# Patient Record
Sex: MALE | Race: WHITE | NOT HISPANIC OR LATINO | URBAN - METROPOLITAN AREA
[De-identification: names, ages, dates, MRNs, and addresses within clinical notes are randomized per-mention and may not be internally consistent; named-entity substitution may affect disease eponyms.]

---

## 2023-08-15 ENCOUNTER — TELEPHONE (OUTPATIENT)
Dept: UROLOGY | Facility: CLINIC | Age: 63
End: 2023-08-15
Payer: COMMERCIAL

## 2023-08-15 ENCOUNTER — HOSPITAL ENCOUNTER (EMERGENCY)
Facility: HOSPITAL | Age: 63
Discharge: HOME | End: 2023-08-15
Attending: EMERGENCY MEDICINE | Admitting: EMERGENCY MEDICINE
Payer: COMMERCIAL

## 2023-08-15 ENCOUNTER — APPOINTMENT (OUTPATIENT)
Dept: CT IMAGING | Facility: HOSPITAL | Age: 63
End: 2023-08-15
Payer: COMMERCIAL

## 2023-08-15 VITALS
HEART RATE: 101 BPM | SYSTOLIC BLOOD PRESSURE: 157 MMHG | OXYGEN SATURATION: 92 % | RESPIRATION RATE: 18 BRPM | HEIGHT: 70 IN | WEIGHT: 179.01 LBS | DIASTOLIC BLOOD PRESSURE: 88 MMHG | BODY MASS INDEX: 25.63 KG/M2 | TEMPERATURE: 98.2 F

## 2023-08-15 DIAGNOSIS — I71.43 INFRARENAL ABDOMINAL AORTIC ANEURYSM (AAA) WITHOUT RUPTURE: ICD-10-CM

## 2023-08-15 DIAGNOSIS — N20.1 URETEROLITHIASIS: Primary | ICD-10-CM

## 2023-08-15 DIAGNOSIS — N28.89 LEFT RENAL MASS: ICD-10-CM

## 2023-08-15 LAB
ALBUMIN SERPL-MCNC: 4.5 G/DL (ref 3.5–5.2)
ALBUMIN/GLOB SERPL: 1.6 G/DL
ALP SERPL-CCNC: 77 U/L (ref 39–117)
ALT SERPL W P-5'-P-CCNC: 23 U/L (ref 1–41)
ANION GAP SERPL CALCULATED.3IONS-SCNC: 11.1 MMOL/L (ref 5–15)
AST SERPL-CCNC: 22 U/L (ref 1–40)
BASOPHILS # BLD AUTO: 0.07 10*3/MM3 (ref 0–0.2)
BASOPHILS NFR BLD AUTO: 0.7 % (ref 0–1.5)
BILIRUB SERPL-MCNC: 0.3 MG/DL (ref 0–1.2)
BILIRUB UR QL STRIP: NEGATIVE
BUN SERPL-MCNC: 17 MG/DL (ref 8–23)
BUN/CREAT SERPL: 17 (ref 7–25)
CALCIUM SPEC-SCNC: 8.9 MG/DL (ref 8.6–10.5)
CHLORIDE SERPL-SCNC: 102 MMOL/L (ref 98–107)
CLARITY UR: CLEAR
CO2 SERPL-SCNC: 24.9 MMOL/L (ref 22–29)
COLOR UR: YELLOW
CREAT SERPL-MCNC: 1 MG/DL (ref 0.76–1.27)
D-LACTATE SERPL-SCNC: 1.5 MMOL/L (ref 0.5–2)
DEPRECATED RDW RBC AUTO: 45 FL (ref 37–54)
EGFRCR SERPLBLD CKD-EPI 2021: 84 ML/MIN/1.73
EOSINOPHIL # BLD AUTO: 0.44 10*3/MM3 (ref 0–0.4)
EOSINOPHIL NFR BLD AUTO: 4.3 % (ref 0.3–6.2)
ERYTHROCYTE [DISTWIDTH] IN BLOOD BY AUTOMATED COUNT: 15.4 % (ref 12.3–15.4)
GLOBULIN UR ELPH-MCNC: 2.8 GM/DL
GLUCOSE SERPL-MCNC: 160 MG/DL (ref 65–99)
GLUCOSE UR STRIP-MCNC: ABNORMAL MG/DL
HCT VFR BLD AUTO: 45.4 % (ref 37.5–51)
HGB BLD-MCNC: 15.9 G/DL (ref 13–17.7)
HGB UR QL STRIP.AUTO: NEGATIVE
HOLD SPECIMEN: NORMAL
HOLD SPECIMEN: NORMAL
IMM GRANULOCYTES # BLD AUTO: 0.03 10*3/MM3 (ref 0–0.05)
IMM GRANULOCYTES NFR BLD AUTO: 0.3 % (ref 0–0.5)
KETONES UR QL STRIP: NEGATIVE
LEUKOCYTE ESTERASE UR QL STRIP.AUTO: NEGATIVE
LIPASE SERPL-CCNC: 21 U/L (ref 13–60)
LYMPHOCYTES # BLD AUTO: 1.45 10*3/MM3 (ref 0.7–3.1)
LYMPHOCYTES NFR BLD AUTO: 14.3 % (ref 19.6–45.3)
MCH RBC QN AUTO: 29.7 PG (ref 26.6–33)
MCHC RBC AUTO-ENTMCNC: 35 G/DL (ref 31.5–35.7)
MCV RBC AUTO: 84.9 FL (ref 79–97)
MONOCYTES # BLD AUTO: 0.56 10*3/MM3 (ref 0.1–0.9)
MONOCYTES NFR BLD AUTO: 5.5 % (ref 5–12)
NEUTROPHILS NFR BLD AUTO: 7.58 10*3/MM3 (ref 1.7–7)
NEUTROPHILS NFR BLD AUTO: 74.9 % (ref 42.7–76)
NITRITE UR QL STRIP: NEGATIVE
NRBC BLD AUTO-RTO: 0 /100 WBC (ref 0–0.2)
PH UR STRIP.AUTO: 7.5 [PH] (ref 5–8)
PLATELET # BLD AUTO: 279 10*3/MM3 (ref 140–450)
PMV BLD AUTO: 9 FL (ref 6–12)
POTASSIUM SERPL-SCNC: 3.6 MMOL/L (ref 3.5–5.2)
PROT SERPL-MCNC: 7.3 G/DL (ref 6–8.5)
PROT UR QL STRIP: ABNORMAL
RBC # BLD AUTO: 5.35 10*6/MM3 (ref 4.14–5.8)
SODIUM SERPL-SCNC: 138 MMOL/L (ref 136–145)
SP GR UR STRIP: 1.01 (ref 1–1.03)
UROBILINOGEN UR QL STRIP: ABNORMAL
WBC NRBC COR # BLD: 10.13 10*3/MM3 (ref 3.4–10.8)
WHOLE BLOOD HOLD COAG: NORMAL
WHOLE BLOOD HOLD SPECIMEN: NORMAL

## 2023-08-15 PROCEDURE — 81003 URINALYSIS AUTO W/O SCOPE: CPT

## 2023-08-15 PROCEDURE — 96374 THER/PROPH/DIAG INJ IV PUSH: CPT

## 2023-08-15 PROCEDURE — 99285 EMERGENCY DEPT VISIT HI MDM: CPT

## 2023-08-15 PROCEDURE — 25010000002 KETOROLAC TROMETHAMINE PER 15 MG: Performed by: NURSE PRACTITIONER

## 2023-08-15 PROCEDURE — 96375 TX/PRO/DX INJ NEW DRUG ADDON: CPT

## 2023-08-15 PROCEDURE — 25510000001 IOPAMIDOL PER 1 ML: Performed by: EMERGENCY MEDICINE

## 2023-08-15 PROCEDURE — 25010000002 ONDANSETRON PER 1 MG: Performed by: NURSE PRACTITIONER

## 2023-08-15 PROCEDURE — 25010000002 HYDROMORPHONE 1 MG/ML SOLUTION: Performed by: EMERGENCY MEDICINE

## 2023-08-15 PROCEDURE — 83690 ASSAY OF LIPASE: CPT | Performed by: EMERGENCY MEDICINE

## 2023-08-15 PROCEDURE — 74177 CT ABD & PELVIS W/CONTRAST: CPT

## 2023-08-15 PROCEDURE — 80053 COMPREHEN METABOLIC PANEL: CPT | Performed by: EMERGENCY MEDICINE

## 2023-08-15 PROCEDURE — 85025 COMPLETE CBC W/AUTO DIFF WBC: CPT

## 2023-08-15 PROCEDURE — 83605 ASSAY OF LACTIC ACID: CPT

## 2023-08-15 RX ORDER — TAMSULOSIN HYDROCHLORIDE 0.4 MG/1
0.4 CAPSULE ORAL ONCE
Status: COMPLETED | OUTPATIENT
Start: 2023-08-15 | End: 2023-08-15

## 2023-08-15 RX ORDER — ONDANSETRON 2 MG/ML
4 INJECTION INTRAMUSCULAR; INTRAVENOUS ONCE
Status: COMPLETED | OUTPATIENT
Start: 2023-08-15 | End: 2023-08-15

## 2023-08-15 RX ORDER — METHYLPREDNISOLONE 4 MG/1
TABLET ORAL
Qty: 21 TABLET | Refills: 0 | Status: SHIPPED | OUTPATIENT
Start: 2023-08-15

## 2023-08-15 RX ORDER — HYDROCODONE BITARTRATE AND ACETAMINOPHEN 7.5; 325 MG/1; MG/1
1 TABLET ORAL EVERY 6 HOURS PRN
Qty: 20 TABLET | Refills: 0 | Status: SHIPPED | OUTPATIENT
Start: 2023-08-15 | End: 2023-08-19 | Stop reason: HOSPADM

## 2023-08-15 RX ORDER — KETOROLAC TROMETHAMINE 10 MG/1
10 TABLET, FILM COATED ORAL EVERY 6 HOURS PRN
Qty: 20 TABLET | Refills: 0 | Status: SHIPPED | OUTPATIENT
Start: 2023-08-15

## 2023-08-15 RX ORDER — SODIUM CHLORIDE 0.9 % (FLUSH) 0.9 %
10 SYRINGE (ML) INJECTION AS NEEDED
Status: DISCONTINUED | OUTPATIENT
Start: 2023-08-15 | End: 2023-08-15 | Stop reason: HOSPADM

## 2023-08-15 RX ORDER — MYCOPHENOLATE MOFETIL 500 MG/1
500 TABLET ORAL DAILY
COMMUNITY
Start: 2023-08-14

## 2023-08-15 RX ORDER — TAMSULOSIN HYDROCHLORIDE 0.4 MG/1
1 CAPSULE ORAL DAILY
Qty: 30 CAPSULE | Refills: 0 | Status: ON HOLD | OUTPATIENT
Start: 2023-08-15 | End: 2023-08-18 | Stop reason: SDUPTHER

## 2023-08-15 RX ORDER — KETOROLAC TROMETHAMINE 30 MG/ML
30 INJECTION, SOLUTION INTRAMUSCULAR; INTRAVENOUS ONCE
Status: COMPLETED | OUTPATIENT
Start: 2023-08-15 | End: 2023-08-15

## 2023-08-15 RX ADMIN — HYDROMORPHONE HYDROCHLORIDE 0.5 MG: 1 INJECTION, SOLUTION INTRAMUSCULAR; INTRAVENOUS; SUBCUTANEOUS at 08:59

## 2023-08-15 RX ADMIN — KETOROLAC TROMETHAMINE 30 MG: 30 INJECTION, SOLUTION INTRAMUSCULAR; INTRAVENOUS at 06:20

## 2023-08-15 RX ADMIN — IOPAMIDOL 100 ML: 755 INJECTION, SOLUTION INTRAVENOUS at 07:42

## 2023-08-15 RX ADMIN — ONDANSETRON 4 MG: 2 INJECTION INTRAMUSCULAR; INTRAVENOUS at 06:20

## 2023-08-15 RX ADMIN — TAMSULOSIN HYDROCHLORIDE 0.4 MG: 0.4 CAPSULE ORAL at 07:21

## 2023-08-15 NOTE — DISCHARGE INSTRUCTIONS
Increase your daily fluid intake.  Strain your urine for passage of stone.  Follow-up with urology.  He will see you a phone call with a follow-up appointment to see Dr. Spicer or one of her associates.  Follow-up with Dr. Velazco to discuss treatment options regarding your abdominal aortic aneurysm.  Return to the ER for severe abdominal pain, severe back pain, if you pass out, or any other concerns issues that may arise.

## 2023-08-15 NOTE — PROGRESS NOTES
UROLOGY OFFICE H&P NOTE    Subjective   HPI  Danny Savage is a 62 y.o. male.  Presents for evaluation of ureteral stone and renal mass after abnormal CT scan after presentation to the ER for acute left-sided flank pain.  Accompanied by his wife.    The patient was seen in the emergency room the other day for a kidney stone. He states that this is the third bad kidney stone he has had. The patient denies any previous surgeries for kidney stones. He states that the pain was worse than it has ever hurt before because he was vomiting with it.     The patient states that he had a CT scan done and was told that the mass on the left kidney was probably cancerous. He also had an aortic aneurysm. The patient states that the aneurysm was 4.7 x 4.6 cm. He was told to follow up later with a vascular surgeon.     The patient is doing well from a vomiting standpoint. He denies any fevers. The patient feels like he has passed the stone. He has zero pain at all in the tube where the stone will be lodged. The patient has not seen it coming out. He denies any burning. The patient was dehydrated so badly that he woke up at 5:30 AM and needed to go to the hospital. He vomited at home quite a bit. He drank a lot of water. The patient has not been seeing any blood in his urine.     Denies family history of genitourinary cancer.. The patient quit smoking about 10 to 12 years ago.     Denies history of renal insufficiency.  Denies unintentional weight loss or hematuria.    _______  Creatinine   8/15/2023: 1.0    UA 8/15/2023: Negative for hematuria, nitrite negative    CT abdomen pelvis with contrast 8/15/2023: (Urology findings)  4.5 mm obstructing stone in the left UPJ causing mild obstructing   uropathy.  A couple of other 2 mm nonobstructing stones are present in the left kidney as well.    2. 4.2 x 3.7 cm heterogeneous soft tissue partially exophytic mass in the lower pole of the left   kidney suspicious for renal cell  carcinoma/malignancy until proven otherwise.  Urology consultation   is recommended.      Results for orders placed or performed during the hospital encounter of 08/15/23   Comprehensive Metabolic Panel    Specimen: Blood   Result Value Ref Range    Glucose 160 (H) 65 - 99 mg/dL    BUN 17 8 - 23 mg/dL    Creatinine 1.00 0.76 - 1.27 mg/dL    Sodium 138 136 - 145 mmol/L    Potassium 3.6 3.5 - 5.2 mmol/L    Chloride 102 98 - 107 mmol/L    CO2 24.9 22.0 - 29.0 mmol/L    Calcium 8.9 8.6 - 10.5 mg/dL    Total Protein 7.3 6.0 - 8.5 g/dL    Albumin 4.5 3.5 - 5.2 g/dL    ALT (SGPT) 23 1 - 41 U/L    AST (SGOT) 22 1 - 40 U/L    Alkaline Phosphatase 77 39 - 117 U/L    Total Bilirubin 0.3 0.0 - 1.2 mg/dL    Globulin 2.8 gm/dL    A/G Ratio 1.6 g/dL    BUN/Creatinine Ratio 17.0 7.0 - 25.0    Anion Gap 11.1 5.0 - 15.0 mmol/L    eGFR 84.0 >60.0 mL/min/1.73   Lipase    Specimen: Blood   Result Value Ref Range    Lipase 21 13 - 60 U/L   Urinalysis With Microscopic If Indicated (No Culture) - Urine, Clean Catch    Specimen: Urine, Clean Catch   Result Value Ref Range    Color, UA Yellow Yellow, Straw    Appearance, UA Clear Clear    pH, UA 7.5 5.0 - 8.0    Specific Gravity, UA 1.009 1.005 - 1.030    Glucose,  mg/dL (1+) (A) Negative    Ketones, UA Negative Negative    Bilirubin, UA Negative Negative    Blood, UA Negative Negative    Protein, UA Trace (A) Negative    Leuk Esterase, UA Negative Negative    Nitrite, UA Negative Negative    Urobilinogen, UA 0.2 E.U./dL 0.2 - 1.0 E.U./dL   Lactic Acid, Plasma    Specimen: Blood   Result Value Ref Range    Lactate 1.5 0.5 - 2.0 mmol/L   CBC Auto Differential    Specimen: Blood   Result Value Ref Range    WBC 10.13 3.40 - 10.80 10*3/mm3    RBC 5.35 4.14 - 5.80 10*6/mm3    Hemoglobin 15.9 13.0 - 17.7 g/dL    Hematocrit 45.4 37.5 - 51.0 %    MCV 84.9 79.0 - 97.0 fL    MCH 29.7 26.6 - 33.0 pg    MCHC 35.0 31.5 - 35.7 g/dL    RDW 15.4 12.3 - 15.4 %    RDW-SD 45.0 37.0 - 54.0 fl    MPV 9.0  6.0 - 12.0 fL    Platelets 279 140 - 450 10*3/mm3    Neutrophil % 74.9 42.7 - 76.0 %    Lymphocyte % 14.3 (L) 19.6 - 45.3 %    Monocyte % 5.5 5.0 - 12.0 %    Eosinophil % 4.3 0.3 - 6.2 %    Basophil % 0.7 0.0 - 1.5 %    Immature Grans % 0.3 0.0 - 0.5 %    Neutrophils, Absolute 7.58 (H) 1.70 - 7.00 10*3/mm3    Lymphocytes, Absolute 1.45 0.70 - 3.10 10*3/mm3    Monocytes, Absolute 0.56 0.10 - 0.90 10*3/mm3    Eosinophils, Absolute 0.44 (H) 0.00 - 0.40 10*3/mm3    Basophils, Absolute 0.07 0.00 - 0.20 10*3/mm3    Immature Grans, Absolute 0.03 0.00 - 0.05 10*3/mm3    nRBC 0.0 0.0 - 0.2 /100 WBC   Green Top (Gel)   Result Value Ref Range    Extra Tube Hold for add-ons.    Lavender Top   Result Value Ref Range    Extra Tube hold for add-on    Gold Top - SST   Result Value Ref Range    Extra Tube Hold for add-ons.    Light Blue Top   Result Value Ref Range    Extra Tube Hold for add-ons.          Medical History:  Past Medical History:   Diagnosis Date    Allergy         Social History:  Social History     Socioeconomic History    Marital status:    Tobacco Use    Smoking status: Former     Types: Cigarettes    Smokeless tobacco: Never   Vaping Use    Vaping Use: Never used   Substance and Sexual Activity    Alcohol use: Never    Drug use: Never    Sexual activity: Yes     Partners: Female        Family History:  History reviewed. No pertinent family history.     Surgical History:  History reviewed. No pertinent surgical history.     Allergies:  Allergies   Allergen Reactions    Latex, Natural Rubber Hives        Current Medications:  No current facility-administered medications for this visit.     No current outpatient medications on file.     Facility-Administered Medications Ordered in Other Visits   Medication Dose Route Frequency Provider Last Rate Last Admin    sennosides-docusate (PERICOLACE) 8.6-50 MG per tablet 2 tablet  2 tablet Oral BID , Dimpi, DO        And    polyethylene glycol (MIRALAX) packet 17  g  17 g Oral Daily PRN , Dimpi, DO        And    bisacodyl (DULCOLAX) EC tablet 5 mg  5 mg Oral Daily PRN , Dimpi, DO        And    bisacodyl (DULCOLAX) suppository 10 mg  10 mg Rectal Daily PRN , Dimpi, DO        Calcium Replacement - Follow Nurse / BPA Driven Protocol   Does not apply PRN , Dimpi, DO        cefTRIAXone (ROCEPHIN) IVPB 1,000 mg  1,000 mg Intravenous Q24H , Dimpi, DO        cetirizine (zyrTEC) tablet 10 mg  10 mg Oral Daily , Dimpi, DO        Enoxaparin Sodium (LOVENOX) syringe 40 mg  40 mg Subcutaneous Daily , Dimpi, DO        famotidine (PEPCID) tablet 40 mg  40 mg Oral Daily , Dimpi, DO   40 mg at 08/17/23 1956    hydrALAZINE (APRESOLINE) injection 10 mg  10 mg Intravenous Q6H PRN , Dimpi, DO        HYDROcodone-acetaminophen (NORCO) 5-325 MG per tablet 1 tablet  1 tablet Oral Q4H PRN , Dimpi, DO   1 tablet at 08/17/23 1956    HYDROmorphone (DILAUDID) injection 0.5 mg  0.5 mg Intravenous Q2H PRN , Dimpi, DO   0.5 mg at 08/18/23 0349    And    naloxone (NARCAN) injection 0.4 mg  0.4 mg Intravenous Q5 Min PRN , Dimpi, DO        ketorolac (TORADOL) injection 15 mg  15 mg Intravenous Q6H PRN , Dimpi, DO   15 mg at 08/18/23 0528    Magnesium Standard Dose Replacement - Follow Nurse / BPA Driven Protocol   Does not apply PRN , Dimpi, DO        melatonin tablet 5 mg  5 mg Oral Nightly PRN , Dimpi, DO        mycophenolate (CELLCEPT) capsule 500 mg  500 mg Oral Daily , Dimpi, DO        ondansetron (ZOFRAN) tablet 4 mg  4 mg Oral Q6H PRN , Dimpi, DO        Or    ondansetron (ZOFRAN) injection 4 mg  4 mg Intravenous Q6H PRN , Dimpi, DO        Phosphorus Replacement - Follow Nurse / BPA Driven Protocol   Does not apply PRN , Dimpi, DO        Potassium Replacement - Follow Nurse / BPA Driven Protocol   Does not apply PRN Jack Patel, DO        sodium chloride 0.9 % flush 10 mL  10 mL Intravenous PRN Alberto Carrero,  DO        sodium chloride 0.9 % flush 10 mL  10 mL Intravenous Q12H , Dimpi, DO        sodium chloride 0.9 % flush 10 mL  10 mL Intravenous PRN , Dimpi, DO        sodium chloride 0.9 % infusion 40 mL  40 mL Intravenous PRN , Dimpi, DO        sodium chloride 0.9 % infusion  100 mL/hr Intravenous Continuous , Dimpi,  mL/hr at 08/18/23 0405 100 mL/hr at 08/18/23 0405    tamsulosin (FLOMAX) 24 hr capsule 0.8 mg  0.8 mg Oral Daily , Dimpi, DO           Review of systems  A review of systems was performed, and positive findings are noted in the HPI.    Objective     Physical exam  No acute distress, well-nourished  Lungs: Clear, unlabored  CV: Regular rate, no chest retractions  Awake alert and oriented  Mood normal; affect normal    Results  PROCEDURE:  CT ABDOMEN PELVIS W CONTRAST     COMPARISON: [facility], CR, IV PYELOGRAM & TIMI, 2/23/2004, 10:54.     INDICATIONS:  BILATERAL FLANK PAIN WITH NO KNOWN HEMATURIA     TECHNIQUE:    After obtaining the patient's consent, CT images were created with non-ionic intravenous   contrast material.       PROTOCOL:     Standard imaging protocol performed                 RADIATION:      DLP: 764.9mGy*cm               Automated exposure control was utilized to minimize radiation dose.   CONTRAST:      100cc Isovue 370 I.V.     FINDINGS:          The lung bases are free of active disease.  There is cardiomegaly.  The liver appears mildly   diffusely fatty infiltrated.  No suspicious liver.  Spleen pancreas adrenal glands are   unremarkable.  There is mild left hydroureteronephrosis due to a 4.5 mm left UPJ stone.  There are   a couple of 2 mm stones in the calices of the upper and midportion of the left kidney.    Additionally, arising from the lower pole of the left kidney is a partially exophytic slightly   heterogeneous mass measuring 4.2 x 3.7 x 5.3 cm in AP transverse and craniocaudal dimensions.  It   has Hounsfield units of 64 on the  portal venous phase images and 65 on the delayed images.  It is   suspicious for malignancy until proven otherwise.  There is a sub cm cortical cyst in the anterior   contralateral right kidney which otherwise is unremarkable.  The bladder is well distended without   focal abnormality.  The prostate gland is mildly enlarged with coarse central calcifications.       There is a small hiatal hernia.  The GI tract is normal in course and caliber.  No focal bowel wall   thickening.  There is mild descending and sigmoid colonic diverticulosis.  No fluid collections are   seen.  No pathologically enlarged lymph nodes.  There is Caroline nephric and Caroline ureteral fat   stranding around the left kidney and proximal left ureter.     There is a fusiform infrarenal abdominal aortic aneurysm measuring 4.7 x 4.6 cm in AP and   transverse dimensions.  There is a moderate amount of atherosclerotic disease in the abdominal   aorta.  No acute bony abnormalities or aggressive appearing focal osseous lesions are seen.     IMPRESSION:               1. There is a 4.5 mm obstructing stone in the left UPJ causing mild obstructing   uropathy.  A couple of other 2 mm nonobstructing stones are present in the left kidney as well.    2. 4.2 x 3.7 cm heterogeneous soft tissue partially exophytic mass in the lower pole of the left   kidney suspicious for renal cell carcinoma/malignancy until proven otherwise.  Urology consultation   is recommended.  3. Fusiform 4.7 x 4.6 cm infrarenal abdominal aortic aneurysm with a moderate amount of   atherosclerotic disease.  There are no prior imaging studies available for comparison to evaluate   for change.  If this has not been previously evaluated elsewhere, vascular surgery consultation on   a nonemergent basis is recommended.  4. Other incidental chronic ancillary findings are described above.        COBY DEGROOT DO         Electronically Signed and Approved By: COBY DEGROOT DO on 8/15/2023 at 8:18              Problem List:  Patient Active Problem List   Diagnosis    Left renal mass    Kidney stone    Left ureteral stone       Assessment & Plan     Diagnoses and all orders for this visit:    1. Ureteral stone (Primary)  -     XR Abdomen KUB; Future    2. Renal mass      We discussed the results of his CT scan in detail.       The patient and patient's wife were counseled regarding diagnostic results, prognosis and risks and benefits of treatment options.             The patient has a  4.2 x 3.7 left lower pole left renal mass concerning for renal cell carcinoma. This appears to be amendable to Nephron Sparing Surgery. I discussed the risks of this including bleeding, infection, pain, injury to ureter and renal pelvis, urine leak, recurrence, need for nephrectomy. I discussed the role of biopsy in my opinion and the degree of false negative biopsies and the 85% likelihood of a small renal mass being malignant.   Patient aware that this mass may be malignant but diagnosis is uncertain without tissue pathology.  Discussed that the risk of it being malignant are increased as size increase.     Recommend surgical excision of this left renal mass.  Discussed the risks of partial nephrectomy this including bleeding, infection, damage to surrounding structures, pain, injury to ureter and renal pelvis, urine leak, recurrence, need for nephrectomy, need for open surgery, benign pathology, and permanent loss of renal function.  Also aware that this is a procedure performed under general anesthesia which carries inherent risks including and up to death.  Patient participated in the discussion and notes understanding of these risks.      Willing to schedule left robotic assisted laparoscopic partial nephrectomy once acute issues resolve.      Left ureteral stone and nephrolithiasis    Potentially passed the 4.5 mm stone in left kidney.  -     The patient needs follow up imaging due to the size, and degree of hydronephrosis  to verify the stone has passed particularly prior to necessary intervention of his left renal mass  -    Obtain x-ray in about 2 weeks  -     If the stone has not passed within 4 to 6 weeks, we will make a plan to take it out.  -     If he gets a recurrence of pain, a fever over 101, or persistent vomiting, he should call the office, and we will schedule to have the stone removed.   -     Continue to stay well hydrated.   - If the stone is not seen on x-ray, then we need to wait until we can get another CT scan to make sure that that stone is not there and address it.    We will notify him of KUB results in about 2 weeks   Schedule OR left robotic partial nephrectomy   all questions and concerns have been addressed at this time.        Transcribed from ambient dictation for Michelle Cai MD by Gracie Mcgee.  08/16/23   10:28 EDT    Patient or patient representative verbalized consent to the visit recording.  I have personally performed the services described in this document as transcribed by the above individual, and it is both accurate and complete.

## 2023-08-15 NOTE — ED PROVIDER NOTES
Time: 6:58 AM EDT  Date of encounter:  8/15/2023  Independent Historian/Clinical History and Information was obtained by:   Patient  Chief Complaint: Flank pain    History is limited by: N/A    History of Present Illness:  Patient is a 62 y.o. year old male who presents to the emergency department for evaluation of flank pain. Pt states that he developed left sided flank pain abruptly around 0330 this morning. He states that the pain has been constant since onset. His pain has been relieved with Toradol in the ED. He denies any aggravating factors. He states that his pain started in the posterior aspect of his flank but it has since radiated into his LLQ. He rates his pain as a 10/10 at its worst. He also confirms several episodes of vomiting and urinary frequency. He denies nausea, diarrhea, or hematuria. He has a history of kidney stones but he states that this pain feels different.    hospitals    Patient Care Team  Primary Care Provider: Provider, No Known    Past Medical History:     Allergies   Allergen Reactions    Latex, Natural Rubber Hives     History reviewed. No pertinent past medical history.  History reviewed. No pertinent surgical history.  History reviewed. No pertinent family history.    Home Medications:  Prior to Admission medications    Not on File        Social History:          Review of Systems:  Review of Systems   Constitutional:  Negative for chills and fever.   HENT:  Negative for congestion, ear pain and sore throat.    Eyes:  Negative for pain.   Respiratory:  Negative for cough and shortness of breath.    Cardiovascular:  Negative for chest pain.   Gastrointestinal:  Positive for abdominal pain and vomiting. Negative for diarrhea and nausea.   Genitourinary:  Positive for flank pain and frequency. Negative for dysuria and hematuria.   Musculoskeletal:  Negative for back pain.   Skin:  Negative for pallor.   Neurological:  Negative for headaches.   All other systems reviewed and are negative.  "    Physical Exam:  /88 (BP Location: Left arm, Patient Position: Lying)   Pulse 101   Temp 98.2 øF (36.8 øC) (Oral)   Resp 18   Ht 177.8 cm (70\")   Wt 81.2 kg (179 lb 0.2 oz)   SpO2 92%   BMI 25.69 kg/mý      Vital signs were reviewed under triage note.  General appearance - Patient appears well-developed and well-nourished.  Patient is in no acute distress.  Head - Normocephalic, atraumatic.  Pupils - Equal, round, reactive to light.  Extraocular muscles are intact.  Conjunctiva is clear.  Nasal - Normal inspection.  No evidence of trauma or epistaxis.  Tympanic membranes - Gray, intact without erythema or retractions.  Oral mucosa - Pink and moist without lesions or erythema.  Uvula is midline.  Chest wall - Atraumatic.  Chest wall is nontender.  There are no vesicular rashes noted.  Neck - Supple.  Trachea was midline.  There is no palpable lymphadenopathy or thyromegaly.  There are no meningeal signs  Lungs - Clear to auscultation and percussion bilaterally.  Heart - Regular rate and rhythm without any murmurs, clicks, or gallops.  Abdomen - Soft.  Bowel sounds are present.  There is mild left lower quadrant palpable tenderness.  There is no rebound, guarding, or rigidity.  There are no palpable masses.  There are no pulsatile masses.  Back - Spine is straight and midline.  There is no CVA tenderness.  Extremities - Intact x4 with full range of motion.  There is no palpable edema.  Pulses are intact x4 and equal.  Neurologic - Patient is awake, alert, and oriented x3.  Cranial nerves II through XII are grossly intact.  Motor and sensory functions grossly intact.  Cerebellar function was normal.  Integument - There are no rashes.  There are no petechia or purpura lesions noted.  There are no vesicular lesions noted.            Procedures:  Procedures      Medical Decision Making:      Comorbidities that affect care:    History of Nephrolithiasis    External Notes reviewed:    None      The following " orders were placed and all results were independently analyzed by me:  Orders Placed This Encounter   Procedures    CT Abdomen Pelvis With Contrast    [place] Draw    Comprehensive Metabolic Panel    Lipase    Urinalysis With Microscopic If Indicated (No Culture) - Urine, Clean Catch    Lactic Acid, Plasma    CBC Auto Differential    Undress & Gown    CBC & Differential    Green Top (Gel)    Lavender Top    Gold Top - SST    Light Blue Top       Medications Given in the Emergency Department:  Medications   ketorolac (TORADOL) injection 30 mg (30 mg Intravenous Given 8/15/23 0620)   ondansetron (ZOFRAN) injection 4 mg (4 mg Intravenous Given 8/15/23 0620)   tamsulosin (FLOMAX) 24 hr capsule 0.4 mg (0.4 mg Oral Given 8/15/23 0721)   iopamidol (ISOVUE-370) 76 % injection 100 mL (100 mL Intravenous Given 8/15/23 0742)   HYDROmorphone (DILAUDID) injection 0.5 mg (0.5 mg Intravenous Given 8/15/23 0859)        ED Course:    Danny Savage is a 62 year old male who presented to the ED for evaluation of flank pain. I personally evaluated this patient during their visit to the ED. History was obtained. Their physical exam was unremarkable. I ordered appropriate labs and imaging related to this patient's chief complaint. Their work up in the ED was remarkable for multiple findings on his CT imaging. Significant findings include a 4.5 mm obstructing stone in the left UPJ, a 4.2 x 3.7 cm soft tissue mass in the lower pole of the left kidney, and a 4.7 x 4.6 cm infrarenal AAA. Because of these findings I consulted Dr. Cai who agreed to see this patient in the office. I also recommended outpatient follow up with vascular surgery for clinical monitoring of his AAA. I discussed the work up results with the patient. I instructed them to return to the ED with any new or worsening symptoms. The patient was agreeable to the plan of care. I determined this patient to be stable and appropriate for discharge. I addressed all of this  patient's questions and the patient has no further questions.      Labs:    Lab Results (last 24 hours)       Procedure Component Value Units Date/Time    Urinalysis With Microscopic If Indicated (No Culture) - Urine, Clean Catch [[id number]]  (Abnormal) Collected: 08/15/23 0606    Specimen: Urine, Clean Catch Updated: 08/15/23 0617     Color, UA Yellow     Appearance, UA Clear     pH, UA 7.5     Specific Gravity, UA 1.009     Glucose,  mg/dL (1+)     Ketones, UA Negative     Bilirubin, UA Negative     Blood, UA Negative     Protein, UA Trace     Leuk Esterase, UA Negative     Nitrite, UA Negative     Urobilinogen, UA 0.2 E.U./dL    Narrative:      Urine microscopic not indicated.    CBC & Differential [[id number]]  (Abnormal) Collected: 08/15/23 0613    Specimen: Blood Updated: 08/15/23 0624    Narrative:      The following orders were created for panel order CBC & Differential.  Procedure                               Abnormality         Status                     ---------                               -----------         ------                     CBC Auto Differential[[id number]]        Abnormal            Final result                 Please view results for these tests on the individual orders.    Lactic Acid, Plasma [[id number]]  (Normal) Collected: 08/15/23 0613    Specimen: Blood Updated: 08/15/23 0638     Lactate 1.5 mmol/L     CBC Auto Differential [[id number]]  (Abnormal) Collected: 08/15/23 0613    Specimen: Blood Updated: 08/15/23 0624     WBC 10.13 10*3/mm3      RBC 5.35 10*6/mm3      Hemoglobin 15.9 g/dL      Hematocrit 45.4 %      MCV 84.9 fL      MCH 29.7 pg      MCHC 35.0 g/dL      RDW 15.4 %      RDW-SD 45.0 fl      MPV 9.0 fL      Platelets 279 10*3/mm3      Neutrophil % 74.9 %      Lymphocyte % 14.3 %      Monocyte % 5.5 %      Eosinophil % 4.3 %      Basophil % 0.7 %      Immature Grans % 0.3 %      Neutrophils, Absolute 7.58 10*3/mm3      Lymphocytes, Absolute 1.45 10*3/mm3      Monocytes,  Absolute 0.56 10*3/mm3      Eosinophils, Absolute 0.44 10*3/mm3      Basophils, Absolute 0.07 10*3/mm3      Immature Grans, Absolute 0.03 10*3/mm3      nRBC 0.0 /100 WBC     Comprehensive Metabolic Panel [[id number]]  (Abnormal) Collected: 08/15/23 0640    Specimen: Blood Updated: 08/15/23 0704     Glucose 160 mg/dL      BUN 17 mg/dL      Creatinine 1.00 mg/dL      Sodium 138 mmol/L      Potassium 3.6 mmol/L      Comment: Slight hemolysis detected by analyzer. Results may be affected.        Chloride 102 mmol/L      CO2 24.9 mmol/L      Calcium 8.9 mg/dL      Total Protein 7.3 g/dL      Albumin 4.5 g/dL      ALT (SGPT) 23 U/L      AST (SGOT) 22 U/L      Alkaline Phosphatase 77 U/L      Total Bilirubin 0.3 mg/dL      Globulin 2.8 gm/dL      A/G Ratio 1.6 g/dL      BUN/Creatinine Ratio 17.0     Anion Gap 11.1 mmol/L      eGFR 84.0 mL/min/1.73     Narrative:      GFR Normal >60  Chronic Kidney Disease <60  Kidney Failure <15      Lipase [[id number]]  (Normal) Collected: 08/15/23 0640    Specimen: Blood Updated: 08/15/23 0704     Lipase 21 U/L              Imaging:    CT Abdomen Pelvis With Contrast    Result Date: 8/15/2023  PROCEDURE: CT ABDOMEN PELVIS W CONTRAST  COMPARISON: [facility], CR, IV PYELOGRAM & TIMI, 2/23/2004, 10:54.  INDICATIONS: BILATERAL FLANK PAIN WITH NO KNOWN HEMATURIA  TECHNIQUE: After obtaining the patient's consent, CT images were created with non-ionic intravenous contrast material.   PROTOCOL:   Standard imaging protocol performed    RADIATION:   DLP: 764.9mGy*cm   Automated exposure control was utilized to minimize radiation dose. CONTRAST: 100cc Isovue 370 I.V.  FINDINGS:  The lung bases are free of active disease.  There is cardiomegaly.  The liver appears mildly diffusely fatty infiltrated.  No suspicious liver.  Spleen pancreas adrenal glands are unremarkable.  There is mild left hydroureteronephrosis due to a 4.5 mm left UPJ stone.  There are a couple of 2 mm stones in the  calices of the upper and midportion of the left kidney.  Additionally, arising from the lower pole of the left kidney is a partially exophytic slightly heterogeneous mass measuring 4.2 x 3.7 x 5.3 cm in AP transverse and craniocaudal dimensions.  It has Hounsfield units of 64 on the portal venous phase images and 65 on the delayed images.  It is suspicious for malignancy until proven otherwise.  There is a sub cm cortical cyst in the anterior contralateral right kidney which otherwise is unremarkable.  The bladder is well distended without focal abnormality.  The prostate gland is mildly enlarged with coarse central calcifications.   There is a small hiatal hernia.  The GI tract is normal in course and caliber.  No focal bowel wall thickening.  There is mild descending and sigmoid colonic diverticulosis.  No fluid collections are seen.  No pathologically enlarged lymph nodes.  There is Caroline nephric and Caroline ureteral fat stranding around the left kidney and proximal left ureter.  There is a fusiform infrarenal abdominal aortic aneurysm measuring 4.7 x 4.6 cm in AP and transverse dimensions.  There is a moderate amount of atherosclerotic disease in the abdominal aorta.  No acute bony abnormalities or aggressive appearing focal osseous lesions are seen.       1. There is a 4.5 mm obstructing stone in the left UPJ causing mild obstructing uropathy.  A couple of other 2 mm nonobstructing stones are present in the left kidney as well.  2. 4.2 x 3.7 cm heterogeneous soft tissue partially exophytic mass in the lower pole of the left kidney suspicious for renal cell carcinoma/malignancy until proven otherwise.  Urology consultation is recommended. 3. Fusiform 4.7 x 4.6 cm infrarenal abdominal aortic aneurysm with a moderate amount of atherosclerotic disease.  There are no prior imaging studies available for comparison to evaluate for change.  If this has not been previously evaluated elsewhere, vascular surgery consultation  on a nonemergent basis is recommended. 4. Other incidental chronic ancillary findings are described above.    COBY DEGROOT DO       Electronically Signed and Approved By: COBY DEGROOT DO on 8/15/2023 at 8:18                Differential Diagnosis and Discussion:    Flank Pain: Differential diagnosis includes but is not limited to kidney stones, pyelonephritis, musculoskeletal disorders, renal infarction, urinary tract infection, hydronephrosis, radiculopathy, aortic aneurysm, renal cell carcinoma.    All labs were reviewed and interpreted by me.  CT scan radiology impression was interpreted by me.    MDM     Amount and/or Complexity of Data Reviewed  Clinical lab tests: reviewed  Tests in the radiology section of CP[facility]: reviewed             Patient Care Considerations:    MRI: I considered ordering an MRI however this can be performed as an outpatient if deemed necessary by Dr. Cai.      Consultants/Shared Management Plan:    Consultant: I have discussed the case with Dr. Cai who agrees to consult on the patient.    Social Determinants of Health:    Patient is independent, reliable, and has access to care.       Disposition and Care Coordination:    Discharged: I considered escalation of care by admitting this patient for observation, however the patient has improved and is suitable and  stable for discharge.    I have explained the patient's condition, diagnoses and treatment plan based on the information available to me at this time. I have answered questions and addressed any concerns. The patient has a good  understanding of the patient's diagnosis, condition, and treatment plan as can be expected at this point. The vital signs have been stable. The patient's condition is stable and appropriate for discharge from the emergency department.      The patient will pursue further outpatient evaluation with the primary care physician or other designated or consulting physician as outlined in the discharge  instructions. They are agreeable to this plan of care and follow-up instructions have been explained in detail. The patient has received these instructions in written format and have expressed an understanding of the discharge instructions. The patient is aware that any significant change in condition or worsening of symptoms should prompt an immediate return to this or the closest emergency department or call to 911.    Final diagnoses:   Ureterolithiasis   Left renal mass   Infrarenal abdominal aortic aneurysm (AAA) without rupture        ED Disposition       ED Disposition   Discharge    Condition   Stable    Comment   --               This medical record created using voice recognition software.             Teofilo Crawford DO  08/15/23 2036

## 2023-08-15 NOTE — TELEPHONE ENCOUNTER
"----- Message from Angelina Naresh sent at 8/15/2023  9:10 AM EDT -----  Regarding: needs appt  Per Dr Cai, please call this pt to sched a new pt appt \"renal stone/kidney mass.\" She is on call today and spoke to Dr Crawford (ER) about this pt. She wants to see him this week or next. He is currently still in the ER. I advised that you all will call him today. Thank you!    "

## 2023-08-16 ENCOUNTER — OFFICE VISIT (OUTPATIENT)
Dept: UROLOGY | Facility: CLINIC | Age: 63
End: 2023-08-16
Payer: COMMERCIAL

## 2023-08-16 ENCOUNTER — PREP FOR SURGERY (OUTPATIENT)
Dept: OTHER | Facility: HOSPITAL | Age: 63
End: 2023-08-16
Payer: COMMERCIAL

## 2023-08-16 DIAGNOSIS — N28.89 RENAL MASS: ICD-10-CM

## 2023-08-16 DIAGNOSIS — N28.89 LEFT RENAL MASS: Primary | ICD-10-CM

## 2023-08-16 DIAGNOSIS — N20.1 URETERAL STONE: Primary | ICD-10-CM

## 2023-08-17 ENCOUNTER — HOSPITAL ENCOUNTER (INPATIENT)
Facility: HOSPITAL | Age: 63
LOS: 2 days | Discharge: HOME | DRG: 661 | End: 2023-08-19
Attending: EMERGENCY MEDICINE | Admitting: HOSPITALIST
Payer: COMMERCIAL

## 2023-08-17 ENCOUNTER — APPOINTMENT (OUTPATIENT)
Dept: GENERAL RADIOLOGY | Facility: HOSPITAL | Age: 63
DRG: 661 | End: 2023-08-17
Payer: COMMERCIAL

## 2023-08-17 DIAGNOSIS — N28.89 LEFT RENAL MASS: ICD-10-CM

## 2023-08-17 DIAGNOSIS — N13.30 HYDRONEPHROSIS, UNSPECIFIED HYDRONEPHROSIS TYPE: ICD-10-CM

## 2023-08-17 DIAGNOSIS — N20.1 LEFT URETERAL STONE: ICD-10-CM

## 2023-08-17 DIAGNOSIS — R26.2 DIFFICULTY WALKING: ICD-10-CM

## 2023-08-17 DIAGNOSIS — N20.0 KIDNEY STONE: Primary | ICD-10-CM

## 2023-08-17 LAB
ALBUMIN SERPL-MCNC: 4.4 G/DL (ref 3.5–5.2)
ALBUMIN/GLOB SERPL: 1.8 G/DL
ALP SERPL-CCNC: 75 U/L (ref 39–117)
ALT SERPL W P-5'-P-CCNC: 21 U/L (ref 1–41)
ANION GAP SERPL CALCULATED.3IONS-SCNC: 11.1 MMOL/L (ref 5–15)
AST SERPL-CCNC: 20 U/L (ref 1–40)
BACTERIA UR QL AUTO: ABNORMAL /HPF
BASOPHILS # BLD AUTO: 0.05 10*3/MM3 (ref 0–0.2)
BASOPHILS NFR BLD AUTO: 0.8 % (ref 0–1.5)
BILIRUB SERPL-MCNC: 0.3 MG/DL (ref 0–1.2)
BILIRUB UR QL STRIP: NEGATIVE
BUN SERPL-MCNC: 20 MG/DL (ref 8–23)
BUN/CREAT SERPL: 17.7 (ref 7–25)
CALCIUM SPEC-SCNC: 8.9 MG/DL (ref 8.6–10.5)
CHLORIDE SERPL-SCNC: 102 MMOL/L (ref 98–107)
CLARITY UR: CLEAR
CO2 SERPL-SCNC: 24.9 MMOL/L (ref 22–29)
COLOR UR: YELLOW
CREAT SERPL-MCNC: 1.13 MG/DL (ref 0.76–1.27)
D-LACTATE SERPL-SCNC: 1.1 MMOL/L (ref 0.5–2)
DEPRECATED RDW RBC AUTO: 42.4 FL (ref 37–54)
EGFRCR SERPLBLD CKD-EPI 2021: 72.6 ML/MIN/1.73
EOSINOPHIL # BLD AUTO: 0.57 10*3/MM3 (ref 0–0.4)
EOSINOPHIL NFR BLD AUTO: 9.6 % (ref 0.3–6.2)
ERYTHROCYTE [DISTWIDTH] IN BLOOD BY AUTOMATED COUNT: 12.8 % (ref 12.3–15.4)
GLOBULIN UR ELPH-MCNC: 2.5 GM/DL
GLUCOSE SERPL-MCNC: 121 MG/DL (ref 65–99)
GLUCOSE UR STRIP-MCNC: ABNORMAL MG/DL
HCT VFR BLD AUTO: 46.1 % (ref 37.5–51)
HGB BLD-MCNC: 14.4 G/DL (ref 13–17.7)
HGB UR QL STRIP.AUTO: ABNORMAL
HOLD SPECIMEN: NORMAL
HOLD SPECIMEN: NORMAL
HYALINE CASTS UR QL AUTO: ABNORMAL /LPF
IMM GRANULOCYTES # BLD AUTO: 0.01 10*3/MM3 (ref 0–0.05)
IMM GRANULOCYTES NFR BLD AUTO: 0.2 % (ref 0–0.5)
KETONES UR QL STRIP: NEGATIVE
LEUKOCYTE ESTERASE UR QL STRIP.AUTO: NEGATIVE
LIPASE SERPL-CCNC: 22 U/L (ref 13–60)
LYMPHOCYTES # BLD AUTO: 1.41 10*3/MM3 (ref 0.7–3.1)
LYMPHOCYTES NFR BLD AUTO: 23.8 % (ref 19.6–45.3)
MCH RBC QN AUTO: 28.2 PG (ref 26.6–33)
MCHC RBC AUTO-ENTMCNC: 31.2 G/DL (ref 31.5–35.7)
MCV RBC AUTO: 90.4 FL (ref 79–97)
MONOCYTES # BLD AUTO: 0.59 10*3/MM3 (ref 0.1–0.9)
MONOCYTES NFR BLD AUTO: 9.9 % (ref 5–12)
NEUTROPHILS NFR BLD AUTO: 3.3 10*3/MM3 (ref 1.7–7)
NEUTROPHILS NFR BLD AUTO: 55.7 % (ref 42.7–76)
NITRITE UR QL STRIP: NEGATIVE
NRBC BLD AUTO-RTO: 0 /100 WBC (ref 0–0.2)
PH UR STRIP.AUTO: 7 [PH] (ref 5–8)
PLATELET # BLD AUTO: 278 10*3/MM3 (ref 140–450)
PMV BLD AUTO: 9.1 FL (ref 6–12)
POTASSIUM SERPL-SCNC: 4 MMOL/L (ref 3.5–5.2)
PROT SERPL-MCNC: 6.9 G/DL (ref 6–8.5)
PROT UR QL STRIP: NEGATIVE
RBC # BLD AUTO: 5.1 10*6/MM3 (ref 4.14–5.8)
RBC # UR STRIP: ABNORMAL /HPF
REF LAB TEST METHOD: ABNORMAL
SODIUM SERPL-SCNC: 138 MMOL/L (ref 136–145)
SP GR UR STRIP: 1.01 (ref 1–1.03)
SQUAMOUS #/AREA URNS HPF: ABNORMAL /HPF
UROBILINOGEN UR QL STRIP: ABNORMAL
WBC # UR STRIP: ABNORMAL /HPF
WBC NRBC COR # BLD: 5.93 10*3/MM3 (ref 3.4–10.8)
WHOLE BLOOD HOLD COAG: NORMAL
WHOLE BLOOD HOLD SPECIMEN: NORMAL

## 2023-08-17 PROCEDURE — 85025 COMPLETE CBC W/AUTO DIFF WBC: CPT

## 2023-08-17 PROCEDURE — 74018 RADEX ABDOMEN 1 VIEW: CPT

## 2023-08-17 PROCEDURE — 36415 COLL VENOUS BLD VENIPUNCTURE: CPT

## 2023-08-17 PROCEDURE — 83690 ASSAY OF LIPASE: CPT

## 2023-08-17 PROCEDURE — 25010000002 HYDROMORPHONE 1 MG/ML SOLUTION: Performed by: HOSPITALIST

## 2023-08-17 PROCEDURE — 99285 EMERGENCY DEPT VISIT HI MDM: CPT

## 2023-08-17 PROCEDURE — 25010000002 HYDROMORPHONE 1 MG/ML SOLUTION: Performed by: EMERGENCY MEDICINE

## 2023-08-17 PROCEDURE — 80053 COMPREHEN METABOLIC PANEL: CPT

## 2023-08-17 PROCEDURE — 25010000002 ONDANSETRON PER 1 MG: Performed by: EMERGENCY MEDICINE

## 2023-08-17 PROCEDURE — 83605 ASSAY OF LACTIC ACID: CPT

## 2023-08-17 PROCEDURE — 81001 URINALYSIS AUTO W/SCOPE: CPT | Performed by: EMERGENCY MEDICINE

## 2023-08-17 RX ORDER — LORATADINE 10 MG/1
10 TABLET ORAL DAILY
COMMUNITY

## 2023-08-17 RX ORDER — ENOXAPARIN SODIUM 100 MG/ML
40 INJECTION SUBCUTANEOUS DAILY
Status: DISCONTINUED | OUTPATIENT
Start: 2023-08-17 | End: 2023-08-19 | Stop reason: HOSPADM

## 2023-08-17 RX ORDER — HYDROCODONE BITARTRATE AND ACETAMINOPHEN 5; 325 MG/1; MG/1
1 TABLET ORAL EVERY 4 HOURS PRN
Status: DISCONTINUED | OUTPATIENT
Start: 2023-08-17 | End: 2023-08-19 | Stop reason: HOSPADM

## 2023-08-17 RX ORDER — SODIUM CHLORIDE 9 MG/ML
100 INJECTION, SOLUTION INTRAVENOUS CONTINUOUS
Status: DISCONTINUED | OUTPATIENT
Start: 2023-08-17 | End: 2023-08-19 | Stop reason: HOSPADM

## 2023-08-17 RX ORDER — ONDANSETRON 2 MG/ML
4 INJECTION INTRAMUSCULAR; INTRAVENOUS ONCE
Status: COMPLETED | OUTPATIENT
Start: 2023-08-17 | End: 2023-08-17

## 2023-08-17 RX ORDER — FAMOTIDINE 20 MG/1
40 TABLET, FILM COATED ORAL DAILY
Status: DISCONTINUED | OUTPATIENT
Start: 2023-08-17 | End: 2023-08-19 | Stop reason: HOSPADM

## 2023-08-17 RX ORDER — SODIUM CHLORIDE 9 MG/ML
40 INJECTION, SOLUTION INTRAVENOUS AS NEEDED
Status: DISCONTINUED | OUTPATIENT
Start: 2023-08-17 | End: 2023-08-19 | Stop reason: HOSPADM

## 2023-08-17 RX ORDER — TAMSULOSIN HYDROCHLORIDE 0.4 MG/1
0.4 CAPSULE ORAL ONCE
Status: COMPLETED | OUTPATIENT
Start: 2023-08-17 | End: 2023-08-17

## 2023-08-17 RX ORDER — TAMSULOSIN HYDROCHLORIDE 0.4 MG/1
0.8 CAPSULE ORAL DAILY
Status: DISCONTINUED | OUTPATIENT
Start: 2023-08-18 | End: 2023-08-18

## 2023-08-17 RX ORDER — CHOLECALCIFEROL (VITAMIN D3) 125 MCG
5 CAPSULE ORAL NIGHTLY PRN
Status: DISCONTINUED | OUTPATIENT
Start: 2023-08-17 | End: 2023-08-19 | Stop reason: HOSPADM

## 2023-08-17 RX ORDER — NALOXONE HCL 0.4 MG/ML
0.4 VIAL (ML) INJECTION
Status: DISCONTINUED | OUTPATIENT
Start: 2023-08-17 | End: 2023-08-19 | Stop reason: HOSPADM

## 2023-08-17 RX ORDER — CEFTRIAXONE SODIUM 1 G/50ML
1000 INJECTION, SOLUTION INTRAVENOUS ONCE
Status: COMPLETED | OUTPATIENT
Start: 2023-08-18 | End: 2023-08-18

## 2023-08-17 RX ORDER — POLYETHYLENE GLYCOL 3350 17 G/17G
17 POWDER, FOR SOLUTION ORAL DAILY PRN
Status: DISCONTINUED | OUTPATIENT
Start: 2023-08-17 | End: 2023-08-19 | Stop reason: HOSPADM

## 2023-08-17 RX ORDER — BISACODYL 10 MG
10 SUPPOSITORY, RECTAL RECTAL DAILY PRN
Status: DISCONTINUED | OUTPATIENT
Start: 2023-08-17 | End: 2023-08-19 | Stop reason: HOSPADM

## 2023-08-17 RX ORDER — KETOROLAC TROMETHAMINE 30 MG/ML
15 INJECTION, SOLUTION INTRAMUSCULAR; INTRAVENOUS EVERY 6 HOURS PRN
Status: DISPENSED | OUTPATIENT
Start: 2023-08-18 | End: 2023-08-18

## 2023-08-17 RX ORDER — SODIUM CHLORIDE 0.9 % (FLUSH) 0.9 %
10 SYRINGE (ML) INJECTION AS NEEDED
Status: DISCONTINUED | OUTPATIENT
Start: 2023-08-17 | End: 2023-08-19 | Stop reason: HOSPADM

## 2023-08-17 RX ORDER — CETIRIZINE HYDROCHLORIDE 10 MG/1
10 TABLET ORAL DAILY
Status: DISCONTINUED | OUTPATIENT
Start: 2023-08-18 | End: 2023-08-19 | Stop reason: HOSPADM

## 2023-08-17 RX ORDER — AMOXICILLIN 250 MG
2 CAPSULE ORAL 2 TIMES DAILY
Status: DISCONTINUED | OUTPATIENT
Start: 2023-08-17 | End: 2023-08-19 | Stop reason: HOSPADM

## 2023-08-17 RX ORDER — PSYLLIUM HUSK 0.4 G
1 CAPSULE ORAL 2 TIMES DAILY
COMMUNITY

## 2023-08-17 RX ORDER — BISACODYL 5 MG/1
5 TABLET, DELAYED RELEASE ORAL DAILY PRN
Status: DISCONTINUED | OUTPATIENT
Start: 2023-08-17 | End: 2023-08-19 | Stop reason: HOSPADM

## 2023-08-17 RX ORDER — ONDANSETRON 4 MG/1
4 TABLET, FILM COATED ORAL EVERY 6 HOURS PRN
Status: DISCONTINUED | OUTPATIENT
Start: 2023-08-17 | End: 2023-08-19 | Stop reason: HOSPADM

## 2023-08-17 RX ORDER — ONDANSETRON 2 MG/ML
4 INJECTION INTRAMUSCULAR; INTRAVENOUS EVERY 6 HOURS PRN
Status: DISCONTINUED | OUTPATIENT
Start: 2023-08-17 | End: 2023-08-19 | Stop reason: HOSPADM

## 2023-08-17 RX ORDER — MYCOPHENOLATE MOFETIL 250 MG/1
500 CAPSULE ORAL DAILY
Status: DISCONTINUED | OUTPATIENT
Start: 2023-08-18 | End: 2023-08-19 | Stop reason: HOSPADM

## 2023-08-17 RX ORDER — HYDRALAZINE HYDROCHLORIDE 20 MG/ML
10 INJECTION INTRAMUSCULAR; INTRAVENOUS EVERY 6 HOURS PRN
Status: DISCONTINUED | OUTPATIENT
Start: 2023-08-17 | End: 2023-08-19 | Stop reason: HOSPADM

## 2023-08-17 RX ORDER — SODIUM CHLORIDE 0.9 % (FLUSH) 0.9 %
10 SYRINGE (ML) INJECTION EVERY 12 HOURS SCHEDULED
Status: DISCONTINUED | OUTPATIENT
Start: 2023-08-17 | End: 2023-08-19 | Stop reason: HOSPADM

## 2023-08-17 RX ORDER — CEFTRIAXONE SODIUM 1 G/50ML
1000 INJECTION, SOLUTION INTRAVENOUS EVERY 24 HOURS
Status: DISCONTINUED | OUTPATIENT
Start: 2023-08-18 | End: 2023-08-19 | Stop reason: HOSPADM

## 2023-08-17 RX ADMIN — ONDANSETRON 4 MG: 2 INJECTION INTRAMUSCULAR; INTRAVENOUS at 16:36

## 2023-08-17 RX ADMIN — HYDROMORPHONE HYDROCHLORIDE 0.5 MG: 1 INJECTION, SOLUTION INTRAMUSCULAR; INTRAVENOUS; SUBCUTANEOUS at 18:55

## 2023-08-17 RX ADMIN — HYDROCODONE BITARTRATE AND ACETAMINOPHEN 1 TABLET: 5; 325 TABLET ORAL at 19:56

## 2023-08-17 RX ADMIN — HYDROMORPHONE HYDROCHLORIDE 1 MG: 1 INJECTION, SOLUTION INTRAMUSCULAR; INTRAVENOUS; SUBCUTANEOUS at 16:36

## 2023-08-17 RX ADMIN — FAMOTIDINE 40 MG: 20 TABLET ORAL at 19:56

## 2023-08-17 RX ADMIN — LIDOCAINE HYDROCHLORIDE 125 MG: 10 INJECTION, SOLUTION INFILTRATION; PERINEURAL at 21:04

## 2023-08-17 RX ADMIN — TAMSULOSIN HYDROCHLORIDE 0.4 MG: 0.4 CAPSULE ORAL at 21:05

## 2023-08-17 NOTE — ED PROVIDER NOTES
Time: 3:45 PM EDT  Date of encounter:  8/17/2023  Independent Historian/Clinical History and Information was obtained by:   Patient and Family    History is limited by: N/A    Chief Complaint   Patient presents with    Flank Pain         History of Present Illness:  Patient is a 62 y.o. year old male who presents to the emergency department for evaluation of flank pain.  Patient was diagnosed with ureteral stone 2 days ago.  Has been drinking fluids and taking Flomax as well as Toradol.  Today pain returned with significant severity.  Patient reports that the discussed surgical removal with Dr. Cai if pain did not improve.  Patient denies fever/chills, difficulty urinating.  BAKARI Gaffney    HPI    Patient Care Team  Primary Care Provider: Provider, No Known    Past Medical History:     Allergies   Allergen Reactions    Latex, Natural Rubber Hives     Past Medical History:   Diagnosis Date    Allergy      History reviewed. No pertinent surgical history.  History reviewed. No pertinent family history.    Home Medications:  Prior to Admission medications    Medication Sig Start Date End Date Taking? Authorizing Provider   HYDROcodone-acetaminophen (NORCO) 7.5-325 MG per tablet Take 1 tablet by mouth Every 6 (Six) Hours As Needed for Moderate Pain. 8/15/23   Teofilo Crawford DO   ketorolac (TORADOL) 10 MG tablet Take 1 tablet by mouth Every 6 (Six) Hours As Needed for Moderate Pain. 8/15/23   Teofilo Crawford DO   methylPREDNISolone (MEDROL) 4 MG dose pack Take as directed on package instructions. 8/15/23   Teofilo Crawford DO   mycophenolate (CELLCEPT) 500 MG tablet  8/14/23   Provider, MD Soniya   tamsulosin (FLOMAX) 0.4 MG capsule 24 hr capsule Take 1 capsule by mouth Daily. 8/15/23   Teofilo Crawford DO        Social History:   Social History     Tobacco Use    Smoking status: Former     Types: Cigarettes    Smokeless tobacco: Never   Vaping Use    Vaping Use: Never used         Review of Systems:  Review of Systems  "  Constitutional:  Negative for chills and fever.   HENT:  Negative for congestion, rhinorrhea and sore throat.    Eyes:  Negative for pain and visual disturbance.   Respiratory:  Negative for apnea, cough, chest tightness and shortness of breath.    Cardiovascular:  Negative for chest pain and palpitations.   Gastrointestinal:  Positive for nausea. Negative for abdominal pain, diarrhea and vomiting.   Genitourinary:  Positive for flank pain (Left). Negative for difficulty urinating and dysuria.   Musculoskeletal:  Negative for joint swelling and myalgias.   Skin:  Negative for color change.   Neurological:  Negative for seizures and headaches.   Psychiatric/Behavioral: Negative.     All other systems reviewed and are negative.     Physical Exam:  BP (!) 174/101 (BP Location: Left arm, Patient Position: Sitting)   Pulse 90   Temp 98.7 øF (37.1 øC) (Oral)   Resp 18   Ht 177.8 cm (70\")   Wt 80.3 kg (177 lb 0.5 oz)   SpO2 98%   BMI 25.40 kg/mý         Physical Exam  Vitals and nursing note reviewed.   Constitutional:       General: He is in acute distress (Pain).      Appearance: Normal appearance. He is not toxic-appearing.   HENT:      Head: Normocephalic and atraumatic.      Jaw: There is normal jaw occlusion.   Eyes:      General: Lids are normal.      Extraocular Movements: Extraocular movements intact.      Conjunctiva/sclera: Conjunctivae normal.      Pupils: Pupils are equal, round, and reactive to light.   Cardiovascular:      Rate and Rhythm: Normal rate and regular rhythm.      Pulses: Normal pulses.      Heart sounds: Normal heart sounds.   Pulmonary:      Effort: Pulmonary effort is normal. No respiratory distress.      Breath sounds: Normal breath sounds. No wheezing or rhonchi.   Abdominal:      General: Abdomen is flat. There is no distension.      Palpations: Abdomen is soft.      Tenderness: There is no abdominal tenderness. There is left CVA tenderness. There is no guarding or rebound. "   Musculoskeletal:         General: Normal range of motion.      Cervical back: Normal range of motion and neck supple.      Right lower leg: No edema.      Left lower leg: No edema.   Skin:     General: Skin is warm and dry.      Coloration: Skin is not cyanotic.   Neurological:      Mental Status: He is alert and oriented to person, place, and time. Mental status is at baseline.   Psychiatric:         Attention and Perception: Attention and perception normal.         Mood and Affect: Mood normal.                    Procedures:  Procedures      Medical Decision Making:      Comorbidities that affect care:    None    External Notes reviewed:    Previous Clinic Note: 8/16/2023 for ureteral stone      The following orders were placed and all results were independently analyzed by me:  Orders Placed This Encounter   Procedures    XR Abdomen KUB    [place] Draw    Comprehensive Metabolic Panel    Lipase    Urinalysis With Microscopic If Indicated (No Culture) - Urine, Clean Catch    Lactic Acid, Plasma    CBC Auto Differential    Urinalysis, Microscopic Only - Urine, Clean Catch    NPO Diet NPO Type: Strict NPO    Undress & Gown    Inpatient Urology Consult    Inpatient Hospitalist Consult    Insert Peripheral IV    Inpatient Admission    CBC & Differential    Green Top (Gel)    Lavender Top    Gold Top - SST    Light Blue Top       Medications Given in the Emergency Department:  Medications   sodium chloride 0.9 % flush 10 mL (has no administration in time range)   lidocaine (XYLOCAINE) 1 % 125 mg in sodium chloride 0.9 % 250 mL IVPB (has no administration in time range)   sodium chloride 0.9 % infusion (has no administration in time range)   ondansetron (ZOFRAN) injection 4 mg (4 mg Intravenous Given 8/17/23 1636)   HYDROmorphone (DILAUDID) injection 1 mg (1 mg Intravenous Given 8/17/23 1636)        ED Course:    The patient was initially evaluated in the triage area where orders were placed. The patient was later  dispositioned by Alberto Rocha PA-C.      The patient was advised to stay for completion of workup which includes but is not limited to communication of labs and radiological results, reassessment and plan. The patient was advised that leaving prior to disposition by a provider could result in critical findings that are not communicated to the patient.          Labs:    Lab Results (last 24 hours)       Procedure Component Value Units Date/Time    CBC & Differential [[id number]]  (Abnormal) Collected: 08/17/23 1544    Specimen: Blood from Arm, Left Updated: 08/17/23 1601    Narrative:      The following orders were created for panel order CBC & Differential.  Procedure                               Abnormality         Status                     ---------                               -----------         ------                     CBC Auto Differential[[id number]]        Abnormal            Final result                 Please view results for these tests on the individual orders.    Comprehensive Metabolic Panel [[id number]]  (Abnormal) Collected: 08/17/23 1544    Specimen: Blood from Arm, Left Updated: 08/17/23 1620     Glucose 121 mg/dL      BUN 20 mg/dL      Creatinine 1.13 mg/dL      Sodium 138 mmol/L      Potassium 4.0 mmol/L      Chloride 102 mmol/L      CO2 24.9 mmol/L      Calcium 8.9 mg/dL      Total Protein 6.9 g/dL      Albumin 4.4 g/dL      ALT (SGPT) 21 U/L      AST (SGOT) 20 U/L      Alkaline Phosphatase 75 U/L      Total Bilirubin 0.3 mg/dL      Globulin 2.5 gm/dL      A/G Ratio 1.8 g/dL      BUN/Creatinine Ratio 17.7     Anion Gap 11.1 mmol/L      eGFR 72.6 mL/min/1.73     Narrative:      GFR Normal >60  Chronic Kidney Disease <60  Kidney Failure <15      Lipase [[id number]]  (Normal) Collected: 08/17/23 1544    Specimen: Blood from Arm, Left Updated: 08/17/23 1620     Lipase 22 U/L     Lactic Acid, Plasma [[id number]]  (Normal) Collected: 08/17/23 1544    Specimen: Blood from Arm, Left Updated: 08/17/23  1616     Lactate 1.1 mmol/L     CBC Auto Differential [[id number]]  (Abnormal) Collected: 08/17/23 1544    Specimen: Blood from Arm, Left Updated: 08/17/23 1601     WBC 5.93 10*3/mm3      RBC 5.10 10*6/mm3      Hemoglobin 14.4 g/dL      Hematocrit 46.1 %      MCV 90.4 fL      MCH 28.2 pg      MCHC 31.2 g/dL      RDW 12.8 %      RDW-SD 42.4 fl      MPV 9.1 fL      Platelets 278 10*3/mm3      Neutrophil % 55.7 %      Lymphocyte % 23.8 %      Monocyte % 9.9 %      Eosinophil % 9.6 %      Basophil % 0.8 %      Immature Grans % 0.2 %      Neutrophils, Absolute 3.30 10*3/mm3      Lymphocytes, Absolute 1.41 10*3/mm3      Monocytes, Absolute 0.59 10*3/mm3      Eosinophils, Absolute 0.57 10*3/mm3      Basophils, Absolute 0.05 10*3/mm3      Immature Grans, Absolute 0.01 10*3/mm3      nRBC 0.0 /100 WBC     Urinalysis With Microscopic If Indicated (No Culture) - Urine, Clean Catch [[id number]]  (Abnormal) Collected: 08/17/23 1623    Specimen: Urine, Clean Catch Updated: 08/17/23 1637     Color, UA Yellow     Appearance, UA Clear     pH, UA 7.0     Specific Gravity, UA 1.012     Glucose,  mg/dL (Trace)     Ketones, UA Negative     Bilirubin, UA Negative     Blood, UA Trace     Protein, UA Negative     Leuk Esterase, UA Negative     Nitrite, UA Negative     Urobilinogen, UA 0.2 E.U./dL    Urinalysis, Microscopic Only - Urine, Clean Catch [[id number]]  (Abnormal) Collected: 08/17/23 1623    Specimen: Urine, Clean Catch Updated: 08/17/23 1637     RBC, UA 0-2 /HPF      WBC, UA 0-2 /HPF      Bacteria, UA None Seen /HPF      Squamous Epithelial Cells, UA 0-2 /HPF      Hyaline Casts, UA None Seen /LPF      Methodology Automated Microscopy             Imaging:    XR Abdomen KUB    Result Date: 8/17/2023  PROCEDURE: XR ABDOMEN KUB  COMPARISON: [facility], CT, CT ABDOMEN PELVIS W CONTRAST, 8/15/2023, 7:28.  INDICATIONS: left side flank pain  FINDINGS:  There are faint left renal calculi.  There is a round calcific density  in the left aspect of the pelvis consistent with a phlebolith.  There is a slightly irregular calcification in the left lower pelvis measuring 4 mm in size suspicious for a stone at the left ureterovesical junction.  The bowel gas pattern is normal.  The bony structures are normal for age.  The lung bases are clear.        1. Faint left renal calculi. 2. 4 mm calcific density in the left lower pelvis suspicious for left ureterovesical junction stone.     MALICK GARNETT MD       Electronically Signed and Approved By: MALICK GARNETT MD on 8/17/2023 at 17:41                Differential Diagnosis and Discussion:      Dysuria: Differential diagnosis includes but is not limited to urethritis, cystitis, pyelonephritis, ureteral calculi, neoplasm, chemical irritant, urethral stricture, and trauma  Flank Pain: Differential diagnosis includes but is not limited to kidney stones, pyelonephritis, musculoskeletal disorders, renal infarction, urinary tract infection, hydronephrosis, radiculopathy, aortic aneurysm, renal cell carcinoma.    All labs were reviewed and interpreted by me.  All X-rays impressions were independently interpreted by me.    MDM     Amount and/or Complexity of Data Reviewed  Decide to obtain previous medical records or to obtain history from someone other than the patient: yes    Patient was seen yesterday by Dr. Spicer and evaluated for left kidney stone as well as left kidney neoplasm.  Dr. Spicer told the patient that they would try to let the patient pass the stone within the next 2 weeks and if the stone had not passed or became too painful for the patient to return to the ER.  Patient states he has been taking Toradol and Flomax at home and is still having intractable pain so he came to the ER today.  I gave the patient Dilaudid but did not help the patient's pain.  I spoke with Dr. Spicer on the phone and she said to have the patient admitted to  for pain control and she will operate tomorrow  before noon.  I relayed this to the patient he was very grateful and understood the plan of care.        Patient Care Considerations:    CT ABDOMEN AND PELVIS: I considered ordering a CT scan of the abdomen and pelvis however the patient had a CT 2 days ago and Dr. Spicer is aware of the situation.      Consultants/Shared Management Plan:    Consultant: I have discussed the case with Dr. Spicer who states to admit the patient due to intractable pain and she will operate tomorrow.  I spoke with Dr. Melchor on the phone and she agreed to accept the patient for admission.    Social Determinants of Health:    Patient has presented with family members who are responsible, reliable and will ensure follow up care.      Disposition and Care Coordination:    Admit:   Through independent evaluation of the patient's history, physical, and imperical data, the patient meets criteria for observation/admission to the hospital.        Final diagnoses:   Kidney stone        ED Disposition       ED Disposition   Decision to Admit    Condition   --    Comment   Level of Care: Med/Surg [1]   Diagnosis: Kidney stone [[id number]]   Admitting Physician: CRIS MELCHOR [[id number]]   Attending Physician: CRIS MELCHOR [[id number]]   Certification: I Certify That Inpatient Hospital Services Are Medically Necessary For Greater Than 2 Midnights                 This medical record created using voice recognition software.             Alberto Rocha PA-C  08/17/23 8350

## 2023-08-17 NOTE — H&P
[facility] HISTORY AND PHYSICAL  Date: 2023   Patient Name: Danny Savage  : 1960  MRN: [id number]  Primary Care Physician:  Provider, No Known  Date of admission: 2023    Subjective abdominal pain/flank pain  Subjective     Chief Complaint: Abdominal and flank pain    HPI: Patient is a 62-year-old male who was diagnosed with a ureteral stone 2 days ago in the ER.  He has been drinking lots of fluids and taking Flomax.  The pain is severe.  He was told to come back to the ER and have the stone removed by Dr. Spicer if the pain did not improve.  Patient denies any nausea vomiting fevers or chills.    On arrival to the ED patient's temperature of 98.7, pulse of 90, respiratory rate of 18, blood pressure 174/101, and he saturating 98% on room air.  KUB shows a forced millimeter calcific density in the left lower pelvis suspicious for left ureterovesicular junction stone.    CT from 8/15/2023 shows:1. There is a 4.5 mm obstructing stone in the left UPJ causing mild obstructing uropathy.  A couple of other 2 mm nonobstructing stones are present in the left kidney as well.    2. 4.2 x 3.7 cm heterogeneous soft tissue partially exophytic mass in the lower pole of the left kidney suspicious for renal cell carcinoma/malignancy until proven otherwise.  Urology consultation is recommended.   3. Fusiform 4.7 x 4.6 cm infrarenal abdominal aortic aneurysm with a moderate amount of atherosclerotic disease.  There are no prior imaging studies available for comparison to evaluate for change.  If this has not been previously evaluated elsewhere, vascular surgery consultation on a nonemergent basis is recommended.   4. Other incidental chronic ancillary findings are described above.         On UA, patient has a trace amount of blood.  Glucose is 121.  There is no leukocytosis.  Personal History     Past Medical History:  Past Medical History:   Diagnosis Date    Allergy          Past Surgical  History:  History reviewed. No pertinent surgical history.     Family History:   History reviewed. No pertinent family history.     Social History:   Social History     Socioeconomic History    Marital status:    Tobacco Use    Smoking status: Former     Types: Cigarettes    Smokeless tobacco: Never   Vaping Use    Vaping Use: Never used         Home Medications:  HYDROcodone-acetaminophen, Psyllium, ketorolac, loratadine, methylPREDNISolone, mycophenolate, and tamsulosin    Allergies:  Allergies   Allergen Reactions    Latex, Natural Rubber Hives       Review of Systems   All systems were reviewed and negative except for:flank pain     Objective   Objective     Vitals:   Temp:  [98.7 øF (37.1 øC)] 98.7 øF (37.1 øC)  Heart Rate:  [88-90] 88  Resp:  [18] 18  BP: (168-174)/() 168/84    Physical Exam   Physical Exam  Constitutional:       Appearance: Normal appearance.   HENT:      Head: Normocephalic.      Nose: Nose normal.      Mouth/Throat:      Mouth: Mucous membranes are moist.   Eyes:      Extraocular Movements: Extraocular movements intact.      Pupils: Pupils are equal, round, and reactive to light.   Cardiovascular:      Rate and Rhythm: Normal rate and regular rhythm.      Pulses: Normal pulses.   Pulmonary:      Effort: Pulmonary effort is normal.      Breath sounds: Normal breath sounds.   Abdominal:      General: Abdomen is flat. Bowel sounds are normal.      Palpations: Abdomen is soft.   Musculoskeletal:         General: Normal range of motion.   Skin:     General: Skin is warm and dry.   Neurological:      General: No focal deficit present.      Mental Status: He is alert.        Result Review      Result Review:  I have personally reviewed the results from the time of this admission to 8/17/2023 19:42 EDT and agree with these findings:  [x]  Laboratory  []  Microbiology  []  Radiology  []  EKG/Telemetry   [x]  Cardiology/Vascular   []  Pathology  [x]  Old records  []  Other:    Lab  Results (most recent)       Procedure Component Value Units Date/Time    Urinalysis With Microscopic If Indicated (No Culture) - Urine, Clean Catch [[id number]]  (Abnormal) Collected: 08/17/23 1623    Specimen: Urine, Clean Catch Updated: 08/17/23 1637     Color, UA Yellow     Appearance, UA Clear     pH, UA 7.0     Specific Gravity, UA 1.012     Glucose,  mg/dL (Trace)     Ketones, UA Negative     Bilirubin, UA Negative     Blood, UA Trace     Protein, UA Negative     Leuk Esterase, UA Negative     Nitrite, UA Negative     Urobilinogen, UA 0.2 E.U./dL    Urinalysis, Microscopic Only - Urine, Clean Catch [[id number]]  (Abnormal) Collected: 08/17/23 1623    Specimen: Urine, Clean Catch Updated: 08/17/23 1637     RBC, UA 0-2 /HPF      WBC, UA 0-2 /HPF      Bacteria, UA None Seen /HPF      Squamous Epithelial Cells, UA 0-2 /HPF      Hyaline Casts, UA None Seen /LPF      Methodology Automated Microscopy    Comprehensive Metabolic Panel [[id number]]  (Abnormal) Collected: 08/17/23 1544    Specimen: Blood from Arm, Left Updated: 08/17/23 1620     Glucose 121 mg/dL      BUN 20 mg/dL      Creatinine 1.13 mg/dL      Sodium 138 mmol/L      Potassium 4.0 mmol/L      Chloride 102 mmol/L      CO2 24.9 mmol/L      Calcium 8.9 mg/dL      Total Protein 6.9 g/dL      Albumin 4.4 g/dL      ALT (SGPT) 21 U/L      AST (SGOT) 20 U/L      Alkaline Phosphatase 75 U/L      Total Bilirubin 0.3 mg/dL      Globulin 2.5 gm/dL      A/G Ratio 1.8 g/dL      BUN/Creatinine Ratio 17.7     Anion Gap 11.1 mmol/L      eGFR 72.6 mL/min/1.73     Narrative:      GFR Normal >60  Chronic Kidney Disease <60  Kidney Failure <15      Lipase [[id number]]  (Normal) Collected: 08/17/23 1544    Specimen: Blood from Arm, Left Updated: 08/17/23 1620     Lipase 22 U/L     Lactic Acid, Plasma [[id number]]  (Normal) Collected: 08/17/23 1544    Specimen: Blood from Arm, Left Updated: 08/17/23 1616     Lactate 1.1 mmol/L     CBC & Differential [[id number]]  (Abnormal)  Collected: 08/17/23 1544    Specimen: Blood from Arm, Left Updated: 08/17/23 1601    Narrative:      The following orders were created for panel order CBC & Differential.  Procedure                               Abnormality         Status                     ---------                               -----------         ------                     CBC Auto Differential[[id number]]        Abnormal            Final result                 Please view results for these tests on the individual orders.    CBC Auto Differential [[id number]]  (Abnormal) Collected: 08/17/23 1544    Specimen: Blood from Arm, Left Updated: 08/17/23 1601     WBC 5.93 10*3/mm3      RBC 5.10 10*6/mm3      Hemoglobin 14.4 g/dL      Hematocrit 46.1 %      MCV 90.4 fL      MCH 28.2 pg      MCHC 31.2 g/dL      RDW 12.8 %      RDW-SD 42.4 fl      MPV 9.1 fL      Platelets 278 10*3/mm3      Neutrophil % 55.7 %      Lymphocyte % 23.8 %      Monocyte % 9.9 %      Eosinophil % 9.6 %      Basophil % 0.8 %      Immature Grans % 0.2 %      Neutrophils, Absolute 3.30 10*3/mm3      Lymphocytes, Absolute 1.41 10*3/mm3      Monocytes, Absolute 0.59 10*3/mm3      Eosinophils, Absolute 0.57 10*3/mm3      Basophils, Absolute 0.05 10*3/mm3      Immature Grans, Absolute 0.01 10*3/mm3      nRBC 0.0 /100 WBC     [place] Draw [[id number]] Collected: 08/17/23 1544    Specimen: Blood from Arm, Left Updated: 08/17/23 5047    Narrative:      The following orders were created for panel order [place] Draw.  Procedure                               Abnormality         Status                     ---------                               -----------         ------                     Green Top (Gel)[[id number]]                                  Final result               Lavender Top[[id number]]                                     Final result               Gold Top - SST[[id number]]                                   Final result               Light Blue Top[[id number]]                                    Final result                 Please view results for these tests on the individual orders.    Lavender Top [[id number]] Collected: 08/17/23 1544    Specimen: Blood from Arm, Left Updated: 08/17/23 1559     Extra Tube hold for add-on     Comment: Auto resulted       Green Top (Gel) [[id number]] Collected: 08/17/23 1544    Specimen: Blood from Arm, Left Updated: 08/17/23 1558     Extra Tube Hold for add-ons.     Comment: Auto resulted.       Gold Top - SST [[id number]] Collected: 08/17/23 1544    Specimen: Blood from Arm, Left Updated: 08/17/23 1558     Extra Tube Hold for add-ons.     Comment: Auto resulted.       Light Blue Top [[id number]] Collected: 08/17/23 1544    Specimen: Blood from Arm, Left Updated: 08/17/23 1558     Extra Tube Hold for add-ons.     Comment: Auto resulted               XR Abdomen KUB    Result Date: 8/17/2023    1. Faint left renal calculi. 2. 4 mm calcific density in the left lower pelvis suspicious for left ureterovesical junction stone.     MALICK GARNETT MD       Electronically Signed and Approved By: MALICK GARNETT MD on 8/17/2023 at 17:41              Assessment & Plan   Assessment / Plan   Assessment/Plan:   #1  4 mm left kidney stone  -Dr. Cai will take the patient to the OR tomorrow.  -IV fluids, Rocephin, pain control antiemetics    #2  4.2 x 3.7 cm heterogeneous soft tissue partially exophytic mass in the lower pole of the left kidney suspicious for renal cell carcinoma/malignancy until proven otherwise.  Urology consultation is recommended.     #3  Patient reports that he is on CellCept we will continue.  Unsure of the reason.    #4  Fusiform 4.7 x 4.6 cm infrarenal abdominal aortic aneurysm with a moderate amount of atherosclerotic disease.  -We will need outpatient vascular referral    #5 allergic rhinitis continue home regimen    #6 hypertension as needed hydralazine ordered.  Patient not on any hypertension medicines could be secondary to pain.       DVT  prophylaxis:  Medical DVT prophylaxis orders are present.    CODE STATUS:    Level Of Support Discussed With: Patient  Code Status (Patient has no pulse and is not breathing): CPR (Attempt to Resuscitate)  Medical Interventions (Patient has pulse or is breathing): Full Support      Admission Status:  I believe this patient meets inpatient status.    Electronically signed by Jack Patel DO, 08/17/23, 7:42 PM EDT.

## 2023-08-18 ENCOUNTER — APPOINTMENT (OUTPATIENT)
Dept: GENERAL RADIOLOGY | Facility: HOSPITAL | Age: 63
DRG: 661 | End: 2023-08-18
Payer: COMMERCIAL

## 2023-08-18 ENCOUNTER — ANESTHESIA EVENT (OUTPATIENT)
Dept: PERIOP | Facility: HOSPITAL | Age: 63
DRG: 661 | End: 2023-08-18
Payer: COMMERCIAL

## 2023-08-18 ENCOUNTER — ANESTHESIA (OUTPATIENT)
Dept: PERIOP | Facility: HOSPITAL | Age: 63
DRG: 661 | End: 2023-08-18
Payer: COMMERCIAL

## 2023-08-18 LAB
ANION GAP SERPL CALCULATED.3IONS-SCNC: 11 MMOL/L (ref 5–15)
BASOPHILS # BLD AUTO: 0.04 10*3/MM3 (ref 0–0.2)
BASOPHILS NFR BLD AUTO: 0.6 % (ref 0–1.5)
BUN SERPL-MCNC: 20 MG/DL (ref 8–23)
BUN/CREAT SERPL: 14.6 (ref 7–25)
CALCIUM SPEC-SCNC: 8.5 MG/DL (ref 8.6–10.5)
CHLORIDE SERPL-SCNC: 104 MMOL/L (ref 98–107)
CO2 SERPL-SCNC: 23 MMOL/L (ref 22–29)
CREAT SERPL-MCNC: 1.37 MG/DL (ref 0.76–1.27)
DEPRECATED RDW RBC AUTO: 42.5 FL (ref 37–54)
EGFRCR SERPLBLD CKD-EPI 2021: 57.6 ML/MIN/1.73
EOSINOPHIL # BLD AUTO: 0.28 10*3/MM3 (ref 0–0.4)
EOSINOPHIL NFR BLD AUTO: 4.2 % (ref 0.3–6.2)
ERYTHROCYTE [DISTWIDTH] IN BLOOD BY AUTOMATED COUNT: 12.8 % (ref 12.3–15.4)
GLUCOSE SERPL-MCNC: 99 MG/DL (ref 65–99)
HCT VFR BLD AUTO: 41.4 % (ref 37.5–51)
HGB BLD-MCNC: 13.1 G/DL (ref 13–17.7)
IMM GRANULOCYTES # BLD AUTO: 0.02 10*3/MM3 (ref 0–0.05)
IMM GRANULOCYTES NFR BLD AUTO: 0.3 % (ref 0–0.5)
LAB AP CASE REPORT: NORMAL
LAB AP CLINICAL INFORMATION: NORMAL
LYMPHOCYTES # BLD AUTO: 1.02 10*3/MM3 (ref 0.7–3.1)
LYMPHOCYTES NFR BLD AUTO: 15.2 % (ref 19.6–45.3)
MCH RBC QN AUTO: 28.8 PG (ref 26.6–33)
MCHC RBC AUTO-ENTMCNC: 31.6 G/DL (ref 31.5–35.7)
MCV RBC AUTO: 91 FL (ref 79–97)
MONOCYTES # BLD AUTO: 0.84 10*3/MM3 (ref 0.1–0.9)
MONOCYTES NFR BLD AUTO: 12.5 % (ref 5–12)
NEUTROPHILS NFR BLD AUTO: 4.52 10*3/MM3 (ref 1.7–7)
NEUTROPHILS NFR BLD AUTO: 67.2 % (ref 42.7–76)
NRBC BLD AUTO-RTO: 0 /100 WBC (ref 0–0.2)
PATH REPORT.FINAL DX SPEC: NORMAL
PATH REPORT.GROSS SPEC: NORMAL
PLATELET # BLD AUTO: 231 10*3/MM3 (ref 140–450)
PMV BLD AUTO: 9.6 FL (ref 6–12)
POTASSIUM SERPL-SCNC: 3.8 MMOL/L (ref 3.5–5.2)
RBC # BLD AUTO: 4.55 10*6/MM3 (ref 4.14–5.8)
SODIUM SERPL-SCNC: 138 MMOL/L (ref 136–145)
WBC NRBC COR # BLD: 6.72 10*3/MM3 (ref 3.4–10.8)

## 2023-08-18 PROCEDURE — C2617 STENT, NON-COR, TEM W/O DEL: HCPCS | Performed by: UROLOGY

## 2023-08-18 PROCEDURE — 99222 1ST HOSP IP/OBS MODERATE 55: CPT | Performed by: UROLOGY

## 2023-08-18 PROCEDURE — BT1F1ZZ FLUOROSCOPY OF LEFT KIDNEY, URETER AND BLADDER USING LOW OSMOLAR CONTRAST: ICD-10-PCS | Performed by: UROLOGY

## 2023-08-18 PROCEDURE — C1769 GUIDE WIRE: HCPCS | Performed by: UROLOGY

## 2023-08-18 PROCEDURE — 25010000002 KETOROLAC TROMETHAMINE PER 15 MG: Performed by: HOSPITALIST

## 2023-08-18 PROCEDURE — 25010000002 DEXAMETHASONE PER 1 MG

## 2023-08-18 PROCEDURE — 74420 UROGRAPHY RTRGR +-KUB: CPT | Performed by: UROLOGY

## 2023-08-18 PROCEDURE — 0T778DZ DILATION OF LEFT URETER WITH INTRALUMINAL DEVICE, VIA NATURAL OR ARTIFICIAL OPENING ENDOSCOPIC: ICD-10-PCS | Performed by: UROLOGY

## 2023-08-18 PROCEDURE — 25010000002 CEFTRIAXONE PER 250 MG: Performed by: UROLOGY

## 2023-08-18 PROCEDURE — 93005 ELECTROCARDIOGRAM TRACING: CPT | Performed by: ANESTHESIOLOGY

## 2023-08-18 PROCEDURE — 0TC78ZZ EXTIRPATION OF MATTER FROM LEFT URETER, VIA NATURAL OR ARTIFICIAL OPENING ENDOSCOPIC: ICD-10-PCS | Performed by: UROLOGY

## 2023-08-18 PROCEDURE — 80048 BASIC METABOLIC PNL TOTAL CA: CPT | Performed by: HOSPITALIST

## 2023-08-18 PROCEDURE — 52352 CYSTOURETERO W/STONE REMOVE: CPT | Performed by: UROLOGY

## 2023-08-18 PROCEDURE — 25010000002 ONDANSETRON PER 1 MG

## 2023-08-18 PROCEDURE — 63710000001 MYCOPHENOLATE MOFETIL PER 250 MG: Performed by: UROLOGY

## 2023-08-18 PROCEDURE — 25010000002 HYDROMORPHONE 1 MG/ML SOLUTION: Performed by: HOSPITALIST

## 2023-08-18 PROCEDURE — 97165 OT EVAL LOW COMPLEX 30 MIN: CPT

## 2023-08-18 PROCEDURE — C1758 CATHETER, URETERAL: HCPCS | Performed by: UROLOGY

## 2023-08-18 PROCEDURE — 25010000002 PROPOFOL 10 MG/ML EMULSION

## 2023-08-18 PROCEDURE — 85025 COMPLETE CBC W/AUTO DIFF WBC: CPT | Performed by: HOSPITALIST

## 2023-08-18 PROCEDURE — 25510000001 IOPAMIDOL PER 1 ML: Performed by: UROLOGY

## 2023-08-18 PROCEDURE — 25010000002 FENTANYL CITRATE (PF) 50 MCG/ML SOLUTION

## 2023-08-18 PROCEDURE — 82365 CALCULUS SPECTROSCOPY: CPT | Performed by: UROLOGY

## 2023-08-18 PROCEDURE — 88300 SURGICAL PATH GROSS: CPT | Performed by: UROLOGY

## 2023-08-18 PROCEDURE — 25010000002 SUGAMMADEX 200 MG/2ML SOLUTION

## 2023-08-18 PROCEDURE — 76000 FLUOROSCOPY <1 HR PHYS/QHP: CPT

## 2023-08-18 PROCEDURE — 52332 CYSTOSCOPY AND TREATMENT: CPT | Performed by: UROLOGY

## 2023-08-18 DEVICE — URETERAL STENT
Type: IMPLANTABLE DEVICE | Site: URETER | Status: FUNCTIONAL
Brand: ASCERTA™

## 2023-08-18 RX ORDER — DEXAMETHASONE SODIUM PHOSPHATE 4 MG/ML
INJECTION, SOLUTION INTRA-ARTICULAR; INTRALESIONAL; INTRAMUSCULAR; INTRAVENOUS; SOFT TISSUE AS NEEDED
Status: DISCONTINUED | OUTPATIENT
Start: 2023-08-18 | End: 2023-08-18 | Stop reason: SURG

## 2023-08-18 RX ORDER — PHENYLEPHRINE HCL IN 0.9% NACL 1 MG/10 ML
SYRINGE (ML) INTRAVENOUS AS NEEDED
Status: DISCONTINUED | OUTPATIENT
Start: 2023-08-18 | End: 2023-08-18 | Stop reason: SURG

## 2023-08-18 RX ORDER — EPHEDRINE SULFATE 50 MG/ML
INJECTION, SOLUTION INTRAVENOUS AS NEEDED
Status: DISCONTINUED | OUTPATIENT
Start: 2023-08-18 | End: 2023-08-18 | Stop reason: SURG

## 2023-08-18 RX ORDER — MAGNESIUM HYDROXIDE 1200 MG/15ML
LIQUID ORAL AS NEEDED
Status: DISCONTINUED | OUTPATIENT
Start: 2023-08-18 | End: 2023-08-18 | Stop reason: HOSPADM

## 2023-08-18 RX ORDER — PROMETHAZINE HYDROCHLORIDE 25 MG/1
25 SUPPOSITORY RECTAL ONCE AS NEEDED
Status: DISCONTINUED | OUTPATIENT
Start: 2023-08-18 | End: 2023-08-18

## 2023-08-18 RX ORDER — PHENAZOPYRIDINE HYDROCHLORIDE 200 MG/1
200 TABLET, FILM COATED ORAL 3 TIMES DAILY PRN
Qty: 20 TABLET | Refills: 0 | Status: SHIPPED | OUTPATIENT
Start: 2023-08-18

## 2023-08-18 RX ORDER — OXYCODONE AND ACETAMINOPHEN 10; 325 MG/1; MG/1
1 TABLET ORAL EVERY 4 HOURS PRN
Status: DISCONTINUED | OUTPATIENT
Start: 2023-08-18 | End: 2023-08-19 | Stop reason: HOSPADM

## 2023-08-18 RX ORDER — PROPOFOL 10 MG/ML
VIAL (ML) INTRAVENOUS AS NEEDED
Status: DISCONTINUED | OUTPATIENT
Start: 2023-08-18 | End: 2023-08-18 | Stop reason: SURG

## 2023-08-18 RX ORDER — TAMSULOSIN HYDROCHLORIDE 0.4 MG/1
1 CAPSULE ORAL DAILY
Qty: 14 CAPSULE | Refills: 0 | Status: SHIPPED | OUTPATIENT
Start: 2023-08-18

## 2023-08-18 RX ORDER — OXYCODONE HCL 5 MG/5 ML
5 SOLUTION, ORAL ORAL EVERY 4 HOURS PRN
Status: DISCONTINUED | OUTPATIENT
Start: 2023-08-18 | End: 2023-08-18

## 2023-08-18 RX ORDER — OXYCODONE HYDROCHLORIDE AND ACETAMINOPHEN 5; 325 MG/1; MG/1
1-2 TABLET ORAL EVERY 6 HOURS PRN
Qty: 14 TABLET | Refills: 0 | Status: SHIPPED | OUTPATIENT
Start: 2023-08-18

## 2023-08-18 RX ORDER — PHENAZOPYRIDINE HYDROCHLORIDE 100 MG/1
100 TABLET, FILM COATED ORAL 3 TIMES DAILY PRN
Status: DISCONTINUED | OUTPATIENT
Start: 2023-08-18 | End: 2023-08-19 | Stop reason: HOSPADM

## 2023-08-18 RX ORDER — ONDANSETRON 2 MG/ML
INJECTION INTRAMUSCULAR; INTRAVENOUS AS NEEDED
Status: DISCONTINUED | OUTPATIENT
Start: 2023-08-18 | End: 2023-08-18 | Stop reason: SURG

## 2023-08-18 RX ORDER — ONDANSETRON 2 MG/ML
4 INJECTION INTRAMUSCULAR; INTRAVENOUS ONCE AS NEEDED
Status: DISCONTINUED | OUTPATIENT
Start: 2023-08-18 | End: 2023-08-18

## 2023-08-18 RX ORDER — ACETAMINOPHEN 500 MG
1000 TABLET ORAL ONCE
Status: COMPLETED | OUTPATIENT
Start: 2023-08-18 | End: 2023-08-18

## 2023-08-18 RX ORDER — OXYCODONE HYDROCHLORIDE AND ACETAMINOPHEN 5; 325 MG/1; MG/1
1 TABLET ORAL EVERY 4 HOURS PRN
Status: DISCONTINUED | OUTPATIENT
Start: 2023-08-18 | End: 2023-08-19 | Stop reason: HOSPADM

## 2023-08-18 RX ORDER — TAMSULOSIN HYDROCHLORIDE 0.4 MG/1
0.4 CAPSULE ORAL DAILY
Status: DISCONTINUED | OUTPATIENT
Start: 2023-08-18 | End: 2023-08-19 | Stop reason: HOSPADM

## 2023-08-18 RX ORDER — LIDOCAINE HYDROCHLORIDE 20 MG/ML
INJECTION, SOLUTION EPIDURAL; INFILTRATION; INTRACAUDAL; PERINEURAL AS NEEDED
Status: DISCONTINUED | OUTPATIENT
Start: 2023-08-18 | End: 2023-08-18 | Stop reason: SURG

## 2023-08-18 RX ORDER — SODIUM CHLORIDE, SODIUM LACTATE, POTASSIUM CHLORIDE, CALCIUM CHLORIDE 600; 310; 30; 20 MG/100ML; MG/100ML; MG/100ML; MG/100ML
9 INJECTION, SOLUTION INTRAVENOUS CONTINUOUS PRN
Status: DISCONTINUED | OUTPATIENT
Start: 2023-08-18 | End: 2023-08-19 | Stop reason: HOSPADM

## 2023-08-18 RX ORDER — ROCURONIUM BROMIDE 10 MG/ML
INJECTION, SOLUTION INTRAVENOUS AS NEEDED
Status: DISCONTINUED | OUTPATIENT
Start: 2023-08-18 | End: 2023-08-18 | Stop reason: SURG

## 2023-08-18 RX ORDER — FENTANYL CITRATE 50 UG/ML
INJECTION, SOLUTION INTRAMUSCULAR; INTRAVENOUS AS NEEDED
Status: DISCONTINUED | OUTPATIENT
Start: 2023-08-18 | End: 2023-08-18 | Stop reason: SURG

## 2023-08-18 RX ORDER — PROMETHAZINE HYDROCHLORIDE 12.5 MG/1
25 TABLET ORAL ONCE AS NEEDED
Status: DISCONTINUED | OUTPATIENT
Start: 2023-08-18 | End: 2023-08-18

## 2023-08-18 RX ORDER — MEPERIDINE HYDROCHLORIDE 25 MG/ML
12.5 INJECTION INTRAMUSCULAR; INTRAVENOUS; SUBCUTANEOUS
Status: DISCONTINUED | OUTPATIENT
Start: 2023-08-18 | End: 2023-08-18

## 2023-08-18 RX ORDER — OXYBUTYNIN CHLORIDE 5 MG/1
5 TABLET ORAL 3 TIMES DAILY PRN
Qty: 12 TABLET | Refills: 0 | Status: SHIPPED | OUTPATIENT
Start: 2023-08-18

## 2023-08-18 RX ADMIN — FENTANYL CITRATE 25 MCG: 50 INJECTION, SOLUTION INTRAMUSCULAR; INTRAVENOUS at 09:56

## 2023-08-18 RX ADMIN — FAMOTIDINE 40 MG: 20 TABLET ORAL at 11:35

## 2023-08-18 RX ADMIN — HYDROCODONE BITARTRATE AND ACETAMINOPHEN 1 TABLET: 5; 325 TABLET ORAL at 19:29

## 2023-08-18 RX ADMIN — DEXAMETHASONE SODIUM PHOSPHATE 4 MG: 4 INJECTION, SOLUTION INTRAMUSCULAR; INTRAVENOUS at 09:41

## 2023-08-18 RX ADMIN — CETIRIZINE HYDROCHLORIDE 10 MG: 10 TABLET, FILM COATED ORAL at 11:36

## 2023-08-18 RX ADMIN — Medication 100 MCG: at 09:54

## 2023-08-18 RX ADMIN — EPHEDRINE SULFATE 5 MG: 50 INJECTION INTRAVENOUS at 10:05

## 2023-08-18 RX ADMIN — MYCOPHENOLATE MOFETIL 500 MG: 250 CAPSULE ORAL at 11:35

## 2023-08-18 RX ADMIN — ROCURONIUM BROMIDE 50 MG: 50 INJECTION INTRAVENOUS at 09:35

## 2023-08-18 RX ADMIN — SUGAMMADEX 200 MG: 100 INJECTION, SOLUTION INTRAVENOUS at 10:15

## 2023-08-18 RX ADMIN — PROPOFOL 160 MG: 10 INJECTION, EMULSION INTRAVENOUS at 09:35

## 2023-08-18 RX ADMIN — Medication 10 ML: at 11:38

## 2023-08-18 RX ADMIN — SODIUM CHLORIDE 100 ML/HR: 9 INJECTION, SOLUTION INTRAVENOUS at 04:05

## 2023-08-18 RX ADMIN — EPHEDRINE SULFATE 5 MG: 50 INJECTION INTRAVENOUS at 10:00

## 2023-08-18 RX ADMIN — EPHEDRINE SULFATE 5 MG: 50 INJECTION INTRAVENOUS at 10:08

## 2023-08-18 RX ADMIN — PHENAZOPYRIDINE 100 MG: 100 TABLET ORAL at 21:38

## 2023-08-18 RX ADMIN — HYDROMORPHONE HYDROCHLORIDE 0.5 MG: 1 INJECTION, SOLUTION INTRAMUSCULAR; INTRAVENOUS; SUBCUTANEOUS at 03:49

## 2023-08-18 RX ADMIN — Medication 100 MCG: at 10:08

## 2023-08-18 RX ADMIN — FENTANYL CITRATE 50 MCG: 50 INJECTION, SOLUTION INTRAMUSCULAR; INTRAVENOUS at 09:35

## 2023-08-18 RX ADMIN — CEFTRIAXONE SODIUM 1000 MG: 1 INJECTION, SOLUTION INTRAVENOUS at 06:47

## 2023-08-18 RX ADMIN — TAMSULOSIN HYDROCHLORIDE 0.4 MG: 0.4 CAPSULE ORAL at 11:36

## 2023-08-18 RX ADMIN — KETOROLAC TROMETHAMINE 15 MG: 30 INJECTION, SOLUTION INTRAMUSCULAR; INTRAVENOUS at 05:28

## 2023-08-18 RX ADMIN — LIDOCAINE HYDROCHLORIDE 100 MG: 20 INJECTION, SOLUTION EPIDURAL; INFILTRATION; INTRACAUDAL; PERINEURAL at 09:35

## 2023-08-18 RX ADMIN — ACETAMINOPHEN 1000 MG: 500 TABLET ORAL at 08:03

## 2023-08-18 RX ADMIN — PHENAZOPYRIDINE 100 MG: 100 TABLET ORAL at 11:45

## 2023-08-18 RX ADMIN — ONDANSETRON 4 MG: 2 INJECTION INTRAMUSCULAR; INTRAVENOUS at 10:09

## 2023-08-18 RX ADMIN — SODIUM CHLORIDE, POTASSIUM CHLORIDE, SODIUM LACTATE AND CALCIUM CHLORIDE 9 ML/HR: 600; 310; 30; 20 INJECTION, SOLUTION INTRAVENOUS at 08:04

## 2023-08-18 RX ADMIN — SODIUM CHLORIDE 100 ML/HR: 9 INJECTION, SOLUTION INTRAVENOUS at 11:45

## 2023-08-18 NOTE — THERAPY EVALUATION
Patient Name: Danny Savage  : 1960    MRN: [id number]                              Today's Date: 2023       Admit Date: 2023    Visit Dx:     ICD-10-CM ICD-9-CM   1. Kidney stone  N20.0 592.0   2. Left ureteral stone  N20.1 592.1     Patient Active Problem List   Diagnosis    Left renal mass    Kidney stone    Left ureteral stone     Past Medical History:   Diagnosis Date    Allergy      History reviewed. No pertinent surgical history.   General Information       Row Name 2313          OT Time and Intention    Document Type evaluation  -PG     Mode of Treatment individual therapy;occupational therapy  -PG       Row Name 23          General Information    Patient Profile Reviewed yes  Patient lives with his wife and independent with all self-care/functional transfers  -PG     Prior Level of Function independent:;transfer;ADL's  -PG     Existing Precautions/Restrictions no known precautions/restrictions  -PG     Barriers to Rehab none identified  -PG       Row Name 23          Occupational Profile    Reason for Services/Referral (Occupational Profile) Patient is a 62-year-old male admitted for left ureteral stone.  No previous OT services identified.  Patient is being evaluated by Occupational Therapy due to recent decline in ADL function  -PG       Row Name 2313          Living Environment    People in Home spouse  -PG       Row Name 2313          Cognition    Orientation Status (Cognition) oriented x 4  -PG               User Key  (r) = Recorded By, (t) = Taken By, (c) = Cosigned By      Initials Name Provider Type    PG Dameon Kiran, OT Occupational Therapist                     Mobility/ADL's       Row Name 2315          Transfers    Transfers sit-stand transfer;stand-sit transfer  -PG       Row Name 2315          Sit-Stand Transfer    Sit-Stand [place] (Transfers) independent  -PG       Row Name 23 0915           Stand-Sit Transfer    Stand-Sit [place] (Transfers) independent  -PG       Row Name 08/18/23 0915          Activities of Daily Living    BADL Assessment/Intervention bathing;upper body dressing;lower body dressing;grooming;toileting  -PG       Row Name 08/18/23 0915          Bathing Assessment/Intervention    [place] Level (Bathing) bathing skills;independent  -PG       Row Name 08/18/23 0915          Upper Body Dressing Assessment/Training    [place] Level (Upper Body Dressing) upper body dressing skills;independent  -PG       Row Name 08/18/23 0915          Lower Body Dressing Assessment/Training    [place] Level (Lower Body Dressing) lower body dressing skills;independent  -PG       Row Name 08/18/23 0915          Grooming Assessment/Training    [place] Level (Grooming) grooming skills;independent  -PG       Row Name 08/18/23 0915          Toileting Assessment/Training    [place] Level (Toileting) toileting skills;independent  -PG               User Key  (r) = Recorded By, (t) = Taken By, (c) = Cosigned By      Initials Name Provider Type    PG Dameon Kiran OT Occupational Therapist                   Obj/Interventions       Row Name 08/18/23 0916          Sensory Assessment (Somatosensory)    Sensory Assessment (Somatosensory) sensation intact  -PG       Row Name 08/18/23 0916          Vision Assessment/Intervention    Visual Impairment/Limitations WFL  -PG       Row Name 08/18/23 0916          Range of Motion Comprehensive    General Range of Motion no range of motion deficits identified  -PG       Row Name 08/18/23 0916          Strength Comprehensive (MMT)    General Manual Muscle Testing (MMT) Assessment no strength deficits identified  -PG       Row Name 08/18/23 0916          Motor Skills    Motor Skills coordination;functional endurance  -PG     Coordination WFL  -PG     Functional Endurance Good  -PG               User Key  (r) = Recorded By, (t) = Taken By, (c) = Cosigned  By      Initials Name Provider Type    PG Dameon Kiran, KOSTA Occupational Therapist                   Goals/Plan    No documentation.                  Clinical Impression       Row Name 08/18/23 0917          Pain Assessment    Pretreatment Pain Rating 0/10 - no pain  -PG     Posttreatment Pain Rating 0/10 - no pain  -PG       Row Name 08/18/23 0917          Plan of Care Review    Plan of Care Reviewed With patient  -PG     Progress improving  -PG     Outcome Evaluation Patient currently independent with all self-care and functional transfers and agreeable that no additional Occupational Therapy services are necessary at this time.  -PG       Row Name 08/18/23 0917          Therapy Assessment/Plan (OT)    Criteria for Skilled Therapeutic Interventions Met (OT) no;no problems identified which require skilled intervention  -PG     Therapy Frequency (OT) evaluation only  -PG       Row Name 08/18/23 0917          Therapy Plan Review/Discharge Plan (OT)    Anticipated Discharge Disposition (OT) home  -PG               User Key  (r) = Recorded By, (t) = Taken By, (c) = Cosigned By      Initials Name Provider Type    PG Dameon Kiran OT Occupational Therapist                   Outcome Measures       Row Name 08/18/23 0917          How much help from another is currently needed...    Putting on and taking off regular lower body clothing? 4  -PG     Bathing (including washing, rinsing, and drying) 4  -PG     Toileting (which includes using toilet bed pan or urinal) 4  -PG     Putting on and taking off regular upper body clothing 4  -PG     Taking care of personal grooming (such as brushing teeth) 4  -PG     Eating meals 4  -PG     AM-PAC 6 Clicks Score (OT) 24  -PG       Row Name 08/17/23 2240 08/17/23 2238       How much help from another person do you currently need...    Turning from your back to your side while in flat bed without using bedrails? 4  -CS 4  -CS    Moving from lying on back to sitting on the side of a flat  bed without bedrails? 4  -CS 4  -CS    Moving to and from a bed to a chair (including a wheelchair)? 4  -CS 4  -CS    Standing up from a chair using your arms (e.g., wheelchair, bedside chair)? 4  -CS 4  -CS    Climbing 3-5 steps with a railing? 4  -CS 4  -CS    To walk in hospital room? 4  -CS 4  -CS    AM-PAC 6 Clicks Score (PT) 24  -CS 24  -CS      Row Name 08/18/23 0917          Functional Assessment    Outcome Measure Options AM-PAC 6 Clicks Daily Activity (OT);Optimal Instrument  -PG       Row Name 08/18/23 0917          Optimal Instrument    Optimal Instrument Optimal - 3  -PG     Bending/Stooping 1  -PG     Standing 1  -PG     Reaching 1  -PG     From the list, choose the 3 activities you would most like to be able to do without any difficulty Bending/stooping;Standing;Reaching  -PG     Total Score Optimal - 3 3  -PG               User Key  (r) = Recorded By, (t) = Taken By, (c) = Cosigned By      Initials Name Provider Type    CS Brittany Daniel, RN Registered Nurse    Dameon Hassan, OT Occupational Therapist                      OT Recommendation and Plan  Therapy Frequency (OT): evaluation only  Plan of Care Review  Plan of Care Reviewed With: patient  Progress: improving  Outcome Evaluation: Patient currently independent with all self-care and functional transfers and agreeable that no additional Occupational Therapy services are necessary at this time.     Time Calculation:   Evaluation Complexity (OT)  Review Occupational Profile/Medical/Therapy History Complexity: brief/low complexity  Assessment, Occupational Performance/Identification of Deficit Complexity: 1-3 performance deficits  Clinical Decision Making Complexity (OT): problem focused assessment/low complexity  Overall Complexity of Evaluation (OT): low complexity     Time Calculation- OT       Row Name 08/18/23 0918             Time Calculation- OT    OT Received On 08/18/23  -PG         Untimed Charges    OT Eval/Re-eval Minutes 30   -PG         Total Minutes    Untimed Charges Total Minutes 30  -PG       Total Minutes 30  -PG                User Key  (r) = Recorded By, (t) = Taken By, (c) = Cosigned By      Initials Name Provider Type    PG Dameon Kiran OT Occupational Therapist                  Therapy Charges for Today       Code Description Service Date Service Provider Modifiers Qty    [id number]  OT EVAL LOW COMPLEXITY 2 8/18/2023 Dameon Kiran OT GO 1                 Dameon Kiran OT  8/18/2023

## 2023-08-18 NOTE — PAYOR COMM NOTE
"JanetteDanny W \"Karl\" (62 y.o. Male)       Date of Birth   1960    Social Security Number       Address   [id number] [facility] [id number]    Home Phone       MRN   [id number]       Congregation   None    Marital Status                               Admission Date   8/17/23    Admission Type   Emergency    Admitting Provider   Jack Patel DO    Attending Provider   Tad Juarez DO    Department, Room/Bed   [address], 4006/1       Discharge Date       Discharge Disposition       Discharge Destination                                 Attending Provider: Tad Juarez DO    Allergies: Latex, Natural Rubber    Isolation: None   Infection: None   Code Status: CPR    Ht: 177.8 cm (70\")   Wt: 78.8 kg (173 lb 11.6 oz)    Admission Cmt: None   Principal Problem: Kidney stone [N20.0]                   Active Insurance as of 8/17/2023       Primary Coverage       Payor Plan Insurance Group Employer/Plan Group    [employer] BLUE [employer]OSS [employer] BLUE [employer]OSS BLUE [employer] PPO [id number]       Payor Plan Address Payor Plan Phone Number Payor Plan Fax Number Effective Dates    [address] [phone]  10/1/2022 - None Entered    [place] [address]         Subscriber Name Subscriber Birth Date Member ID       RONNI SAVAGE 6/18/1970 [id number]                     Emergency Contacts        (Rel.) Home Phone Work Phone Mobile Phone    Radha Savage (Spouse) -- -- [phone]             Alberto Rocha PA-C   Physician Assistant  Emergency Medicine     ED Provider Notes     Attested     Date of Service: 08/17/23 1545  Creation Time: 08/17/23 1545     Attested           Attestation signed by Alberto Carrero DO at 08/18/23 0036           SUPERVISE: For this patient encounter, I reviewed the APC's documentation, treatment plan, and medical decision making.  Alberto Carrero DO 8/18/2023 00:36 EDT                                   Expand All Collapse " All    Time: 3:45 PM EDT  Date of encounter:  8/17/2023  Independent Historian/Clinical History and Information was obtained by:   Patient and Family     History is limited by: N/A         Chief Complaint   Patient presents with    Flank Pain            History of Present Illness:  Patient is a 62 y.o. year old male who presents to the emergency department for evaluation of flank pain.  Patient was diagnosed with ureteral stone 2 days ago.  Has been drinking fluids and taking Flomax as well as Toradol.  Today pain returned with significant severity.  Patient reports that the discussed surgical removal with Dr. Cai if pain did not improve.  Patient denies fever/chills, difficulty urinating.  BAKARI Gaffney     HPI     Patient Care Team  Primary Care Provider: Provider, No Known     Past Medical History:           Allergies   Allergen Reactions    Latex, Natural Rubber Hives      Medical History        Past Medical History:   Diagnosis Date    Allergy           Surgical History   History reviewed. No pertinent surgical history.     History reviewed. No pertinent family history.     Home Medications:          Prior to Admission medications    Medication Sig Start Date End Date Taking? Authorizing Provider   HYDROcodone-acetaminophen (NORCO) 7.5-325 MG per tablet Take 1 tablet by mouth Every 6 (Six) Hours As Needed for Moderate Pain. 8/15/23     Teofilo Crawford DO   ketorolac (TORADOL) 10 MG tablet Take 1 tablet by mouth Every 6 (Six) Hours As Needed for Moderate Pain. 8/15/23     Teofilo Crawford DO   methylPREDNISolone (MEDROL) 4 MG dose pack Take as directed on package instructions. 8/15/23     Teofilo Crawford DO   mycophenolate (CELLCEPT) 500 MG tablet   8/14/23     Provider, MD Soniya   tamsulosin (FLOMAX) 0.4 MG capsule 24 hr capsule Take 1 capsule by mouth Daily. 8/15/23     Teofilo Crawford DO         Social History:   Social History            Tobacco Use    Smoking status: Former       Types: Cigarettes    Smokeless  "tobacco: Never   Vaping Use    Vaping Use: Never used            Review of Systems:  Review of Systems   Constitutional:  Negative for chills and fever.   HENT:  Negative for congestion, rhinorrhea and sore throat.    Eyes:  Negative for pain and visual disturbance.   Respiratory:  Negative for apnea, cough, chest tightness and shortness of breath.    Cardiovascular:  Negative for chest pain and palpitations.   Gastrointestinal:  Positive for nausea. Negative for abdominal pain, diarrhea and vomiting.   Genitourinary:  Positive for flank pain (Left). Negative for difficulty urinating and dysuria.   Musculoskeletal:  Negative for joint swelling and myalgias.   Skin:  Negative for color change.   Neurological:  Negative for seizures and headaches.   Psychiatric/Behavioral: Negative.     All other systems reviewed and are negative.      Physical Exam:  BP (!) 174/101 (BP Location: Left arm, Patient Position: Sitting)   Pulse 90   Temp 98.7 øF (37.1 øC) (Oral)   Resp 18   Ht 177.8 cm (70\")   Wt 80.3 kg (177 lb 0.5 oz)   SpO2 98%   BMI 25.40 kg/mý            Physical Exam  Vitals and nursing note reviewed.   Constitutional:       General: He is in acute distress (Pain).      Appearance: Normal appearance. He is not toxic-appearing.   HENT:      Head: Normocephalic and atraumatic.      Jaw: There is normal jaw occlusion.   Eyes:      General: Lids are normal.      Extraocular Movements: Extraocular movements intact.      Conjunctiva/sclera: Conjunctivae normal.      Pupils: Pupils are equal, round, and reactive to light.   Cardiovascular:      Rate and Rhythm: Normal rate and regular rhythm.      Pulses: Normal pulses.      Heart sounds: Normal heart sounds.   Pulmonary:      Effort: Pulmonary effort is normal. No respiratory distress.      Breath sounds: Normal breath sounds. No wheezing or rhonchi.   Abdominal:      General: Abdomen is flat. There is no distension.      Palpations: Abdomen is soft.      " Tenderness: There is no abdominal tenderness. There is left CVA tenderness. There is no guarding or rebound.   Musculoskeletal:         General: Normal range of motion.      Cervical back: Normal range of motion and neck supple.      Right lower leg: No edema.      Left lower leg: No edema.   Skin:     General: Skin is warm and dry.      Coloration: Skin is not cyanotic.   Neurological:      Mental Status: He is alert and oriented to person, place, and time. Mental status is at baseline.   Psychiatric:         Attention and Perception: Attention and perception normal.         Mood and Affect: Mood normal.                         Procedures:  Procedures        Medical Decision Making:        Comorbidities that affect care:     None     External Notes reviewed:     Previous Clinic Note: 8/16/2023 for ureteral stone        The following orders were placed and all results were independently analyzed by me:      Orders Placed This Encounter   Procedures    XR Abdomen KUB    [place] Draw    Comprehensive Metabolic Panel    Lipase    Urinalysis With Microscopic If Indicated (No Culture) - Urine, Clean Catch    Lactic Acid, Plasma    CBC Auto Differential    Urinalysis, Microscopic Only - Urine, Clean Catch    NPO Diet NPO Type: Strict NPO    Undress & Gown    Inpatient Urology Consult    Inpatient Hospitalist Consult    Insert Peripheral IV    Inpatient Admission    CBC & Differential    Green Top (Gel)    Lavender Top    Gold Top - SST    Light Blue Top         Medications Given in the Emergency Department:  Medications   sodium chloride 0.9 % flush 10 mL (has no administration in time range)   lidocaine (XYLOCAINE) 1 % 125 mg in sodium chloride 0.9 % 250 mL IVPB (has no administration in time range)   sodium chloride 0.9 % infusion (has no administration in time range)   ondansetron (ZOFRAN) injection 4 mg (4 mg Intravenous Given 8/17/23 1636)   HYDROmorphone (DILAUDID) injection 1 mg (1 mg Intravenous Given 8/17/23  1636)         ED Course:     The patient was initially evaluated in the triage area where orders were placed. The patient was later dispositioned by Alberto Rocha PA-C.        The patient was advised to stay for completion of workup which includes but is not limited to communication of labs and radiological results, reassessment and plan. The patient was advised that leaving prior to disposition by a provider could result in critical findings that are not communicated to the patient.         Labs:     Lab Results (last 24 hours)         Procedure Component Value Units Date/Time     CBC & Differential [[id number]]  (Abnormal) Collected: 08/17/23 1544     Specimen: Blood from Arm, Left Updated: 08/17/23 1601     Narrative:       The following orders were created for panel order CBC & Differential.  Procedure                               Abnormality         Status                     ---------                               -----------         ------                     CBC Auto Differential[[id number]]        Abnormal            Final result                  Please view results for these tests on the individual orders.     Comprehensive Metabolic Panel [[id number]]  (Abnormal) Collected: 08/17/23 1544     Specimen: Blood from Arm, Left Updated: 08/17/23 1620       Glucose 121 mg/dL         BUN 20 mg/dL         Creatinine 1.13 mg/dL         Sodium 138 mmol/L         Potassium 4.0 mmol/L         Chloride 102 mmol/L         CO2 24.9 mmol/L         Calcium 8.9 mg/dL         Total Protein 6.9 g/dL         Albumin 4.4 g/dL         ALT (SGPT) 21 U/L         AST (SGOT) 20 U/L         Alkaline Phosphatase 75 U/L         Total Bilirubin 0.3 mg/dL         Globulin 2.5 gm/dL         A/G Ratio 1.8 g/dL         BUN/Creatinine Ratio 17.7       Anion Gap 11.1 mmol/L         eGFR 72.6 mL/min/1.73       Narrative:       GFR Normal >60  Chronic Kidney Disease <60  Kidney Failure <15        Lipase [[id number]]  (Normal) Collected: 08/17/23  1544     Specimen: Blood from Arm, Left Updated: 08/17/23 1620       Lipase 22 U/L       Lactic Acid, Plasma [[id number]]  (Normal) Collected: 08/17/23 1544     Specimen: Blood from Arm, Left Updated: 08/17/23 1616       Lactate 1.1 mmol/L       CBC Auto Differential [[id number]]  (Abnormal) Collected: 08/17/23 1544     Specimen: Blood from Arm, Left Updated: 08/17/23 1601       WBC 5.93 10*3/mm3         RBC 5.10 10*6/mm3         Hemoglobin 14.4 g/dL         Hematocrit 46.1 %         MCV 90.4 fL         MCH 28.2 pg         MCHC 31.2 g/dL         RDW 12.8 %         RDW-SD 42.4 fl         MPV 9.1 fL         Platelets 278 10*3/mm3         Neutrophil % 55.7 %         Lymphocyte % 23.8 %         Monocyte % 9.9 %         Eosinophil % 9.6 %         Basophil % 0.8 %         Immature Grans % 0.2 %         Neutrophils, Absolute 3.30 10*3/mm3         Lymphocytes, Absolute 1.41 10*3/mm3         Monocytes, Absolute 0.59 10*3/mm3         Eosinophils, Absolute 0.57 10*3/mm3         Basophils, Absolute 0.05 10*3/mm3         Immature Grans, Absolute 0.01 10*3/mm3         nRBC 0.0 /100 WBC       Urinalysis With Microscopic If Indicated (No Culture) - Urine, Clean Catch [[id number]]  (Abnormal) Collected: 08/17/23 1623     Specimen: Urine, Clean Catch Updated: 08/17/23 1637       Color, UA Yellow       Appearance, UA Clear       pH, UA 7.0       Specific Gravity, UA 1.012       Glucose,  mg/dL (Trace)       Ketones, UA Negative       Bilirubin, UA Negative       Blood, UA Trace       Protein, UA Negative       Leuk Esterase, UA Negative       Nitrite, UA Negative       Urobilinogen, UA 0.2 E.U./dL     Urinalysis, Microscopic Only - Urine, Clean Catch [[id number]]  (Abnormal) Collected: 08/17/23 1623     Specimen: Urine, Clean Catch Updated: 08/17/23 1637       RBC, UA 0-2 /HPF         WBC, UA 0-2 /HPF         Bacteria, UA None Seen /HPF         Squamous Epithelial Cells, UA 0-2 /HPF         Hyaline Casts, UA None Seen /LPF          Methodology Automated Microscopy                Imaging:     XR Abdomen KUB     Result Date: 8/17/2023  PROCEDURE:            XR ABDOMEN KUB  COMPARISON:           [facility], CT, CT ABDOMEN PELVIS W CONTRAST, 8/15/2023, 7:28.  INDICATIONS:         left side flank pain  FINDINGS:    There are faint left renal calculi.  There is a round calcific density in the left aspect of the pelvis consistent with a phlebolith.  There is a slightly irregular calcification in the left lower pelvis measuring 4 mm in size suspicious for a stone at the left ureterovesical junction.  The bowel gas pattern is normal.  The bony structures are normal for age.  The lung bases are clear.                    1. Faint left renal calculi. 2. 4 mm calcific density in the left lower pelvis suspicious for left ureterovesical junction stone.     MALICK GARNETT MD       Electronically Signed and Approved By: MALICK GARNETT MD on 8/17/2023 at 17:41                  Differential Diagnosis and Discussion:        Dysuria: Differential diagnosis includes but is not limited to urethritis, cystitis, pyelonephritis, ureteral calculi, neoplasm, chemical irritant, urethral stricture, and trauma  Flank Pain: Differential diagnosis includes but is not limited to kidney stones, pyelonephritis, musculoskeletal disorders, renal infarction, urinary tract infection, hydronephrosis, radiculopathy, aortic aneurysm, renal cell carcinoma.     All labs were reviewed and interpreted by me.  All X-rays impressions were independently interpreted by me.     MDM     Amount and/or Complexity of Data Reviewed  Decide to obtain previous medical records or to obtain history from someone other than the patient: yes     Patient was seen yesterday by Dr. Spicer and evaluated for left kidney stone as well as left kidney neoplasm.  Dr. Spicer told the patient that they would try to let the patient pass the stone within the next 2 weeks and if the stone had not passed or  became too painful for the patient to return to the ER.  Patient states he has been taking Toradol and Flomax at home and is still having intractable pain so he came to the ER today.  I gave the patient Dilaudid but did not help the patient's pain.  I spoke with Dr. Spicer on the phone and she said to have the patient admitted to  for pain control and she will operate tomorrow before noon.  I relayed this to the patient he was very grateful and understood the plan of care.           Patient Care Considerations:     CT ABDOMEN AND PELVIS: I considered ordering a CT scan of the abdomen and pelvis however the patient had a CT 2 days ago and Dr. Spicer is aware of the situation.        Consultants/Shared Management Plan:     Consultant: I have discussed the case with Dr. Spicer who states to admit the patient due to intractable pain and she will operate tomorrow.  I spoke with  on the phone and she agreed to accept the patient for admission.     Social Determinants of Health:     Patient has presented with family members who are responsible, reliable and will ensure follow up care.        Disposition and Care Coordination:     Admit:   Through independent evaluation of the patient's history, physical, and imperical data, the patient meets criteria for observation/admission to the hospital.           Final diagnoses:   Kidney stone         ED Disposition         ED Disposition   Decision to Admit    Condition   --    Comment   Level of Care: Med/Surg [1]   Diagnosis: Kidney stone [518380]   Admitting Physician: CRIS PATEL [[id number]]   Attending Physician: CRIS PATEL [[id number]]   Certification: I Certify That Inpatient Hospital Services Are Medically Necessary For Greater Than 2 Midnights                      This medical record created using voice recognition software.                 Alberto Rocha PA-C  08/17/23 1824               Cosigned by: Alberto Carrero DO at 08/18/23 0036     Revision  History          Jack Patel DO   Physician  Medicine     H&P     Signed     Date of Service: 23  Creation Time: 23     Signed       [facility] Coll[facility]  Date: 2023   Patient Name: Danny Savage  : 1960  MRN: [id number]  Primary Care Physician:  Provider, No Known  Date of admission: 2023        Subjective []Expand by Default   abdominal pain/flank pain  Subjective      Chief Complaint: Abdominal and flank pain     HPI: Patient is a 62-year-old male who was diagnosed with a ureteral stone 2 days ago in the ER.  He has been drinking lots of fluids and taking Flomax.  The pain is severe.  He was told to come back to the ER and have the stone removed by Dr. Spicer if the pain did not improve.  Patient denies any nausea vomiting fevers or chills.     On arrival to the ED patient's temperature of 98.7, pulse of 90, respiratory rate of 18, blood pressure 174/101, and he saturating 98% on room air.  KUB shows a forced millimeter calcific density in the left lower pelvis suspicious for left ureterovesicular junction stone.     CT from 8/15/2023 shows:1. There is a 4.5 mm obstructing stone in the left UPJ causing mild obstructing uropathy.  A couple of other 2 mm nonobstructing stones are present in the left kidney as well.    2. 4.2 x 3.7 cm heterogeneous soft tissue partially exophytic mass in the lower pole of the left kidney suspicious for renal cell carcinoma/malignancy until proven otherwise.  Urology consultation is recommended.   3. Fusiform 4.7 x 4.6 cm infrarenal abdominal aortic aneurysm with a moderate amount of atherosclerotic disease.  There are no prior imaging studies available for comparison to evaluate for change.  If this has not been previously evaluated elsewhere, vascular surgery consultation on a nonemergent basis is recommended.   4. Other incidental chronic ancillary findings are described above.            On UA, patient has a trace amount of blood.  Glucose is 121.  There is no leukocytosis.  Personal History      Past Medical History:  Medical History        Past Medical History:   Diagnosis Date    Allergy                 Past Surgical History:  Surgical History   History reviewed. No pertinent surgical history.         Family History:   History reviewed. No pertinent family history.      Social History:   Social History   Social History            Socioeconomic History    Marital status:    Tobacco Use    Smoking status: Former       Types: Cigarettes    Smokeless tobacco: Never   Vaping Use    Vaping Use: Never used               Home Medications:  HYDROcodone-acetaminophen, Psyllium, ketorolac, loratadine, methylPREDNISolone, mycophenolate, and tamsulosin     Allergies:       Allergies   Allergen Reactions    Latex, Natural Rubber Hives         Review of Systems              All systems were reviewed and negative except for:flank pain            Objective      Objective      Vitals:   Temp:  [98.7 øF (37.1 øC)] 98.7 øF (37.1 øC)  Heart Rate:  [88-90] 88  Resp:  [18] 18  BP: (168-174)/() 168/84     Physical Exam             Physical Exam  Constitutional:       Appearance: Normal appearance.   HENT:      Head: Normocephalic.      Nose: Nose normal.      Mouth/Throat:      Mouth: Mucous membranes are moist.   Eyes:      Extraocular Movements: Extraocular movements intact.      Pupils: Pupils are equal, round, and reactive to light.   Cardiovascular:      Rate and Rhythm: Normal rate and regular rhythm.      Pulses: Normal pulses.   Pulmonary:      Effort: Pulmonary effort is normal.      Breath sounds: Normal breath sounds.   Abdominal:      General: Abdomen is flat. Bowel sounds are normal.      Palpations: Abdomen is soft.   Musculoskeletal:         General: Normal range of motion.   Skin:     General: Skin is warm and dry.   Neurological:      General: No focal deficit present.      Mental  Status: He is alert.               Result Review          Result Review:  I have personally reviewed the results from the time of this admission to 8/17/2023 19:42 EDT and agree with these findings:  [x]  Laboratory  []  Microbiology  []  Radiology  []  EKG/Telemetry   [x]  Cardiology/Vascular   []  Pathology  [x]  Old records  []  Other:     Lab Results (most recent)         Procedure Component Value Units Date/Time     Urinalysis With Microscopic If Indicated (No Culture) - Urine, Clean Catch [[id number]]  (Abnormal) Collected: 08/17/23 1623     Specimen: Urine, Clean Catch Updated: 08/17/23 1637       Color, UA Yellow       Appearance, UA Clear       pH, UA 7.0       Specific Gravity, UA 1.012       Glucose,  mg/dL (Trace)       Ketones, UA Negative       Bilirubin, UA Negative       Blood, UA Trace       Protein, UA Negative       Leuk Esterase, UA Negative       Nitrite, UA Negative       Urobilinogen, UA 0.2 E.U./dL     Urinalysis, Microscopic Only - Urine, Clean Catch [[id number]]  (Abnormal) Collected: 08/17/23 1623     Specimen: Urine, Clean Catch Updated: 08/17/23 1637       RBC, UA 0-2 /HPF         WBC, UA 0-2 /HPF         Bacteria, UA None Seen /HPF         Squamous Epithelial Cells, UA 0-2 /HPF         Hyaline Casts, UA None Seen /LPF         Methodology Automated Microscopy     Comprehensive Metabolic Panel [[id number]]  (Abnormal) Collected: 08/17/23 1544     Specimen: Blood from Arm, Left Updated: 08/17/23 1620       Glucose 121 mg/dL         BUN 20 mg/dL         Creatinine 1.13 mg/dL         Sodium 138 mmol/L         Potassium 4.0 mmol/L         Chloride 102 mmol/L         CO2 24.9 mmol/L         Calcium 8.9 mg/dL         Total Protein 6.9 g/dL         Albumin 4.4 g/dL         ALT (SGPT) 21 U/L         AST (SGOT) 20 U/L         Alkaline Phosphatase 75 U/L         Total Bilirubin 0.3 mg/dL         Globulin 2.5 gm/dL         A/G Ratio 1.8 g/dL         BUN/Creatinine Ratio 17.7       Anion Gap 11.1  mmol/L         eGFR 72.6 mL/min/1.73       Narrative:       GFR Normal >60  Chronic Kidney Disease <60  Kidney Failure <15        Lipase [[id number]]  (Normal) Collected: 08/17/23 1544     Specimen: Blood from Arm, Left Updated: 08/17/23 1620       Lipase 22 U/L       Lactic Acid, Plasma [[id number]]  (Normal) Collected: 08/17/23 1544     Specimen: Blood from Arm, Left Updated: 08/17/23 1616       Lactate 1.1 mmol/L       CBC & Differential [[id number]]  (Abnormal) Collected: 08/17/23 1544     Specimen: Blood from Arm, Left Updated: 08/17/23 1601     Narrative:       The following orders were created for panel order CBC & Differential.  Procedure                               Abnormality         Status                     ---------                               -----------         ------                     CBC Auto Differential[[id number]]        Abnormal            Final result                  Please view results for these tests on the individual orders.     CBC Auto Differential [[id number]]  (Abnormal) Collected: 08/17/23 1544     Specimen: Blood from Arm, Left Updated: 08/17/23 1601       WBC 5.93 10*3/mm3         RBC 5.10 10*6/mm3         Hemoglobin 14.4 g/dL         Hematocrit 46.1 %         MCV 90.4 fL         MCH 28.2 pg         MCHC 31.2 g/dL         RDW 12.8 %         RDW-SD 42.4 fl         MPV 9.1 fL         Platelets 278 10*3/mm3         Neutrophil % 55.7 %         Lymphocyte % 23.8 %         Monocyte % 9.9 %         Eosinophil % 9.6 %         Basophil % 0.8 %         Immature Grans % 0.2 %         Neutrophils, Absolute 3.30 10*3/mm3         Lymphocytes, Absolute 1.41 10*3/mm3         Monocytes, Absolute 0.59 10*3/mm3         Eosinophils, Absolute 0.57 10*3/mm3         Basophils, Absolute 0.05 10*3/mm3         Immature Grans, Absolute 0.01 10*3/mm3         nRBC 0.0 /100 WBC       [place] Draw [[id number]] Collected: 08/17/23 1544     Specimen: Blood from Arm, Left Updated: 08/17/23 1559     Narrative:        The following orders were created for panel order [place] Draw.  Procedure                               Abnormality         Status                     ---------                               -----------         ------                     Green Top (Gel)[[id number]]                                  Final result               Lavender Top[[id number]]                                     Final result               Gold Top - SST[[id number]]                                   Final result               Light Blue Top[[id number]]                                   Final result                  Please view results for these tests on the individual orders.     Lavender Top [[id number]] Collected: 08/17/23 1544     Specimen: Blood from Arm, Left Updated: 08/17/23 1559       Extra Tube hold for add-on       Comment: Auto resulted        Green Top (Gel) [[id number]] Collected: 08/17/23 1544     Specimen: Blood from Arm, Left Updated: 08/17/23 1558       Extra Tube Hold for add-ons.       Comment: Auto resulted.        Gold Top - SST [[id number]] Collected: 08/17/23 1544     Specimen: Blood from Arm, Left Updated: 08/17/23 1558       Extra Tube Hold for add-ons.       Comment: Auto resulted.        Light Blue Top [[id number]] Collected: 08/17/23 1544     Specimen: Blood from Arm, Left Updated: 08/17/23 1558       Extra Tube Hold for add-ons.       Comment: Auto resulted                   XR Abdomen KUB     Result Date: 8/17/2023               1. Faint left renal calculi. 2. 4 mm calcific density in the left lower pelvis suspicious for left ureterovesical junction stone.     MALICK GARNETT MD       Electronically Signed and Approved By: MALICK GARNETT MD on 8/17/2023 at 17:41                     Assessment & Plan     Assessment / Plan   Assessment/Plan:   #1  4 mm left kidney stone  -Dr. Cai will take the patient to the OR tomorrow.  -IV fluids, Rocephin, pain control antiemetics     #2  4.2 x 3.7 cm heterogeneous soft tissue partially  exophytic mass in the lower pole of the left kidney suspicious for renal cell carcinoma/malignancy until proven otherwise.  Urology consultation is recommended.      #3  Patient reports that he is on CellCept we will continue.  Unsure of the reason.     #4  Fusiform 4.7 x 4.6 cm infrarenal abdominal aortic aneurysm with a moderate amount of atherosclerotic disease.  -We will need outpatient vascular referral     #5 allergic rhinitis continue home regimen     #6 hypertension as needed hydralazine ordered.  Patient not on any hypertension medicines could be secondary to pain.                   DVT prophylaxis:  Medical DVT prophylaxis orders are present.     CODE STATUS:    Level Of Support Discussed With: Patient  Code Status (Patient has no pulse and is not breathing): CPR (Attempt to Resuscitate)  Medical Interventions (Patient has pulse or is breathing): Full Support        Admission Status:  I believe this patient meets inpatient status.     Electronically signed by Jack Patel DO, 23, 7:42 PM EDT.                                      Michelle Cai MD   Physician  Urology     Consults     Signed     Date of Service: 23  Creation Time: 23  Consult Orders   Inpatient Urology Consult [[id number]] ordered by Alberto Rocha PA-C at 23 1716          Signed       [facility]   UROLOGY Consult Note     Patient Name: Danny Savage  : 1960  MRN: [id number]  Primary Care Physician:  Provider, No Known  Referring Physician: No ref. provider found  Date of admission: 2023        Subjective []Expand by Default     Subjective      Reason for Consult/ Chief Complaint: Left flank pain     HPI:  Danny Savage is a 62 y.o. male presented to ER overnight with acute, severe left-sided flank pain.  Patient known to urology, seen this week in office secondary to left ureteral stone as well as left renal mass.  Patient presented to ER when  pain became too severe to control at home.  Denies fever, nausea, vomiting.  Symptoms refractory in ER, admitted to hospitalist service for overnight pain control, plan for definitive stone intervention.     Patient was afebrile vital signs stable in ER; Platten this morning 1.37 from 1.13 upon presentation to ER.  No leukocytosis.  UA negative for infection.     ER KUB reveals faint left renal calculi; 4 mm density in left lower pelvis suspicious for left UPJ stone     Review of Systems              All systems were reviewed and negative except for: ER H&P in the above     Personal History      Medical History        Past Medical History:   Diagnosis Date    Allergy              Surgical History   History reviewed. No pertinent surgical history.        Family History: family history is not on file. Otherwise pertinent FHx was reviewed and not pertinent to current issue.     Social History:  reports that he has quit smoking. His smoking use included cigarettes. He has never used smokeless tobacco. He reports that he does not drink alcohol and does not use drugs.     Home Medications:  HYDROcodone-acetaminophen, Psyllium, ketorolac, loratadine, methylPREDNISolone, mycophenolate, and tamsulosin     Allergies:       Allergies   Allergen Reactions    Latex, Natural Rubber Hives               Objective       Objective      Vitals:   Temp:  [97.9 øF (36.6 øC)-98.7 øF (37.1 øC)] 97.9 øF (36.6 øC)  Heart Rate:  [82-90] 82  Resp:  [18] 18  BP: (127-174)/() 131/80     Physical Exam:              No acute distress, well-nourished  Lungs: Clear, unlabored  CV: Regular rate, no chest retractions  Awake alert and oriented  Mood normal; affect normal           Result Review       Result Review:  I have personally reviewed the results from the time of this admission to 8/18/2023 07:32 EDT and agree with these findings:  [x]  Laboratory  [x]  Microbiology  [x]  Radiology  []  EKG/Telemetry   []  Cardiology/Vascular   []   Pathology  []  Old records  []  Other:              Assessment & Plan     Assessment / Plan      Brief Patient Summary:  Danny Savage is a 62 y.o. male with left renal mass; left ureteral stone and nephrolithiasis, failed trial of passage     Active Hospital Problems:       Active Hospital Problems     Diagnosis      **Kidney stone      Left ureteral stone           Plan:   Labs and imaging reviewed  Plan to proceed with cystoscopy, left retrograde pyelogram, ureteroscopy, laser lithotripsy, stent placement.    Risks, benefits and alternatives were discussed with patient including bleeding, infection, damage to surrounding structures, stone recurrence, stricture.    Patient also notes understanding that if unable to reach the level of the stone or if significant purulent discharge noted upon initiation of procedure that stent will be placed in definitive stone management will be deferred until the collecting system is optimized.  OR notified  Left side marked  N.p.o.  Preop Rocephin  All question addressed at this time.     Electronically signed by Michelle Cai MD, 08/18/23, 7:32 AM EDT.                           Michelle Cai MD   Physician  Urology     Op Note     Signed     Date of Service: 08/18/23 0952  Creation Time: 08/18/23 1020  Case Time: Procedures: Surgeons:   08/18/23 0952 CYSTOSCOPY URETEROSCOPY RETROGRADE PYELOGRAM STENT INSERTION, left    Michelle Cai MD               Signed         Preoperative diagnosis  Left ureteral stone, nephrolithiasis     Postoperative diagnosis  Left ureteral stone, nephrolithiasis     Procedure performed  Cystoscopy left retrograde pyelogram, ureteroscopy, basket stone extraction in 2 distinct locations, ureter and kidney; ureteral stent insertion      Attending surgeon  Michelle Cai MD      Anesthesia  General     EBL  0 mL     Complications  None     Specimen  Left ureteral and renal stones     Findings  Cystoscopy without abnormalities;  bilateral orthotopic ureters; distal ureteral stone seen on  x-ray; retrieved with basket  Small intrarenal stone retrieved with basket; no other intrarenal stone burden appreciated  Left retrograde pyelogram with mild hydronephrosis  6 x 26 stent with string     Indications  62 y.o. male agreed to undergo the above named procedure after discussion of the alternatives, risks and benefits.   Informed consent was obtained.       Procedure  After informed consent was obtained.  The patient was taken back to the operating suite where general anesthesia was administered.  Once patient was adequately anesthetized he was placed in the dorsolithotomy position.  His genitals were prepped and draped in the normal sterile fashion.  A rigid cystoscope was inserted gently into the urethral meatus and passed atraumatically into the bladder.  Pan cystoscopy was performed and a 360 degree manner.  No abnormalities were noted.  There were no diverticula, trabeculations, lesions, or tumors.  Patient had bilateral orthotopic ureters.  Focus for the remainder of the procedure was placed on the left side.   x-ray revealed the location of the distal left ureteral stone.  A sensor wire was used to intubate the left ureteral orifice which passed up into the renal pelvis.  Next, semirigid ureteroscope was used to navigate the distal ureter.  The distal ureteral stone was encountered as expected in the distal ureter.  It was amenable to basket stone extraction.  Basket was inserted through the ureteroscope and used to retrieve the stone entirely.  It was then passed off for chemical analysis.  Further ureteroscopy up to the level of the renal pelvis did not reveal any ureteral abnormalities further fragments or calculi.   Retrograde pyelogram was then obtained through the semirigid ureteroscope revealing mild left hydronephrosis.  Ureteroscope was then removed.  Access sheath was placed over the wire under fluoroscopic guidance.   Next the flexible ureteroscope was inserted over the wire to the level of the renal pelvis and nephroscopy was performed.  Nephroscopy was performed a systematic manner; there was one free-floating renal calculus which was retrieved with basket and sent for chemical analysis as well.  No other stones encountered.  After ensuring inspection of all calyces, finally, the access sheath and ureteroscope was retrieved inspecting the length of the ureter 1 last time which was free of stones.  Finally, a 6 x 26 stent was placed over the wire.  Upon retrieval of the wire there was proximal coil within the UPJ and visible distal coil within the bladder.  Patient's bladder was then emptied and the procedure deemed terminated.  The string was secured with a Tegaderm.  He was awoken from general anesthesia and transferred to the PACU in stable condition.        Signed:  Michelle Cai MD  08/18/23  10:20 EDT                  IV ROCEPHIN QD-IVF @ 100  DILAUDID IV X8  ZOFRAN IV X3 -NORCO PO X3-TORADOL IV x4 -DEM IV X1-OXYCODONE PO X1

## 2023-08-18 NOTE — PLAN OF CARE
"Goal Outcome Evaluation:  Plan of Care Reviewed With: patient      Pt alert and oriented. Had stent placement today for kidney stone. States pain has improved since surgery, but now \"burning\" sensation in penis. Pt concerned he may have reaction to the tegaderm that is holding the \"string\" in place on his penis. Dr. Hunter was notified and discussed situation with her. Pt was given option of just keeping an eye on it and if it begins being irritated, removing the tegaderm at that time and having urology come and cut the string closer to the head of the penis so the stent is less likely to come out. Or for Dr. Crocker to come on in and remove the tegaderm and cut the string closer today. Pt decided to wait and keep eye on it. Call light in reach.                "

## 2023-08-18 NOTE — OP NOTE
Preoperative diagnosis  Left ureteral stone, nephrolithiasis    Postoperative diagnosis  Left ureteral stone, nephrolithiasis    Procedure performed  Cystoscopy left retrograde pyelogram, ureteroscopy, basket stone extraction in 2 distinct locations, ureter and kidney; ureteral stent insertion     Attending surgeon  Michelle Cai MD     Anesthesia  General    EBL  0 mL    Complications  None    Specimen  Left ureteral and renal stones    Findings  Cystoscopy without abnormalities; bilateral orthotopic ureters; distal ureteral stone seen on  x-ray; retrieved with basket  Small intrarenal stone retrieved with basket; no other intrarenal stone burden appreciated  Left retrograde pyelogram with mild hydronephrosis  6 x 26 stent with string    Indications  62 y.o. male agreed to undergo the above named procedure after discussion of the alternatives, risks and benefits.   Informed consent was obtained.      Procedure  After informed consent was obtained.  The patient was taken back to the operating suite where general anesthesia was administered.  Once patient was adequately anesthetized he was placed in the dorsolithotomy position.  His genitals were prepped and draped in the normal sterile fashion.  A rigid cystoscope was inserted gently into the urethral meatus and passed atraumatically into the bladder.  Pan cystoscopy was performed and a 360 degree manner.  No abnormalities were noted.  There were no diverticula, trabeculations, lesions, or tumors.  Patient had bilateral orthotopic ureters.  Focus for the remainder of the procedure was placed on the left side.   x-ray revealed the location of the distal left ureteral stone.  A sensor wire was used to intubate the left ureteral orifice which passed up into the renal pelvis.  Next, semirigid ureteroscope was used to navigate the distal ureter.  The distal ureteral stone was encountered as expected in the distal ureter.  It was amenable to basket stone  extraction.  Basket was inserted through the ureteroscope and used to retrieve the stone entirely.  It was then passed off for chemical analysis.  Further ureteroscopy up to the level of the renal pelvis did not reveal any ureteral abnormalities further fragments or calculi.   Retrograde pyelogram was then obtained through the semirigid ureteroscope revealing mild left hydronephrosis.  Ureteroscope was then removed.  Access sheath was placed over the wire under fluoroscopic guidance.  Next the flexible ureteroscope was inserted over the wire to the level of the renal pelvis and nephroscopy was performed.  Nephroscopy was performed a systematic manner; there was one free-floating renal calculus which was retrieved with basket and sent for chemical analysis as well.  No other stones encountered.  After ensuring inspection of all calyces, finally, the access sheath and ureteroscope was retrieved inspecting the length of the ureter 1 last time which was free of stones.  Finally, a 6 x 26 stent was placed over the wire.  Upon retrieval of the wire there was proximal coil within the UPJ and visible distal coil within the bladder.  Patient's bladder was then emptied and the procedure deemed terminated.  The string was secured with a Tegaderm.  He was awoken from general anesthesia and transferred to the PACU in stable condition.      Signed:  Michelle Cai MD  08/18/23  10:20 EDT

## 2023-08-18 NOTE — CONSULTS
[facility]   UROLOGY Consult Note    Patient Name: Danny Savage  : 1960  MRN: [id number]  Primary Care Physician:  Provider, No Known  Referring Physician: No ref. provider found  Date of admission: 2023    Subjective   Subjective     Reason for Consult/ Chief Complaint: Left flank pain    HPI:  Danny Savage is a 62 y.o. male presented to ER overnight with acute, severe left-sided flank pain.  Patient known to urology, seen this week in office secondary to left ureteral stone as well as left renal mass.  Patient presented to ER when pain became too severe to control at home.  Denies fever, nausea, vomiting.  Symptoms refractory in ER, admitted to hospitalist service for overnight pain control, plan for definitive stone intervention.    Patient was afebrile vital signs stable in ER; Platten this morning 1.37 from 1.13 upon presentation to ER.  No leukocytosis.  UA negative for infection.    ER KUB reveals faint left renal calculi; 4 mm density in left lower pelvis suspicious for left UPJ stone    Review of Systems   All systems were reviewed and negative except for: ER H&P in the above    Personal History     Past Medical History:   Diagnosis Date    Allergy        History reviewed. No pertinent surgical history.    Family History: family history is not on file. Otherwise pertinent FHx was reviewed and not pertinent to current issue.    Social History:  reports that he has quit smoking. His smoking use included cigarettes. He has never used smokeless tobacco. He reports that he does not drink alcohol and does not use drugs.    Home Medications:  HYDROcodone-acetaminophen, Psyllium, ketorolac, loratadine, methylPREDNISolone, mycophenolate, and tamsulosin    Allergies:  Allergies   Allergen Reactions    Latex, Natural Rubber Hives       Objective    Objective     Vitals:   Temp:  [97.9 øF (36.6 øC)-98.7 øF (37.1 øC)] 97.9 øF (36.6 øC)  Heart Rate:  [82-90] 82  Resp:  [18] 18  BP:  (127-174)/() 131/80    Physical Exam:   No acute distress, well-nourished  Lungs: Clear, unlabored  CV: Regular rate, no chest retractions  Awake alert and oriented  Mood normal; affect normal    Result Review    Result Review:  I have personally reviewed the results from the time of this admission to 8/18/2023 07:32 EDT and agree with these findings:  [x]  Laboratory  [x]  Microbiology  [x]  Radiology  []  EKG/Telemetry   []  Cardiology/Vascular   []  Pathology  []  Old records  []  Other:      Assessment & Plan   Assessment / Plan     Brief Patient Summary:  Danny Savage is a 62 y.o. male with left renal mass; left ureteral stone and nephrolithiasis, failed trial of passage    Active Hospital Problems:  Active Hospital Problems    Diagnosis     **Kidney stone     Left ureteral stone        Plan:   Labs and imaging reviewed  Plan to proceed with cystoscopy, left retrograde pyelogram, ureteroscopy, laser lithotripsy, stent placement.    Risks, benefits and alternatives were discussed with patient including bleeding, infection, damage to surrounding structures, stone recurrence, stricture.    Patient also notes understanding that if unable to reach the level of the stone or if significant purulent discharge noted upon initiation of procedure that stent will be placed in definitive stone management will be deferred until the collecting system is optimized.  OR notified  Left side marked  N.p.o.  Preop Rocephin  All question addressed at this time.    Electronically signed by Michelle Cai MD, 08/18/23, 7:32 AM EDT.

## 2023-08-18 NOTE — PLAN OF CARE
Goal Outcome Evaluation:  Plan of Care Reviewed With: patient        Progress: improving  Outcome Evaluation: Patient currently independent with all self-care and functional transfers and agreeable that no additional Occupational Therapy services are necessary at this time.      Anticipated Discharge Disposition (OT): home

## 2023-08-18 NOTE — PLAN OF CARE
Goal Outcome Evaluation:                      Patient alert and oriented to person,place, time, situation. Wife at bedside on admission, attentive and helpful to patient, no meds to beds necessary at this time, pain left flank staying moderate but controlled compared to severe before admission. 3/10 pain acceptable per patient. Educated on NPO status and chlorhexadine bath tomorrow, 8/18/2023, for possible procedure tomorrow. Call bell in reach, no needs voiced, breathing deeply.

## 2023-08-18 NOTE — ANESTHESIA PREPROCEDURE EVALUATION
Anesthesia Evaluation     Patient summary reviewed and Nursing notes reviewed   no history of anesthetic complications:   NPO Solid Status: > 8 hours  NPO Liquid Status: > 2 hours           Airway   Mallampati: II  TM distance: >3 FB  Neck ROM: full  No difficulty expected  Dental      Pulmonary - negative pulmonary ROS and normal exam    breath sounds clear to auscultation  Cardiovascular - negative cardio ROS and normal exam  Exercise tolerance: good (4-7 METS)    ECG reviewed  Rhythm: regular  Rate: normal        Neuro/Psych- negative ROS  GI/Hepatic/Renal/Endo    (+) renal disease stones    Musculoskeletal (-) negative ROS    Abdominal    Substance History - negative use     OB/GYN negative ob/gyn ROS         Other - negative ROS       ROS/Med Hx Other: PAT Nursing Notes unavailable.             Latest Reference Range & Units 08/17/23 15:44   Sodium 136 - 145 mmol/L 138   Potassium 3.5 - 5.2 mmol/L 4.0   Chloride 98 - 107 mmol/L 102   CO2 22.0 - 29.0 mmol/L 24.9   Anion Gap 5.0 - 15.0 mmol/L 11.1   BUN 8 - 23 mg/dL 20   Creatinine 0.76 - 1.27 mg/dL 1.13   BUN/Creatinine Ratio 7.0 - 25.0  17.7   eGFR >60.0 mL/min/1.73 72.6   Glucose 65 - 99 mg/dL 121 (H)   Calcium 8.6 - 10.5 mg/dL 8.9   Alkaline Phosphatase 39 - 117 U/L 75   Total Protein 6.0 - 8.5 g/dL 6.9   Albumin 3.5 - 5.2 g/dL 4.4   Globulin gm/dL 2.5   A/G Ratio g/dL 1.8   AST (SGOT) 1 - 40 U/L 20   ALT (SGPT) 1 - 41 U/L 21   Total Bilirubin 0.0 - 1.2 mg/dL 0.3   (H): Data is abnormally high         Anesthesia Plan    ASA 2     general     (Patient understands anesthesia not responsible for dental damage.    Will obtain 12 lead EKG preop since none documented in last year)  intravenous induction     Anesthetic plan, risks, benefits, and alternatives have been provided, discussed and informed consent has been obtained with: patient.    Use of blood products discussed with patient .    Plan discussed with CRNA.      CODE STATUS:    Level Of Support Discussed  With: Patient  Code Status (Patient has no pulse and is not breathing): CPR (Attempt to Resuscitate)  Medical Interventions (Patient has pulse or is breathing): Full Support

## 2023-08-18 NOTE — CASE MANAGEMENT/SOCIAL WORK
Discharge Planning Assessment   Evelina     Patient Name: Danny Savage  MRN: [id number]  Today's Date: 8/18/2023    Admit Date: 8/17/2023    Plan: patient admitted due to ureteral stone and nephrolithiasis. Patient is indeendent at home with spouse. Pharm will be [facility]. Patient would like to schedule his PCP new patient with Dr. Kodi Pichardo. Wife will call today to set up. Patient denies additional needs, spouse very involved. Patient did express along with wife his extreme sensitivity to latex and adhesive. Patient has concerns with tape around surgical area. Primary RN made aware at bedside.   Discharge Needs Assessment       Row Name 08/18/23 1412       Living Environment    People in Home spouse    Current Living Arrangements home    Potentially Unsafe Housing Conditions none    Primary Care Provided by self    Family Caregiver if Needed spouse    Quality of Family Relationships helpful;involved;supportive    Able to Return to Prior Arrangements yes       Resource/Environmental Concerns    Resource/Environmental Concerns none    Transportation Concerns none       Transition Planning    Patient/Family Anticipates Transition to home with family    Patient/Family Anticipated Services at Transition none    Transportation Anticipated family or friend will provide       Discharge Needs Assessment    Readmission Within the Last 30 Days no previous admission in last 30 days    Equipment Currently Used at Home none    Concerns to be Addressed discharge planning    Anticipated Changes Related to Illness none    Equipment Needed After Discharge none                   Discharge Plan       Row Name 08/18/23 1414       Plan    Plan patient admitted due to ureteral stone and nephrolithiasis. Patient is indeendent at home with spouse. Pharm will be [facility]. Patient would like to schedule his PCP new patient with Dr. Kodi Pichardo. Wife will call today to set up. Patient denies additional needs, spouse very involved. Patient  did express along with wife his extreme sensitivity to latex and adhesive. Patient has concerns with tape around surgical area. Primary RN made aware at bedside.    Final Discharge Disposition Code 01 - home or self-care                  Continued Care and Services - Admitted Since 8/17/2023    Coordination has not been started for this encounter.          Demographic Summary       Row Name 08/18/23 1411       General Information    Admission Type inpatient    Arrived From home;emergency department    Referral Source admission list    Reason for Consult discharge planning    Preferred Language English       Contact Information    Permission Granted to Share Info With family/designee    Contact Information Obtained for                    Functional Status       Row Name 08/18/23 1412       Functional Status    Usual Activity Tolerance good    Current Activity Tolerance good       Assessment of Health Literacy    How often do you have someone help you read hospital materials? Occasionally    How often do you have problems learning about your medical condition because of difficulty understanding written information? Never    How often do you have a problem understanding what is told to you about your medical condition? Never    How confident are you filling out medical forms by yourself? Quite a bit    Health Literacy Good       Functional Status, IADL    Medications independent    Meal Preparation independent    Housekeeping independent    Laundry independent    Shopping independent       Mental Status    General Appearance WDL WDL       Mental Status Summary    Recent Changes in Mental Status/Cognitive Functioning no changes       Employment/    Employment Status retired                   Psychosocial    No documentation.                  Abuse/Neglect    No documentation.                  Legal    No documentation.                  Substance Abuse    No documentation.                  Patient  Forms    No documentation.                     Gaby Villavicencio RN

## 2023-08-18 NOTE — ANESTHESIA POSTPROCEDURE EVALUATION
Patient: Danny Savage    Procedure Summary       Date: 08/18/23 Room / Location: [facility] OR 07 / [facility] MAIN OR    Anesthesia Start: 0930 Anesthesia Stop: 1025    Procedure: CYSTOSCOPY URETEROSCOPY RETROGRADE PYELOGRAM STENT INSERTION, left (Left) Diagnosis:       Kidney stone      Left ureteral stone      (Kidney stone [N20.0])      (Left ureteral stone [N20.1])    Surgeons: Michelle Cai MD Provider: Clayton Anders MD    Anesthesia Type: general ASA Status: 2            Anesthesia Type: general    Vitals  Vitals Value Taken Time   /77 08/18/23 1035   Temp 36.4 øC (97.6 øF) 08/18/23 1023   Pulse 87 08/18/23 1038   Resp 14 08/18/23 1035   SpO2 90 % 08/18/23 1038   Vitals shown include unvalidated device data.        Post Anesthesia Care and Evaluation    Patient location during evaluation: bedside  Patient participation: complete - patient participated  Level of consciousness: awake  Pain management: adequate    Airway patency: patent  PONV Status: none  Cardiovascular status: acceptable and stable  Respiratory status: acceptable  Hydration status: acceptable    Comments: An Anesthesiologist personally participated in the most demanding procedures (including induction and emergence if applicable) in the anesthesia plan, monitored the course of anesthesia administration at frequent intervals and remained physically present and available for immediate diagnosis and treatment of emergencies.

## 2023-08-19 ENCOUNTER — READMISSION MANAGEMENT (OUTPATIENT)
Dept: CALL CENTER | Facility: HOSPITAL | Age: 63
End: 2023-08-19
Payer: COMMERCIAL

## 2023-08-19 VITALS
HEIGHT: 70 IN | OXYGEN SATURATION: 95 % | TEMPERATURE: 98.1 F | HEART RATE: 84 BPM | SYSTOLIC BLOOD PRESSURE: 133 MMHG | WEIGHT: 173.72 LBS | DIASTOLIC BLOOD PRESSURE: 69 MMHG | BODY MASS INDEX: 24.87 KG/M2 | RESPIRATION RATE: 16 BRPM

## 2023-08-19 LAB
ANION GAP SERPL CALCULATED.3IONS-SCNC: 8.3 MMOL/L (ref 5–15)
BASOPHILS # BLD AUTO: 0.03 10*3/MM3 (ref 0–0.2)
BASOPHILS NFR BLD AUTO: 0.4 % (ref 0–1.5)
BUN SERPL-MCNC: 17 MG/DL (ref 8–23)
BUN/CREAT SERPL: 16.5 (ref 7–25)
CALCIUM SPEC-SCNC: 8.5 MG/DL (ref 8.6–10.5)
CHLORIDE SERPL-SCNC: 106 MMOL/L (ref 98–107)
CO2 SERPL-SCNC: 24.7 MMOL/L (ref 22–29)
CREAT SERPL-MCNC: 1.03 MG/DL (ref 0.76–1.27)
DEPRECATED RDW RBC AUTO: 42.4 FL (ref 37–54)
EGFRCR SERPLBLD CKD-EPI 2021: 81.1 ML/MIN/1.73
EOSINOPHIL # BLD AUTO: 0.06 10*3/MM3 (ref 0–0.4)
EOSINOPHIL NFR BLD AUTO: 0.7 % (ref 0.3–6.2)
ERYTHROCYTE [DISTWIDTH] IN BLOOD BY AUTOMATED COUNT: 13 % (ref 12.3–15.4)
GLUCOSE SERPL-MCNC: 132 MG/DL (ref 65–99)
HCT VFR BLD AUTO: 37.8 % (ref 37.5–51)
HGB BLD-MCNC: 12.1 G/DL (ref 13–17.7)
IMM GRANULOCYTES # BLD AUTO: 0.03 10*3/MM3 (ref 0–0.05)
IMM GRANULOCYTES NFR BLD AUTO: 0.4 % (ref 0–0.5)
LYMPHOCYTES # BLD AUTO: 1.07 10*3/MM3 (ref 0.7–3.1)
LYMPHOCYTES NFR BLD AUTO: 13 % (ref 19.6–45.3)
MCH RBC QN AUTO: 29.1 PG (ref 26.6–33)
MCHC RBC AUTO-ENTMCNC: 32 G/DL (ref 31.5–35.7)
MCV RBC AUTO: 90.9 FL (ref 79–97)
MONOCYTES # BLD AUTO: 0.94 10*3/MM3 (ref 0.1–0.9)
MONOCYTES NFR BLD AUTO: 11.4 % (ref 5–12)
NEUTROPHILS NFR BLD AUTO: 6.08 10*3/MM3 (ref 1.7–7)
NEUTROPHILS NFR BLD AUTO: 74.1 % (ref 42.7–76)
NRBC BLD AUTO-RTO: 0 /100 WBC (ref 0–0.2)
PLATELET # BLD AUTO: 227 10*3/MM3 (ref 140–450)
PMV BLD AUTO: 9.6 FL (ref 6–12)
POTASSIUM SERPL-SCNC: 4 MMOL/L (ref 3.5–5.2)
RBC # BLD AUTO: 4.16 10*6/MM3 (ref 4.14–5.8)
SODIUM SERPL-SCNC: 139 MMOL/L (ref 136–145)
WBC NRBC COR # BLD: 8.21 10*3/MM3 (ref 3.4–10.8)

## 2023-08-19 PROCEDURE — 85025 COMPLETE CBC W/AUTO DIFF WBC: CPT | Performed by: UROLOGY

## 2023-08-19 PROCEDURE — 25010000002 ENOXAPARIN PER 10 MG: Performed by: UROLOGY

## 2023-08-19 PROCEDURE — 97161 PT EVAL LOW COMPLEX 20 MIN: CPT

## 2023-08-19 PROCEDURE — 36415 COLL VENOUS BLD VENIPUNCTURE: CPT | Performed by: UROLOGY

## 2023-08-19 PROCEDURE — 63710000001 MYCOPHENOLATE MOFETIL PER 250 MG: Performed by: UROLOGY

## 2023-08-19 PROCEDURE — 80048 BASIC METABOLIC PNL TOTAL CA: CPT | Performed by: UROLOGY

## 2023-08-19 RX ADMIN — MYCOPHENOLATE MOFETIL 500 MG: 250 CAPSULE ORAL at 09:22

## 2023-08-19 RX ADMIN — FAMOTIDINE 40 MG: 20 TABLET ORAL at 09:22

## 2023-08-19 RX ADMIN — SENNOSIDES AND DOCUSATE SODIUM 2 TABLET: 50; 8.6 TABLET ORAL at 09:22

## 2023-08-19 RX ADMIN — PHENAZOPYRIDINE 100 MG: 100 TABLET ORAL at 07:28

## 2023-08-19 RX ADMIN — CETIRIZINE HYDROCHLORIDE 10 MG: 10 TABLET, FILM COATED ORAL at 09:22

## 2023-08-19 RX ADMIN — TAMSULOSIN HYDROCHLORIDE 0.4 MG: 0.4 CAPSULE ORAL at 09:22

## 2023-08-19 RX ADMIN — HYDROCODONE BITARTRATE AND ACETAMINOPHEN 1 TABLET: 5; 325 TABLET ORAL at 09:24

## 2023-08-19 NOTE — PLAN OF CARE
Goal Outcome Evaluation:  Plan of Care Reviewed With: patient, spouse      Pt alert and oriented. Had some discomfort this am in left kidney area, pain medication given. No signs of skin breakdown from tegaderm placement on penis. Pt to be discharged home. Spouse at bedside. Call light in reach.

## 2023-08-19 NOTE — THERAPY EVALUATION
Acute Care - Physical Therapy Initial Evaluation   Evelina     Patient Name: Danny Savage  : 1960  MRN: [id number]  Today's Date: 2023      Visit Dx:     ICD-10-CM ICD-9-CM   1. Kidney stone  N20.0 592.0   2. Left ureteral stone  N20.1 592.1   3. Left renal mass  N28.89 593.9   4. Hydronephrosis, unspecified hydronephrosis type  N13.30 591   5. Difficulty walking  R26.2 719.7     Patient Active Problem List   Diagnosis    Left renal mass    Kidney stone    Left ureteral stone     Past Medical History:   Diagnosis Date    Allergy      Past Surgical History:   Procedure Laterality Date    URETEROSCOPY LASER LITHOTRIPSY WITH STENT INSERTION Left 2023    Procedure: CYSTOSCOPY URETEROSCOPY RETROGRADE PYELOGRAM STENT INSERTION, left;  Surgeon: Michelle Cai MD;  Location: [facility];  Service: Urology;  Laterality: Left;     PT Assessment (last 12 hours)       PT Evaluation and Treatment       Row Name 23 1104          Physical Therapy Time and Intention    Subjective Information no complaints  -     Document Type evaluation  -     Mode of Treatment individual therapy;physical therapy  -     Patient Effort excellent  -     Symptoms Noted During/After Treatment none  -       Row Name 23 1104          General Information    Patient Profile Reviewed yes  -     Barriers to Rehab none identified  -       Row Name 23 1104          Living Environment    Current Living Arrangements home  -     People in Home spouse  -     Primary Care Provided by self  -       Row Name 23 1104          Home Use of Assistive/Adaptive Equipment    Equipment Currently Used at Home none  -       Row Name 23 1104          Range of Motion (ROM)    Range of Motion bilateral lower extremities;ROM is WNL  -       Row Name 23 1104          Strength (Manual Muscle Testing)    Strength (Manual Muscle Testing) bilateral lower extremities;strength is [facility]        Row Name 08/19/23 1104          Bed Mobility    Bed Mobility bed mobility (all) activities  -     All Activities, [place] (Bed Mobility) independent  -       Row Name 08/19/23 1104          Transfers    Transfers sit-stand transfer;stand-sit transfer  -       Row Name 08/19/23 1104          Sit-Stand Transfer    Sit-Stand [place] (Transfers) independent  -       Row Name 08/19/23 1104          Stand-Sit Transfer    Stand-Sit [place] (Transfers) independent  -       Row Name 08/19/23 1104          Gait/Stairs (Locomotion)    Gait/Stairs Locomotion gait/ambulation independence  -     [place] Level (Gait) independent  -     Distance in Feet (Gait) 300  -     Pattern (Gait) step-through  -       Row Name 08/19/23 1104          Balance    Balance Assessment standing dynamic balance  -     Dynamic Standing Balance independent  -       Row Name             Wound 08/18/23 0952 urethral meatus Other (comment)    Wound - Properties Group Placement Date: 08/18/23  -KJ Placement Time: 0952  -KJ Present on Hospital Admission: N  -KJ Location: urethral meatus  -KJ Primary Wound Type: Other  -KJ, procedure point of entry     Retired Wound - Properties Group Placement Date: 08/18/23  -KJ Placement Time: 0952  -KJ Present on Hospital Admission: N  -KJ Location: urethral meatus  -KJ Primary Wound Type: Other  -KJ, procedure point of entry     Retired Wound - Properties Group Date first assessed: 08/18/23  -KJ Time first assessed: 0952  -KJ Present on Hospital Admission: N  -KJ Location: urethral meatus  -KJ Primary Wound Type: Other  -KJ, procedure point of entry       Row Name 08/19/23 1104          Plan of Care Review    Plan of Care Reviewed With patient  -     Outcome Evaluation Pt does not present with any deficits requiring skilled PT. Pt is safe to discharge home with spouse.  -       Row Name 08/19/23 1104          Positioning and Restraints    Pre-Treatment Position in bed  -      Post Treatment Position bed  -     In Bed call light within reach;with family/caregiver  -       Row Name 08/19/23 1104          Therapy Assessment/Plan (PT)    Therapy Frequency (PT) evaluation only  -               User Key  (r) = Recorded By, (t) = Taken By, (c) = Cosigned By      Initials Name Provider Type    Velia Nation, RN Registered Nurse    Emiliano Robert PT Physical Therapist                    Physical Therapy Education       Title: PT OT SLP Therapies (Done)       Topic: Physical Therapy (Done)       Point: Mobility training (Done)       Learning Progress Summary             Patient Acceptance, E, VU by  at 8/19/2023 1109                         Point: Home exercise program (Done)       Learning Progress Summary             Patient Acceptance, E, VU by  at 8/19/2023 1109                         Point: Body mechanics (Done)       Learning Progress Summary             Patient Acceptance, E, VU by  at 8/19/2023 1109                         Point: Precautions (Done)       Learning Progress Summary             Patient Acceptance, E, VU by  at 8/19/2023 1109                                         User Key       Initials Effective Dates Name Provider Type [facility] 05/08/23 -  Emiliano Love, LEONARDA Physical Therapist PT                  PT Recommendation and Plan  Therapy Frequency (PT): evaluation only  Plan of Care Reviewed With: patient  Outcome Evaluation: Pt does not present with any deficits requiring skilled PT. Pt is safe to discharge home with spouse.   Outcome Measures       Row Name 08/19/23 1100             How much help from another person do you currently need...    Turning from your back to your side while in flat bed without using bedrails? 4  -JH      Moving from lying on back to sitting on the side of a flat bed without bedrails? 4  -JH      Moving to and from a bed to a chair (including a wheelchair)? 4  -JH      Standing up from a chair using your arms (e.g.,  wheelchair, bedside chair)? 4  -JH      Climbing 3-5 steps with a railing? 4  -JH      To walk in hospital room? 4  -JH      AM-PAC 6 Clicks Score (PT) 24  -         Functional Assessment    Outcome Measure Options AM-PAC 6 Clicks Basic Mobility (PT)  -                User Key  (r) = Recorded By, (t) = Taken By, (c) = Cosigned By      Initials Name Provider Type    Emiliano Robert, LEONARDA Physical Therapist                     Time Calculation:    PT Charges       Row Name 08/19/23 1104             Time Calculation    PT Received On 08/19/23  -         Untimed Charges    PT Eval/Re-eval Minutes 35  -JH         Total Minutes    Untimed Charges Total Minutes 35  -JH       Total Minutes 35  -JH                User Key  (r) = Recorded By, (t) = Taken By, (c) = Cosigned By      Initials Name Provider Type    Emiliano Robert PT Physical Therapist                  Therapy Charges for Today       Code Description Service Date Service Provider Modifiers Qty    13029170049 HC PT EVAL LOW COMPLEXITY 3 8/19/2023 Emiliano Love, PT GP 1            PT G-Codes  Outcome Measure Options: AM-PAC 6 Clicks Basic Mobility (PT)  AM-PAC 6 Clicks Score (PT): 24  AM-PAC 6 Clicks Score (OT): 24    Emiliano Love PT  8/19/2023

## 2023-08-19 NOTE — DISCHARGE SUMMARY
[facility]         HOSPITALIST  DISCHARGE SUMMARY    Patient Name: Danny Savage  : 1960  MRN: [id number]  4006/1   Date of Admission: 2023  Date of Discharge:  2023  Primary Care Physician: Provider, No Known    Consults       Date and Time Order Name Status Description    2023  5:28 PM Inpatient Hospitalist Consult      2023  5:16 PM Inpatient Urology Consult Completed             Active and Resolved Hospital Problems:  4 mm obstructive left renal stone  4.2 x 3.7 cm heterogeneous soft tissue partially exophytic mass in the lower pole of the left kidney suspicious for renal cell carcinoma/malignancy until proven otherwise   Fusiform 4.7 x 4.6 cm infrarenal abdominal aortic aneurysm with a moderate amount of atherosclerotic disease.   Hypertension   Allergic rhinitis    Hospital Course     Hospital Course:  Danny Savage is a 62 y.o. male  who was diagnosed with a ureteral stone 2 days ago in the ER.  He has been drinking lots of fluids and taking Flomax.  The pain is severe.  He was told to come back to the ER and have the stone removed by Dr. Spicer if the pain did not improve.  Patient denies any nausea vomiting fevers or chills.     On arrival to the ED patient's temperature of 98.7, pulse of 90, respiratory rate of 18, blood pressure 174/101, and he saturating 98% on room air.  KUB shows a forced millimeter calcific density in the left lower pelvis suspicious for left ureterovesicular junction stone. Patient was taken to the OR for urologic intervention.  Ureteral stent was placed and stones were removed.    On the day of discharge he was feeling much better and eager to go home. He was discharged in good condition to follow up with Urology which has already been arranged.     DISCHARGE Follow Up Recommendations for labs and diagnostics: Will need vascular referral outpatient for Fusiform 4.7 x 4.6 cm infrarenal abdominal aortic aneurysm with a moderate  amount of atherosclerotic disease.       Day of Discharge     Vital Signs:  Temp:  [97.6 øF (36.4 øC)-98.5 øF (36.9 øC)] 98.1 øF (36.7 øC)  Heart Rate:  [84-99] 84  Resp:  [16-18] 16  BP: (119-151)/(69-85) 133/69  Physical Exam:               Constitutional: Awake, alert, no acute distress              Respiratory: Clear to auscultation bilaterally, nonlabored respirations               Cardiovascular: RRR, no murmurs, rubs, or gallops, palpable pedal pulses bilaterally              Gastrointestinal: Positive bowel sounds, soft, nontender, nondistended              Psychiatric: Appropriate affect, cooperative              Neurologic: Oriented x 3, speech clear      Discharge Details        Discharge Medications        New Medications        Instructions Start Date   oxybutynin 5 MG tablet  Commonly known as: DITROPAN   5 mg, Oral, 3 Times Daily PRN, May cause constipation      oxyCODONE-acetaminophen 5-325 MG per tablet  Commonly known as: Percocet   1-2 tablets, Oral, Every 6 Hours PRN      phenazopyridine 200 MG tablet  Commonly known as: Pyridium   200 mg, Oral, 3 Times Daily PRN             Continue These Medications        Instructions Start Date   ketorolac 10 MG tablet  Commonly known as: TORADOL   10 mg, Oral, Every 6 Hours PRN      loratadine 10 MG tablet  Commonly known as: CLARITIN   10 mg, Oral, Daily      Metamucil 0.36 g capsule  Generic drug: Psyllium   1 capsule, Oral, 2 Times Daily      methylPREDNISolone 4 MG dose pack  Commonly known as: MEDROL   Take as directed on package instructions.      mycophenolate 500 MG tablet  Commonly known as: CELLCEPT   Take 1 tablet by mouth Daily.      tamsulosin 0.4 MG capsule 24 hr capsule  Commonly known as: FLOMAX   0.4 mg, Oral, Daily             Stop These Medications      HYDROcodone-acetaminophen 7.5-325 MG per tablet  Commonly known as: NORCO              Allergies   Allergen Reactions    Latex, Natural Rubber Hives       Discharge Disposition:  Home or  Self Care    Diet:  Hospital:  Diet Order   Procedures    Diet: Regular/House Diet; Texture: Regular Texture (IDDSI 7); Fluid Consistency: Thin (IDDSI 0)       Discharge Activity:   Activity Instructions       Discharge Activity      Do not drive or return to work while requiring narcotic pain medication.      Do not take additional Tylenol while taking prescription pain medication.      May take ibuprofen as needed for additional discomfort.   Once no longer taking narcotic pain medication, may take Tylenol and ibuprofen.    It is important to decrease the amount of pain medication as you are discomfort improves as this is not a medication that can be called in over the phone.    Blood in the urine as well as back and crampy bladder pain is expected after this procedure and sometimes until stent is removed, take medications as needed and prescribed, and be sure to drink plenty of fluids.     Wean narcotic pain medication as tolerated and discomfort improves.     Maintain string taped to skin, okay to reinforce with tape as needed.  May shower.  No soaking in water or tub baths until stent is removed.  Be mindful not to pull or tug at string prior to removal.     On Friday morning, 8/25/2023, may untape string and pull with steady pressure until stent is removed.  Frequently, patients experience some blood in urine and crampy pain after stent is removed; typically gets better in 24 to 48 hours.  Be sure to drink plenty of fluids and take medication if needed.  If pain does not improve with medication or fever 101, call urology or go to ER.    Okay to resume activity as tolerated after stent removal.     Follow-up with Dr. Cai in 4-6 week, unless earlier as needed.  You will have a study performed in radiology, a renal ultrasound, prior to follow-up appointment.  You will be called to arrange and schedule renal ultrasound. Call urology office with any questions or concerns.            CODE STATUS:  Code Status  and Medical Interventions:   Ordered at: 08/17/23 1941     Level Of Support Discussed With:    Patient     Code Status (Patient has no pulse and is not breathing):    CPR (Attempt to Resuscitate)     Medical Interventions (Patient has pulse or is breathing):    Full Support         Future Appointments   Date Time Provider Department Center   10/30/2023 12:30 PM PAT 2 [facility]       Additional Instructions for the Follow-ups that You Need to Schedule       Discharge Follow-up with Specified Provider: Dr. Cai; CT scan prior; 6 Weeks   As directed      To: Dr. Cai; CT scan prior   Follow Up: 6 Weeks   Follow Up Details: [phone]        CT Abdomen With & Without Contrast   Sep 18, 2023      Will Oral Contrast be needed for this procedure?: No                Pertinent  and/or Most Recent Results     PROCEDURES:   Cystoscopy left retrograde pyelogram, ureteroscopy, basket stone extraction in 2 distinct locations, ureter and kidney; ureteral stent insertion      LAB RESULTS:      Lab 08/19/23  0556 08/18/23  0516 08/17/23  1544 08/15/23  0613   WBC 8.21 6.72 5.93 10.13   HEMOGLOBIN 12.1* 13.1 14.4 15.9   HEMATOCRIT 37.8 41.4 46.1 45.4   PLATELETS 227 231 278 279   NEUTROS ABS 6.08 4.52 3.30 7.58*   IMMATURE GRANS (ABS) 0.03 0.02 0.01 0.03   LYMPHS ABS 1.07 1.02 1.41 1.45   MONOS ABS 0.94* 0.84 0.59 0.56   EOS ABS 0.06 0.28 0.57* 0.44*   MCV 90.9 91.0 90.4 84.9   LACTATE  --   --  1.1 1.5         Lab 08/19/23  0556 08/18/23  0516 08/17/23  1544 08/15/23  0640   SODIUM 139 138 138 138   POTASSIUM 4.0 3.8 4.0 3.6   CHLORIDE 106 104 102 102   CO2 24.7 23.0 24.9 24.9   ANION GAP 8.3 11.0 11.1 11.1   BUN 17 20 20 17   CREATININE 1.03 1.37* 1.13 1.00   EGFR 81.1 57.6* 72.6 84.0   GLUCOSE 132* 99 121* 160*   CALCIUM 8.5* 8.5* 8.9 8.9         Lab 08/17/23  1544 08/15/23  0640   TOTAL PROTEIN 6.9 7.3   ALBUMIN 4.4 4.5   GLOBULIN 2.5 2.8   ALT (SGPT) 21 23   AST (SGOT) 20 22   BILIRUBIN 0.3 0.3   ALK PHOS 75 77    LIPASE 22 21                     Brief Urine Lab Results  (Last result in the past 365 days)        Color   Clarity   Blood   Leuk Est   Nitrite   Protein   CREAT   Urine HCG        08/17/23 1623 Yellow   Clear   Trace   Negative   Negative   Negative                 Microbiology Results (last 10 days)       ** No results found for the last 240 hours. **            CT Abdomen Pelvis With Contrast    Result Date: 8/15/2023   1. There is a 4.5 mm obstructing stone in the left UPJ causing mild obstructing uropathy.  A couple of other 2 mm nonobstructing stones are present in the left kidney as well.  2. 4.2 x 3.7 cm heterogeneous soft tissue partially exophytic mass in the lower pole of the left kidney suspicious for renal cell carcinoma/malignancy until proven otherwise.  Urology consultation is recommended. 3. Fusiform 4.7 x 4.6 cm infrarenal abdominal aortic aneurysm with a moderate amount of atherosclerotic disease.  There are no prior imaging studies available for comparison to evaluate for change.  If this has not been previously evaluated elsewhere, vascular surgery consultation on a nonemergent basis is recommended. 4. Other incidental chronic ancillary findings are described above.    COBY DEGROOT DO       Electronically Signed and Approved By: COBY DEGROOT DO on 8/15/2023 at 8:18             XR Abdomen KUB    Result Date: 8/17/2023    1. Faint left renal calculi. 2. 4 mm calcific density in the left lower pelvis suspicious for left ureterovesical junction stone.     MALICK GARNETT MD       Electronically Signed and Approved By: MALICK GARNETT MD on 8/17/2023 at 17:41                           Labs Pending at Discharge:  Pending Labs       Order Current Status    STONE ANALYSIS - Calculus, Ureter, Left In process              Time spent on Discharge including face to face service:  25 minutes    Electronically signed by Tad Juarez DO, 08/19/23, 11:01 AM EDT.

## 2023-08-19 NOTE — PROGRESS NOTES
[facility]   Hospitalist Progress Note  Date: 2023  Patient Name: Danny Savage  : 1960  MRN: [id number]  Date of admission: 2023      Subjective   Subjective     Summary: 62-year-old male who was diagnosed with a ureteral stone 2 days ago in the ER.  He has been drinking lots of fluids and taking Flomax.  The pain is severe.  He was told to come back to the ER and have the stone removed by Dr. Spicer if the pain did not improve.  Patient denies any nausea vomiting fevers or chills.     On arrival to the ED patient's temperature of 98.7, pulse of 90, respiratory rate of 18, blood pressure 174/101, and he saturating 98% on room air.  KUB shows a forced millimeter calcific density in the left lower pelvis suspicious for left ureterovesicular junction stone.       Interval Followup: Patient was taken to the OR for urologic intervention.  Ureteral stent was placed and stones were removed.      Objective   Objective     Vitals:   Temp:  [97.6 øF (36.4 øC)-98.5 øF (36.9 øC)] 98.2 øF (36.8 øC)  Heart Rate:  [82-99] 92  Resp:  [14-19] 16  BP: (122-151)/(69-83) 137/73  FiO2 (%):  [44 %] 44 %  Physical Exam    Constitutional: Awake, alert, no acute distress   Respiratory: Clear to auscultation bilaterally, nonlabored respirations    Cardiovascular: RRR, no murmurs, rubs, or gallops, palpable pedal pulses bilaterally   Gastrointestinal: Positive bowel sounds, soft, nontender, nondistended   Psychiatric: Appropriate affect, cooperative   Neurologic: Oriented x 3, speech clear          Assessment & Plan   Assessment / Plan     Assessment/Plan:  4 mm obstructive left renal stone  4.2 x 3.7 cm heterogeneous soft tissue partially exophytic mass in the lower pole of the left kidney suspicious for renal cell carcinoma/malignancy until proven otherwise   Fusiform 4.7 x 4.6 cm infrarenal abdominal aortic aneurysm with a moderate amount of atherosclerotic disease.   Hypertension - holding home meds for  now  Allergic rhinitis       Remain inpatient  Appreciate urology consultation and intervention   Monitor pain and vital signs  PRN pain meds and nausea meds  Continue Cellcept  Will need vascular referral outpatient for Fusiform 4.7 x 4.6 cm infrarenal abdominal aortic aneurysm with a moderate amount of atherosclerotic disease.   Labs in am    Discussed plan with RN.    DVT prophylaxis:  Medical DVT prophylaxis orders are present.    CODE STATUS:   Level Of Support Discussed With: Patient  Code Status (Patient has no pulse and is not breathing): CPR (Attempt to Resuscitate)  Medical Interventions (Patient has pulse or is breathing): Full Support

## 2023-08-19 NOTE — OUTREACH NOTE
Prep Survey      Flowsheet Row Responses   [facility] facility patient discharged from? [facility]   Is LACE score < 7 ? Yes   Eligibility Not Eligible   What are the reasons patient is not eligible? Other  [Readmission Risk Score low]   Does the patient have one of the following disease processes/diagnoses(primary or secondary)? Other   Prep survey completed? Yes            Monserrat CALDWELL - Registered Nurse

## 2023-08-19 NOTE — PLAN OF CARE
Goal Outcome Evaluation:  Plan of Care Reviewed With: patient           Outcome Evaluation: Pt does not present with any deficits requiring skilled PT. Pt is safe to discharge home with spouse.

## 2023-08-20 LAB — QT INTERVAL: 388 MS

## 2023-08-24 ENCOUNTER — LAB (OUTPATIENT)
Dept: LAB | Facility: HOSPITAL | Age: 63
End: 2023-08-24
Payer: COMMERCIAL

## 2023-08-24 ENCOUNTER — TRANSCRIBE ORDERS (OUTPATIENT)
Dept: LAB | Facility: HOSPITAL | Age: 63
End: 2023-08-24
Payer: COMMERCIAL

## 2023-08-24 DIAGNOSIS — Z13.220 SCREENING FOR LIPOID DISORDERS: ICD-10-CM

## 2023-08-24 DIAGNOSIS — Z00.00 ROUTINE GENERAL MEDICAL EXAMINATION AT A HEALTH CARE FACILITY: Primary | ICD-10-CM

## 2023-08-24 DIAGNOSIS — Z13.6 SCREENING FOR ISCHEMIC HEART DISEASE: ICD-10-CM

## 2023-08-24 DIAGNOSIS — Z12.5 SPECIAL SCREENING FOR MALIGNANT NEOPLASM OF PROSTATE: ICD-10-CM

## 2023-08-24 DIAGNOSIS — Z00.00 ROUTINE GENERAL MEDICAL EXAMINATION AT A HEALTH CARE FACILITY: ICD-10-CM

## 2023-08-24 LAB
ALBUMIN SERPL-MCNC: 4 G/DL (ref 3.5–5.2)
ALBUMIN/GLOB SERPL: 1.4 G/DL
ALP SERPL-CCNC: 76 U/L (ref 39–117)
ALT SERPL W P-5'-P-CCNC: 19 U/L (ref 1–41)
ANION GAP SERPL CALCULATED.3IONS-SCNC: 11.9 MMOL/L (ref 5–15)
AST SERPL-CCNC: 20 U/L (ref 1–40)
BASOPHILS # BLD AUTO: 0.04 10*3/MM3 (ref 0–0.2)
BASOPHILS NFR BLD AUTO: 0.7 % (ref 0–1.5)
BILIRUB SERPL-MCNC: 0.5 MG/DL (ref 0–1.2)
BUN SERPL-MCNC: 22 MG/DL (ref 8–23)
BUN/CREAT SERPL: 18.2 (ref 7–25)
CALCIUM SPEC-SCNC: 9.2 MG/DL (ref 8.6–10.5)
CHLORIDE SERPL-SCNC: 105 MMOL/L (ref 98–107)
CHOLEST SERPL-MCNC: 236 MG/DL (ref 0–200)
CO2 SERPL-SCNC: 23.1 MMOL/L (ref 22–29)
CREAT SERPL-MCNC: 1.21 MG/DL (ref 0.76–1.27)
DEPRECATED RDW RBC AUTO: 41.2 FL (ref 37–54)
EGFRCR SERPLBLD CKD-EPI 2021: 66.9 ML/MIN/1.73
EOSINOPHIL # BLD AUTO: 0.59 10*3/MM3 (ref 0–0.4)
EOSINOPHIL NFR BLD AUTO: 10.4 % (ref 0.3–6.2)
ERYTHROCYTE [DISTWIDTH] IN BLOOD BY AUTOMATED COUNT: 13 % (ref 12.3–15.4)
GLOBULIN UR ELPH-MCNC: 2.8 GM/DL
GLUCOSE SERPL-MCNC: 93 MG/DL (ref 65–99)
HCT VFR BLD AUTO: 44.6 % (ref 37.5–51)
HDLC SERPL-MCNC: 47 MG/DL (ref 40–60)
HGB BLD-MCNC: 14.8 G/DL (ref 13–17.7)
IMM GRANULOCYTES # BLD AUTO: 0.01 10*3/MM3 (ref 0–0.05)
IMM GRANULOCYTES NFR BLD AUTO: 0.2 % (ref 0–0.5)
LDLC SERPL CALC-MCNC: 168 MG/DL (ref 0–100)
LDLC/HDLC SERPL: 3.53 {RATIO}
LYMPHOCYTES # BLD AUTO: 1.08 10*3/MM3 (ref 0.7–3.1)
LYMPHOCYTES NFR BLD AUTO: 18.9 % (ref 19.6–45.3)
MCH RBC QN AUTO: 29.2 PG (ref 26.6–33)
MCHC RBC AUTO-ENTMCNC: 33.2 G/DL (ref 31.5–35.7)
MCV RBC AUTO: 88 FL (ref 79–97)
MONOCYTES # BLD AUTO: 0.6 10*3/MM3 (ref 0.1–0.9)
MONOCYTES NFR BLD AUTO: 10.5 % (ref 5–12)
NEUTROPHILS NFR BLD AUTO: 3.38 10*3/MM3 (ref 1.7–7)
NEUTROPHILS NFR BLD AUTO: 59.3 % (ref 42.7–76)
NRBC BLD AUTO-RTO: 0 /100 WBC (ref 0–0.2)
PLATELET # BLD AUTO: 294 10*3/MM3 (ref 140–450)
PMV BLD AUTO: 9.6 FL (ref 6–12)
POTASSIUM SERPL-SCNC: 4.6 MMOL/L (ref 3.5–5.2)
PROT SERPL-MCNC: 6.8 G/DL (ref 6–8.5)
PSA SERPL-MCNC: 2.78 NG/ML (ref 0–4)
RBC # BLD AUTO: 5.07 10*6/MM3 (ref 4.14–5.8)
SODIUM SERPL-SCNC: 140 MMOL/L (ref 136–145)
TRIGL SERPL-MCNC: 116 MG/DL (ref 0–150)
TSH SERPL DL<=0.05 MIU/L-ACNC: 1.8 UIU/ML (ref 0.27–4.2)
VLDLC SERPL-MCNC: 21 MG/DL (ref 5–40)
WBC NRBC COR # BLD: 5.7 10*3/MM3 (ref 3.4–10.8)

## 2023-08-24 PROCEDURE — G0103 PSA SCREENING: HCPCS

## 2023-08-24 PROCEDURE — 80050 GENERAL HEALTH PANEL: CPT

## 2023-08-24 PROCEDURE — 80061 LIPID PANEL: CPT

## 2023-08-24 PROCEDURE — 36415 COLL VENOUS BLD VENIPUNCTURE: CPT

## 2023-08-28 LAB
CALCIUM OXALATE DIHYDRATE MFR STONE IR: 10 %
COLOR STONE: NORMAL
COM MFR STONE: 90 %
COMPN STONE: NORMAL
LABORATORY COMMENT REPORT: NORMAL
Lab: NORMAL
Lab: NORMAL
PHOTO: NORMAL
SIZE STONE: NORMAL MM
SPEC SOURCE SUBJ: NORMAL
WT STONE: 74 MG

## 2023-09-19 ENCOUNTER — HOSPITAL ENCOUNTER (OUTPATIENT)
Dept: CT IMAGING | Facility: HOSPITAL | Age: 63
Discharge: HOME | End: 2023-09-19
Admitting: UROLOGY
Payer: COMMERCIAL

## 2023-09-19 DIAGNOSIS — N13.30 HYDRONEPHROSIS, UNSPECIFIED HYDRONEPHROSIS TYPE: ICD-10-CM

## 2023-09-19 DIAGNOSIS — N28.89 LEFT RENAL MASS: ICD-10-CM

## 2023-09-19 PROCEDURE — 25510000001 IOPAMIDOL PER 1 ML: Performed by: UROLOGY

## 2023-09-19 PROCEDURE — 74170 CT ABD WO CNTRST FLWD CNTRST: CPT

## 2023-09-19 RX ADMIN — IOPAMIDOL 100 ML: 755 INJECTION, SOLUTION INTRAVENOUS at 18:13

## 2023-10-30 ENCOUNTER — HOSPITAL ENCOUNTER (OUTPATIENT)
Dept: GENERAL RADIOLOGY | Facility: HOSPITAL | Age: 63
Discharge: HOME | End: 2023-10-30
Payer: COMMERCIAL

## 2023-10-30 ENCOUNTER — PRE-ADMISSION TESTING (OUTPATIENT)
Dept: PREADMISSION TESTING | Facility: HOSPITAL | Age: 63
End: 2023-10-30
Payer: COMMERCIAL

## 2023-10-30 VITALS
HEIGHT: 70 IN | WEIGHT: 176.15 LBS | BODY MASS INDEX: 25.22 KG/M2 | DIASTOLIC BLOOD PRESSURE: 64 MMHG | OXYGEN SATURATION: 98 % | RESPIRATION RATE: 18 BRPM | SYSTOLIC BLOOD PRESSURE: 122 MMHG | TEMPERATURE: 98.5 F | HEART RATE: 86 BPM

## 2023-10-30 DIAGNOSIS — N28.89 LEFT RENAL MASS: ICD-10-CM

## 2023-10-30 DIAGNOSIS — J18.9 PNEUMONIA OF BOTH LUNGS DUE TO INFECTIOUS ORGANISM, UNSPECIFIED PART OF LUNG: ICD-10-CM

## 2023-10-30 LAB
ABO GROUP BLD: NORMAL
ANION GAP SERPL CALCULATED.3IONS-SCNC: 8.2 MMOL/L (ref 5–15)
BUN SERPL-MCNC: 16 MG/DL (ref 8–23)
BUN/CREAT SERPL: 17.6 (ref 7–25)
CALCIUM SPEC-SCNC: 9 MG/DL (ref 8.6–10.5)
CHLORIDE SERPL-SCNC: 102 MMOL/L (ref 98–107)
CO2 SERPL-SCNC: 28.8 MMOL/L (ref 22–29)
CREAT SERPL-MCNC: 0.91 MG/DL (ref 0.76–1.27)
DEPRECATED RDW RBC AUTO: 42.5 FL (ref 37–54)
EGFRCR SERPLBLD CKD-EPI 2021: 93.5 ML/MIN/1.73
ERYTHROCYTE [DISTWIDTH] IN BLOOD BY AUTOMATED COUNT: 12.9 % (ref 12.3–15.4)
GLUCOSE SERPL-MCNC: 158 MG/DL (ref 65–99)
HCT VFR BLD AUTO: 45.7 % (ref 37.5–51)
HGB BLD-MCNC: 14.4 G/DL (ref 13–17.7)
MCH RBC QN AUTO: 28.6 PG (ref 26.6–33)
MCHC RBC AUTO-ENTMCNC: 31.5 G/DL (ref 31.5–35.7)
MCV RBC AUTO: 90.9 FL (ref 79–97)
PLATELET # BLD AUTO: 289 10*3/MM3 (ref 140–450)
PMV BLD AUTO: 9.2 FL (ref 6–12)
POTASSIUM SERPL-SCNC: 3.9 MMOL/L (ref 3.5–5.2)
RBC # BLD AUTO: 5.03 10*6/MM3 (ref 4.14–5.8)
RH BLD: NEGATIVE
SODIUM SERPL-SCNC: 139 MMOL/L (ref 136–145)
WBC NRBC COR # BLD: 5.45 10*3/MM3 (ref 3.4–10.8)

## 2023-10-30 PROCEDURE — 71046 X-RAY EXAM CHEST 2 VIEWS: CPT

## 2023-10-30 PROCEDURE — 85027 COMPLETE CBC AUTOMATED: CPT

## 2023-10-30 PROCEDURE — 36415 COLL VENOUS BLD VENIPUNCTURE: CPT

## 2023-10-30 PROCEDURE — 80048 BASIC METABOLIC PNL TOTAL CA: CPT

## 2023-10-30 NOTE — DISCHARGE INSTRUCTIONS
IMPORTANT INSTRUCTIONS - PRE-ADMISSION TESTING  DO NOT EAT OR CHEW anything after midnight the night before your procedure.    You may have CLEAR liquids up to 2 hours prior to ARRIVAL time.   Take the following medications the morning of your procedure with JUST A SIP OF WATER:  Claritin, Mycophenolate     DO NOT BRING your medications to the hospital with you, UNLESS something has changed since your PRE-Admission Testing appointment.  Hold all vitamins, supplements, and NSAIDS (Non- steroidal anti-inflammatory meds) for one week prior to surgery (you MAY take Tylenol or Acetaminophen).  Make sure you have a ride home and have someone who will stay with you the day of your procedure after you go home.  If you have any questions, please call your Pre-Admission Testing Nurse, Hailey at [phone]_.   You will receive a call the Friday before surgery with an arrival time.,    Clear Liquid Diet        Find out when you need to start a clear liquid diet.   Think of “clear liquids” as anything you could read a newspaper through. This includes things like water, broth, sports drinks, or tea WITHOUT any kind of milk or cream.           Once you are told to start a clear liquid diet, only drink these things until 2 hours before arrival to the hospital or when the hospital says to stop. Total volume limitation: 8 oz.       Clear liquids you CAN drink:   Water   Clear broth: beef, chicken, vegetable, or bone broth with nothing in it   Gatorade   Lemonade or Max-aid   Soda   Tea, coffee (NO cream or honey)   Jell-O (without fruit)   Popsicles (without fruit or cream)   Italian ices   Juice without pulp: apple, white, grape   You may use salt, pepper, and sugar    Do NOT drink:   Milk or cream   Soy milk, almond milk, coconut milk, or other non-dairy drinks and   creamers   Milkshakes or smoothies   Tomato juice   Orange juice   Grapefruit juice   Cream soups or any other than broth         Clear Liquid Diet:  Do NOT eat  any solid food.  Do NOT eat or suck on mints or candy.  Do NOT chew gum.  Do NOT drink thick liquids like milk or juice with pulp in it.  Do NOT add milk, cream, or anything like soy milk or almond milk to coffee or tea.     PREOPERATIVE (BEFORE SURGERY)              BATHING INSTRUCTIONS  Instructions:    You will need to shower 2 separate times utilizing the soap provided; at the times indicated   below:     11-12-23 AM  11-12-23 PM     Wash your hair and face with normal shampoo and soap, rinse it well before using the surgical soap.      In the shower, wet the skin completely with water from your neck to your feet. Apply the cleanser to your   body ONLY FROM THE NECK TO YOUR FEET.     Do NOT USE THE CLEANSER ON YOUR FACE, HEAD, OR GENITAL (PRIVATE) AREAS.   Keep it out of your eyes, ears, and mouth because of the risk of injury to those areas.      Scrub with a clean washcloth for each bath utilizing the soap provided from the top of your body to the   bottom starting at the neck area.      Pay close attention to your armpits, groin area, and the site of surgery.      Wash your body gently for 5 minutes. Stand outside the stream or turn off the water while scrubbing your   body. Do NOT wash with your regular soap after the surgical cleanser is used.      RINSE THE CLEANSER OFF COMPLETELY with plenty of water. Rinse the area again thoroughly.      Dry off with a clean towel. The surgical soap can cause dryness; however do NOT APPLY LOTION,   CREAM, POWDER, and/or DEODORANT AFTER SHOWERING.     Be sure to where clean clothes after showering.      Ensure CLEAN BED LINENS AFTER FIRST wash with the surgical soap.      NO PETS ALLOWED IN THE BED with you after utilizing the surgical soap.

## 2023-11-13 ENCOUNTER — ANESTHESIA EVENT CONVERTED (OUTPATIENT)
Dept: ANESTHESIOLOGY | Facility: HOSPITAL | Age: 63
End: 2023-11-13
Payer: COMMERCIAL

## 2023-11-13 ENCOUNTER — ANESTHESIA EVENT (OUTPATIENT)
Dept: PERIOP | Facility: HOSPITAL | Age: 63
End: 2023-11-13
Payer: COMMERCIAL

## 2023-11-13 ENCOUNTER — HOSPITAL ENCOUNTER (OUTPATIENT)
Facility: HOSPITAL | Age: 63
Discharge: HOME | End: 2023-11-15
Attending: UROLOGY | Admitting: UROLOGY
Payer: COMMERCIAL

## 2023-11-13 ENCOUNTER — ANESTHESIA (OUTPATIENT)
Dept: PERIOP | Facility: HOSPITAL | Age: 63
End: 2023-11-13
Payer: COMMERCIAL

## 2023-11-13 DIAGNOSIS — N28.89 LEFT RENAL MASS: ICD-10-CM

## 2023-11-13 PROBLEM — C65.2 CANCER OF LEFT RENAL PELVIS: Status: ACTIVE | Noted: 2023-11-13

## 2023-11-13 LAB
ABO GROUP BLD: NORMAL
BACTERIA UR QL AUTO: NORMAL /HPF
BILIRUB UR QL STRIP: NEGATIVE
BLD GP AB SCN SERPL QL: NEGATIVE
CLARITY UR: CLEAR
COLOR UR: YELLOW
GLUCOSE UR STRIP-MCNC: NEGATIVE MG/DL
HGB UR QL STRIP.AUTO: NEGATIVE
HYALINE CASTS UR QL AUTO: NORMAL /LPF
KETONES UR QL STRIP: NEGATIVE
LEUKOCYTE ESTERASE UR QL STRIP.AUTO: NEGATIVE
NITRITE UR QL STRIP: NEGATIVE
PH UR STRIP.AUTO: 5.5 [PH] (ref 5–8)
PROT UR QL STRIP: NEGATIVE
RBC # UR STRIP: NORMAL /HPF
REF LAB TEST METHOD: NORMAL
RH BLD: NEGATIVE
SP GR UR STRIP: 1.02 (ref 1–1.03)
SQUAMOUS #/AREA URNS HPF: NORMAL /HPF
T&S EXPIRATION DATE: NORMAL
UROBILINOGEN UR QL STRIP: NORMAL
WBC # UR STRIP: NORMAL /HPF

## 2023-11-13 PROCEDURE — S0260 H&P FOR SURGERY: HCPCS | Performed by: UROLOGY

## 2023-11-13 PROCEDURE — 63710000001 ONDANSETRON PER 8 MG: Performed by: UROLOGY

## 2023-11-13 PROCEDURE — 94799 UNLISTED PULMONARY SVC/PX: CPT

## 2023-11-13 PROCEDURE — 25010000002 MIDAZOLAM PER 1MG: Performed by: ANESTHESIOLOGY

## 2023-11-13 PROCEDURE — 25010000002 SUGAMMADEX 200 MG/2ML SOLUTION

## 2023-11-13 PROCEDURE — 25810000003 LACTATED RINGERS PER 1000 ML: Performed by: ANESTHESIOLOGY

## 2023-11-13 PROCEDURE — 25810000003 LACTATED RINGERS PER 1000 ML: Performed by: UROLOGY

## 2023-11-13 PROCEDURE — 25810000003 SODIUM CHLORIDE 0.9 % SOLUTION

## 2023-11-13 PROCEDURE — 88341 IMHCHEM/IMCYTCHM EA ADD ANTB: CPT | Performed by: UROLOGY

## 2023-11-13 PROCEDURE — 25010000002 DEXAMETHASONE PER 1 MG: Performed by: ANESTHESIOLOGY

## 2023-11-13 PROCEDURE — 88342 IMHCHEM/IMCYTCHM 1ST ANTB: CPT | Performed by: UROLOGY

## 2023-11-13 PROCEDURE — 25010000002 CEFAZOLIN 1-4 GM/50ML-% SOLUTION: Performed by: UROLOGY

## 2023-11-13 PROCEDURE — 50543 LAPARO PARTIAL NEPHRECTOMY: CPT | Performed by: UROLOGY

## 2023-11-13 PROCEDURE — 25010000002 MEPERIDINE PER 100 MG

## 2023-11-13 PROCEDURE — 25010000002 BUPIVACAINE (PF) 0.5 % SOLUTION: Performed by: UROLOGY

## 2023-11-13 PROCEDURE — 86850 RBC ANTIBODY SCREEN: CPT | Performed by: UROLOGY

## 2023-11-13 PROCEDURE — 86901 BLOOD TYPING SEROLOGIC RH(D): CPT | Performed by: UROLOGY

## 2023-11-13 PROCEDURE — 25010000002 FENTANYL CITRATE (PF) 50 MCG/ML SOLUTION

## 2023-11-13 PROCEDURE — 88307 TISSUE EXAM BY PATHOLOGIST: CPT | Performed by: UROLOGY

## 2023-11-13 PROCEDURE — 81001 URINALYSIS AUTO W/SCOPE: CPT | Performed by: UROLOGY

## 2023-11-13 PROCEDURE — 25010000002 PROPOFOL 10 MG/ML EMULSION

## 2023-11-13 PROCEDURE — 25010000002 HYDROMORPHONE 1 MG/ML SOLUTION

## 2023-11-13 PROCEDURE — 25010000002 KETOROLAC TROMETHAMINE PER 15 MG: Performed by: UROLOGY

## 2023-11-13 PROCEDURE — 25010000002 ONDANSETRON PER 1 MG

## 2023-11-13 PROCEDURE — 86900 BLOOD TYPING SEROLOGIC ABO: CPT | Performed by: UROLOGY

## 2023-11-13 PROCEDURE — G0378 HOSPITAL OBSERVATION PER HR: HCPCS

## 2023-11-13 DEVICE — CLIP LIG HEMOLOK PA LG 6CT PRP: Type: IMPLANTABLE DEVICE | Site: ABDOMEN | Status: FUNCTIONAL

## 2023-11-13 DEVICE — ABSORBABLE WOUND CLOSURE DEVICE
Type: IMPLANTABLE DEVICE | Site: ABDOMEN | Status: FUNCTIONAL
Brand: V-LOC 90

## 2023-11-13 DEVICE — ABSORBABLE WOUND CLOSURE DEVICE
Type: IMPLANTABLE DEVICE | Site: ABDOMEN | Status: FUNCTIONAL
Brand: V-LOC 180

## 2023-11-13 DEVICE — FLOSEAL WITH RECOTHROM - 10ML.
Type: IMPLANTABLE DEVICE | Site: ABDOMEN | Status: FUNCTIONAL
Brand: FLOSEAL HEMOSTATIC MATRIX

## 2023-11-13 DEVICE — ABSORBABLE HEMOSTAT (OXIDIZED REGENERATED CELLULOSE, U.S.P.)
Type: IMPLANTABLE DEVICE | Site: ABDOMEN | Status: FUNCTIONAL
Brand: SURGICEL

## 2023-11-13 RX ORDER — NALOXONE HCL 0.4 MG/ML
0.4 VIAL (ML) INJECTION
Status: DISCONTINUED | OUTPATIENT
Start: 2023-11-13 | End: 2023-11-15 | Stop reason: HOSPADM

## 2023-11-13 RX ORDER — PROPOFOL 10 MG/ML
VIAL (ML) INTRAVENOUS AS NEEDED
Status: DISCONTINUED | OUTPATIENT
Start: 2023-11-13 | End: 2023-11-13 | Stop reason: SURG

## 2023-11-13 RX ORDER — DEXMEDETOMIDINE HYDROCHLORIDE 100 UG/ML
INJECTION, SOLUTION INTRAVENOUS AS NEEDED
Status: DISCONTINUED | OUTPATIENT
Start: 2023-11-13 | End: 2023-11-13 | Stop reason: SURG

## 2023-11-13 RX ORDER — DEXAMETHASONE SODIUM PHOSPHATE 4 MG/ML
INJECTION, SOLUTION INTRA-ARTICULAR; INTRALESIONAL; INTRAMUSCULAR; INTRAVENOUS; SOFT TISSUE
Status: COMPLETED | OUTPATIENT
Start: 2023-11-13 | End: 2023-11-13

## 2023-11-13 RX ORDER — DOCUSATE SODIUM 100 MG/1
100 CAPSULE, LIQUID FILLED ORAL 2 TIMES DAILY PRN
Status: DISCONTINUED | OUTPATIENT
Start: 2023-11-13 | End: 2023-11-15 | Stop reason: HOSPADM

## 2023-11-13 RX ORDER — OXYCODONE HYDROCHLORIDE 5 MG/1
5 TABLET ORAL
Status: DISCONTINUED | OUTPATIENT
Start: 2023-11-13 | End: 2023-11-13 | Stop reason: HOSPADM

## 2023-11-13 RX ORDER — CEFAZOLIN SODIUM 1 G/50ML
1000 INJECTION, SOLUTION INTRAVENOUS ONCE
Status: COMPLETED | OUTPATIENT
Start: 2023-11-13 | End: 2023-11-13

## 2023-11-13 RX ORDER — BUPIVACAINE HYDROCHLORIDE AND EPINEPHRINE 2.5; 5 MG/ML; UG/ML
INJECTION, SOLUTION EPIDURAL; INFILTRATION; INTRACAUDAL; PERINEURAL
Status: COMPLETED | OUTPATIENT
Start: 2023-11-13 | End: 2023-11-13

## 2023-11-13 RX ORDER — ONDANSETRON 2 MG/ML
4 INJECTION INTRAMUSCULAR; INTRAVENOUS ONCE AS NEEDED
Status: DISCONTINUED | OUTPATIENT
Start: 2023-11-13 | End: 2023-11-13 | Stop reason: HOSPADM

## 2023-11-13 RX ORDER — FENTANYL CITRATE 50 UG/ML
INJECTION, SOLUTION INTRAMUSCULAR; INTRAVENOUS AS NEEDED
Status: DISCONTINUED | OUTPATIENT
Start: 2023-11-13 | End: 2023-11-13 | Stop reason: SURG

## 2023-11-13 RX ORDER — PROMETHAZINE HYDROCHLORIDE 12.5 MG/1
25 TABLET ORAL ONCE AS NEEDED
Status: DISCONTINUED | OUTPATIENT
Start: 2023-11-13 | End: 2023-11-13 | Stop reason: HOSPADM

## 2023-11-13 RX ORDER — FAMOTIDINE 20 MG/1
20 TABLET, FILM COATED ORAL
Status: DISCONTINUED | OUTPATIENT
Start: 2023-11-13 | End: 2023-11-15 | Stop reason: HOSPADM

## 2023-11-13 RX ORDER — ONDANSETRON 2 MG/ML
4 INJECTION INTRAMUSCULAR; INTRAVENOUS EVERY 6 HOURS PRN
Status: DISCONTINUED | OUTPATIENT
Start: 2023-11-13 | End: 2023-11-15 | Stop reason: HOSPADM

## 2023-11-13 RX ORDER — CETIRIZINE HYDROCHLORIDE 10 MG/1
10 TABLET ORAL DAILY
Status: DISCONTINUED | OUTPATIENT
Start: 2023-11-13 | End: 2023-11-15 | Stop reason: HOSPADM

## 2023-11-13 RX ORDER — PROMETHAZINE HYDROCHLORIDE 25 MG/1
25 SUPPOSITORY RECTAL ONCE AS NEEDED
Status: DISCONTINUED | OUTPATIENT
Start: 2023-11-13 | End: 2023-11-13 | Stop reason: HOSPADM

## 2023-11-13 RX ORDER — ONDANSETRON 2 MG/ML
INJECTION INTRAMUSCULAR; INTRAVENOUS AS NEEDED
Status: DISCONTINUED | OUTPATIENT
Start: 2023-11-13 | End: 2023-11-13 | Stop reason: SURG

## 2023-11-13 RX ORDER — ACETAMINOPHEN 650 MG/1
650 SUPPOSITORY RECTAL EVERY 4 HOURS PRN
Status: DISCONTINUED | OUTPATIENT
Start: 2023-11-13 | End: 2023-11-15 | Stop reason: HOSPADM

## 2023-11-13 RX ORDER — ACETAMINOPHEN 500 MG
1000 TABLET ORAL ONCE
Status: COMPLETED | OUTPATIENT
Start: 2023-11-13 | End: 2023-11-13

## 2023-11-13 RX ORDER — SODIUM CHLORIDE 9 MG/ML
40 INJECTION, SOLUTION INTRAVENOUS AS NEEDED
Status: DISCONTINUED | OUTPATIENT
Start: 2023-11-13 | End: 2023-11-15 | Stop reason: HOSPADM

## 2023-11-13 RX ORDER — SODIUM CHLORIDE 0.9 % (FLUSH) 0.9 %
10 SYRINGE (ML) INJECTION AS NEEDED
Status: DISCONTINUED | OUTPATIENT
Start: 2023-11-13 | End: 2023-11-15 | Stop reason: HOSPADM

## 2023-11-13 RX ORDER — OXYCODONE AND ACETAMINOPHEN 10; 325 MG/1; MG/1
1 TABLET ORAL EVERY 4 HOURS PRN
Status: DISCONTINUED | OUTPATIENT
Start: 2023-11-13 | End: 2023-11-15 | Stop reason: HOSPADM

## 2023-11-13 RX ORDER — EPHEDRINE SULFATE 50 MG/ML
INJECTION, SOLUTION INTRAVENOUS AS NEEDED
Status: DISCONTINUED | OUTPATIENT
Start: 2023-11-13 | End: 2023-11-13 | Stop reason: SURG

## 2023-11-13 RX ORDER — KETOROLAC TROMETHAMINE 15 MG/ML
15 INJECTION, SOLUTION INTRAMUSCULAR; INTRAVENOUS EVERY 6 HOURS PRN
Status: DISCONTINUED | OUTPATIENT
Start: 2023-11-13 | End: 2023-11-15 | Stop reason: HOSPADM

## 2023-11-13 RX ORDER — BUPIVACAINE HYDROCHLORIDE 5 MG/ML
INJECTION, SOLUTION EPIDURAL; INTRACAUDAL AS NEEDED
Status: DISCONTINUED | OUTPATIENT
Start: 2023-11-13 | End: 2023-11-13 | Stop reason: HOSPADM

## 2023-11-13 RX ORDER — ACETAMINOPHEN 325 MG/1
650 TABLET ORAL EVERY 4 HOURS PRN
Status: DISCONTINUED | OUTPATIENT
Start: 2023-11-13 | End: 2023-11-15 | Stop reason: HOSPADM

## 2023-11-13 RX ORDER — PHENYLEPHRINE HCL IN 0.9% NACL 1 MG/10 ML
SYRINGE (ML) INTRAVENOUS AS NEEDED
Status: DISCONTINUED | OUTPATIENT
Start: 2023-11-13 | End: 2023-11-13 | Stop reason: SURG

## 2023-11-13 RX ORDER — MEPERIDINE HYDROCHLORIDE 25 MG/ML
12.5 INJECTION INTRAMUSCULAR; INTRAVENOUS; SUBCUTANEOUS
Status: DISCONTINUED | OUTPATIENT
Start: 2023-11-13 | End: 2023-11-13 | Stop reason: HOSPADM

## 2023-11-13 RX ORDER — OXYCODONE HYDROCHLORIDE AND ACETAMINOPHEN 5; 325 MG/1; MG/1
1 TABLET ORAL EVERY 4 HOURS PRN
Status: DISCONTINUED | OUTPATIENT
Start: 2023-11-13 | End: 2023-11-15 | Stop reason: HOSPADM

## 2023-11-13 RX ORDER — LIDOCAINE HYDROCHLORIDE 20 MG/ML
INJECTION, SOLUTION EPIDURAL; INFILTRATION; INTRACAUDAL; PERINEURAL AS NEEDED
Status: DISCONTINUED | OUTPATIENT
Start: 2023-11-13 | End: 2023-11-13 | Stop reason: SURG

## 2023-11-13 RX ORDER — SODIUM CHLORIDE, SODIUM LACTATE, POTASSIUM CHLORIDE, CALCIUM CHLORIDE 600; 310; 30; 20 MG/100ML; MG/100ML; MG/100ML; MG/100ML
9 INJECTION, SOLUTION INTRAVENOUS CONTINUOUS PRN
Status: DISCONTINUED | OUTPATIENT
Start: 2023-11-13 | End: 2023-11-15 | Stop reason: HOSPADM

## 2023-11-13 RX ORDER — ROCURONIUM BROMIDE 10 MG/ML
INJECTION, SOLUTION INTRAVENOUS AS NEEDED
Status: DISCONTINUED | OUTPATIENT
Start: 2023-11-13 | End: 2023-11-13 | Stop reason: SURG

## 2023-11-13 RX ORDER — SODIUM CHLORIDE, SODIUM LACTATE, POTASSIUM CHLORIDE, CALCIUM CHLORIDE 600; 310; 30; 20 MG/100ML; MG/100ML; MG/100ML; MG/100ML
100 INJECTION, SOLUTION INTRAVENOUS CONTINUOUS
Status: DISCONTINUED | OUTPATIENT
Start: 2023-11-13 | End: 2023-11-14

## 2023-11-13 RX ORDER — LIDOCAINE 4 G/G
1 PATCH TOPICAL DAILY PRN
Status: DISCONTINUED | OUTPATIENT
Start: 2023-11-13 | End: 2023-11-15 | Stop reason: HOSPADM

## 2023-11-13 RX ORDER — FAMOTIDINE 20 MG/1
20 TABLET, FILM COATED ORAL
Status: DISCONTINUED | OUTPATIENT
Start: 2023-11-14 | End: 2023-11-13

## 2023-11-13 RX ORDER — ONDANSETRON 4 MG/1
4 TABLET, FILM COATED ORAL EVERY 6 HOURS PRN
Status: DISCONTINUED | OUTPATIENT
Start: 2023-11-13 | End: 2023-11-15 | Stop reason: HOSPADM

## 2023-11-13 RX ORDER — DEXAMETHASONE SODIUM PHOSPHATE 4 MG/ML
INJECTION, SOLUTION INTRA-ARTICULAR; INTRALESIONAL; INTRAMUSCULAR; INTRAVENOUS; SOFT TISSUE AS NEEDED
Status: DISCONTINUED | OUTPATIENT
Start: 2023-11-13 | End: 2023-11-13 | Stop reason: SURG

## 2023-11-13 RX ORDER — SODIUM CHLORIDE 9 MG/ML
INJECTION, SOLUTION INTRAVENOUS CONTINUOUS PRN
Status: DISCONTINUED | OUTPATIENT
Start: 2023-11-13 | End: 2023-11-13 | Stop reason: SURG

## 2023-11-13 RX ORDER — MIDAZOLAM HYDROCHLORIDE 2 MG/2ML
2 INJECTION, SOLUTION INTRAMUSCULAR; INTRAVENOUS ONCE
Status: COMPLETED | OUTPATIENT
Start: 2023-11-13 | End: 2023-11-13

## 2023-11-13 RX ORDER — SODIUM CHLORIDE 0.9 % (FLUSH) 0.9 %
10 SYRINGE (ML) INJECTION EVERY 12 HOURS SCHEDULED
Status: DISCONTINUED | OUTPATIENT
Start: 2023-11-13 | End: 2023-11-15 | Stop reason: HOSPADM

## 2023-11-13 RX ORDER — MYCOPHENOLATE MOFETIL 500 MG/1
500 TABLET ORAL DAILY
Status: DISCONTINUED | OUTPATIENT
Start: 2023-11-14 | End: 2023-11-15 | Stop reason: HOSPADM

## 2023-11-13 RX ORDER — MORPHINE SULFATE 2 MG/ML
2 INJECTION, SOLUTION INTRAMUSCULAR; INTRAVENOUS
Status: DISCONTINUED | OUTPATIENT
Start: 2023-11-13 | End: 2023-11-15 | Stop reason: HOSPADM

## 2023-11-13 RX ORDER — MAGNESIUM HYDROXIDE 1200 MG/15ML
LIQUID ORAL AS NEEDED
Status: DISCONTINUED | OUTPATIENT
Start: 2023-11-13 | End: 2023-11-13 | Stop reason: HOSPADM

## 2023-11-13 RX ADMIN — KETOROLAC TROMETHAMINE 15 MG: 15 INJECTION, SOLUTION INTRAMUSCULAR; INTRAVENOUS at 18:55

## 2023-11-13 RX ADMIN — FENTANYL CITRATE 100 MCG: 50 INJECTION, SOLUTION INTRAMUSCULAR; INTRAVENOUS at 08:38

## 2023-11-13 RX ADMIN — BUPIVACAINE HYDROCHLORIDE AND EPINEPHRINE BITARTRATE 60 ML: 2.5; .0091 INJECTION, SOLUTION EPIDURAL; INFILTRATION; INTRACAUDAL; PERINEURAL at 08:00

## 2023-11-13 RX ADMIN — HYDROMORPHONE HYDROCHLORIDE 0.5 MG: 1 INJECTION, SOLUTION INTRAMUSCULAR; INTRAVENOUS; SUBCUTANEOUS at 12:09

## 2023-11-13 RX ADMIN — Medication 200 MCG: at 09:18

## 2023-11-13 RX ADMIN — Medication 150 MCG: at 10:28

## 2023-11-13 RX ADMIN — DEXMEDETOMIDINE 20 MCG: 100 INJECTION, SOLUTION INTRAVENOUS at 09:32

## 2023-11-13 RX ADMIN — Medication 150 MCG: at 08:59

## 2023-11-13 RX ADMIN — EPHEDRINE SULFATE 10 MG: 50 INJECTION INTRAVENOUS at 09:54

## 2023-11-13 RX ADMIN — Medication 150 MCG: at 09:38

## 2023-11-13 RX ADMIN — ONDANSETRON 4 MG: 2 INJECTION INTRAMUSCULAR; INTRAVENOUS at 08:45

## 2023-11-13 RX ADMIN — MEPERIDINE HYDROCHLORIDE 12.5 MG: 25 INJECTION INTRAMUSCULAR; INTRAVENOUS; SUBCUTANEOUS at 12:10

## 2023-11-13 RX ADMIN — Medication 200 MCG: at 09:04

## 2023-11-13 RX ADMIN — DEXAMETHASONE SODIUM PHOSPHATE 4 MG: 4 INJECTION, SOLUTION INTRAMUSCULAR; INTRAVENOUS at 08:00

## 2023-11-13 RX ADMIN — ROCURONIUM BROMIDE 50 MG: 50 INJECTION INTRAVENOUS at 08:38

## 2023-11-13 RX ADMIN — SODIUM CHLORIDE, POTASSIUM CHLORIDE, SODIUM LACTATE AND CALCIUM CHLORIDE: 600; 310; 30; 20 INJECTION, SOLUTION INTRAVENOUS at 10:22

## 2023-11-13 RX ADMIN — SUGAMMADEX 200 MG: 100 INJECTION, SOLUTION INTRAVENOUS at 11:38

## 2023-11-13 RX ADMIN — ROCURONIUM BROMIDE 20 MG: 50 INJECTION INTRAVENOUS at 10:21

## 2023-11-13 RX ADMIN — HYDROMORPHONE HYDROCHLORIDE 0.5 MG: 1 INJECTION, SOLUTION INTRAMUSCULAR; INTRAVENOUS; SUBCUTANEOUS at 12:31

## 2023-11-13 RX ADMIN — SODIUM CHLORIDE, POTASSIUM CHLORIDE, SODIUM LACTATE AND CALCIUM CHLORIDE 9 ML/HR: 600; 310; 30; 20 INJECTION, SOLUTION INTRAVENOUS at 07:24

## 2023-11-13 RX ADMIN — SODIUM CHLORIDE, POTASSIUM CHLORIDE, SODIUM LACTATE AND CALCIUM CHLORIDE 100 ML/HR: 600; 310; 30; 20 INJECTION, SOLUTION INTRAVENOUS at 13:10

## 2023-11-13 RX ADMIN — PROPOFOL 150 MG: 10 INJECTION, EMULSION INTRAVENOUS at 08:38

## 2023-11-13 RX ADMIN — Medication 150 MCG: at 08:55

## 2023-11-13 RX ADMIN — MIDAZOLAM HYDROCHLORIDE 2 MG: 1 INJECTION, SOLUTION INTRAMUSCULAR; INTRAVENOUS at 07:54

## 2023-11-13 RX ADMIN — SODIUM CHLORIDE: 9 INJECTION, SOLUTION INTRAVENOUS at 08:38

## 2023-11-13 RX ADMIN — ROCURONIUM BROMIDE 20 MG: 50 INJECTION INTRAVENOUS at 09:43

## 2023-11-13 RX ADMIN — Medication 10 ML: at 13:11

## 2023-11-13 RX ADMIN — Medication 200 MCG: at 09:13

## 2023-11-13 RX ADMIN — Medication 100 MCG: at 09:11

## 2023-11-13 RX ADMIN — FAMOTIDINE 20 MG: 20 TABLET, FILM COATED ORAL at 21:41

## 2023-11-13 RX ADMIN — ROCURONIUM BROMIDE 20 MG: 50 INJECTION INTRAVENOUS at 11:05

## 2023-11-13 RX ADMIN — ROCURONIUM BROMIDE 30 MG: 50 INJECTION INTRAVENOUS at 09:07

## 2023-11-13 RX ADMIN — Medication 200 MCG: at 09:09

## 2023-11-13 RX ADMIN — DEXMEDETOMIDINE 10 MCG: 100 INJECTION, SOLUTION INTRAVENOUS at 10:55

## 2023-11-13 RX ADMIN — MIDAZOLAM HYDROCHLORIDE 2 MG: 1 INJECTION, SOLUTION INTRAMUSCULAR; INTRAVENOUS at 07:27

## 2023-11-13 RX ADMIN — ACETAMINOPHEN 1000 MG: 500 TABLET ORAL at 07:27

## 2023-11-13 RX ADMIN — DEXAMETHASONE SODIUM PHOSPHATE 4 MG: 4 INJECTION, SOLUTION INTRA-ARTICULAR; INTRALESIONAL; INTRAMUSCULAR; INTRAVENOUS; SOFT TISSUE at 08:45

## 2023-11-13 RX ADMIN — CEFAZOLIN SODIUM 2000 MG: 1 INJECTION, SOLUTION INTRAVENOUS at 08:43

## 2023-11-13 RX ADMIN — LIDOCAINE HYDROCHLORIDE 50 MG: 20 INJECTION, SOLUTION EPIDURAL; INFILTRATION; INTRACAUDAL; PERINEURAL at 08:38

## 2023-11-13 RX ADMIN — Medication 10 ML: at 21:19

## 2023-11-13 RX ADMIN — ONDANSETRON HYDROCHLORIDE 4 MG: 4 TABLET, FILM COATED ORAL at 15:24

## 2023-11-13 RX ADMIN — Medication 200 MCG: at 09:51

## 2023-11-13 RX ADMIN — Medication 150 MCG: at 11:10

## 2023-11-13 NOTE — OP NOTE
Preoperative diagnosis  Left renal mass    Postoperative diagnosis  Left renal mass    Procedure performed  Robotic assisted laparoscopic left partial nephrectomy    Attending surgeon  Michelle Cai MD     Assistant  Hussein Brown RN    Anesthesia  General    EBL  30 mL    Complications  None    Specimen  Left renal mass    Findings  Left renal mass, gross margin negative; kidney defect thoroughly fulgurated; no collecting system defect noted; ischemic clamp time 19 minutes    Indications  63 y.o. male agreed to undergo the above named procedure after discussion of the alternatives, risks and benefits.   Informed consent was obtained.      Procedure  The patient was properly identified, brought to the Operating Room. Following induction of general anesthesia, the patient was placed in the modified left flank position. At the umbilicus, a small vertical incision was made with an 11 blade scalpel, through which we gained pneumoperitoneum with help of a Veress needle. A drop test was used to confirm its proper placement. Visual trocar with 0 degree laparoscopic lens was used to obtain access into the abdomen. There was no evidence of bowel or vascular injury. There were no adhesions noted and careful inspection was performed prior to insertion of the ports. In a triangular fashion towards the left upper quadrant, three robot trocars were placed. In the mid-line just above the camera port, a 12 millimeter dilating assistant port was placed. We docked the robot using a 30 degree  lens. The colon was mobilized along the white line of Toldt. Once this was reflected, identification of the ureter and gonadal vein were achieved on the lower margin of the kidney. A space was created between posterior body wall and the posterior aspect of Gerota's fascia. Dissection continued toward the hilum. Once the renal hilum was identified, a window was created above and below. The kidney was then completely mobilized within Gerotas  Fascia. The mass was seen exophytic off the lower pole of the kidney. Gerota's fascia was kept attached to this mass. Once the mass was completely exposed the hilum was clamped with a bulldog x 2. The mass was then sharply resected with minimal venous oozing. The defect was then closed over a Nu-Knit bolster with 0 V-Loc suture capsular renorrhaphy.  There was no collecting defect noted.  Once this was complete the mass was placed in a 10 mm Endocatch bag, total warm ischemia time was 19 minutes. Inspection noted hemostasis to be excellent. Repeated inspection of the hilum, there was adequate hemostasis after observing for several minutes; the field was covered with Ochoa hemostatic agent.  The robot was then undocked and the camera, 12  mm trocar excision was extended in order to extract the specimen.  The specimen was noted to be completely intact inside the EndoCatch bag.  This was passed off for pathology.  The fascia was closed in a running fashion with 0 Vicryl suture.  The skin was closed in subcuticular 4-0 Monocryl, dermabond was placed as dressing.  The procedure was deemed terminated.  The patient tolerated the procedure well without complication and was  transferred  to the recovery room in stable condition.   All needle and sponge counts were correct x 2.      The first assist, Hussein Brown RN, was present and actively participated throughout the case.         Signed:  Michelle Cai MD  11/13/23  11:38 EST

## 2023-11-13 NOTE — ANESTHESIA POSTPROCEDURE EVALUATION
Patient: Danny Savage    Procedure Summary       Date: 11/13/23 Room / Location: [facility] OR 08 / [facility] MAIN OR    Anesthesia Start: 0833 Anesthesia Stop: 1152    Procedure: NEPHRECTOMY PARTIAL LAPAROSCOPIC WITH DAVINCI ROBOT, left (Left: Abdomen) Diagnosis:       Left renal mass      (Left renal mass [N28.89])    Surgeons: Michelle Cai MD Provider: Yinka Ellison MD    Anesthesia Type: general with block ASA Status: 2            Anesthesia Type: general with block    Vitals  Vitals Value Taken Time   /76 11/13/23 1319   Temp 36.3 °C (97.3 °F) 11/13/23 1319   Pulse 100 11/13/23 1319   Resp 18 11/13/23 1319   SpO2 93 % 11/13/23 1319           Post Anesthesia Care and Evaluation    Patient location during evaluation: bedside  Patient participation: complete - patient participated  Level of consciousness: awake and alert  Pain management: adequate    Airway patency: patent  Anesthetic complications: No anesthetic complications  PONV Status: none  Cardiovascular status: acceptable  Respiratory status: acceptable  Hydration status: acceptable    Comments: An Anesthesiologist personally participated in the most demanding procedures (including induction and emergence if applicable) in the anesthesia plan, monitored the course of anesthesia administration at frequent intervals and remained physically present and available for immediate diagnosis and treatment of emergencies.

## 2023-11-13 NOTE — ANESTHESIA PROCEDURE NOTES
Peripheral Block      Patient reassessed immediately prior to procedure    Patient location during procedure: pre-op  Start time: 11/13/2023 8:00 AM  Stop time: 11/13/2023 8:10 AM  Reason for block: at surgeon's request and post-op pain management  Performed by  Anesthesiologist: Alberto Hendrickson MD  Preanesthetic Checklist  Completed: patient identified, IV checked, site marked, risks and benefits discussed, surgical consent, monitors and equipment checked, pre-op evaluation and timeout performed  Prep:  Pt Position: sitting  Sterile barriers:partial drape, alcohol skin prep, cap, washed/disinfected hands, gloves and mask  Prep: ChloraPrep  Patient monitoring: blood pressure monitoring, EKG and continuous pulse oximetry  Procedure    Sedation: yes    Guidance:ultrasound guided and landmark technique    ULTRASOUND INTERPRETATION.  Using ultrasound guidance a 22 G gauge needle was placed in close proximity to the nerve, at which point, under ultrasound guidance anesthetic was injected in the area of the nerve and spread of the anesthesia was seen on ultrasound in close proximity thereto.  There were no abnormalities seen on ultrasound; a digital image was taken; and the patient tolerated the procedure with no complications. Images:still images obtained, printed/placed on chart    Laterality:Bilateral  Block Type:erector spinae block  Injection Technique:single-shot  Needle Type:echogenic  Needle Gauge:22 G (2in)  Resistance on Injection: none    Medications Used: bupivacaine-EPINEPHrine PF (MARCAINE w/EPI) 0.25% -[id number] injection - Injection   60 mL - 11/13/2023 8:00:00 AM  dexamethasone (DECADRON) injection 4 mg/mL - Injection   4 mg - 11/13/2023 8:00:00 AM      Medications  Comment:40 cc on LEFT, 20 cc on RIGHT    Post Assessment  Injection Assessment: negative aspiration for heme, no paresthesia on injection and incremental injection  Patient Tolerance:comfortable throughout block  Complications:no  Additional  Notes  The block or continuous infusion is requested by the referring physician for management of postoperative pain, or pain related to a procedure. Ultrasound guidance (deemed medically necessary). Painless injection, pt was awake and conversant during the procedure without complications. Needle and surrounding structures visualized throughout procedure. No adverse reactions or complications seen during this period. Post-procedure image showed no signs of complication, and anatomy was consistent with an uncomplicated nerve blockade.

## 2023-11-13 NOTE — H&P
[facility]   Urology Preop H&P Note    Patient Name: Danny Savage  : 1960  MRN: [id number]  Primary Care Physician:  Kodi Pichardo MD  Referring Physician: Michelle Cai MD  Date of admission: 2023    Subjective   Subjective     Reason for Consult/ Chief Complaint: Left renal mass [N28.89]    HPI:  Danny Savage is a 63 y.o. male history ofLeft renal mass [N28.89] who presents for further management OR.  Presents for planned Procedure(s):  NEPHRECTOMY PARTIAL LAPAROSCOPIC WITH DAVINCI ROBOT, left;  .  Procedure to be preformed on the left.  Risk, benefits, and alternatives discussed with patient prior to today. Discussed the risks of partial nephrectomy this including bleeding, infection, damage to surrounding structures, pain, injury to ureter and renal pelvis, urine leak, recurrence, need for nephrectomy, need for open surgery, benign pathology, and permanent loss of renal function.  Also aware that this is a procedure performed under general anesthesia which carries inherent risks including and up to death.  Patient participated in the discussion and notes understanding of these risks.    All questions were addressed after providing time for discussion.  Patient denies significant changes since last visit.  No new complaints today.    ___  Creatinine   8/15/2023: 1.0     UA 8/15/2023: Negative for hematuria, nitrite negative     CT abdomen pelvis with contrast 8/15/2023: (Urology findings)  4.5 mm obstructing stone in the left UPJ causing mild obstructing   uropathy.  A couple of other 2 mm nonobstructing stones are present in the left kidney as well.    2. 4.2 x 3.7 cm heterogeneous soft tissue partially exophytic mass in the lower pole of the left   kidney suspicious for renal cell carcinoma/malignancy until proven otherwise.  Urology consultation   is recommended.    Review of Systems   All systems were reviewed and negative except for the above  Personal History      Past Medical History:   Diagnosis Date    Allergy     Aortic aneurysm     4.7 just found has not f/u    H/O Kidney stone     Hiatal hernia     Kidney mass     left       Past Surgical History:   Procedure Laterality Date    URETEROSCOPY LASER LITHOTRIPSY WITH STENT INSERTION Left 8/18/2023    Procedure: CYSTOSCOPY URETEROSCOPY RETROGRADE PYELOGRAM STENT INSERTION, left;  Surgeon: Michelle Cai MD;  [facility];  Service: Urology;  Laterality: Left;       Family History: family history is not on file. Otherwise pertinent FHx was reviewed and not pertinent to current issue.    Social History:  reports that he has quit smoking. His smoking use included cigarettes. He has never used smokeless tobacco. He reports that he does not drink alcohol and does not use drugs.    Home Medications:  Psyllium, loratadine, and mycophenolate    Allergies:  Allergies   Allergen Reactions    Latex, Natural Rubber Hives       Objective    Objective     Vitals:   Temp:  [98.5 °F (36.9 °C)] 98.5 °F (36.9 °C)  Heart Rate:  [88] 88  Resp:  [18] 18  BP: (160)/(91) 160/91    Physical Exam:   Constitutional: Awake, alert   Respiratory: Clear, nonlabored respirations    Cardiovascular: Regular rate, no chest retractions   gastrointestinal: Appears soft, nontender     Results:    Assessment & Plan   Assessment / Plan     Brief Patient Summary:  Danny Savage is a 63 y.o. male who     Active Hospital Problems:  Active Hospital Problems    Diagnosis     **Left renal mass        Plan:   Proceed to the OR for planned procedure, Procedure(s):  NEPHRECTOMY PARTIAL LAPAROSCOPIC WITH DAVINCI ROBOT, left,  ,   Risk, benefits, and alternatives discussed with patient at length he is agreeable to proceed  Laterality identified, left  All questions addressed      Electronically signed by Michelle Cai MD, 11/13/23, 6:49 AM EST.

## 2023-11-13 NOTE — ANESTHESIA PREPROCEDURE EVALUATION
Anesthesia Evaluation     Patient summary reviewed and Nursing notes reviewed   no history of anesthetic complications:   NPO Solid Status: > 8 hours  NPO Liquid Status: > 2 hours           Airway   Mallampati: II  TM distance: >3 FB  Neck ROM: full  No difficulty expected  Dental      Pulmonary - negative pulmonary ROS and normal exam    breath sounds clear to auscultation  Cardiovascular - negative cardio ROS and normal exam  Exercise tolerance: good (4-7 METS)    Rhythm: regular  Rate: normal        Neuro/Psych- negative ROS  GI/Hepatic/Renal/Endo    (+) hiatal hernia, renal disease-    Musculoskeletal (-) negative ROS    Abdominal    Substance History - negative use     OB/GYN negative ob/gyn ROS         Other - negative ROS       ROS/Med Hx Other: >4METS, HX RENAL MASS, 4.7 CM INFRARENAL AAA (STABLE PER CT SCAN 9/19/23).CHRONIC MYCOPHENOLATE USE FOR HIVES/ALLERGY). KT               Anesthesia Plan    ASA 2     general with block     (Patient understands anesthesia not responsible for dental damage.  Pt consented and ok with preop esb block)  intravenous induction     Anesthetic plan, risks, benefits, and alternatives have been provided, discussed and informed consent has been obtained with: patient.  Pre-procedure education provided  Use of blood products discussed with patient .    Plan discussed with CRNA.    CODE STATUS:

## 2023-11-14 LAB
ANION GAP SERPL CALCULATED.3IONS-SCNC: 10.6 MMOL/L (ref 5–15)
BASOPHILS # BLD AUTO: 0.01 10*3/MM3 (ref 0–0.2)
BASOPHILS NFR BLD AUTO: 0.1 % (ref 0–1.5)
BUN SERPL-MCNC: 19 MG/DL (ref 8–23)
BUN/CREAT SERPL: 16.7 (ref 7–25)
CALCIUM SPEC-SCNC: 8.2 MG/DL (ref 8.6–10.5)
CHLORIDE SERPL-SCNC: 100 MMOL/L (ref 98–107)
CO2 SERPL-SCNC: 23.4 MMOL/L (ref 22–29)
CREAT SERPL-MCNC: 1.14 MG/DL (ref 0.76–1.27)
DEPRECATED RDW RBC AUTO: 40.4 FL (ref 37–54)
EGFRCR SERPLBLD CKD-EPI 2021: 71.4 ML/MIN/1.73
EOSINOPHIL # BLD AUTO: 0 10*3/MM3 (ref 0–0.4)
EOSINOPHIL NFR BLD AUTO: 0 % (ref 0.3–6.2)
ERYTHROCYTE [DISTWIDTH] IN BLOOD BY AUTOMATED COUNT: 12.6 % (ref 12.3–15.4)
GLUCOSE SERPL-MCNC: 169 MG/DL (ref 65–99)
HCT VFR BLD AUTO: 37.2 % (ref 37.5–51)
HGB BLD-MCNC: 12 G/DL (ref 13–17.7)
IMM GRANULOCYTES # BLD AUTO: 0.04 10*3/MM3 (ref 0–0.05)
IMM GRANULOCYTES NFR BLD AUTO: 0.4 % (ref 0–0.5)
LYMPHOCYTES # BLD AUTO: 0.72 10*3/MM3 (ref 0.7–3.1)
LYMPHOCYTES NFR BLD AUTO: 7.2 % (ref 19.6–45.3)
MCH RBC QN AUTO: 28.6 PG (ref 26.6–33)
MCHC RBC AUTO-ENTMCNC: 32.3 G/DL (ref 31.5–35.7)
MCV RBC AUTO: 88.6 FL (ref 79–97)
MONOCYTES # BLD AUTO: 0.75 10*3/MM3 (ref 0.1–0.9)
MONOCYTES NFR BLD AUTO: 7.5 % (ref 5–12)
NEUTROPHILS NFR BLD AUTO: 8.46 10*3/MM3 (ref 1.7–7)
NEUTROPHILS NFR BLD AUTO: 84.8 % (ref 42.7–76)
NRBC BLD AUTO-RTO: 0 /100 WBC (ref 0–0.2)
PLATELET # BLD AUTO: 226 10*3/MM3 (ref 140–450)
PMV BLD AUTO: 9.4 FL (ref 6–12)
POTASSIUM SERPL-SCNC: 4.1 MMOL/L (ref 3.5–5.2)
RBC # BLD AUTO: 4.2 10*6/MM3 (ref 4.14–5.8)
SODIUM SERPL-SCNC: 134 MMOL/L (ref 136–145)
WBC NRBC COR # BLD: 9.98 10*3/MM3 (ref 3.4–10.8)

## 2023-11-14 PROCEDURE — 25810000003 LACTATED RINGERS PER 1000 ML: Performed by: UROLOGY

## 2023-11-14 PROCEDURE — 99024 POSTOP FOLLOW-UP VISIT: CPT | Performed by: UROLOGY

## 2023-11-14 PROCEDURE — 94799 UNLISTED PULMONARY SVC/PX: CPT

## 2023-11-14 PROCEDURE — 25010000002 KETOROLAC TROMETHAMINE PER 15 MG: Performed by: UROLOGY

## 2023-11-14 PROCEDURE — 85025 COMPLETE CBC W/AUTO DIFF WBC: CPT | Performed by: UROLOGY

## 2023-11-14 PROCEDURE — 80048 BASIC METABOLIC PNL TOTAL CA: CPT | Performed by: UROLOGY

## 2023-11-14 RX ADMIN — DOCUSATE SODIUM 100 MG: 100 CAPSULE, LIQUID FILLED ORAL at 21:53

## 2023-11-14 RX ADMIN — OXYCODONE AND ACETAMINOPHEN 1 TABLET: 5; 325 TABLET ORAL at 13:17

## 2023-11-14 RX ADMIN — OXYCODONE AND ACETAMINOPHEN 1 TABLET: 5; 325 TABLET ORAL at 07:03

## 2023-11-14 RX ADMIN — OXYCODONE AND ACETAMINOPHEN 1 TABLET: 10; 325 TABLET ORAL at 17:13

## 2023-11-14 RX ADMIN — KETOROLAC TROMETHAMINE 15 MG: 15 INJECTION, SOLUTION INTRAMUSCULAR; INTRAVENOUS at 02:10

## 2023-11-14 RX ADMIN — DOCUSATE SODIUM 100 MG: 100 CAPSULE, LIQUID FILLED ORAL at 13:23

## 2023-11-14 RX ADMIN — KETOROLAC TROMETHAMINE 15 MG: 15 INJECTION, SOLUTION INTRAMUSCULAR; INTRAVENOUS at 15:31

## 2023-11-14 RX ADMIN — SODIUM CHLORIDE, POTASSIUM CHLORIDE, SODIUM LACTATE AND CALCIUM CHLORIDE 100 ML/HR: 600; 310; 30; 20 INJECTION, SOLUTION INTRAVENOUS at 00:18

## 2023-11-14 RX ADMIN — OXYCODONE AND ACETAMINOPHEN 1 TABLET: 10; 325 TABLET ORAL at 21:54

## 2023-11-14 NOTE — PLAN OF CARE
Goal Outcome Evaluation:      Pt vitals stable throughout shift. Pt ambulated in room and to bathroom multiple times during shift. Pt pain well controlled until later in shift and breakthrough medication was given. Pt tolerated diet well. Pt urinated multiple times, urine WDL.    Kandice Soto RN

## 2023-11-14 NOTE — PROGRESS NOTES
[facility]   Urology Progress Note    Patient Name: Danny Savage  : 1960  MRN: [id number]  Primary Care Physician:  Kodi Pichardo MD  Date of admission: 2023    Subjective   Subjective     Primary complaint is acid reflux; no able to tolerate p.o. but this is limited due to his reflux, states getting better  Walked overnight; has not required narcotic pain medication      Objective   Objective     Vitals:   Temp:  [97.2 °F (36.2 °C)-98.2 °F (36.8 °C)] 98.2 °F (36.8 °C)  Heart Rate:  [] 73  Resp:  [12-18] 16  BP: (106-135)/(62-86) 119/74  Flow (L/min):  [2-4] 2  Physical Exam     Alert and oriented x3  No acute distress  Unlabored respirations  Soft, nondistended, appropriately tender; incisions clean, dry, intact, no erythema  Rico in place, clear       Result Review    Result Review:  I have personally reviewed the results from the time of this admission to 2023 07:47 EST and agree with these findings:  [x]  Laboratory  []  Microbiology  []  Radiology  []  EKG/Telemetry   []  Cardiology/Vascular   []  Pathology  []  Old records  []  Other:      Assessment & Plan   Assessment / Plan     Brief Patient Summary:  Danny Savage is a 63 y.o. male status post robotic left partial, 2023 nephrectomy    Active Hospital Problems:  Active Hospital Problems    Diagnosis     **Left renal mass      Plan:   Afebrile vital signs stable, labs reviewed  Clinically doing well  Void trial  P.o. symptom control  Ambulate as much as will tolerate  P.o. as tolerated  Saline lock IV    Home when goals met either later today or tomorrow      Electronically signed by Michelle Cai MD, 23, 6:23 AM EST.

## 2023-11-15 ENCOUNTER — TELEPHONE (OUTPATIENT)
Dept: UROLOGY | Facility: CLINIC | Age: 63
End: 2023-11-15
Payer: COMMERCIAL

## 2023-11-15 VITALS
BODY MASS INDEX: 24.68 KG/M2 | WEIGHT: 172.4 LBS | RESPIRATION RATE: 16 BRPM | HEART RATE: 85 BPM | HEIGHT: 70 IN | DIASTOLIC BLOOD PRESSURE: 82 MMHG | SYSTOLIC BLOOD PRESSURE: 145 MMHG | TEMPERATURE: 97.7 F | OXYGEN SATURATION: 95 %

## 2023-11-15 LAB
ANION GAP SERPL CALCULATED.3IONS-SCNC: 7 MMOL/L (ref 5–15)
BUN SERPL-MCNC: 18 MG/DL (ref 8–23)
BUN/CREAT SERPL: 15.7 (ref 7–25)
CALCIUM SPEC-SCNC: 8 MG/DL (ref 8.6–10.5)
CHLORIDE SERPL-SCNC: 104 MMOL/L (ref 98–107)
CO2 SERPL-SCNC: 25 MMOL/L (ref 22–29)
CREAT SERPL-MCNC: 1.15 MG/DL (ref 0.76–1.27)
CYTO UR: NORMAL
DEPRECATED RDW RBC AUTO: 42.9 FL (ref 37–54)
EGFRCR SERPLBLD CKD-EPI 2021: 70.6 ML/MIN/1.73
ERYTHROCYTE [DISTWIDTH] IN BLOOD BY AUTOMATED COUNT: 12.9 % (ref 12.3–15.4)
GLUCOSE SERPL-MCNC: 144 MG/DL (ref 65–99)
HCT VFR BLD AUTO: 36.8 % (ref 37.5–51)
HGB BLD-MCNC: 11.7 G/DL (ref 13–17.7)
LAB AP CASE REPORT: NORMAL
LAB AP CLINICAL INFORMATION: NORMAL
LAB AP SPECIAL STAINS: NORMAL
LAB AP SYNOPTIC CHECKLIST: NORMAL
MCH RBC QN AUTO: 29 PG (ref 26.6–33)
MCHC RBC AUTO-ENTMCNC: 31.8 G/DL (ref 31.5–35.7)
MCV RBC AUTO: 91.3 FL (ref 79–97)
PATH REPORT.FINAL DX SPEC: NORMAL
PATH REPORT.GROSS SPEC: NORMAL
PLATELET # BLD AUTO: 177 10*3/MM3 (ref 140–450)
PMV BLD AUTO: 9.7 FL (ref 6–12)
POTASSIUM SERPL-SCNC: 3.8 MMOL/L (ref 3.5–5.2)
RBC # BLD AUTO: 4.03 10*6/MM3 (ref 4.14–5.8)
SODIUM SERPL-SCNC: 136 MMOL/L (ref 136–145)
WBC NRBC COR # BLD: 6.75 10*3/MM3 (ref 3.4–10.8)

## 2023-11-15 PROCEDURE — 85027 COMPLETE CBC AUTOMATED: CPT | Performed by: UROLOGY

## 2023-11-15 PROCEDURE — 99024 POSTOP FOLLOW-UP VISIT: CPT | Performed by: UROLOGY

## 2023-11-15 PROCEDURE — 63710000001 ONDANSETRON PER 8 MG: Performed by: UROLOGY

## 2023-11-15 PROCEDURE — 80048 BASIC METABOLIC PNL TOTAL CA: CPT | Performed by: UROLOGY

## 2023-11-15 RX ORDER — GABAPENTIN 100 MG/1
100 CAPSULE ORAL ONCE
Status: COMPLETED | OUTPATIENT
Start: 2023-11-15 | End: 2023-11-15

## 2023-11-15 RX ORDER — BACLOFEN 10 MG/1
10 TABLET ORAL 3 TIMES DAILY PRN
Qty: 24 TABLET | Refills: 0 | Status: SHIPPED | OUTPATIENT
Start: 2023-11-15

## 2023-11-15 RX ORDER — BACLOFEN 10 MG/1
10 TABLET ORAL 3 TIMES DAILY PRN
Status: DISCONTINUED | OUTPATIENT
Start: 2023-11-15 | End: 2023-11-15 | Stop reason: HOSPADM

## 2023-11-15 RX ORDER — OXYCODONE HYDROCHLORIDE AND ACETAMINOPHEN 5; 325 MG/1; MG/1
1-2 TABLET ORAL EVERY 6 HOURS PRN
Qty: 18 TABLET | Refills: 0 | Status: SHIPPED | OUTPATIENT
Start: 2023-11-15 | End: 2023-11-20

## 2023-11-15 RX ORDER — GABAPENTIN 100 MG/1
100 CAPSULE ORAL 3 TIMES DAILY PRN
Status: DISCONTINUED | OUTPATIENT
Start: 2023-11-15 | End: 2023-11-15 | Stop reason: HOSPADM

## 2023-11-15 RX ORDER — ONDANSETRON 4 MG/1
4 TABLET, FILM COATED ORAL EVERY 6 HOURS PRN
Qty: 12 TABLET | Refills: 0 | Status: SHIPPED | OUTPATIENT
Start: 2023-11-15

## 2023-11-15 RX ORDER — DOCUSATE SODIUM 100 MG/1
100 CAPSULE, LIQUID FILLED ORAL 2 TIMES DAILY
Qty: 30 CAPSULE | Refills: 1 | Status: SHIPPED | OUTPATIENT
Start: 2023-11-15

## 2023-11-15 RX ADMIN — OXYCODONE AND ACETAMINOPHEN 1 TABLET: 10; 325 TABLET ORAL at 07:35

## 2023-11-15 RX ADMIN — ONDANSETRON HYDROCHLORIDE 4 MG: 4 TABLET, FILM COATED ORAL at 02:50

## 2023-11-15 RX ADMIN — BACLOFEN 10 MG: 10 TABLET ORAL at 09:42

## 2023-11-15 RX ADMIN — GABAPENTIN 100 MG: 100 CAPSULE ORAL at 09:42

## 2023-11-15 RX ADMIN — OXYCODONE AND ACETAMINOPHEN 1 TABLET: 10; 325 TABLET ORAL at 02:50

## 2023-11-15 NOTE — DISCHARGE SUMMARY
[facility]   DISCHARGE SUMMARY      Name:  Danny Savage   Age:  63 y.o.  Sex:  male  :  1960  MRN:  [id number]   Visit Number:  [id number]  Primary Care Physician:  Kodi Pichardo MD  Date of Discharge:  11/15/2023  Admission Date:  2023      Discharge Diagnosis:       Active Hospital Problems    Diagnosis  POA    **Left renal mass [N28.89]  Unknown      Resolved Hospital Problems   No resolved problems to display.         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  He tolerated procedure well.  Please see operative report for further details.  Patient was admitted postoperatively for pain control and monitoring.  Patient remained stable during admission.  Postoperatively he was able to ambulate as well as tolerate a diet.  At time of discharge patient was ambulating without assist, tolerating regular diet, and pain was controlled with p.o. medications.  At this time all goals of inpatient care met patient is deemed ready for discharge home     Procedures Performed:    Procedure(s):  NEPHRECTOMY PARTIAL LAPAROSCOPIC WITH DAVINCI ROBOT, left       Consults:     Consults       No orders found from 10/15/2023 to 2023.            Pertinent Test Results:    Basic Metabolic Panel [[id number]]  (Abnormal) Collected: 11/15/23 0401    Specimen: Blood Updated: 11/15/23 05     Glucose 144 mg/dL      BUN 18 mg/dL      Creatinine 1.15 mg/dL      Sodium 136 mmol/L      Potassium 3.8 mmol/L      Comment: Slight hemolysis detected by analyzer. Result may be falsely elevated.        Chloride 104 mmol/L      CO2 25.0 mmol/L      Calcium 8.0 mg/dL      BUN/Creatinine Ratio 15.7     Anion Gap 7.0 mmol/L      eGFR 70.6 mL/min/1.73     Narrative:      GFR Normal >60  Chronic Kidney Disease <60  Kidney Failure <15      CBC (No Diff) [[id number]]  (Abnormal)  Collected: 11/15/23 0401    Specimen: Blood Updated: 11/15/23 0500     WBC 6.75 10*3/mm3      RBC 4.03 10*6/mm3      Hemoglobin 11.7 g/dL      Hematocrit 36.8 %      MCV 91.3 fL      MCH 29.0 pg      MCHC 31.8 g/dL      RDW 12.9 %      RDW-SD 42.9 fl      MPV 9.7 fL      Platelets 177 10*3/mm3     Basic Metabolic Panel [[id number]]  (Abnormal) Collected: 11/14/23 0348    Specimen: Blood from Arm, Right Updated: 11/14/23 0445     Glucose 169 mg/dL      BUN 19 mg/dL      Creatinine 1.14 mg/dL      Sodium 134 mmol/L      Potassium 4.1 mmol/L      Chloride 100 mmol/L      CO2 23.4 mmol/L      Calcium 8.2 mg/dL      BUN/Creatinine Ratio 16.7     Anion Gap 10.6 mmol/L      eGFR 71.4 mL/min/1.73     Narrative:      GFR Normal >60  Chronic Kidney Disease <60  Kidney Failure <15      CBC & Differential [[id number]]  (Abnormal) Collected: 11/14/23 0348    Specimen: Blood from Arm, Right Updated: 11/14/23 0426    Narrative:      The following orders were created for panel order CBC & Differential.  Procedure                               Abnormality         Status                     ---------                               -----------         ------                     CBC Auto Differential[[id number]]        Abnormal            Final result                 Please view results for these tests on the individual orders.    CBC Auto Differential [[id number]]  (Abnormal) Collected: 11/14/23 0348    Specimen: Blood from Arm, Right Updated: 11/14/23 0426     WBC 9.98 10*3/mm3      RBC 4.20 10*6/mm3      Hemoglobin 12.0 g/dL      Hematocrit 37.2 %      MCV 88.6 fL      MCH 28.6 pg      MCHC 32.3 g/dL      RDW 12.6 %      RDW-SD 40.4 fl      MPV 9.4 fL      Platelets 226 10*3/mm3      Neutrophil % 84.8 %      Lymphocyte % 7.2 %      Monocyte % 7.5 %      Eosinophil % 0.0 %      Basophil % 0.1 %      Immature Grans % 0.4 %      Neutrophils, Absolute 8.46 10*3/mm3      Lymphocytes, Absolute 0.72 10*3/mm3      Monocytes, Absolute 0.75  "10*3/mm3      Eosinophils, Absolute 0.00 10*3/mm3      Basophils, Absolute 0.01 10*3/mm3      Immature Grans, Absolute 0.04 10*3/mm3      nRBC 0.0 /100 WBC     Urinalysis With Microscopic - Urine, Clean Catch [[id number]] Collected: 11/13/23 0632    Specimen: Urine, Clean Catch Updated: 11/13/23 0712    Narrative:      The following orders were created for panel order Urinalysis With Microscopic - Urine, Clean Catch.  Procedure                               Abnormality         Status                     ---------                               -----------         ------                     Urinalysis without micro...[[id number]]  Normal              Final result               Urinalysis, Microscopic ...[[id number]]                      Final result                 Please view results for these tests on the individual orders.    Urinalysis, Microscopic Only - Urine, Clean Catch [[id number]] Collected: 11/13/23 0632    Specimen: Urine, Clean Catch Updated: 11/13/23 0712     RBC, UA 0-2 /HPF      WBC, UA 0-2 /HPF      Bacteria, UA None Seen /HPF      Squamous Epithelial Cells, UA 0-2 /HPF      Hyaline Casts, UA 0-2 /LPF      Methodology Automated Microscopy    Urinalysis without microscopic (no culture) - Urine, Clean Catch [[id number]]  (Normal) Collected: 11/13/23 0632    Specimen: Urine, Clean Catch Updated: 11/13/23 0702     Color, UA Yellow     Appearance, UA Clear     pH, UA 5.5     Specific Gravity, UA 1.016     Glucose, UA Negative     Ketones, UA Negative     Bilirubin, UA Negative     Blood, UA Negative     Protein, UA Negative     Leuk Esterase, UA Negative     Nitrite, UA Negative     Urobilinogen, UA 0.2 E.U./dL            Imaging Results (All)       None            Condition on Discharge:      good    Vital Signs:    /82 (BP Location: Left arm, Patient Position: Lying)   Pulse 85   Temp 97.7 °F (36.5 °C) (Oral)   Resp 16   Ht 177.8 cm (70\")   Wt 78.2 kg (172 lb 6.4 oz)   SpO2 95%   BMI 24.74 kg/m² "     Discharge Disposition:    Home or Self Care    Discharge Medication:       Discharge Medications        New Medications        Instructions Start Date   baclofen 10 MG tablet  Commonly known as: LIORESAL   10 mg, Oral, 3 Times Daily PRN      docusate sodium 100 MG capsule  Commonly known as: Colace   100 mg, Oral, 2 Times Daily, May take as needed once having regular bowel movements      ondansetron 4 MG tablet  Commonly known as: ZOFRAN   4 mg, Oral, Every 6 Hours PRN      oxyCODONE-acetaminophen 5-325 MG per tablet  Commonly known as: PERCOCET   1-2 tablets, Oral, Every 6 Hours PRN             Continue These Medications        Instructions Start Date   loratadine 10 MG tablet  Commonly known as: CLARITIN   10 mg, Oral, Daily      Metamucil 0.36 g capsule  Generic drug: Psyllium   1 capsule, Oral, 2 Times Daily      mycophenolate 500 MG tablet  Commonly known as: CELLCEPT   Take 1 tablet by mouth Daily.               Discharge Diet:         Activity at Discharge:     Activity Instructions       Discharge Activity      Activity: You will likely feel tired for 1-2 weeks or longer after surgery.  Simple tasks may tire you.  We encourage you to nap during the day.  Your activity and energy will improve each day.     Try to walk as much as tolerated. It is ok to go up and down stairs as tolerated. Walk at least twice a day for 20 minutes at a time.  Start the day you leave the hospital and continue for 5 weeks after surgery.     Do not drive while taking prescription pain medication (narcotic).       Do not lift anything over 15 pounds until follow up.  15 pounds is about a gallon of milk.     Caring for your wound: Your wound may itch or feel a bit irritated.  It is normal to feel a firm ridge under the wound as it heals.  The scar will get smaller and slowly fade in 6-18 months. All of the stiches are dissovable and the surgical glue will come off over time.     You may shower.  Do not soak in a tub, pool or  Shruthi for 4 weeks.     Recommend wearing loose pants with an elastic waistband to feel more comfortable.     Eating and drinking:  You may have less of an appetite right after surgery.  Your appetite should steadily improve.  Drinking is more important than eating.  Be sure to drink plenty of fluids and stay hydrated.     Medications:   Follow the instructions provided to you at time of discharge regarding your home medications  Your pain should improve each day after surgery.  Take the prescription pain medication for severe pain as needed.  As your pain improves, it is important that you decrease the amount of prescription pain medicine as much as possible. Prescription pain medication is unable to be refilled over the phone.     Do not take over 4000 mg (4 g) acetaminophen/Tylenol in 24 hours.    It is okay to take ibuprofen while taking the prescription pain medication as needed for additional discomfort.      Many patients experience constipation after surgery, which can cause significant discomfort. It may take up to a week to have a bowel movement. You should take a stool softener (Colace or Docusate) twice a day and should add Miralax once daily, if needed, until you are having bowel movements regularly.     Follow-up: You will be scheduled for follow-up in about 3 weeks; call with any questions or issues     Notify our office if any of the following occurs:  Increased pain, redness, or swelling at the incision  Fever greater than 101.5°F  Persistent nausea, vomiting or inability to have bowel movements  Shortness of breath, calf pain, or swelling in your extremities            Follow-up Appointments:    No future appointments.  Additional Instructions for the Follow-ups that You Need to Schedule       Discharge Follow-up with Specified Provider: Dr. Cai; 3 Weeks   As directed      To: Dr. Cai   Follow Up: 3 Weeks   Follow Up Details: [phone]        Basic Metabolic Panel    Nov 22, 2023  (Approximate)      Release to patient: Routine Release        CBC (No Diff)    Nov 22, 2023 (Approximate)      Release to patient: Routine Release                Test Results Pending at Discharge:           Michelle Cai MD  11/15/23  15:48 EST

## 2023-11-15 NOTE — SIGNIFICANT NOTE
11/15/23 1300   Coping/Psychosocial   Observed Emotional State calm;cooperative;other (see comments)  (Pt says he had cancer and now he had a surgery and the cancer is gone.)   Verbalized Emotional State relief;hopefulness   Trust Relationship/Rapport empathic listening provided   Involvement in Care interacting with patient   Additional Documentation Spiritual Care (Group)   Spiritual Care   Use of Spiritual Resources non-Gnosticist use of spiritual care   Spiritual Care Source  initiative   Spiritual Care Follow-Up follow-up, none required as presently assessed   Response to Spiritual Care receptive of support;engaged in conversation   Spiritual Care Interventions supportive conversation provided   Spiritual Care Visit Type initial   Receptivity to Spiritual Care visit welcomed

## 2023-11-15 NOTE — PLAN OF CARE
Goal Outcome Evaluation:         Education complete. Patient is discharging.   Jenny Gamboa RN

## 2023-11-15 NOTE — PROGRESS NOTES
[facility]   Urology Progress Note    Patient Name: Danny Savage  : 1960  MRN: [id number]  Primary Care Physician:  Kodi Pichardo MD  Date of admission: 2023    Subjective   Subjective     Pain overnight requiring Percocet; passing gas, tolerating p.o., ambulating by: Concerned about the pain    Objective   Objective     Vitals:   Temp:  [97.4 °F (36.3 °C)-98.3 °F (36.8 °C)] 98.2 °F (36.8 °C)  Heart Rate:  [83-92] 85  Resp:  [16] 16  BP: (111-137)/(62-82) 137/82  Physical Exam     Alert and oriented x3  No acute distress  Unlabored respirations  Soft, nondistended, appropriately tender; incisions clean, dry, intact, no erythema        Result Review    Result Review:  I have personally reviewed the results from the time of this admission to 11/15/2023 07:32 EST and agree with these findings:  [x]  Laboratory  []  Microbiology  []  Radiology  []  EKG/Telemetry   []  Cardiology/Vascular   []  Pathology  []  Old records  []  Other:      Assessment & Plan   Assessment / Plan     Brief Patient Summary:  Danny Savage is a 63 y.o. male status post robotic left partial, 2023 nephrectomy    Active Hospital Problems:  Active Hospital Problems    Diagnosis     **Left renal mass      Plan:   Afebrile vital signs stable, labs reviewed  Provided reassurance; clinically doing well    P.o. symptom control; try as needed gabapentin and baclofen    Ambulate as much as will tolerate    P.o. as tolerated      Home when goals met either later today or tomorrow    Electronically signed by Michelle Cai MD, 11/15/23, 7:33 AM EST.

## 2023-11-22 ENCOUNTER — OFFICE VISIT (OUTPATIENT)
Dept: UROLOGY | Facility: CLINIC | Age: 63
End: 2023-11-22
Payer: COMMERCIAL

## 2023-11-22 ENCOUNTER — TELEPHONE (OUTPATIENT)
Dept: UROLOGY | Facility: CLINIC | Age: 63
End: 2023-11-22
Payer: COMMERCIAL

## 2023-11-22 VITALS — HEIGHT: 70 IN | WEIGHT: 172.4 LBS | BODY MASS INDEX: 24.68 KG/M2

## 2023-11-22 DIAGNOSIS — N28.89 RENAL MASS: Primary | ICD-10-CM

## 2023-11-22 DIAGNOSIS — T81.89XA INCISIONAL IRRITATION, INITIAL ENCOUNTER: ICD-10-CM

## 2023-11-22 PROCEDURE — 99024 POSTOP FOLLOW-UP VISIT: CPT | Performed by: NURSE PRACTITIONER

## 2023-11-22 RX ORDER — CEPHALEXIN 500 MG/1
500 CAPSULE ORAL 2 TIMES DAILY
Qty: 14 CAPSULE | Refills: 0 | Status: SHIPPED | OUTPATIENT
Start: 2023-11-22 | End: 2023-11-29

## 2023-11-22 NOTE — PROGRESS NOTES
Chief Complaint: Post-op Problem    Subjective         History of Present Illness  Danny Savage is a 63 y.o. male presents to [facility] UROLOGY to be seen for follow-up.    Patient had a partial left nephrectomy on 11/13/2023 by Dr. Michelle Cai.  He called the office today with concerns of drainage and redness at his incision site.  He is here for evaluation.    Patient reports that today he had showered and the Band-Aid on one of his incisions came off and yellow drainage came off with it and it appeared to him that the incision may be open.  He was concerned with the long holiday weekend coming up that this may worsen over the course of the next several days.    Objective     Past Medical History:   Diagnosis Date    Allergy     Aortic aneurysm     4.7 just found has not f/u    H/O Kidney stone     Hiatal hernia     Kidney mass     left       Past Surgical History:   Procedure Laterality Date    NEPHRECTOMY PARTIAL Left 11/13/2023    Procedure: NEPHRECTOMY PARTIAL LAPAROSCOPIC WITH DAVINCI ROBOT, left;  Surgeon: Michelle Cai MD;  [facility]  Service: Robotics - DaVinci;  Laterality: Left;    URETEROSCOPY LASER LITHOTRIPSY WITH STENT INSERTION Left 8/18/2023    Procedure: CYSTOSCOPY URETEROSCOPY RETROGRADE PYELOGRAM STENT INSERTION, left;  Surgeon: Michelle Cai MD;  [facility]  Service: Urology;  Laterality: Left;         Current Outpatient Medications:     baclofen (LIORESAL) 10 MG tablet, Take 1 tablet by mouth 3 (Three) Times a Day As Needed for Muscle Spasms (muscle pain)., Disp: 24 tablet, Rfl: 0    docusate sodium (Colace) 100 MG capsule, Take 1 capsule by mouth 2 (Two) Times a Day. May take as needed once having regular bowel movements, Disp: 30 capsule, Rfl: 1    loratadine (CLARITIN) 10 MG tablet, Take 1 tablet by mouth Daily., Disp: , Rfl:     mycophenolate (CELLCEPT) 500 MG tablet, Take 1 tablet by mouth Daily., Disp: , Rfl:      "ondansetron (ZOFRAN) 4 MG tablet, Take 1 tablet by mouth Every 6 (Six) Hours As Needed for Nausea or Vomiting., Disp: 12 tablet, Rfl: 0    Psyllium (Metamucil) 0.36 g capsule, Take 1 capsule by mouth 2 (Two) Times a Day., Disp: , Rfl:     cephalexin (KEFLEX) 500 MG capsule, Take 1 capsule by mouth 2 (Two) Times a Day for 7 days., Disp: 14 capsule, Rfl: 0    Allergies   Allergen Reactions    Latex, Natural Rubber Hives        No family history on file.    Social History     Socioeconomic History    Marital status:    Tobacco Use    Smoking status: Former     Types: Cigarettes     Passive exposure: Past    Smokeless tobacco: Never    Tobacco comments:     Quit over 10 years ago   Vaping Use    Vaping Use: Never used   Substance and Sexual Activity    Alcohol use: Never    Drug use: Never    Sexual activity: Yes     Partners: Female       Vital Signs:   Ht 177.8 cm (70\")   Wt 78.2 kg (172 lb 6.4 oz)   BMI 24.74 kg/m²      Physical Exam  Vitals reviewed.   Constitutional:       Appearance: Normal appearance.   Abdominal:          Comments: 4mm area of mild erythema with slight open area to bottom of surgical incision. No drainage or warmth noted.   Neurological:      General: No focal deficit present.      Mental Status: He is alert and oriented to person, place, and time.   Psychiatric:         Mood and Affect: Mood normal.         Behavior: Behavior normal.          Result Review :   The following data was reviewed by: BAKARI Tong on 11/22/2023:     PSA          8/24/2023    08:13   PSA   PSA 2.780          Procedures        Assessment and Plan    Diagnoses and all orders for this visit:    1. Renal mass (Primary)  -     cephalexin (KEFLEX) 500 MG capsule; Take 1 capsule by mouth 2 (Two) Times a Day for 7 days.  Dispense: 14 capsule; Refill: 0    2. Incisional irritation, initial encounter    Discussed with patient that I do not believe the incision to be infected, however with the upcoming " holiday weekend he is very concerned that it may become infected so I will put him on a short course of antibiotics.  We discussed incisional care.  I did advise him if this appearance worsens or if he should develop fever that he should definitely follow-up.    Advised to follow-up with Dr. Michelle Cai as advised postop.      Follow Up   No follow-ups on file.  Patient was given instructions and counseling regarding his condition or for health maintenance advice. Please see specific information pulled into the AVS if appropriate.         This document has been electronically signed by BAKARI Tong  November 22, 2023 13:44 EST

## 2023-11-22 NOTE — TELEPHONE ENCOUNTER
"PT HAD SX ON 11/13 (L PARTIAL NEPHRECTOMY). STATES THAT ONE OF HIS INCISIONS IS RED AROUND IT, YELLOW DRAINAGE, STILL HAS BANDAGE ON IT BUT HE FEELS LIKE IF HE TRIES TO TAKE IT OFF \"THE WHOLE THING WILL PULL AWAY.\" PT IS ASKING TO BE SEEN TODAY. CONCERNED ABOUT IT BEING/BECOMING INFECTED.    PER SHARLA, ADD TO HER SCHED TODAY. PT NOTIFIED, EXPRESSED UNDERSTANDING AND IS AGREEABLE.  "

## 2023-12-06 ENCOUNTER — OFFICE VISIT (OUTPATIENT)
Dept: UROLOGY | Facility: CLINIC | Age: 63
End: 2023-12-06
Payer: COMMERCIAL

## 2023-12-06 ENCOUNTER — HOSPITAL ENCOUNTER (OUTPATIENT)
Dept: CT IMAGING | Facility: HOSPITAL | Age: 63
Discharge: HOME | End: 2023-12-06
Admitting: UROLOGY
Payer: COMMERCIAL

## 2023-12-06 ENCOUNTER — TELEPHONE (OUTPATIENT)
Dept: UROLOGY | Facility: CLINIC | Age: 63
End: 2023-12-06

## 2023-12-06 VITALS
WEIGHT: 176.2 LBS | HEIGHT: 70 IN | HEART RATE: 82 BPM | BODY MASS INDEX: 25.22 KG/M2 | DIASTOLIC BLOOD PRESSURE: 87 MMHG | SYSTOLIC BLOOD PRESSURE: 153 MMHG

## 2023-12-06 DIAGNOSIS — C64.2 RENAL CELL CARCINOMA OF LEFT KIDNEY: ICD-10-CM

## 2023-12-06 DIAGNOSIS — C64.2 RENAL CELL CARCINOMA OF LEFT KIDNEY: Primary | ICD-10-CM

## 2023-12-06 DIAGNOSIS — R10.9 LEFT FLANK PAIN: ICD-10-CM

## 2023-12-06 PROCEDURE — 25510000001 IOPAMIDOL PER 1 ML: Performed by: UROLOGY

## 2023-12-06 PROCEDURE — 74178 CT ABD&PLV WO CNTR FLWD CNTR: CPT

## 2023-12-06 PROCEDURE — 99024 POSTOP FOLLOW-UP VISIT: CPT | Performed by: UROLOGY

## 2023-12-06 RX ORDER — AZELASTINE HYDROCHLORIDE, FLUTICASONE PROPIONATE 137; 50 UG/1; UG/1
SPRAY, METERED NASAL
COMMUNITY
Start: 2023-10-31

## 2023-12-06 RX ADMIN — IOPAMIDOL 100 ML: 755 INJECTION, SOLUTION INTRAVENOUS at 14:53

## 2023-12-06 NOTE — TELEPHONE ENCOUNTER
SPOKE TO PT AND ADVISED THAT PRE-CERT WAS APPROVED ON HIS SCAN AND GEN CAN WORK HIM IN NOW. APPT IS 245PM TODAY. PT EXPRESSED UNDERSTANDING AND IS AGREEABLE.

## 2023-12-06 NOTE — PROGRESS NOTES
"    UROLOGY OFFICE follow up NOTE    Subjective   HPI  Danny Savage is a 63 y.o. male.  Presents for follow-up after robotic left partial nephrectomy, 11/13/2023.  Overall doing well.    The patient has been experiencing back pain on the left side. He had a little flank pain before the surgery. The patient reports that the pain seems to be still there or a little bit worse. Today his pain is worse. He slept a little bit last night. The patient reports that the pain is on both sides, which is all the way across the bottom. He asks if the flank pain will slowly dissipate.   He states that Tylenol does not relieve his pain. He does not want narcotic medication.     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  A couple of other 2 mm nonobstructing stones are present in the left kidney as well.    2. 4.2 x 3.7 cm heterogeneous soft tissue partially exophytic mass in the lower pole of the left   kidney suspicious for renal cell carcinoma/malignancy until proven otherwise.  Urology consultation   is recommended.    Review of systems  A review of systems was performed, and positive findings are noted in the HPI.    Objective     Vital Signs:   /87 (BP Location: Left arm, Patient Position: Sitting, Cuff Size: Large Adult)   Pulse 82   Ht 177.8 cm (70\")   Wt 79.9 kg (176 lb 3.2 oz)   BMI 25.28 kg/m²       Physical exam  No acute distress, well-nourished  Awake alert and oriented  Mood normal; affect normal    Problem List:  Patient Active Problem List   Diagnosis    Left renal mass    Kidney stone    Left ureteral stone       Assessment & Plan   Diagnoses and all " orders for this visit:    1. Renal cell carcinoma of left kidney (Primary)  -     Cancel: CT Abdomen Pelvis Stone Protocol; Future  -     CT Abdomen Pelvis With & Without Contrast; Future    2. Left flank pain  -     Cancel: CT Abdomen Pelvis Stone Protocol; Future  -     CT Abdomen Pelvis With & Without Contrast; Future      pT3a papillary renal cell carcinoma (extends into renal sinus, 5 cm, margin negative    Status post partial nephrectomy, doing well    We discussed the results of his pathology.We will obtain a CT scan, laboratory studies, and a chest x-ray prior to his 4 month follow-up.    Advised to avoid heavy lifting or a lot of activity for 1 more week.     Back pain.  Unknown etiology, certainly possible that he is continuing to heal.  However, given history of partial nephrectomy, obtain CT scan to assess for urinoma, other surgical complication which may be attributing to his back pain and may or may not require intervention.    P.o. symptom control      We will notify him of the result of CT scan once available and any further recommendations    Follow-up in about 4 months, routine surveillance imaging and labs prior  All questions addressed    Transcribed from ambient dictation for Michelle Cai MD by Gracie Mcgee.  12/06/23   13:37 EST    Patient or patient representative verbalized consent to the visit recording.  I have personally performed the services described in this document as transcribed by the above individual, and it is both accurate and complete.

## 2023-12-22 ENCOUNTER — APPOINTMENT (OUTPATIENT)
Dept: GENERAL RADIOLOGY | Facility: HOSPITAL | Age: 63
End: 2023-12-22
Payer: COMMERCIAL

## 2023-12-22 ENCOUNTER — HOSPITAL ENCOUNTER (EMERGENCY)
Facility: HOSPITAL | Age: 63
Discharge: HOME | End: 2023-12-22
Attending: EMERGENCY MEDICINE
Payer: COMMERCIAL

## 2023-12-22 ENCOUNTER — APPOINTMENT (OUTPATIENT)
Dept: CT IMAGING | Facility: HOSPITAL | Age: 63
End: 2023-12-22
Payer: COMMERCIAL

## 2023-12-22 VITALS
WEIGHT: 173.72 LBS | HEIGHT: 70 IN | TEMPERATURE: 98.1 F | SYSTOLIC BLOOD PRESSURE: 106 MMHG | DIASTOLIC BLOOD PRESSURE: 69 MMHG | OXYGEN SATURATION: 92 % | BODY MASS INDEX: 24.87 KG/M2 | RESPIRATION RATE: 20 BRPM | HEART RATE: 93 BPM

## 2023-12-22 DIAGNOSIS — B34.9 ACUTE VIRAL SYNDROME: ICD-10-CM

## 2023-12-22 DIAGNOSIS — R50.9 FEVER IN ADULT: Primary | ICD-10-CM

## 2023-12-22 LAB
ALBUMIN SERPL-MCNC: 4.3 G/DL (ref 3.5–5.2)
ALBUMIN/GLOB SERPL: 1.2 G/DL
ALP SERPL-CCNC: 94 U/L (ref 39–117)
ALT SERPL W P-5'-P-CCNC: 22 U/L (ref 1–41)
ANION GAP SERPL CALCULATED.3IONS-SCNC: 14.9 MMOL/L (ref 5–15)
AST SERPL-CCNC: 21 U/L (ref 1–40)
BASOPHILS # BLD AUTO: 0.02 10*3/MM3 (ref 0–0.2)
BASOPHILS NFR BLD AUTO: 0.1 % (ref 0–1.5)
BILIRUB SERPL-MCNC: 0.5 MG/DL (ref 0–1.2)
BILIRUB UR QL STRIP: NEGATIVE
BUN SERPL-MCNC: 16 MG/DL (ref 8–23)
BUN/CREAT SERPL: 13.9 (ref 7–25)
CALCIUM SPEC-SCNC: 9.3 MG/DL (ref 8.6–10.5)
CHLORIDE SERPL-SCNC: 97 MMOL/L (ref 98–107)
CLARITY UR: CLEAR
CO2 SERPL-SCNC: 23.1 MMOL/L (ref 22–29)
COLOR UR: YELLOW
CREAT SERPL-MCNC: 1.15 MG/DL (ref 0.76–1.27)
D-LACTATE SERPL-SCNC: 1.5 MMOL/L (ref 0.5–2)
DEPRECATED RDW RBC AUTO: 41.6 FL (ref 37–54)
EGFRCR SERPLBLD CKD-EPI 2021: 70.6 ML/MIN/1.73
EOSINOPHIL # BLD AUTO: 0.01 10*3/MM3 (ref 0–0.4)
EOSINOPHIL NFR BLD AUTO: 0.1 % (ref 0.3–6.2)
ERYTHROCYTE [DISTWIDTH] IN BLOOD BY AUTOMATED COUNT: 13 % (ref 12.3–15.4)
FLUAV SUBTYP SPEC NAA+PROBE: NOT DETECTED
FLUBV RNA ISLT QL NAA+PROBE: NOT DETECTED
GLOBULIN UR ELPH-MCNC: 3.5 GM/DL
GLUCOSE SERPL-MCNC: 160 MG/DL (ref 65–99)
GLUCOSE UR STRIP-MCNC: ABNORMAL MG/DL
HCT VFR BLD AUTO: 43.8 % (ref 37.5–51)
HGB BLD-MCNC: 14.1 G/DL (ref 13–17.7)
HGB UR QL STRIP.AUTO: NEGATIVE
HOLD SPECIMEN: NORMAL
HOLD SPECIMEN: NORMAL
IMM GRANULOCYTES # BLD AUTO: 0.05 10*3/MM3 (ref 0–0.05)
IMM GRANULOCYTES NFR BLD AUTO: 0.4 % (ref 0–0.5)
KETONES UR QL STRIP: ABNORMAL
LEUKOCYTE ESTERASE UR QL STRIP.AUTO: NEGATIVE
LYMPHOCYTES # BLD AUTO: 0.01 10*3/MM3 (ref 0.7–3.1)
LYMPHOCYTES NFR BLD AUTO: 0.1 % (ref 19.6–45.3)
MAGNESIUM SERPL-MCNC: 1.5 MG/DL (ref 1.6–2.4)
MCH RBC QN AUTO: 28.3 PG (ref 26.6–33)
MCHC RBC AUTO-ENTMCNC: 32.2 G/DL (ref 31.5–35.7)
MCV RBC AUTO: 87.8 FL (ref 79–97)
MONOCYTES # BLD AUTO: 0.96 10*3/MM3 (ref 0.1–0.9)
MONOCYTES NFR BLD AUTO: 7 % (ref 5–12)
NEUTROPHILS NFR BLD AUTO: 12.59 10*3/MM3 (ref 1.7–7)
NEUTROPHILS NFR BLD AUTO: 92.3 % (ref 42.7–76)
NITRITE UR QL STRIP: NEGATIVE
NRBC BLD AUTO-RTO: 0 /100 WBC (ref 0–0.2)
NT-PROBNP SERPL-MCNC: 165.7 PG/ML (ref 0–900)
PH UR STRIP.AUTO: 6 [PH] (ref 5–8)
PLATELET # BLD AUTO: 222 10*3/MM3 (ref 140–450)
PMV BLD AUTO: 9 FL (ref 6–12)
POTASSIUM SERPL-SCNC: 3.6 MMOL/L (ref 3.5–5.2)
PROCALCITONIN SERPL-MCNC: 0.48 NG/ML (ref 0–0.25)
PROT SERPL-MCNC: 7.8 G/DL (ref 6–8.5)
PROT UR QL STRIP: ABNORMAL
QT INTERVAL: 319 MS
QTC INTERVAL: 459 MS
RBC # BLD AUTO: 4.99 10*6/MM3 (ref 4.14–5.8)
RSV RNA NPH QL NAA+NON-PROBE: NOT DETECTED
SARS-COV-2 RNA RESP QL NAA+PROBE: NOT DETECTED
SODIUM SERPL-SCNC: 135 MMOL/L (ref 136–145)
SP GR UR STRIP: 1.02 (ref 1–1.03)
TROPONIN T SERPL HS-MCNC: 9 NG/L
UROBILINOGEN UR QL STRIP: ABNORMAL
WBC NRBC COR # BLD AUTO: 13.64 10*3/MM3 (ref 3.4–10.8)
WHOLE BLOOD HOLD COAG: NORMAL
WHOLE BLOOD HOLD SPECIMEN: NORMAL

## 2023-12-22 PROCEDURE — 87186 SC STD MICRODIL/AGAR DIL: CPT

## 2023-12-22 PROCEDURE — 96365 THER/PROPH/DIAG IV INF INIT: CPT

## 2023-12-22 PROCEDURE — 93005 ELECTROCARDIOGRAM TRACING: CPT | Performed by: EMERGENCY MEDICINE

## 2023-12-22 PROCEDURE — 74177 CT ABD & PELVIS W/CONTRAST: CPT

## 2023-12-22 PROCEDURE — 96368 THER/DIAG CONCURRENT INF: CPT

## 2023-12-22 PROCEDURE — 81003 URINALYSIS AUTO W/O SCOPE: CPT | Performed by: EMERGENCY MEDICINE

## 2023-12-22 PROCEDURE — 25010000002 MORPHINE PER 10 MG: Performed by: EMERGENCY MEDICINE

## 2023-12-22 PROCEDURE — 25010000002 PIPERACILLIN SOD-TAZOBACTAM PER 1 G

## 2023-12-22 PROCEDURE — 25010000002 ONDANSETRON PER 1 MG

## 2023-12-22 PROCEDURE — 25010000002 VANCOMYCIN 5 G RECONSTITUTED SOLUTION: Performed by: EMERGENCY MEDICINE

## 2023-12-22 PROCEDURE — 87040 BLOOD CULTURE FOR BACTERIA: CPT

## 2023-12-22 PROCEDURE — 85025 COMPLETE CBC W/AUTO DIFF WBC: CPT | Performed by: EMERGENCY MEDICINE

## 2023-12-22 PROCEDURE — 96375 TX/PRO/DX INJ NEW DRUG ADDON: CPT

## 2023-12-22 PROCEDURE — 25810000003 SODIUM CHLORIDE 0.9 % SOLUTION

## 2023-12-22 PROCEDURE — 83735 ASSAY OF MAGNESIUM: CPT | Performed by: EMERGENCY MEDICINE

## 2023-12-22 PROCEDURE — 25510000001 IOPAMIDOL PER 1 ML: Performed by: EMERGENCY MEDICINE

## 2023-12-22 PROCEDURE — 84484 ASSAY OF TROPONIN QUANT: CPT | Performed by: EMERGENCY MEDICINE

## 2023-12-22 PROCEDURE — 93005 ELECTROCARDIOGRAM TRACING: CPT

## 2023-12-22 PROCEDURE — 36415 COLL VENOUS BLD VENIPUNCTURE: CPT

## 2023-12-22 PROCEDURE — 83605 ASSAY OF LACTIC ACID: CPT

## 2023-12-22 PROCEDURE — 71045 X-RAY EXAM CHEST 1 VIEW: CPT

## 2023-12-22 PROCEDURE — 83880 ASSAY OF NATRIURETIC PEPTIDE: CPT

## 2023-12-22 PROCEDURE — 84145 PROCALCITONIN (PCT): CPT

## 2023-12-22 PROCEDURE — 80053 COMPREHEN METABOLIC PANEL: CPT | Performed by: EMERGENCY MEDICINE

## 2023-12-22 PROCEDURE — 25010000002 ACETAMINOPHEN 10 MG/ML SOLUTION

## 2023-12-22 PROCEDURE — 99285 EMERGENCY DEPT VISIT HI MDM: CPT

## 2023-12-22 PROCEDURE — 25810000003 SODIUM CHLORIDE 0.9 % SOLUTION: Performed by: EMERGENCY MEDICINE

## 2023-12-22 PROCEDURE — 87147 CULTURE TYPE IMMUNOLOGIC: CPT

## 2023-12-22 PROCEDURE — 87637 SARSCOV2&INF A&B&RSV AMP PRB: CPT | Performed by: EMERGENCY MEDICINE

## 2023-12-22 RX ORDER — SODIUM CHLORIDE 0.9 % (FLUSH) 0.9 %
10 SYRINGE (ML) INJECTION AS NEEDED
Status: DISCONTINUED | OUTPATIENT
Start: 2023-12-22 | End: 2023-12-22 | Stop reason: HOSPADM

## 2023-12-22 RX ORDER — ONDANSETRON 2 MG/ML
4 INJECTION INTRAMUSCULAR; INTRAVENOUS ONCE
Status: COMPLETED | OUTPATIENT
Start: 2023-12-22 | End: 2023-12-22

## 2023-12-22 RX ORDER — VANCOMYCIN/0.9 % SOD CHLORIDE 1.5G/250ML
20 PLASTIC BAG, INJECTION (ML) INTRAVENOUS ONCE
Status: COMPLETED | OUTPATIENT
Start: 2023-12-22 | End: 2023-12-22

## 2023-12-22 RX ORDER — VANCOMYCIN/0.9 % SOD CHLORIDE 1.5G/250ML
20 PLASTIC BAG, INJECTION (ML) INTRAVENOUS ONCE
Status: DISCONTINUED | OUTPATIENT
Start: 2023-12-22 | End: 2023-12-22

## 2023-12-22 RX ORDER — ACETAMINOPHEN 10 MG/ML
1000 INJECTION, SOLUTION INTRAVENOUS ONCE
Status: COMPLETED | OUTPATIENT
Start: 2023-12-22 | End: 2023-12-22

## 2023-12-22 RX ADMIN — MORPHINE SULFATE 4 MG: 4 INJECTION, SOLUTION INTRAMUSCULAR; INTRAVENOUS at 10:38

## 2023-12-22 RX ADMIN — SODIUM CHLORIDE 500 ML: 9 INJECTION, SOLUTION INTRAVENOUS at 08:12

## 2023-12-22 RX ADMIN — ACETAMINOPHEN 1000 MG: 10 INJECTION INTRAVENOUS at 09:00

## 2023-12-22 RX ADMIN — PIPERACILLIN SODIUM AND TAZOBACTAM SODIUM 3.38 G: 3; .375 INJECTION, POWDER, LYOPHILIZED, FOR SOLUTION INTRAVENOUS at 08:31

## 2023-12-22 RX ADMIN — IOPAMIDOL 100 ML: 755 INJECTION, SOLUTION INTRAVENOUS at 08:58

## 2023-12-22 RX ADMIN — VANCOMYCIN HYDROCHLORIDE 1500 MG: 5 INJECTION, POWDER, LYOPHILIZED, FOR SOLUTION INTRAVENOUS at 08:36

## 2023-12-22 RX ADMIN — SODIUM CHLORIDE 500 ML: 9 INJECTION, SOLUTION INTRAVENOUS at 10:44

## 2023-12-22 RX ADMIN — ONDANSETRON 4 MG: 2 INJECTION INTRAMUSCULAR; INTRAVENOUS at 08:13

## 2023-12-22 NOTE — DISCHARGE INSTRUCTIONS
Your urine shows that you are dehydrated, no signs of UTI.  Chest x-ray was negative for pneumonia, COVID and flu are negative.  CT scan of abdomen pelvis shows a decrease in fluid collection from prior nephrectomy.    We have replaced your IV fluids during your ED visit. Please take tylenol for fever/pain and follow up with your PCP

## 2023-12-22 NOTE — ED PROVIDER NOTES
Time: 8:15 AM EST  Date of encounter:  12/22/2023  Independent Historian/Clinical History and Information was obtained by:   Patient and Family    History is limited by: N/A    Chief Complaint   Patient presents with    Vomiting    Nausea    Dizziness    Fever         History of Present Illness:  Patient is a 63 y.o. year old male who presents to the emergency department for evaluation of nausea, vomiting, dizziness, low back pain and fever that started this morning around 2 AM.  Fever at home PTA was 102.7.  No antipyretics given.  Wife at bedside states he had a low-grade fever 99.92 days ago, she gave him Tylenol states that it broke his fever.  Patient has a history of kidney cancer with nephrectomy.  Denies radiation or chemo.  Denies abdominal pain, dysuria, hematuria, cough.     Patient Care Team  Primary Care Provider: Kodi Pichardo MD    Past Medical History:     Allergies   Allergen Reactions    Latex, Natural Rubber Hives     Past Medical History:   Diagnosis Date    Allergy     Aortic aneurysm     4.7 just found has not f/u    H/O Kidney stone     Hiatal hernia     Kidney mass     left    Renal cancer      Past Surgical History:   Procedure Laterality Date    KIDNEY STONE SURGERY      NEPHRECTOMY PARTIAL Left 11/13/2023    Procedure: NEPHRECTOMY PARTIAL LAPAROSCOPIC WITH DAVINCI ROBOT, left;  Surgeon: Michelle Cai MD;  Location: [facility] OR;  Service: Robotics - DaVinci;  Laterality: Left;    URETEROSCOPY LASER LITHOTRIPSY WITH STENT INSERTION Left 08/18/2023    Procedure: CYSTOSCOPY URETEROSCOPY RETROGRADE PYELOGRAM STENT INSERTION, left;  Surgeon: Michelle Cai MD;  Location: [facility] OR;  Service: Urology;  Laterality: Left;     Family History   Problem Relation Age of Onset    Cancer Mother         Skin Cancer    Cancer Father         Throat Cancer    Heart disease Father         Congestive Heart Failure    Hypertension Father        Home Medications:  Prior to Admission  medications    Medication Sig Start Date End Date Taking? Authorizing Provider   Azelastine-Fluticasone 137-50 MCG/ACT suspension  10/31/23   Soniya Calderon MD   baclofen (LIORESAL) 10 MG tablet Take 1 tablet by mouth 3 (Three) Times a Day As Needed for Muscle Spasms (muscle pain). 11/15/23   Michelle Cai MD   docusate sodium (Colace) 100 MG capsule Take 1 capsule by mouth 2 (Two) Times a Day. May take as needed once having regular bowel movements 11/15/23   Michelle Cai MD   loratadine (CLARITIN) 10 MG tablet Take 1 tablet by mouth Daily.    Soniya Calderon MD   mycophenolate (CELLCEPT) 500 MG tablet Take 1 tablet by mouth Daily. 8/14/23   Soniya Calderon MD   ondansetron (ZOFRAN) 4 MG tablet Take 1 tablet by mouth Every 6 (Six) Hours As Needed for Nausea or Vomiting. 11/15/23   Michelle Cai MD   Psyllium (Metamucil) 0.36 g capsule Take 1 capsule by mouth 2 (Two) Times a Day.    Soniya Calderon MD        Social History:   Social History     Tobacco Use    Smoking status: Former     Packs/day: 1.00     Years: 20.00     Additional pack years: 0.00     Total pack years: 20.00     Types: Cigarettes     Passive exposure: Past    Smokeless tobacco: Never    Tobacco comments:     Quit appx 10-15 years ago   Vaping Use    Vaping Use: Never used   Substance Use Topics    Alcohol use: Never    Drug use: Never         Review of Systems:  Review of Systems   Constitutional:  Positive for fever.   HENT: Negative.     Eyes: Negative.    Respiratory: Negative.  Negative for cough.    Cardiovascular: Negative.    Gastrointestinal:  Positive for nausea and vomiting. Negative for abdominal pain.   Endocrine: Negative.    Genitourinary: Negative.  Negative for dysuria and hematuria.   Musculoskeletal: Negative.    Skin: Negative.    Allergic/Immunologic: Negative.    Neurological:  Positive for dizziness.   Hematological: Negative.    Psychiatric/Behavioral: Negative.          Physical  "Exam:  /69   Pulse 93   Temp 98.1 °F (36.7 °C) (Oral)   Resp 20   Ht 177.8 cm (70\")   Wt 78.8 kg (173 lb 11.6 oz)   SpO2 92%   BMI 24.93 kg/m²         Physical Exam  Vitals and nursing note reviewed.   Constitutional:       Appearance: Normal appearance. He is normal weight.   HENT:      Head: Normocephalic and atraumatic.      Nose: Nose normal.      Mouth/Throat:      Mouth: Mucous membranes are moist.   Eyes:      Extraocular Movements: Extraocular movements intact.      Conjunctiva/sclera: Conjunctivae normal.      Pupils: Pupils are equal, round, and reactive to light.   Cardiovascular:      Rate and Rhythm: Regular rhythm. Tachycardia present.      Heart sounds: Normal heart sounds.   Pulmonary:      Effort: Pulmonary effort is normal.      Breath sounds: Normal breath sounds.   Abdominal:      Tenderness: There is no abdominal tenderness. There is no right CVA tenderness.   Musculoskeletal:         General: Normal range of motion.      Cervical back: Normal range of motion and neck supple.   Skin:     General: Skin is warm and dry.   Neurological:      General: No focal deficit present.      Mental Status: He is alert and oriented to person, place, and time.   Psychiatric:         Mood and Affect: Mood normal.         Behavior: Behavior normal.                 Procedures:  Procedures      Medical Decision Making:      Comorbidities that affect care:    Renal cancer    External Notes reviewed:    Previous Clinic Note: Urology office visit December 6, 2023 for renal cell carcinoma and Previous Radiological Studies: CT abdomen pelvis from December 6, 2023 with and without contrast showing a 4.5 fluid collection adjacent to the left pole of left kidney.       The following orders were placed and all results were independently analyzed by me:  Orders Placed This Encounter   Procedures    COVID PRE-OP / PRE-PROCEDURE SCREENING ORDER (NO ISOLATION) - Swab, Nasopharynx    COVID-19, FLU A/B, RSV PCR 1 HR " TAT - Swab, Nasopharynx    Blood Culture - Blood,    Blood Culture - Blood,    XR Chest 1 View    CT Abdomen Pelvis With Contrast    [place] Draw    Comprehensive Metabolic Panel    Single High Sensitivity Troponin T    Magnesium    Urinalysis With Microscopic If Indicated (No Culture) - Urine, Clean Catch    CBC Auto Differential    Lactic Acid, Plasma    Procalcitonin    BNP    Undress & Gown    Continuous Pulse Oximetry    Vital Signs    Vital Signs    ECG 12 Lead ED Triage Standing Order; Weak / Dizzy / AMS    CBC & Differential    Green Top (Gel)    Lavender Top    Gold Top - SST    Light Blue Top       Medications Given in the Emergency Department:  Medications   sodium chloride 0.9 % bolus 500 mL (0 mL Intravenous Stopped 12/22/23 1016)   ondansetron (ZOFRAN) injection 4 mg (4 mg Intravenous Given 12/22/23 0813)   acetaminophen (OFIRMEV) injection 1,000 mg (1,000 mg Intravenous Given 12/22/23 0900)   piperacillin-tazobactam (ZOSYN) 3.375 g/100 mL 0.9% NS IVPB (mbp) (0 g Intravenous Stopped 12/22/23 0958)   vancomycin IVPB 1500 mg in 0.9% NaCl (Premix) 500 mL (0 mg Intravenous Stopped 12/22/23 1050)   iopamidol (ISOVUE-370) 76 % injection 100 mL (100 mL Intravenous Given 12/22/23 0858)   morphine injection 4 mg (4 mg Intravenous Given 12/22/23 1038)   sodium chloride 0.9 % bolus 500 mL (0 mL Intravenous Stopped 12/22/23 1130)        ED Course:    The patient was initially evaluated in the triage area where orders were placed. The patient was later dispositioned by Yue Jeong PA-C.      The patient was advised to stay for completion of workup which includes but is not limited to communication of labs and radiological results, reassessment and plan. The patient was advised that leaving prior to disposition by a provider could result in critical findings that are not communicated to the patient.     ED Course as of 12/22/23 1645   Fri Dec 22, 2023   0732 SIRS met at 0706, will evaluate the patient prior  to abx, fluids started  [AJ]   1043 Consulted with Dr. Richards, urologist.  She would like me to consult with Dr. Cai who did the nephrectomy procedure.      Consulted with Dr. Cai, she is looked over patient's CT from today.  Fluid collection has significantly decreased, she does not believe his symptoms are coming from infection status post op.  Urine is negative for UTI.  COVID, flu and chest x-ray are negative.  No source of infection noted, most likely viral.    Discussed with nurse to stop IV abx and will order another bolus of fluids and DC home.  [AJ]   1105 Patient's vital signs are stable, he is afebrile and non tachy [AJ]      ED Course User Index  [AJ] Yue Jeong, JESÚS       Labs:    Lab Results (last 24 hours)       Procedure Component Value Units Date/Time    COVID PRE-OP / PRE-PROCEDURE SCREENING ORDER (NO ISOLATION) - Swab, Nasopharynx [[id number]]  (Normal) Collected: 12/22/23 0710    Specimen: Swab from Nasopharynx Updated: 12/22/23 0802    Narrative:      The following orders were created for panel order COVID PRE-OP / PRE-PROCEDURE SCREENING ORDER (NO ISOLATION) - Swab, Nasopharynx.  Procedure                               Abnormality         Status                     ---------                               -----------         ------                     COVID-19, FLU A/B, RSV P...[[id number]]  Normal              Final result                 Please view results for these tests on the individual orders.    COVID-19, FLU A/B, RSV PCR 1 HR TAT - Swab, Nasopharynx [[id number]]  (Normal) Collected: 12/22/23 0710    Specimen: Swab from Nasopharynx Updated: 12/22/23 0802     COVID19 Not Detected     Influenza A PCR Not Detected     Influenza B PCR Not Detected     RSV, PCR Not Detected    Narrative:      Fact sheet for providers: https://www.fda.gov/media/481496/download    Fact sheet for patients: https://www.fda.gov/media/087004/download    Test performed by PCR.    CBC & Differential  [[id number]]  (Abnormal) Collected: 12/22/23 0815    Specimen: Blood Updated: 12/22/23 0823    Narrative:      The following orders were created for panel order CBC & Differential.  Procedure                               Abnormality         Status                     ---------                               -----------         ------                     CBC Auto Differential[[id number]]        Abnormal            Final result                 Please view results for these tests on the individual orders.    Comprehensive Metabolic Panel [[id number]]  (Abnormal) Collected: 12/22/23 0815    Specimen: Blood Updated: 12/22/23 0845     Glucose 160 mg/dL      BUN 16 mg/dL      Creatinine 1.15 mg/dL      Sodium 135 mmol/L      Potassium 3.6 mmol/L      Chloride 97 mmol/L      CO2 23.1 mmol/L      Calcium 9.3 mg/dL      Total Protein 7.8 g/dL      Albumin 4.3 g/dL      ALT (SGPT) 22 U/L      AST (SGOT) 21 U/L      Alkaline Phosphatase 94 U/L      Total Bilirubin 0.5 mg/dL      Globulin 3.5 gm/dL      A/G Ratio 1.2 g/dL      BUN/Creatinine Ratio 13.9     Anion Gap 14.9 mmol/L      eGFR 70.6 mL/min/1.73     Narrative:      GFR Normal >60  Chronic Kidney Disease <60  Kidney Failure <15      Single High Sensitivity Troponin T [[id number]]  (Normal) Collected: 12/22/23 0815    Specimen: Blood Updated: 12/22/23 0845     HS Troponin T 9 ng/L     Narrative:      High Sensitive Troponin T Reference Range:  <14.0 ng/L- Negative Female for AMI  <22.0 ng/L- Negative Male for AMI  >=14 - Abnormal Female indicating possible myocardial injury.  >=22 - Abnormal Male indicating possible myocardial injury.   Clinicians would have to utilize clinical acumen, EKG, Troponin, and serial changes to determine if it is an Acute Myocardial Infarction or myocardial injury due to an underlying chronic condition.         Magnesium [[id number]]  (Abnormal) Collected: 12/22/23 0815    Specimen: Blood Updated: 12/22/23 0845     Magnesium 1.5 mg/dL     CBC Auto  "Differential [[id number]]  (Abnormal) Collected: 12/22/23 0815    Specimen: Blood Updated: 12/22/23 0823     WBC 13.64 10*3/mm3      RBC 4.99 10*6/mm3      Hemoglobin 14.1 g/dL      Hematocrit 43.8 %      MCV 87.8 fL      MCH 28.3 pg      MCHC 32.2 g/dL      RDW 13.0 %      RDW-SD 41.6 fl      MPV 9.0 fL      Platelets 222 10*3/mm3      Neutrophil % 92.3 %      Lymphocyte % 0.1 %      Monocyte % 7.0 %      Eosinophil % 0.1 %      Basophil % 0.1 %      Immature Grans % 0.4 %      Neutrophils, Absolute 12.59 10*3/mm3      Lymphocytes, Absolute 0.01 10*3/mm3      Monocytes, Absolute 0.96 10*3/mm3      Eosinophils, Absolute 0.01 10*3/mm3      Basophils, Absolute 0.02 10*3/mm3      Immature Grans, Absolute 0.05 10*3/mm3      nRBC 0.0 /100 WBC     Blood Culture - Blood, Arm, Right [[id number]] Collected: 12/22/23 0815    Specimen: Blood from Arm, Right Updated: 12/22/23 0819    Blood Culture - Blood, Arm, Left [[id number]] Collected: 12/22/23 0815    Specimen: Blood from Arm, Left Updated: 12/22/23 0820    Lactic Acid, Plasma [[id number]]  (Normal) Collected: 12/22/23 0815    Specimen: Blood Updated: 12/22/23 0842     Lactate 1.5 mmol/L     Procalcitonin [[id number]]  (Abnormal) Collected: 12/22/23 0815    Specimen: Blood Updated: 12/22/23 0852     Procalcitonin 0.48 ng/mL     Narrative:      As a Marker for Sepsis (Non-Neonates):    1. <0.5 ng/mL represents a low risk of severe sepsis and/or septic shock.  2. >2 ng/mL represents a high risk of severe sepsis and/or septic shock.    As a Marker for Lower Respiratory Tract Infections that require antibiotic therapy:    PCT on Admission    Antibiotic Therapy       6-12 Hrs later    >0.5                Strongly Recommended  >0.25 - <0.5        Recommended  0.1 - 0.25          Discouraged              Remeasure/reassess PCT  <0.1                Strongly Discouraged     Remeasure/reassess PCT    As 28 day mortality risk marker: \"Change in Procalcitonin Result\" (>80% or <=80%) if Day " 0 (or Day 1) and Day 4 values are available. Refer to http://www.[facility]-pct-calculator.com    Change in PCT <=80%  A decrease of PCT levels below or equal to 80% defines a positive change in PCT test result representing a higher risk for 28-day all-cause mortality of patients diagnosed with severe sepsis for septic shock.    Change in PCT >80%  A decrease of PCT levels of more than 80% defines a negative change in PCT result representing a lower risk for 28-day all-cause mortality of patients diagnosed with severe sepsis or septic shock.    This test is Prognostic not Diagnostic, if elevated correlate with clinical findings before administering antibiotic treatment.        Urinalysis With Microscopic If Indicated (No Culture) - Urine, Clean Catch [[id number]]  (Abnormal) Collected: 12/22/23 0841    Specimen: Urine, Clean Catch Updated: 12/22/23 0850     Color, UA Yellow     Appearance, UA Clear     pH, UA 6.0     Specific Gravity, UA 1.017     Glucose,  mg/dL (2+)     Ketones, UA 80 mg/dL (3+)     Bilirubin, UA Negative     Blood, UA Negative     Protein, UA Trace     Leuk Esterase, UA Negative     Nitrite, UA Negative     Urobilinogen, UA 0.2 E.U./dL    Narrative:      Urine microscopic not indicated.    BNP [[id number]]  (Normal) Collected: 12/22/23 0841    Specimen: Blood Updated: 12/22/23 0906     proBNP 165.7 pg/mL     Narrative:      This assay is used as an aid in the diagnosis of individuals suspected of having heart failure. It can be used as an aid in the diagnosis of acute decompensated heart failure (ADHF) in patients presenting with signs and symptoms of ADHF to the emergency department (ED). In addition, NT-proBNP of <300 pg/mL indicates ADHF is not likely.    Age Range Result Interpretation  NT-proBNP Concentration (pg/mL:      <50             Positive            >450                   [place]                 300-450                    Negative             <300    50-75           Positive             >900                  [place]                300-900                  Negative            <300      >75             Positive            >1800                  Gray                300-1800                  Negative            <300             Imaging:    CT Abdomen Pelvis With Contrast    Result Date: 12/22/2023  PROCEDURE: CT ABDOMEN PELVIS W CONTRAST  COMPARISON: [facility], CT, CT ABDOMEN PELVIS W WO CONTRAST, 12/06/2023, 14:43.  INDICATIONS: LEFT FLANK PAIN/NAUSEA/FEVER  TECHNIQUE: After obtaining the patient's consent, CT images were created with non-ionic intravenous contrast material.   PROTOCOL:   Standard imaging protocol performed    RADIATION:   DLP: 466.7 mGy*cm   Automated exposure control was utilized to minimize radiation dose. CONTRAST: 90 cc Isovue 370 I.V. LABS:   eGFR: >60 ml/min/1.73m2  FINDINGS:  Heart size normal.  Subpleural atelectasis versus scar in the lung bases.  Normal size and contour of the liver.  Probable mild steatosis.  Portal vasculature, splenic vein and superior mesenteric vein patent.  Gallbladder and biliary tree unremarkable.  Fatty infiltration and atrophy of the pancreas without active inflammation.  Spleen appears unremarkable.  Prominent vasculature at splenic hilum similar to prior exam and nonspecific in isolation.   No adrenal nodule.  Changes of partial left nephrectomy again noted.  Low-density fluid containing small but decreasing gas bubbles appears decreased in size from prior exam measuring 1.4 x 2.9 centimeter in AP and transverse dimension previously 2 x 3.2 centimeter.  This fluid does not appear to have progressively organized in the antrum.  No visualized true rim enhancement.  The adjacent renal parenchyma appears normally enhancing.  Tiny low-density lesions in right kidney likely benign cysts.  No hydronephrosis.  Urinary bladder unremarkable.  Mild prostatomegaly.   Small sliding hiatal hernia.  Mild colonic diverticulosis.  Submucosal  fat deposition throughout the colon suggesting prior inflammation.  No obstruction or active inflammation.  No evidence of acute appendicitis.  Aortic atherosclerotic disease with fusiform infrarenal abdominal aortic aneurysm measuring up to 5.1 x 4.7 centimeter similar to prior measuring 5 x 4.8 centimeter.  Low-density eccentric mural thrombus/hematoma without significant change.  Smoothly dilated common iliac arteries similar to prior.  No overt ascites, new free air or drainable collection.  No suspicious adenopathy.  Prior midline laparotomy.  Small fat containing umbilical hernia.  Degenerative related osseous changes within lumbar spine without acute displaced fracture or aggressive lesion.        1. Mildly decreased fluid collection at the partial left nephrectomy bed measuring 1.4 x 2.9 centimeter (previously 2 x 3.2 centimeter), containing decreasing but persistent small gas bubbles.  Findings favor postoperative seroma/hematoma +/-infection.  There does not appear to be a mature rim enhancing wall to suggest true abscess or drainable collection.  Decrease size argues against urinoma. 2. Grossly stable infrarenal abdominal aortic aneurysm with eccentric mural thrombus/hematoma measuring up to 5.1 centimeter maximally. 3. Hepatic steatosis. 4. Other chronic/ancillary finding similar to recent comparison.      DOMINIK NORTON MD       Electronically Signed and Approved By: DOMINIK NORTON MD on 12/22/2023 at 9:36             XR Chest 1 View    Result Date: 12/22/2023  PROCEDURE: XR CHEST 1 VW  COMPARISON: None  INDICATIONS: DIZZINESS AND WEAKNESS  FINDINGS:  Heart size and pulmonary vasculature within normal limits.  No infiltrate or edema.  Atelectasis in the left lung base.  Scarring in the left lower lung.  Calcified granuloma in the left mid lung.       No active cardiopulmonary disease       BERNIE SEXTON MD       Electronically Signed and Approved By: BERNIE SEXTON MD on 12/22/2023 at 7:25                 Differential Diagnosis and Discussion:      Dizziness: Based on the patient's history, signs, and symptoms, the diffential diagnosis includes but is not limited to meningitis, stroke, sepsis, subarachnoid hemorrhage, intracranial bleeding, encephalitis, vertigo, electrolyte imbalance, and metabolic disorders.  Fever: Based on the complaint of fever, differential diagnosis includes but is not limited to meningitis, pneumonia, pyelonephritis, acute uti,  systemic immune response syndrome, sepsis, viral syndrome, fungal infection, tick born illness and other bacterial infections.  Vomiting: Differential diagnosis includes but is not limited to migraine, labyrinthine disorders, psychogenic, metabolic and endocrine causes, peptic ulcer, gastric outlet obstruction, gastritis, gastroenteritis, appendicitis, intestinal obstruction, paralytic ileus, food poisoning, cholecystitis, acute hepatitis, acute pancreatitis, acute febrile illness, and myocardial infarction.    All labs were reviewed and interpreted by me.  All X-rays impressions were independently interpreted by me.  EKG was interpreted by me.  CT scan radiology impression was interpreted by me.    MDM     Amount and/or Complexity of Data Reviewed  Clinical lab tests: reviewed  Tests in the radiology section of CPT®: reviewed  Tests in the medicine section of CPT®: reviewed  Decide to obtain previous medical records or to obtain history from someone other than the patient: yes         Critical Care Note: Total Critical Care time of 35 minutes. Total critical care time documented does not include time spent on separately billed procedures for services of nurses or physician assistants. I personally saw and examined the patient. I have reviewed all diagnostic interpretations and treatment plans as written. I was present for the key portions of any procedures performed and the inclusive time noted in any critical care statement. Critical care time includes patient  management by me, time spent at the patients bedside,  time to review lab and imaging results, discussing patient care, documentation in the medical record, and time spent with family or caregiver.        Patient Care Considerations:    SEPSIS IS NOT PRESENT IN THE EMERGENCY DEPARTMENT: Patient meets SIRS criteria in the emergency department however the patient does not have a known source of bacterial infection to confirm the diagnosis of sepsis.   ANTIBIOTICS: I considered prescribing antibiotics as an outpatient however no bacterial focus of infection was found.      Consultants/Shared Management Plan:    Consultant: I have discussed the case with Dr Cai, urologist  who states she has reviewed patient CT from today, fluid collection has significantly decreased from prior imaging.  She does not believe the source of infection is status postop nephrectomy.    Social Determinants of Health:    Patient has presented with family members who are responsible, reliable and will ensure follow up care.      Disposition and Care Coordination:    Discharged: I considered escalation of care by admitting this patient to the hospital, however the patient has improved and is suitable and stable for discharge.    I have explained the patient´s condition, diagnoses and treatment plan based on the information available to me at this time. I have answered questions and addressed any concerns. The patient has a good  understanding of the patient´s diagnosis, condition, and treatment plan as can be expected at this point. The vital signs have been stable. The patient´s condition is stable and appropriate for discharge from the emergency department.      The patient will pursue further outpatient evaluation with the primary care physician or other designated or consulting physician as outlined in the discharge instructions. They are agreeable to this plan of care and follow-up instructions have been explained in detail. The patient  has received these instructions in written format and have expressed an understanding of the discharge instructions. The patient is aware that any significant change in condition or worsening of symptoms should prompt an immediate return to this or the closest emergency department or call to 911.  I have explained discharge medications and the need for follow up with the patient/caretakers. This was also printed in the discharge instructions. Patient was discharged with the following medications and follow up:      Medication List      No changes were made to your prescriptions during this visit.      Kodi Pichardo MD  [address] [facility] [place] [address]  [phone]    In 3 days         Final diagnoses:   Fever in adult   Acute viral syndrome        ED Disposition       ED Disposition   Discharge    Condition   Stable    Comment   --               This medical record created using voice recognition software.             Yue Jeong PA-C  12/22/23 2956

## 2023-12-23 ENCOUNTER — APPOINTMENT (OUTPATIENT)
Dept: CT IMAGING | Facility: HOSPITAL | Age: 63
DRG: 268 | End: 2023-12-23
Payer: COMMERCIAL

## 2023-12-23 ENCOUNTER — APPOINTMENT (OUTPATIENT)
Dept: GENERAL RADIOLOGY | Facility: HOSPITAL | Age: 63
DRG: 268 | End: 2023-12-23
Payer: COMMERCIAL

## 2023-12-23 ENCOUNTER — HOSPITAL ENCOUNTER (INPATIENT)
Facility: HOSPITAL | Age: 63
LOS: 20 days | Discharge: NURSING, REHAB OR LONG-TERM CARE | DRG: 268 | End: 2024-01-12
Attending: EMERGENCY MEDICINE | Admitting: STUDENT IN AN ORGANIZED HEALTH CARE EDUCATION/TRAINING PROGRAM
Payer: COMMERCIAL

## 2023-12-23 DIAGNOSIS — Z86.79 S/P AAA (ABDOMINAL AORTIC ANEURYSM) REPAIR: ICD-10-CM

## 2023-12-23 DIAGNOSIS — R50.9 FEVER, UNSPECIFIED FEVER CAUSE: ICD-10-CM

## 2023-12-23 DIAGNOSIS — A41.9 SEPSIS, DUE TO UNSPECIFIED ORGANISM, UNSPECIFIED WHETHER ACUTE ORGAN DYSFUNCTION PRESENT: Primary | ICD-10-CM

## 2023-12-23 DIAGNOSIS — R26.2 DIFFICULTY WALKING: ICD-10-CM

## 2023-12-23 DIAGNOSIS — I71.40 ABDOMINAL AORTIC ANEURYSM (AAA) WITHOUT RUPTURE, UNSPECIFIED PART: ICD-10-CM

## 2023-12-23 DIAGNOSIS — Z98.890 S/P AAA (ABDOMINAL AORTIC ANEURYSM) REPAIR: ICD-10-CM

## 2023-12-23 DIAGNOSIS — I71.33 RUPTURED INFRARENAL ABDOMINAL AORTIC ANEURYSM (AAA): ICD-10-CM

## 2023-12-23 DIAGNOSIS — I82.4Z9 DVT, LOWER EXTREMITY, DISTAL, ACUTE, UNSPECIFIED LATERALITY: ICD-10-CM

## 2023-12-23 DIAGNOSIS — R13.12 DYSPHAGIA, OROPHARYNGEAL: ICD-10-CM

## 2023-12-23 DIAGNOSIS — Z78.9 DECREASED ACTIVITIES OF DAILY LIVING (ADL): ICD-10-CM

## 2023-12-23 DIAGNOSIS — N17.9 AKI (ACUTE KIDNEY INJURY): ICD-10-CM

## 2023-12-23 DIAGNOSIS — R18.8 INTRA-ABDOMINAL FLUID COLLECTION: ICD-10-CM

## 2023-12-23 PROBLEM — R10.9 LEFT FLANK PAIN: Status: ACTIVE | Noted: 2023-12-23

## 2023-12-23 LAB
ALBUMIN SERPL-MCNC: 3.6 G/DL (ref 3.5–5.2)
ALBUMIN/GLOB SERPL: 1.1 G/DL
ALP SERPL-CCNC: 112 U/L (ref 39–117)
ALT SERPL W P-5'-P-CCNC: 54 U/L (ref 1–41)
ANION GAP SERPL CALCULATED.3IONS-SCNC: 16.8 MMOL/L (ref 5–15)
AST SERPL-CCNC: 43 U/L (ref 1–40)
BACTERIA UR QL AUTO: ABNORMAL /HPF
BASOPHILS # BLD AUTO: 0.02 10*3/MM3 (ref 0–0.2)
BASOPHILS NFR BLD AUTO: 0.2 % (ref 0–1.5)
BILIRUB SERPL-MCNC: 0.8 MG/DL (ref 0–1.2)
BILIRUB UR QL STRIP: NEGATIVE
BUN SERPL-MCNC: 13 MG/DL (ref 8–23)
BUN/CREAT SERPL: 15.5 (ref 7–25)
CALCIUM SPEC-SCNC: 8.4 MG/DL (ref 8.6–10.5)
CHLORIDE SERPL-SCNC: 96 MMOL/L (ref 98–107)
CLARITY UR: CLEAR
CO2 SERPL-SCNC: 20.2 MMOL/L (ref 22–29)
COLOR UR: YELLOW
CREAT SERPL-MCNC: 0.84 MG/DL (ref 0.76–1.27)
D-LACTATE SERPL-SCNC: 1.2 MMOL/L (ref 0.5–2)
DEPRECATED RDW RBC AUTO: 41.9 FL (ref 37–54)
EGFRCR SERPLBLD CKD-EPI 2021: 96.8 ML/MIN/1.73
EOSINOPHIL # BLD AUTO: 0 10*3/MM3 (ref 0–0.4)
EOSINOPHIL NFR BLD AUTO: 0 % (ref 0.3–6.2)
ERYTHROCYTE [DISTWIDTH] IN BLOOD BY AUTOMATED COUNT: 13.1 % (ref 12.3–15.4)
FLUAV SUBTYP SPEC NAA+PROBE: NOT DETECTED
FLUBV RNA ISLT QL NAA+PROBE: NOT DETECTED
GLOBULIN UR ELPH-MCNC: 3.2 GM/DL
GLUCOSE SERPL-MCNC: 109 MG/DL (ref 65–99)
GLUCOSE UR STRIP-MCNC: ABNORMAL MG/DL
HCT VFR BLD AUTO: 40.6 % (ref 37.5–51)
HGB BLD-MCNC: 13 G/DL (ref 13–17.7)
HGB UR QL STRIP.AUTO: ABNORMAL
HOLD SPECIMEN: NORMAL
HOLD SPECIMEN: NORMAL
HYALINE CASTS UR QL AUTO: ABNORMAL /LPF
IMM GRANULOCYTES # BLD AUTO: 0.03 10*3/MM3 (ref 0–0.05)
IMM GRANULOCYTES NFR BLD AUTO: 0.3 % (ref 0–0.5)
KETONES UR QL STRIP: ABNORMAL
LEUKOCYTE ESTERASE UR QL STRIP.AUTO: NEGATIVE
LYMPHOCYTES # BLD AUTO: 0.43 10*3/MM3 (ref 0.7–3.1)
LYMPHOCYTES NFR BLD AUTO: 3.8 % (ref 19.6–45.3)
MCH RBC QN AUTO: 28.1 PG (ref 26.6–33)
MCHC RBC AUTO-ENTMCNC: 32 G/DL (ref 31.5–35.7)
MCV RBC AUTO: 87.7 FL (ref 79–97)
MONOCYTES # BLD AUTO: 0.69 10*3/MM3 (ref 0.1–0.9)
MONOCYTES NFR BLD AUTO: 6.1 % (ref 5–12)
NEUTROPHILS NFR BLD AUTO: 10.15 10*3/MM3 (ref 1.7–7)
NEUTROPHILS NFR BLD AUTO: 89.6 % (ref 42.7–76)
NITRITE UR QL STRIP: NEGATIVE
NRBC BLD AUTO-RTO: 0 /100 WBC (ref 0–0.2)
PH UR STRIP.AUTO: 5.5 [PH] (ref 5–8)
PLATELET # BLD AUTO: 188 10*3/MM3 (ref 140–450)
PMV BLD AUTO: 9.7 FL (ref 6–12)
POTASSIUM SERPL-SCNC: 3.4 MMOL/L (ref 3.5–5.2)
PROT SERPL-MCNC: 6.8 G/DL (ref 6–8.5)
PROT UR QL STRIP: ABNORMAL
RBC # BLD AUTO: 4.63 10*6/MM3 (ref 4.14–5.8)
RBC # UR STRIP: ABNORMAL /HPF
REF LAB TEST METHOD: ABNORMAL
RSV RNA NPH QL NAA+NON-PROBE: NOT DETECTED
SARS-COV-2 RNA RESP QL NAA+PROBE: NOT DETECTED
SODIUM SERPL-SCNC: 133 MMOL/L (ref 136–145)
SP GR UR STRIP: 1.02 (ref 1–1.03)
SQUAMOUS #/AREA URNS HPF: ABNORMAL /HPF
UROBILINOGEN UR QL STRIP: ABNORMAL
VANCOMYCIN SERPL-MCNC: <4 MCG/ML (ref 5–40)
WBC # UR STRIP: ABNORMAL /HPF
WBC NRBC COR # BLD AUTO: 11.32 10*3/MM3 (ref 3.4–10.8)
WHOLE BLOOD HOLD COAG: NORMAL
WHOLE BLOOD HOLD SPECIMEN: NORMAL

## 2023-12-23 PROCEDURE — 36415 COLL VENOUS BLD VENIPUNCTURE: CPT

## 2023-12-23 PROCEDURE — 83605 ASSAY OF LACTIC ACID: CPT

## 2023-12-23 PROCEDURE — 71045 X-RAY EXAM CHEST 1 VIEW: CPT

## 2023-12-23 PROCEDURE — 25010000002 CEFEPIME PER 500 MG: Performed by: EMERGENCY MEDICINE

## 2023-12-23 PROCEDURE — 85025 COMPLETE CBC W/AUTO DIFF WBC: CPT

## 2023-12-23 PROCEDURE — 99223 1ST HOSP IP/OBS HIGH 75: CPT | Performed by: STUDENT IN AN ORGANIZED HEALTH CARE EDUCATION/TRAINING PROGRAM

## 2023-12-23 PROCEDURE — 99291 CRITICAL CARE FIRST HOUR: CPT

## 2023-12-23 PROCEDURE — 87637 SARSCOV2&INF A&B&RSV AMP PRB: CPT | Performed by: EMERGENCY MEDICINE

## 2023-12-23 PROCEDURE — 74177 CT ABD & PELVIS W/CONTRAST: CPT

## 2023-12-23 PROCEDURE — 80053 COMPREHEN METABOLIC PANEL: CPT | Performed by: EMERGENCY MEDICINE

## 2023-12-23 PROCEDURE — 25010000002 HYDROMORPHONE 1 MG/ML SOLUTION: Performed by: EMERGENCY MEDICINE

## 2023-12-23 PROCEDURE — 80202 ASSAY OF VANCOMYCIN: CPT | Performed by: EMERGENCY MEDICINE

## 2023-12-23 PROCEDURE — 25010000002 VANCOMYCIN 5 G RECONSTITUTED SOLUTION: Performed by: EMERGENCY MEDICINE

## 2023-12-23 PROCEDURE — 25510000001 IOPAMIDOL PER 1 ML: Performed by: EMERGENCY MEDICINE

## 2023-12-23 PROCEDURE — 81001 URINALYSIS AUTO W/SCOPE: CPT

## 2023-12-23 PROCEDURE — 87040 BLOOD CULTURE FOR BACTERIA: CPT | Performed by: EMERGENCY MEDICINE

## 2023-12-23 PROCEDURE — 25010000002 ONDANSETRON PER 1 MG

## 2023-12-23 PROCEDURE — 25810000003 SODIUM CHLORIDE 0.9 % SOLUTION: Performed by: EMERGENCY MEDICINE

## 2023-12-23 RX ORDER — ONDANSETRON 2 MG/ML
4 INJECTION INTRAMUSCULAR; INTRAVENOUS ONCE
Status: COMPLETED | OUTPATIENT
Start: 2023-12-23 | End: 2023-12-23

## 2023-12-23 RX ORDER — ACETAMINOPHEN 325 MG/1
975 TABLET ORAL ONCE
Status: COMPLETED | OUTPATIENT
Start: 2023-12-23 | End: 2023-12-23

## 2023-12-23 RX ORDER — ALUMINA, MAGNESIA, AND SIMETHICONE 2400; 2400; 240 MG/30ML; MG/30ML; MG/30ML
15 SUSPENSION ORAL ONCE
Status: COMPLETED | OUTPATIENT
Start: 2023-12-23 | End: 2023-12-23

## 2023-12-23 RX ORDER — VANCOMYCIN/0.9 % SOD CHLORIDE 1.5G/250ML
20 PLASTIC BAG, INJECTION (ML) INTRAVENOUS ONCE
Status: COMPLETED | OUTPATIENT
Start: 2023-12-23 | End: 2023-12-24

## 2023-12-23 RX ADMIN — HYDROMORPHONE HYDROCHLORIDE 1 MG: 1 INJECTION, SOLUTION INTRAMUSCULAR; INTRAVENOUS; SUBCUTANEOUS at 23:27

## 2023-12-23 RX ADMIN — VANCOMYCIN HYDROCHLORIDE 1500 MG: 5 INJECTION, POWDER, LYOPHILIZED, FOR SOLUTION INTRAVENOUS at 23:32

## 2023-12-23 RX ADMIN — ACETAMINOPHEN 975 MG: 325 TABLET ORAL at 20:03

## 2023-12-23 RX ADMIN — SODIUM CHLORIDE 1000 ML: 9 INJECTION, SOLUTION INTRAVENOUS at 22:05

## 2023-12-23 RX ADMIN — ONDANSETRON 4 MG: 2 INJECTION INTRAMUSCULAR; INTRAVENOUS at 20:03

## 2023-12-23 RX ADMIN — IOPAMIDOL 100 ML: 755 INJECTION, SOLUTION INTRAVENOUS at 21:53

## 2023-12-23 RX ADMIN — HYDROMORPHONE HYDROCHLORIDE 1 MG: 1 INJECTION, SOLUTION INTRAMUSCULAR; INTRAVENOUS; SUBCUTANEOUS at 21:41

## 2023-12-23 RX ADMIN — CEFEPIME 2000 MG: 2 INJECTION, POWDER, FOR SOLUTION INTRAVENOUS at 22:05

## 2023-12-23 RX ADMIN — ALUMINUM HYDROXIDE, MAGNESIUM HYDROXIDE, AND DIMETHICONE 15 ML: 400; 400; 40 SUSPENSION ORAL at 21:41

## 2023-12-23 NOTE — Clinical Note
Hemostasis started on the right femoral vein. Manual pressure applied to vessel. Manual pressure was held by Dr. Collins. Manual pressure was held for 7 min. Hemostasis achieved successfully.

## 2023-12-23 NOTE — Clinical Note
Level of Care: Telemetry [5]   Diagnosis: Left flank pain [965455]   Certification: I Certify That Inpatient Hospital Services Are Medically Necessary For Greater Than 2 Midnights

## 2023-12-24 ENCOUNTER — APPOINTMENT (OUTPATIENT)
Dept: GENERAL RADIOLOGY | Facility: HOSPITAL | Age: 63
DRG: 268 | End: 2023-12-24
Payer: COMMERCIAL

## 2023-12-24 ENCOUNTER — APPOINTMENT (OUTPATIENT)
Dept: CARDIOLOGY | Facility: HOSPITAL | Age: 63
DRG: 268 | End: 2023-12-24
Payer: COMMERCIAL

## 2023-12-24 ENCOUNTER — ANESTHESIA EVENT (OUTPATIENT)
Dept: PERIOP | Facility: HOSPITAL | Age: 63
End: 2023-12-24
Payer: COMMERCIAL

## 2023-12-24 ENCOUNTER — APPOINTMENT (OUTPATIENT)
Dept: CT IMAGING | Facility: HOSPITAL | Age: 63
DRG: 268 | End: 2023-12-24
Payer: COMMERCIAL

## 2023-12-24 ENCOUNTER — ANESTHESIA (OUTPATIENT)
Dept: PERIOP | Facility: HOSPITAL | Age: 63
End: 2023-12-24
Payer: COMMERCIAL

## 2023-12-24 PROBLEM — I71.33 RUPTURED INFRARENAL ABDOMINAL AORTIC ANEURYSM (AAA): Status: ACTIVE | Noted: 2023-12-23

## 2023-12-24 LAB
ABO GROUP BLD: NORMAL
ALBUMIN SERPL-MCNC: 3.3 G/DL (ref 3.5–5.2)
ALBUMIN/GLOB SERPL: 1.1 G/DL
ALP SERPL-CCNC: 105 U/L (ref 39–117)
ALT SERPL W P-5'-P-CCNC: 47 U/L (ref 1–41)
ANION GAP SERPL CALCULATED.3IONS-SCNC: 11.7 MMOL/L (ref 5–15)
ANION GAP SERPL CALCULATED.3IONS-SCNC: 16.1 MMOL/L (ref 5–15)
APTT PPP: 24.6 SECONDS (ref 24.2–34.2)
APTT PPP: >200 SECONDS (ref 24.2–34.2)
ARTERIAL PATENCY WRIST A: ABNORMAL
ARTERIAL PATENCY WRIST A: ABNORMAL
ARTERIAL PATENCY WRIST A: POSITIVE
AST SERPL-CCNC: 31 U/L (ref 1–40)
BASE EXCESS BLDA CALC-SCNC: -15.5 MMOL/L (ref -2–2)
BASE EXCESS BLDA CALC-SCNC: -15.6 MMOL/L (ref -2–2)
BASE EXCESS BLDA CALC-SCNC: -17.1 MMOL/L (ref -2–2)
BASE EXCESS BLDA CALC-SCNC: -6.5 MMOL/L (ref -2–2)
BASE EXCESS BLDA CALC-SCNC: -7 MMOL/L (ref -2–2)
BASE EXCESS BLDA CALC-SCNC: -8.6 MMOL/L (ref -2–2)
BASOPHILS # BLD AUTO: 0.03 10*3/MM3 (ref 0–0.2)
BASOPHILS # BLD AUTO: 0.05 10*3/MM3 (ref 0–0.2)
BASOPHILS # BLD AUTO: NORMAL 10*3/UL
BASOPHILS NFR BLD AUTO: 0.2 % (ref 0–1.5)
BASOPHILS NFR BLD AUTO: 0.2 % (ref 0–1.5)
BASOPHILS NFR BLD AUTO: NORMAL %
BDY SITE: ABNORMAL
BILIRUB SERPL-MCNC: 0.6 MG/DL (ref 0–1.2)
BUN SERPL-MCNC: 11 MG/DL (ref 8–23)
BUN SERPL-MCNC: 9 MG/DL (ref 8–23)
BUN/CREAT SERPL: 11.8 (ref 7–25)
BUN/CREAT SERPL: 12 (ref 7–25)
BURR CELLS BLD QL SMEAR: NORMAL
CA-I BLDA-SCNC: 0.68 MMOL/L (ref 1.13–1.32)
CA-I BLDA-SCNC: 0.69 MMOL/L (ref 1.13–1.32)
CA-I BLDA-SCNC: 0.95 MMOL/L (ref 1.13–1.32)
CA-I BLDA-SCNC: 1.12 MMOL/L (ref 1.13–1.32)
CA-I BLDA-SCNC: 1.16 MMOL/L (ref 1.13–1.32)
CA-I BLDA-SCNC: 1.17 MMOL/L (ref 1.13–1.32)
CALCIUM SPEC-SCNC: 7.9 MG/DL (ref 8.6–10.5)
CALCIUM SPEC-SCNC: 9.3 MG/DL (ref 8.6–10.5)
CHLORIDE BLDA-SCNC: 100 MMOL/L (ref 98–106)
CHLORIDE BLDA-SCNC: 100 MMOL/L (ref 98–106)
CHLORIDE BLDA-SCNC: 101 MMOL/L (ref 98–106)
CHLORIDE BLDA-SCNC: 117 MMOL/L (ref 98–106)
CHLORIDE BLDA-SCNC: 99 MMOL/L (ref 98–106)
CHLORIDE BLDA-SCNC: 99 MMOL/L (ref 98–106)
CHLORIDE SERPL-SCNC: 96 MMOL/L (ref 98–107)
CHLORIDE SERPL-SCNC: 98 MMOL/L (ref 98–107)
CO2 SERPL-SCNC: 20.9 MMOL/L (ref 22–29)
CO2 SERPL-SCNC: 23.3 MMOL/L (ref 22–29)
COHGB MFR BLD: 0 % (ref 0–1.5)
COHGB MFR BLD: 0.3 % (ref 0–1.5)
CREAT SERPL-MCNC: 0.76 MG/DL (ref 0.76–1.27)
CREAT SERPL-MCNC: 0.92 MG/DL (ref 0.76–1.27)
D DIMER PPP FEU-MCNC: 19.79 MCGFEU/ML (ref 0–0.65)
D-LACTATE SERPL-SCNC: 11.2 MMOL/L (ref 0.5–2)
D-LACTATE SERPL-SCNC: 7.7 MMOL/L (ref 0.5–2)
DEPRECATED RDW RBC AUTO: 42 FL (ref 37–54)
DEPRECATED RDW RBC AUTO: 44 FL (ref 37–54)
DEPRECATED RDW RBC AUTO: NORMAL FL
EGFRCR SERPLBLD CKD-EPI 2021: 92.3 ML/MIN/1.73
EGFRCR SERPLBLD CKD-EPI 2021: 99.7 ML/MIN/1.73
EOSINOPHIL # BLD AUTO: 0.01 10*3/MM3 (ref 0–0.4)
EOSINOPHIL # BLD AUTO: 0.02 10*3/MM3 (ref 0–0.4)
EOSINOPHIL # BLD AUTO: NORMAL 10*3/UL
EOSINOPHIL NFR BLD AUTO: 0.1 % (ref 0.3–6.2)
EOSINOPHIL NFR BLD AUTO: 0.1 % (ref 0.3–6.2)
EOSINOPHIL NFR BLD AUTO: NORMAL %
ERYTHROCYTE [DISTWIDTH] IN BLOOD BY AUTOMATED COUNT: 13 % (ref 12.3–15.4)
ERYTHROCYTE [DISTWIDTH] IN BLOOD BY AUTOMATED COUNT: 13.1 % (ref 12.3–15.4)
ERYTHROCYTE [DISTWIDTH] IN BLOOD BY AUTOMATED COUNT: NORMAL %
FHHB: 1.6 % (ref 0–5)
FHHB: 1.8 % (ref 0–5)
FHHB: 2 % (ref 0–5)
FHHB: 3.1 % (ref 0–5)
FHHB: 3.2 % (ref 0–5)
FHHB: 7.3 % (ref 0–5)
FIBRINOGEN PPP-MCNC: 203 MG/DL (ref 215–521)
GAS FLOW AIRWAY: 3 LPM
GAS FLOW AIRWAY: ABNORMAL L/MIN
GAS FLOW AIRWAY: ABNORMAL L/MIN
GEN 5 2HR TROPONIN T REFLEX: 467 NG/L
GLOBULIN UR ELPH-MCNC: 3 GM/DL
GLUCOSE BLDA-MCNC: 131 MG/DL (ref 70–99)
GLUCOSE BLDA-MCNC: 142 MG/DL (ref 70–99)
GLUCOSE BLDA-MCNC: 145 MG/DL (ref 70–99)
GLUCOSE BLDA-MCNC: 179 MG/DL (ref 70–99)
GLUCOSE BLDA-MCNC: 187 MG/DL (ref 70–99)
GLUCOSE BLDA-MCNC: 217 MG/DL (ref 70–99)
GLUCOSE BLDC GLUCOMTR-MCNC: 107 MG/DL (ref 70–99)
GLUCOSE BLDC GLUCOMTR-MCNC: 128 MG/DL (ref 70–99)
GLUCOSE BLDC GLUCOMTR-MCNC: 136 MG/DL (ref 70–99)
GLUCOSE SERPL-MCNC: 128 MG/DL (ref 65–99)
GLUCOSE SERPL-MCNC: 188 MG/DL (ref 65–99)
HCO3 BLDA-SCNC: 11 MMOL/L (ref 22–26)
HCO3 BLDA-SCNC: 11.6 MMOL/L (ref 22–26)
HCO3 BLDA-SCNC: 12.7 MMOL/L (ref 22–26)
HCO3 BLDA-SCNC: 15.8 MMOL/L (ref 22–26)
HCO3 BLDA-SCNC: 17.7 MMOL/L (ref 22–26)
HCO3 BLDA-SCNC: 21.2 MMOL/L (ref 22–26)
HCT VFR BLD AUTO: 29 % (ref 37.5–51)
HCT VFR BLD AUTO: 37.5 % (ref 37.5–51)
HCT VFR BLD AUTO: NORMAL %
HGB BLD-MCNC: 12 G/DL (ref 13–17.7)
HGB BLD-MCNC: 9.1 G/DL (ref 13–17.7)
HGB BLD-MCNC: NORMAL G/DL
HGB BLDA-MCNC: 11.6 G/DL (ref 13.8–16.4)
HGB BLDA-MCNC: 4.6 G/DL (ref 13.8–16.4)
HGB BLDA-MCNC: 5.3 G/DL (ref 13.8–16.4)
HGB BLDA-MCNC: 5.5 G/DL (ref 13.8–16.4)
HGB BLDA-MCNC: 7 G/DL (ref 13.8–16.4)
HGB BLDA-MCNC: 9.4 G/DL (ref 13.8–16.4)
IMM GRANULOCYTES # BLD AUTO: 0.08 10*3/MM3 (ref 0–0.05)
IMM GRANULOCYTES # BLD AUTO: 0.12 10*3/MM3 (ref 0–0.05)
IMM GRANULOCYTES NFR BLD AUTO: 0.6 % (ref 0–0.5)
IMM GRANULOCYTES NFR BLD AUTO: 0.6 % (ref 0–0.5)
INHALED O2 CONCENTRATION: 100 %
INHALED O2 CONCENTRATION: 32 %
INHALED O2 CONCENTRATION: 40 %
INHALED O2 CONCENTRATION: 42 %
INHALED O2 CONCENTRATION: 42 %
INHALED O2 CONCENTRATION: ABNORMAL %
INR PPP: 1.42 (ref 0.86–1.15)
INR PPP: 4.53 (ref 0.86–1.15)
LACTATE BLDA-SCNC: 10.66 MMOL/L (ref 0.5–2)
LACTATE BLDA-SCNC: 11.18 MMOL/L (ref 0.5–2)
LACTATE BLDA-SCNC: 12.89 MMOL/L (ref 0.5–2)
LACTATE BLDA-SCNC: 5.17 MMOL/L (ref 0.5–2)
LACTATE BLDA-SCNC: 5.38 MMOL/L (ref 0.5–2)
LACTATE BLDA-SCNC: 8.18 MMOL/L (ref 0.5–2)
LYMPHOCYTES # BLD AUTO: 0.64 10*3/MM3 (ref 0.7–3.1)
LYMPHOCYTES # BLD AUTO: 2.48 10*3/MM3 (ref 0.7–3.1)
LYMPHOCYTES # BLD AUTO: NORMAL 10*3/UL
LYMPHOCYTES NFR BLD AUTO: 12.1 % (ref 19.6–45.3)
LYMPHOCYTES NFR BLD AUTO: 4.9 % (ref 19.6–45.3)
LYMPHOCYTES NFR BLD AUTO: NORMAL %
MAGNESIUM SERPL-MCNC: 1.7 MG/DL (ref 1.6–2.4)
MAGNESIUM SERPL-MCNC: 2 MG/DL (ref 1.6–2.4)
MCH RBC QN AUTO: 28.3 PG (ref 26.6–33)
MCH RBC QN AUTO: 28.6 PG (ref 26.6–33)
MCH RBC QN AUTO: NORMAL PG
MCHC RBC AUTO-ENTMCNC: 31.4 G/DL (ref 31.5–35.7)
MCHC RBC AUTO-ENTMCNC: 32 G/DL (ref 31.5–35.7)
MCHC RBC AUTO-ENTMCNC: NORMAL G/DL
MCV RBC AUTO: 88.4 FL (ref 79–97)
MCV RBC AUTO: 91.2 FL (ref 79–97)
MCV RBC AUTO: NORMAL FL
METHGB BLD QL: 0.2 % (ref 0–1.5)
METHGB BLD QL: 0.3 % (ref 0–1.5)
METHGB BLD QL: 0.4 % (ref 0–1.5)
METHGB BLD QL: 0.6 % (ref 0–1.5)
METHGB BLD QL: 0.7 % (ref 0–1.5)
METHGB BLD QL: 1.1 % (ref 0–1.5)
MODALITY: ABNORMAL
MONOCYTES # BLD AUTO: 1.02 10*3/MM3 (ref 0.1–0.9)
MONOCYTES # BLD AUTO: 2.55 10*3/MM3 (ref 0.1–0.9)
MONOCYTES # BLD AUTO: NORMAL 10*3/UL
MONOCYTES NFR BLD AUTO: 12.5 % (ref 5–12)
MONOCYTES NFR BLD AUTO: 7.8 % (ref 5–12)
MONOCYTES NFR BLD AUTO: NORMAL %
NEUTROPHILS NFR BLD AUTO: 11.25 10*3/MM3 (ref 1.7–7)
NEUTROPHILS NFR BLD AUTO: 15.22 10*3/MM3 (ref 1.7–7)
NEUTROPHILS NFR BLD AUTO: 74.5 % (ref 42.7–76)
NEUTROPHILS NFR BLD AUTO: 86.4 % (ref 42.7–76)
NEUTROPHILS NFR BLD AUTO: NORMAL %
NEUTROPHILS NFR BLD AUTO: NORMAL %
NOTE: ABNORMAL
NRBC BLD AUTO-RTO: 0 /100 WBC (ref 0–0.2)
NRBC BLD AUTO-RTO: 0 /100 WBC (ref 0–0.2)
NT-PROBNP SERPL-MCNC: 1898 PG/ML (ref 0–900)
OXYHGB MFR BLDV: 91.6 % (ref 94–99)
OXYHGB MFR BLDV: 95.8 % (ref 94–99)
OXYHGB MFR BLDV: 96.4 % (ref 94–99)
OXYHGB MFR BLDV: 97.3 % (ref 94–99)
OXYHGB MFR BLDV: 97.5 % (ref 94–99)
OXYHGB MFR BLDV: 97.6 % (ref 94–99)
PCO2 BLDA: 23.1 MM HG (ref 35–45)
PCO2 BLDA: 33.1 MM HG (ref 35–45)
PCO2 BLDA: 36.1 MM HG (ref 35–45)
PCO2 BLDA: 39.6 MM HG (ref 35–45)
PCO2 BLDA: 42.5 MM HG (ref 35–45)
PCO2 BLDA: 60.4 MM HG (ref 35–45)
PH BLDA: 7.09 PH UNITS (ref 7.35–7.45)
PH BLDA: 7.1 PH UNITS (ref 7.35–7.45)
PH BLDA: 7.16 PH UNITS (ref 7.35–7.45)
PH BLDA: 7.16 PH UNITS (ref 7.35–7.45)
PH BLDA: 7.27 PH UNITS (ref 7.35–7.45)
PH BLDA: 7.45 PH UNITS (ref 7.35–7.45)
PHOSPHATE SERPL-MCNC: 1.8 MG/DL (ref 2.5–4.5)
PHOSPHATE SERPL-MCNC: 7.1 MG/DL (ref 2.5–4.5)
PLAT MORPH BLD: NORMAL
PLATELET # BLD AUTO: 171 10*3/MM3 (ref 140–450)
PLATELET # BLD AUTO: 263 10*3/MM3 (ref 140–450)
PLATELET # BLD AUTO: NORMAL 10*3/UL
PMV BLD AUTO: 9.1 FL (ref 6–12)
PMV BLD AUTO: 9.5 FL (ref 6–12)
PMV BLD AUTO: NORMAL FL
PO2 BLD: 279 MM[HG] (ref 0–500)
PO2 BLD: 287 MM[HG] (ref 0–500)
PO2 BLD: 338 MM[HG] (ref 0–500)
PO2 BLD: 476 MM[HG] (ref 0–500)
PO2 BLD: 664 MM[HG] (ref 0–500)
PO2 BLD: ABNORMAL MM[HG]
PO2 BLDA: 120.5 MM HG (ref 80–100)
PO2 BLDA: 135.3 MM HG (ref 80–100)
PO2 BLDA: 181.7 MM HG (ref 80–100)
PO2 BLDA: 278.9 MM HG (ref 80–100)
PO2 BLDA: 475.9 MM HG (ref 80–100)
PO2 BLDA: 89.2 MM HG (ref 80–100)
POIKILOCYTOSIS BLD QL SMEAR: NORMAL
POTASSIUM BLDA-SCNC: 2.27 MMOL/L (ref 3.5–5)
POTASSIUM BLDA-SCNC: 3.43 MMOL/L (ref 3.5–5)
POTASSIUM BLDA-SCNC: 3.77 MMOL/L (ref 3.5–5)
POTASSIUM BLDA-SCNC: 4.29 MMOL/L (ref 3.5–5)
POTASSIUM BLDA-SCNC: 4.61 MMOL/L (ref 3.5–5)
POTASSIUM BLDA-SCNC: 4.81 MMOL/L (ref 3.5–5)
POTASSIUM SERPL-SCNC: 4.2 MMOL/L (ref 3.5–5.2)
POTASSIUM SERPL-SCNC: 4.5 MMOL/L (ref 3.5–5.2)
PROCALCITONIN SERPL-MCNC: 1.32 NG/ML (ref 0–0.25)
PROT SERPL-MCNC: 6.3 G/DL (ref 6–8.5)
PROTHROMBIN TIME: 17.6 SECONDS (ref 11.8–14.9)
PROTHROMBIN TIME: 43.6 SECONDS (ref 11.8–14.9)
RBC # BLD AUTO: 3.18 10*6/MM3 (ref 4.14–5.8)
RBC # BLD AUTO: 4.24 10*6/MM3 (ref 4.14–5.8)
RBC # BLD AUTO: NORMAL 10*6/UL
RBC MORPH BLD: NORMAL
RH BLD: NEGATIVE
SAO2 % BLDCOA: 92.6 % (ref 95–99)
SAO2 % BLDCOA: 96.8 % (ref 95–99)
SAO2 % BLDCOA: 96.9 % (ref 95–99)
SAO2 % BLDCOA: 98 % (ref 95–99)
SAO2 % BLDCOA: 98.2 % (ref 95–99)
SAO2 % BLDCOA: 98.4 % (ref 95–99)
SMALL PLATELETS BLD QL SMEAR: NORMAL
SODIUM BLDA-SCNC: 128.2 MMOL/L (ref 136–146)
SODIUM BLDA-SCNC: 128.8 MMOL/L (ref 136–146)
SODIUM BLDA-SCNC: 129.9 MMOL/L (ref 136–146)
SODIUM BLDA-SCNC: 140.4 MMOL/L (ref 136–146)
SODIUM BLDA-SCNC: 140.5 MMOL/L (ref 136–146)
SODIUM BLDA-SCNC: 143.9 MMOL/L (ref 136–146)
SODIUM SERPL-SCNC: 133 MMOL/L (ref 136–145)
SODIUM SERPL-SCNC: 133 MMOL/L (ref 136–145)
TROPONIN T DELTA: 353 NG/L
TROPONIN T SERPL HS-MCNC: 114 NG/L
WBC MORPH BLD: NORMAL
WBC NRBC COR # BLD AUTO: 13.03 10*3/MM3 (ref 3.4–10.8)
WBC NRBC COR # BLD AUTO: 20.44 10*3/MM3 (ref 3.4–10.8)
WBC NRBC COR # BLD AUTO: NORMAL 10*3/UL

## 2023-12-24 PROCEDURE — 94760 N-INVAS EAR/PLS OXIMETRY 1: CPT

## 2023-12-24 PROCEDURE — 99292 CRITICAL CARE ADDL 30 MIN: CPT | Performed by: INTERNAL MEDICINE

## 2023-12-24 PROCEDURE — 25010000002 HYDROMORPHONE 1 MG/ML SOLUTION: Performed by: STUDENT IN AN ORGANIZED HEALTH CARE EDUCATION/TRAINING PROGRAM

## 2023-12-24 PROCEDURE — 99291 CRITICAL CARE FIRST HOUR: CPT | Performed by: INTERNAL MEDICINE

## 2023-12-24 PROCEDURE — 93005 ELECTROCARDIOGRAM TRACING: CPT | Performed by: INTERNAL MEDICINE

## 2023-12-24 PROCEDURE — 86900 BLOOD TYPING SEROLOGIC ABO: CPT

## 2023-12-24 PROCEDURE — 86900 BLOOD TYPING SEROLOGIC ABO: CPT | Performed by: SURGERY

## 2023-12-24 PROCEDURE — 85384 FIBRINOGEN ACTIVITY: CPT | Performed by: SURGERY

## 2023-12-24 PROCEDURE — 86901 BLOOD TYPING SEROLOGIC RH(D): CPT | Performed by: SURGERY

## 2023-12-24 PROCEDURE — 86901 BLOOD TYPING SEROLOGIC RH(D): CPT

## 2023-12-24 PROCEDURE — 35082 REPAIR ARTERY RUPTURE AORTA: CPT | Performed by: SURGERY

## 2023-12-24 PROCEDURE — 0BH17EZ INSERTION OF ENDOTRACHEAL AIRWAY INTO TRACHEA, VIA NATURAL OR ARTIFICIAL OPENING: ICD-10-PCS | Performed by: NURSE PRACTITIONER

## 2023-12-24 PROCEDURE — 86927 PLASMA FRESH FROZEN: CPT

## 2023-12-24 PROCEDURE — 25010000002 HYDROCORTISONE SOD SUC (PF) 100 MG RECONSTITUTED SOLUTION: Performed by: NURSE PRACTITIONER

## 2023-12-24 PROCEDURE — 25010000002 MORPHINE PER 10 MG: Performed by: STUDENT IN AN ORGANIZED HEALTH CARE EDUCATION/TRAINING PROGRAM

## 2023-12-24 PROCEDURE — 83735 ASSAY OF MAGNESIUM: CPT | Performed by: STUDENT IN AN ORGANIZED HEALTH CARE EDUCATION/TRAINING PROGRAM

## 2023-12-24 PROCEDURE — 86923 COMPATIBILITY TEST ELECTRIC: CPT

## 2023-12-24 PROCEDURE — 87205 SMEAR GRAM STAIN: CPT | Performed by: NURSE PRACTITIONER

## 2023-12-24 PROCEDURE — 93306 TTE W/DOPPLER COMPLETE: CPT

## 2023-12-24 PROCEDURE — 25010000002 ALBUMIN HUMAN 5% PER 50 ML: Performed by: ANESTHESIOLOGY

## 2023-12-24 PROCEDURE — 99223 1ST HOSP IP/OBS HIGH 75: CPT | Performed by: SURGERY

## 2023-12-24 PROCEDURE — 85730 THROMBOPLASTIN TIME PARTIAL: CPT | Performed by: SURGERY

## 2023-12-24 PROCEDURE — 86850 RBC ANTIBODY SCREEN: CPT | Performed by: SURGERY

## 2023-12-24 PROCEDURE — P9035 PLATELET PHERES LEUKOREDUCED: HCPCS

## 2023-12-24 PROCEDURE — 83880 ASSAY OF NATRIURETIC PEPTIDE: CPT | Performed by: NURSE PRACTITIONER

## 2023-12-24 PROCEDURE — 83050 HGB METHEMOGLOBIN QUAN: CPT | Performed by: INTERNAL MEDICINE

## 2023-12-24 PROCEDURE — P9016 RBC LEUKOCYTES REDUCED: HCPCS

## 2023-12-24 PROCEDURE — P9041 ALBUMIN (HUMAN),5%, 50ML: HCPCS | Performed by: ANESTHESIOLOGY

## 2023-12-24 PROCEDURE — 83605 ASSAY OF LACTIC ACID: CPT | Performed by: STUDENT IN AN ORGANIZED HEALTH CARE EDUCATION/TRAINING PROGRAM

## 2023-12-24 PROCEDURE — 94799 UNLISTED PULMONARY SVC/PX: CPT

## 2023-12-24 PROCEDURE — 25010000002 CEFAZOLIN PER 500 MG: Performed by: ANESTHESIOLOGY

## 2023-12-24 PROCEDURE — 25010000002 NALOXONE PER 1 MG: Performed by: STUDENT IN AN ORGANIZED HEALTH CARE EDUCATION/TRAINING PROGRAM

## 2023-12-24 PROCEDURE — 82948 REAGENT STRIP/BLOOD GLUCOSE: CPT

## 2023-12-24 PROCEDURE — 85610 PROTHROMBIN TIME: CPT | Performed by: NURSE PRACTITIONER

## 2023-12-24 PROCEDURE — 25010000002 CEFEPIME PER 500 MG: Performed by: STUDENT IN AN ORGANIZED HEALTH CARE EDUCATION/TRAINING PROGRAM

## 2023-12-24 PROCEDURE — 94002 VENT MGMT INPAT INIT DAY: CPT

## 2023-12-24 PROCEDURE — 36430 TRANSFUSION BLD/BLD COMPNT: CPT

## 2023-12-24 PROCEDURE — C1752 CATH,HEMODIALYSIS,SHORT-TERM: HCPCS

## 2023-12-24 PROCEDURE — 25010000002 CLINDAMYCIN 600 MG/50ML SOLUTION: Performed by: NURSE PRACTITIONER

## 2023-12-24 PROCEDURE — 85007 BL SMEAR W/DIFF WBC COUNT: CPT | Performed by: SURGERY

## 2023-12-24 PROCEDURE — 84100 ASSAY OF PHOSPHORUS: CPT | Performed by: STUDENT IN AN ORGANIZED HEALTH CARE EDUCATION/TRAINING PROGRAM

## 2023-12-24 PROCEDURE — 85025 COMPLETE CBC W/AUTO DIFF WBC: CPT | Performed by: SURGERY

## 2023-12-24 PROCEDURE — 80053 COMPREHEN METABOLIC PANEL: CPT | Performed by: STUDENT IN AN ORGANIZED HEALTH CARE EDUCATION/TRAINING PROGRAM

## 2023-12-24 PROCEDURE — C1751 CATH, INF, PER/CENT/MIDLINE: HCPCS

## 2023-12-24 PROCEDURE — 82805 BLOOD GASES W/O2 SATURATION: CPT | Performed by: SURGERY

## 2023-12-24 PROCEDURE — 85379 FIBRIN DEGRADATION QUANT: CPT | Performed by: NURSE PRACTITIONER

## 2023-12-24 PROCEDURE — 84484 ASSAY OF TROPONIN QUANT: CPT | Performed by: NURSE PRACTITIONER

## 2023-12-24 PROCEDURE — 82375 ASSAY CARBOXYHB QUANT: CPT | Performed by: SURGERY

## 2023-12-24 PROCEDURE — 74018 RADEX ABDOMEN 1 VIEW: CPT

## 2023-12-24 PROCEDURE — P9012 CRYOPRECIPITATE EACH UNIT: HCPCS

## 2023-12-24 PROCEDURE — 04HY32Z INSERTION OF MONITORING DEVICE INTO LOWER ARTERY, PERCUTANEOUS APPROACH: ICD-10-PCS | Performed by: NURSE PRACTITIONER

## 2023-12-24 PROCEDURE — 25810000003 SODIUM CHLORIDE 0.9 % SOLUTION: Performed by: INTERNAL MEDICINE

## 2023-12-24 PROCEDURE — 84145 PROCALCITONIN (PCT): CPT | Performed by: NURSE PRACTITIONER

## 2023-12-24 PROCEDURE — 02HV33Z INSERTION OF INFUSION DEVICE INTO SUPERIOR VENA CAVA, PERCUTANEOUS APPROACH: ICD-10-PCS | Performed by: NURSE PRACTITIONER

## 2023-12-24 PROCEDURE — 25010000002 MIDAZOLAM PER 1MG: Performed by: ANESTHESIOLOGY

## 2023-12-24 PROCEDURE — P9100 PATHOGEN TEST FOR PLATELETS: HCPCS

## 2023-12-24 PROCEDURE — 99233 SBSQ HOSP IP/OBS HIGH 50: CPT | Performed by: STUDENT IN AN ORGANIZED HEALTH CARE EDUCATION/TRAINING PROGRAM

## 2023-12-24 PROCEDURE — 82805 BLOOD GASES W/O2 SATURATION: CPT | Performed by: INTERNAL MEDICINE

## 2023-12-24 PROCEDURE — 5A1945Z RESPIRATORY VENTILATION, 24-96 CONSECUTIVE HOURS: ICD-10-PCS | Performed by: NURSE PRACTITIONER

## 2023-12-24 PROCEDURE — 74174 CTA ABD&PLVS W/CONTRAST: CPT

## 2023-12-24 PROCEDURE — 36600 WITHDRAWAL OF ARTERIAL BLOOD: CPT | Performed by: INTERNAL MEDICINE

## 2023-12-24 PROCEDURE — 04R00JZ REPLACEMENT OF ABDOMINAL AORTA WITH SYNTHETIC SUBSTITUTE, OPEN APPROACH: ICD-10-PCS | Performed by: SURGERY

## 2023-12-24 PROCEDURE — 25810000003 SODIUM CHLORIDE 0.9 % SOLUTION: Performed by: STUDENT IN AN ORGANIZED HEALTH CARE EDUCATION/TRAINING PROGRAM

## 2023-12-24 PROCEDURE — 0 DEXTROSE 5 % SOLUTION: Performed by: INTERNAL MEDICINE

## 2023-12-24 PROCEDURE — 25810000003 SODIUM CHLORIDE 0.9 % SOLUTION 500 ML FLEX CONT: Performed by: STUDENT IN AN ORGANIZED HEALTH CARE EDUCATION/TRAINING PROGRAM

## 2023-12-24 PROCEDURE — 85384 FIBRINOGEN ACTIVITY: CPT | Performed by: NURSE PRACTITIONER

## 2023-12-24 PROCEDURE — 25010000002 DIPHENHYDRAMINE PER 50 MG: Performed by: ANESTHESIOLOGY

## 2023-12-24 PROCEDURE — 82375 ASSAY CARBOXYHB QUANT: CPT | Performed by: INTERNAL MEDICINE

## 2023-12-24 PROCEDURE — 85610 PROTHROMBIN TIME: CPT | Performed by: SURGERY

## 2023-12-24 PROCEDURE — 94761 N-INVAS EAR/PLS OXIMETRY MLT: CPT

## 2023-12-24 PROCEDURE — 25010000002 PROPOFOL 10 MG/ML EMULSION: Performed by: NURSE PRACTITIONER

## 2023-12-24 PROCEDURE — 25010000002 MAGNESIUM SULFATE PER 500 MG OF MAGNESIUM: Performed by: ANESTHESIOLOGY

## 2023-12-24 PROCEDURE — 36556 INSERT NON-TUNNEL CV CATH: CPT

## 2023-12-24 PROCEDURE — 25010000002 ONDANSETRON PER 1 MG: Performed by: STUDENT IN AN ORGANIZED HEALTH CARE EDUCATION/TRAINING PROGRAM

## 2023-12-24 PROCEDURE — 85025 COMPLETE CBC W/AUTO DIFF WBC: CPT | Performed by: STUDENT IN AN ORGANIZED HEALTH CARE EDUCATION/TRAINING PROGRAM

## 2023-12-24 PROCEDURE — 25510000001 IOPAMIDOL PER 1 ML: Performed by: STUDENT IN AN ORGANIZED HEALTH CARE EDUCATION/TRAINING PROGRAM

## 2023-12-24 PROCEDURE — 25010000002 KETOROLAC TROMETHAMINE PER 15 MG: Performed by: STUDENT IN AN ORGANIZED HEALTH CARE EDUCATION/TRAINING PROGRAM

## 2023-12-24 PROCEDURE — 87070 CULTURE OTHR SPECIMN AEROBIC: CPT | Performed by: NURSE PRACTITIONER

## 2023-12-24 PROCEDURE — 36620 INSERTION CATHETER ARTERY: CPT | Performed by: NURSE PRACTITIONER

## 2023-12-24 PROCEDURE — 83050 HGB METHEMOGLOBIN QUAN: CPT | Performed by: SURGERY

## 2023-12-24 PROCEDURE — 71045 X-RAY EXAM CHEST 1 VIEW: CPT

## 2023-12-24 PROCEDURE — 31500 INSERT EMERGENCY AIRWAY: CPT

## 2023-12-24 PROCEDURE — C1768 GRAFT, VASCULAR: HCPCS | Performed by: SURGERY

## 2023-12-24 PROCEDURE — 25010000002 FENTANYL CITRATE (PF) 50 MCG/ML SOLUTION: Performed by: ANESTHESIOLOGY

## 2023-12-24 PROCEDURE — 36556 INSERT NON-TUNNEL CV CATH: CPT | Performed by: NURSE PRACTITIONER

## 2023-12-24 PROCEDURE — 87040 BLOOD CULTURE FOR BACTERIA: CPT | Performed by: STUDENT IN AN ORGANIZED HEALTH CARE EDUCATION/TRAINING PROGRAM

## 2023-12-24 PROCEDURE — 25010000002 VASOPRESSIN 20 UNIT/ML SOLUTION: Performed by: ANESTHESIOLOGY

## 2023-12-24 PROCEDURE — 35082 REPAIR ARTERY RUPTURE AORTA: CPT

## 2023-12-24 PROCEDURE — 25010000002 HEPARIN (PORCINE) PER 1000 UNITS: Performed by: SURGERY

## 2023-12-24 PROCEDURE — P9037 PLATE PHERES LEUKOREDU IRRAD: HCPCS

## 2023-12-24 PROCEDURE — 25010000002 CALCIUM GLUCONATE 2-0.675 GM/100ML-% SOLUTION: Performed by: INTERNAL MEDICINE

## 2023-12-24 PROCEDURE — 85730 THROMBOPLASTIN TIME PARTIAL: CPT | Performed by: NURSE PRACTITIONER

## 2023-12-24 PROCEDURE — 25010000002 EPINEPHRINE 1 MG/10ML SOLUTION PREFILLED SYRINGE: Performed by: STUDENT IN AN ORGANIZED HEALTH CARE EDUCATION/TRAINING PROGRAM

## 2023-12-24 PROCEDURE — 25810000003 LACTATED RINGERS SOLUTION: Performed by: NURSE PRACTITIONER

## 2023-12-24 PROCEDURE — 86920 COMPATIBILITY TEST SPIN: CPT

## 2023-12-24 PROCEDURE — 25010000002 HEPARIN (PORCINE) PER 1000 UNITS: Performed by: ANESTHESIOLOGY

## 2023-12-24 PROCEDURE — 25010000002 VASOPRESSIN 0.2 UNIT/ML SOLUTION: Performed by: NURSE PRACTITIONER

## 2023-12-24 PROCEDURE — 0 DEXTROSE 5 % SOLUTION: Performed by: STUDENT IN AN ORGANIZED HEALTH CARE EDUCATION/TRAINING PROGRAM

## 2023-12-24 PROCEDURE — P9059 PLASMA, FRZ BETWEEN 8-24HOUR: HCPCS

## 2023-12-24 PROCEDURE — 76937 US GUIDE VASCULAR ACCESS: CPT | Performed by: NURSE PRACTITIONER

## 2023-12-24 PROCEDURE — 25010000002 MIDAZOLAM PER 1MG: Performed by: INTERNAL MEDICINE

## 2023-12-24 PROCEDURE — 31500 INSERT EMERGENCY AIRWAY: CPT | Performed by: NURSE PRACTITIONER

## 2023-12-24 PROCEDURE — 25010000002 CALCIUM GLUCONATE 2-0.675 GM/100ML-% SOLUTION: Performed by: STUDENT IN AN ORGANIZED HEALTH CARE EDUCATION/TRAINING PROGRAM

## 2023-12-24 PROCEDURE — 25010000002 VANCOMYCIN 5 G RECONSTITUTED SOLUTION: Performed by: STUDENT IN AN ORGANIZED HEALTH CARE EDUCATION/TRAINING PROGRAM

## 2023-12-24 PROCEDURE — 25810000003 LACTATED RINGERS PER 1000 ML: Performed by: STUDENT IN AN ORGANIZED HEALTH CARE EDUCATION/TRAINING PROGRAM

## 2023-12-24 PROCEDURE — 93306 TTE W/DOPPLER COMPLETE: CPT | Performed by: INTERNAL MEDICINE

## 2023-12-24 DEVICE — CLIP LIGAT VASC HORIZON TI LG ORNG 6CT: Type: IMPLANTABLE DEVICE | Site: ABDOMEN | Status: FUNCTIONAL

## 2023-12-24 DEVICE — PLEDGET INCISIONLINE REINF TFE SFT PTFE 1.5X4.8X9.5MM WHT: Type: IMPLANTABLE DEVICE | Site: ABDOMEN | Status: FUNCTIONAL

## 2023-12-24 DEVICE — CLIP LIGAT VASC HORIZON TI MD BLU 6CT: Type: IMPLANTABLE DEVICE | Site: ABDOMEN | Status: FUNCTIONAL

## 2023-12-24 DEVICE — FLOSEAL WITH RECOTHROM - 10ML.
Type: IMPLANTABLE DEVICE | Site: ABDOMEN | Status: FUNCTIONAL
Brand: FLOSEAL HEMOSTATIC MATRIX

## 2023-12-24 DEVICE — ABSORBABLE HEMOSTAT (OXIDIZED REGENERATED CELLULOSE, U.S.P.)
Type: IMPLANTABLE DEVICE | Site: ABDOMEN | Status: FUNCTIONAL
Brand: SURGICEL FIBRILLAR

## 2023-12-24 DEVICE — IMPLANTABLE DEVICE: Type: IMPLANTABLE DEVICE | Site: ABDOMEN | Status: FUNCTIONAL

## 2023-12-24 DEVICE — CLIP LIGAT VASC HORIZON TI SM YEL 6CT: Type: IMPLANTABLE DEVICE | Site: ABDOMEN | Status: FUNCTIONAL

## 2023-12-24 RX ORDER — DEXTROSE MONOHYDRATE 25 G/50ML
25 INJECTION, SOLUTION INTRAVENOUS
Status: DISCONTINUED | OUTPATIENT
Start: 2023-12-24 | End: 2024-01-12 | Stop reason: HOSPADM

## 2023-12-24 RX ORDER — ALBUMIN (HUMAN) 12.5 G/50ML
25 SOLUTION INTRAVENOUS
Qty: 200 ML | Refills: 0 | Status: DISCONTINUED | OUTPATIENT
Start: 2023-12-24 | End: 2023-12-24

## 2023-12-24 RX ORDER — ETOMIDATE 2 MG/ML
INJECTION INTRAVENOUS
Status: COMPLETED | OUTPATIENT
Start: 2023-12-24 | End: 2023-12-24

## 2023-12-24 RX ORDER — AMOXICILLIN 250 MG
2 CAPSULE ORAL 2 TIMES DAILY
Status: DISCONTINUED | OUTPATIENT
Start: 2023-12-24 | End: 2023-12-24

## 2023-12-24 RX ORDER — LIDOCAINE HYDROCHLORIDE 20 MG/ML
INJECTION, SOLUTION INFILTRATION; PERINEURAL
Status: COMPLETED
Start: 2023-12-24 | End: 2023-12-25

## 2023-12-24 RX ORDER — AMOXICILLIN 250 MG
2 CAPSULE ORAL 2 TIMES DAILY
Status: DISCONTINUED | OUTPATIENT
Start: 2023-12-24 | End: 2023-12-29

## 2023-12-24 RX ORDER — ACETAMINOPHEN 325 MG/1
650 TABLET ORAL EVERY 4 HOURS PRN
Status: DISCONTINUED | OUTPATIENT
Start: 2023-12-24 | End: 2023-12-24

## 2023-12-24 RX ORDER — CALCIUM GLUCONATE 20 MG/ML
2000 INJECTION, SOLUTION INTRAVENOUS ONCE
Status: COMPLETED | OUTPATIENT
Start: 2023-12-24 | End: 2023-12-25

## 2023-12-24 RX ORDER — PROMETHAZINE HYDROCHLORIDE 25 MG/1
25 TABLET ORAL ONCE AS NEEDED
Status: CANCELLED | OUTPATIENT
Start: 2023-12-24

## 2023-12-24 RX ORDER — KETOROLAC TROMETHAMINE 30 MG/ML
15 INJECTION, SOLUTION INTRAMUSCULAR; INTRAVENOUS ONCE
Status: COMPLETED | OUTPATIENT
Start: 2023-12-24 | End: 2023-12-24

## 2023-12-24 RX ORDER — SODIUM CHLORIDE 9 MG/ML
40 INJECTION, SOLUTION INTRAVENOUS AS NEEDED
Status: DISCONTINUED | OUTPATIENT
Start: 2023-12-24 | End: 2024-01-12 | Stop reason: HOSPADM

## 2023-12-24 RX ORDER — FENTANYL CITRATE-0.9 % NACL/PF 10 MCG/ML
50-200 PLASTIC BAG, INJECTION (ML) INTRAVENOUS
Status: DISCONTINUED | OUTPATIENT
Start: 2023-12-24 | End: 2023-12-25

## 2023-12-24 RX ORDER — SODIUM CHLORIDE, SODIUM LACTATE, POTASSIUM CHLORIDE, CALCIUM CHLORIDE 600; 310; 30; 20 MG/100ML; MG/100ML; MG/100ML; MG/100ML
100 INJECTION, SOLUTION INTRAVENOUS CONTINUOUS
Status: ACTIVE | OUTPATIENT
Start: 2023-12-24 | End: 2023-12-24

## 2023-12-24 RX ORDER — METOPROLOL TARTRATE 1 MG/ML
INJECTION, SOLUTION INTRAVENOUS AS NEEDED
Status: DISCONTINUED | OUTPATIENT
Start: 2023-12-24 | End: 2023-12-25 | Stop reason: SURG

## 2023-12-24 RX ORDER — CEFAZOLIN SODIUM 1 G/3ML
INJECTION, POWDER, FOR SOLUTION INTRAMUSCULAR; INTRAVENOUS AS NEEDED
Status: DISCONTINUED | OUTPATIENT
Start: 2023-12-24 | End: 2023-12-25 | Stop reason: SURG

## 2023-12-24 RX ORDER — POTASSIUM CHLORIDE 750 MG/1
40 CAPSULE, EXTENDED RELEASE ORAL ONCE
Status: COMPLETED | OUTPATIENT
Start: 2023-12-24 | End: 2023-12-24

## 2023-12-24 RX ORDER — CALCIUM CHLORIDE 100 MG/ML
INJECTION INTRAVENOUS; INTRAVENTRICULAR
Status: COMPLETED | OUTPATIENT
Start: 2023-12-24 | End: 2023-12-24

## 2023-12-24 RX ORDER — FAMOTIDINE 10 MG/ML
20 INJECTION, SOLUTION INTRAVENOUS EVERY 12 HOURS SCHEDULED
Status: DISCONTINUED | OUTPATIENT
Start: 2023-12-24 | End: 2023-12-30

## 2023-12-24 RX ORDER — ALBUMIN, HUMAN INJ 5% 5 %
SOLUTION INTRAVENOUS CONTINUOUS PRN
Status: DISCONTINUED | OUTPATIENT
Start: 2023-12-24 | End: 2023-12-25 | Stop reason: SURG

## 2023-12-24 RX ORDER — CETIRIZINE HYDROCHLORIDE 10 MG/1
10 TABLET ORAL DAILY
COMMUNITY
End: 2024-01-12 | Stop reason: HOSPADM

## 2023-12-24 RX ORDER — ALBUMIN (HUMAN) 12.5 G/50ML
25 SOLUTION INTRAVENOUS
Status: COMPLETED | OUTPATIENT
Start: 2023-12-24 | End: 2023-12-25

## 2023-12-24 RX ORDER — BISACODYL 10 MG
10 SUPPOSITORY, RECTAL RECTAL DAILY PRN
Status: DISCONTINUED | OUTPATIENT
Start: 2023-12-24 | End: 2023-12-29

## 2023-12-24 RX ORDER — DIPHENHYDRAMINE HYDROCHLORIDE 50 MG/ML
INJECTION INTRAMUSCULAR; INTRAVENOUS AS NEEDED
Status: DISCONTINUED | OUTPATIENT
Start: 2023-12-24 | End: 2023-12-25 | Stop reason: SURG

## 2023-12-24 RX ORDER — NICOTINE POLACRILEX 4 MG
15 LOZENGE BUCCAL
Status: DISCONTINUED | OUTPATIENT
Start: 2023-12-24 | End: 2024-01-12 | Stop reason: HOSPADM

## 2023-12-24 RX ORDER — NOREPINEPHRINE BITARTRATE 0.03 MG/ML
INJECTION, SOLUTION INTRAVENOUS
Status: COMPLETED | OUTPATIENT
Start: 2023-12-24 | End: 2023-12-24

## 2023-12-24 RX ORDER — NOREPINEPHRINE BITARTRATE 0.03 MG/ML
.02-.3 INJECTION, SOLUTION INTRAVENOUS
Status: DISCONTINUED | OUTPATIENT
Start: 2023-12-24 | End: 2023-12-29

## 2023-12-24 RX ORDER — CALCIUM GLUCONATE 20 MG/ML
2000 INJECTION, SOLUTION INTRAVENOUS ONCE
Status: COMPLETED | OUTPATIENT
Start: 2023-12-24 | End: 2023-12-24

## 2023-12-24 RX ORDER — MORPHINE SULFATE 2 MG/ML
2 INJECTION, SOLUTION INTRAMUSCULAR; INTRAVENOUS EVERY 4 HOURS PRN
Status: DISCONTINUED | OUTPATIENT
Start: 2023-12-24 | End: 2023-12-24

## 2023-12-24 RX ORDER — ONDANSETRON 2 MG/ML
4 INJECTION INTRAMUSCULAR; INTRAVENOUS ONCE AS NEEDED
Status: CANCELLED | OUTPATIENT
Start: 2023-12-24

## 2023-12-24 RX ORDER — CLINDAMYCIN PHOSPHATE 600 MG/50ML
600 INJECTION, SOLUTION INTRAVENOUS EVERY 8 HOURS
Status: DISCONTINUED | OUTPATIENT
Start: 2023-12-24 | End: 2023-12-25

## 2023-12-24 RX ORDER — ROCURONIUM BROMIDE 10 MG/ML
INJECTION, SOLUTION INTRAVENOUS
Status: COMPLETED | OUTPATIENT
Start: 2023-12-24 | End: 2023-12-24

## 2023-12-24 RX ORDER — ONDANSETRON 2 MG/ML
4 INJECTION INTRAMUSCULAR; INTRAVENOUS EVERY 6 HOURS PRN
Status: DISCONTINUED | OUTPATIENT
Start: 2023-12-24 | End: 2024-01-12 | Stop reason: HOSPADM

## 2023-12-24 RX ORDER — PROMETHAZINE HYDROCHLORIDE 25 MG/1
25 SUPPOSITORY RECTAL ONCE AS NEEDED
Status: CANCELLED | OUTPATIENT
Start: 2023-12-24

## 2023-12-24 RX ORDER — FENTANYL CITRATE 50 UG/ML
INJECTION, SOLUTION INTRAMUSCULAR; INTRAVENOUS AS NEEDED
Status: DISCONTINUED | OUTPATIENT
Start: 2023-12-24 | End: 2023-12-25 | Stop reason: SURG

## 2023-12-24 RX ORDER — VASOPRESSIN 20 U/ML
INJECTION PARENTERAL AS NEEDED
Status: DISCONTINUED | OUTPATIENT
Start: 2023-12-24 | End: 2023-12-25 | Stop reason: SURG

## 2023-12-24 RX ORDER — CALCIUM CHLORIDE 100 MG/ML
INJECTION INTRAVENOUS; INTRAVENTRICULAR
Status: DISPENSED
Start: 2023-12-24 | End: 2023-12-25

## 2023-12-24 RX ORDER — NITROGLYCERIN 0.4 MG/1
0.4 TABLET SUBLINGUAL
Status: DISCONTINUED | OUTPATIENT
Start: 2023-12-24 | End: 2024-01-05 | Stop reason: SDUPTHER

## 2023-12-24 RX ORDER — MORPHINE SULFATE 2 MG/ML
2 INJECTION, SOLUTION INTRAMUSCULAR; INTRAVENOUS EVERY 4 HOURS PRN
Status: DISPENSED | OUTPATIENT
Start: 2023-12-24 | End: 2023-12-31

## 2023-12-24 RX ORDER — MEPERIDINE HYDROCHLORIDE 25 MG/ML
12.5 INJECTION INTRAMUSCULAR; INTRAVENOUS; SUBCUTANEOUS
Status: CANCELLED | OUTPATIENT
Start: 2023-12-24 | End: 2023-12-25

## 2023-12-24 RX ORDER — POLYETHYLENE GLYCOL 3350 17 G/17G
17 POWDER, FOR SOLUTION ORAL DAILY PRN
Status: DISCONTINUED | OUTPATIENT
Start: 2023-12-24 | End: 2023-12-24

## 2023-12-24 RX ORDER — OXYCODONE HYDROCHLORIDE 5 MG/1
5 TABLET ORAL
Status: CANCELLED | OUTPATIENT
Start: 2023-12-24

## 2023-12-24 RX ORDER — MIDAZOLAM HYDROCHLORIDE 2 MG/2ML
INJECTION, SOLUTION INTRAMUSCULAR; INTRAVENOUS AS NEEDED
Status: DISCONTINUED | OUTPATIENT
Start: 2023-12-24 | End: 2023-12-25 | Stop reason: SURG

## 2023-12-24 RX ORDER — SODIUM CHLORIDE 0.9 % (FLUSH) 0.9 %
10 SYRINGE (ML) INJECTION AS NEEDED
Status: DISCONTINUED | OUTPATIENT
Start: 2023-12-24 | End: 2024-01-12 | Stop reason: HOSPADM

## 2023-12-24 RX ORDER — POLYETHYLENE GLYCOL 3350 17 G/17G
17 POWDER, FOR SOLUTION ORAL DAILY PRN
Status: DISCONTINUED | OUTPATIENT
Start: 2023-12-24 | End: 2023-12-29

## 2023-12-24 RX ORDER — IBUPROFEN 600 MG/1
1 TABLET ORAL
Status: DISCONTINUED | OUTPATIENT
Start: 2023-12-24 | End: 2024-01-12 | Stop reason: HOSPADM

## 2023-12-24 RX ORDER — CALCIUM CARBONATE 500 MG/1
2 TABLET, CHEWABLE ORAL ONCE
Status: COMPLETED | OUTPATIENT
Start: 2023-12-24 | End: 2023-12-24

## 2023-12-24 RX ORDER — CALCIUM CARBONATE 500 MG/1
2 TABLET, CHEWABLE ORAL 3 TIMES DAILY PRN
Status: DISCONTINUED | OUTPATIENT
Start: 2023-12-24 | End: 2023-12-24

## 2023-12-24 RX ORDER — MORPHINE SULFATE 2 MG/ML
2 INJECTION, SOLUTION INTRAMUSCULAR; INTRAVENOUS ONCE
Status: DISCONTINUED | OUTPATIENT
Start: 2023-12-24 | End: 2023-12-24

## 2023-12-24 RX ORDER — ACETAMINOPHEN 325 MG/1
650 TABLET ORAL EVERY 4 HOURS PRN
Status: DISCONTINUED | OUTPATIENT
Start: 2023-12-24 | End: 2023-12-29

## 2023-12-24 RX ORDER — SODIUM CHLORIDE 0.9 % (FLUSH) 0.9 %
10 SYRINGE (ML) INJECTION EVERY 12 HOURS SCHEDULED
Status: DISCONTINUED | OUTPATIENT
Start: 2023-12-24 | End: 2024-01-12 | Stop reason: HOSPADM

## 2023-12-24 RX ORDER — MAGNESIUM SULFATE HEPTAHYDRATE 500 MG/ML
INJECTION, SOLUTION INTRAMUSCULAR; INTRAVENOUS AS NEEDED
Status: DISCONTINUED | OUTPATIENT
Start: 2023-12-24 | End: 2023-12-25 | Stop reason: SURG

## 2023-12-24 RX ORDER — BISACODYL 10 MG
10 SUPPOSITORY, RECTAL RECTAL DAILY PRN
Status: DISCONTINUED | OUTPATIENT
Start: 2023-12-24 | End: 2023-12-24

## 2023-12-24 RX ORDER — NALOXONE HCL 0.4 MG/ML
0.4 VIAL (ML) INJECTION
Status: DISCONTINUED | OUTPATIENT
Start: 2023-12-24 | End: 2023-12-24

## 2023-12-24 RX ORDER — CALCIUM CHLORIDE 100 MG/ML
INJECTION INTRAVENOUS; INTRAVENTRICULAR AS NEEDED
Status: DISCONTINUED | OUTPATIENT
Start: 2023-12-24 | End: 2023-12-25 | Stop reason: SURG

## 2023-12-24 RX ORDER — MIDAZOLAM HYDROCHLORIDE 2 MG/2ML
INJECTION, SOLUTION INTRAMUSCULAR; INTRAVENOUS
Status: COMPLETED | OUTPATIENT
Start: 2023-12-24 | End: 2023-12-24

## 2023-12-24 RX ORDER — SODIUM CHLORIDE, SODIUM LACTATE, POTASSIUM CHLORIDE, CALCIUM CHLORIDE 600; 310; 30; 20 MG/100ML; MG/100ML; MG/100ML; MG/100ML
150 INJECTION, SOLUTION INTRAVENOUS CONTINUOUS
Status: DISCONTINUED | OUTPATIENT
Start: 2023-12-24 | End: 2023-12-25

## 2023-12-24 RX ORDER — HEPARIN SODIUM 1000 [USP'U]/ML
INJECTION, SOLUTION INTRAVENOUS; SUBCUTANEOUS AS NEEDED
Status: DISCONTINUED | OUTPATIENT
Start: 2023-12-24 | End: 2023-12-25 | Stop reason: SURG

## 2023-12-24 RX ORDER — BISACODYL 5 MG/1
5 TABLET, DELAYED RELEASE ORAL DAILY PRN
Status: DISCONTINUED | OUTPATIENT
Start: 2023-12-24 | End: 2023-12-24

## 2023-12-24 RX ORDER — NALOXONE HCL 0.4 MG/ML
0.4 VIAL (ML) INJECTION
Status: DISCONTINUED | OUTPATIENT
Start: 2023-12-24 | End: 2024-01-12 | Stop reason: HOSPADM

## 2023-12-24 RX ORDER — CALCIUM CARBONATE 500 MG/1
2 TABLET, CHEWABLE ORAL 3 TIMES DAILY PRN
Status: DISCONTINUED | OUTPATIENT
Start: 2023-12-24 | End: 2023-12-29

## 2023-12-24 RX ORDER — ROCURONIUM BROMIDE 10 MG/ML
INJECTION, SOLUTION INTRAVENOUS AS NEEDED
Status: DISCONTINUED | OUTPATIENT
Start: 2023-12-24 | End: 2023-12-25 | Stop reason: SURG

## 2023-12-24 RX ADMIN — SODIUM CHLORIDE, POTASSIUM CHLORIDE, SODIUM LACTATE AND CALCIUM CHLORIDE 2000 ML: 600; 310; 30; 20 INJECTION, SOLUTION INTRAVENOUS at 15:30

## 2023-12-24 RX ADMIN — MIDAZOLAM HYDROCHLORIDE 2 MG: 1 INJECTION, SOLUTION INTRAMUSCULAR; INTRAVENOUS at 14:31

## 2023-12-24 RX ADMIN — CALCIUM CARBONATE 2 TABLET: 500 TABLET, CHEWABLE ORAL at 10:48

## 2023-12-24 RX ADMIN — SODIUM PHOSPHATE, MONOBASIC, MONOHYDRATE AND SODIUM PHOSPHATE, DIBASIC, ANHYDROUS 15 MMOL: 142; 276 INJECTION, SOLUTION INTRAVENOUS at 10:20

## 2023-12-24 RX ADMIN — ROCURONIUM BROMIDE 50 MG: 50 INJECTION INTRAVENOUS at 20:47

## 2023-12-24 RX ADMIN — SODIUM CHLORIDE 40 ML: 9 INJECTION, SOLUTION INTRAVENOUS at 20:43

## 2023-12-24 RX ADMIN — CALCIUM CHLORIDE INJECTION 1 G: 100 INJECTION, SOLUTION INTRAVENOUS at 14:18

## 2023-12-24 RX ADMIN — CEFEPIME 2000 MG: 2 INJECTION, POWDER, FOR SOLUTION INTRAVENOUS at 13:37

## 2023-12-24 RX ADMIN — HEPARIN SODIUM 3000 UNITS: 1000 INJECTION INTRAVENOUS; SUBCUTANEOUS at 20:53

## 2023-12-24 RX ADMIN — IOPAMIDOL 92 ML: 755 INJECTION, SOLUTION INTRAVENOUS at 19:20

## 2023-12-24 RX ADMIN — FENTANYL CITRATE 50 MCG: 50 INJECTION, SOLUTION INTRAMUSCULAR; INTRAVENOUS at 22:41

## 2023-12-24 RX ADMIN — SODIUM CHLORIDE 40 ML: 9 INJECTION, SOLUTION INTRAVENOUS at 20:31

## 2023-12-24 RX ADMIN — NOREPINEPHRINE BITARTRATE 0.05 MCG/KG/MIN: 0.03 INJECTION, SOLUTION INTRAVENOUS at 14:06

## 2023-12-24 RX ADMIN — PROPOFOL 15 MCG/KG/MIN: 10 INJECTION, EMULSION INTRAVENOUS at 16:04

## 2023-12-24 RX ADMIN — ALBUMIN (HUMAN): 12.5 INJECTION, SOLUTION INTRAVENOUS at 20:37

## 2023-12-24 RX ADMIN — SODIUM CHLORIDE 40 ML: 9 INJECTION, SOLUTION INTRAVENOUS at 20:50

## 2023-12-24 RX ADMIN — MORPHINE SULFATE 2 MG: 2 INJECTION, SOLUTION INTRAMUSCULAR; INTRAVENOUS at 01:10

## 2023-12-24 RX ADMIN — CALCIUM CARBONATE (ANTACID) CHEW TAB 500 MG 2 TABLET: 500 CHEW TAB at 05:13

## 2023-12-24 RX ADMIN — SODIUM BICARBONATE 50 MEQ: 84 INJECTION INTRAVENOUS at 14:17

## 2023-12-24 RX ADMIN — POTASSIUM CHLORIDE 40 MEQ: 10 CAPSULE, COATED, EXTENDED RELEASE ORAL at 01:09

## 2023-12-24 RX ADMIN — HEPARIN SODIUM 2000 UNITS: 1000 INJECTION INTRAVENOUS; SUBCUTANEOUS at 21:51

## 2023-12-24 RX ADMIN — VASOPRESSIN 1 UNITS: 20 INJECTION INTRAVENOUS at 22:05

## 2023-12-24 RX ADMIN — CALCIUM GLUCONATE 2000 MG: 20 INJECTION, SOLUTION INTRAVENOUS at 20:00

## 2023-12-24 RX ADMIN — HYDROMORPHONE HYDROCHLORIDE 1 MG: 1 INJECTION, SOLUTION INTRAMUSCULAR; INTRAVENOUS; SUBCUTANEOUS at 12:27

## 2023-12-24 RX ADMIN — CALCIUM CHLORIDE INJECTION 1 G: 100 INJECTION, SOLUTION INTRAVENOUS at 21:00

## 2023-12-24 RX ADMIN — MIDAZOLAM HYDROCHLORIDE 2 MG: 1 INJECTION, SOLUTION INTRAMUSCULAR; INTRAVENOUS at 20:30

## 2023-12-24 RX ADMIN — CALCIUM CHLORIDE INJECTION 1 G: 100 INJECTION, SOLUTION INTRAVENOUS at 22:35

## 2023-12-24 RX ADMIN — ETOMIDATE 20 MG: 40 INJECTION, SOLUTION INTRAVENOUS at 14:10

## 2023-12-24 RX ADMIN — METOPROLOL TARTRATE 2 MG: 1 INJECTION, SOLUTION INTRAVENOUS at 22:34

## 2023-12-24 RX ADMIN — FAMOTIDINE 20 MG: 10 INJECTION INTRAVENOUS at 03:37

## 2023-12-24 RX ADMIN — CALCIUM GLUCONATE 2000 MG: 20 INJECTION, SOLUTION INTRAVENOUS at 13:38

## 2023-12-24 RX ADMIN — MORPHINE SULFATE 2 MG: 2 INJECTION, SOLUTION INTRAMUSCULAR; INTRAVENOUS at 05:13

## 2023-12-24 RX ADMIN — MAGNESIUM SULFATE HEPTAHYDRATE 1 G: 500 INJECTION, SOLUTION INTRAMUSCULAR; INTRAVENOUS at 22:53

## 2023-12-24 RX ADMIN — KETOROLAC TROMETHAMINE 15 MG: 30 INJECTION, SOLUTION INTRAMUSCULAR; INTRAVENOUS at 11:30

## 2023-12-24 RX ADMIN — ONDANSETRON 4 MG: 2 INJECTION INTRAMUSCULAR; INTRAVENOUS at 09:10

## 2023-12-24 RX ADMIN — ROCURONIUM BROMIDE 50 MG: 50 INJECTION INTRAVENOUS at 23:28

## 2023-12-24 RX ADMIN — SODIUM CHLORIDE 40 ML: 9 INJECTION, SOLUTION INTRAVENOUS at 13:37

## 2023-12-24 RX ADMIN — HYDROMORPHONE HYDROCHLORIDE 1 MG: 1 INJECTION, SOLUTION INTRAMUSCULAR; INTRAVENOUS; SUBCUTANEOUS at 06:26

## 2023-12-24 RX ADMIN — SODIUM CHLORIDE, POTASSIUM CHLORIDE, SODIUM LACTATE AND CALCIUM CHLORIDE 100 ML/HR: 600; 310; 30; 20 INJECTION, SOLUTION INTRAVENOUS at 01:44

## 2023-12-24 RX ADMIN — SODIUM CHLORIDE, POTASSIUM CHLORIDE, SODIUM LACTATE AND CALCIUM CHLORIDE 100 ML/HR: 600; 310; 30; 20 INJECTION, SOLUTION INTRAVENOUS at 12:32

## 2023-12-24 RX ADMIN — KETOROLAC TROMETHAMINE 15 MG: 30 INJECTION, SOLUTION INTRAMUSCULAR; INTRAVENOUS at 07:10

## 2023-12-24 RX ADMIN — ALBUMIN (HUMAN): 12.5 INJECTION, SOLUTION INTRAVENOUS at 21:09

## 2023-12-24 RX ADMIN — FAMOTIDINE 20 MG: 10 INJECTION INTRAVENOUS at 23:40

## 2023-12-24 RX ADMIN — HYDROMORPHONE HYDROCHLORIDE 1 MG: 1 INJECTION, SOLUTION INTRAMUSCULAR; INTRAVENOUS; SUBCUTANEOUS at 03:37

## 2023-12-24 RX ADMIN — SODIUM CHLORIDE 200 ML/HR: 9 INJECTION, SOLUTION INTRAVENOUS at 20:09

## 2023-12-24 RX ADMIN — DOCUSATE SODIUM 50MG AND SENNOSIDES 8.6MG 2 TABLET: 8.6; 5 TABLET, FILM COATED ORAL at 08:18

## 2023-12-24 RX ADMIN — VANCOMYCIN HYDROCHLORIDE 1250 MG: 5 INJECTION, POWDER, LYOPHILIZED, FOR SOLUTION INTRAVENOUS at 13:37

## 2023-12-24 RX ADMIN — CEFAZOLIN 2 G: 1 INJECTION, POWDER, FOR SOLUTION INTRAMUSCULAR; INTRAVENOUS at 21:12

## 2023-12-24 RX ADMIN — CLINDAMYCIN IN 5 PERCENT DEXTROSE 600 MG: 12 INJECTION, SOLUTION INTRAVENOUS at 17:00

## 2023-12-24 RX ADMIN — Medication 50 MCG/HR: at 15:50

## 2023-12-24 RX ADMIN — EPINEPHRINE 1 MG: 0.1 INJECTION INTRAVENOUS at 14:05

## 2023-12-24 RX ADMIN — SODIUM CHLORIDE 40 ML: 9 INJECTION, SOLUTION INTRAVENOUS at 21:02

## 2023-12-24 RX ADMIN — Medication 10 ML: at 01:12

## 2023-12-24 RX ADMIN — ROCURONIUM BROMIDE 50 MG: 50 INJECTION INTRAVENOUS at 22:20

## 2023-12-24 RX ADMIN — DIPHENHYDRAMINE HYDROCHLORIDE 25 MG: 50 INJECTION, SOLUTION INTRAMUSCULAR; INTRAVENOUS at 23:40

## 2023-12-24 RX ADMIN — SODIUM CHLORIDE 40 ML: 9 INJECTION, SOLUTION INTRAVENOUS at 13:38

## 2023-12-24 RX ADMIN — HYDROMORPHONE HYDROCHLORIDE 2 MG: 1 INJECTION, SOLUTION INTRAMUSCULAR; INTRAVENOUS; SUBCUTANEOUS at 09:04

## 2023-12-24 RX ADMIN — VASOPRESSIN 0.04 UNITS/MIN: 0.2 INJECTION INTRAVENOUS at 16:23

## 2023-12-24 RX ADMIN — FENTANYL CITRATE 50 MCG: 50 INJECTION, SOLUTION INTRAMUSCULAR; INTRAVENOUS at 22:44

## 2023-12-24 RX ADMIN — SODIUM BICARBONATE 50 MEQ: 84 INJECTION INTRAVENOUS at 19:53

## 2023-12-24 RX ADMIN — NALOXONE HYDROCHLORIDE 0.4 MG: 0.4 INJECTION, SOLUTION INTRAMUSCULAR; INTRAVENOUS; SUBCUTANEOUS at 13:57

## 2023-12-24 RX ADMIN — HYDROCORTISONE SODIUM SUCCINATE 100 MG: 100 INJECTION, POWDER, FOR SOLUTION INTRAMUSCULAR; INTRAVENOUS at 18:07

## 2023-12-24 RX ADMIN — CEFEPIME 2000 MG: 2 INJECTION, POWDER, FOR SOLUTION INTRAVENOUS at 05:13

## 2023-12-24 RX ADMIN — ROCURONIUM BROMIDE 50 MG: 10 INJECTION INTRAVENOUS at 14:11

## 2023-12-24 RX ADMIN — NALOXONE HYDROCHLORIDE 0.4 MG: 0.4 INJECTION, SOLUTION INTRAMUSCULAR; INTRAVENOUS; SUBCUTANEOUS at 14:05

## 2023-12-24 NOTE — PROGRESS NOTES
"[facility] Clinical Pharmacy Services: Vancomycin Pharmacokinetic Initial Consult Note    Danny Savage is a 63 y.o. male who is on day 1 of pharmacy to dose vancomycin for Sepsis and Urinary Tract Infection.    Consult Information  Consulting Provider: Keyana  Planned Duration of Therapy: 7 days   Was Patient Receiving Prior to Admission/Consult?: No  Loading Dose Ordered or Given: 1500 mg on 23 at 2330  PK/PD Target: -600 mg/L.hr   Relevant ID History:     Aerobic Bottle Gram positive cocci in pairs and chains Critical on 23     Other Antimicrobials: Cefepime    Imaging Reviewed?: Yes    Microbiology Data  MRSA PCR performed: No; Result: Not ordered due to excluded indication or presence of suspected abscess  Culture/Source:       Vitals/Labs  Ht: 177.8 cm (70\"); Wt: 79.9 kg (176 lb 2.4 oz)  Temp (24hrs), Av.5 °F (38.1 °C), Min:98.5 °F (36.9 °C), Max:102.6 °F (39.2 °C)   Estimated Creatinine Clearance: 99.1 mL/min (by C-G formula based on SCr of 0.84 mg/dL).       Results from last 7 days   Lab Units 23  0815   VANCOMYCIN RM mcg/mL <4.00*  --   --    CREATININE mg/dL 0.84  --  1.15   WBC 10*3/mm3  --  11.32* 13.64*     Assessment/Plan:    Vancomycin Dose:  1250 mg IV every 12 hours; which provides the following predicted parameters:  Exposure target: AUC24 (range)400-600 mg/L.hr   AUC24,ss: 538 mg/L.hr  PAUC*: 90 %  Ctrough,ss: 15.7 mg/L  Pconc*: 24 %  Tox.: 11 %  Vanc Random ordered for 23 at 0900  Patient has order for Complete Metabolic Panel    Pharmacy will follow patient's kidney function and will adjust doses and obtain levels as necessary. Thank you for involving pharmacy in this patient's care. Please contact pharmacy with any questions or concerns.                           Stanton Dailey [facility]  Clinical Pharmacist   "

## 2023-12-24 NOTE — CONSULTS
Pulmonary / Critical Care Consult Note      Patient Name: Danny Savage  : 1960  MRN: [id number]  Primary Care Physician:  Kodi Pichardo MD  Referring Physician: Dick Chaves MD  Date of admission: 2023    Subjective   Subjective     Reason for Consult/ Chief Complaint: Septic shock    HPI:  Danny Savage is a 63 y.o. male with history of renal cell carcinoma, who had partial nephrectomy on  by urology presented to the hospital 2 days ago for fever and chills.  At that time patient was discharged home, patient had ongoing fever back pain and chills presented to the ED yesterday, CT scan of the abdomen showed fluid collection around left nephrectomy consistent with seroma/hematoma but infection cannot be ruled out.  Laboratory findings showed leukocytosis, urinalysis was negative.  He was initiated on antibiotics, and admitted to [facility].  Urology has been seen patient no indication for intervention at this time.  Blood cultures have resulted 2/2 Streptococcus pyogenes group A.  Today patient had witnessed episode of dizziness followed by unresponsiveness RRT was called followed by [facility] patient did not lose pulse however was agonal he breathing with systolic blood pressures in the 50s he was given IV fluids and 0.5 mg of epinephrine.  He was emergently intubated.  ABG showed pH 7.4 5/23/89/15, with a lactate of 5.3.  Our service was consulted for critical care management he was transferred to the ICU.  Patient was intubated emergently at bedside and was transferred to ICU.  Central venous access was obtained and arterial line was placed.  Placed on invasive mechanical ventilator with vent settings of AC VC 24/450/PEEP of 5/FiO2 at 100%.  Patient was started on Levophed, vasopressin drip and currently is on escalating amount of pressors.  He is getting fluid resuscitated.  He is on sedative with propofol drip and fentanyl drip.    Review of Systems  Unable to obtain due  to patient status    Personal History     Past Medical History:   Diagnosis Date   • Allergy    • Aortic aneurysm     4.7 just found has not f/u   • H/O Kidney stone    • Hiatal hernia    • Kidney mass     left   • Renal cancer        Past Surgical History:   Procedure Laterality Date   • KIDNEY STONE SURGERY     • NEPHRECTOMY PARTIAL Left 11/13/2023    Procedure: NEPHRECTOMY PARTIAL LAPAROSCOPIC WITH DAVINCI ROBOT, left;  Surgeon: Michelle Cai MD;  Location: [facility] OR;  Service: Robotics - DaVinci;  Laterality: Left;   • URETEROSCOPY LASER LITHOTRIPSY WITH STENT INSERTION Left 08/18/2023    Procedure: CYSTOSCOPY URETEROSCOPY RETROGRADE PYELOGRAM STENT INSERTION, left;  Surgeon: Michelle Cai MD;  Location: [facility] OR;  Service: Urology;  Laterality: Left;       Family History: family history includes Cancer in his father and mother; Heart disease in his father; Hypertension in his father. Otherwise pertinent FHx was reviewed and not pertinent to current issue.    Social History:  reports that he has quit smoking. His smoking use included cigarettes. He has a 20.00 pack-year smoking history. He has been exposed to tobacco smoke. He has never used smokeless tobacco. He reports that he does not drink alcohol and does not use drugs.    Home Medications:  Psyllium, cetirizine, mycophenolate, and ondansetron    Allergies:  Allergies   Allergen Reactions   • Latex, Natural Rubber Hives       Objective    Objective     Vitals:   Temp:  [98.4 °F (36.9 °C)-102.6 °F (39.2 °C)] 98.6 °F (37 °C)  Heart Rate:  [] 158  Resp:  [14-18] 18  BP: ()/(32-97) 42/32  Flow (L/min):  [1.5] 1.5  FiO2 (%):  [100 %] 100 %    Physical Exam:  Vital Signs Reviewed   General: critically ill, on mechanical ventilator  HEENT:  PERRL, EOMI. ET tube in place  Neck:  Supple, no JVD, no thyromegaly  Chest:  good aeration, coarse mechanical ventilator sounds to auscultation bilaterally, tympanic to percussion bilaterally,  no work of breathing noted  CV: Tachycardic, no MGR, pulses 2+, equal.  Abd: Soft, round, NT, ND, + BS, no HSM  EXT:  no clubbing, no cyanosis, no edema  Neuro:  A&Ox0, CN grossly intact, no focal deficits.  Skin: No rashes or lesions noted      Result Review    Result Review:  I have personally reviewed the results from the time of this admission to 12/24/2023 15:25 EST and agree with these findings:  [x]  Laboratory  [x]  Microbiology  [x]  Radiology  [x]  EKG/Telemetry   [x]  Cardiology/Vascular   []  Pathology  [x]  Old records  []  Other:  Most notable findings include:   Blood cultures 2/2 Streptococcus pyogenes    Assessment & Plan   Assessment / Plan     Active Hospital Problems:  Active Hospital Problems    Diagnosis    • **Left flank pain      Impression:  Septic shock  Streptococcus pyogenes group A bacteremia  Acute hypoxic respiratory failure requiring mechanical ventilation  Aspiration  ?  Possible pneumonia  Chronically immunosuppressed on CellCept at home prescribed by dermatology  History of renal cell carcinoma s/p partial nephrectomy on 11/13/2023  Leukocytosis  Hypophosphatemia  Hypomagnesia   Hyponatremia    Plan:  -Continue mechanical ventilator AC/VC 22/450/5 +, wean FiO2 as tolerated  -ok to use propofol and fentanyl for RASS 0 to -1  -Will give 3 L total LR for volume resuscitation in the setting of septic shock  -Start Levophed to maintain MAP at 65 or above  -Add vasopressin  -Stress dose steroids hydrocortisone 100 now, 50 every 8  -Noted to have group A Streptococcus pyogenes bacteremia, continue vancomycin and cefepime  -Will also add clindamycin for toxic burden  -Trend lactic acid until clear  -Recheck labs now, CBC/BMP/mag/Phos/lactic  -Check echocardiogram  -Arterial line in place, check noninvasive hemodynamic monitoring  -Repeat blood cultures in process  -CT scan of the abdomen and pelvis reviewed 12/23/2023 stable fluid collection along left nephrectomy, urology following  -Did  have a left lower lobe infiltrate  -Would send respiratory culture  -Trend renal function, monitor replace electrolytes as needed  -Pepcid for peptic ulcer prophylaxis  Prognosis-poor    DVT prophylaxis:  Mechanical DVT prophylaxis orders are present.     Code Status and Medical Interventions:   Ordered at: 12/24/23 0024     Level Of Support Discussed With:    Patient     Code Status (Patient has no pulse and is not breathing):    CPR (Attempt to Resuscitate)     Medical Interventions (Patient has pulse or is breathing):    Full Support      I, BAKARI Alvarez, spent 30 minutes critical care time in accordance to split shared billing.    Electronically signed by BAKARI Arenas, 12/24/23, 3:25 PM EST.      The patient is critically ill in the ICU with septic shock with strep pyogenes bacteremia, possible perinephric abscess versus pneumonia versus other cirrhosis, chronic immunosuppressed state, lactic acidosis. Multidisciplinary bedside critical care rounds were performed with nursing staff, respiratory therapy, pharmacy, nutritional services, social work. I have personally reviewed the chart, labs and any pertinent imaging available.  We have spent 108 minutes of critical care time, excluding procedures, in the care of this patient. I, Dr Rossi, spent 78 mins of critical care time according to split shared critical care billing guidelines.     Electronically signed by Brayan Rossi MD, 12/24/23, 5:54 PM EST.

## 2023-12-24 NOTE — PROCEDURES
Arterial Line Placement Procedure Note     Indication: Shock   Hypotension    Consent obtained: shock, emergent    Time Out: performed, yes    Anthony's Test: not applicable    Procedure: The skin over the right femoral artery was prepped with ChloraPrep and draped. Local anesthesia was applied with 1% lidocaine. An arterial catheter insertion was attempted under ultrasound guidance with arterial Arrow kit. The procedure was successful in first attempt.  The catheter was securely fastened to the skin with suture.      The transducer set was attached to the catheter and waveforms on the monitor were observed and found to be normal. The site was then dressed in a sterile fashion.    The patient tolerated the procedure well.    Complications: None    Electronically signed by BAKARI Arenas, 12/24/23, 3:25 PM EST.

## 2023-12-24 NOTE — SIGNIFICANT NOTE
"Family member at bedside opened door and stated \"something is not right.\" PCA entered room and called for help. RN entered room found patient cool, clammy, sweating, no pupil response, non-responsive to sternal rub, Kaitana roa breathing pattern. Called out to have an RRT called. Called for vitals cart and blood glucose machine to be brought to bedside. Obtained vitals, blood sugar, pushed Narcan. RN also obtained manual BP. RN stayed at bedside to assist with RRT. Code was called. RN remained at bedside and gave needed information. RN escorted family to CCU.   "

## 2023-12-24 NOTE — PROCEDURES
Procedure Name: Intubation     Indication: Respiratory failure    Consent obtained: emergent    Timeout: Performed with nursing staff prior to beginning procedure.     Medications used:  Pre-medications: Etomidate 20 mg IV  Paralytics: Rocuronium 50 mg IV    Procedure: Patient was placed in the supine position. Patient was preoxygenated with 100% O2 via BMV. RSI was initiated.  GlideScope was then used to visualize the vocal cords as an 8.0 ET tube was placed in between the cords without difficulty.  Position was verified by auscultation, condensate noted in ET tube and with Etco2 monitoring. Device was secured at 24 cm at lip. Patient was connected to the vent.     X-ray was ordered to correct tube placement.    The patient tolerated the procedure well.    Complications: None    Electronically signed by BAKARI Arenas 12/24/23 15:24 EST

## 2023-12-24 NOTE — PROCEDURES
Procedure Note    Central Line Placement Procedure Note    Indication: Shock    Consent obtained: emergent.    Time Out: performed at bedside    Procedure: The patient was positioned appropriately and the skin over the right internal jugular vein was prepped with ChloraPrep and draped in sterile fashion.  Local anesthesia over the insertion site was applied with 1% lidocaine.  A large bore needle was then advanced with ultrasound guidance until there was return of dark blood. Guidewire was inserted and site confirmed with ultrasound again. Site was dilated with dilator, and triple lumen catheter was then inserted into the vessel over the guidewire using the Seldinger technique.  All three ports showed good, free flowing blood return and easily flushed with saline.  The catheter was then securely fastened to the skin with sutures and covered with a sterile dressing.  A post-procedure x-ray is pending at this time.    The patient tolerated the procedure well.    Complications: None.Procedure Note

## 2023-12-24 NOTE — H&P
[facility] HISTORY AND PHYSICAL  Date: 2023   Patient Name: Danny Savage  : 1960  MRN: [id number]  Primary Care Physician:  Kodi Pichardo MD  Date of admission: 2023    Subjective   Subjective     Chief Complaint: Left flank pain, fevers    HPI:    Danny Savage is a 63 y.o. male with a past medical history of renal cell carcinoma of the left kidney status post partial nephrectomy in 2023, stable infrarenal abdominal aortic aneurysm presented to the ED for evaluation of worsening left lower back pain and fever since 3 days.  Patient previously had left flank pain in the early December and has been evaluated by outpatient urologist, CT scan abdomen pelvis at that time showed 4.5 cm fluid collection adjacent to the lower pole of the left kidney with a few foci of gas likely post operative seroma and has been treated symptomatically.  Patient was seen here in the ED yesterday left flank pain, imaging showed mildly decreased fluid collection at the partial left nephrectomy bed containing decreasing but persistent small gas bubbles.  Findings consistent with postoperative seroma/hematoma plus or minus infection.  Patient has been discharged home after discussing with Dr. Cai.  Pain got worse today and and has come to the ED for further evaluation.  Associated with nausea, vomiting, unable to tolerate oral liquids, fevers with a Tmax of 102.  Severe pain, 10/10 in the left flank region radiating to the anterior abdomen.  Denies any dysuria, hematuria, diarrhea.    In the ED, vital signs temperature 102.6, pulse 108, respiratory 14, blood pressure 156/97 on room air saturating around 94%.  Labs showed sodium 133, potassium 3.4, bicarb 20.2, anion gap 16.8, AST/ALT 43/54, normal lactic acid, Pro-Hunter yesterday was elevated 0.48, WBC 11.32, rest of the CMP and CBC with no significant findings, urinalysis showed none bacteria.  1/2 blood cultures yesterday  were positive for gram-positive cocci in pairs and chains possibly contamination.  COVID flu RSV negative.  CT abdomen pelvis with contrast showed stable fluid collection along the left nephrectomy bed, likely postoperative seroma/hematoma although infection cannot be excluded on imaging alone.  Received IV fluids, cefepime and vancomycin in the ED.  Case has been discussed with urologist on-call by ED physician, agreed to consult.  Patient has been admitted for further evaluation and management of sepsis, possible abscess of the left nephrectomy bed, possible seroma/hematoma of the left nephrectomy bed, hypokalemia, mild transaminitis      Personal History     Past Medical History:   Diagnosis Date   • Allergy    • Aortic aneurysm     4.7 just found has not f/u   • H/O Kidney stone    • Hiatal hernia    • Kidney mass     left   • Renal cancer          Past Surgical History:   Procedure Laterality Date   • KIDNEY STONE SURGERY     • NEPHRECTOMY PARTIAL Left 11/13/2023    Procedure: NEPHRECTOMY PARTIAL LAPAROSCOPIC WITH DAVINCI ROBOT, left;  Surgeon: Michelle Cai MD;  Location: [facility];  Service: Robotics - DaVinci;  Laterality: Left;   • URETEROSCOPY LASER LITHOTRIPSY WITH STENT INSERTION Left 08/18/2023    Procedure: CYSTOSCOPY URETEROSCOPY RETROGRADE PYELOGRAM STENT INSERTION, left;  Surgeon: Michelle Cai MD;  Location: [facility] OR;  Service: Urology;  Laterality: Left;         Family History   Problem Relation Age of Onset   • Cancer Mother         Skin Cancer   • Cancer Father         Throat Cancer   • Heart disease Father         Congestive Heart Failure   • Hypertension Father          Social History     Socioeconomic History   • Marital status:    Tobacco Use   • Smoking status: Former     Packs/day: 1.00     Years: 20.00     Additional pack years: 0.00     Total pack years: 20.00     Types: Cigarettes     Passive exposure: Past   • Smokeless tobacco: Never   • Tobacco comments:      Quit appx 10-15 years ago   Vaping Use   • Vaping Use: Never used   Substance and Sexual Activity   • Alcohol use: Never   • Drug use: Never   • Sexual activity: Defer     Partners: Female         Home Medications:  Azelastine-Fluticasone, Psyllium, baclofen, docusate sodium, loratadine, mycophenolate, and ondansetron    Allergies:  Allergies   Allergen Reactions   • Latex, Natural Rubber Hives       Review of Systems   All other systems reviewed and negative except as mentioned above in the HPI    Objective   Objective     Vitals:   Temp:  [100.3 °F (37.9 °C)-102.6 °F (39.2 °C)] 100.3 °F (37.9 °C)  Heart Rate:  [108-112] 112  Resp:  [14-16] 16  BP: (139-156)/(97) 139/97    Physical Exam    Constitutional: Awake, alert, no acute distress   Eyes: Pupils equal, sclerae anicteric, no conjunctival injection   HENT: NCAT, mucous membranes moist   Respiratory: Clear to auscultation bilaterally, nonlabored respirations    Cardiovascular: RRR, no murmurs, rubs, or gallops, palpable pedal pulses bilaterally   Gastrointestinal: Tenderness in the left flank and left paraspinal region, positive bowel sounds, soft, nontender, nondistended   Musculoskeletal: No bilateral ankle edema, no clubbing or cyanosis to extremities   Neurologic: Oriented x 3, speech clear   Skin: No rashes     Result Review    Result Review:  I have personally reviewed the results from the time of this admission to 12/24/2023 00:25 EST and agree with these findings:  [x]  Laboratory  []  Microbiology  [x]  Radiology  []  EKG/Telemetry   []  Cardiology/Vascular   []  Pathology  []  Old records  []  Other:        Imaging Results (Last 24 Hours)       Procedure Component Value Units Date/Time    CT Abdomen Pelvis With Contrast [[id number]] Collected: 12/23/23 2207     Updated: 12/23/23 2210    Narrative:      PROCEDURE: CT ABDOMEN PELVIS W CONTRAST     COMPARISON: [facility], CR, XR CHEST 1 VW, 12/23/2023, 21:43.  [facility],  CT, CT ABDOMEN PELVIS W CONTRAST, 12/22/2023, 8:56.     INDICATIONS: BACK PAIN. WORSENING SINCE YESTERDAY.     TECHNIQUE: After obtaining the patient's consent, CT images were created with non-ionic intravenous   contrast material.       PROTOCOL:   Standard imaging protocol performed      RADIATION:   DLP: 513.6mGy*cm    Automated exposure control was utilized to minimize radiation dose.   CONTRAST: 80cc Isovue 370 I.V.  LABS:   eGFR: >60ml/min/1.73m2     FINDINGS:   Within the lung bases is mild bibasilar atelectasis.     The liver, gallbladder, adrenal glands, right kidney, spleen, and pancreas are unremarkable.  A 2.9   x 1.4 cm fluid collection along the left nephrectomy bed appears unchanged, with a couple gas   locules.     The stomach appears normal.  The small bowel appears normal in caliber and configuration.  There is   sigmoid diverticulosis.  The appendix is not well visualized.  There is no ascites or loculated   collection.  No abnormally enlarged lymph nodes are identified.  A 5.1 cm infrarenal abdominal   aortic aneurysm appears unchanged.     The rectum, prostate, and urinary bladder are unremarkable.     No aggressive osseous lesions are identified.       Impression:         1. Stable fluid collection along left nephrectomy bed, likely a postoperative seroma/hematoma,   although infection cannot be excluded on imaging alone.  2. No new acute process identified within abdomen/pelvis.  3. Stable abdominal aortic aneurysm.            GELY MONTE MD         Electronically Signed and Approved By: GELY MONTE MD on 12/23/2023 at 22:07                     XR Chest 1 View [[id number]] Collected: 12/23/23 2149     Updated: 12/23/23 2152    Narrative:      PROCEDURE: XR CHEST 1 VW     COMPARISON: [facility], , XR CHEST 1 VW, 12/22/2023, 6:58.     INDICATIONS: fever     FINDINGS:   LUNGS: There is a calcified granuloma in the along the left mid lung.  No significant pulmonary    parenchymal abnormalities.    VASCULATURE: Normal.  Unremarkable pulmonary vasculature.    CARDIAC: Normal.  No cardiac silhouette abnormality or cardiomegaly.    MEDIASTINUM: Normal.  No visible mass or adenopathy.    PLEURA: Normal.  No effusion or pleural thickening.    BONES: Normal.  No fracture or visible bony lesion.    OTHER: Negative.         Impression:       No acute cardiopulmonary process identified.                  GELY MONTE MD         Electronically Signed and Approved By: GELY MONTE MD on 12/23/2023 at 21:49                              vancomycin, 20 mg/kg, Intravenous, Once         Assessment & Plan   Assessment / Plan     Assessment/Plan:   Sepsis, present on admission  Concern for abscess/seroma/hematoma of the left nephrectomy bed  Hypokalemia  Mild transaminitis  Blood cultures from 12/22, 1/ 2 positive for gram-positive cocci pairs and chains  History of left renal cell carcinoma s/p partial nephrectomy  Stable infrarenal abdominal aortic aneurysm 5.1 cm  Immunosuppressed state, on Chronic Mycophenolate, unsure of the reason    Plan  Admit to inpatient, remote telemetry  Continue cefepime and vancomycin  Follow-up on repeat blood cultures  40 mEq PO potassium, recheck and replete with a.m. labs  Pain control with IV opioids  Case discussed by ED physician with urologist on-call, DR. Ozuna, agreed to consult  N.p.o. except sips with meds  POCT glucose every 6 hours  Hypoglycemia protocol  Resume other appropriate home medications once reconciled      DVT prophylaxis:  No DVT prophylaxis order currently exists.    CODE STATUS:    Level Of Support Discussed With: Patient  Code Status (Patient has no pulse and is not breathing): CPR (Attempt to Resuscitate)  Medical Interventions (Patient has pulse or is breathing): Full Support      Admission Status:  I believe this patient meets inpatient status.    Part of this note may be an electronic transcription/translation of spoken  language to printed text using the Dragon Dictation System    Oswald Ridley MD

## 2023-12-24 NOTE — PROGRESS NOTES
[facility]   Hospitalist Progress Note  Date: 2023  Patient Name: Danny Savage  : 1960  MRN: [id number]  Date of admission: 2023  Room/Bed: [facility]      Subjective   Subjective     Chief Complaint: Left flank pain, fever    Summary:Danny Savage is a 63 y.o. male with past medical history of recently diagnosed renal cell carcinoma s/p partial left kidney nephrectomy in 2023 (follows Dr.O Espino as outpatient) and infrarenal aortic aneurysm (scan on  showing AAA of 4.7 cm) who presented with complaints of left lower back pain and fever since past 3 days.  Patient had CT abdomen scan done early December and was evaluated by urologist as outpatient.  Patient presented to ER 1 day prior with left flank pain where CT abdomen was obtained showing mildly decreased fluid collection at partial left nephrectomy bed containing decreasing but persistent small gas bubbles; findings consistent with postoperative seroma/hematoma; questionable infection.  Case was discussed with Dr. Spicer and patient was discharged home.  But patient's pain got worse on the day of presentation and was brought to ED.  Patient's left flank pain was severe, about 10 x 10 in intensity on presentation.  Patient was febrile with temp 102.6.  Lactic acid was normal.  Urinalysis was not significant for UTI.  CT abdomen pelvis with contrast showed stable fluid collection along the left nephrectomy bed, likely postoperative seroma/hematoma although infection cannot be ruled out. Abdominal aortic aneurysm of 5.1 cm.  Patient was started on IV antibiotics and was admitted as a case of possible abscess in the left nephrectomy bed.  Urology was consulted.    Interval Followup: Patient was complaining of severe pain and had received multiple doses of IV pain medications overnight.  Patient was also very lethargic this morning but alert and awake.  Patient's wife was at bedside.  Patient stated his pain is down to  3/10 in intensity during my evaluation.  He denied any fever, chills, rigors, chest pain or difficulty breathing.  ETCO2 was added given patient had received multiple opioids for pain control and seemed slightly lethargic.  Later afternoon, RRT was called given patient was unresponsive suddenly.  Vital signs showed blood pressure 42/32.  I was informed by the RN, went to bedside immediately.  Patient was pale and clammy with agonal breathing.  No pulse was felt which seem like PEA arrest on my evaluation; after which CODE BLUE was called.  Patient was given second dose of Narcan and was getting IV fluid bolus.  Before starting CPR, patient had feeble pulse after which CPR was held.  Patient was then intubated at bedside.  He had a lot of secretions which was aspirated during intubation.  He was then transferred to critical care unit.      Review of Systems    All systems reviewed and negative except for what is outlined above.      Objective   Objective     Vitals:   Temp:  [98.4 °F (36.9 °C)-102.6 °F (39.2 °C)] 98.4 °F (36.9 °C)  Heart Rate:  [] 101  Resp:  [14-18] 18  BP: (139-170)/(85-97) 170/92  Flow (L/min):  [1.5] 1.5    Physical Exam   General: Intubated.  Cardiovascular: RRR, no murmurs   Pulmonary: Bilateral decreased air entry, crackles appreciated.  No wheezing.    Gastrointestinal: S/ND/tenderness on left flank, +BS  Neuro: Currently intubated.  Was moving all extremities prior to that.        Result Review    Result Review:  I have personally reviewed these results:  [x]  Laboratory      Lab 12/24/23 0620 12/1960 12/23/23 1957 12/22/23  0815   WBC 13.03*  --  11.32* 13.64*   HEMOGLOBIN 12.0*  --  13.0 14.1   HEMATOCRIT 37.5  --  40.6 43.8   PLATELETS 171  --  188 222   NEUTROS ABS 11.25*  --  10.15* 12.59*   IMMATURE GRANS (ABS) 0.08*  --  0.03 0.05   LYMPHS ABS 0.64*  --  0.43* 0.01*   MONOS ABS 1.02*  --  0.69 0.96*   EOS ABS 0.01  --  0.00 0.01   MCV 88.4  --  87.7 87.8   PROCALCITONIN   --   --   --  0.48*   LACTATE  --  1.2  --  1.5         Lab 12/24/23  0620 12/23/23 2050 12/22/23 0815   SODIUM 133* 133* 135*   POTASSIUM 4.2 3.4* 3.6   CHLORIDE 98 96* 97*   CO2 23.3 20.2* 23.1   ANION GAP 11.7 16.8* 14.9   BUN 9 13 16   CREATININE 0.76 0.84 1.15   EGFR 99.7 96.8 70.6   GLUCOSE 128* 109* 160*   CALCIUM 7.9* 8.4* 9.3   MAGNESIUM 1.7  --  1.5*   PHOSPHORUS 1.8*  --   --          Lab 12/24/23 0620 12/23/23 2050 12/22/23 0815   TOTAL PROTEIN 6.3 6.8 7.8   ALBUMIN 3.3* 3.6 4.3   GLOBULIN 3.0 3.2 3.5   ALT (SGPT) 47* 54* 22   AST (SGOT) 31 43* 21   BILIRUBIN 0.6 0.8 0.5   ALK PHOS 105 112 94         Lab 12/22/23  0841 12/22/23 0815   PROBNP 165.7  --    HSTROP T  --  9                 Brief Urine Lab Results  (Last result in the past 365 days)        Color   Clarity   Blood   Leuk Est   Nitrite   Protein   CREAT   Urine HCG        12/23/23 2054 Yellow   Clear   Small (1+)   Negative   Negative   100 mg/dL (2+)                 [x]  Microbiology   Microbiology Results (last 10 days)       Procedure Component Value - Date/Time    COVID-19, FLU A/B, RSV PCR 1 HR TAT - Swab, Nasopharynx [[id number]]  (Normal) Collected: 12/23/23 2204    Lab Status: Final result Specimen: Swab from Nasopharynx Updated: 12/23/23 2303     COVID19 Not Detected     Influenza A PCR Not Detected     Influenza B PCR Not Detected     RSV, PCR Not Detected    Narrative:      Fact sheet for providers: https://www.fda.gov/media/179384/download    Fact sheet for patients: https://www.fda.gov/media/886355/download    Test performed by PCR.    Blood Culture - Blood, Arm, Right [[id number]]  (Abnormal) Collected: 12/22/23 0815    Lab Status: Preliminary result Specimen: Blood from Arm, Right Updated: 12/24/23 0637     Blood Culture Streptococcus pyogenes (Group A)     Isolated from Aerobic and Anaerobic Bottles     Gram Stain Aerobic Bottle Gram positive cocci in pairs and chains      Anaerobic Bottle Gram positive cocci in pairs and  chains    Blood Culture - Blood, Arm, Left [[id number]]  (Abnormal) Collected: 12/22/23 0815    Lab Status: Preliminary result Specimen: Blood from Arm, Left Updated: 12/24/23 0638     Blood Culture Streptococcus pyogenes (Group A)     Isolated from Aerobic and Anaerobic Bottles     Gram Stain Aerobic Bottle Gram positive cocci in pairs and chains      Anaerobic Bottle Gram positive cocci in pairs and chains    COVID PRE-OP / PRE-PROCEDURE SCREENING ORDER (NO ISOLATION) - Swab, Nasopharynx [[id number]]  (Normal) Collected: 12/22/23 0710    Lab Status: Final result Specimen: Swab from Nasopharynx Updated: 12/22/23 0802    Narrative:      The following orders were created for panel order COVID PRE-OP / PRE-PROCEDURE SCREENING ORDER (NO ISOLATION) - Swab, Nasopharynx.  Procedure                               Abnormality         Status                     ---------                               -----------         ------                     COVID-19, FLU A/B, RSV P...[[id number]]  Normal              Final result                 Please view results for these tests on the individual orders.    COVID-19, FLU A/B, RSV PCR 1 HR TAT - Swab, Nasopharynx [[id number]]  (Normal) Collected: 12/22/23 0710    Lab Status: Final result Specimen: Swab from Nasopharynx Updated: 12/22/23 0802     COVID19 Not Detected     Influenza A PCR Not Detected     Influenza B PCR Not Detected     RSV, PCR Not Detected    Narrative:      Fact sheet for providers: https://www.fda.gov/media/453073/download    Fact sheet for patients: https://www.fda.gov/media/290286/download    Test performed by PCR.          [x]  Radiology  CT Abdomen Pelvis With Contrast    Result Date: 12/23/2023    1. Stable fluid collection along left nephrectomy bed, likely a postoperative seroma/hematoma, although infection cannot be excluded on imaging alone. 2. No new acute process identified within abdomen/pelvis. 3. Stable abdominal aortic aneurysm.     GELY MONTE MD        Electronically Signed and Approved By: GELY MONTE MD on 12/23/2023 at 22:07             XR Chest 1 View    Result Date: 12/23/2023   No acute cardiopulmonary process identified.       GELY MONTE MD       Electronically Signed and Approved By: GELY MONTE MD on 12/23/2023 at 21:49             CT Abdomen Pelvis With Contrast    Result Date: 12/22/2023    1. Mildly decreased fluid collection at the partial left nephrectomy bed measuring 1.4 x 2.9 centimeter (previously 2 x 3.2 centimeter), containing decreasing but persistent small gas bubbles.  Findings favor postoperative seroma/hematoma +/-infection.  There does not appear to be a mature rim enhancing wall to suggest true abscess or drainable collection.  Decrease size argues against urinoma. 2. Grossly stable infrarenal abdominal aortic aneurysm with eccentric mural thrombus/hematoma measuring up to 5.1 centimeter maximally. 3. Hepatic steatosis. 4. Other chronic/ancillary finding similar to recent comparison.      DOMINIK NORTON MD       Electronically Signed and Approved By: DOMINIK NORTON MD on 12/22/2023 at 9:36             XR Chest 1 View    Result Date: 12/22/2023   No active cardiopulmonary disease       BERNIE SEXTON MD       Electronically Signed and Approved By: BERNIE SEXTON MD on 12/22/2023 at 7:25            []  EKG/Telemetry   []  Cardiology/Vascular   []  Pathology  []  Old records  []  Other:    Assessment & Plan   Assessment / Plan     Assessment:  Intubated, on mechanical ventilation  Acute hypoxic respiratory failure with agonal breathing  Shock, likely septic shock but hemorrhagic shock cannot be ruled out  Septic shock could be in the setting of perinephric abscess along with aspiration pneumonia  ?  Hemorrhagic shock given history of AAA which is expanding compared to prior scans  Streptococcus pyogenes bacteremia  Concern for abscess/seroma/hematoma left nephrectomy bed  Hypophosphatemia,  repleted  Leukocytosis      Plan:  Patient is currently being managed in Critical care unit, appreciate intensivist input.  Patient was found to be hypotensive with SBP in 40s. IV fluid bolus was started. Later Levophed was started. Later Vasopressin was added.  S/p 1 dose of 0.5 mg Epi.  S/p 1 dose of Hydrocortisone 100 mg IV stat. Started on Hydrocortisone 50 mg IV every 8 hours.  Blood culture growing Group A Strep pyogenes. On IV Vancomycin and IV cefepime. Clindamycin added. Continue.  Lactic acid uptrending.  Given patient has expanding AAA  with eccentric mural thrombus/hematoma measuring up to 5.1 centimeter maximally on CT abdomen of 12/22; there is also concern for aortic rupture; will obtain CTA abdomen stat to r/o rupture.  Stat lab including CBC, BMP, Ca, Mag, phos, lactic acid.  Urology following the patient; appreciate further input.  Rest management as per intensivist.       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 Vascular Surgery was consulted stat and massive transfusion protocol was initiated. Vascular surgery planning on urgent surgery.    DVT prophylaxis:  Mechanical DVT prophylaxis orders are present.    CODE STATUS:   Level Of Support Discussed With: Patient  Code Status (Patient has no pulse and is not breathing): CPR (Attempt to Resuscitate)  Medical Interventions (Patient has pulse or is breathing): Full Support      Electronically signed by Dick Chaves MD, 12/24/23, 8:23 AM EST.

## 2023-12-24 NOTE — PROGRESS NOTES
[facility]     Progress Note    Patient Name: Danny Savage  : 1960  MRN: [id number]  Primary Care Physician:  Kodi Pichardo MD  Date of admission: 2023    Subjective   Subjective     Chief Complaint: Patient is septic    Back Pain      Patient Reports patient unable to communicate he is intubated    Review of Systems   Musculoskeletal:  Positive for back pain.       Objective   Objective     Vitals:   Temp:  [98.4 °F (36.9 °C)-102.6 °F (39.2 °C)] 98.6 °F (37 °C)  Heart Rate:  [] 156  Resp:  [14-26] 26  BP: ()/(32-97) 67/53  Flow (L/min):  [1.5] 1.5  FiO2 (%):  [60 %-100 %] 60 %    Physical Exam   Patient is intubated is not awake or alert.  Result Review    Result Review:  I have personally reviewed the results from the time of this admission to 2023 17:30 EST and agree with these findings:  []  Laboratory list / accordion  []  Microbiology  []  Radiology  []  EKG/Telemetry   []  Cardiology/Vascular   []  Pathology  []  Old records  []  Other:  Most notable findings include: Potential pocket of abscess left side      Assessment & Plan   Assessment / Plan     Brief Patient Summary:  Danny Savage is a 63 y.o. male who patient underwent left partial nephrectomy in November of this year.  He was having significant pain around  and had a CT which showed a 4.5 cm fluid collection with some gas bubbles inside it.  Patient then comes into the emergency room yesterday with severe left flank pain and abdominal pain and a repeat CT scan shows the collection of fluid on the left side now down to 2.9 cm but still has some air bubbles in there.  Patient was doing well earlier today when he was seen but gradually became septic and went into shock and was intubated and sent to ICU.  Currently he is on pressors.    Active Hospital Problems:  Active Hospital Problems    Diagnosis    • **Left flank pain      Plan:   I discussed the case with Dr. Recio the interventional  radiologist and discussed the need to place a drain and that 3 cm fluid collection next to his partial nephrectomy site.  In all the imaging studies and tests the only possible source of infection and sepsis is that small fluid collection next to his kidney.  Dr. Recio discussed the case with Dr. Ordaz who looked at his CT scan and chest x-ray and believes that there are infiltrates in the lower lungs and that may be a source of sepsis.  Dr. Recio however states that the collection may be small and difficult to put a drain into it.  I will discuss the case with the intensivist.    DVT prophylaxis:  Mechanical DVT prophylaxis orders are present.    CODE STATUS:    Level Of Support Discussed With: Patient  Code Status (Patient has no pulse and is not breathing): CPR (Attempt to Resuscitate)  Medical Interventions (Patient has pulse or is breathing): Full Support    Disposition:  I expect patient to be discharged to floor.    Iliana Ozuna MD

## 2023-12-24 NOTE — PLAN OF CARE
"Goal Outcome Evaluation:  Plan of Care Reviewed With: patient        Progress: no change  Outcome Evaluation: patient is alert and oriented x4 and on 2LNC. direct admit this shift. screened positive for sepsis, MD aware. vitals signs monitored and blood glucose monitored. IV antibiotics and fluids given per orders. PRN pain medication provided throughout shift, pt states \"its not touching this pain I am having.\" MD notified and aware. patient also medicated for indigestion x2 during shift. patients LUQ and LLQ is tender, pain is radiating to flank and RLQ/RUQ. patient expresses frustration stating \"I just dont understand why this pain wont go away.\" no other concerns at this time.         "

## 2023-12-24 NOTE — CONSULTS
[facility]   Consult Note    Patient Name: Danny Savage  : 1960  MRN: [id number]  Primary Care Physician:  Kodi Pichardo MD  Referring Physician: No ref. provider found  Date of admission: 2023    Consults  Subjective   Subjective     Reason for Consult/ Chief Complaint: Left flank pain    History of Present Illness  Danny Savage is a 63 y.o. male patient in November of this year underwent a left partial nephrectomy.  Patient was doing well until he developed severe left flank pain.  CT scan on  showed a 4.5 cm collection of fluid on the left side at the site of the partial nephrectomy.  Patient states that the pain has continued and got worse 2 days ago with severe nausea and vomiting and fevers up to 102.6.  Another CT scan was performed yesterday and it shows that the fluid collection has decreased to 2.9 cm and it is not consistent with an abscess.  The kidney itself looks good and there is no hydronephrosis or obstruction.  There is no collection of urinoma.    Review of Systems   Review of systems 10 points was performed and it is negative  Personal History     Past Medical History:   Diagnosis Date    Allergy     Aortic aneurysm     4.7 just found has not f/u    H/O Kidney stone     Hiatal hernia     Kidney mass     left    Renal cancer        Past Surgical History:   Procedure Laterality Date    KIDNEY STONE SURGERY      NEPHRECTOMY PARTIAL Left 2023    Procedure: NEPHRECTOMY PARTIAL LAPAROSCOPIC WITH DAVINCI ROBOT, left;  Surgeon: Michelle Cai MD;  Location: [facility];  Service: Robotics - DaVinci;  Laterality: Left;    URETEROSCOPY LASER LITHOTRIPSY WITH STENT INSERTION Left 2023    Procedure: CYSTOSCOPY URETEROSCOPY RETROGRADE PYELOGRAM STENT INSERTION, left;  Surgeon: Michelle Cai MD;  Location: [facility];  Service: Urology;  Laterality: Left;       Family History: family history includes Cancer in his father and mother;  Heart disease in his father; Hypertension in his father. Otherwise pertinent FHx was reviewed and not pertinent to current issue.    Social History:  reports that he has quit smoking. His smoking use included cigarettes. He has a 20.00 pack-year smoking history. He has been exposed to tobacco smoke. He has never used smokeless tobacco. He reports that he does not drink alcohol and does not use drugs.    Home Medications:   Psyllium, baclofen, docusate sodium, loratadine, mycophenolate, and ondansetron    Allergies:  Allergies   Allergen Reactions    Latex, Natural Rubber Hives       Objective    Objective     Vitals:  Temp:  [98.4 °F (36.9 °C)-102.6 °F (39.2 °C)] 98.4 °F (36.9 °C)  Heart Rate:  [] 101  Resp:  [14-18] 18  BP: (139-170)/(85-97) 170/92  Flow (L/min):  [1.5] 1.5    Physical Exam  He is awake alert oriented x 3.  Afebrile at this moment vital signs stable.  Temperature was 102.6 at admission.  HEENT: Negative no lesions seen neck is supple.  Cardiovascular is regular rate and rhythm  Lungs inspiration and expiration are normal without any shortness of breath or pain.  Abdomen soft nontender nondistended no rebound guarding or rigidity is noted.  Incisions have healed well.  There is significant left CVA tenderness.  No tenderness on the right side.  Extremities no clubbing cyanosis or edema.  Result Review    Result Review:  I have personally reviewed the results from the time of this admission to 12/24/2023 09:37 EST and agree with these findings:  []  Laboratory list / accordion  []  Microbiology  []  Radiology  []  EKG/Telemetry   []  Cardiology/Vascular   []  Pathology  []  Old records  []  Other:  Most notable findings include: Fluid collection on the left side 2.9 cm.      Assessment & Plan   Assessment / Plan     Brief Patient Summary:  Danny Savage is a 63 y.o. male who patient has undergone left partial nephrectomy in November.  He has developed severe left flank pain that radiates  he says to the middle of the back and around the left side of the abdomen.  CT scan reveals a fluid collection that is getting smaller.    Active Hospital Problems:  Active Hospital Problems    Diagnosis     **Left flank pain      Plan:   Patient continues to have severe pain.  The CT scan shows a fluid collection it was 4.5 cm on December 6 and had some air bubbles within it the fluid collection has gotten smaller by 1 cm now and there are still some small air bubbles within it.  This could represent an abscess but since his has gotten smaller it is not consistent with an abscess or an infection.  However I do recommend given his temperature of 102.6 to continue with aggressive IV antibiotics therapy and then monitor him to see how he does.  I will follow him with you please call me for any questions.    Iliana Ozuna MD

## 2023-12-24 NOTE — NURSING NOTE
RRT called to patient room 4005. Upon arrival patient found to be bradypneic with a blood pressure in the 50's systolic. Patient unresponsive to voice or painful stimuli. Blood sugar 132. Pt was in NSR on the monitor with rate in the 70-80s. 500ml bolus started per protocol. Dr. Chaves notified. EKG ordered per protocol. Liv Gomez NP notified of critically ill status of patient. After no response in blood pressure and alertness from bolus, and decline in respiration rate code blue called. Follow code documentation in for continuing care notes.

## 2023-12-25 LAB
ALBUMIN SERPL-MCNC: 1.8 G/DL (ref 3.5–5.2)
ALBUMIN SERPL-MCNC: 2.2 G/DL (ref 3.5–5.2)
ALBUMIN SERPL-MCNC: 2.4 G/DL (ref 3.5–5.2)
ALBUMIN SERPL-MCNC: 2.4 G/DL (ref 3.5–5.2)
ALBUMIN/GLOB SERPL: 1.7 G/DL
ALBUMIN/GLOB SERPL: 1.8 G/DL
ALBUMIN/GLOB SERPL: 2 G/DL
ALBUMIN/GLOB SERPL: 2.7 G/DL
ALP SERPL-CCNC: 27 U/L (ref 39–117)
ALP SERPL-CCNC: 35 U/L (ref 39–117)
ALP SERPL-CCNC: 39 U/L (ref 39–117)
ALP SERPL-CCNC: 48 U/L (ref 39–117)
ALT SERPL W P-5'-P-CCNC: 244 U/L (ref 1–41)
ALT SERPL W P-5'-P-CCNC: 302 U/L (ref 1–41)
ALT SERPL W P-5'-P-CCNC: 550 U/L (ref 1–41)
ALT SERPL W P-5'-P-CCNC: 846 U/L (ref 1–41)
AMYLASE SERPL-CCNC: 194 U/L (ref 28–100)
ANION GAP SERPL CALCULATED.3IONS-SCNC: 13.6 MMOL/L (ref 5–15)
ANION GAP SERPL CALCULATED.3IONS-SCNC: 14.1 MMOL/L (ref 5–15)
ANION GAP SERPL CALCULATED.3IONS-SCNC: 16.3 MMOL/L (ref 5–15)
ANION GAP SERPL CALCULATED.3IONS-SCNC: 25.6 MMOL/L (ref 5–15)
ANISOCYTOSIS BLD QL: ABNORMAL
APTT PPP: 111 SECONDS (ref 24.2–34.2)
APTT PPP: 45 SECONDS (ref 24.2–34.2)
ARTERIAL PATENCY WRIST A: ABNORMAL
AST SERPL-CCNC: 444 U/L (ref 1–40)
AST SERPL-CCNC: 493 U/L (ref 1–40)
AST SERPL-CCNC: 591 U/L (ref 1–40)
AST SERPL-CCNC: 863 U/L (ref 1–40)
BACTERIA SPEC AEROBE CULT: ABNORMAL
BACTERIA SPEC AEROBE CULT: ABNORMAL
BASE EXCESS BLDA CALC-SCNC: -10.4 MMOL/L (ref -2–2)
BASE EXCESS BLDA CALC-SCNC: -17.7 MMOL/L (ref -2–2)
BASE EXCESS BLDA CALC-SCNC: -2.6 MMOL/L (ref -2–2)
BASE EXCESS BLDA CALC-SCNC: 0.1 MMOL/L (ref -2–2)
BDY SITE: ABNORMAL
BH CV ECHO MEAS - LVOT AREA: 3.1 CM2
BH CV ECHO MEAS - LVOT DIAM: 2 CM
BILIRUB SERPL-MCNC: 0.4 MG/DL (ref 0–1.2)
BILIRUB SERPL-MCNC: 1.2 MG/DL (ref 0–1.2)
BILIRUB SERPL-MCNC: 1.4 MG/DL (ref 0–1.2)
BILIRUB SERPL-MCNC: 1.7 MG/DL (ref 0–1.2)
BUN SERPL-MCNC: 12 MG/DL (ref 8–23)
BUN SERPL-MCNC: 14 MG/DL (ref 8–23)
BUN SERPL-MCNC: 17 MG/DL (ref 8–23)
BUN SERPL-MCNC: 19 MG/DL (ref 8–23)
BUN/CREAT SERPL: 7.7 (ref 7–25)
BUN/CREAT SERPL: 9 (ref 7–25)
BUN/CREAT SERPL: 9 (ref 7–25)
BUN/CREAT SERPL: 9.1 (ref 7–25)
BURR CELLS BLD QL SMEAR: ABNORMAL
BURR CELLS BLD QL SMEAR: ABNORMAL
CA-I BLDA-SCNC: 1.01 MMOL/L (ref 1.13–1.32)
CA-I BLDA-SCNC: 1.09 MMOL/L (ref 1.13–1.32)
CA-I BLDA-SCNC: 1.16 MMOL/L (ref 1.13–1.32)
CA-I BLDA-SCNC: 1.23 MMOL/L (ref 1.13–1.32)
CALCIUM SPEC-SCNC: 7.9 MG/DL (ref 8.6–10.5)
CALCIUM SPEC-SCNC: 7.9 MG/DL (ref 8.6–10.5)
CALCIUM SPEC-SCNC: 8.1 MG/DL (ref 8.6–10.5)
CALCIUM SPEC-SCNC: 9.4 MG/DL (ref 8.6–10.5)
CHLORIDE BLDA-SCNC: 100 MMOL/L (ref 98–106)
CHLORIDE BLDA-SCNC: 103 MMOL/L (ref 98–106)
CHLORIDE BLDA-SCNC: 99 MMOL/L (ref 98–106)
CHLORIDE BLDA-SCNC: 99 MMOL/L (ref 98–106)
CHLORIDE SERPL-SCNC: 100 MMOL/L (ref 98–107)
CHLORIDE SERPL-SCNC: 102 MMOL/L (ref 98–107)
CHLORIDE SERPL-SCNC: 98 MMOL/L (ref 98–107)
CHLORIDE SERPL-SCNC: 99 MMOL/L (ref 98–107)
CO2 SERPL-SCNC: 15.4 MMOL/L (ref 22–29)
CO2 SERPL-SCNC: 22.7 MMOL/L (ref 22–29)
CO2 SERPL-SCNC: 25.9 MMOL/L (ref 22–29)
CO2 SERPL-SCNC: 26.4 MMOL/L (ref 22–29)
COHGB MFR BLD: 0.3 % (ref 0–1.5)
COHGB MFR BLD: 0.4 % (ref 0–1.5)
CREAT SERPL-MCNC: 1.33 MG/DL (ref 0.76–1.27)
CREAT SERPL-MCNC: 1.54 MG/DL (ref 0.76–1.27)
CREAT SERPL-MCNC: 1.89 MG/DL (ref 0.76–1.27)
CREAT SERPL-MCNC: 2.47 MG/DL (ref 0.76–1.27)
D-LACTATE SERPL-SCNC: 11.4 MMOL/L (ref 0.5–2)
D-LACTATE SERPL-SCNC: 18.6 MMOL/L (ref 0.5–2)
D-LACTATE SERPL-SCNC: 4.9 MMOL/L (ref 0.5–2)
D-LACTATE SERPL-SCNC: 6.6 MMOL/L (ref 0.5–2)
D-LACTATE SERPL-SCNC: 7.5 MMOL/L (ref 0.5–2)
D-LACTATE SERPL-SCNC: 9.5 MMOL/L (ref 0.5–2)
DEPRECATED RDW RBC AUTO: 43.9 FL (ref 37–54)
DEPRECATED RDW RBC AUTO: 46.3 FL (ref 37–54)
DEPRECATED RDW RBC AUTO: 47.7 FL (ref 37–54)
DEPRECATED RDW RBC AUTO: 51.1 FL (ref 37–54)
EGFRCR SERPLBLD CKD-EPI 2021: 28.2 ML/MIN/1.73
EGFRCR SERPLBLD CKD-EPI 2021: 38.9 ML/MIN/1.73
EGFRCR SERPLBLD CKD-EPI 2021: 49.7 ML/MIN/1.73
EGFRCR SERPLBLD CKD-EPI 2021: 59.3 ML/MIN/1.73
ERYTHROCYTE [DISTWIDTH] IN BLOOD BY AUTOMATED COUNT: 14.4 % (ref 12.3–15.4)
ERYTHROCYTE [DISTWIDTH] IN BLOOD BY AUTOMATED COUNT: 15 % (ref 12.3–15.4)
ERYTHROCYTE [DISTWIDTH] IN BLOOD BY AUTOMATED COUNT: 15.5 % (ref 12.3–15.4)
ERYTHROCYTE [DISTWIDTH] IN BLOOD BY AUTOMATED COUNT: 15.5 % (ref 12.3–15.4)
FHHB: 11.5 % (ref 0–5)
FHHB: 5.3 % (ref 0–5)
FHHB: 7.6 % (ref 0–5)
FHHB: 9.9 % (ref 0–5)
FIBRINOGEN PPP-MCNC: 135 MG/DL (ref 215–521)
FIBRINOGEN PPP-MCNC: 158 MG/DL (ref 215–521)
FIBRINOGEN PPP-MCNC: 163 MG/DL (ref 215–521)
FIBRINOGEN PPP-MCNC: 169 MG/DL (ref 215–521)
FIBRINOGEN PPP-MCNC: <60 MG/DL (ref 215–521)
GAS FLOW AIRWAY: ABNORMAL L/MIN
GLOBULIN UR ELPH-MCNC: 0.9 GM/DL
GLOBULIN UR ELPH-MCNC: 1 GM/DL
GLOBULIN UR ELPH-MCNC: 1.1 GM/DL
GLOBULIN UR ELPH-MCNC: 1.4 GM/DL
GLUCOSE BLDA-MCNC: 254 MG/DL (ref 70–99)
GLUCOSE BLDA-MCNC: 265 MG/DL (ref 70–99)
GLUCOSE BLDA-MCNC: 265 MG/DL (ref 70–99)
GLUCOSE BLDA-MCNC: 280 MG/DL (ref 70–99)
GLUCOSE BLDC GLUCOMTR-MCNC: 186 MG/DL (ref 70–99)
GLUCOSE BLDC GLUCOMTR-MCNC: 239 MG/DL (ref 70–99)
GLUCOSE SERPL-MCNC: 186 MG/DL (ref 65–99)
GLUCOSE SERPL-MCNC: 276 MG/DL (ref 65–99)
GLUCOSE SERPL-MCNC: 307 MG/DL (ref 65–99)
GLUCOSE SERPL-MCNC: 328 MG/DL (ref 65–99)
GRAM STN SPEC: ABNORMAL
HCO3 BLDA-SCNC: 12.8 MMOL/L (ref 22–26)
HCO3 BLDA-SCNC: 16.5 MMOL/L (ref 22–26)
HCO3 BLDA-SCNC: 22.5 MMOL/L (ref 22–26)
HCO3 BLDA-SCNC: 24.9 MMOL/L (ref 22–26)
HCT VFR BLD AUTO: 16.7 % (ref 37.5–51)
HCT VFR BLD AUTO: 18.7 % (ref 37.5–51)
HCT VFR BLD AUTO: 33.9 % (ref 37.5–51)
HCT VFR BLD AUTO: 42.6 % (ref 37.5–51)
HGB BLD-MCNC: 11.6 G/DL (ref 13–17.7)
HGB BLD-MCNC: 13.8 G/DL (ref 13–17.7)
HGB BLD-MCNC: 5.7 G/DL (ref 13–17.7)
HGB BLD-MCNC: 6.6 G/DL (ref 13–17.7)
HGB BLDA-MCNC: 11.8 G/DL (ref 13.8–16.4)
HGB BLDA-MCNC: 12.2 G/DL (ref 13.8–16.4)
HGB BLDA-MCNC: 13.9 G/DL (ref 13.8–16.4)
HGB BLDA-MCNC: 6.7 G/DL (ref 13.8–16.4)
INHALED O2 CONCENTRATION: 40 %
INHALED O2 CONCENTRATION: 40 %
INHALED O2 CONCENTRATION: 50 %
INHALED O2 CONCENTRATION: 50 %
INR PPP: 1.39 (ref 0.86–1.15)
INR PPP: 1.66 (ref 0.86–1.15)
INR PPP: 1.76 (ref 0.86–1.15)
INR PPP: 2.15 (ref 0.86–1.15)
ISOLATED FROM: ABNORMAL
ISOLATED FROM: ABNORMAL
LACTATE BLDA-SCNC: 10.14 MMOL/L (ref 0.5–2)
LACTATE BLDA-SCNC: 5.16 MMOL/L (ref 0.5–2)
LACTATE BLDA-SCNC: 5.99 MMOL/L (ref 0.5–2)
LACTATE BLDA-SCNC: 8.24 MMOL/L (ref 0.5–2)
LARGE PLATELETS: ABNORMAL
LIPASE SERPL-CCNC: 20 U/L (ref 13–60)
LYMPHOCYTES # BLD MANUAL: 0.5 10*3/MM3 (ref 0.7–3.1)
LYMPHOCYTES NFR BLD MANUAL: 7 % (ref 5–12)
MACROCYTES BLD QL SMEAR: ABNORMAL
MAGNESIUM SERPL-MCNC: 1.9 MG/DL (ref 1.6–2.4)
MAGNESIUM SERPL-MCNC: 2.2 MG/DL (ref 1.6–2.4)
MAGNESIUM SERPL-MCNC: 2.2 MG/DL (ref 1.6–2.4)
MAGNESIUM SERPL-MCNC: 2.5 MG/DL (ref 1.6–2.4)
MCH RBC QN AUTO: 28.8 PG (ref 26.6–33)
MCH RBC QN AUTO: 28.9 PG (ref 26.6–33)
MCH RBC QN AUTO: 29 PG (ref 26.6–33)
MCH RBC QN AUTO: 29.5 PG (ref 26.6–33)
MCHC RBC AUTO-ENTMCNC: 32.4 G/DL (ref 31.5–35.7)
MCHC RBC AUTO-ENTMCNC: 34.1 G/DL (ref 31.5–35.7)
MCHC RBC AUTO-ENTMCNC: 34.2 G/DL (ref 31.5–35.7)
MCHC RBC AUTO-ENTMCNC: 35.3 G/DL (ref 31.5–35.7)
MCV RBC AUTO: 83.5 FL (ref 79–97)
MCV RBC AUTO: 84.3 FL (ref 79–97)
MCV RBC AUTO: 84.3 FL (ref 79–97)
MCV RBC AUTO: 89.5 FL (ref 79–97)
METAMYELOCYTES NFR BLD MANUAL: 1 % (ref 0–0)
METHGB BLD QL: 0.2 % (ref 0–1.5)
METHGB BLD QL: 0.2 % (ref 0–1.5)
METHGB BLD QL: 0.3 % (ref 0–1.5)
METHGB BLD QL: 0.3 % (ref 0–1.5)
MICROCYTES BLD QL: ABNORMAL
MODALITY: ABNORMAL
MONOCYTES # BLD: 0.58 10*3/MM3 (ref 0.1–0.9)
NEUTROPHILS # BLD AUTO: 7.14 10*3/MM3 (ref 1.7–7)
NEUTROPHILS NFR BLD MANUAL: 82 % (ref 42.7–76)
NEUTS BAND NFR BLD MANUAL: 4 % (ref 0–5)
NEUTS VAC BLD QL SMEAR: ABNORMAL
NOTE: ABNORMAL
OVALOCYTES BLD QL SMEAR: ABNORMAL
OXYHGB MFR BLDV: 87.9 % (ref 94–99)
OXYHGB MFR BLDV: 89.6 % (ref 94–99)
OXYHGB MFR BLDV: 91.7 % (ref 94–99)
OXYHGB MFR BLDV: 94.2 % (ref 94–99)
PCO2 BLDA: 40 MM HG (ref 35–45)
PCO2 BLDA: 40.1 MM HG (ref 35–45)
PCO2 BLDA: 40.9 MM HG (ref 35–45)
PCO2 BLDA: 49.5 MM HG (ref 35–45)
PH BLDA: 7.03 PH UNITS (ref 7.35–7.45)
PH BLDA: 7.23 PH UNITS (ref 7.35–7.45)
PH BLDA: 7.37 PH UNITS (ref 7.35–7.45)
PH BLDA: 7.4 PH UNITS (ref 7.35–7.45)
PHOSPHATE SERPL-MCNC: 3.2 MG/DL (ref 2.5–4.5)
PHOSPHATE SERPL-MCNC: 3.5 MG/DL (ref 2.5–4.5)
PHOSPHATE SERPL-MCNC: 3.8 MG/DL (ref 2.5–4.5)
PHOSPHATE SERPL-MCNC: 9 MG/DL (ref 2.5–4.5)
PLATELET # BLD AUTO: 105 10*3/MM3 (ref 140–450)
PLATELET # BLD AUTO: 41 10*3/MM3 (ref 140–450)
PLATELET # BLD AUTO: 62 10*3/MM3 (ref 140–450)
PLATELET # BLD AUTO: 69 10*3/MM3 (ref 140–450)
PMV BLD AUTO: 10.2 FL (ref 6–12)
PMV BLD AUTO: 10.9 FL (ref 6–12)
PMV BLD AUTO: 11.6 FL (ref 6–12)
PMV BLD AUTO: 9.6 FL (ref 6–12)
PO2 BLD: 123 MM[HG] (ref 0–500)
PO2 BLD: 158 MM[HG] (ref 0–500)
PO2 BLD: 170 MM[HG] (ref 0–500)
PO2 BLD: 171 MM[HG] (ref 0–500)
PO2 BLDA: 61.3 MM HG (ref 80–100)
PO2 BLDA: 68.1 MM HG (ref 80–100)
PO2 BLDA: 68.2 MM HG (ref 80–100)
PO2 BLDA: 79.2 MM HG (ref 80–100)
POLYCHROMASIA BLD QL SMEAR: ABNORMAL
POTASSIUM BLDA-SCNC: 3.35 MMOL/L (ref 3.5–5)
POTASSIUM BLDA-SCNC: 3.39 MMOL/L (ref 3.5–5)
POTASSIUM BLDA-SCNC: 3.67 MMOL/L (ref 3.5–5)
POTASSIUM BLDA-SCNC: 4.03 MMOL/L (ref 3.5–5)
POTASSIUM SERPL-SCNC: 3.6 MMOL/L (ref 3.5–5.2)
POTASSIUM SERPL-SCNC: 3.7 MMOL/L (ref 3.5–5.2)
POTASSIUM SERPL-SCNC: 4.1 MMOL/L (ref 3.5–5.2)
POTASSIUM SERPL-SCNC: 4.5 MMOL/L (ref 3.5–5.2)
PROCALCITONIN SERPL-MCNC: 2.07 NG/ML (ref 0–0.25)
PROCALCITONIN SERPL-MCNC: 8.45 NG/ML (ref 0–0.25)
PROT SERPL-MCNC: 2.8 G/DL (ref 6–8.5)
PROT SERPL-MCNC: 3.3 G/DL (ref 6–8.5)
PROT SERPL-MCNC: 3.3 G/DL (ref 6–8.5)
PROT SERPL-MCNC: 3.8 G/DL (ref 6–8.5)
PROTHROMBIN TIME: 17.4 SECONDS (ref 11.8–14.9)
PROTHROMBIN TIME: 19.9 SECONDS (ref 11.8–14.9)
PROTHROMBIN TIME: 20.8 SECONDS (ref 11.8–14.9)
PROTHROMBIN TIME: 24.3 SECONDS (ref 11.8–14.9)
RBC # BLD AUTO: 1.98 10*6/MM3 (ref 4.14–5.8)
RBC # BLD AUTO: 2.24 10*6/MM3 (ref 4.14–5.8)
RBC # BLD AUTO: 4.02 10*6/MM3 (ref 4.14–5.8)
RBC # BLD AUTO: 4.76 10*6/MM3 (ref 4.14–5.8)
SAO2 % BLDCOA: 88.4 % (ref 95–99)
SAO2 % BLDCOA: 90.1 % (ref 95–99)
SAO2 % BLDCOA: 92.3 % (ref 95–99)
SAO2 % BLDCOA: 94.7 % (ref 95–99)
SMALL PLATELETS BLD QL SMEAR: ABNORMAL
SODIUM BLDA-SCNC: 133.4 MMOL/L (ref 136–146)
SODIUM BLDA-SCNC: 134.8 MMOL/L (ref 136–146)
SODIUM BLDA-SCNC: 135.1 MMOL/L (ref 136–146)
SODIUM BLDA-SCNC: 137 MMOL/L (ref 136–146)
SODIUM SERPL-SCNC: 137 MMOL/L (ref 136–145)
SODIUM SERPL-SCNC: 140 MMOL/L (ref 136–145)
SODIUM SERPL-SCNC: 140 MMOL/L (ref 136–145)
SODIUM SERPL-SCNC: 142 MMOL/L (ref 136–145)
VANCOMYCIN SERPL-MCNC: 24 MCG/ML (ref 5–40)
VARIANT LYMPHS NFR BLD MANUAL: 3 % (ref 0–5)
VARIANT LYMPHS NFR BLD MANUAL: 3 % (ref 19.6–45.3)
WBC NRBC COR # BLD AUTO: 10.74 10*3/MM3 (ref 3.4–10.8)
WBC NRBC COR # BLD AUTO: 10.77 10*3/MM3 (ref 3.4–10.8)
WBC NRBC COR # BLD AUTO: 7.18 10*3/MM3 (ref 3.4–10.8)
WBC NRBC COR # BLD AUTO: 8.3 10*3/MM3 (ref 3.4–10.8)

## 2023-12-25 PROCEDURE — P9100 PATHOGEN TEST FOR PLATELETS: HCPCS

## 2023-12-25 PROCEDURE — 82150 ASSAY OF AMYLASE: CPT | Performed by: SURGERY

## 2023-12-25 PROCEDURE — 25010000002 VASOPRESSIN 0.2 UNIT/ML SOLUTION: Performed by: NURSE PRACTITIONER

## 2023-12-25 PROCEDURE — P9037 PLATE PHERES LEUKOREDU IRRAD: HCPCS

## 2023-12-25 PROCEDURE — P9047 ALBUMIN (HUMAN), 25%, 50ML: HCPCS | Performed by: NURSE PRACTITIONER

## 2023-12-25 PROCEDURE — 25010000002 FENTANYL CITRATE (PF) 50 MCG/ML SOLUTION

## 2023-12-25 PROCEDURE — P9016 RBC LEUKOCYTES REDUCED: HCPCS

## 2023-12-25 PROCEDURE — 80202 ASSAY OF VANCOMYCIN: CPT | Performed by: STUDENT IN AN ORGANIZED HEALTH CARE EDUCATION/TRAINING PROGRAM

## 2023-12-25 PROCEDURE — 25810000003 SODIUM CHLORIDE 0.9 % SOLUTION: Performed by: NURSE PRACTITIONER

## 2023-12-25 PROCEDURE — 85384 FIBRINOGEN ACTIVITY: CPT | Performed by: NURSE PRACTITIONER

## 2023-12-25 PROCEDURE — 83605 ASSAY OF LACTIC ACID: CPT | Performed by: STUDENT IN AN ORGANIZED HEALTH CARE EDUCATION/TRAINING PROGRAM

## 2023-12-25 PROCEDURE — 82805 BLOOD GASES W/O2 SATURATION: CPT | Performed by: SURGERY

## 2023-12-25 PROCEDURE — 25010000002 CALCIUM GLUCONATE 2-0.675 GM/100ML-% SOLUTION: Performed by: NURSE PRACTITIONER

## 2023-12-25 PROCEDURE — 25010000002 CEFEPIME PER 500 MG: Performed by: STUDENT IN AN ORGANIZED HEALTH CARE EDUCATION/TRAINING PROGRAM

## 2023-12-25 PROCEDURE — 82375 ASSAY CARBOXYHB QUANT: CPT | Performed by: SURGERY

## 2023-12-25 PROCEDURE — 99291 CRITICAL CARE FIRST HOUR: CPT | Performed by: INTERNAL MEDICINE

## 2023-12-25 PROCEDURE — 85027 COMPLETE CBC AUTOMATED: CPT | Performed by: INTERNAL MEDICINE

## 2023-12-25 PROCEDURE — 0 DEXTROSE 5 % SOLUTION: Performed by: INTERNAL MEDICINE

## 2023-12-25 PROCEDURE — 25010000002 PROPOFOL 10 MG/ML EMULSION: Performed by: NURSE PRACTITIONER

## 2023-12-25 PROCEDURE — 86900 BLOOD TYPING SEROLOGIC ABO: CPT

## 2023-12-25 PROCEDURE — 85025 COMPLETE CBC W/AUTO DIFF WBC: CPT | Performed by: NURSE PRACTITIONER

## 2023-12-25 PROCEDURE — 99232 SBSQ HOSP IP/OBS MODERATE 35: CPT | Performed by: STUDENT IN AN ORGANIZED HEALTH CARE EDUCATION/TRAINING PROGRAM

## 2023-12-25 PROCEDURE — 83735 ASSAY OF MAGNESIUM: CPT | Performed by: NURSE PRACTITIONER

## 2023-12-25 PROCEDURE — 80053 COMPREHEN METABOLIC PANEL: CPT | Performed by: NURSE PRACTITIONER

## 2023-12-25 PROCEDURE — 85730 THROMBOPLASTIN TIME PARTIAL: CPT | Performed by: NURSE PRACTITIONER

## 2023-12-25 PROCEDURE — 25010000002 MAGNESIUM SULFATE 2 GM/50ML SOLUTION: Performed by: INTERNAL MEDICINE

## 2023-12-25 PROCEDURE — P9035 PLATELET PHERES LEUKOREDUCED: HCPCS

## 2023-12-25 PROCEDURE — 85730 THROMBOPLASTIN TIME PARTIAL: CPT | Performed by: INTERNAL MEDICINE

## 2023-12-25 PROCEDURE — 25010000002 AMIODARONE IN DEXTROSE 5% 360-4.14 MG/200ML-% SOLUTION: Performed by: NURSE PRACTITIONER

## 2023-12-25 PROCEDURE — P9059 PLASMA, FRZ BETWEEN 8-24HOUR: HCPCS

## 2023-12-25 PROCEDURE — 25010000002 CLINDAMYCIN 600 MG/50ML SOLUTION: Performed by: NURSE PRACTITIONER

## 2023-12-25 PROCEDURE — 83050 HGB METHEMOGLOBIN QUAN: CPT | Performed by: NURSE PRACTITIONER

## 2023-12-25 PROCEDURE — 83735 ASSAY OF MAGNESIUM: CPT | Performed by: INTERNAL MEDICINE

## 2023-12-25 PROCEDURE — 94003 VENT MGMT INPAT SUBQ DAY: CPT

## 2023-12-25 PROCEDURE — 80053 COMPREHEN METABOLIC PANEL: CPT | Performed by: SURGERY

## 2023-12-25 PROCEDURE — 25010000002 POTASSIUM CHLORIDE PER 2 MEQ: Performed by: NURSE PRACTITIONER

## 2023-12-25 PROCEDURE — 83690 ASSAY OF LIPASE: CPT | Performed by: SURGERY

## 2023-12-25 PROCEDURE — 84100 ASSAY OF PHOSPHORUS: CPT | Performed by: SURGERY

## 2023-12-25 PROCEDURE — 25810000003 SODIUM CHLORIDE 0.9 % SOLUTION: Performed by: STUDENT IN AN ORGANIZED HEALTH CARE EDUCATION/TRAINING PROGRAM

## 2023-12-25 PROCEDURE — 85384 FIBRINOGEN ACTIVITY: CPT | Performed by: INTERNAL MEDICINE

## 2023-12-25 PROCEDURE — 36430 TRANSFUSION BLD/BLD COMPNT: CPT

## 2023-12-25 PROCEDURE — 25010000002 AMIODARONE IN DEXTROSE 5% 150-4.21 MG/100ML-% SOLUTION

## 2023-12-25 PROCEDURE — 85610 PROTHROMBIN TIME: CPT | Performed by: NURSE PRACTITIONER

## 2023-12-25 PROCEDURE — 85027 COMPLETE CBC AUTOMATED: CPT | Performed by: NURSE PRACTITIONER

## 2023-12-25 PROCEDURE — 86927 PLASMA FRESH FROZEN: CPT

## 2023-12-25 PROCEDURE — 94799 UNLISTED PULMONARY SVC/PX: CPT

## 2023-12-25 PROCEDURE — 83735 ASSAY OF MAGNESIUM: CPT | Performed by: SURGERY

## 2023-12-25 PROCEDURE — 83605 ASSAY OF LACTIC ACID: CPT | Performed by: NURSE PRACTITIONER

## 2023-12-25 PROCEDURE — 83050 HGB METHEMOGLOBIN QUAN: CPT | Performed by: SURGERY

## 2023-12-25 PROCEDURE — 0 DEXTROSE 5 % SOLUTION: Performed by: NURSE PRACTITIONER

## 2023-12-25 PROCEDURE — 84100 ASSAY OF PHOSPHORUS: CPT | Performed by: INTERNAL MEDICINE

## 2023-12-25 PROCEDURE — 84100 ASSAY OF PHOSPHORUS: CPT | Performed by: NURSE PRACTITIONER

## 2023-12-25 PROCEDURE — 84145 PROCALCITONIN (PCT): CPT | Performed by: SURGERY

## 2023-12-25 PROCEDURE — 25010000002 ALBUMIN HUMAN 25% PER 50 ML: Performed by: NURSE PRACTITIONER

## 2023-12-25 PROCEDURE — 80053 COMPREHEN METABOLIC PANEL: CPT | Performed by: INTERNAL MEDICINE

## 2023-12-25 PROCEDURE — 99024 POSTOP FOLLOW-UP VISIT: CPT | Performed by: SURGERY

## 2023-12-25 PROCEDURE — 25010000002 MAGNESIUM SULFATE IN D5W 1G/100ML (PREMIX) 1-5 GM/100ML-% SOLUTION: Performed by: NURSE PRACTITIONER

## 2023-12-25 PROCEDURE — 25010000002 VANCOMYCIN 5 G RECONSTITUTED SOLUTION: Performed by: STUDENT IN AN ORGANIZED HEALTH CARE EDUCATION/TRAINING PROGRAM

## 2023-12-25 PROCEDURE — 25010000002 FENTANYL CITRATE (PF) 50 MCG/ML SOLUTION: Performed by: NURSE PRACTITIONER

## 2023-12-25 PROCEDURE — 84145 PROCALCITONIN (PCT): CPT | Performed by: INTERNAL MEDICINE

## 2023-12-25 PROCEDURE — 82375 ASSAY CARBOXYHB QUANT: CPT | Performed by: NURSE PRACTITIONER

## 2023-12-25 PROCEDURE — 25010000002 CALCIUM GLUCONATE 2-0.675 GM/100ML-% SOLUTION: Performed by: INTERNAL MEDICINE

## 2023-12-25 PROCEDURE — 85610 PROTHROMBIN TIME: CPT | Performed by: INTERNAL MEDICINE

## 2023-12-25 PROCEDURE — 85007 BL SMEAR W/DIFF WBC COUNT: CPT | Performed by: NURSE PRACTITIONER

## 2023-12-25 PROCEDURE — 82948 REAGENT STRIP/BLOOD GLUCOSE: CPT

## 2023-12-25 PROCEDURE — P9017 PLASMA 1 DONOR FRZ W/IN 8 HR: HCPCS

## 2023-12-25 PROCEDURE — 25810000003 SODIUM CHLORIDE 0.9 % SOLUTION: Performed by: SURGERY

## 2023-12-25 PROCEDURE — 82805 BLOOD GASES W/O2 SATURATION: CPT | Performed by: NURSE PRACTITIONER

## 2023-12-25 PROCEDURE — P9012 CRYOPRECIPITATE EACH UNIT: HCPCS

## 2023-12-25 RX ORDER — FENTANYL CITRATE 50 UG/ML
INJECTION, SOLUTION INTRAMUSCULAR; INTRAVENOUS
Status: COMPLETED
Start: 2023-12-25 | End: 2023-12-25

## 2023-12-25 RX ORDER — ALBUMIN (HUMAN) 12.5 G/50ML
25 SOLUTION INTRAVENOUS
Qty: 200 ML | Refills: 0 | Status: COMPLETED | OUTPATIENT
Start: 2023-12-25 | End: 2023-12-25

## 2023-12-25 RX ORDER — SODIUM CHLORIDE, SODIUM LACTATE, POTASSIUM CHLORIDE, CALCIUM CHLORIDE 600; 310; 30; 20 MG/100ML; MG/100ML; MG/100ML; MG/100ML
9 INJECTION, SOLUTION INTRAVENOUS CONTINUOUS PRN
Status: CANCELLED | OUTPATIENT
Start: 2023-12-25

## 2023-12-25 RX ORDER — FENTANYL CITRATE-0.9 % NACL/PF 10 MCG/ML
50-200 PLASTIC BAG, INJECTION (ML) INTRAVENOUS
Status: DISCONTINUED | OUTPATIENT
Start: 2023-12-25 | End: 2023-12-27

## 2023-12-25 RX ORDER — LABETALOL HYDROCHLORIDE 5 MG/ML
10 INJECTION, SOLUTION INTRAVENOUS ONCE
Status: DISCONTINUED | OUTPATIENT
Start: 2023-12-25 | End: 2023-12-25

## 2023-12-25 RX ORDER — MAGNESIUM SULFATE HEPTAHYDRATE 40 MG/ML
2 INJECTION, SOLUTION INTRAVENOUS ONCE
Status: COMPLETED | OUTPATIENT
Start: 2023-12-25 | End: 2023-12-25

## 2023-12-25 RX ORDER — FENTANYL CITRATE-0.9 % NACL/PF 10 MCG/ML
PLASTIC BAG, INJECTION (ML) INTRAVENOUS
Status: COMPLETED
Start: 2023-12-25 | End: 2023-12-25

## 2023-12-25 RX ORDER — FENTANYL CITRATE 50 UG/ML
25 INJECTION, SOLUTION INTRAMUSCULAR; INTRAVENOUS
Status: DISPENSED | OUTPATIENT
Start: 2023-12-25 | End: 2024-01-01

## 2023-12-25 RX ORDER — MAGNESIUM SULFATE 1 G/100ML
1 INJECTION INTRAVENOUS ONCE
Status: COMPLETED | OUTPATIENT
Start: 2023-12-25 | End: 2023-12-25

## 2023-12-25 RX ORDER — CALCIUM GLUCONATE 20 MG/ML
2000 INJECTION, SOLUTION INTRAVENOUS ONCE
Status: COMPLETED | OUTPATIENT
Start: 2023-12-25 | End: 2023-12-25

## 2023-12-25 RX ORDER — SODIUM CHLORIDE 9 MG/ML
100 INJECTION, SOLUTION INTRAVENOUS CONTINUOUS
Status: DISCONTINUED | OUTPATIENT
Start: 2023-12-25 | End: 2023-12-26

## 2023-12-25 RX ORDER — POTASSIUM CHLORIDE 29.8 MG/ML
20 INJECTION INTRAVENOUS ONCE
Status: COMPLETED | OUTPATIENT
Start: 2023-12-25 | End: 2023-12-25

## 2023-12-25 RX ADMIN — SODIUM BICARBONATE 50 MEQ: 84 INJECTION INTRAVENOUS at 00:59

## 2023-12-25 RX ADMIN — Medication 50 MCG/HR: at 11:15

## 2023-12-25 RX ADMIN — MAGNESIUM SULFATE HEPTAHYDRATE 2 G: 40 INJECTION, SOLUTION INTRAVENOUS at 08:13

## 2023-12-25 RX ADMIN — VANCOMYCIN HYDROCHLORIDE 1250 MG: 5 INJECTION, POWDER, LYOPHILIZED, FOR SOLUTION INTRAVENOUS at 02:25

## 2023-12-25 RX ADMIN — CEFEPIME 2000 MG: 2 INJECTION, POWDER, FOR SOLUTION INTRAVENOUS at 15:23

## 2023-12-25 RX ADMIN — SODIUM CHLORIDE 100 ML/HR: 9 INJECTION, SOLUTION INTRAVENOUS at 21:33

## 2023-12-25 RX ADMIN — NOREPINEPHRINE BITARTRATE 0.02 MCG/KG/MIN: 1 INJECTION, SOLUTION, CONCENTRATE INTRAVENOUS at 05:39

## 2023-12-25 RX ADMIN — Medication 10 ML: at 20:04

## 2023-12-25 RX ADMIN — AMIODARONE HYDROCHLORIDE 0.5 MG/MIN: 1.8 INJECTION, SOLUTION INTRAVENOUS at 16:15

## 2023-12-25 RX ADMIN — Medication 125 MCG/HR: at 21:22

## 2023-12-25 RX ADMIN — CALCIUM GLUCONATE 2000 MG: 20 INJECTION, SOLUTION INTRAVENOUS at 08:13

## 2023-12-25 RX ADMIN — ALBUMIN (HUMAN) 25 G: 0.25 INJECTION, SOLUTION INTRAVENOUS at 07:49

## 2023-12-25 RX ADMIN — VASOPRESSIN 0.04 UNITS/MIN: 0.2 INJECTION INTRAVENOUS at 14:29

## 2023-12-25 RX ADMIN — Medication 10 ML: at 09:16

## 2023-12-25 RX ADMIN — MAGNESIUM SULFATE 1 G: 1 INJECTION INTRAVENOUS at 11:19

## 2023-12-25 RX ADMIN — CALCIUM GLUCONATE 2000 MG: 20 INJECTION, SOLUTION INTRAVENOUS at 00:58

## 2023-12-25 RX ADMIN — ALBUMIN (HUMAN) 25 G: 0.25 INJECTION, SOLUTION INTRAVENOUS at 00:55

## 2023-12-25 RX ADMIN — FAMOTIDINE 20 MG: 10 INJECTION INTRAVENOUS at 20:04

## 2023-12-25 RX ADMIN — SODIUM BICARBONATE 150 MEQ: 84 INJECTION, SOLUTION INTRAVENOUS at 07:36

## 2023-12-25 RX ADMIN — CALCIUM GLUCONATE 2000 MG: 20 INJECTION, SOLUTION INTRAVENOUS at 11:18

## 2023-12-25 RX ADMIN — VASOPRESSIN 0.04 UNITS/MIN: 0.2 INJECTION INTRAVENOUS at 07:45

## 2023-12-25 RX ADMIN — NOREPINEPHRINE BITARTRATE 0.02 MCG/KG/MIN: 1 INJECTION, SOLUTION, CONCENTRATE INTRAVENOUS at 00:56

## 2023-12-25 RX ADMIN — FENTANYL CITRATE 25 MCG: 50 INJECTION, SOLUTION INTRAMUSCULAR; INTRAVENOUS at 14:38

## 2023-12-25 RX ADMIN — CLINDAMYCIN IN 5 PERCENT DEXTROSE 600 MG: 12 INJECTION, SOLUTION INTRAVENOUS at 02:26

## 2023-12-25 RX ADMIN — ALBUMIN (HUMAN) 25 G: 0.25 INJECTION, SOLUTION INTRAVENOUS at 08:31

## 2023-12-25 RX ADMIN — CEFEPIME 2000 MG: 2 INJECTION, POWDER, FOR SOLUTION INTRAVENOUS at 02:51

## 2023-12-25 RX ADMIN — AMIODARONE HYDROCHLORIDE 1 MG/MIN: 1.8 INJECTION, SOLUTION INTRAVENOUS at 10:33

## 2023-12-25 RX ADMIN — ALBUMIN (HUMAN) 25 G: 0.25 INJECTION, SOLUTION INTRAVENOUS at 00:56

## 2023-12-25 RX ADMIN — AMIODARONE HYDROCHLORIDE 150 MG: 1.5 INJECTION, SOLUTION INTRAVENOUS at 10:30

## 2023-12-25 RX ADMIN — SODIUM CHLORIDE 500 ML: 9 INJECTION, SOLUTION INTRAVENOUS at 02:25

## 2023-12-25 RX ADMIN — FENTANYL CITRATE 25 MCG: 50 INJECTION, SOLUTION INTRAMUSCULAR; INTRAVENOUS at 16:22

## 2023-12-25 RX ADMIN — PROPOFOL 5 MCG/KG/MIN: 10 INJECTION, EMULSION INTRAVENOUS at 15:35

## 2023-12-25 RX ADMIN — Medication 10 ML: at 01:54

## 2023-12-25 RX ADMIN — LIDOCAINE HYDROCHLORIDE: 20 INJECTION, SOLUTION INFILTRATION; PERINEURAL at 00:56

## 2023-12-25 RX ADMIN — NOREPINEPHRINE BITARTRATE 0.14 MCG/KG/MIN: 1 INJECTION, SOLUTION, CONCENTRATE INTRAVENOUS at 16:14

## 2023-12-25 RX ADMIN — FAMOTIDINE 20 MG: 10 INJECTION INTRAVENOUS at 09:16

## 2023-12-25 RX ADMIN — VASOPRESSIN 0.04 UNITS/MIN: 0.2 INJECTION INTRAVENOUS at 22:43

## 2023-12-25 RX ADMIN — POTASSIUM CHLORIDE 20 MEQ: 29.8 INJECTION, SOLUTION INTRAVENOUS at 11:15

## 2023-12-25 RX ADMIN — FENTANYL CITRATE 25 MCG: 50 INJECTION, SOLUTION INTRAMUSCULAR; INTRAVENOUS at 10:45

## 2023-12-25 NOTE — CONSULTS
Patient Care Team:  Kodi Pichardo MD as PCP - General (Internal Medicine)  Lisset Harrington APRN as Nurse Practitioner (Urology)    Chief complaint: ROLAND    Subjective     History of Present Illness  64yo with h/o left partial nephrectomy due to RCC 11/23.  He had presented to ER with left flank/back pain for 3 days.  Initial CT showed decreased fluid collection in area of left nephrectomy consistent with postop seroma/hematoma.  He had noted fever/tachy and possible infection/abscess and admitted for IV abx. Creatinine had been around 0.9-1.0.  He had stable infrarenal abdominal aortic aneurysm of 5.1cm.  He had RRT with hypotension and bp reported 42/32 and intubated with decreased respirations.  Repeat CT showed ruptured aortic aneurysm with retroperitoneal hematoma.  S/p repair yesterday.  Pt sedated on vent, family at bedside.  Still making fairly good urine.  Creatinine had increased to 1.5 and we were asked to see him.    Review of Systems   Unable to obtain    Past Medical History:   Diagnosis Date    Allergy     Aortic aneurysm     4.7 just found has not f/u    H/O Kidney stone     Hiatal hernia     Kidney mass     left    Renal cancer    ,   Past Surgical History:   Procedure Laterality Date    KIDNEY STONE SURGERY      NEPHRECTOMY PARTIAL Left 11/13/2023    Procedure: NEPHRECTOMY PARTIAL LAPAROSCOPIC WITH DAVINCI ROBOT, left;  Surgeon: Michelle Cai MD;  Location: [facility];  Service: Robotics - DaVinci;  Laterality: Left;    URETEROSCOPY LASER LITHOTRIPSY WITH STENT INSERTION Left 08/18/2023    Procedure: CYSTOSCOPY URETEROSCOPY RETROGRADE PYELOGRAM STENT INSERTION, left;  Surgeon: Michelle Cai MD;  Location: [facility] OR;  Service: Urology;  Laterality: Left;   ,   Family History   Problem Relation Age of Onset    Cancer Mother         Skin Cancer    Cancer Father         Throat Cancer    Heart disease Father         Congestive Heart Failure    Hypertension Father    ,    Social History     Socioeconomic History    Marital status:    Tobacco Use    Smoking status: Former     Packs/day: 1.00     Years: 20.00     Additional pack years: 0.00     Total pack years: 20.00     Types: Cigarettes     Passive exposure: Past    Smokeless tobacco: Never    Tobacco comments:     Quit appx 10-15 years ago   Vaping Use    Vaping Use: Never used   Substance and Sexual Activity    Alcohol use: Never    Drug use: Never    Sexual activity: Defer     Partners: Female     E-cigarette/Vaping    E-cigarette/Vaping Use Never User      E-cigarette/Vaping Substances     E-cigarette/Vaping Devices         ,   Medications Prior to Admission   Medication Sig Dispense Refill Last Dose    cetirizine (zyrTEC) 10 MG tablet Take 1 tablet by mouth Daily.   12/22/2023    mycophenolate (CELLCEPT) 500 MG tablet Take 1 tablet by mouth Daily.   12/22/2023    ondansetron (ZOFRAN) 4 MG tablet Take 1 tablet by mouth Every 6 (Six) Hours As Needed for Nausea or Vomiting. 12 tablet 0     Psyllium (Metamucil) 0.36 g capsule Take 1 capsule by mouth 2 (Two) Times a Day.   12/22/2023   , Scheduled Meds:  albumin human, 25 g, Intravenous, Q30 Min  calcium chloride, , ,   calcium gluconate, 2,000 mg, Intravenous, Once  cefepime, 2,000 mg, Intravenous, Q12H  famotidine, 20 mg, Intravenous, Q12H  [MAR Hold] hydrocortisone sodium succinate, 50 mg, Intravenous, Q8H  labetalol, 10 mg, Intravenous, Once  magnesium sulfate, 2 g, Intravenous, Once  [MAR Hold] senna-docusate sodium, 2 tablet, Nasogastric, BID  sodium chloride, 10 mL, Intravenous, Q12H  vancomycin, 1,250 mg, Intravenous, Q12H   , Continuous Infusions:  fentanyl 10 mcg/mL,  mcg/hr, Last Rate: Stopped (12/25/23 0100)  norepinephrine, 0.02-0.3 mcg/kg/min, Last Rate: 0.2 mcg/kg/min (12/25/23 0746)  Pharmacy to Dose Cefepime,   Pharmacy to dose vancomycin,   propofol, 5-50 mcg/kg/min, Last Rate: Stopped (12/25/23 0217)  sodium bicarbonate, 150 mEq, Last Rate: 150 mEq  (12/25/23 0736)  vasopressin, 0.04 Units/min, Last Rate: 0.04 Units/min (12/25/23 0745)   , PRN Meds:    [MAR Hold] acetaminophen    [MAR Hold] senna-docusate sodium **AND** [MAR Hold] polyethylene glycol **AND** [DISCONTINUED] bisacodyl **AND** [MAR Hold] bisacodyl    [MAR Hold] calcium carbonate    calcium chloride    dextrose    dextrose    glucagon (human recombinant)    [MAR Hold] HYDROmorphone    [MAR Hold] Morphine **AND** [MAR Hold] naloxone    nitroglycerin    ondansetron    Pharmacy to Dose Cefepime    Pharmacy to dose vancomycin    sodium chloride    sodium chloride, and Allergies:  Latex, natural rubber    Objective     Vital Signs  Temp:  [89.2 °F (31.8 °C)-98.6 °F (37 °C)] 92 °F (33.3 °C)  Heart Rate:  [] 141  Resp:  [18-26] 26  BP: ()/() 89/56  FiO2 (%):  [30 %-100 %] 50 %    No intake/output data recorded.  I/O last 3 completed shifts:  In: 5683.8 [I.V.:4233.8; Blood:600; IV Piggyback:850]  Out: 4100 [Urine:1600; Emesis/NG output:400; Blood:1200]    Physical Exam  Constitutional:       Comments: Orally intubated, sedated   HENT:      Head: Normocephalic.      Nose: Nose normal.      Mouth/Throat:      Mouth: Mucous membranes are moist.   Eyes:      General: No scleral icterus.     Pupils: Pupils are equal, round, and reactive to light.   Cardiovascular:      Rate and Rhythm: Normal rate and regular rhythm.      Pulses: Normal pulses.      Heart sounds: Normal heart sounds.   Pulmonary:      Effort: Pulmonary effort is normal.      Breath sounds: Normal breath sounds.   Abdominal:      General: There is no distension.   Musculoskeletal:         General: Normal range of motion.      Cervical back: Normal range of motion.      Right lower leg: No edema.      Left lower leg: No edema.   Skin:     General: Skin is warm and dry.         Results Review:    I reviewed the patient's new clinical results.    WBC WBC   Date Value Ref Range Status   12/25/2023 10.74 3.40 - 10.80 10*3/mm3 Final  "  12/25/2023 8.30 3.40 - 10.80 10*3/mm3 Final   12/24/2023   Corrected     Comment:     Pt is receiving blood MTP, disregard previous results  Corrected result. Previous result was 5.32 10*3/mm3 on 12/24/2023 at 2237 EST.   12/24/2023 20.44 (H) 3.40 - 10.80 10*3/mm3 Final   12/24/2023 13.03 (H) 3.40 - 10.80 10*3/mm3 Final   12/23/2023 11.32 (H) 3.40 - 10.80 10*3/mm3 Final   12/22/2023 13.64 (H) 3.40 - 10.80 10*3/mm3 Final      HGB Hemoglobin   Date Value Ref Range Status   12/25/2023 11.6 (L) 13.0 - 17.7 g/dL Final   12/25/2023 13.8 13.0 - 17.7 g/dL Final   12/24/2023   Corrected     Comment:     Pt is receiving blood MTP, disregard previous results  Corrected result. Previous result was 8.5 g/dL on 12/24/2023 at 2237 EST.   12/24/2023 9.1 (L) 13.0 - 17.7 g/dL Final   12/24/2023 12.0 (L) 13.0 - 17.7 g/dL Final   12/23/2023 13.0 13.0 - 17.7 g/dL Final   12/22/2023 14.1 13.0 - 17.7 g/dL Final      HCT Hematocrit   Date Value Ref Range Status   12/25/2023 33.9 (L) 37.5 - 51.0 % Final   12/25/2023 42.6 37.5 - 51.0 % Final   12/24/2023   Corrected     Comment:     Pt is receiving blood MTP, disregard previous results  Corrected result. Previous result was 26.7 % on 12/24/2023 at 2237 EST.   12/24/2023 29.0 (L) 37.5 - 51.0 % Final   12/24/2023 37.5 37.5 - 51.0 % Final   12/23/2023 40.6 37.5 - 51.0 % Final   12/22/2023 43.8 37.5 - 51.0 % Final      Platlets No results found for: \"LABPLAT\"   MCV MCV   Date Value Ref Range Status   12/25/2023 84.3 79.0 - 97.0 fL Final   12/25/2023 89.5 79.0 - 97.0 fL Final   12/24/2023   Corrected     Comment:     Pt is receiving blood MTP, disregard previous results  Corrected result. Previous result was 92.1 fL on 12/24/2023 at 2237 EST.   12/24/2023 91.2 79.0 - 97.0 fL Final   12/24/2023 88.4 79.0 - 97.0 fL Final   12/23/2023 87.7 79.0 - 97.0 fL Final   12/22/2023 87.8 79.0 - 97.0 fL Final          Sodium Sodium   Date Value Ref Range Status   12/25/2023 137 136 - 145 mmol/L Final "   12/25/2023 140 136 - 145 mmol/L Final   12/24/2023 133 (L) 136 - 145 mmol/L Final   12/24/2023 133 (L) 136 - 145 mmol/L Final   12/23/2023 133 (L) 136 - 145 mmol/L Final   12/22/2023 135 (L) 136 - 145 mmol/L Final     Sodium, Arterial   Date Value Ref Range Status   12/25/2023 133.4 (L) 136 - 146 mmol/L Final   12/25/2023 134.8 (L) 136 - 146 mmol/L Final   12/25/2023 137.0 136 - 146 mmol/L Final   12/24/2023 140.5 136 - 146 mmol/L Final   12/24/2023 140.4 136 - 146 mmol/L Final   12/24/2023 143.9 136 - 146 mmol/L Final   12/24/2023 128.8 (L) 136 - 146 mmol/L Final   12/24/2023 129.9 (L) 136 - 146 mmol/L Final   12/24/2023 128.2 (L) 136 - 146 mmol/L Final      Potassium Potassium   Date Value Ref Range Status   12/25/2023 3.6 3.5 - 5.2 mmol/L Final     Comment:     Slight hemolysis detected by analyzer. Result may be falsely elevated.   12/25/2023 4.5 3.5 - 5.2 mmol/L Final     Comment:     Slight hemolysis detected by analyzer. Result may be falsely elevated.   12/24/2023 4.5 3.5 - 5.2 mmol/L Final   12/24/2023 4.2 3.5 - 5.2 mmol/L Final   12/23/2023 3.4 (L) 3.5 - 5.2 mmol/L Final   12/22/2023 3.6 3.5 - 5.2 mmol/L Final      Chloride Chloride   Date Value Ref Range Status   12/25/2023 98 98 - 107 mmol/L Final   12/25/2023 99 98 - 107 mmol/L Final   12/24/2023 96 (L) 98 - 107 mmol/L Final   12/24/2023 98 98 - 107 mmol/L Final   12/23/2023 96 (L) 98 - 107 mmol/L Final   12/22/2023 97 (L) 98 - 107 mmol/L Final      CO2 CO2   Date Value Ref Range Status   12/25/2023 22.7 22.0 - 29.0 mmol/L Final   12/25/2023 15.4 (L) 22.0 - 29.0 mmol/L Final   12/24/2023 20.9 (L) 22.0 - 29.0 mmol/L Final   12/24/2023 23.3 22.0 - 29.0 mmol/L Final   12/23/2023 20.2 (L) 22.0 - 29.0 mmol/L Final   12/22/2023 23.1 22.0 - 29.0 mmol/L Final      BUN BUN   Date Value Ref Range Status   12/25/2023 14 8 - 23 mg/dL Final   12/25/2023 12 8 - 23 mg/dL Final   12/24/2023 11 8 - 23 mg/dL Final   12/24/2023 9 8 - 23 mg/dL Final   12/23/2023 13 8 -  "23 mg/dL Final   12/22/2023 16 8 - 23 mg/dL Final      Creatinine Creatinine   Date Value Ref Range Status   12/25/2023 1.54 (H) 0.76 - 1.27 mg/dL Final   12/25/2023 1.33 (H) 0.76 - 1.27 mg/dL Final   12/24/2023 0.92 0.76 - 1.27 mg/dL Final   12/24/2023 0.76 0.76 - 1.27 mg/dL Final   12/23/2023 0.84 0.76 - 1.27 mg/dL Final   12/22/2023 1.15 0.76 - 1.27 mg/dL Final      Calcium Calcium   Date Value Ref Range Status   12/25/2023 8.1 (L) 8.6 - 10.5 mg/dL Final   12/25/2023 9.4 8.6 - 10.5 mg/dL Final   12/24/2023 9.3 8.6 - 10.5 mg/dL Final   12/24/2023 7.9 (L) 8.6 - 10.5 mg/dL Final   12/23/2023 8.4 (L) 8.6 - 10.5 mg/dL Final   12/22/2023 9.3 8.6 - 10.5 mg/dL Final      PO4 No results found for: \"CAPO4\"   Albumin Albumin   Date Value Ref Range Status   12/25/2023 2.2 (L) 3.5 - 5.2 g/dL Final   12/25/2023 1.8 (L) 3.5 - 5.2 g/dL Final   12/24/2023 3.3 (L) 3.5 - 5.2 g/dL Final   12/23/2023 3.6 3.5 - 5.2 g/dL Final   12/22/2023 4.3 3.5 - 5.2 g/dL Final      Magnesium Magnesium   Date Value Ref Range Status   12/25/2023 1.9 1.6 - 2.4 mg/dL Final   12/25/2023 2.2 1.6 - 2.4 mg/dL Final   12/24/2023 2.0 1.6 - 2.4 mg/dL Final   12/24/2023 1.7 1.6 - 2.4 mg/dL Final   12/22/2023 1.5 (L) 1.6 - 2.4 mg/dL Final      Uric Acid No results found for: \"URICACID\"         Assessment & Plan       Left flank pain    Ruptured infrarenal abdominal aortic aneurysm (AAA)      Assessment & Plan  ROLAND-  pt with previous partial left nephrectomy and baseline creatinine closer to 0.9-1.0.  Had noted hypotension with AAA rupture s/p repair.  Non-oliguric at this time with creatinine increased to 1.54 now.  Likely ATN from hypotension vs. Atheroembolic injury. Also received IV contrast with CTA. D/w wife at bedside.  No acute need for dialysis but will monitor closely next 1-2 days for renal function.  Continue supportive care.  AAA rupture-  s/p repair.  Resp Failure-  intubated at this time.  Shock- sepsis, hemorrhagic component with AAA rupture.  "   Sepsis-  blood cx with strep pyogenes.  On vanc/cefepime.      I discussed the patients findings and my recommendations with family, nursing staff, and consulting provider    Adin Hinton MD  12/25/23  07:43 EST

## 2023-12-25 NOTE — PROGRESS NOTES
"[facility] Clinical Pharmacy Services: Vancomycin Monitoring Note    Danny Savage is a 63 y.o. male who is on day 3 of pharmacy to dose vancomycin for Sepsis.    Previous Vancomycin Dose:   1250 mg IV q12  Imaging Reviewed?: Yes - no active cardiopulmonary disease and CT AP shows fluid collection  Updated Cultures and Sensitivities:   Covid (-)   blood NGTD   resp cx - gram stain shows few GNB    Vitals/Labs  Ht: 177.8 cm (70\"); Wt: 80.5 kg (177 lb 7.5 oz)   Temp (24hrs), Av.8 °F (34.9 °C), Min:89.2 °F (31.8 °C), Max:98.6 °F (37 °C)   Estimated Creatinine Clearance: 54.5 mL/min (A) (by C-G formula based on SCr of 1.54 mg/dL (H)).       Results from last 7 days   Lab Units 23  0919 23  0532 23  0141 23  0035 23  1459 23  0620 23  2050   VANCOMYCIN RM mcg/mL 24.00  --   --   --   --   --  <4.00*   CREATININE mg/dL  --  1.54*  --  1.33* 0.92   < > 0.84   WBC 10*3/mm3  --  10.74 8.30  --  20.44*   < >  --     < > = values in this interval not displayed.     Assessment/Plan    Current Vancomycin Dose:  1250 mg IV every 24 hours; which provides the following predicted parameters:  AUC24,ss: 482 mg/L.hr  PAUC*: 84 %  Ctrough,ss: 13.5 mg/L  Pconc*: 11 %  Tox.: 9 %  Next Vanc Random ordered for  at 0900  We will continue to monitor patient changes and renal function     Thank you for involving pharmacy in this patient's care. Please contact pharmacy with any questions or concerns.    Susie Mitchell, [facility]  Clinical Pharmacist     "

## 2023-12-25 NOTE — PROGRESS NOTES
[facility]     Progress Note    Patient Name: Danny Savage  : 1960  MRN: [id number]  Primary Care Physician:  Kodi Pichardo MD  Date of admission: 2023    Subjective   Subjective     Patient remains critically ill.  No spontaneous movement of the lower extremities.  Sedation has been off since surgery.  Making urine.  Hemodynamic instability progressively worse this morning.  Significant serosanguineous drainage from the wound VAC.  Fluid responsive.    Objective   Objective     Vitals:   Temp:  [89.2 °F (31.8 °C)-98.3 °F (36.8 °C)] 98.3 °F (36.8 °C)  Heart Rate:  [] 124  Resp:  [26] 26  BP: ()/() 112/65  FiO2 (%):  [30 %-100 %] 30 %    Labs     Results from last 7 days   Lab Units 23  0532 23  0141 23  1459   WBC 10*3/mm3 10.74 8.30 20.44*   HEMOGLOBIN g/dL 11.6* 13.8 9.1*   HEMATOCRIT % 33.9* 42.6 29.0*   PLATELETS 10*3/mm3 69* 62* 263         Results from last 7 days   Lab Units 23  0532 23  0035 23  1459   CREATININE mg/dL 1.54* 1.33* 0.92         Results from last 7 days   Lab Units 23  0611   INR  1.76*              Lab Results   Component Value Date     (H) 2023              Physical Exam   Abdomen is soft.  He has Doppler signal in the left PT and the right DP.  Feet are warm.  Core temperature is improved.  Still coagulopathic overall.  No purposeful neurological movement.        Assessment & Plan   Assessment / Plan     Active Hospital Problems:  Active Hospital Problems    Diagnosis    • **Left flank pain    • Ruptured infrarenal abdominal aortic aneurysm (AAA)          Assessment/Plan:    Reverse coagulopathy.  Aggressive resuscitation.  If she continues to be fluid responsive and significant back drainage despite blood products and plasma, we will discuss exploration to make sure there is no ongoing bleeding.  Discussed with ICU team.  Family was updated.    Electronically signed by Koffi Agosto MD,  12/25/23, 11:33 AM EST.

## 2023-12-25 NOTE — PLAN OF CARE
Goal Outcome Evaluation:  Plan of Care Reviewed With: patient        Progress: no change  Outcome Evaluation: Patient is currently on AC/VC 26/450/+5/30%. Continuing to wean oxygen. Suctioned a scant amount of secretions this morning.

## 2023-12-25 NOTE — OP NOTE
ABDOMINAL AORTIC ANEURYSM REPAIR  Procedure Report    Patient Name:  Danny Savage  YOB: 1960    Date of Surgery:  12/24/2023 - 12/25/2023     Indications: Ruptured abdominal aortic aneurysm with hemodynamic instability.  Recent partial left nephrectomy with concern for perinephric abscess.  Chronic immunosuppression with CellCept.    Pre-op Diagnosis:   Ruptured infrarenal abdominal aortic aneurysm (AAA) [I71.33]       Post-Op Diagnosis Codes:     * Ruptured infrarenal abdominal aortic aneurysm (AAA) [I71.33]    Procedure(s):  Open repair of ruptured infrarenal abdominal aortic aneurysm with a tube graft, 18 mm Dacron graft.  Mobilization of the omentum with coverage of the graft using pedicled omental flap.    Staff:  Surgeon(s):  Koffi Agosto MD    Assistant: Nhi Garcia RN CSA    Anesthesia: General     Estimated Blood Loss: 1200 mL    Implants:    Implant Name Type Inv. Item Serial No.  Lot No. LRB No. Used Action   CLIP LIGAT VASC HORIZON TI LG ORNG 6CT - [id number] Implant CLIP LIGAT VASC HORIZON TI LG ORNG 6CT  [employer] [id number] N/A 1 Implanted   CLIP LIGAT VASC HORIZON TI MD BRANDYN 6CT - [id number] Implant CLIP LIGAT VASC HORIZON TI MD BRANDYN 6CT  [employer] [id number] N/A 1 Implanted   CLIP LIGAT VASC HORIZON TI MD BRANDYN 6CT - [id number] Implant CLIP LIGAT VASC HORIZON TI MD BRANDYN 6CT  [employer] [id number] N/A 2 Implanted   CLIP LIGAT VASC HORIZON TI SM YEL 6CT - [id number] Implant CLIP LIGAT VASC HORIZON TI SM YEL 6CT  [employer] [id number] N/A 1 Implanted   PLEDGET INCISIONLINE REINF TFE SFT PTFE 1.5X4.8X9.5MM WHT - [id number] Implant PLEDGET INCISIONLINE REINF TFE SFT PTFE 1.5X4.8X9.5MM WHT  [employer]  DIV OF J AND J [id number] N/A 1 Implanted   GRFT VASC KNIT INTERGARD 9X18MM - [id number] Implant GRFT VASC KNIT INTERGARD 9X18MM  MAQUET [id number] N/A 1 Implanted   KT SEAL HEMOS ABS FLOSEAL MATRX 1.5/FAST/PREP 5000/IU 10ML - [id number] Implant KT SEAL HEMOS ABS  FLOSEAL MATRX 1.5/FAST/PREP 5000/IU 10ML  [facility] [id number] N/A 1 Implanted   KT SEAL HEMOS ABS FLOSEAL MATRX 1.5/FAST/PREP 5000/IU 10ML - [id number] Implant KT SEAL HEMOS ABS FLOSEAL MATRX 1.5/FAST/PREP 5000/IU 10ML  [facility] [id number] N/A 1 Implanted   HEMOST ABS SURGICEL NUKNIT 6X9 - [id number] Implant HEMOST ABS SURGICEL NUKNIT 6X9  [employer]  DIV OF J AND J [id number] N/A 1 Implanted   HEMOST ABS SURGICEL FIBRILLAR 1X2 - [id number] Implant HEMOST ABS SURGICEL FIBRILLAR 1X2  [employer]  DIV OF J AND J [id number] N/A 1 Implanted       Specimen: None       Findings: Rupture of the infrarenal aorta in the right anterior lateral wall.  Large right retroperitoneal hematoma.  Very dilated left iliolumbar vein.  Doppler signal in the left femoral and waveform noted in the right femoral art line after repair.  Dopplerable flow noted in both renal arteries after repair.  Patent left sigmoid: Concerning for ischemia.  Adhesions noted in the left retroperitoneum from recent surgery.  Viable transverse colon.  Massively dilated right colon.    Complications: None unexpected    Description of Procedure: Patient was brought to the operating room placed supine.  After excellent anesthesia was achieved patient's abdomen was emergently prepped and draped.  Timeout was performed.  Vertical incision was carried out from the xiphoid to the suprapubic area.  Abdominal cavity was entered.  Large amount of serosanguineous fluid was evacuated.  Supraceliac aorta was then bluntly dissected.  Clamp was placed but not clamped.  Retroperitoneum was then exposed.  As soon as the duodenum was mobilized there was significant bleeding noted from the infrarenal aorta.  I used a supraceliac clamp to allow control of this.  Further control of the immediate infrarenal aorta was performed.  A clamp was placed in the segment and the supraceliac clamp was removed.  The aorta was then further exposed and the iliac arteries were also  clamped.  It was dissected out.  KRISSY was ligated and divided.  Aneurysm was opened up.  Thrombus was removed.  There was no aortocaval fistula that was obvious.  Lumbar vessels were suture-ligated.  The proximal aorta was significantly thinned and not a good segment for suturing.  I moved the clamp between the renal arteries to allow a little bit better aortic segment for suturing.  Proximal aorta was then fashion and an 18 mm Dacron graft was sutured in end-to-end.  Graft was soaked in rifampin prior.  Good hemostatic anastomosis was achieved.  Repair suture was placed to make sure there was no ongoing bleeding.  Doppler signal was noted in the renal arteries.  Distally the aorta was fashioned just proximal to the aortic bifurcation.  End to end anastomosis was created.  Prior to completion of anastomosis backbleeding for flushing was performed.  Repair sutures were placed to make sure this was hemostatic.  Clamps were slowly released as the patient's blood pressure tolerated.  Left iliolumbar vein was very dilated.  This was reversed because of the retroperitoneal hematoma and pressure from that and likely from adhesions from the recent nephrectomy.  This was suture-ligated using Prolene and then clipped to control bleeding.  Good Doppler signals were noted in the iliac vessels also.  I was satisfied with this.  Hemostatic agents were placed.  Colon was marginal.  I decided to use omental flap to cover the graft especially because the patient came in bacteremic and there is concern for intra-abdominal infection and this aneurysm rupture may be infectious in origin.  To decrease the risk of further infection omental flap was mobilized and the cuff was used to cover the graft.  This was also used to separate the graft from the duodenum and the colon.  The omentum was brought through the mesocolon and this defect was then repaired using Vicryl to avoid any herniation.  Left colon was examined further.  Proximal left  descending colon appeared viable.  Transverse colon appeared viable.  Right colon was very dilated and the sigmoid colon definitely appeared pale and possibly ischemic.  Because of these reasons I decided to leave the abdomen open.  Antibiotic solution was placed in the left retroperitoneal area from the recent renal surgery since there is concern for an abscess in that segment.  Wound VAC was placed over the abdomen.  The patient was transferred to the ICU overall in a critical position.  I was present for the entire procedure.  Findings were discussed with the family.    Assistant: Nhi Garcia RN CSA  was responsible for performing the following activities: Retraction, Suction, Irrigation, Suturing, Placing Dressing, and aortic graft placement  and their skilled assistance was necessary for the success of this case.    Koffi Agosto MD     Date: 12/25/2023  Time: 01:03 EST

## 2023-12-25 NOTE — ANESTHESIA POSTPROCEDURE EVALUATION
Patient: Danny Savage    Procedure Summary       Date: 12/24/23 Room / Location: [facility] OR 01 / [facility] MAIN OR    Anesthesia Start: 2009 Anesthesia Stop: 12/25/23 0013    Procedure: ABDOMINAL AORTIC ANEURYSM REPAIR (Abdomen) Diagnosis:       Ruptured infrarenal abdominal aortic aneurysm (AAA)      (Ruptured infrarenal abdominal aortic aneurysm (AAA) [I71.33])    Surgeons: Koffi Agosto MD Provider: Srinivas Gallardo MD    Anesthesia Type: general, Xi ASA Status: 5 - Emergent            Anesthesia Type: general, Xi    Vitals  Vitals Value Taken Time   /84 12/25/23 0015   Temp     Pulse 78 12/25/23 0016   Resp     SpO2     Vitals shown include unfiled device data.        Post Anesthesia Care and Evaluation    Patient location during evaluation: bedside  Patient participation: complete - patient cannot participate  Level of consciousness: obtunded/minimal responses    Cardiovascular status: acceptable and hemodynamically stable  Respiratory status: acceptable    Comments: Pt to pacu, hemodynamically stable on levophed, massive transfusion protocol followed  No anesthesia care post op

## 2023-12-25 NOTE — PROGRESS NOTES
Pulmonary / Critical Care Progress Note      Patient Name: Danny Savage  : 1960  MRN: [id number]  Attending:  Dick Chaves MD   Date of admission: 2023    Subjective   Subjective   Patient critically ill with ruptured AAA s/p open repair, hemorrhagic shock, Streptococcus pyogenes bacteremia    Overnight taken OR for open infrarenal abdominal aneurysm repair/graft  Received 16 units PRBCs, 4 FFP, 3 platelets, 1 cryo    This morning, tachycardic, with significant SVV  On levo 0.14  Open abdomen, 1 L red blood from wound VAC since operation  Urine output 700 cc /since operation    Review of Systems  Unable to obtain      Objective   Objective     Vitals:   Vital signs for last 24 hours:  Temp:  [89.2 °F (31.8 °C)-98.6 °F (37 °C)] 92 °F (33.3 °C)  Heart Rate:  [] 137  Resp:  [18-26] 26  BP: ()/() 89/54  FiO2 (%):  [30 %-100 %] 50 %    Intake/Output last 3 shifts:  I/O last 3 completed shifts:  In: 5683.8 [I.V.:4233.8; Blood:600; IV Piggyback:850]  Out: 4100 [Urine:1600; Emesis/NG output:400; Blood:1200]    Vent settings for last 24 hours:  Mode: VC/AC  FiO2 (%):  [30 %-100 %] 50 %  S RR:  [6-26] 26  S VT:  [450 mL] 450 mL  PEEP/CPAP (cm H2O):  [5 cm H20] 5 cm H20  MAP (cm H2O):  [6.5-11] 11    Hemodynamic parameters for last 24 hours:  CVP:  [7 mmHg-13 mmHg] 7 mmHg    Physical Exam   Critically ill on mechanical ventilator  Pale, Tachycardic  Open abdomen with wound VAC    Result Review    I have personally reviewed the results from the time of this admission to 2023 08:02 EST and agree with these findings:  [x]  Laboratory  [x]  Microbiology  [x]  Radiology  []  EKG/Telemetry   []  Cardiology/Vascular   []  Pathology  []  Old records  []  Other:  Most notable findings include:   2023 blood culture Streptococcus pyogenes group A    Assessment & Plan   Assessment / Plan     Active Hospital Problems:  Active Hospital Problems    Diagnosis    • **Left flank pain    •  Ruptured infrarenal abdominal aortic aneurysm (AAA)      Impression:  Ruptured infrarenal AAA s/p open repair  Hemorrhagic shock  Hypovolemic shock  Acute hypoxic respiratory failure requiring mechanical ventilation  Septic shock with Streptococcus pyogenes bacteremia  History of RCC s/p partial nephrectomy on 11/13  Acute kidney injury  Clinically significant lactic acidosis  Chronically immunosuppressed on CellCept  Hypocalcemia  Hypokalemia  Pneumonitis  SVT    Plan:    -Patient is volume responsive, with significant stroke-volume variation  -Continue resuscitation, will give 1 unit PRBC, 50 mg IV albumin  -Would give platelets if available in house  -Replace calcium and magnesium IV  -DC sodium bicarb 100 cc/h  Cahnge to LR  Would like to DC fluids but must keep up with ongoing loss from the wound VAC  -Trend lactic acid, renal panel, CBC, and coags  -Continue Levophed, vasopressin  -Continue mechanical ventilation  Start Amiodarone for SVT  -Trend renal function, will have nephrology evaluate patient  -Wound VAC in place, still having some oozing/bleeding  -Noted to have dusky sigmoid  -Vascular surgery following, appreciate service  -Continue Vanco and cefepime, DC clindamycin      Peptic ulcer prophylaxis-Pepcid   DVT prophylaxis:  Mechanical DVT prophylaxis orders are present.    CODE STATUS:   Level Of Support Discussed With: Patient  Code Status (Patient has no pulse and is not breathing): CPR (Attempt to Resuscitate)  Medical Interventions (Patient has pulse or is breathing): Full Support    I, BAKARI Alvarez, spent 30 minutes critical care time in accordance to split shared billing.    Electronically signed by BAKARI Arenas, 12/25/23, 8:02 AM EST.    Total CTT by our service is 90 minutes    I have spent a total of 60 minutes if Critical care time    The patient is critically ill in the ICU with shock ruptured AAA.  Multiple metabolic and electrolyte degrangments and  SVTMultidisciplinary bedside critical care rounds were performed with nursing staff, respiratory therapy, pharmacy, nutritional services, social work. I have personally reviewed the chart, labs and any pertinent imaging available.    Electronically signed by Clayton Holland DO, 12/25/23, 11:26 AM EST.

## 2023-12-25 NOTE — CONSULTS
[facility]   VASCULAR SURGERY CONSULT    Patient Name: Danny Savage  : 1960  MRN: [id number]  Primary Care Physician:  Kodi Pichardo MD  Date of admission: 2023    Subjective   Subjective     Chief Complaint: Ruptured aortic aneurysm with aortocaval fistula and hemodynamic instability    HPI:    Danny Savage is a 63 y.o. male who recently underwent a left partial nephrectomy for renal mass.  He was noted to have an aortic aneurysm at that time.  Further workup plan intervention was anticipated but he was going through postoperative recovery from his renal surgery.  He was admitted because of concern for infection and bacteremia.  He presented to the hospital on Friday for fever and chills.  Starting this morning the patient started to have progressive distributive shock appearance with requirement for increasing pressors and also significant fluid resuscitation.  He had CODE BLUE and was intubated.  There was concern that his perinephric fluid collection was infected and he was going to be taken to CT for drainage.  A CT was performed and there was a rupture of his aortic aneurysm that was noted.  I was called for that.  Patient is significantly unstable with multiple pressors.  His last hemoglobin was 4.6.  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  Large hematoma is noted in the retroperitoneum on the right side.  His aneurysm was intact yesterday on the CT scan images.  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  The patient is intubated and sedated.    Personal History     Past Medical History:   Diagnosis Date   • Allergy    • Aortic aneurysm     4.7 just found has not f/u   • H/O Kidney stone     • Hiatal hernia    • Kidney mass     left   • Renal cancer        Past Surgical History:   Procedure Laterality Date   • KIDNEY STONE SURGERY     • NEPHRECTOMY PARTIAL Left 11/13/2023    Procedure: NEPHRECTOMY PARTIAL LAPAROSCOPIC WITH DAVINCI ROBOT, left;  Surgeon: Michelle Cai MD;  Location: [facility];  Service: Robotics - DaVinci;  Laterality: Left;   • URETEROSCOPY LASER LITHOTRIPSY WITH STENT INSERTION Left 08/18/2023    Procedure: CYSTOSCOPY URETEROSCOPY RETROGRADE PYELOGRAM STENT INSERTION, left;  Surgeon: Michelle Cai MD;  Location: [facility];  Service: Urology;  Laterality: Left;       Family History: family history includes Cancer in his father and mother; Heart disease in his father; Hypertension in his father. Otherwise pertinent FHx was reviewed and not pertinent to current issue.    Social History:  reports that he has quit smoking. His smoking use included cigarettes. He has a 20.00 pack-year smoking history. He has been exposed to tobacco smoke. He has never used smokeless tobacco. He reports that he does not drink alcohol and does not use drugs.    Home Medications:  Psyllium, cetirizine, mycophenolate, and ondansetron    Allergies:  Allergies   Allergen Reactions   • Latex, Natural Rubber Hives       Objective   Objective     Vitals:   Temp:  [98.4 °F (36.9 °C)-100.3 °F (37.9 °C)] 98.6 °F (37 °C)  Heart Rate:  [] 142  Resp:  [16-26] 26  BP: ()/(32-97) 70/55  Flow (L/min):  [1.5] 1.5  FiO2 (%):  [60 %-100 %] 60 %    Physical Exam   Abdomen is massively distended.  Pale.  No distal pulses.  Hemodynamically unstable.  Mechanically ventilated.    Diagnostic studies: CT images as above    Labs:      Results from last 7 days   Lab Units 12/24/23  1459 12/24/23  0620 12/23/23 1957   WBC 10*3/mm3 20.44* 13.03* 11.32*   HEMOGLOBIN g/dL 9.1* 12.0* 13.0   HEMATOCRIT % 29.0* 37.5 40.6   PLATELETS 10*3/mm3 263 171 188         Results from last 7 days   Lab Units  12/24/23  1459 12/24/23  0620 12/23/23 2050   CREATININE mg/dL 0.92 0.76 0.84         Results from last 7 days   Lab Units 12/24/23  1754   INR  1.42*              Lab Results   Component Value Date     (H) 08/24/2023              Assessment & Plan   Assessment / Plan     Active Hospital Problems:  Active Hospital Problems    Diagnosis    • **Left flank pain    • Ruptured infrarenal abdominal aortic aneurysm (AAA)        Assessment/plan:   63-year-old male with aortic rupture with aortocaval fistula and large retroperitoneal hematoma.  He is hemodynamic unstable.  Has significant coagulopathy also.  Massive resuscitation has been started.  There is also ongoing concern for intra-abdominal infection with positive blood cultures recently.  I had a long discussion with the family and the ICU team.  He requires emergent repair.  He has a very high risk for perioperative mortality and complications including but not limited to renal failure, bowel ischemia, leg ischemia.  Most likely I will have to leave the abdomen open for compartment syndrome.  There is long-term risk for graft infection also.  I discussed all this with the family.  They understand the emergent nature and the risks and benefit and options at this time.  Will take him to the operating room emergently.  Will return to the ICU afterwards.        Electronically signed by Koffi Agosto MD, 12/24/23, 8:10 PM EST.

## 2023-12-25 NOTE — PLAN OF CARE
Family updated by phone at this time. 4-digit code provided. Goal Outcome Evaluation:      Took over care for patient at 1900 in CT. Started MTP in CT and went with patient to OR with Dr. Agosto, MTP continued during surgery. Unable to data validate vitals during surgery. When arrived back to unit, patients temp was 89.2. Warming blankets applied. Current temp 94.2 with coccon on. Been off of pressors and sedation since coming back from surgery. Current GCS 3, FLOR[place] notified. 700 urine out strickland cath. 900 red output out of wound vac.     Tran Calle RN

## 2023-12-25 NOTE — PLAN OF CARE
Goal Outcome Evaluation:  Plan of Care Reviewed With: patient, family           Outcome Evaluation: Patient is on ventilator settings AC/VC 24/450/5+/50%. Patient is tolerating vent settings well at this time. Will continue to titrate oxygen as tolerated.

## 2023-12-25 NOTE — H&P (VIEW-ONLY)
[facility]   VASCULAR SURGERY CONSULT    Patient Name: Danny Savage  : 1960  MRN: [id number]  Primary Care Physician:  Kodi Pichardo MD  Date of admission: 2023    Subjective   Subjective     Chief Complaint: Ruptured aortic aneurysm with aortocaval fistula and hemodynamic instability    HPI:    Danny Savage is a 63 y.o. male who recently underwent a left partial nephrectomy for renal mass.  He was noted to have an aortic aneurysm at that time.  Further workup plan intervention was anticipated but he was going through postoperative recovery from his renal surgery.  He was admitted because of concern for infection and bacteremia.  He presented to the hospital on Friday for fever and chills.  Starting this morning the patient started to have progressive distributive shock appearance with requirement for increasing pressors and also significant fluid resuscitation.  He had CODE BLUE and was intubated.  There was concern that his perinephric fluid collection was infected and he was going to be taken to CT for drainage.  A CT was performed and there was a rupture of his aortic aneurysm that was noted.  I was called for that.  Patient is significantly unstable with multiple pressors.  His last hemoglobin was 4.6.  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  Large hematoma is noted in the retroperitoneum on the right side.  His aneurysm was intact yesterday on the CT scan images.  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  The patient is intubated and sedated.    Personal History     Past Medical History:   Diagnosis Date   • Allergy    • Aortic aneurysm     4.7 just found has not f/u   • H/O Kidney stone     • Hiatal hernia    • Kidney mass     left   • Renal cancer        Past Surgical History:   Procedure Laterality Date   • KIDNEY STONE SURGERY     • NEPHRECTOMY PARTIAL Left 11/13/2023    Procedure: NEPHRECTOMY PARTIAL LAPAROSCOPIC WITH DAVINCI ROBOT, left;  Surgeon: Michelle Cai MD;  Location: [facility];  Service: Robotics - DaVinci;  Laterality: Left;   • URETEROSCOPY LASER LITHOTRIPSY WITH STENT INSERTION Left 08/18/2023    Procedure: CYSTOSCOPY URETEROSCOPY RETROGRADE PYELOGRAM STENT INSERTION, left;  Surgeon: Michelle Cai MD;  Location: [facility];  Service: Urology;  Laterality: Left;       Family History: family history includes Cancer in his father and mother; Heart disease in his father; Hypertension in his father. Otherwise pertinent FHx was reviewed and not pertinent to current issue.    Social History:  reports that he has quit smoking. His smoking use included cigarettes. He has a 20.00 pack-year smoking history. He has been exposed to tobacco smoke. He has never used smokeless tobacco. He reports that he does not drink alcohol and does not use drugs.    Home Medications:  Psyllium, cetirizine, mycophenolate, and ondansetron    Allergies:  Allergies   Allergen Reactions   • Latex, Natural Rubber Hives       Objective   Objective     Vitals:   Temp:  [98.4 °F (36.9 °C)-100.3 °F (37.9 °C)] 98.6 °F (37 °C)  Heart Rate:  [] 142  Resp:  [16-26] 26  BP: ()/(32-97) 70/55  Flow (L/min):  [1.5] 1.5  FiO2 (%):  [60 %-100 %] 60 %    Physical Exam   Abdomen is massively distended.  Pale.  No distal pulses.  Hemodynamically unstable.  Mechanically ventilated.    Diagnostic studies: CT images as above    Labs:      Results from last 7 days   Lab Units 12/24/23  1459 12/24/23  0620 12/23/23 1957   WBC 10*3/mm3 20.44* 13.03* 11.32*   HEMOGLOBIN g/dL 9.1* 12.0* 13.0   HEMATOCRIT % 29.0* 37.5 40.6   PLATELETS 10*3/mm3 263 171 188         Results from last 7 days   Lab Units  12/24/23  1459 12/24/23  0620 12/23/23 2050   CREATININE mg/dL 0.92 0.76 0.84         Results from last 7 days   Lab Units 12/24/23  1754   INR  1.42*              Lab Results   Component Value Date     (H) 08/24/2023              Assessment & Plan   Assessment / Plan     Active Hospital Problems:  Active Hospital Problems    Diagnosis    • **Left flank pain    • Ruptured infrarenal abdominal aortic aneurysm (AAA)        Assessment/plan:   63-year-old male with aortic rupture with aortocaval fistula and large retroperitoneal hematoma.  He is hemodynamic unstable.  Has significant coagulopathy also.  Massive resuscitation has been started.  There is also ongoing concern for intra-abdominal infection with positive blood cultures recently.  I had a long discussion with the family and the ICU team.  He requires emergent repair.  He has a very high risk for perioperative mortality and complications including but not limited to renal failure, bowel ischemia, leg ischemia.  Most likely I will have to leave the abdomen open for compartment syndrome.  There is long-term risk for graft infection also.  I discussed all this with the family.  They understand the emergent nature and the risks and benefit and options at this time.  Will take him to the operating room emergently.  Will return to the ICU afterwards.        Electronically signed by Koffi Agosto MD, 12/24/23, 8:10 PM EST.

## 2023-12-25 NOTE — ANESTHESIA PREPROCEDURE EVALUATION
Anesthesia Evaluation     Patient summary reviewed and Nursing notes reviewed   no history of anesthetic complications:   NPO Solid Status: > 8 hours  NPO Liquid Status: > 2 hours           Airway   Mallampati: unable to access  TM distance: >3 FB  Neck ROM: full  No difficulty expected  Dental      Pulmonary - negative pulmonary ROS and normal exam    breath sounds clear to auscultation  Cardiovascular   Exercise tolerance: poor (<4 METS)    Rhythm: regular  Rate: abnormal        Neuro/Psych- negative ROS  GI/Hepatic/Renal/Endo    (+) hiatal hernia, renal disease-    Musculoskeletal (-) negative ROS    Abdominal    Substance History - negative use     OB/GYN negative ob/gyn ROS         Other      history of cancer    ROS/Med Hx Other: PAT Nursing Notes unavailable.     Ruptured aaa pt w/ hgb 5 after receiving 4 u prbc to or emergently for open aaa      Phys Exam Other: Pt intubated            Anesthesia Plan    ASA 5 - emergent     general and Xi     (Patient understands anesthesia not responsible for dental damage.    Massive transfusion protocol for ruptured aaa and repair)  intravenous induction     Anesthetic plan, risks, benefits, and alternatives have been provided, discussed and informed consent has been obtained with: patient.    Use of blood products discussed with patient .    Plan discussed with CRNA.    CODE STATUS:    Level Of Support Discussed With: Patient  Code Status (Patient has no pulse and is not breathing): CPR (Attempt to Resuscitate)  Medical Interventions (Patient has pulse or is breathing): Full Support

## 2023-12-26 ENCOUNTER — ANESTHESIA (OUTPATIENT)
Dept: PERIOP | Facility: HOSPITAL | Age: 63
End: 2023-12-26
Payer: COMMERCIAL

## 2023-12-26 ENCOUNTER — ANESTHESIA EVENT (OUTPATIENT)
Dept: PERIOP | Facility: HOSPITAL | Age: 63
End: 2023-12-26
Payer: COMMERCIAL

## 2023-12-26 LAB
ALBUMIN SERPL-MCNC: 2.2 G/DL (ref 3.5–5.2)
ALBUMIN SERPL-MCNC: 2.2 G/DL (ref 3.5–5.2)
ALBUMIN SERPL-MCNC: 2.5 G/DL (ref 3.5–5.2)
ALBUMIN/GLOB SERPL: 1.2 G/DL
ALBUMIN/GLOB SERPL: 1.3 G/DL
ALBUMIN/GLOB SERPL: 1.7 G/DL
ALP SERPL-CCNC: 43 U/L (ref 39–117)
ALP SERPL-CCNC: 53 U/L (ref 39–117)
ALP SERPL-CCNC: 57 U/L (ref 39–117)
ALT SERPL W P-5'-P-CCNC: 246 U/L (ref 1–41)
ALT SERPL W P-5'-P-CCNC: 274 U/L (ref 1–41)
ALT SERPL W P-5'-P-CCNC: 281 U/L (ref 1–41)
ANION GAP SERPL CALCULATED.3IONS-SCNC: 11.2 MMOL/L (ref 5–15)
ANION GAP SERPL CALCULATED.3IONS-SCNC: 11.8 MMOL/L (ref 5–15)
ANION GAP SERPL CALCULATED.3IONS-SCNC: 9.5 MMOL/L (ref 5–15)
ARTERIAL PATENCY WRIST A: ABNORMAL
AST SERPL-CCNC: 507 U/L (ref 1–40)
AST SERPL-CCNC: 590 U/L (ref 1–40)
AST SERPL-CCNC: 642 U/L (ref 1–40)
BASE EXCESS BLDA CALC-SCNC: 0.1 MMOL/L (ref -2–2)
BASE EXCESS BLDA CALC-SCNC: 0.2 MMOL/L (ref -2–2)
BASE EXCESS BLDA CALC-SCNC: 0.7 MMOL/L (ref -2–2)
BDY SITE: ABNORMAL
BH BB BLOOD EXPIRATION DATE: NORMAL
BH BB BLOOD TYPE BARCODE: 5100
BH BB BLOOD TYPE BARCODE: 600
BH BB BLOOD TYPE BARCODE: 6200
BH BB BLOOD TYPE BARCODE: 7300
BH BB BLOOD TYPE BARCODE: 7300
BH BB DISPENSE STATUS: NORMAL
BH BB PRODUCT CODE: NORMAL
BH BB UNIT NUMBER: NORMAL
BILIRUB SERPL-MCNC: 1.2 MG/DL (ref 0–1.2)
BILIRUB SERPL-MCNC: 1.3 MG/DL (ref 0–1.2)
BILIRUB SERPL-MCNC: 1.3 MG/DL (ref 0–1.2)
BUN SERPL-MCNC: 25 MG/DL (ref 8–23)
BUN SERPL-MCNC: 29 MG/DL (ref 8–23)
BUN SERPL-MCNC: 37 MG/DL (ref 8–23)
BUN/CREAT SERPL: 10.8 (ref 7–25)
BUN/CREAT SERPL: 8.7 (ref 7–25)
BUN/CREAT SERPL: 9.5 (ref 7–25)
CA-I BLDA-SCNC: 0.82 MMOL/L (ref 1.13–1.32)
CA-I BLDA-SCNC: 1 MMOL/L (ref 1.13–1.32)
CA-I BLDA-SCNC: 1.04 MMOL/L (ref 1.13–1.32)
CALCIUM SPEC-SCNC: 7.1 MG/DL (ref 8.6–10.5)
CALCIUM SPEC-SCNC: 7.2 MG/DL (ref 8.6–10.5)
CALCIUM SPEC-SCNC: 7.6 MG/DL (ref 8.6–10.5)
CHLORIDE BLDA-SCNC: 103 MMOL/L (ref 98–106)
CHLORIDE BLDA-SCNC: 104 MMOL/L (ref 98–106)
CHLORIDE BLDA-SCNC: 111 MMOL/L (ref 98–106)
CHLORIDE SERPL-SCNC: 102 MMOL/L (ref 98–107)
CHLORIDE SERPL-SCNC: 104 MMOL/L (ref 98–107)
CHLORIDE SERPL-SCNC: 104 MMOL/L (ref 98–107)
CO2 SERPL-SCNC: 25.2 MMOL/L (ref 22–29)
CO2 SERPL-SCNC: 27.5 MMOL/L (ref 22–29)
CO2 SERPL-SCNC: 27.8 MMOL/L (ref 22–29)
COHGB MFR BLD: 0.3 % (ref 0–1.5)
CREAT SERPL-MCNC: 2.62 MG/DL (ref 0.76–1.27)
CREAT SERPL-MCNC: 3.34 MG/DL (ref 0.76–1.27)
CREAT SERPL-MCNC: 3.44 MG/DL (ref 0.76–1.27)
CROSSMATCH INTERPRETATION: NORMAL
D-LACTATE SERPL-SCNC: 1.3 MMOL/L (ref 0.5–2)
D-LACTATE SERPL-SCNC: 1.8 MMOL/L (ref 0.5–2)
D-LACTATE SERPL-SCNC: 2.2 MMOL/L (ref 0.5–2)
DEPRECATED RDW RBC AUTO: 43.9 FL (ref 37–54)
DEPRECATED RDW RBC AUTO: 45.4 FL (ref 37–54)
DEPRECATED RDW RBC AUTO: 46.4 FL (ref 37–54)
EGFRCR SERPLBLD CKD-EPI 2021: 19 ML/MIN/1.73
EGFRCR SERPLBLD CKD-EPI 2021: 19.6 ML/MIN/1.73
EGFRCR SERPLBLD CKD-EPI 2021: 26.3 ML/MIN/1.73
ERYTHROCYTE [DISTWIDTH] IN BLOOD BY AUTOMATED COUNT: 14.5 % (ref 12.3–15.4)
ERYTHROCYTE [DISTWIDTH] IN BLOOD BY AUTOMATED COUNT: 14.6 % (ref 12.3–15.4)
ERYTHROCYTE [DISTWIDTH] IN BLOOD BY AUTOMATED COUNT: 14.8 % (ref 12.3–15.4)
FHHB: 4.3 % (ref 0–5)
FHHB: 8.2 % (ref 0–5)
FHHB: 9.9 % (ref 0–5)
FIBRINOGEN PPP-MCNC: 179 MG/DL (ref 215–521)
FIBRINOGEN PPP-MCNC: 200 MG/DL (ref 215–521)
FIBRINOGEN PPP-MCNC: 235 MG/DL (ref 215–521)
GLOBULIN UR ELPH-MCNC: 1.5 GM/DL
GLOBULIN UR ELPH-MCNC: 1.7 GM/DL
GLOBULIN UR ELPH-MCNC: 1.8 GM/DL
GLUCOSE BLDA-MCNC: 126 MG/DL (ref 70–99)
GLUCOSE BLDA-MCNC: 172 MG/DL (ref 70–99)
GLUCOSE BLDA-MCNC: 97 MG/DL (ref 70–99)
GLUCOSE SERPL-MCNC: 100 MG/DL (ref 65–99)
GLUCOSE SERPL-MCNC: 152 MG/DL (ref 65–99)
GLUCOSE SERPL-MCNC: 172 MG/DL (ref 65–99)
HCO3 BLDA-SCNC: 23.6 MMOL/L (ref 22–26)
HCO3 BLDA-SCNC: 24.8 MMOL/L (ref 22–26)
HCO3 BLDA-SCNC: 25.6 MMOL/L (ref 22–26)
HCT VFR BLD AUTO: 19.3 % (ref 37.5–51)
HCT VFR BLD AUTO: 24.2 % (ref 37.5–51)
HCT VFR BLD AUTO: 24.8 % (ref 37.5–51)
HGB BLD-MCNC: 6.9 G/DL (ref 13–17.7)
HGB BLD-MCNC: 8.5 G/DL (ref 13–17.7)
HGB BLD-MCNC: 8.6 G/DL (ref 13–17.7)
HGB BLDA-MCNC: 7.1 G/DL (ref 13.8–16.4)
HGB BLDA-MCNC: 7.8 G/DL (ref 13.8–16.4)
HGB BLDA-MCNC: 8.5 G/DL (ref 13.8–16.4)
INHALED O2 CONCENTRATION: 35 %
INHALED O2 CONCENTRATION: 40 %
INHALED O2 CONCENTRATION: 60 %
INR PPP: 1.15 (ref 0.86–1.15)
INR PPP: 1.17 (ref 0.86–1.15)
INR PPP: 1.29 (ref 0.86–1.15)
LACTATE BLDA-SCNC: 1.72 MMOL/L (ref 0.5–2)
LACTATE BLDA-SCNC: 1.79 MMOL/L (ref 0.5–2)
LACTATE BLDA-SCNC: 4.39 MMOL/L (ref 0.5–2)
MAGNESIUM SERPL-MCNC: 2.3 MG/DL (ref 1.6–2.4)
MAGNESIUM SERPL-MCNC: 2.4 MG/DL (ref 1.6–2.4)
MAGNESIUM SERPL-MCNC: 2.4 MG/DL (ref 1.6–2.4)
MCH RBC QN AUTO: 29.9 PG (ref 26.6–33)
MCH RBC QN AUTO: 30 PG (ref 26.6–33)
MCH RBC QN AUTO: 30 PG (ref 26.6–33)
MCHC RBC AUTO-ENTMCNC: 34.7 G/DL (ref 31.5–35.7)
MCHC RBC AUTO-ENTMCNC: 35.1 G/DL (ref 31.5–35.7)
MCHC RBC AUTO-ENTMCNC: 35.8 G/DL (ref 31.5–35.7)
MCV RBC AUTO: 83.9 FL (ref 79–97)
MCV RBC AUTO: 85.5 FL (ref 79–97)
MCV RBC AUTO: 86.1 FL (ref 79–97)
METHGB BLD QL: 0.2 % (ref 0–1.5)
METHGB BLD QL: 0.3 % (ref 0–1.5)
METHGB BLD QL: 0.5 % (ref 0–1.5)
MODALITY: ABNORMAL
NOTE: ABNORMAL
OXYHGB MFR BLDV: 89.5 % (ref 94–99)
OXYHGB MFR BLDV: 91 % (ref 94–99)
OXYHGB MFR BLDV: 95.2 % (ref 94–99)
PCO2 BLDA: 32.4 MM HG (ref 35–45)
PCO2 BLDA: 37.2 MM HG (ref 35–45)
PCO2 BLDA: 45.5 MM HG (ref 35–45)
PH BLDA: 7.37 PH UNITS (ref 7.35–7.45)
PH BLDA: 7.44 PH UNITS (ref 7.35–7.45)
PH BLDA: 7.48 PH UNITS (ref 7.35–7.45)
PHOSPHATE SERPL-MCNC: 3.4 MG/DL (ref 2.5–4.5)
PHOSPHATE SERPL-MCNC: 3.4 MG/DL (ref 2.5–4.5)
PLATELET # BLD AUTO: 68 10*3/MM3 (ref 140–450)
PLATELET # BLD AUTO: 72 10*3/MM3 (ref 140–450)
PLATELET # BLD AUTO: 88 10*3/MM3 (ref 140–450)
PMV BLD AUTO: 11 FL (ref 6–12)
PMV BLD AUTO: 11.3 FL (ref 6–12)
PMV BLD AUTO: 11.4 FL (ref 6–12)
PO2 BLD: 113 MM[HG] (ref 0–500)
PO2 BLD: 182 MM[HG] (ref 0–500)
PO2 BLD: 213 MM[HG] (ref 0–500)
PO2 BLDA: 63.8 MM HG (ref 80–100)
PO2 BLDA: 67.5 MM HG (ref 80–100)
PO2 BLDA: 85.1 MM HG (ref 80–100)
POTASSIUM BLDA-SCNC: 2.98 MMOL/L (ref 3.5–5)
POTASSIUM BLDA-SCNC: 3.54 MMOL/L (ref 3.5–5)
POTASSIUM BLDA-SCNC: 3.64 MMOL/L (ref 3.5–5)
POTASSIUM SERPL-SCNC: 3.9 MMOL/L (ref 3.5–5.2)
POTASSIUM SERPL-SCNC: 3.9 MMOL/L (ref 3.5–5.2)
POTASSIUM SERPL-SCNC: 4 MMOL/L (ref 3.5–5.2)
PROT SERPL-MCNC: 3.9 G/DL (ref 6–8.5)
PROT SERPL-MCNC: 4 G/DL (ref 6–8.5)
PROT SERPL-MCNC: 4 G/DL (ref 6–8.5)
PROTHROMBIN TIME: 14.9 SECONDS (ref 11.8–14.9)
PROTHROMBIN TIME: 15.1 SECONDS (ref 11.8–14.9)
PROTHROMBIN TIME: 16.4 SECONDS (ref 11.8–14.9)
RBC # BLD AUTO: 2.3 10*6/MM3 (ref 4.14–5.8)
RBC # BLD AUTO: 2.83 10*6/MM3 (ref 4.14–5.8)
RBC # BLD AUTO: 2.88 10*6/MM3 (ref 4.14–5.8)
SAO2 % BLDCOA: 90 % (ref 95–99)
SAO2 % BLDCOA: 91.7 % (ref 95–99)
SAO2 % BLDCOA: 95.7 % (ref 95–99)
SODIUM BLDA-SCNC: 135.5 MMOL/L (ref 136–146)
SODIUM BLDA-SCNC: 138.3 MMOL/L (ref 136–146)
SODIUM BLDA-SCNC: 138.3 MMOL/L (ref 136–146)
SODIUM SERPL-SCNC: 141 MMOL/L (ref 136–145)
UNIT  ABO: NORMAL
UNIT  RH: NORMAL
WBC NRBC COR # BLD AUTO: 11.23 10*3/MM3 (ref 3.4–10.8)
WBC NRBC COR # BLD AUTO: 12.18 10*3/MM3 (ref 3.4–10.8)
WBC NRBC COR # BLD AUTO: 12.78 10*3/MM3 (ref 3.4–10.8)

## 2023-12-26 PROCEDURE — 83605 ASSAY OF LACTIC ACID: CPT | Performed by: NURSE PRACTITIONER

## 2023-12-26 PROCEDURE — 85027 COMPLETE CBC AUTOMATED: CPT | Performed by: NURSE PRACTITIONER

## 2023-12-26 PROCEDURE — 25010000002 LABETALOL 5 MG/ML SOLUTION: Performed by: SURGERY

## 2023-12-26 PROCEDURE — 25010000002 DEXAMETHASONE PER 1 MG

## 2023-12-26 PROCEDURE — 85384 FIBRINOGEN ACTIVITY: CPT | Performed by: NURSE PRACTITIONER

## 2023-12-26 PROCEDURE — 83050 HGB METHEMOGLOBIN QUAN: CPT | Performed by: SURGERY

## 2023-12-26 PROCEDURE — 44146 PARTIAL REMOVAL OF COLON: CPT | Performed by: SURGERY

## 2023-12-26 PROCEDURE — 25010000002 VASOPRESSIN 0.2 UNIT/ML SOLUTION: Performed by: NURSE PRACTITIONER

## 2023-12-26 PROCEDURE — 25810000003 LACTATED RINGERS PER 1000 ML

## 2023-12-26 PROCEDURE — 25810000003 SODIUM CHLORIDE 0.9 % SOLUTION: Performed by: SURGERY

## 2023-12-26 PROCEDURE — 99291 CRITICAL CARE FIRST HOUR: CPT | Performed by: INTERNAL MEDICINE

## 2023-12-26 PROCEDURE — 25010000002 FUROSEMIDE PER 20 MG: Performed by: NURSE PRACTITIONER

## 2023-12-26 PROCEDURE — 44146 PARTIAL REMOVAL OF COLON: CPT | Performed by: SPECIALIST/TECHNOLOGIST, OTHER

## 2023-12-26 PROCEDURE — 25810000003 SODIUM CHLORIDE 0.9 % SOLUTION: Performed by: NURSE PRACTITIONER

## 2023-12-26 PROCEDURE — 25010000002 PROPOFOL 10 MG/ML EMULSION: Performed by: NURSE PRACTITIONER

## 2023-12-26 PROCEDURE — 82375 ASSAY CARBOXYHB QUANT: CPT | Performed by: SURGERY

## 2023-12-26 PROCEDURE — 83050 HGB METHEMOGLOBIN QUAN: CPT | Performed by: NURSE PRACTITIONER

## 2023-12-26 PROCEDURE — 82805 BLOOD GASES W/O2 SATURATION: CPT | Performed by: NURSE PRACTITIONER

## 2023-12-26 PROCEDURE — 25810000003 SODIUM CHLORIDE 0.9 % SOLUTION: Performed by: STUDENT IN AN ORGANIZED HEALTH CARE EDUCATION/TRAINING PROGRAM

## 2023-12-26 PROCEDURE — 88302 TISSUE EXAM BY PATHOLOGIST: CPT | Performed by: SURGERY

## 2023-12-26 PROCEDURE — 86920 COMPATIBILITY TEST SPIN: CPT

## 2023-12-26 PROCEDURE — 0DQB0ZZ REPAIR ILEUM, OPEN APPROACH: ICD-10-PCS | Performed by: SURGERY

## 2023-12-26 PROCEDURE — 88307 TISSUE EXAM BY PATHOLOGIST: CPT | Performed by: SURGERY

## 2023-12-26 PROCEDURE — 99222 1ST HOSP IP/OBS MODERATE 55: CPT | Performed by: SURGERY

## 2023-12-26 PROCEDURE — 85610 PROTHROMBIN TIME: CPT | Performed by: NURSE PRACTITIONER

## 2023-12-26 PROCEDURE — 25010000002 CEFEPIME PER 500 MG: Performed by: STUDENT IN AN ORGANIZED HEALTH CARE EDUCATION/TRAINING PROGRAM

## 2023-12-26 PROCEDURE — 25010000002 ONDANSETRON PER 1 MG

## 2023-12-26 PROCEDURE — 25010000002 AMIODARONE IN DEXTROSE 5% 360-4.14 MG/200ML-% SOLUTION: Performed by: NURSE PRACTITIONER

## 2023-12-26 PROCEDURE — 25010000002 MIDAZOLAM PER 1MG

## 2023-12-26 PROCEDURE — 86900 BLOOD TYPING SEROLOGIC ABO: CPT

## 2023-12-26 PROCEDURE — 94761 N-INVAS EAR/PLS OXIMETRY MLT: CPT

## 2023-12-26 PROCEDURE — 84100 ASSAY OF PHOSPHORUS: CPT | Performed by: NURSE PRACTITIONER

## 2023-12-26 PROCEDURE — 0D1L0Z4 BYPASS TRANSVERSE COLON TO CUTANEOUS, OPEN APPROACH: ICD-10-PCS | Performed by: SURGERY

## 2023-12-26 PROCEDURE — 25010000002 CALCIUM GLUCONATE 2-0.675 GM/100ML-% SOLUTION: Performed by: NURSE PRACTITIONER

## 2023-12-26 PROCEDURE — 0DTG0ZZ RESECTION OF LEFT LARGE INTESTINE, OPEN APPROACH: ICD-10-PCS | Performed by: SURGERY

## 2023-12-26 PROCEDURE — 83735 ASSAY OF MAGNESIUM: CPT | Performed by: NURSE PRACTITIONER

## 2023-12-26 PROCEDURE — 80053 COMPREHEN METABOLIC PANEL: CPT | Performed by: NURSE PRACTITIONER

## 2023-12-26 PROCEDURE — 36430 TRANSFUSION BLD/BLD COMPNT: CPT

## 2023-12-26 PROCEDURE — 25010000002 CEFTRIAXONE PER 250 MG: Performed by: INTERNAL MEDICINE

## 2023-12-26 PROCEDURE — 94799 UNLISTED PULMONARY SVC/PX: CPT

## 2023-12-26 PROCEDURE — 25010000002 VANCOMYCIN 5 G RECONSTITUTED SOLUTION: Performed by: STUDENT IN AN ORGANIZED HEALTH CARE EDUCATION/TRAINING PROGRAM

## 2023-12-26 PROCEDURE — 82375 ASSAY CARBOXYHB QUANT: CPT | Performed by: NURSE PRACTITIONER

## 2023-12-26 PROCEDURE — 0DTJ0ZZ RESECTION OF APPENDIX, OPEN APPROACH: ICD-10-PCS | Performed by: SURGERY

## 2023-12-26 PROCEDURE — 25010000002 SUGAMMADEX 200 MG/2ML SOLUTION

## 2023-12-26 PROCEDURE — 25810000003 SODIUM CHLORIDE 0.9 % SOLUTION

## 2023-12-26 PROCEDURE — 94003 VENT MGMT INPAT SUBQ DAY: CPT

## 2023-12-26 PROCEDURE — 35840 EXPLORE ABDOMINAL VESSELS: CPT | Performed by: SPECIALIST/TECHNOLOGIST, OTHER

## 2023-12-26 PROCEDURE — 82805 BLOOD GASES W/O2 SATURATION: CPT | Performed by: SURGERY

## 2023-12-26 PROCEDURE — 25010000002 INDOCYANINE GREEN 25 MG RECONSTITUTED SOLUTION: Performed by: SURGERY

## 2023-12-26 PROCEDURE — P9016 RBC LEUKOCYTES REDUCED: HCPCS

## 2023-12-26 PROCEDURE — 35840 EXPLORE ABDOMINAL VESSELS: CPT | Performed by: SURGERY

## 2023-12-26 DEVICE — ABSORBABLE HEMOSTAT (OXIDIZED REGENERATED CELLULOSE)
Type: IMPLANTABLE DEVICE | Site: ABDOMEN | Status: FUNCTIONAL
Brand: SURGICEL NU-KNIT

## 2023-12-26 DEVICE — PROXIMATE LINEAR CUTTER RELOAD, BLUE, 75MM
Type: IMPLANTABLE DEVICE | Site: ABDOMEN | Status: FUNCTIONAL
Brand: PROXIMATE

## 2023-12-26 DEVICE — PROXIMATE RELOADABLE LINEAR CUTTER WITH SAFETY LOCK-OUT.  75MM LINEAR CUTTER.
Type: IMPLANTABLE DEVICE | Site: ABDOMEN | Status: FUNCTIONAL
Brand: PROXIMATE

## 2023-12-26 DEVICE — ECHELON CONTOUR W/ GREEN RELOAD
Type: IMPLANTABLE DEVICE | Site: ABDOMEN | Status: FUNCTIONAL
Brand: ECHELON

## 2023-12-26 RX ORDER — DEXAMETHASONE SODIUM PHOSPHATE 4 MG/ML
INJECTION, SOLUTION INTRA-ARTICULAR; INTRALESIONAL; INTRAMUSCULAR; INTRAVENOUS; SOFT TISSUE AS NEEDED
Status: DISCONTINUED | OUTPATIENT
Start: 2023-12-26 | End: 2023-12-26 | Stop reason: SURG

## 2023-12-26 RX ORDER — MIDAZOLAM HYDROCHLORIDE 2 MG/2ML
INJECTION, SOLUTION INTRAMUSCULAR; INTRAVENOUS AS NEEDED
Status: DISCONTINUED | OUTPATIENT
Start: 2023-12-26 | End: 2023-12-26 | Stop reason: SURG

## 2023-12-26 RX ORDER — LABETALOL HYDROCHLORIDE 5 MG/ML
10 INJECTION, SOLUTION INTRAVENOUS EVERY 4 HOURS PRN
Status: DISCONTINUED | OUTPATIENT
Start: 2023-12-26 | End: 2024-01-06

## 2023-12-26 RX ORDER — FUROSEMIDE 10 MG/ML
80 INJECTION INTRAMUSCULAR; INTRAVENOUS ONCE
Status: COMPLETED | OUTPATIENT
Start: 2023-12-26 | End: 2023-12-26

## 2023-12-26 RX ORDER — INDOCYANINE GREEN AND WATER 25 MG
5 KIT INJECTION ONCE
Status: COMPLETED | OUTPATIENT
Start: 2023-12-26 | End: 2023-12-26

## 2023-12-26 RX ORDER — SODIUM CHLORIDE, SODIUM LACTATE, POTASSIUM CHLORIDE, CALCIUM CHLORIDE 600; 310; 30; 20 MG/100ML; MG/100ML; MG/100ML; MG/100ML
INJECTION, SOLUTION INTRAVENOUS CONTINUOUS PRN
Status: DISCONTINUED | OUTPATIENT
Start: 2023-12-26 | End: 2023-12-26 | Stop reason: SURG

## 2023-12-26 RX ORDER — SODIUM CHLORIDE 9 MG/ML
INJECTION, SOLUTION INTRAVENOUS CONTINUOUS PRN
Status: DISCONTINUED | OUTPATIENT
Start: 2023-12-26 | End: 2023-12-26 | Stop reason: SURG

## 2023-12-26 RX ORDER — CALCIUM GLUCONATE 20 MG/ML
2000 INJECTION, SOLUTION INTRAVENOUS ONCE
Status: COMPLETED | OUTPATIENT
Start: 2023-12-26 | End: 2023-12-26

## 2023-12-26 RX ORDER — MAGNESIUM HYDROXIDE 1200 MG/15ML
LIQUID ORAL AS NEEDED
Status: DISCONTINUED | OUTPATIENT
Start: 2023-12-26 | End: 2023-12-26 | Stop reason: HOSPADM

## 2023-12-26 RX ORDER — ROCURONIUM BROMIDE 10 MG/ML
INJECTION, SOLUTION INTRAVENOUS AS NEEDED
Status: DISCONTINUED | OUTPATIENT
Start: 2023-12-26 | End: 2023-12-26 | Stop reason: SURG

## 2023-12-26 RX ORDER — ONDANSETRON 2 MG/ML
INJECTION INTRAMUSCULAR; INTRAVENOUS AS NEEDED
Status: DISCONTINUED | OUTPATIENT
Start: 2023-12-26 | End: 2023-12-26 | Stop reason: SURG

## 2023-12-26 RX ADMIN — PROPOFOL 25 MCG/KG/MIN: 10 INJECTION, EMULSION INTRAVENOUS at 02:17

## 2023-12-26 RX ADMIN — SUGAMMADEX 200 MG: 100 INJECTION, SOLUTION INTRAVENOUS at 13:55

## 2023-12-26 RX ADMIN — VASOPRESSIN 0.04 UNITS/MIN: 0.2 INJECTION INTRAVENOUS at 07:04

## 2023-12-26 RX ADMIN — CEFTRIAXONE SODIUM 2000 MG: 2 INJECTION, POWDER, FOR SOLUTION INTRAMUSCULAR; INTRAVENOUS at 09:52

## 2023-12-26 RX ADMIN — ONDANSETRON 4 MG: 2 INJECTION INTRAMUSCULAR; INTRAVENOUS at 11:48

## 2023-12-26 RX ADMIN — SODIUM CHLORIDE 100 ML/HR: 9 INJECTION, SOLUTION INTRAVENOUS at 07:04

## 2023-12-26 RX ADMIN — DEXAMETHASONE SODIUM PHOSPHATE 4 MG: 4 INJECTION, SOLUTION INTRAMUSCULAR; INTRAVENOUS at 11:48

## 2023-12-26 RX ADMIN — CEFEPIME 2000 MG: 2 INJECTION, POWDER, FOR SOLUTION INTRAVENOUS at 04:34

## 2023-12-26 RX ADMIN — Medication 10 ML: at 08:17

## 2023-12-26 RX ADMIN — SODIUM CHLORIDE, POTASSIUM CHLORIDE, SODIUM LACTATE AND CALCIUM CHLORIDE: 600; 310; 30; 20 INJECTION, SOLUTION INTRAVENOUS at 11:20

## 2023-12-26 RX ADMIN — SODIUM CHLORIDE: 9 INJECTION, SOLUTION INTRAVENOUS at 11:20

## 2023-12-26 RX ADMIN — INDOCYANINE GREEN AND WATER 5 MG: KIT at 12:15

## 2023-12-26 RX ADMIN — NOREPINEPHRINE BITARTRATE 0.06 MCG/KG/MIN: 1 INJECTION, SOLUTION, CONCENTRATE INTRAVENOUS at 10:55

## 2023-12-26 RX ADMIN — ROCURONIUM BROMIDE 20 MG: 50 INJECTION INTRAVENOUS at 12:39

## 2023-12-26 RX ADMIN — FUROSEMIDE 80 MG: 10 INJECTION, SOLUTION INTRAMUSCULAR; INTRAVENOUS at 18:33

## 2023-12-26 RX ADMIN — PROPOFOL 20 MCG/KG/MIN: 10 INJECTION, EMULSION INTRAVENOUS at 10:54

## 2023-12-26 RX ADMIN — ROCURONIUM BROMIDE 50 MG: 50 INJECTION INTRAVENOUS at 11:43

## 2023-12-26 RX ADMIN — ROCURONIUM BROMIDE 50 MG: 50 INJECTION INTRAVENOUS at 11:08

## 2023-12-26 RX ADMIN — Medication 75 MCG/HR: at 04:08

## 2023-12-26 RX ADMIN — FAMOTIDINE 20 MG: 10 INJECTION INTRAVENOUS at 20:18

## 2023-12-26 RX ADMIN — ROCURONIUM BROMIDE 10 MG: 50 INJECTION INTRAVENOUS at 13:19

## 2023-12-26 RX ADMIN — FAMOTIDINE 20 MG: 10 INJECTION INTRAVENOUS at 08:17

## 2023-12-26 RX ADMIN — CALCIUM GLUCONATE 2000 MG: 20 INJECTION, SOLUTION INTRAVENOUS at 09:52

## 2023-12-26 RX ADMIN — MIDAZOLAM HYDROCHLORIDE 2 MG: 1 INJECTION, SOLUTION INTRAMUSCULAR; INTRAVENOUS at 10:58

## 2023-12-26 RX ADMIN — Medication 10 ML: at 20:20

## 2023-12-26 RX ADMIN — AMIODARONE HYDROCHLORIDE 0.5 MG/MIN: 1.8 INJECTION, SOLUTION INTRAVENOUS at 04:08

## 2023-12-26 RX ADMIN — LABETALOL HYDROCHLORIDE 10 MG: 5 INJECTION, SOLUTION INTRAVENOUS at 20:11

## 2023-12-26 RX ADMIN — VANCOMYCIN HYDROCHLORIDE 1250 MG: 5 INJECTION, POWDER, LYOPHILIZED, FOR SOLUTION INTRAVENOUS at 06:45

## 2023-12-26 NOTE — ANESTHESIA POSTPROCEDURE EVALUATION
Patient: Danny Savage    Procedure Summary       Date: 12/26/23 Room / Location: [facility] OR 04 / [facility] MAIN OR    Anesthesia Start: 1050 Anesthesia Stop: 1355    Procedures:       LAPAROTOMY EXPLORATORY (Abdomen)      COLON RESECTION (Abdomen) Diagnosis:       Ruptured infrarenal abdominal aortic aneurysm (AAA)      (Ruptured infrarenal abdominal aortic aneurysm (AAA) [I71.33])    Surgeons: Koffi Agosto MD; Hernan Mallory MD Provider: Candice Gu MD    Anesthesia Type: generalXi ASA Status: 4            Anesthesia Type: general, [place]    Vitals  Vitals Value Taken Time   /60    Temp 36.3 °C (97.3 °F) 12/26/23 1400   Pulse 75 12/26/23 1500   Resp     SpO2 95 % 12/26/23 1451   Vitals shown include unfiled device data.      Post Anesthesia Care and Evaluation    Patient location during evaluation: ICU  Patient participation: complete - patient cannot participate  Pain score: 0  Pain management: adequate    Airway patency: patent  Anesthetic complications: No anesthetic complications  PONV Status: none  Cardiovascular status: acceptable  Respiratory status: intubated and ventilator  Hydration status: acceptable

## 2023-12-26 NOTE — ANESTHESIA PREPROCEDURE EVALUATION
Anesthesia Evaluation     Patient summary reviewed and Nursing notes reviewed   no history of anesthetic complications:   NPO Solid Status: > 8 hours  NPO Liquid Status: > 2 hours           Airway   Mallampati: unable to access  Dental      Pulmonary - negative pulmonary ROS and normal exam    breath sounds clear to auscultation  Cardiovascular   Exercise tolerance: poor (<4 METS)    Rhythm: regular  Rate: normal        Neuro/Psych- negative ROS  GI/Hepatic/Renal/Endo    (+) hiatal hernia, renal disease-    Musculoskeletal (-) negative ROS    Abdominal    Substance History - negative use     OB/GYN negative ob/gyn ROS         Other      history of cancer    ROS/Med Hx Other: S/P AAA rupture repair.      Phys Exam Other: Pt intubated                Anesthesia Plan    ASA 4     general and Xi     (Patient understands anesthesia not responsible for dental damage.  )  intravenous induction     Anesthetic plan, risks, benefits, and alternatives have been provided, discussed and informed consent has been obtained with: patient.  Pre-procedure education provided  Use of blood products discussed with patient .      CODE STATUS:    Level Of Support Discussed With: Patient  Code Status (Patient has no pulse and is not breathing): CPR (Attempt to Resuscitate)  Medical Interventions (Patient has pulse or is breathing): Full Support

## 2023-12-26 NOTE — PROGRESS NOTES
[facility]     Progress Note    Patient Name: Danny Savage  : 1960  MRN: [id number]  Primary Care Physician:  Kodi Pichardo MD  Date of admission: 2023    Subjective   Subjective     Chief Complaint: Patient is not awake or alert.  He is still intubated.    Back Pain      Patient Reports patient is not awake or alert he is intubated.  Patient underwent last month left partial nephrectomy.  He was having pain on that left side and CT scan showed a collection of fluid plus some gas bubbles inside the fluid.  Patient was given antibiotics and came back to the emergency room with severe left flank pain and a CT scan that shows the area in question to have gotten smaller but still had some air bubbles in there.  While in the hospital here he ruptured an abdominal aortic aneurysm and was taken urgently to surgery to repair it.  Upon my visit today he had just returned from surgery where they close his abdominal incision.    Review of Systems   Musculoskeletal:  Positive for back pain.       Objective   Objective     Vitals:   Temp:  [98.4 °F (36.9 °C)-99.2 °F (37.3 °C)] 98.7 °F (37.1 °C)  Heart Rate:  [] 81  Resp:  [26-30] 26  BP: ()/(58-80) 93/69  FiO2 (%):  [35 %-40 %] 35 %    Physical Exam   He is intubated.  He is afebrile and is on a pressor to keep his blood pressure up.  Result Review    Result Review:  I have personally reviewed the results from the time of this admission to 2023 14:12 EST and agree with these findings:  []  Laboratory list / accordion  []  Microbiology  []  Radiology  []  EKG/Telemetry   []  Cardiology/Vascular   []  Pathology  []  Old records  []  Other:  Most notable findings include: None      Assessment & Plan   Assessment / Plan     Brief Patient Summary:  Danny Savage is a 63 y.o. male who patient has a ruptured AAA that was repaired and the abdominal incision was closed today.  He has this fluid collection around his kidney on the left  side and we need to continue to monitor that.  But at this point I do not recommend any interventions from urological standpoint as patient is still on the ventilator and trying to recuperate from his AAA repair.    Active Hospital Problems:  Active Hospital Problems    Diagnosis    • **Left flank pain    • Ruptured infrarenal abdominal aortic aneurysm (AAA)      Plan:   Please see patient summary above.    DVT prophylaxis:  Mechanical DVT prophylaxis orders are present.    CODE STATUS:    Level Of Support Discussed With: Patient  Code Status (Patient has no pulse and is not breathing): CPR (Attempt to Resuscitate)  Medical Interventions (Patient has pulse or is breathing): Full Support    Disposition:  I expect patient to be discharged floor..    Iliana Ozuna MD

## 2023-12-26 NOTE — PROGRESS NOTES
[facility]   Hospitalist Progress Note  Date: 2023  Patient Name: Danny Savage  : 1960  MRN: [id number]  Date of admission: 2023  Room/Bed: [facility] MAIN OR/MAIN OR      Subjective   Subjective     Chief Complaint: Back pain    Summary:Danny Savage is a 63 y.o. male Danny Savage is a 63 y.o. male with past medical history of recently diagnosed renal cell carcinoma s/p partial left kidney nephrectomy in 2023 (follows Dr.O Espino as outpatient) and infrarenal aortic aneurysm (scan on  showing AAA of 4.7 cm) who presented with complaints of left lower back pain and fever since past 3 days.  Patient had CT abdomen scan done early December and was evaluated by urologist as outpatient.  Patient presented to ER 1 day prior with left flank pain where CT abdomen was obtained showing mildly decreased fluid collection at partial left nephrectomy bed containing decreasing but persistent small gas bubbles; findings consistent with postoperative seroma/hematoma; questionable infection.  Case was discussed with Dr. Spicer and patient was discharged home.  But patient's pain got worse on the day of presentation and was brought to ED.  Patient's left flank pain was severe, about 10 x 10 in intensity on presentation.  Patient was febrile with temp 102.6.  Lactic acid was normal.  Urinalysis was not significant for UTI.  CT abdomen pelvis with contrast showed stable fluid collection along the left nephrectomy bed, likely postoperative seroma/hematoma although infection cannot be ruled out. Abdominal aortic aneurysm of 5.1 cm.  Patient was started on IV antibiotics and was admitted as a case of possible abscess in the left nephrectomy bed.  Urology was consulted.     Hospital course complicated by sudden unresponsive on 2023.  Vital signs showed blood pressure 42/32.Patient was pale and clammy with agonal breathing.  No pulse was felt which seemed like PEA arrest after which  CODE BLUE was called.  Patient was getting IV fluid bolus.  Before starting CPR, patient had feeble pulse after which CPR was held.  Patient was then intubated at bedside. He was then transferred to critical care unit.  Given patient has expanding AAA  with eccentric mural thrombus/hematoma measuring up to 5.1 centimeter maximally on CT abdomen of 12/22; there is also concern for aortic rupture. 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 Vascular Surgeon was consulted immediately and patient underwent urgent Open repair of ruptured infrarenal abdominal aortic aneurysm with a tube graft and mobilization of the omentum with coverage of the graft using pedicled omental flap.     Interval Followup: No acute events overnight, patient resting comfortably in bed, family at bedside, surgery planning on taking patient back to the OR today      Objective   Objective     Vitals:   Temp:  [98.4 °F (36.9 °C)-99.2 °F (37.3 °C)] 98.7 °F (37.1 °C)  Heart Rate:  [] 81  Resp:  [26-30] 26  BP: ()/(52-80) 93/69  FiO2 (%):  [35 %-40 %] 35 %    Physical Exam   GEN: No acute distress  HEENT: Moist mucous membranes  LUNGS: Equal chest rise bilaterally  CARDIAC: Regular rate and rhythm  NEURO: Moving all 4 extremities spontaneously  SKIN: No obvious breakdown      Result Review    Result Review:  I have personally reviewed these results:  [x]  Laboratory      Lab 12/26/23  1157 12/26/23  0818 12/26/23  0639 12/25/23  2358 12/25/23  1803 12/25/23  0632 12/25/23  0611 12/25/23  0532 12/25/23  0325 12/25/23  0141 12/25/23  0035 12/24/23  2229 12/24/23  2153 12/24/23  1935 12/24/23  1754 12/24/23  1636 12/24/23  1459 12/24/23  1410 12/24/23  0620 12/23/23  [birth year]   WBC  --  12.78*  --  11.23* 10.77   < >  --  10.74  --  8.30  --   --   --   --   --   --  20.44*  --  13.03*  --  11.32*   HEMOGLOBIN  --  8.5*  --  6.9* 6.6*   < >  --  11.6*  --  13.8  --    --   --   --   --   --  9.1*  --  12.0*  --  13.0   HEMATOCRIT  --  24.2*  --  19.3* 18.7*   < >  --  33.9*  --  42.6  --   --   --   --   --   --  29.0*  --  37.5  --  40.6   PLATELETS  --  68*  --  88* 105*   < >  --  69*  --  62*  --   --   --   --   --   --  263  --  171  --  188   NEUTROS ABS  --   --   --   --   --   --   --   --   --  7.14*  --   --   --   --   --   --  15.22*  --  11.25*  --  10.15*   IMMATURE GRANS (ABS)  --   --   --   --   --   --   --   --   --   --   --   --   --   --   --   --  0.12*  --  0.08*  --  0.03   LYMPHS ABS  --   --   --   --   --   --   --   --   --   --   --   --   --   --   --   --  2.48  --  0.64*  --  0.43*   MONOS ABS  --   --   --   --   --   --   --   --   --   --   --   --   --   --   --   --  2.55*  --  1.02*  --  0.69   EOS ABS  --   --   --   --   --   --   --   --   --   --   --   --   --   --   --   --  0.02  --  0.01  --  0.00   MCV  --  85.5  --  83.9 83.5   < >  --  84.3  --  89.5  --   --   --   --   --   --  91.2  --  88.4  --  87.7   PROCALCITONIN  --   --   --   --   --   --   --  8.45*  --   --  2.07*  --   --   --   --   --  1.32*  --   --   --   --    LACTATE  --  1.8  --  2.2* 4.9*   < >  --  9.5*   < >  --  18.6*  --   --   --  11.2*  --  7.7*  --   --    < >  --    LACTATE, ARTERIAL 1.79  --  1.72  --   --    < >  --   --    < >  --   --    < >  --    < >  --    < >  --    < >  --   --   --    PROTIME  --  15.1*  --  16.4* 17.4*   < > 20.8*  --   --   --  24.3*  --  43.6*  --  17.6*   < >  --   --   --   --   --    APTT  --   --   --   --   --   --  45.0*  --   --   --  111.0*  --  >200.0*  --  24.6   < >  --   --   --   --   --    D DIMER QUANT  --   --   --   --   --   --   --   --   --   --   --   --   --   --  19.79*  --   --   --   --   --   --     < > = values in this interval not displayed.         Lab 12/26/23  1157 12/26/23  0818 12/26/23  0639 12/25/23  2358 12/25/23  1803 12/25/23  1712   SODIUM  --  141  --  141 142  --    SODIUM,  ARTERIAL 138.3  --  138.3  --   --  135.5*   POTASSIUM  --  3.9  --  3.9 4.1  --    CHLORIDE  --  104  --  102 102  --    CO2  --  27.5  --  27.8 26.4  --    ANION GAP  --  9.5  --  11.2 13.6  --    BUN  --  29*  --  25* 19  --    CREATININE  --  3.34*  --  2.62* 2.47*  --    EGFR  --  19.6*  --  26.3* 28.2*  --    GLUCOSE  --  152*  --  172* 186*  --    GLUCOSE, ARTERIAL 97  --  126*  --   --  172*   CALCIUM  --  7.1*  --  7.6* 7.9*  --    IONIZED CALCIUM 1.00*  --  0.82*  --   --  1.04*   MAGNESIUM  --  2.4  --  2.3 2.5*  --    PHOSPHORUS  --  3.4  --  3.4 3.8  --          Lab 12/26/23  0818 12/25/23 2358 12/25/23  1803 12/25/23  0532 12/25/23  0035   TOTAL PROTEIN 3.9* 4.0* 3.8*   < > 2.8*   ALBUMIN 2.2* 2.5* 2.4*   < > 1.8*   GLOBULIN 1.7 1.5 1.4   < > 1.0   ALT (SGPT) 274* 246* 244*   < > 550*   AST (SGOT) 590* 642* 591*   < > 444*   BILIRUBIN 1.3* 1.3* 1.2   < > 0.4   ALK PHOS 53 43 39   < > 35*   AMYLASE  --   --   --   --  194*   LIPASE  --   --   --   --  20    < > = values in this interval not displayed.         Lab 12/26/23  0818 12/25/23 2358 12/25/23  1803 12/24/23  2153 12/24/23  1754 12/24/23  1459 12/22/23  0841 12/22/23  0815   PROBNP  --   --   --   --   --  1,898.0* 165.7  --    HSTROP T  --   --   --   --  467* 114*  --  9   PROTIME 15.1* 16.4* 17.4*   < > 17.6*  --   --   --    INR 1.17* 1.29* 1.39*   < > 1.42*  --   --   --     < > = values in this interval not displayed.             Lab 12/24/23 2032   ABO TYPING A   RH TYPING Negative   ANTIBODY SCREEN Negative         Lab 12/26/23  1157 12/26/23  0639 12/25/23  1712   PH, ARTERIAL 7.368 7.480* 7.442   PCO2, ARTERIAL 45.5* 32.4* 37.2   PO2 ART 67.5* 63.8* 85.1   O2 SATURATION ART 90.0* 91.7* 95.7   FIO2 60 35 40   HCO3 ART 25.6 23.6 24.8   BASE EXCESS ART 0.1 0.2 0.7   CARBOXYHEMOGLOBIN 0.3 0.3 0.3     Brief Urine Lab Results  (Last result in the past 365 days)        Color   Clarity   Blood   Leuk Est   Nitrite   Protein   CREAT   Urine  HCG        12/23/23 2054 Yellow   Clear   Small (1+)   Negative   Negative   100 mg/dL (2+)                 [x]  Microbiology   Microbiology Results (last 10 days)       Procedure Component Value - Date/Time    Respiratory Culture - Sputum, ET Suction [[id number]] Collected: 12/24/23 1638    Lab Status: Preliminary result Specimen: Sputum from ET Suction Updated: 12/26/23 1132     Respiratory Culture Light growth (2+) The culture consists of normal respiratory stewart. This is a preliminary report; final report to follow.     Gram Stain Few (2+) Gram negative bacilli      Rare (1+) WBCs seen    Blood Culture - Blood, Arm, Left [[id number]]  (Normal) Collected: 12/24/23 1459    Lab Status: Preliminary result Specimen: Blood from Arm, Left Updated: 12/25/23 1531     Blood Culture No growth at 24 hours    Blood Culture - Blood, Arm, Right [[id number]]  (Normal) Collected: 12/23/23 2205    Lab Status: Preliminary result Specimen: Blood from Arm, Right Updated: 12/25/23 2215     Blood Culture No growth at 2 days    Blood Culture - Blood, Arm, Left [[id number]]  (Normal) Collected: 12/23/23 2205    Lab Status: Preliminary result Specimen: Blood from Arm, Left Updated: 12/25/23 2215     Blood Culture No growth at 2 days    COVID-19, FLU A/B, RSV PCR 1 HR TAT - Swab, Nasopharynx [[id number]]  (Normal) Collected: 12/23/23 2204    Lab Status: Final result Specimen: Swab from Nasopharynx Updated: 12/23/23 2303     COVID19 Not Detected     Influenza A PCR Not Detected     Influenza B PCR Not Detected     RSV, PCR Not Detected    Narrative:      Fact sheet for providers: https://www.fda.gov/media/644875/download    Fact sheet for patients: https://www.fda.gov/media/333589/download    Test performed by PCR.    Blood Culture - Blood, Arm, Right [[id number]]  (Abnormal)  (Susceptibility) Collected: 12/22/23 0815    Lab Status: Final result Specimen: Blood from Arm, Right Updated: 12/25/23 0652     Blood Culture Streptococcus pyogenes  (Group A)     Isolated from Aerobic and Anaerobic Bottles     Gram Stain Aerobic Bottle Gram positive cocci in pairs and chains      Anaerobic Bottle Gram positive cocci in pairs and chains    Susceptibility        Streptococcus pyogenes (Group A)      LINDSEY      Ceftriaxone Susceptible      Levofloxacin Susceptible      Penicillin G Susceptible      Vancomycin Susceptible                           Blood Culture - Blood, Arm, Left [[id number]]  (Abnormal) Collected: 12/22/23 0815    Lab Status: Final result Specimen: Blood from Arm, Left Updated: 12/25/23 0652     Blood Culture Streptococcus pyogenes (Group A)     Isolated from Aerobic and Anaerobic Bottles     Gram Stain Aerobic Bottle Gram positive cocci in pairs and chains      Anaerobic Bottle Gram positive cocci in pairs and chains    Narrative:      Refer to previous blood culture collected on 12/22/2023 0815 for MICs.      COVID PRE-OP / PRE-PROCEDURE SCREENING ORDER (NO ISOLATION) - Swab, Nasopharynx [[id number]]  (Normal) Collected: 12/22/23 0710    Lab Status: Final result Specimen: Swab from Nasopharynx Updated: 12/22/23 0802    Narrative:      The following orders were created for panel order COVID PRE-OP / PRE-PROCEDURE SCREENING ORDER (NO ISOLATION) - Swab, Nasopharynx.  Procedure                               Abnormality         Status                     ---------                               -----------         ------                     COVID-19, FLU A/B, RSV P...[[id number]]  Normal              Final result                 Please view results for these tests on the individual orders.    COVID-19, FLU A/B, RSV PCR 1 HR TAT - Swab, Nasopharynx [[id number]]  (Normal) Collected: 12/22/23 0710    Lab Status: Final result Specimen: Swab from Nasopharynx Updated: 12/22/23 0802     COVID19 Not Detected     Influenza A PCR Not Detected     Influenza B PCR Not Detected     RSV, PCR Not Detected    Narrative:      Fact sheet for providers:  https://www.fda.gov/media/057455/download    Fact sheet for patients: https://www.fda.gov/media/847701/download    Test performed by PCR.          [x]  Radiology  CT Angiogram Abdomen Pelvis    Result Date: 12/24/2023    1. Development of aorto-caval fistula with large amount of active extravasation of hemorrhage into right abdomen and retroperitoneal space likely from a ruptured IVC.  Findings were discussed by Dr. Cervantes to vascular surgery at approximately 1845 hours. 2. Bibasilar consolidations within lung bases, which could reflect atelectasis, aspiration, and/or pneumonia. 3. Small fluid collection along left nephrectomy site is unchanged. 4. Air within urinary bladder, likely secondary to Rico catheterization.     GELY MONTE MD       Electronically Signed and Approved By: GELY MONTE MD on 12/24/2023 at 19:37             XR Abdomen KUB    Result Date: 12/24/2023    1. Mild prominence of small bowel loops within the mid abdomen.  The findings are nonspecific and may be related to adynamic ileus.  Developing bowel obstruction could have this appearance.  Recommend follow-up to ensure resolution.     GELY MONTOYA MD       Electronically Signed and Approved By: GELY MONTOYA MD on 12/24/2023 at 16:07             XR Chest 1 View    Result Date: 12/24/2023    1. The endotracheal tube distal tip is seen approximately 3-4 cm above the prosper. 2. The nasogastric tube is positioned in the stomach in the left upper quadrant. 3. The right IJ central line distal tip is noted near the SVC/right atrial junction.  The line is ready for use.  There is no pneumothorax. 4. Evidence for subtle ill-defined infiltrates throughout the mid to lower lungs bilaterally.  There may also be a small left pleural effusion.       GELY MONTOYA MD       Electronically Signed and Approved By: GELY MONTOYA MD on 12/24/2023 at 15:13             CT Abdomen Pelvis With Contrast    Result Date: 12/23/2023    1. Stable fluid  collection along left nephrectomy bed, likely a postoperative seroma/hematoma, although infection cannot be excluded on imaging alone. 2. No new acute process identified within abdomen/pelvis. 3. Stable abdominal aortic aneurysm.     GELY MONTE MD       Electronically Signed and Approved By: GELY MONTE MD on 12/23/2023 at 22:07             XR Chest 1 View    Result Date: 12/23/2023   No acute cardiopulmonary process identified.       GELY MONTE MD       Electronically Signed and Approved By: GELY MONTE MD on 12/23/2023 at 21:49             CT Abdomen Pelvis With Contrast    Result Date: 12/22/2023    1. Mildly decreased fluid collection at the partial left nephrectomy bed measuring 1.4 x 2.9 centimeter (previously 2 x 3.2 centimeter), containing decreasing but persistent small gas bubbles.  Findings favor postoperative seroma/hematoma +/-infection.  There does not appear to be a mature rim enhancing wall to suggest true abscess or drainable collection.  Decrease size argues against urinoma. 2. Grossly stable infrarenal abdominal aortic aneurysm with eccentric mural thrombus/hematoma measuring up to 5.1 centimeter maximally. 3. Hepatic steatosis. 4. Other chronic/ancillary finding similar to recent comparison.      DOMINIK NORTON MD       Electronically Signed and Approved By: DOMINIK NORTON MD on 12/22/2023 at 9:36             XR Chest 1 View    Result Date: 12/22/2023   No active cardiopulmonary disease       BERNIE SEXTON MD       Electronically Signed and Approved By: BERNIE SEXTON MD on 12/22/2023 at 7:25            []  EKG/Telemetry   []  Cardiology/Vascular   []  Pathology  []  Old records  []  Other:    Assessment & Plan   Assessment / Plan     Assessment:  Ruptured infrarenal AAA s/p open repair  Hemorrhagic shock  Hypovolemic shock  Acute hypoxic respiratory failure requiring mechanical ventilation  Septic shock with Streptococcus pyogenes bacteremia  SVT  History of RCC s/p  partial nephrectomy on 11/13  Acute kidney injury  Clinically significant lactic acidosis  Chronically immunosuppressed on CellCept  Hypocalcemia  Hypokalemia    Plan:  Patient currently being managed in critical care unit; appreciate intensivist input.  Patient had spontaneous rupture of AAA s/p Open repair of ruptured infrarenal abdominal aortic aneurysm with a tube graft and mobilization of the omentum with coverage of the graft using pedicled omental flap on 12/24.  Vascular surgery following the patient; appreciate further input.,  Planning on taking patient back to the OR today  Remains on mechanical ventilation.  Replace electrolytes  On LR IVF, continue.  On Levophed and vasopressin; continue. Titrate as tolerated to maintain MAP above 65.,  Wean as able  On amiodarone for SVT.  Wound vac in place and still draining significant amount of serosanguinous fluid.  Vascular surgery planning of exploration if needed to look for any ongoing bleeding given significant output of serosanguinous fluid from the wound vac.  Continue IV vancomycin and Cefepime.  Wean vent as able  Discussed management plan with pulmonology     Discussed with RN.    DVT prophylaxis:  Mechanical DVT prophylaxis orders are present.    CODE STATUS:   Level Of Support Discussed With: Patient  Code Status (Patient has no pulse and is not breathing): CPR (Attempt to Resuscitate)  Medical Interventions (Patient has pulse or is breathing): Full Support    Pt is critically ill in ICU with shock, blood loss anemia.  I have spent 33 minutes of critical care time reviewing previous documentation, reviewing all pertinent telemetry, labs, and imaging studies, examining the patient, modifying the care plan and discussing the patient´s condition and care plan with the consultants, available family and during rounds. This does not include any procedures performed.      Electronically signed by Eric Martin MD, 12/26/23, 1:12 PM EST.

## 2023-12-26 NOTE — PROGRESS NOTES
" LOS: 3 days   Patient Care Team:  Kodi Pichardo MD as PCP - General (Internal Medicine)  Lisset Harrington APRN as Nurse Practitioner (Urology)    Chief Complaint: ROLAND    Subjective     History of Present Illness  Pt remains sedated on vent  Plans for abd exploration for bleeding today.  Wife at bedside.    Subjective  Unable to obtain    History taken from: chart family    Objective     Vital Sign Min/Max for last 24 hours  Temp  Min: 97.9 °F (36.6 °C)  Max: 99.2 °F (37.3 °C)   BP  Min: 85/68  Max: 152/78   Pulse  Min: 71  Max: 230   Resp  Min: 26  Max: 30   SpO2  Min: 91 %  Max: 100 %   No data recorded   No data recorded     Flowsheet Rows      Flowsheet Row First Filed Value   Admission Height 177.8 cm (70\") Documented at 12/23/2023 1948   Admission Weight 79.9 kg (176 lb 2.4 oz) Documented at 12/23/2023 1948            No intake/output data recorded.  I/O last 3 completed shifts:  In: 58361.9 [I.V.:85819; Blood:9346.9; IV Piggyback:750]  Out: 8675 [Urine:1175; Emesis/NG output:400; Blood:1200]    Objective:  General Appearance:  Comfortable.    Vital signs: (most recent): Blood pressure 147/80, pulse 71, temperature 98.7 °F (37.1 °C), resp. rate 26, height 177.8 cm (70\"), weight 80.5 kg (177 lb 7.5 oz), SpO2 93%.  Vital signs are normal.    Output: Producing urine.    HEENT: Normal HEENT exam.    Lungs:  Normal effort and normal respiratory rate.    Heart: Normal rate.  Regular rhythm.    Abdomen: Abdomen is soft.  Bowel sounds are normal.     Extremities: There is no dependent edema.    Pulses: Distal pulses are intact.    Pupils:  Pupils are equal, round, and reactive to light.    Skin:  Warm and dry.              Results Review:     I reviewed the patient's new clinical results.    WBC WBC   Date Value Ref Range Status   12/25/2023 11.23 (H) 3.40 - 10.80 10*3/mm3 Final   12/25/2023 10.77 3.40 - 10.80 10*3/mm3 Final   12/25/2023 7.18 3.40 - 10.80 10*3/mm3 Final   12/25/2023 10.74 3.40 - 10.80 " "10*3/mm3 Final   12/25/2023 8.30 3.40 - 10.80 10*3/mm3 Final   12/24/2023   Corrected     Comment:     Pt is receiving blood MTP, disregard previous results  Corrected result. Previous result was 5.32 10*3/mm3 on 12/24/2023 at 2237 EST.   12/24/2023 20.44 (H) 3.40 - 10.80 10*3/mm3 Final   12/24/2023 13.03 (H) 3.40 - 10.80 10*3/mm3 Final   12/23/2023 11.32 (H) 3.40 - 10.80 10*3/mm3 Final      HGB Hemoglobin   Date Value Ref Range Status   12/25/2023 6.9 (C) 13.0 - 17.7 g/dL Final   12/25/2023 6.6 (C) 13.0 - 17.7 g/dL Final   12/25/2023 5.7 (C) 13.0 - 17.7 g/dL Final   12/25/2023 11.6 (L) 13.0 - 17.7 g/dL Final   12/25/2023 13.8 13.0 - 17.7 g/dL Final   12/24/2023   Corrected     Comment:     Pt is receiving blood MTP, disregard previous results  Corrected result. Previous result was 8.5 g/dL on 12/24/2023 at 2237 EST.   12/24/2023 9.1 (L) 13.0 - 17.7 g/dL Final   12/24/2023 12.0 (L) 13.0 - 17.7 g/dL Final   12/23/2023 13.0 13.0 - 17.7 g/dL Final      HCT Hematocrit   Date Value Ref Range Status   12/25/2023 19.3 (C) 37.5 - 51.0 % Final   12/25/2023 18.7 (C) 37.5 - 51.0 % Final   12/25/2023 16.7 (C) 37.5 - 51.0 % Final   12/25/2023 33.9 (L) 37.5 - 51.0 % Final   12/25/2023 42.6 37.5 - 51.0 % Final   12/24/2023   Corrected     Comment:     Pt is receiving blood MTP, disregard previous results  Corrected result. Previous result was 26.7 % on 12/24/2023 at 2237 EST.   12/24/2023 29.0 (L) 37.5 - 51.0 % Final   12/24/2023 37.5 37.5 - 51.0 % Final   12/23/2023 40.6 37.5 - 51.0 % Final      Platlets No results found for: \"LABPLAT\"   MCV MCV   Date Value Ref Range Status   12/25/2023 83.9 79.0 - 97.0 fL Final   12/25/2023 83.5 79.0 - 97.0 fL Final   12/25/2023 84.3 79.0 - 97.0 fL Final   12/25/2023 84.3 79.0 - 97.0 fL Final   12/25/2023 89.5 79.0 - 97.0 fL Final   12/24/2023   Corrected     Comment:     Pt is receiving blood MTP, disregard previous results  Corrected result. Previous result was 92.1 fL on 12/24/2023 at 2237 " EST.   12/24/2023 91.2 79.0 - 97.0 fL Final   12/24/2023 88.4 79.0 - 97.0 fL Final   12/23/2023 87.7 79.0 - 97.0 fL Final          Sodium Sodium   Date Value Ref Range Status   12/25/2023 141 136 - 145 mmol/L Final   12/25/2023 142 136 - 145 mmol/L Final   12/25/2023 140 136 - 145 mmol/L Final   12/25/2023 137 136 - 145 mmol/L Final   12/25/2023 140 136 - 145 mmol/L Final   12/24/2023 133 (L) 136 - 145 mmol/L Final   12/24/2023 133 (L) 136 - 145 mmol/L Final   12/23/2023 133 (L) 136 - 145 mmol/L Final     Sodium, Arterial   Date Value Ref Range Status   12/26/2023 138.3 136 - 146 mmol/L Final   12/25/2023 135.5 (L) 136 - 146 mmol/L Final   12/25/2023 135.1 (L) 136 - 146 mmol/L Final   12/25/2023 133.4 (L) 136 - 146 mmol/L Final   12/25/2023 134.8 (L) 136 - 146 mmol/L Final   12/25/2023 137.0 136 - 146 mmol/L Final   12/24/2023 140.5 136 - 146 mmol/L Final   12/24/2023 140.4 136 - 146 mmol/L Final   12/24/2023 143.9 136 - 146 mmol/L Final   12/24/2023 128.8 (L) 136 - 146 mmol/L Final   12/24/2023 129.9 (L) 136 - 146 mmol/L Final   12/24/2023 128.2 (L) 136 - 146 mmol/L Final      Potassium Potassium   Date Value Ref Range Status   12/25/2023 3.9 3.5 - 5.2 mmol/L Final   12/25/2023 4.1 3.5 - 5.2 mmol/L Final   12/25/2023 3.7 3.5 - 5.2 mmol/L Final   12/25/2023 3.6 3.5 - 5.2 mmol/L Final     Comment:     Slight hemolysis detected by analyzer. Result may be falsely elevated.   12/25/2023 4.5 3.5 - 5.2 mmol/L Final     Comment:     Slight hemolysis detected by analyzer. Result may be falsely elevated.   12/24/2023 4.5 3.5 - 5.2 mmol/L Final   12/24/2023 4.2 3.5 - 5.2 mmol/L Final   12/23/2023 3.4 (L) 3.5 - 5.2 mmol/L Final      Chloride Chloride   Date Value Ref Range Status   12/25/2023 102 98 - 107 mmol/L Final   12/25/2023 102 98 - 107 mmol/L Final   12/25/2023 100 98 - 107 mmol/L Final   12/25/2023 98 98 - 107 mmol/L Final   12/25/2023 99 98 - 107 mmol/L Final   12/24/2023 96 (L) 98 - 107 mmol/L Final   12/24/2023 98  "98 - 107 mmol/L Final   12/23/2023 96 (L) 98 - 107 mmol/L Final      CO2 CO2   Date Value Ref Range Status   12/25/2023 27.8 22.0 - 29.0 mmol/L Final   12/25/2023 26.4 22.0 - 29.0 mmol/L Final   12/25/2023 25.9 22.0 - 29.0 mmol/L Final   12/25/2023 22.7 22.0 - 29.0 mmol/L Final   12/25/2023 15.4 (L) 22.0 - 29.0 mmol/L Final   12/24/2023 20.9 (L) 22.0 - 29.0 mmol/L Final   12/24/2023 23.3 22.0 - 29.0 mmol/L Final   12/23/2023 20.2 (L) 22.0 - 29.0 mmol/L Final      BUN BUN   Date Value Ref Range Status   12/25/2023 25 (H) 8 - 23 mg/dL Final   12/25/2023 19 8 - 23 mg/dL Final   12/25/2023 17 8 - 23 mg/dL Final   12/25/2023 14 8 - 23 mg/dL Final   12/25/2023 12 8 - 23 mg/dL Final   12/24/2023 11 8 - 23 mg/dL Final   12/24/2023 9 8 - 23 mg/dL Final   12/23/2023 13 8 - 23 mg/dL Final      Creatinine Creatinine   Date Value Ref Range Status   12/25/2023 2.62 (H) 0.76 - 1.27 mg/dL Final   12/25/2023 2.47 (H) 0.76 - 1.27 mg/dL Final   12/25/2023 1.89 (H) 0.76 - 1.27 mg/dL Final   12/25/2023 1.54 (H) 0.76 - 1.27 mg/dL Final   12/25/2023 1.33 (H) 0.76 - 1.27 mg/dL Final   12/24/2023 0.92 0.76 - 1.27 mg/dL Final   12/24/2023 0.76 0.76 - 1.27 mg/dL Final   12/23/2023 0.84 0.76 - 1.27 mg/dL Final      Calcium Calcium   Date Value Ref Range Status   12/25/2023 7.6 (L) 8.6 - 10.5 mg/dL Final   12/25/2023 7.9 (L) 8.6 - 10.5 mg/dL Final   12/25/2023 7.9 (L) 8.6 - 10.5 mg/dL Final   12/25/2023 8.1 (L) 8.6 - 10.5 mg/dL Final   12/25/2023 9.4 8.6 - 10.5 mg/dL Final   12/24/2023 9.3 8.6 - 10.5 mg/dL Final   12/24/2023 7.9 (L) 8.6 - 10.5 mg/dL Final   12/23/2023 8.4 (L) 8.6 - 10.5 mg/dL Final      PO4 No results found for: \"CAPO4\"   Albumin Albumin   Date Value Ref Range Status   12/25/2023 2.5 (L) 3.5 - 5.2 g/dL Final   12/25/2023 2.4 (L) 3.5 - 5.2 g/dL Final   12/25/2023 2.4 (L) 3.5 - 5.2 g/dL Final   12/25/2023 2.2 (L) 3.5 - 5.2 g/dL Final   12/25/2023 1.8 (L) 3.5 - 5.2 g/dL Final   12/24/2023 3.3 (L) 3.5 - 5.2 g/dL Final   12/23/2023 " "3.6 3.5 - 5.2 g/dL Final      Magnesium Magnesium   Date Value Ref Range Status   12/25/2023 2.3 1.6 - 2.4 mg/dL Final   12/25/2023 2.5 (H) 1.6 - 2.4 mg/dL Final   12/25/2023 2.2 1.6 - 2.4 mg/dL Final   12/25/2023 1.9 1.6 - 2.4 mg/dL Final   12/25/2023 2.2 1.6 - 2.4 mg/dL Final   12/24/2023 2.0 1.6 - 2.4 mg/dL Final   12/24/2023 1.7 1.6 - 2.4 mg/dL Final      Uric Acid No results found for: \"URICACID\"     Medication Review:   calcium gluconate, 2,000 mg, Intravenous, Once  cefepime, 2,000 mg, Intravenous, Q12H  famotidine, 20 mg, Intravenous, Q12H  [MAR Hold] hydrocortisone sodium succinate, 50 mg, Intravenous, Q8H  [MAR Hold] senna-docusate sodium, 2 tablet, Nasogastric, BID  sodium chloride, 10 mL, Intravenous, Q12H  vancomycin, 1,250 mg, Intravenous, Q24H          Assessment & Plan       Left flank pain    Ruptured infrarenal abdominal aortic aneurysm (AAA)      Assessment & Plan  ROLAND-  pt with previous partial left nephrectomy and baseline creatinine closer to 0.9-1.0.  Had noted hypotension with AAA rupture s/p repair.  Non-oliguric at this time with creatinine increased to 1.54->2.62 now.  Likely ATN from hypotension vs. Atheroembolic injury. Also received IV contrast with CTA.  UOP down some from yesterday. D/w wife at bedside.  No acute need for dialysis but will monitor closely next 1-2 days for renal function.  Continue supportive care.  AAA rupture-  s/p repair.  Resp Failure-  intubated at this time.  Shock- sepsis, hemorrhagic component with AAA rupture.    Sepsis-  blood cx with strep pyogenes.  On vanc/cefepime.  Anemia-  prbc's prn.    Adin Hinton MD  12/26/23  07:49 EST          "

## 2023-12-26 NOTE — CONSULTS
[facility]   HISTORY AND PHYSICAL    Patient Name: Danny Savage  : 1960  MRN: [id number]  Primary Care Physician:  Kodi Pichardo MD  Date of admission: 2023    Subjective   Subjective     Chief Complaint: Open abdomen    HPI:    Danny Savage is a 63 y.o. male who underwent open AAA repair.  Patient has an open abdomen.  In discussion with vascular surgery they are concerned about the viability of his colon/bowel.  He is going for second look laparotomy today with his open abdomen.    Review of Systems   Unable to perform ROS: Intubated        Personal History     Past Medical History:   Diagnosis Date   • Allergy    • Aortic aneurysm     4.7 just found has not f/u   • H/O Kidney stone    • Hiatal hernia    • Kidney mass     left   • Renal cancer        Past Surgical History:   Procedure Laterality Date   • ABDOMINAL AORTIC ANEURYSM REPAIR N/A 2023    Procedure: ABDOMINAL AORTIC ANEURYSM REPAIR;  Surgeon: Koffi Agosto MD;  Location: [facility];  Service: Vascular;  Laterality: N/A;   • KIDNEY STONE SURGERY     • NEPHRECTOMY PARTIAL Left 2023    Procedure: NEPHRECTOMY PARTIAL LAPAROSCOPIC WITH DAVINCI ROBOT, left;  Surgeon: Michelle Cai MD;  Location: [facility];  Service: Robotics - DaVinci;  Laterality: Left;   • URETEROSCOPY LASER LITHOTRIPSY WITH STENT INSERTION Left 2023    Procedure: CYSTOSCOPY URETEROSCOPY RETROGRADE PYELOGRAM STENT INSERTION, left;  Surgeon: Michelle Cai MD;  Location: [facility];  Service: Urology;  Laterality: Left;       Family History: family history includes Cancer in his father and mother; Heart disease in his father; Hypertension in his father. Otherwise pertinent FHx was reviewed and not pertinent to current issue.    Social History:  reports that he has quit smoking. His smoking use included cigarettes. He has a 20.00 pack-year smoking history. He has been exposed to tobacco smoke. He has never used  smokeless tobacco. He reports that he does not drink alcohol and does not use drugs.    Home Medications:  Psyllium, cetirizine, mycophenolate, and ondansetron      Allergies:  Allergies   Allergen Reactions   • Latex, Natural Rubber Hives       Objective   Objective     Vitals:   Temp:  [97.9 °F (36.6 °C)-99.2 °F (37.3 °C)] 98.7 °F (37.1 °C)  Heart Rate:  [] 71  Resp:  [26-30] 26  BP: ()/(49-80) 147/80  FiO2 (%):  [30 %-50 %] 35 %    Physical Exam  Constitutional:       Appearance: He is ill-appearing.   Cardiovascular:      Rate and Rhythm: Tachycardia present.   Pulmonary:      Breath sounds: Normal breath sounds.     On the ventilator  Abdominal dressing in place    Result Review    Result Review:  I have personally reviewed the results from the time of this admission to 12/26/2023 08:17 EST and agree with these findings:  [x]  Laboratory  []  Microbiology  []  Radiology  []  EKG/Telemetry   []  Cardiology/Vascular   []  Pathology  []  Old records  []  Other:    Lab Results   Component Value Date    WBC 11.23 (H) 12/25/2023    HGB 6.9 (C) 12/25/2023    HCT 19.3 (C) 12/25/2023    MCV 83.9 12/25/2023    PLT 88 (L) 12/25/2023      Lab Results   Component Value Date    GLUCOSE 126 (H) 12/26/2023    CALCIUM 7.6 (L) 12/25/2023    .3 12/26/2023    K 3.9 12/25/2023    CO2 27.8 12/25/2023     12/25/2023    BUN 25 (H) 12/25/2023    CREATININE 2.62 (H) 12/25/2023    EGFR 26.3 (L) 12/25/2023    BCR 9.5 12/25/2023    ANIONGAP 11.2 12/25/2023        Lab Results   Component Value Date     (H) 12/25/2023      Most notable findings include: White count 11, hemoglobin 6.9    Assessment & Plan   Assessment / Plan     Brief Patient Summary:  Danny Savage is a 63 y.o. male who has open abdomen after AAA with possible concern for bowel ischemia    Active Hospital Problems:  Active Hospital Problems    Diagnosis    • **Left flank pain    • Ruptured infrarenal abdominal aortic aneurysm (AAA)       Plan:  Will be available when vascular takes the patient back to the OR today  Discussed potential bowel resection and possible ostomy with wife at bedside  Risk benefits alternatives of the procedure were discussed extensively  All questions were answered  Plan is for return to the ICU with open abdomen today  Will be available to assist with abdominal closure if necessary      DVT prophylaxis:  Mechanical DVT prophylaxis orders are present.    CODE STATUS:    Level Of Support Discussed With: Patient  Code Status (Patient has no pulse and is not breathing): CPR (Attempt to Resuscitate)  Medical Interventions (Patient has pulse or is breathing): Full Support    Admission Status:  I believe this patient meets critical care status.    Electronically signed by Hernan Mallory MD, 12/26/23, 8:17 AM EST.

## 2023-12-26 NOTE — PROGRESS NOTES
Pulmonary / Critical Care Progress Note      Patient Name: Danny Savage  : 1960  MRN: [id number]  Attending:  Eric Martin MD   Date of admission: 2023    Subjective   Subjective   Patient critically ill with ruptured AAA s/p open repair, hemorrhagic shock, Streptococcus pyogenes bacteremia    Overall improving  Patient on mechanical ventilator  Does wake up and follow commands, on propofol and fentanyl  On Levophed at 0.02  On vasopressin  Still having oozing/bleeding from abdominal wound  Still requiring blood transfusions  Urine output has dropped off in the last 12 hours, creatinine up       Review of Systems  Unable to obtain      Objective   Objective     Vitals:   Vital signs for last 24 hours:  Temp:  [98.5 °F (36.9 °C)-99.2 °F (37.3 °C)] 98.7 °F (37.1 °C)  Heart Rate:  [] 81  Resp:  [26-30] 26  BP: ()/(58-80) 93/69  FiO2 (%):  [35 %-40 %] 35 %    Intake/Output last 3 shifts:  I/O last 3 completed shifts:  In: 94583.9 [I.V.:96373; Blood:9346.9; IV Piggyback:750]  Out: 8675 [Urine:1175; Emesis/NG output:400; Blood:1200]    Vent settings for last 24 hours:  Mode: VC/AC  FiO2 (%):  [35 %-40 %] 35 %  S RR:  [10-26] 12  S VT:  [450 mL] 450 mL  PEEP/CPAP (cm H2O):  [5 cm H20] 5 cm H20  MAP (cm H2O):  [10-11] 11    Physical Exam   Critically ill on mechanical ventilator  Open abdomen, wound VAC   Does follow commands, no focal deficits    Result Review    I have personally reviewed the results from the time of this admission to 2023 14:36 EST and agree with these findings:  [x]  Laboratory  [x]  Microbiology  [x]  Radiology  []  EKG/Telemetry   []  Cardiology/Vascular   []  Pathology  []  Old records  []  Other:  Most notable findings include:   2023 blood culture Streptococcus pyogenes group A    Assessment & Plan   Assessment / Plan     Active Hospital Problems:  Active Hospital Problems    Diagnosis    • **Left flank pain    • Ruptured infrarenal abdominal  aortic aneurysm (AAA)      Impression:  Ruptured infrarenal AAA s/p open repair  Hemorrhagic shock  Hypovolemic shock  Acute hypoxic respiratory failure requiring mechanical ventilation  Septic shock with Streptococcus pyogenes bacteremia  Ischemic sigmoid colon s/p colostomy  History of RCC s/p partial nephrectomy on 11/13  Acute kidney injury Falguni GRAY  Clinically significant lactic acidosis  Chronically immunosuppressed on CellCept  Hypocalcemia  Hypokalemia  Pneumonitis  SVT    Plan:    -Continue mechanical ventilator  -Can start daily SAT/SBT tomorrow  -Plan for operation today to evaluate sigmoid and ongoing bleeding  -Continue Levophed, wean vasopressin  -Use propofol and fentanyl for RASS 0 to -1  -Will check CBC/coags this afternoon and can stop serial blood draws  -Vascular surgery following, appreciate service  -General surgery consulted, appreciate service  -Nephrology services consulted, Follow renal function, may need diuresis  -Replace IV calcium today  -Continue IV fluids  -Blood cultures previously Streptococcus pyogenes, de-escalate antibiotics to Rocephin 2 g daily for 14 days  -Hold home chronic immunosuppression CellCept  -Continue to evaluate nutritional needs once cleared per general surgery  Spoke with vascular surgery they are okay extubating tomorrow if the patient's volume status allows, they are all also okay trickling tube feeds tomorrow if patient's not extubated  Hold CellCept indefinitely  PUPppx: Pepcid  DVT prophylaxis: None at this time, follow    DVT prophylaxis:  Mechanical DVT prophylaxis orders are present.    CODE STATUS:   Level Of Support Discussed With: Patient  Code Status (Patient has no pulse and is not breathing): CPR (Attempt to Resuscitate)  Medical Interventions (Patient has pulse or is breathing): Full Support    I, BAKARI Alvarez, spent 20 minutes critical care time in accordance to split shared billing.    Electronically signed by BAKARI Arenas,  12/25/23, 8:02 AM EST.    Total CTT by our service is 60 minutes    I have spent a total of 40 minutes if Critical care time    The patient is critically ill in the ICU with shock ruptured AAA.  Multiple metabolic and electrolyte degrangments and SVTMultidisciplinary bedside critical care rounds were performed with nursing staff, respiratory therapy, pharmacy, nutritional services, social work. I have personally reviewed the chart, labs and any pertinent imaging available.      Electronically signed by Clayton Holland DO, 12/26/23, 4:24 PM EST.

## 2023-12-26 NOTE — PLAN OF CARE
Goal Outcome Evaluation:   Patient currently on ventilator, AC 26/450/peep5/50%

## 2023-12-26 NOTE — CONSULTS
"Nutrition Services    Patient Name: Danny Savage  YOB: 1960  MRN: [id number]  Admission date: 12/23/2023      CLINICAL NUTRITION ASSESSMENT      Reason for Assessment  Identified at Risk by Screening Criteria      H&P:  Past Medical History:   Diagnosis Date    Allergy     Aortic aneurysm     4.7 just found has not f/u    H/O Kidney stone     Hiatal hernia     Kidney mass     left    Renal cancer         Current Problems:   Active Hospital Problems    Diagnosis     **Left flank pain     Ruptured infrarenal abdominal aortic aneurysm (AAA)         Nutrition/Diet History         Narrative   Patient admitted with left flank pain, ruptured AAA.  S/P open AAA repair.  Patient has an open abdomen post op.  Concern regarding viability of colon/bowel.  Plan to go back to OR today.      Patient is currently NPO and intubated/sedated.  Day 4 NPO.  Had some clear liquids x 1 day on 12/24/23.  Will monitor for ability to advance diet.      If patient to remain NPO greater than one week and TPN is required, recommend Clinimix 5/15 @ 83 ml/hr and 20% SMOFlipid 250 ml daily.  Will provide 1920 kcal, 100 g pro.       Anthropometrics        Current Height, Weight Height: 177.8 cm (70\")  Weight: 80.5 kg (177 lb 7.5 oz)   Current BMI Body mass index is 25.46 kg/m².   BMI Classification Overweight   % %   Adjusted Body Weight (ABW)    Weight Hx  Wt Readings from Last 30 Encounters:   12/24/23 0027 80.5 kg (177 lb 7.5 oz)   12/23/23 1948 79.9 kg (176 lb 2.4 oz)   12/22/23 0706 78.8 kg (173 lb 11.6 oz)   12/06/23 0958 79.9 kg (176 lb 3.2 oz)   11/22/23 1338 78.2 kg (172 lb 6.4 oz)   11/13/23 0628 78.2 kg (172 lb 6.4 oz)   10/30/23 1440 79.9 kg (176 lb 2.4 oz)   08/17/23 1952 78.8 kg (173 lb 11.6 oz)   08/17/23 1537 80.3 kg (177 lb 0.5 oz)   08/15/23 0554 81.2 kg (179 lb 0.2 oz)          Wt Change Observation Stable x 4 months      Estimated/Assessed Needs  Estimated Needs based on: Critical Care Guidelines     " "  Energy Requirements 12-25 kcal/kg   EST Needs (kcal/day) 966-2013 kcal        Protein Requirements 1.2-2.0 g/kg   EST Daily Needs (g/day)  g       Fluid Requirements 25 ml/kg    Estimated Needs (mL/day) 2013     Labs/Medications         Pertinent Labs Reviewed.   Results from last 7 days   Lab Units 12/26/23  0818 12/26/23  0639 12/25/23 2358 12/25/23  1803   SODIUM mmol/L 141  --  141 142   SODIUM, ARTERIAL mmol/L  --  138.3  --   --    POTASSIUM mmol/L 3.9  --  3.9 4.1   CHLORIDE mmol/L 104  --  102 102   CO2 mmol/L 27.5  --  27.8 26.4   BUN mg/dL 29*  --  25* 19   CREATININE mg/dL 3.34*  --  2.62* 2.47*   CALCIUM mg/dL 7.1*  --  7.6* 7.9*   BILIRUBIN mg/dL 1.3*  --  1.3* 1.2   ALK PHOS U/L 53  --  43 39   ALT (SGPT) U/L 274*  --  246* 244*   AST (SGOT) U/L 590*  --  642* 591*   GLUCOSE mg/dL 152*  --  172* 186*   GLUCOSE, ARTERIAL mg/dL  --  126*  --   --      Results from last 7 days   Lab Units 12/26/23  0818 12/25/23 2358 12/25/23  1803   MAGNESIUM mg/dL 2.4 2.3 2.5*   PHOSPHORUS mg/dL 3.4 3.4 3.8   HEMOGLOBIN g/dL 8.5* 6.9* 6.6*   HEMATOCRIT % 24.2* 19.3* 18.7*     COVID19   Date Value Ref Range Status   12/23/2023 Not Detected Not Detected - Ref. Range Final     No results found for: \"HGBA1C\"      Pertinent Medications Reviewed.     Malnutrition Severity Assessment                Nutrition Diagnosis         Nutrition Dx Problem 1 Inadequate energy Intake related to Inability to consume sufficient energy as evidenced by NPO.       Nutrition Intervention           Current Nutrition Orders & Evaluation of Intake       Current PO Diet Diet: Liquid Diets; Clear Liquid; Fluid Consistency: Thin (IDDSI 0)   Supplement No active supplement orders           Nutrition Intervention/Prescription        No nutrition intervention at this time.   Will follow clinical course and provide nutrition intervention if indicated.          Medical Nutrition Therapy/Nutrition Education          Learner     Readiness " Patient  Education not indicated at this time     Method     Response N/A  N/A     Monitor/Evaluation        Monitor Per protocol, I&O, Pertinent labs, Weight, POC/GOC       Nutrition Discharge Plan         To be determined       Electronically signed by:  Shannon Acharya RD  12/26/23 11:12 EST

## 2023-12-26 NOTE — SIGNIFICANT NOTE
Wound Eval / Progress Noted     Evelina     Patient Name: Danny Savage  : 1960  MRN: [id number]  Today's Date: 2023                 Admit Date: 2023    Visit Dx:    ICD-10-CM ICD-9-CM   1. Sepsis, due to unspecified organism, unspecified whether acute organ dysfunction present  A41.9 038.9     995.91   2. Fever, unspecified fever cause  R50.9 780.60   3. Intra-abdominal fluid collection  R18.8 789.59   4. Abdominal aortic aneurysm (AAA) without rupture, unspecified part  I71.40 441.4   5. Ruptured infrarenal abdominal aortic aneurysm (AAA)  I71.33 441.3         Left flank pain    Ruptured infrarenal abdominal aortic aneurysm (AAA)        Past Medical History:   Diagnosis Date    Allergy     Aortic aneurysm     4.7 just found has not f/u    H/O Kidney stone     Hiatal hernia     Kidney mass     left    Renal cancer         Past Surgical History:   Procedure Laterality Date    ABDOMINAL AORTIC ANEURYSM REPAIR N/A 2023    Procedure: ABDOMINAL AORTIC ANEURYSM REPAIR;  Surgeon: Koffi Agosto MD;  Location: [facility];  Service: Vascular;  Laterality: N/A;    KIDNEY STONE SURGERY      NEPHRECTOMY PARTIAL Left 2023    Procedure: NEPHRECTOMY PARTIAL LAPAROSCOPIC WITH DAVINCI ROBOT, left;  Surgeon: Michelle Cai MD;  Location: [facility];  Service: Robotics - DaVinci;  Laterality: Left;    URETEROSCOPY LASER LITHOTRIPSY WITH STENT INSERTION Left 2023    Procedure: CYSTOSCOPY URETEROSCOPY RETROGRADE PYELOGRAM STENT INSERTION, left;  Surgeon: Michelle Cai MD;  Location: [facility];  Service: Urology;  Laterality: Left;      23 0942   Wound 23 midline abdomen Incision   Placement Date/Time: 23   Orientation: midline  Location: abdomen  Primary Wound Type: Incision   Dressing Appearance moist drainage;intact   Closure Unable to assess   Base dressing in place, unable to visualize   Care, Wound negative pressure wound therapy        Wound Check / Follow-up: Patient seen today for check-in after wound VAC placement. Patient is currently in CICU, intubated on a mechanical ventilator. Patient's wife is present at bedside. Patient status post abdominal aortic aneurysm repair on 12/24 with vascular surgery where abdomen was left open and wound VAC was placed over abdomen. Upon assessment today, wound VAC tubing was found to be connected to wall suction. Discussed with primary RN. Large amount of sanguineous drainage noted in suction canister. Discussed changing wound VAC canisters with primary RN. Disconnected patient's wound VAC tubing from wall suction and connected to wound VAC machine. Notified vascular surgeon. Foam is compressed. There is sanguineous drainage pooling underneath drape. Dressing unable to be changed at bedside. Patient pending another OR visit today with vascular surgery and general surgery. Patient's wife denies any needs at this time.      Impression: Open abdomen with wound VAC in place, status post abdominal aortic aneurysm repair.      Short term goals: Regain skin integrity, negative pressure wound therapy.        Enedina Norman RN    12/26/2023    13:18 EST    S/P CABG x 3  3v CABG 2010 at [facility]  S/P peripheral artery angioplasty  11/2015 5 stents to right leg

## 2023-12-26 NOTE — OP NOTE
Preoperative diagnosis: Ruptured AAA and ischemic colon    Postoperative diagnosis: Same    Procedure: Exploratory laparotomy; appendectomy; left colon resection with creation of ostomy    Surgeon: Rigo Agosto    Anesthesia: General    Assistant: Leora Cortés    EBL: 7 ML for my portion of the procedure    Specimens: Left colon    Complications: None    Indications: 63-year-old male who had undergone previous open AAA repair for ruptured aortic aneurysm.  Had open abdomen in the ICU.  Vascular felt there was some concern given there repair that he would likely have some colonic ischemia.  They asked me to evaluate the patient intraoperatively.    PROCEDURE    Patient was seen in the ICU.  Risks, benefits, alternatives of the operation were a discussed in detail with the family.  All of the family's questions were answered.  They voiced understanding, and agreed to proceed.    Patient was brought to the operating room.  Dr. Agosto performed his portion of the operation.  This will be dictated in a separate operative note.  I was then called into the room to evaluate the left colon.  He had areas of clear colonic ischemia.  We medialized the bowel and mobilized the left colon.  I used the spy device to assess perfusion to the colon.  In the areas of the colon that appeared to be ischemic were noted.  I found an area just proximal to the peritoneal reflection of the rectum that had good perfusion.  I tried to spare as much of the rectosigmoid area as possible.  I made a window in the colonic mesentery.  I used a curvilinear stapler to transect the rectosigmoid junction.  I then took the colonic mesentery as close to the colon as possible using LigaSure.  We marched up the colonic mesentery until we reached an area of good perfusion.  We again verified this area with the spy device.  While using the spy device we noted that he had some adhesions of the appendix down in the pelvis.  I went had and elected to take the  appendix.  We took the appendix mesentery with a LigaSure.  The appendix was transected with a stapler.  I used a 3-0 silk suture to imbricate the staple line into the cecum.  I then transected the colon using a DANNY 75 stapler.  The rest of the remaining bowel was inspected with the spy and appeared to have good perfusion.    I then went about creating colostomy site.  I used a Kocher and grasped the skin and then cut parallel to this to begin with a 10 blade.  This made a circular incision.  We then cored out subcutaneous fat until the rectus sheath was encountered.  I made a cruciate incision in the rectus sheath and then using muscle-sparing technique opened the rectus to the posterior rectus sheath.  I then opened the posterior rectus sheath with cautery.  I then dilated the ostomy site.  We brought the remaining transverse colon up through the ostomy site.  It was held into place with a Hitesh.    Dr. Agosto I then irrigated the abdomen.  We inspected the abdomen.  We then closed the abdomen together with a #1 looped PDS from cephalad to caudad caudad to cephalad.  Skin was closed with staples.  I then matured the ostomy.  I made a colotomy opening up the colon and then matured the ostomy using Reina style sutures with 330 Vicryl sutures.  Once the ostomy was matured, I placed my finger in the colon to make sure that the ostomy was patent well past the fascia.  An ostomy appliance was placed. A Prevena wound VAC was placed.    Patient was then returned to the ICU in stable condition.  Assistant: Leora Cortés CSA was responsible for performing the following activities: Retraction, Suction, and Irrigation and their skilled assistance was necessary for the success of this case.        The operative note was dictated with the help of the dragon dictation system.

## 2023-12-26 NOTE — PROGRESS NOTES
[facility]   Hospitalist Progress Note  Date: 2023  Patient Name: Danny Savage  : 1960  MRN: [id number]  Date of admission: 2023  Room/Bed: [facility]      Subjective   Subjective     Chief Complaint: Back pain    Summary:Danny Savage is a 63 y.o. male Danny Savage is a 63 y.o. male with past medical history of recently diagnosed renal cell carcinoma s/p partial left kidney nephrectomy in 2023 (follows Dr.O Espino as outpatient) and infrarenal aortic aneurysm (scan on  showing AAA of 4.7 cm) who presented with complaints of left lower back pain and fever since past 3 days.  Patient had CT abdomen scan done early December and was evaluated by urologist as outpatient.  Patient presented to ER 1 day prior with left flank pain where CT abdomen was obtained showing mildly decreased fluid collection at partial left nephrectomy bed containing decreasing but persistent small gas bubbles; findings consistent with postoperative seroma/hematoma; questionable infection.  Case was discussed with Dr. Spicer and patient was discharged home.  But patient's pain got worse on the day of presentation and was brought to ED.  Patient's left flank pain was severe, about 10 x 10 in intensity on presentation.  Patient was febrile with temp 102.6.  Lactic acid was normal.  Urinalysis was not significant for UTI.  CT abdomen pelvis with contrast showed stable fluid collection along the left nephrectomy bed, likely postoperative seroma/hematoma although infection cannot be ruled out. Abdominal aortic aneurysm of 5.1 cm.  Patient was started on IV antibiotics and was admitted as a case of possible abscess in the left nephrectomy bed.  Urology was consulted.     Hospital course complicated by sudden unresponsive on 2023.  Vital signs showed blood pressure 42/32.Patient was pale and clammy with agonal breathing.  No pulse was felt which seemed like PEA arrest after which CODE BLUE was  called.  Patient was getting IV fluid bolus.  Before starting CPR, patient had feeble pulse after which CPR was held.  Patient was then intubated at bedside. He was then transferred to critical care unit.  Given patient has expanding AAA  with eccentric mural thrombus/hematoma measuring up to 5.1 centimeter maximally on CT abdomen of 12/22; there is also concern for aortic rupture. 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 Vascular Surgeon was consulted immediately and patient underwent urgent Open repair of ruptured infrarenal abdominal aortic aneurysm with a tube graft and mobilization of the omentum with coverage of the graft using pedicled omental flap.     Interval Followup: Patient intubated but not sedated. No response even with painful stimuli.S/p 16 units of PRBCs, 4 FFP, 3 platelets, 1 cryo overnight.  Patient tachycardic this AM  On levo currently.  1L output since surgery from the wound vac.    Review of Systems    Unable to obtain..      Objective   Objective     Vitals:   Temp:  [89.2 °F (31.8 °C)-98.9 °F (37.2 °C)] 98.7 °F (37.1 °C)  Heart Rate:  [] 90  Resp:  [26-30] 27  BP: ()/() 146/66  FiO2 (%):  [30 %-100 %] 40 %    Physical Exam   General: intubated but not sedated  Cardiovascular: RRR, no murmurs   Pulmonary: CTA bilaterally; no wheezes; no conversational dyspnea  Gastrointestinal: omental graft present; no bleeding or discharge noted; wound vac in situ and draining serosanguinous fluid, +BS  Neuro: Pupils fixed and dilated; no response to light; no response even to painful stimuli      Result Review    Result Review:  I have personally reviewed these results:  [x]  Laboratory      Lab 12/25/23  1803 12/25/23  1259 12/25/23  1027 12/25/23  0632 12/25/23  0611 12/25/23  0532 12/25/23  0325 12/25/23  0141 12/25/23  0035 12/24/23  2229 12/24/23  2153 12/24/23  1935 12/24/23  1754 12/24/23  1636 12/24/23  1459  12/24/23  1410 12/24/23  0620 12/1960 12/23/23 1957   WBC 10.77 7.18  --   --   --  10.74  --  8.30  --   --   --   --   --   --  20.44*  --  13.03*  --  11.32*   HEMOGLOBIN 6.6* 5.7*  --   --   --  11.6*  --  13.8  --   --   --   --   --   --  9.1*  --  12.0*  --  13.0   HEMATOCRIT 18.7* 16.7*  --   --   --  33.9*  --  42.6  --   --   --   --   --   --  29.0*  --  37.5  --  40.6   PLATELETS 105* 41*  --   --   --  69*  --  62*  --   --   --   --   --   --  263  --  171  --  188   NEUTROS ABS  --   --   --   --   --   --   --  7.14*  --   --   --   --   --   --  15.22*  --  11.25*  --  10.15*   IMMATURE GRANS (ABS)  --   --   --   --   --   --   --   --   --   --   --   --   --   --  0.12*  --  0.08*  --  0.03   LYMPHS ABS  --   --   --   --   --   --   --   --   --   --   --   --   --   --  2.48  --  0.64*  --  0.43*   MONOS ABS  --   --   --   --   --   --   --   --   --   --   --   --   --   --  2.55*  --  1.02*  --  0.69   EOS ABS  --   --   --   --   --   --   --   --   --   --   --   --   --   --  0.02  --  0.01  --  0.00   MCV 83.5 84.3  --   --   --  84.3  --  89.5  --   --   --   --   --   --  91.2  --  88.4  --  87.7   PROCALCITONIN  --   --   --   --   --  8.45*  --   --  2.07*  --   --   --   --   --  1.32*  --   --   --   --    LACTATE 4.9* 6.6*  --    < >  --  9.5*   < >  --  18.6*  --   --   --  11.2*  --  7.7*  --   --    < >  --    LACTATE, ARTERIAL  --   --  5.16*   < >  --   --    < >  --   --    < >  --    < >  --    < >  --    < >  --   --   --    PROTIME 17.4* 19.9*  --   --  20.8*  --   --   --  24.3*  --  43.6*  --  17.6*   < >  --   --   --   --   --    APTT  --   --   --   --  45.0*  --   --   --  111.0*  --  >200.0*  --  24.6   < >  --   --   --   --   --    D DIMER QUANT  --   --   --   --   --   --   --   --   --   --   --   --  19.79*  --   --   --   --   --   --     < > = values in this interval not displayed.         Lab 12/25/23  1801 12/25/23  1118 12/25/23  1027  12/25/23  0632 12/25/23  0532 12/25/23  0325   SODIUM 142 140  --   --  137  --    SODIUM, ARTERIAL  --   --  135.1* 133.4*  --  134.8*   POTASSIUM 4.1 3.7  --   --  3.6  --    CHLORIDE 102 100  --   --  98  --    CO2 26.4 25.9  --   --  22.7  --    ANION GAP 13.6 14.1  --   --  16.3*  --    BUN 19 17  --   --  14  --    CREATININE 2.47* 1.89*  --   --  1.54*  --    EGFR 28.2* 38.9*  --   --  49.7*  --    GLUCOSE 186* 276*  --   --  328*  --    GLUCOSE, ARTERIAL  --   --  254* 280*  --  265*   CALCIUM 7.9* 7.9*  --   --  8.1*  --    IONIZED CALCIUM  --   --  1.01* 1.09*  --  1.16   MAGNESIUM 2.5* 2.2  --   --  1.9  --    PHOSPHORUS 3.8 3.5  --   --  3.2  --          Lab 12/25/23  1803 12/25/23  1118 12/25/23  0532 12/25/23  0035   TOTAL PROTEIN 3.8* 3.3* 3.3* 2.8*   ALBUMIN 2.4* 2.4* 2.2* 1.8*   GLOBULIN 1.4 0.9 1.1 1.0   ALT (SGPT) 244* 302* 846* 550*   AST (SGOT) 591* 493* 863* 444*   BILIRUBIN 1.2 1.4* 1.7* 0.4   ALK PHOS 39 27* 48 35*   AMYLASE  --   --   --  194*   LIPASE  --   --   --  20         Lab 12/25/23  1803 12/25/23  1259 12/25/23  0611 12/24/23  2153 12/24/23  1754 12/24/23  1459 12/22/23  0841 12/22/23  0815   PROBNP  --   --   --   --   --  1,898.0* 165.7  --    HSTROP T  --   --   --   --  467* 114*  --  9   PROTIME 17.4* 19.9* 20.8*   < > 17.6*  --   --   --    INR 1.39* 1.66* 1.76*   < > 1.42*  --   --   --     < > = values in this interval not displayed.             Lab 12/24/23 2032   ABO TYPING A   RH TYPING Negative   ANTIBODY SCREEN Negative         Lab 12/25/23  1027 12/25/23  0632 12/25/23  0325   PH, ARTERIAL 7.402 7.368 7.232*   PCO2, ARTERIAL 40.9 40.0 40.1   PO2 ART 68.1* 79.2* 61.3*   O2 SATURATION ART 92.3* 94.7* 90.1*   FIO2 40 50 50   HCO3 ART 24.9 22.5 16.5*   BASE EXCESS ART 0.1 -2.6* -10.4*   CARBOXYHEMOGLOBIN 0.4 0.3 0.3     Brief Urine Lab Results  (Last result in the past 365 days)        Color   Clarity   Blood   Leuk Est   Nitrite   Protein   CREAT   Urine HCG         12/23/23 2054 Yellow   Clear   Small (1+)   Negative   Negative   100 mg/dL (2+)                 [x]  Microbiology   Microbiology Results (last 10 days)       Procedure Component Value - Date/Time    Respiratory Culture - Sputum, ET Suction [[id number]] Collected: 12/24/23 1638    Lab Status: Preliminary result Specimen: Sputum from ET Suction Updated: 12/24/23 1842     Gram Stain Few (2+) Gram negative bacilli      Rare (1+) WBCs seen    Blood Culture - Blood, Arm, Left [[id number]]  (Normal) Collected: 12/24/23 1459    Lab Status: Preliminary result Specimen: Blood from Arm, Left Updated: 12/25/23 1531     Blood Culture No growth at 24 hours    Blood Culture - Blood, Arm, Right [[id number]]  (Normal) Collected: 12/23/23 2205    Lab Status: Preliminary result Specimen: Blood from Arm, Right Updated: 12/24/23 2215     Blood Culture No growth at 24 hours    Blood Culture - Blood, Arm, Left [[id number]]  (Normal) Collected: 12/23/23 2205    Lab Status: Preliminary result Specimen: Blood from Arm, Left Updated: 12/24/23 2215     Blood Culture No growth at 24 hours    COVID-19, FLU A/B, RSV PCR 1 HR TAT - Swab, Nasopharynx [[id number]]  (Normal) Collected: 12/23/23 2204    Lab Status: Final result Specimen: Swab from Nasopharynx Updated: 12/23/23 2303     COVID19 Not Detected     Influenza A PCR Not Detected     Influenza B PCR Not Detected     RSV, PCR Not Detected    Narrative:      Fact sheet for providers: https://www.fda.gov/media/559331/download    Fact sheet for patients: https://www.fda.gov/media/158931/download    Test performed by PCR.    Blood Culture - Blood, Arm, Right [[id number]]  (Abnormal)  (Susceptibility) Collected: 12/22/23 0815    Lab Status: Final result Specimen: Blood from Arm, Right Updated: 12/25/23 0652     Blood Culture Streptococcus pyogenes (Group A)     Isolated from Aerobic and Anaerobic Bottles     Gram Stain Aerobic Bottle Gram positive cocci in pairs and chains      Anaerobic Bottle Gram  positive cocci in pairs and chains    Susceptibility        Streptococcus pyogenes (Group A)      LINDSEY      Ceftriaxone Susceptible      Levofloxacin Susceptible      Penicillin G Susceptible      Vancomycin Susceptible                           Blood Culture - Blood, Arm, Left [[id number]]  (Abnormal) Collected: 12/22/23 0815    Lab Status: Final result Specimen: Blood from Arm, Left Updated: 12/25/23 0652     Blood Culture Streptococcus pyogenes (Group A)     Isolated from Aerobic and Anaerobic Bottles     Gram Stain Aerobic Bottle Gram positive cocci in pairs and chains      Anaerobic Bottle Gram positive cocci in pairs and chains    Narrative:      Refer to previous blood culture collected on 12/22/2023 0815 for MICs.      COVID PRE-OP / PRE-PROCEDURE SCREENING ORDER (NO ISOLATION) - Swab, Nasopharynx [[id number]]  (Normal) Collected: 12/22/23 0710    Lab Status: Final result Specimen: Swab from Nasopharynx Updated: 12/22/23 0802    Narrative:      The following orders were created for panel order COVID PRE-OP / PRE-PROCEDURE SCREENING ORDER (NO ISOLATION) - Swab, Nasopharynx.  Procedure                               Abnormality         Status                     ---------                               -----------         ------                     COVID-19, FLU A/B, RSV P...[[id number]]  Normal              Final result                 Please view results for these tests on the individual orders.    COVID-19, FLU A/B, RSV PCR 1 HR TAT - Swab, Nasopharynx [[id number]]  (Normal) Collected: 12/22/23 0710    Lab Status: Final result Specimen: Swab from Nasopharynx Updated: 12/22/23 0802     COVID19 Not Detected     Influenza A PCR Not Detected     Influenza B PCR Not Detected     RSV, PCR Not Detected    Narrative:      Fact sheet for providers: https://www.fda.gov/media/189509/download    Fact sheet for patients: https://www.fda.gov/media/562000/download    Test performed by PCR.          [x]  Radiology  CT Angiogram  Abdomen Pelvis    Result Date: 12/24/2023    1. Development of aorto-caval fistula with large amount of active extravasation of hemorrhage into right abdomen and retroperitoneal space likely from a ruptured IVC.  Findings were discussed by Dr. Cervantes to vascular surgery at approximately 1845 hours. 2. Bibasilar consolidations within lung bases, which could reflect atelectasis, aspiration, and/or pneumonia. 3. Small fluid collection along left nephrectomy site is unchanged. 4. Air within urinary bladder, likely secondary to Rico catheterization.     GELY MONTE MD       Electronically Signed and Approved By: GELY MONTE MD on 12/24/2023 at 19:37             XR Abdomen KUB    Result Date: 12/24/2023    1. Mild prominence of small bowel loops within the mid abdomen.  The findings are nonspecific and may be related to adynamic ileus.  Developing bowel obstruction could have this appearance.  Recommend follow-up to ensure resolution.     GELY MONTOYA MD       Electronically Signed and Approved By: GELY MONTOYA MD on 12/24/2023 at 16:07             XR Chest 1 View    Result Date: 12/24/2023    1. The endotracheal tube distal tip is seen approximately 3-4 cm above the prosper. 2. The nasogastric tube is positioned in the stomach in the left upper quadrant. 3. The right IJ central line distal tip is noted near the SVC/right atrial junction.  The line is ready for use.  There is no pneumothorax. 4. Evidence for subtle ill-defined infiltrates throughout the mid to lower lungs bilaterally.  There may also be a small left pleural effusion.       GELY MONTOYA MD       Electronically Signed and Approved By: GELY MONTOYA MD on 12/24/2023 at 15:13             CT Abdomen Pelvis With Contrast    Result Date: 12/23/2023    1. Stable fluid collection along left nephrectomy bed, likely a postoperative seroma/hematoma, although infection cannot be excluded on imaging alone. 2. No new acute process identified within  abdomen/pelvis. 3. Stable abdominal aortic aneurysm.     GELY MONTE MD       Electronically Signed and Approved By: GELY MONTE MD on 12/23/2023 at 22:07             XR Chest 1 View    Result Date: 12/23/2023   No acute cardiopulmonary process identified.       GELY MONTE MD       Electronically Signed and Approved By: GELY MONTE MD on 12/23/2023 at 21:49             CT Abdomen Pelvis With Contrast    Result Date: 12/22/2023    1. Mildly decreased fluid collection at the partial left nephrectomy bed measuring 1.4 x 2.9 centimeter (previously 2 x 3.2 centimeter), containing decreasing but persistent small gas bubbles.  Findings favor postoperative seroma/hematoma +/-infection.  There does not appear to be a mature rim enhancing wall to suggest true abscess or drainable collection.  Decrease size argues against urinoma. 2. Grossly stable infrarenal abdominal aortic aneurysm with eccentric mural thrombus/hematoma measuring up to 5.1 centimeter maximally. 3. Hepatic steatosis. 4. Other chronic/ancillary finding similar to recent comparison.      DOMINIK NORTON MD       Electronically Signed and Approved By: DOMINIK NORTON MD on 12/22/2023 at 9:36             XR Chest 1 View    Result Date: 12/22/2023   No active cardiopulmonary disease       BERNIE SEXTON MD       Electronically Signed and Approved By: BERNIE SEXTON MD on 12/22/2023 at 7:25            []  EKG/Telemetry   []  Cardiology/Vascular   []  Pathology  []  Old records  []  Other:    Assessment & Plan   Assessment / Plan     Assessment:  Ruptured infrarenal AAA s/p open repair  Hemorrhagic shock  Hypovolemic shock  Acute hypoxic respiratory failure requiring mechanical ventilation  Septic shock with Streptococcus pyogenes bacteremia  SVT  History of RCC s/p partial nephrectomy on 11/13  Acute kidney injury  Clinically significant lactic acidosis  Chronically immunosuppressed on CellCept  Hypocalcemia  Hypokalemia    Plan:  Patient  currently being managed in critical care unit; appreciate intensivist input.  Patient had spontaneous rupture of AAA s/p Open repair of ruptured infrarenal abdominal aortic aneurysm with a tube graft and mobilization of the omentum with coverage of the graft using pedicled omental flap on 12/24.  Vascular surgery following the patient; appreciate further input.  On mechanical ventilation.  S/p 16 units of PRBCs, 4 FFP, 3 platelets, 1 cryo overnight.  Receiving 1 unit of PRBC today.  Receiving 50 mg IV albumin.  Repleting magnesium and calcium.  On LR IVF, continue.  On Levophed and vasopressin; continue. Titrate as tolerated to maintain MAP above 65.  On amiodarone for SVT.  Wound vac in place and still draining significant amount of serosanguinous fluid.  Vascular surgery planning of exploration if needed to look for any ongoing bleeding given significant output of serosanguinous fluid from the wound vac.  Continue IV vancomycin and Cefepime.  Rest of te management as per Intensivist and Vascular Surgery.     Discussed with RN.    DVT prophylaxis:  Mechanical DVT prophylaxis orders are present.    CODE STATUS:   Level Of Support Discussed With: Patient  Code Status (Patient has no pulse and is not breathing): CPR (Attempt to Resuscitate)  Medical Interventions (Patient has pulse or is breathing): Full Support      Electronically signed by Dick Chaves MD, 12/25/23, 8:41 PM EST.

## 2023-12-26 NOTE — OP NOTE
LAPAROTOMY EXPLORATORY  Procedure Report    Patient Name:  Danny Savage  YOB: 1960    Date of Surgery:  12/26/2023     Indications: Open abdomen status post ruptured abdominal aortic aneurysm repair.  Ongoing need for RBC transfusion with ongoing concern for bleeding.  Concern for sigmoid ischemia.    Pre-op Diagnosis:   Ruptured infrarenal abdominal aortic aneurysm (AAA) [I71.33]       Post-Op Diagnosis Codes:     * Ruptured infrarenal abdominal aortic aneurysm (AAA) [I71.33]    Procedure(s):  Expiratory laparotomy.  Repair of hole in the mesentery of the terminal ileum.  Appendectomy and left colon resection performed by general surgery with ostomy creation, to be dictated under separate heading.  Delayed abdominal closure    Staff:  Surgeon(s):  Koffi Agosto MD Pendleton, John M, MD    Assistant: Leora Cortés CSA    Anesthesia: General    Estimated Blood Loss: <500ml    Implants:    Implant Name Type Inv. Item Serial No.  Lot No. LRB No. Used Action   STPLR LNR CUT PROX THK 75MM GRN TCT75 - [id number] Implant STPLR LNR CUT PROX THK 75MM GRN TCT75  [employer]  DIV OF  AND J [id number]  1 Implanted   RELOAD STPLR LNR CUT PROX 75MM BRANDYN TCR75 - [id number] Implant RELOAD STPLR LNR CUT PROX 75MM BRANDYN TCR75  [employer]  DIV OF  AND J [id number]  1 Implanted   HEMOST ABS SURGICEL NUKNIT 6X9 - [id number] Implant HEMOST ABS SURGICEL NUKNIT 6X9  [employer]  DIV OF  AND J [id number]  1 Implanted   STPLR CUT ECHELONCONTOUR CRV 40MM GRN - [id number] Implant STPLR CUT ECHELONCONTOUR CRV 40MM GRN  [employer]  DIV OF  AND J [id number]  1 Implanted   RELOAD STPLR LNR CUT PROX 75MM BRANDYN TCR75 - [id number] Implant RELOAD STPLR LNR CUT PROX 75MM BRANDYN TCR75  [employer]  DIV OF  AND J [id number]  1 Implanted       Specimen: Left colon and appendix       Findings: Ischemic sigmoid colon.  Diffuse oozing noted from the skin edges and also the retroperitoneum but no major bleeding  source noted.  Large right retroperitoneal hematoma still noted.  Retroperitoneal montanum coverage of the graft was intact.    Complications: None obvious    Description of Procedure: Patient was taken to the operating room.  Prior open abdominal incision was removed.  Abdomen was prepped and draped.  Timeout was performed.  Exploration of the quadrants was performed.  There was no major bleeding source noted.  Forceps were packed.  Left colon was obviously nonviable.  General surgery team scrubbed in at this time.  Aortography was well covered.  Right retroperitoneal hematoma was not opened up.  There was a large defect in the mesentery of the terminal ileum that I repaired with Vicryl sutures.  Diffuse oozing in the skin edges was controlled with electrocautery.  Hemostatic agents were placed in the retroperitoneum towards the right side to control small venous oozing.    General surgery team then proceeded on to perform an appendectomy and a left colectomy.  Ostomy was planned.  After thorough irrigation and making sure there was no ongoing bleeding fascia was approximated using PDS sutures in a delayed closure fashion.  The rogation of the skin was performed.  Staples were placed.  Ostomy was matured.  Kimberly dressing was placed over the incision.    I was present scrubbed for the entire procedure.  The patient was transferred to the ICU in critical position.  Findings were discussed with the family.    Assistant: Leora Cortés CSA  was responsible for performing the following activities: Retraction, Suction, Irrigation, and Closing and their skilled assistance was necessary for the success of this case.    Koffi Agosto MD     Date: 12/26/2023  Time: 13:43 EST

## 2023-12-26 NOTE — PROGRESS NOTES
[facility]     Progress Note    Patient Name: Danny Savage  : 1960  MRN: [id number]  Primary Care Physician:  Kodi Pichardo MD  Date of admission: 2023    Subjective   Subjective     Patient continues to medically clear.  Significant drop in urine output.  Ongoing drainage from open abdomen.  Received multiple transfusions over the last 12 hours.  Total of 4 PRBCs overnight.    Objective   Objective     Vitals:   Temp:  [97.9 °F (36.6 °C)-99.2 °F (37.3 °C)] 98.7 °F (37.1 °C)  Heart Rate:  [] 71  Resp:  [26-30] 26  BP: ()/(49-84) 147/80  FiO2 (%):  [30 %-50 %] 35 %    Labs     Results from last 7 days   Lab Units 238 23  1803 23  1259   WBC 10*3/mm3 11.23* 10.77 7.18   HEMOGLOBIN g/dL 6.9* 6.6* 5.7*   HEMATOCRIT % 19.3* 18.7* 16.7*   PLATELETS 10*3/mm3 88* 105* 41*         Results from last 7 days   Lab Units 23  2358 23  1803 12/25/23  1118   CREATININE mg/dL 2.62* 2.47* 1.89*         Results from last 7 days   Lab Units 23  2358   INR  1.29*              Lab Results   Component Value Date     (H) 2023              Physical Exam   Did wake up and move extremities to command per nursing staff.  Opens eyes spontaneously.  Lower extremity edema.        Assessment & Plan   Assessment / Plan     Active Hospital Problems:  Active Hospital Problems    Diagnosis    • **Left flank pain    • Ruptured infrarenal abdominal aortic aneurysm (AAA)          Assessment/Plan:    Renal function worsening.  Nephrology is following.  May need dialysis.  Will plan exploration to ensure there is no ongoing bleeding issues.  Will discuss with general surgery.    Electronically signed by Koffi Agosto MD, 23, 7:20 AM EST.

## 2023-12-26 NOTE — PLAN OF CARE
Goal Outcome Evaluation:     Plan of care reviewed with: Spouse and family    Progress: Ongoing, progressing    Outcome: Patient GCS 10, more lethargic since surgery. NSR 90s./98. Mechanical ventilation 450/26/8/60.  Sedation off. Pressors off. Surgical site WNL.

## 2023-12-27 ENCOUNTER — APPOINTMENT (OUTPATIENT)
Dept: GENERAL RADIOLOGY | Facility: HOSPITAL | Age: 63
DRG: 268 | End: 2023-12-27
Payer: COMMERCIAL

## 2023-12-27 LAB
ALBUMIN SERPL-MCNC: 2.2 G/DL (ref 3.5–5.2)
ALBUMIN/GLOB SERPL: 1 G/DL
ALP SERPL-CCNC: 68 U/L (ref 39–117)
ALT SERPL W P-5'-P-CCNC: 233 U/L (ref 1–41)
ANION GAP SERPL CALCULATED.3IONS-SCNC: 14.7 MMOL/L (ref 5–15)
ARTERIAL PATENCY WRIST A: ABNORMAL
ARTERIAL PATENCY WRIST A: POSITIVE
AST SERPL-CCNC: 360 U/L (ref 1–40)
BACTERIA SPEC RESP CULT: ABNORMAL
BASE EXCESS BLDA CALC-SCNC: -1.4 MMOL/L (ref -2–2)
BASE EXCESS BLDA CALC-SCNC: -2.8 MMOL/L (ref -2–2)
BDY SITE: ABNORMAL
BDY SITE: ABNORMAL
BH BB BLOOD EXPIRATION DATE: NORMAL
BH BB BLOOD TYPE BARCODE: 600
BH BB BLOOD TYPE BARCODE: 6200
BH BB BLOOD TYPE BARCODE: 8400
BH BB BLOOD TYPE BARCODE: 8400
BH BB BLOOD TYPE BARCODE: NORMAL
BH BB DISPENSE STATUS: NORMAL
BH BB PRODUCT CODE: NORMAL
BH BB UNIT NUMBER: NORMAL
BILIRUB SERPL-MCNC: 1.6 MG/DL (ref 0–1.2)
BUN SERPL-MCNC: 48 MG/DL (ref 8–23)
BUN/CREAT SERPL: 10.4 (ref 7–25)
CA-I BLDA-SCNC: 0.84 MMOL/L (ref 1.13–1.32)
CA-I BLDA-SCNC: 0.94 MMOL/L (ref 1.13–1.32)
CALCIUM SPEC-SCNC: 7.2 MG/DL (ref 8.6–10.5)
CHLORIDE BLDA-SCNC: 103 MMOL/L (ref 98–106)
CHLORIDE BLDA-SCNC: 107 MMOL/L (ref 98–106)
CHLORIDE SERPL-SCNC: 103 MMOL/L (ref 98–107)
CO2 SERPL-SCNC: 24.3 MMOL/L (ref 22–29)
COHGB MFR BLD: 0.2 % (ref 0–1.5)
COHGB MFR BLD: 0.3 % (ref 0–1.5)
CREAT SERPL-MCNC: 4.6 MG/DL (ref 0.76–1.27)
CROSSMATCH INTERPRETATION: NORMAL
DEPRECATED RDW RBC AUTO: 48.1 FL (ref 37–54)
EGFRCR SERPLBLD CKD-EPI 2021: 13.4 ML/MIN/1.73
ERYTHROCYTE [DISTWIDTH] IN BLOOD BY AUTOMATED COUNT: 15.1 % (ref 12.3–15.4)
FHHB: 13.5 % (ref 0–5)
FHHB: 5 % (ref 0–5)
GAS FLOW AIRWAY: ABNORMAL L/MIN
GLOBULIN UR ELPH-MCNC: 2.2 GM/DL
GLUCOSE BLDA-MCNC: 86 MG/DL (ref 70–99)
GLUCOSE BLDA-MCNC: 99 MG/DL (ref 70–99)
GLUCOSE BLDC GLUCOMTR-MCNC: 111 MG/DL (ref 70–99)
GLUCOSE SERPL-MCNC: 99 MG/DL (ref 65–99)
GRAM STN SPEC: ABNORMAL
GRAM STN SPEC: ABNORMAL
HCO3 BLDA-SCNC: 22.7 MMOL/L (ref 22–26)
HCO3 BLDA-SCNC: 26.2 MMOL/L (ref 22–26)
HCT VFR BLD AUTO: 24.3 % (ref 37.5–51)
HGB BLD-MCNC: 8.3 G/DL (ref 13–17.7)
HGB BLDA-MCNC: 10 G/DL (ref 13.8–16.4)
HGB BLDA-MCNC: 8.9 G/DL (ref 13.8–16.4)
INHALED O2 CONCENTRATION: 45 %
INHALED O2 CONCENTRATION: 45 %
INR PPP: 1.07 (ref 0.86–1.15)
LACTATE BLDA-SCNC: 1.49 MMOL/L (ref 0.5–2)
LACTATE BLDA-SCNC: 1.53 MMOL/L (ref 0.5–2)
MAGNESIUM SERPL-MCNC: 2.4 MG/DL (ref 1.6–2.4)
MCH RBC QN AUTO: 29.9 PG (ref 26.6–33)
MCHC RBC AUTO-ENTMCNC: 34.2 G/DL (ref 31.5–35.7)
MCV RBC AUTO: 87.4 FL (ref 79–97)
METHGB BLD QL: 0.2 % (ref 0–1.5)
METHGB BLD QL: 0.3 % (ref 0–1.5)
MODALITY: ABNORMAL
MODALITY: ABNORMAL
NOTE: ABNORMAL
NOTE: ABNORMAL
OXYHGB MFR BLDV: 86 % (ref 94–99)
OXYHGB MFR BLDV: 94.5 % (ref 94–99)
PCO2 BLDA: 35.7 MM HG (ref 35–45)
PCO2 BLDA: 70.2 MM HG (ref 35–45)
PH BLDA: 7.19 PH UNITS (ref 7.35–7.45)
PH BLDA: 7.42 PH UNITS (ref 7.35–7.45)
PHOSPHATE SERPL-MCNC: 5.5 MG/DL (ref 2.5–4.5)
PLATELET # BLD AUTO: 70 10*3/MM3 (ref 140–450)
PMV BLD AUTO: 10.7 FL (ref 6–12)
PO2 BLD: 144 MM[HG] (ref 0–500)
PO2 BLD: 193 MM[HG] (ref 0–500)
PO2 BLDA: 65 MM HG (ref 80–100)
PO2 BLDA: 86.8 MM HG (ref 80–100)
POTASSIUM BLDA-SCNC: 3.8 MMOL/L (ref 3.5–5)
POTASSIUM BLDA-SCNC: 4.76 MMOL/L (ref 3.5–5)
POTASSIUM SERPL-SCNC: 4.5 MMOL/L (ref 3.5–5.2)
PROT SERPL-MCNC: 4.4 G/DL (ref 6–8.5)
PROTHROMBIN TIME: 14.1 SECONDS (ref 11.8–14.9)
RBC # BLD AUTO: 2.78 10*6/MM3 (ref 4.14–5.8)
SAO2 % BLDCOA: 86.4 % (ref 95–99)
SAO2 % BLDCOA: 95 % (ref 95–99)
SODIUM BLDA-SCNC: 138.6 MMOL/L (ref 136–146)
SODIUM BLDA-SCNC: 138.7 MMOL/L (ref 136–146)
SODIUM SERPL-SCNC: 142 MMOL/L (ref 136–145)
UNIT  ABO: NORMAL
UNIT  RH: NORMAL
WBC NRBC COR # BLD AUTO: 17.5 10*3/MM3 (ref 3.4–10.8)

## 2023-12-27 PROCEDURE — 36600 WITHDRAWAL OF ARTERIAL BLOOD: CPT | Performed by: INTERNAL MEDICINE

## 2023-12-27 PROCEDURE — 83735 ASSAY OF MAGNESIUM: CPT | Performed by: NURSE PRACTITIONER

## 2023-12-27 PROCEDURE — 83050 HGB METHEMOGLOBIN QUAN: CPT | Performed by: INTERNAL MEDICINE

## 2023-12-27 PROCEDURE — 94761 N-INVAS EAR/PLS OXIMETRY MLT: CPT

## 2023-12-27 PROCEDURE — 71045 X-RAY EXAM CHEST 1 VIEW: CPT

## 2023-12-27 PROCEDURE — B548ZZA ULTRASONOGRAPHY OF SUPERIOR VENA CAVA, GUIDANCE: ICD-10-PCS | Performed by: INTERNAL MEDICINE

## 2023-12-27 PROCEDURE — 83050 HGB METHEMOGLOBIN QUAN: CPT | Performed by: NURSE PRACTITIONER

## 2023-12-27 PROCEDURE — 99024 POSTOP FOLLOW-UP VISIT: CPT | Performed by: SURGERY

## 2023-12-27 PROCEDURE — 85610 PROTHROMBIN TIME: CPT | Performed by: NURSE PRACTITIONER

## 2023-12-27 PROCEDURE — 25010000002 HYDROMORPHONE 1 MG/ML SOLUTION: Performed by: SURGERY

## 2023-12-27 PROCEDURE — 99291 CRITICAL CARE FIRST HOUR: CPT | Performed by: INTERNAL MEDICINE

## 2023-12-27 PROCEDURE — 02H633Z INSERTION OF INFUSION DEVICE INTO RIGHT ATRIUM, PERCUTANEOUS APPROACH: ICD-10-PCS | Performed by: INTERNAL MEDICINE

## 2023-12-27 PROCEDURE — 80053 COMPREHEN METABOLIC PANEL: CPT | Performed by: SURGERY

## 2023-12-27 PROCEDURE — 82375 ASSAY CARBOXYHB QUANT: CPT | Performed by: NURSE PRACTITIONER

## 2023-12-27 PROCEDURE — 82805 BLOOD GASES W/O2 SATURATION: CPT | Performed by: NURSE PRACTITIONER

## 2023-12-27 PROCEDURE — 25810000003 SODIUM CHLORIDE 0.9 % SOLUTION: Performed by: SURGERY

## 2023-12-27 PROCEDURE — 93010 ELECTROCARDIOGRAM REPORT: CPT | Performed by: INTERNAL MEDICINE

## 2023-12-27 PROCEDURE — 82948 REAGENT STRIP/BLOOD GLUCOSE: CPT

## 2023-12-27 PROCEDURE — 82375 ASSAY CARBOXYHB QUANT: CPT | Performed by: INTERNAL MEDICINE

## 2023-12-27 PROCEDURE — 3E0G76Z INTRODUCTION OF NUTRITIONAL SUBSTANCE INTO UPPER GI, VIA NATURAL OR ARTIFICIAL OPENING: ICD-10-PCS | Performed by: INTERNAL MEDICINE

## 2023-12-27 PROCEDURE — 5A1D70Z PERFORMANCE OF URINARY FILTRATION, INTERMITTENT, LESS THAN 6 HOURS PER DAY: ICD-10-PCS | Performed by: INTERNAL MEDICINE

## 2023-12-27 PROCEDURE — 93005 ELECTROCARDIOGRAM TRACING: CPT | Performed by: INTERNAL MEDICINE

## 2023-12-27 PROCEDURE — 85027 COMPLETE CBC AUTOMATED: CPT | Performed by: NURSE PRACTITIONER

## 2023-12-27 PROCEDURE — 82805 BLOOD GASES W/O2 SATURATION: CPT | Performed by: INTERNAL MEDICINE

## 2023-12-27 PROCEDURE — 84100 ASSAY OF PHOSPHORUS: CPT | Performed by: NURSE PRACTITIONER

## 2023-12-27 PROCEDURE — 94799 UNLISTED PULMONARY SVC/PX: CPT

## 2023-12-27 PROCEDURE — 94003 VENT MGMT INPAT SUBQ DAY: CPT

## 2023-12-27 PROCEDURE — 25010000002 CEFTRIAXONE PER 250 MG: Performed by: SURGERY

## 2023-12-27 PROCEDURE — 86706 HEP B SURFACE ANTIBODY: CPT | Performed by: INTERNAL MEDICINE

## 2023-12-27 PROCEDURE — 87340 HEPATITIS B SURFACE AG IA: CPT | Performed by: INTERNAL MEDICINE

## 2023-12-27 RX ORDER — DEXMEDETOMIDINE HYDROCHLORIDE 4 UG/ML
.2-1.5 INJECTION, SOLUTION INTRAVENOUS
Status: DISCONTINUED | OUTPATIENT
Start: 2023-12-27 | End: 2023-12-28

## 2023-12-27 RX ORDER — BUMETANIDE 0.25 MG/ML
4 INJECTION INTRAMUSCULAR; INTRAVENOUS EVERY 12 HOURS
Status: DISCONTINUED | OUTPATIENT
Start: 2023-12-27 | End: 2023-12-27

## 2023-12-27 RX ORDER — BUMETANIDE 0.25 MG/ML
4 INJECTION INTRAMUSCULAR; INTRAVENOUS EVERY 8 HOURS
Status: DISCONTINUED | OUTPATIENT
Start: 2023-12-27 | End: 2024-01-02

## 2023-12-27 RX ORDER — ACETAMINOPHEN 10 MG/ML
1000 INJECTION, SOLUTION INTRAVENOUS EVERY 8 HOURS PRN
Status: DISCONTINUED | OUTPATIENT
Start: 2023-12-27 | End: 2023-12-29

## 2023-12-27 RX ADMIN — Medication 10 ML: at 20:32

## 2023-12-27 RX ADMIN — NOREPINEPHRINE BITARTRATE 0.02 MCG/KG/MIN: 1 INJECTION, SOLUTION, CONCENTRATE INTRAVENOUS at 13:01

## 2023-12-27 RX ADMIN — BUMETANIDE 4 MG: 0.25 INJECTION INTRAMUSCULAR; INTRAVENOUS at 09:26

## 2023-12-27 RX ADMIN — HYDROMORPHONE HYDROCHLORIDE 1 MG: 1 INJECTION, SOLUTION INTRAMUSCULAR; INTRAVENOUS; SUBCUTANEOUS at 08:00

## 2023-12-27 RX ADMIN — FAMOTIDINE 20 MG: 10 INJECTION INTRAVENOUS at 08:00

## 2023-12-27 RX ADMIN — CEFTRIAXONE SODIUM 2000 MG: 2 INJECTION, POWDER, FOR SOLUTION INTRAMUSCULAR; INTRAVENOUS at 09:27

## 2023-12-27 RX ADMIN — DEXMEDETOMIDINE HYDROCHLORIDE 0.2 MCG/KG/HR: 4 INJECTION, SOLUTION INTRAVENOUS at 13:34

## 2023-12-27 RX ADMIN — DOCUSATE SODIUM 50MG AND SENNOSIDES 8.6MG 2 TABLET: 8.6; 5 TABLET, FILM COATED ORAL at 20:25

## 2023-12-27 RX ADMIN — FAMOTIDINE 20 MG: 10 INJECTION INTRAVENOUS at 20:24

## 2023-12-27 RX ADMIN — Medication 10 ML: at 08:01

## 2023-12-27 RX ADMIN — BUMETANIDE 4 MG: 0.25 INJECTION INTRAMUSCULAR; INTRAVENOUS at 17:04

## 2023-12-27 NOTE — PLAN OF CARE
Goal Outcome Evaluation:      Labetalol 10mg given x1 this shift for systolic above 160 per vascular. A-line patent, VSS. Patient is alert, opens eyes to voice/spontaneously, follows commands.  Nephrology was notified of minimal output, no new orders.

## 2023-12-27 NOTE — SIGNIFICANT NOTE
12/27/23 1128   Spiritual Care   Use of Spiritual Resources non-Church use of spiritual care   Spiritual Care Source  initiative   Spiritual Care Follow-Up follow-up planned regularly for general support   Response to Spiritual Care receptive of support;thanks expressed   Spiritual Care Interventions supportive conversation provided   Spiritual Care Visit Type initial   Spiritual Care Request family support;spiritual/moral support   Receptivity to Spiritual Care visit welcomed  (pt intubated, introductions made to pt's spouse)

## 2023-12-27 NOTE — PROGRESS NOTES
" LOS: 4 days   Patient Care Team:  Kodi Pichardo MD as PCP - General (Internal Medicine)  Lisset Harrington APRN as Nurse Practitioner (Urology)    Chief Complaint: ROLAND    Subjective     History of Present Illness  Pt remains sedated on vent  Uop decreased significantly now.    Subjective  Unable to obtain    History taken from: chart family    Objective     Vital Sign Min/Max for last 24 hours  Temp  Min: 97.3 °F (36.3 °C)  Max: 98.7 °F (37.1 °C)   BP  Min: 93/69  Max: 165/90   Pulse  Min: 64  Max: 96   Resp  Min: 16  Max: 16   SpO2  Min: 87 %  Max: 100 %   Flow (L/min)  Min: 60  Max: 60   No data recorded     Flowsheet Rows      Flowsheet Row First Filed Value   Admission Height 177.8 cm (70\") Documented at 12/23/2023 1948   Admission Weight 79.9 kg (176 lb 2.4 oz) Documented at 12/23/2023 1948            No intake/output data recorded.  I/O last 3 completed shifts:  In: 5331.3 [I.V.:4070; Blood:1261.3]  Out: 2530 [Urine:270; Emesis/NG output:250; Stool:10; Blood:200]    Objective:  General Appearance:  Comfortable.    Vital signs: (most recent): Blood pressure 139/78, pulse 89, temperature 97.7 °F (36.5 °C), temperature source Bladder, resp. rate 16, height 177.8 cm (70\"), weight 80.5 kg (177 lb 7.5 oz), SpO2 97%.  Vital signs are normal.    Output: Producing urine.    HEENT: Normal HEENT exam.    Lungs:  Normal effort and normal respiratory rate.    Heart: Normal rate.  Regular rhythm.    Abdomen: Abdomen is soft.  Bowel sounds are normal.     Extremities: There is no dependent edema.    Pulses: Distal pulses are intact.    Pupils:  Pupils are equal, round, and reactive to light.    Skin:  Warm and dry.              Results Review:     I reviewed the patient's new clinical results.    WBC WBC   Date Value Ref Range Status   12/27/2023 17.50 (H) 3.40 - 10.80 10*3/mm3 Final   12/26/2023 12.18 (H) 3.40 - 10.80 10*3/mm3 Final   12/26/2023 12.78 (H) 3.40 - 10.80 10*3/mm3 Final   12/25/2023 11.23 (H) 3.40 " "- 10.80 10*3/mm3 Final   12/25/2023 10.77 3.40 - 10.80 10*3/mm3 Final   12/25/2023 7.18 3.40 - 10.80 10*3/mm3 Final   12/25/2023 10.74 3.40 - 10.80 10*3/mm3 Final   12/25/2023 8.30 3.40 - 10.80 10*3/mm3 Final   12/24/2023   Corrected     Comment:     Pt is receiving blood MTP, disregard previous results  Corrected result. Previous result was 5.32 10*3/mm3 on 12/24/2023 at 2237 EST.   12/24/2023 20.44 (H) 3.40 - 10.80 10*3/mm3 Final      HGB Hemoglobin   Date Value Ref Range Status   12/27/2023 8.3 (L) 13.0 - 17.7 g/dL Final   12/26/2023 8.6 (L) 13.0 - 17.7 g/dL Final   12/26/2023 8.5 (L) 13.0 - 17.7 g/dL Final   12/25/2023 6.9 (C) 13.0 - 17.7 g/dL Final   12/25/2023 6.6 (C) 13.0 - 17.7 g/dL Final   12/25/2023 5.7 (C) 13.0 - 17.7 g/dL Final   12/25/2023 11.6 (L) 13.0 - 17.7 g/dL Final   12/25/2023 13.8 13.0 - 17.7 g/dL Final   12/24/2023   Corrected     Comment:     Pt is receiving blood MTP, disregard previous results  Corrected result. Previous result was 8.5 g/dL on 12/24/2023 at 2237 EST.   12/24/2023 9.1 (L) 13.0 - 17.7 g/dL Final      HCT Hematocrit   Date Value Ref Range Status   12/27/2023 24.3 (L) 37.5 - 51.0 % Final   12/26/2023 24.8 (L) 37.5 - 51.0 % Final   12/26/2023 24.2 (L) 37.5 - 51.0 % Final   12/25/2023 19.3 (C) 37.5 - 51.0 % Final   12/25/2023 18.7 (C) 37.5 - 51.0 % Final   12/25/2023 16.7 (C) 37.5 - 51.0 % Final   12/25/2023 33.9 (L) 37.5 - 51.0 % Final   12/25/2023 42.6 37.5 - 51.0 % Final   12/24/2023   Corrected     Comment:     Pt is receiving blood MTP, disregard previous results  Corrected result. Previous result was 26.7 % on 12/24/2023 at 2237 EST.   12/24/2023 29.0 (L) 37.5 - 51.0 % Final      Platlets No results found for: \"LABPLAT\"   MCV MCV   Date Value Ref Range Status   12/27/2023 87.4 79.0 - 97.0 fL Final   12/26/2023 86.1 79.0 - 97.0 fL Final   12/26/2023 85.5 79.0 - 97.0 fL Final   12/25/2023 83.9 79.0 - 97.0 fL Final   12/25/2023 83.5 79.0 - 97.0 fL Final   12/25/2023 84.3 79.0 - " 97.0 fL Final   12/25/2023 84.3 79.0 - 97.0 fL Final   12/25/2023 89.5 79.0 - 97.0 fL Final   12/24/2023   Corrected     Comment:     Pt is receiving blood MTP, disregard previous results  Corrected result. Previous result was 92.1 fL on 12/24/2023 at 2237 EST.   12/24/2023 91.2 79.0 - 97.0 fL Final          Sodium Sodium   Date Value Ref Range Status   12/27/2023 142 136 - 145 mmol/L Final   12/26/2023 141 136 - 145 mmol/L Final   12/26/2023 141 136 - 145 mmol/L Final   12/25/2023 141 136 - 145 mmol/L Final   12/25/2023 142 136 - 145 mmol/L Final   12/25/2023 140 136 - 145 mmol/L Final   12/25/2023 137 136 - 145 mmol/L Final   12/25/2023 140 136 - 145 mmol/L Final   12/24/2023 133 (L) 136 - 145 mmol/L Final     Sodium, Arterial   Date Value Ref Range Status   12/27/2023 138.6 136 - 146 mmol/L Final   12/26/2023 138.3 136 - 146 mmol/L Final   12/26/2023 138.3 136 - 146 mmol/L Final   12/25/2023 135.5 (L) 136 - 146 mmol/L Final   12/25/2023 135.1 (L) 136 - 146 mmol/L Final   12/25/2023 133.4 (L) 136 - 146 mmol/L Final   12/25/2023 134.8 (L) 136 - 146 mmol/L Final   12/25/2023 137.0 136 - 146 mmol/L Final   12/24/2023 140.5 136 - 146 mmol/L Final   12/24/2023 140.4 136 - 146 mmol/L Final   12/24/2023 143.9 136 - 146 mmol/L Final   12/24/2023 128.8 (L) 136 - 146 mmol/L Final   12/24/2023 129.9 (L) 136 - 146 mmol/L Final   12/24/2023 128.2 (L) 136 - 146 mmol/L Final      Potassium Potassium   Date Value Ref Range Status   12/27/2023 4.5 3.5 - 5.2 mmol/L Final   12/26/2023 4.0 3.5 - 5.2 mmol/L Final   12/26/2023 3.9 3.5 - 5.2 mmol/L Final   12/25/2023 3.9 3.5 - 5.2 mmol/L Final   12/25/2023 4.1 3.5 - 5.2 mmol/L Final   12/25/2023 3.7 3.5 - 5.2 mmol/L Final   12/25/2023 3.6 3.5 - 5.2 mmol/L Final     Comment:     Slight hemolysis detected by analyzer. Result may be falsely elevated.   12/25/2023 4.5 3.5 - 5.2 mmol/L Final     Comment:     Slight hemolysis detected by analyzer. Result may be falsely elevated.   12/24/2023  4.5 3.5 - 5.2 mmol/L Final      Chloride Chloride   Date Value Ref Range Status   12/27/2023 103 98 - 107 mmol/L Final   12/26/2023 104 98 - 107 mmol/L Final   12/26/2023 104 98 - 107 mmol/L Final   12/25/2023 102 98 - 107 mmol/L Final   12/25/2023 102 98 - 107 mmol/L Final   12/25/2023 100 98 - 107 mmol/L Final   12/25/2023 98 98 - 107 mmol/L Final   12/25/2023 99 98 - 107 mmol/L Final   12/24/2023 96 (L) 98 - 107 mmol/L Final      CO2 CO2   Date Value Ref Range Status   12/27/2023 24.3 22.0 - 29.0 mmol/L Final   12/26/2023 25.2 22.0 - 29.0 mmol/L Final   12/26/2023 27.5 22.0 - 29.0 mmol/L Final   12/25/2023 27.8 22.0 - 29.0 mmol/L Final   12/25/2023 26.4 22.0 - 29.0 mmol/L Final   12/25/2023 25.9 22.0 - 29.0 mmol/L Final   12/25/2023 22.7 22.0 - 29.0 mmol/L Final   12/25/2023 15.4 (L) 22.0 - 29.0 mmol/L Final   12/24/2023 20.9 (L) 22.0 - 29.0 mmol/L Final      BUN BUN   Date Value Ref Range Status   12/27/2023 48 (H) 8 - 23 mg/dL Final   12/26/2023 37 (H) 8 - 23 mg/dL Final   12/26/2023 29 (H) 8 - 23 mg/dL Final   12/25/2023 25 (H) 8 - 23 mg/dL Final   12/25/2023 19 8 - 23 mg/dL Final   12/25/2023 17 8 - 23 mg/dL Final   12/25/2023 14 8 - 23 mg/dL Final   12/25/2023 12 8 - 23 mg/dL Final   12/24/2023 11 8 - 23 mg/dL Final      Creatinine Creatinine   Date Value Ref Range Status   12/27/2023 4.60 (H) 0.76 - 1.27 mg/dL Final   12/26/2023 3.44 (H) 0.76 - 1.27 mg/dL Final   12/26/2023 3.34 (H) 0.76 - 1.27 mg/dL Final   12/25/2023 2.62 (H) 0.76 - 1.27 mg/dL Final   12/25/2023 2.47 (H) 0.76 - 1.27 mg/dL Final   12/25/2023 1.89 (H) 0.76 - 1.27 mg/dL Final   12/25/2023 1.54 (H) 0.76 - 1.27 mg/dL Final   12/25/2023 1.33 (H) 0.76 - 1.27 mg/dL Final   12/24/2023 0.92 0.76 - 1.27 mg/dL Final      Calcium Calcium   Date Value Ref Range Status   12/27/2023 7.2 (L) 8.6 - 10.5 mg/dL Final   12/26/2023 7.2 (L) 8.6 - 10.5 mg/dL Final   12/26/2023 7.1 (L) 8.6 - 10.5 mg/dL Final   12/25/2023 7.6 (L) 8.6 - 10.5 mg/dL Final  "  12/25/2023 7.9 (L) 8.6 - 10.5 mg/dL Final   12/25/2023 7.9 (L) 8.6 - 10.5 mg/dL Final   12/25/2023 8.1 (L) 8.6 - 10.5 mg/dL Final   12/25/2023 9.4 8.6 - 10.5 mg/dL Final   12/24/2023 9.3 8.6 - 10.5 mg/dL Final      PO4 No results found for: \"CAPO4\"   Albumin Albumin   Date Value Ref Range Status   12/27/2023 2.2 (L) 3.5 - 5.2 g/dL Final   12/26/2023 2.2 (L) 3.5 - 5.2 g/dL Final   12/26/2023 2.2 (L) 3.5 - 5.2 g/dL Final   12/25/2023 2.5 (L) 3.5 - 5.2 g/dL Final   12/25/2023 2.4 (L) 3.5 - 5.2 g/dL Final   12/25/2023 2.4 (L) 3.5 - 5.2 g/dL Final   12/25/2023 2.2 (L) 3.5 - 5.2 g/dL Final   12/25/2023 1.8 (L) 3.5 - 5.2 g/dL Final      Magnesium Magnesium   Date Value Ref Range Status   12/27/2023 2.4 1.6 - 2.4 mg/dL Final   12/26/2023 2.4 1.6 - 2.4 mg/dL Final   12/26/2023 2.4 1.6 - 2.4 mg/dL Final   12/25/2023 2.3 1.6 - 2.4 mg/dL Final   12/25/2023 2.5 (H) 1.6 - 2.4 mg/dL Final   12/25/2023 2.2 1.6 - 2.4 mg/dL Final   12/25/2023 1.9 1.6 - 2.4 mg/dL Final   12/25/2023 2.2 1.6 - 2.4 mg/dL Final   12/24/2023 2.0 1.6 - 2.4 mg/dL Final      Uric Acid No results found for: \"URICACID\"     Medication Review:   cefTRIAXone, 2,000 mg, Intravenous, Q24H  famotidine, 20 mg, Intravenous, Q12H  senna-docusate sodium, 2 tablet, Nasogastric, BID  sodium chloride, 10 mL, Intravenous, Q12H          Assessment & Plan       Left flank pain    Ruptured infrarenal abdominal aortic aneurysm (AAA)      Assessment & Plan  ROLAND-  pt with previous partial left nephrectomy and baseline creatinine closer to 0.9-1.0.  Had noted hypotension with AAA rupture s/p repair.  Non-oliguric at this time with creatinine increased to 1.54->2.62 now.  Likely ATN from hypotension vs. Atheroembolic injury. Also received IV contrast with CTA.  UOP down some from yesterday.  Oliguric now. D/w wife at bedside.  No acute need for dialysis but will likely need. Will start IV bumex 4mg IV q8hrs.  Will d/w pulm to place temp dialysis catheter, will likely need dialysis " in next 1-2 days. Continue supportive care.  AAA rupture-  s/p repair.  Resp Failure-  intubated at this time.  Shock- sepsis, hemorrhagic component with AAA rupture.    Sepsis-  blood cx with strep pyogenes.  On vanc/cefepime.  Anemia-  prbc's prn.    Adin Hinton MD  12/27/23  07:47 EST

## 2023-12-27 NOTE — PLAN OF CARE
Goal Outcome Evaluation:   Pt has tolerated PS since 5AM. Pt F/Vt os below 50, Sats >95%, HR in the 80s.

## 2023-12-27 NOTE — PROGRESS NOTES
Pulmonary / Critical Care Progress Note      Patient Name: Danny Savage  : 1960  MRN: [id number]  Attending:  Eric Martin MD   Date of admission: 2023    Subjective   Subjective   Patient critically ill with ruptured AAA s/p open repair, hemorrhagic shock, Streptococcus pyogenes bacteremia    Overall improving  Patient on mechanical ventilator  Does wake up and follow commands, off Propofol and Fent since day prior  On Levophed at 0.02; not requiring Vaso  No stool output noted  Urine output has dropped off in the last 24 hours, creatinine up --> 4.6  Received IV Dilaudid and was with decrease in mental status and put back on ACVC      Review of Systems  Unable to obtain      Objective   Objective     Vitals:   Vital signs for last 24 hours:  Temp:  [95.7 °F (35.4 °C)-97.8 °F (36.6 °C)] 95.7 °F (35.4 °C)  Heart Rate:  [70-96] 79  Resp:  [16] 16  BP: ()/() 137/86  FiO2 (%):  [45 %-60 %] 45 %    Intake/Output last 3 shifts:  I/O last 3 completed shifts:  In: 5331.3 [I.V.:4070; Blood:1261.3]  Out: 2530 [Urine:270; Emesis/NG output:250; Stool:10; Blood:200]    Vent settings for last 24 hours:  Mode: VC/AC  FiO2 (%):  [45 %-60 %] 45 %  S RR:  [26] 26  S VT:  [450 mL-500 mL] 500 mL  PEEP/CPAP (cm H2O):  [5 cm H20-8 cm H20] 5 cm H20  [place] SUP:  [10 cm H20] 10 cm H20  MAP (cm H2O):  [7.8-14] 11    Physical Exam   Critically ill on mechanical ventilator  Open abdomen, wound VAC   Does follow commands, no focal deficits    Result Review    I have personally reviewed the results from the time of this admission to 2023 13:57 EST and agree with these findings:  [x]  Laboratory  [x]  Microbiology  [x]  Radiology  []  EKG/Telemetry   []  Cardiology/Vascular   []  Pathology  []  Old records  []  Other:  Most notable findings include:   2023 blood culture Streptococcus pyogenes group A        Lab 23  1017 23  0644 23  0551 23  1507 23  1157  12/26/23  0818 12/26/23  0639 12/25/23  2358 12/25/23  1803 12/25/23  1712 12/25/23  1259 12/25/23  1118 12/25/23  0632 12/25/23  0532 12/25/23  0141 12/25/23  0035   WBC  --   --  17.50* 12.18*  --  12.78*  --  11.23* 10.77  --  7.18  --   --  10.74   < >  --    HEMOGLOBIN  --   --  8.3* 8.6*  --  8.5*  --  6.9* 6.6*  --  5.7*  --   --  11.6*   < >  --    HEMATOCRIT  --   --  24.3* 24.8*  --  24.2*  --  19.3* 18.7*  --  16.7*  --   --  33.9*   < >  --    PLATELETS  --   --  70* 72*  --  68*  --  88* 105*  --  41*  --   --  69*   < >  --    SODIUM  --   --  142 141  --  141  --  141 142  --   --  140  --  137  --  140   SODIUM, ARTERIAL 138.7 138.6  --   --  138.3  --  138.3  --   --    < >  --   --    < >  --    < >  --    POTASSIUM  --   --  4.5 4.0  --  3.9  --  3.9 4.1  --   --  3.7  --  3.6  --  4.5   CHLORIDE  --   --  103 104  --  104  --  102 102  --   --  100  --  98  --  99   CO2  --   --  24.3 25.2  --  27.5  --  27.8 26.4  --   --  25.9  --  22.7  --  15.4*   BUN  --   --  48* 37*  --  29*  --  25* 19  --   --  17  --  14  --  12   CREATININE  --   --  4.60* 3.44*  --  3.34*  --  2.62* 2.47*  --   --  1.89*  --  1.54*  --  1.33*   GLUCOSE  --   --  99 100*  --  152*  --  172* 186*  --   --  276*  --  328*  --  307*   GLUCOSE, ARTERIAL 99 86  --   --  97  --  126*  --   --    < >  --   --    < >  --    < >  --    CALCIUM  --   --  7.2* 7.2*  --  7.1*  --  7.6* 7.9*  --   --  7.9*  --  8.1*  --  9.4   PHOSPHORUS  --   --  5.5*  --   --  3.4  --  3.4 3.8  --   --  3.5  --  3.2  --  9.0*   TOTAL PROTEIN  --   --  4.4* 4.0*  --  3.9*  --  4.0* 3.8*  --   --  3.3*  --  3.3*  --  2.8*   ALBUMIN  --   --  2.2* 2.2*  --  2.2*  --  2.5* 2.4*  --   --  2.4*  --  2.2*  --  1.8*   GLOBULIN  --   --  2.2 1.8  --  1.7  --  1.5 1.4  --   --  0.9  --  1.1  --  1.0    < > = values in this interval not displayed.         Assessment & Plan   Assessment / Plan     Active Hospital Problems:  Active Hospital Problems     Diagnosis    • **Left flank pain    • Ruptured infrarenal abdominal aortic aneurysm (AAA)      Impression:  Ruptured infrarenal AAA s/p open repair  Hemorrhagic shock  Hypovolemic shock  Acute hypoxic respiratory failure requiring mechanical ventilation  Septic shock with Streptococcus pyogenes bacteremia  Ischemic sigmoid colon s/p colostomy  History of RCC s/p partial nephrectomy on 11/13  Acute kidney injury [facility]  Clinically significant lactic acidosis  Chronically immunosuppressed on CellCept  Hypocalcemia  Hypokalemia  Pneumonitis  SVT    Plan:    -Continue mechanical ventilator  PEEP increased to 8  And able to liberate patient from ventilator at this time  Repeat arterial blood gas showed worsening CO2  This is secondary to bilateral pleural effusions and significant fluid overload  Poke with nephrology who is agreed to initiate hemodialysis  -Continue daily SAT/SBT; will hold off on liberation from ventilator secondary to volume status.  Currently +13L fluid balance  --Continue Levophed, titrate to obtain MAP 65 or greater.  Currently off of vaso  -No longer requiring propofol.  Add Precedex to help with agitation/anxiety  -Has been off of fentanyl drip since day prior.  DC fentanyl drip.  DC Dilaudid given worsening renal function.  Start fentanyl IV push as needed for pain  -IV Tylenol ordered as well  -Vascular surgery following, appreciate service  -General surgery consulted, appreciate service  -Nephrology services consulted, Follow renal function, continues on IV Bumex.  Minimal UOP today post Bumex.  Less than 50 mL of UOP after rounds.  -HD catheter placed.  HD orders for today  -Blood cultures previously Streptococcus pyogenes, de-escalate antibiotics to Rocephin 2 g daily for 14 days  -Hold home chronic immunosuppression CellCept indefinitely  -Dietitian on board.  Okay to start tube feeds at [facility] per general surgery      PUPppx: Pepcid  DVT prophylaxis: None at this time, follow    DVT  prophylaxis:  Mechanical DVT prophylaxis orders are present.    CODE STATUS:   Level Of Support Discussed With: Patient  Code Status (Patient has no pulse and is not breathing): CPR (Attempt to Resuscitate)  Medical Interventions (Patient has pulse or is breathing): Full Support    ITrinidad PA-C spent 15 minutes critical care time in accordance to split shared billing.    Electronically signed by ROEL Negro, 12/27/23, 3:09 PM EST.  .    Total CTT by our service is 45 minutes    I have spent a total of 30 minutes if Critical care time    The patient is critically ill in the ICU with shock ruptured AAA.  Multiple metabolic and electrolyte degrangments and SVTMultidisciplinary bedside critical care rounds were performed with nursing staff, respiratory therapy, pharmacy, nutritional services, social work. I have personally reviewed the chart, labs and any pertinent imaging available.      .

## 2023-12-27 NOTE — PROGRESS NOTES
[facility]   Hospitalist Progress Note  Date: 2023  Patient Name: Danny Savage  : 1960  MRN: [id number]  Date of admission: 2023  [place] [facility] [place]      Subjective   Subjective     Chief Complaint: Back pain    Summary:Danny Savage is a 63 y.o. male Danny Savage is a 63 y.o. male with past medical history of recently diagnosed renal cell carcinoma s/p partial left kidney nephrectomy in 2023 (follows Dr.O Espino as outpatient) and infrarenal aortic aneurysm (scan on  showing AAA of 4.7 cm) who presented with complaints of left lower back pain and fever since past 3 days.  Patient had CT abdomen scan done early December and was evaluated by urologist as outpatient.  Patient presented to ER 1 day prior with left flank pain where CT abdomen was obtained showing mildly decreased fluid collection at partial left nephrectomy bed containing decreasing but persistent small gas bubbles; findings consistent with postoperative seroma/hematoma; questionable infection.  Case was discussed with Dr. Spicer and patient was discharged home.  But patient's pain got worse on the day of presentation and was brought to ED.  Patient's left flank pain was severe, about 10 x 10 in intensity on presentation.  Patient was febrile with temp 102.6.  Lactic acid was normal.  Urinalysis was not significant for UTI.  CT abdomen pelvis with contrast showed stable fluid collection along the left nephrectomy bed, likely postoperative seroma/hematoma although infection cannot be ruled out. Abdominal aortic aneurysm of 5.1 cm.  Patient was started on IV antibiotics and was admitted as a case of possible abscess in the left nephrectomy bed.  Urology was consulted.     Hospital course complicated by sudden unresponsive on 2023.  Vital signs showed blood pressure 42/32.Patient was pale and clammy with agonal breathing.  No pulse was felt which seemed like PEA arrest after which CODE BLUE was  called.  Patient was getting IV fluid bolus.  Before starting CPR, patient had feeble pulse after which CPR was held.  Patient was then intubated at bedside. He was then transferred to critical care unit.  Given patient has expanding AAA  with eccentric mural thrombus/hematoma measuring up to 5.1 centimeter maximally on CT abdomen of 12/22; there is also concern for aortic rupture. 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 Vascular Surgeon was consulted immediately and patient underwent urgent Open repair of ruptured infrarenal abdominal aortic aneurysm with a tube graft and mobilization of the omentum with coverage of the graft using pedicled omental flap.  Patient taken back to the OR on 12/26/2023 to look for sources of blood loss from prior surgeries.  Graft was in good position, no obvious leak was identified, patient was found to have nonviable colon and patient underwent partial colectomy by general surgery.    Interval Followup: No acute events overnight, patient tolerated surgery yesterday, patient status post partial colectomy with ostomy formation      Objective   Objective     Vitals:   Temp:  [97.1 °F (36.2 °C)-97.8 °F (36.6 °C)] 97.1 °F (36.2 °C)  Heart Rate:  [70-96] 80  Resp:  [16] 16  BP: ()/() 98/53  Flow (L/min):  [60] 60  FiO2 (%):  [45 %-60 %] 45 %    Physical Exam   GEN: No acute distress  HEENT: Moist mucous membranes  LUNGS: Equal chest rise bilaterally  CARDIAC: Regular rate and rhythm  NEURO: Moving all 4 extremities spontaneously  SKIN: No obvious breakdown      Result Review    Result Review:  I have personally reviewed these results:  [x]  Laboratory      Lab 12/27/23  1017 12/27/23  0644 12/27/23  0621 12/27/23  0551 12/26/23  1507 12/26/23  1157 12/26/23  0818 12/25/23  0632 12/25/23  0611 12/25/23  0532 12/25/23  0325 12/25/23  0141 12/25/23  0035 12/24/23  2229 12/24/23  2153 12/24/23  1935 12/24/23  1754  12/24/23  1636 12/24/23  1459 12/24/23  1410 12/24/23  0620 12/1960 12/23/23 1957   WBC  --   --   --  17.50* 12.18*  --  12.78*   < >  --  10.74  --  8.30  --   --   --   --   --   --  20.44*  --  13.03*  --  11.32*   HEMOGLOBIN  --   --   --  8.3* 8.6*  --  8.5*   < >  --  11.6*  --  13.8  --   --   --   --   --   --  9.1*  --  12.0*  --  13.0   HEMATOCRIT  --   --   --  24.3* 24.8*  --  24.2*   < >  --  33.9*  --  42.6  --   --   --   --   --   --  29.0*  --  37.5  --  40.6   PLATELETS  --   --   --  70* 72*  --  68*   < >  --  69*  --  62*  --   --   --   --   --   --  263  --  171  --  188   NEUTROS ABS  --   --   --   --   --   --   --   --   --   --   --  7.14*  --   --   --   --   --   --  15.22*  --  11.25*  --  10.15*   IMMATURE GRANS (ABS)  --   --   --   --   --   --   --   --   --   --   --   --   --   --   --   --   --   --  0.12*  --  0.08*  --  0.03   LYMPHS ABS  --   --   --   --   --   --   --   --   --   --   --   --   --   --   --   --   --   --  2.48  --  0.64*  --  0.43*   MONOS ABS  --   --   --   --   --   --   --   --   --   --   --   --   --   --   --   --   --   --  2.55*  --  1.02*  --  0.69   EOS ABS  --   --   --   --   --   --   --   --   --   --   --   --   --   --   --   --   --   --  0.02  --  0.01  --  0.00   MCV  --   --   --  87.4 86.1  --  85.5   < >  --  84.3  --  89.5  --   --   --   --   --   --  91.2  --  88.4  --  87.7   PROCALCITONIN  --   --   --   --   --   --   --   --   --  8.45*  --   --  2.07*  --   --   --   --   --  1.32*  --   --   --   --    LACTATE  --   --   --   --  1.3  --  1.8   < >  --  9.5*   < >  --  18.6*  --   --   --  11.2*  --  7.7*  --   --    < >  --    LACTATE, ARTERIAL 1.49 1.53  --   --   --    < >  --    < >  --   --    < >  --   --    < >  --    < >  --    < >  --    < >  --   --   --    PROTIME  --   --  14.1  --  14.9  --  15.1*   < > 20.8*  --   --   --  24.3*  --  43.6*  --  17.6*   < >  --   --   --   --   --    APTT  --   --   --    --   --   --   --   --  45.0*  --   --   --  111.0*  --  >200.0*  --  24.6   < >  --   --   --   --   --    D DIMER QUANT  --   --   --   --   --   --   --   --   --   --   --   --   --   --   --   --  19.79*  --   --   --   --   --   --     < > = values in this interval not displayed.         Lab 12/27/23  1017 12/27/23  0644 12/27/23  0551 12/26/23  1507 12/26/23  1157 12/26/23  0818 12/26/23  0639 12/25/23  2358   SODIUM  --   --  142 141  --  141  --  141   SODIUM, ARTERIAL 138.7 138.6  --   --  138.3  --    < >  --    POTASSIUM  --   --  4.5 4.0  --  3.9  --  3.9   CHLORIDE  --   --  103 104  --  104  --  102   CO2  --   --  24.3 25.2  --  27.5  --  27.8   ANION GAP  --   --  14.7 11.8  --  9.5  --  11.2   BUN  --   --  48* 37*  --  29*  --  25*   CREATININE  --   --  4.60* 3.44*  --  3.34*  --  2.62*   EGFR  --   --  13.4* 19.0*  --  19.6*  --  26.3*   GLUCOSE  --   --  99 100*  --  152*  --  172*   GLUCOSE, ARTERIAL 99 86  --   --  97  --    < >  --    CALCIUM  --   --  7.2* 7.2*  --  7.1*  --  7.6*   IONIZED CALCIUM 0.94* 0.84*  --   --  1.00*  --    < >  --    MAGNESIUM  --   --  2.4 2.4  --  2.4  --  2.3   PHOSPHORUS  --   --  5.5*  --   --  3.4  --  3.4    < > = values in this interval not displayed.         Lab 12/27/23  0551 12/26/23  1507 12/26/23  0818 12/25/23  0532 12/25/23  0035   TOTAL PROTEIN 4.4* 4.0* 3.9*   < > 2.8*   ALBUMIN 2.2* 2.2* 2.2*   < > 1.8*   GLOBULIN 2.2 1.8 1.7   < > 1.0   ALT (SGPT) 233* 281* 274*   < > 550*   AST (SGOT) 360* 507* 590*   < > 444*   BILIRUBIN 1.6* 1.2 1.3*   < > 0.4   ALK PHOS 68 57 53   < > 35*   AMYLASE  --   --   --   --  194*   LIPASE  --   --   --   --  20    < > = values in this interval not displayed.         Lab 12/27/23  0621 12/26/23  1507 12/26/23  0818 12/24/23  2153 12/24/23  1754 12/24/23  1459 12/22/23  0841 12/22/23  0815   PROBNP  --   --   --   --   --  1,898.0* 165.7  --    HSTROP T  --   --   --   --  467* 114*  --  9   PROTIME 14.1 14.9 15.1*    < > 17.6*  --   --   --    INR 1.07 1.15 1.17*   < > 1.42*  --   --   --     < > = values in this interval not displayed.             Lab 12/24/23 2032   ABO TYPING A   RH TYPING Negative   ANTIBODY SCREEN Negative         Lab 12/27/23  1017 12/27/23  0644 12/26/23  1157   PH, ARTERIAL 7.189* 7.422 7.368   PCO2, ARTERIAL 70.2* 35.7 45.5*   PO2 ART 65.0* 86.8 67.5*   O2 SATURATION ART 86.4* 95.0 90.0*   FIO2 45 45 60   HCO3 ART 26.2* 22.7 25.6   BASE EXCESS ART -2.8* -1.4 0.1   CARBOXYHEMOGLOBIN 0.3 0.2 0.3     Brief Urine Lab Results  (Last result in the past 365 days)        Color   Clarity   Blood   Leuk Est   Nitrite   Protein   CREAT   Urine HCG        12/23/23 2054 Yellow   Clear   Small (1+)   Negative   Negative   100 mg/dL (2+)                 [x]  Microbiology   Microbiology Results (last 10 days)       Procedure Component Value - Date/Time    Respiratory Culture - Sputum, ET Suction [[id number]]  (Abnormal) Collected: 12/24/23 1638    Lab Status: Final result Specimen: Sputum from ET Suction Updated: 12/27/23 1040     Respiratory Culture Light growth (2+) Moraxella atlantae     Gram Stain Few (2+) Gram negative bacilli      Rare (1+) WBCs seen    Blood Culture - Blood, Arm, Left [[id number]]  (Normal) Collected: 12/24/23 1459    Lab Status: Preliminary result Specimen: Blood from Arm, Left Updated: 12/26/23 1531     Blood Culture No growth at 2 days    Blood Culture - Blood, Arm, Right [[id number]]  (Normal) Collected: 12/23/23 2205    Lab Status: Preliminary result Specimen: Blood from Arm, Right Updated: 12/26/23 2215     Blood Culture No growth at 3 days    Blood Culture - Blood, Arm, Left [[id number]]  (Normal) Collected: 12/23/23 2205    Lab Status: Preliminary result Specimen: Blood from Arm, Left Updated: 12/26/23 2215     Blood Culture No growth at 3 days    COVID-19, FLU A/B, RSV PCR 1 HR TAT - Swab, Nasopharynx [[id number]]  (Normal) Collected: 12/23/23 5403    Lab Status: Final result Specimen:  Swab from Nasopharynx Updated: 12/23/23 2303     COVID19 Not Detected     Influenza A PCR Not Detected     Influenza B PCR Not Detected     RSV, PCR Not Detected    Narrative:      Fact sheet for providers: https://www.fda.gov/media/062162/download    Fact sheet for patients: https://www.fda.gov/media/666008/download    Test performed by PCR.    Blood Culture - Blood, Arm, Right [[id number]]  (Abnormal)  (Susceptibility) Collected: 12/22/23 0815    Lab Status: Final result Specimen: Blood from Arm, Right Updated: 12/25/23 0652     Blood Culture Streptococcus pyogenes (Group A)     Isolated from Aerobic and Anaerobic Bottles     Gram Stain Aerobic Bottle Gram positive cocci in pairs and chains      Anaerobic Bottle Gram positive cocci in pairs and chains    Susceptibility        Streptococcus pyogenes (Group A)      LINDSEY      Ceftriaxone Susceptible      Levofloxacin Susceptible      Penicillin G Susceptible      Vancomycin Susceptible                           Blood Culture - Blood, Arm, Left [[id number]]  (Abnormal) Collected: 12/22/23 0815    Lab Status: Final result Specimen: Blood from Arm, Left Updated: 12/25/23 0652     Blood Culture Streptococcus pyogenes (Group A)     Isolated from Aerobic and Anaerobic Bottles     Gram Stain Aerobic Bottle Gram positive cocci in pairs and chains      Anaerobic Bottle Gram positive cocci in pairs and chains    Narrative:      Refer to previous blood culture collected on 12/22/2023 0815 for MICs.      COVID PRE-OP / PRE-PROCEDURE SCREENING ORDER (NO ISOLATION) - Swab, Nasopharynx [[id number]]  (Normal) Collected: 12/22/23 0710    Lab Status: Final result Specimen: Swab from Nasopharynx Updated: 12/22/23 0802    Narrative:      The following orders were created for panel order COVID PRE-OP / PRE-PROCEDURE SCREENING ORDER (NO ISOLATION) - Swab, Nasopharynx.  Procedure                               Abnormality         Status                     ---------                                -----------         ------                     COVID-19, FLU A/B, RSV P...[[id number]]  Normal              Final result                 Please view results for these tests on the individual orders.    COVID-19, FLU A/B, RSV PCR 1 HR TAT - Swab, Nasopharynx [[id number]]  (Normal) Collected: 12/22/23 0710    Lab Status: Final result Specimen: Swab from Nasopharynx Updated: 12/22/23 0802     COVID19 Not Detected     Influenza A PCR Not Detected     Influenza B PCR Not Detected     RSV, PCR Not Detected    Narrative:      Fact sheet for providers: https://www.fda.gov/media/539894/download    Fact sheet for patients: https://www.fda.gov/media/997849/download    Test performed by PCR.          [x]  Radiology  XR Chest 1 View    Result Date: 12/27/2023    1. Endotracheal tube in good position. 2. Bilateral airspace opacities, which could reflect pulmonary edema or pneumonia. 3. Moderate bilateral pleural effusions.       GELY MONTE MD       Electronically Signed and Approved By: GELY MONTE MD on 12/27/2023 at 4:42             CT Angiogram Abdomen Pelvis    Result Date: 12/24/2023    1. Development of aorto-caval fistula with large amount of active extravasation of hemorrhage into right abdomen and retroperitoneal space likely from a ruptured IVC.  Findings were discussed by Dr. Cervantes to vascular surgery at approximately 1845 hours. 2. Bibasilar consolidations within lung bases, which could reflect atelectasis, aspiration, and/or pneumonia. 3. Small fluid collection along left nephrectomy site is unchanged. 4. Air within urinary bladder, likely secondary to Rico catheterization.     GELY MONTE MD       Electronically Signed and Approved By: GELY MONTE MD on 12/24/2023 at 19:37             XR Abdomen KUB    Result Date: 12/24/2023    1. Mild prominence of small bowel loops within the mid abdomen.  The findings are nonspecific and may be related to adynamic ileus.  Developing bowel obstruction  could have this appearance.  Recommend follow-up to ensure resolution.     GELY MONTOYA MD       Electronically Signed and Approved By: GELY MONTOYA MD on 12/24/2023 at 16:07             XR Chest 1 View    Result Date: 12/24/2023    1. The endotracheal tube distal tip is seen approximately 3-4 cm above the prosper. 2. The nasogastric tube is positioned in the stomach in the left upper quadrant. 3. The right IJ central line distal tip is noted near the SVC/right atrial junction.  The line is ready for use.  There is no pneumothorax. 4. Evidence for subtle ill-defined infiltrates throughout the mid to lower lungs bilaterally.  There may also be a small left pleural effusion.       GELY MONTOYA MD       Electronically Signed and Approved By: GELY MONTOYA MD on 12/24/2023 at 15:13             CT Abdomen Pelvis With Contrast    Result Date: 12/23/2023    1. Stable fluid collection along left nephrectomy bed, likely a postoperative seroma/hematoma, although infection cannot be excluded on imaging alone. 2. No new acute process identified within abdomen/pelvis. 3. Stable abdominal aortic aneurysm.     GELY MONTE MD       Electronically Signed and Approved By: GELY MONTE MD on 12/23/2023 at 22:07             XR Chest 1 View    Result Date: 12/23/2023   No acute cardiopulmonary process identified.       GELY MONTE MD       Electronically Signed and Approved By: GELY MONTE MD on 12/23/2023 at 21:49             CT Abdomen Pelvis With Contrast    Result Date: 12/22/2023    1. Mildly decreased fluid collection at the partial left nephrectomy bed measuring 1.4 x 2.9 centimeter (previously 2 x 3.2 centimeter), containing decreasing but persistent small gas bubbles.  Findings favor postoperative seroma/hematoma +/-infection.  There does not appear to be a mature rim enhancing wall to suggest true abscess or drainable collection.  Decrease size argues against urinoma. 2. Grossly stable infrarenal  abdominal aortic aneurysm with eccentric mural thrombus/hematoma measuring up to 5.1 centimeter maximally. 3. Hepatic steatosis. 4. Other chronic/ancillary finding similar to recent comparison.      DOMINIK NORTON MD       Electronically Signed and Approved By: DOMINIK NORTON MD on 12/22/2023 at 9:36             XR Chest 1 View    Result Date: 12/22/2023   No active cardiopulmonary disease       BERNIE SEXTON MD       Electronically Signed and Approved By: BERNIE SEXTON MD on 12/22/2023 at 7:25            []  EKG/Telemetry   []  Cardiology/Vascular   []  Pathology  []  Old records  []  Other:    Assessment & Plan   Assessment / Plan     Assessment:  Ruptured infrarenal AAA s/p open repair  Hemorrhagic shock  Hypovolemic shock  Acute hypoxic respiratory failure requiring mechanical ventilation  Septic shock with Streptococcus pyogenes bacteremia  SVT  History of RCC s/p partial nephrectomy on 11/13  Acute kidney injury  Clinically significant lactic acidosis  Chronically immunosuppressed on CellCept  Hypocalcemia  Hypokalemia    Plan:  Patient currently being managed in critical care unit; appreciate intensivist input.  Patient had spontaneous rupture of AAA s/p Open repair of ruptured infrarenal abdominal aortic aneurysm with a tube graft and mobilization of the omentum with coverage of the graft using pedicled omental flap on 12/24.  Vascular surgery following the patient; appreciate further input.,   Remains on mechanical ventilation.,  On pressure support this a.m., following commands, likely can extubate soon  Replace electrolytes as needed  DC IV fluids secondary to volume overload  Wean pressors as able  Wound vac in place and still draining significant amount of serosanguinous fluid.  Urology consulted and following, appreciate their recommendations  Continue IV vancomycin and Cefepime.  Placed dialysis catheter today for worsening renal function  Discussed management plan with pulmonology     Discussed  with RN.    DVT prophylaxis:  Mechanical DVT prophylaxis orders are present.    CODE STATUS:   Level Of Support Discussed With: Patient  Code Status (Patient has no pulse and is not breathing): CPR (Attempt to Resuscitate)  Medical Interventions (Patient has pulse or is breathing): Full Support    Pt is critically ill in ICU with shock, blood loss anemia.  I have spent 32 minutes of critical care time reviewing previous documentation, reviewing all pertinent telemetry, labs, and imaging studies, examining the patient, modifying the care plan and discussing the patient´s condition and care plan with the consultants, available family and during rounds. This does not include any procedures performed.      Electronically signed by Eric Martin MD, 12/27/23, 11:38 AM EST.

## 2023-12-27 NOTE — PROGRESS NOTES
[facility]     Progress Note    Patient Name: Danny Savage  : 1960  MRN: [id number]  Primary Care Physician:  Kodi Pichardo MD  Date of admission: 2023    Subjective   Subjective     Chief Complaint: Ruptured AAA with ischemic colon    HPI:  Patient intubated but responsive      Objective   Objective     Vitals:   Temp:  [95.7 °F (35.4 °C)-97.8 °F (36.6 °C)] 95.7 °F (35.4 °C)  Heart Rate:  [70-96] 79  Resp:  [16] 16  BP: ()/() 137/86  Flow (L/min):  [60] 60  FiO2 (%):  [45 %-60 %] 45 %    Physical Exam  Constitutional:       Appearance: Normal appearance.   Cardiovascular:      Rate and Rhythm: Normal rate.   Pulmonary:      Effort: Pulmonary effort is normal.   Abdominal:      Palpations: Abdomen is soft.     Ostomy pink with bowel sweat    Result Review    Result Review:  I have personally reviewed the results from the time of this admission to 2023 13:37 EST and agree with these findings:  [x]  Laboratory  []  Microbiology  []  Radiology  []  EKG/Telemetry   []  Cardiology/Vascular   []  Pathology  []  Old records  []  Other:  Most notable findings include: Hemoglobin stable    Assessment & Plan   Assessment / Plan     Brief Patient Summary:  Danny Savage is a 63 y.o. male who status post left colectomy for colon ischemia    Active Hospital Problems:  Active Hospital Problems    Diagnosis    • **Left flank pain    • Ruptured infrarenal abdominal aortic aneurysm (AAA)      Plan:  I am okay to try trickle feeds  Continue to monitor for bowel function  Continue her current care otherwise       DVT prophylaxis:  Mechanical DVT prophylaxis orders are present.    CODE STATUS:   Level Of Support Discussed With: Patient  Code Status (Patient has no pulse and is not breathing): CPR (Attempt to Resuscitate)  Medical Interventions (Patient has pulse or is breathing): Full Support    Disposition:  I expect patient to be discharged unsure.    Electronically signed by Hernan  ZACKARY Mallory MD, 12/27/23, 1:37 PM EST.

## 2023-12-27 NOTE — PLAN OF CARE
Goal Outcome Evaluation:   Patient currently on vcac with settings of 26r, 450vt, +8p, and 55%. Weaned patient down to 55% this shift. Patient doesn't yet meet criteria for SBT so will continue to monitor and assess for changes.

## 2023-12-27 NOTE — CONSULTS
"Nutrition Services    Patient Name: Danny aSvage  YOB: 1960  MRN: [id number]  Admission date: 12/23/2023      CLINICAL NUTRITION ASSESSMENT      Reason for Assessment  Identified at Risk by Screening Criteria      H&P:  Past Medical History:   Diagnosis Date    Allergy     Aortic aneurysm     4.7 just found has not f/u    H/O Kidney stone     Hiatal hernia     Kidney mass     left    Renal cancer         Current Problems:   Active Hospital Problems    Diagnosis     **Left flank pain     Ruptured infrarenal abdominal aortic aneurysm (AAA)         Nutrition/Diet History         Narrative   Patient admitted with left flank pain, ruptured AAA.  S/P open AAA repair.  Patient went back to OR yesterday. Now S/P left open colectomy with ostomy creation.  Remains intubated.  Plan to start enteral nutrition today at a trophic  rate.       Anthropometrics        Current Height, Weight Height: 177.8 cm (70\")  Weight: 80.5 kg (177 lb 7.5 oz)   Current BMI Body mass index is 25.46 kg/m².   BMI Classification Overweight   % %   Adjusted Body Weight (ABW)    Weight Hx  Wt Readings from Last 30 Encounters:   12/24/23 0027 80.5 kg (177 lb 7.5 oz)   12/23/23 1948 79.9 kg (176 lb 2.4 oz)   12/22/23 0706 78.8 kg (173 lb 11.6 oz)   12/06/23 0958 79.9 kg (176 lb 3.2 oz)   11/22/23 1338 78.2 kg (172 lb 6.4 oz)   11/13/23 0628 78.2 kg (172 lb 6.4 oz)   10/30/23 1440 79.9 kg (176 lb 2.4 oz)   08/17/23 1952 78.8 kg (173 lb 11.6 oz)   08/17/23 1537 80.3 kg (177 lb 0.5 oz)   08/15/23 0554 81.2 kg (179 lb 0.2 oz)          Wt Change Observation Stable x 4 months      Estimated/Assessed Needs  Estimated Needs based on: Critical Care Guidelines       Energy Requirements 12-25 kcal/kg   EST Needs (kcal/day) 966-2013 kcal        Protein Requirements 1.2-2.0 g/kg   EST Daily Needs (g/day)  g       Fluid Requirements 25 ml/kg    Estimated Needs (mL/day) 2013     Labs/Medications         Pertinent Labs Reviewed. " "  Results from last 7 days   Lab Units 12/27/23  1017 12/27/23  0644 12/27/23  0551 12/26/23  1507 12/26/23  1157 12/26/23  0818   SODIUM mmol/L  --   --  142 141  --  141   SODIUM, ARTERIAL mmol/L 138.7 138.6  --   --    < >  --    POTASSIUM mmol/L  --   --  4.5 4.0  --  3.9   CHLORIDE mmol/L  --   --  103 104  --  104   CO2 mmol/L  --   --  24.3 25.2  --  27.5   BUN mg/dL  --   --  48* 37*  --  29*   CREATININE mg/dL  --   --  4.60* 3.44*  --  3.34*   CALCIUM mg/dL  --   --  7.2* 7.2*  --  7.1*   BILIRUBIN mg/dL  --   --  1.6* 1.2  --  1.3*   ALK PHOS U/L  --   --  68 57  --  53   ALT (SGPT) U/L  --   --  233* 281*  --  274*   AST (SGOT) U/L  --   --  360* 507*  --  590*   GLUCOSE mg/dL  --   --  99 100*  --  152*   GLUCOSE, ARTERIAL mg/dL 99 86  --   --    < >  --     < > = values in this interval not displayed.     Results from last 7 days   Lab Units 12/27/23  0551 12/26/23  1507 12/26/23  0818   MAGNESIUM mg/dL 2.4 2.4 2.4   PHOSPHORUS mg/dL 5.5*  --  3.4   HEMOGLOBIN g/dL 8.3* 8.6* 8.5*   HEMATOCRIT % 24.3* 24.8* 24.2*     COVID19   Date Value Ref Range Status   12/23/2023 Not Detected Not Detected - Ref. Range Final     No results found for: \"HGBA1C\"      Pertinent Medications Reviewed.     Malnutrition Severity Assessment                Nutrition Diagnosis         Nutrition Dx Problem 1 Inadequate energy Intake related to Inability to consume sufficient energy as evidenced by NPO.       Nutrition Intervention           Current Nutrition Orders & Evaluation of Intake       Current PO Diet NPO Diet NPO Type: Strict NPO   Supplement No active supplement orders           Nutrition Intervention/Prescription        Peptamen AF @ 10 ml/hr x 22 hours/day  Provides 264 kcal, 17 g pro, 178 ml fluid         Medical Nutrition Therapy/Nutrition Education          Learner     Readiness Patient  Education not indicated at this time     Method     Response N/A  N/A     Monitor/Evaluation        Monitor Per protocol, I&O, " Pertinent labs, Weight, POC/GOC       Nutrition Discharge Plan         To be determined       Electronically signed by:  Shannon Acharya RD  12/27/23 10:53 EST

## 2023-12-27 NOTE — PROGRESS NOTES
[facility]     Progress Note    Patient Name: Danny Savage  : 1960  MRN: [id number]  Primary Care Physician:  Kodi Pichardo MD  Date of admission: 2023    Subjective   Subjective     Chief Complaint: He is intubated.    Back Pain      Patient Reports He is intubated.  Pt. Is doing much better .  He is going to be extubated soon. He had Left partial nephrectomy and developed a fluid collection that has been getting smaller.  Of concern was it had some air bubbles.  He was going to have it aspirated but his AAA ruptured while in the hospital.  He had to have  repaired emergently and also underwent partial colectomy as well.    Review of Systems   Musculoskeletal:  Positive for back pain.       Objective   Objective     Vitals:   Temp:  [97.3 °F (36.3 °C)-97.8 °F (36.6 °C)] 97.8 °F (36.6 °C)  Heart Rate:  [68-96] 90  Resp:  [16] 16  BP: (100-165)/() 131/65  Flow (L/min):  [60] 60  FiO2 (%):  [45 %-60 %] 45 %    Physical Exam   AFVSS  On ventilator at this moment.  Result Review    Result Review:  I have personally reviewed the results from the time of this admission to 2023 09:38 EST and agree with these findings:  []  Laboratory list / accordion  []  Microbiology  [x]  Radiology  []  EKG/Telemetry   []  Cardiology/Vascular   []  Pathology  []  Old records  []  Other:  Most notable findings include: stable.      Assessment & Plan   Assessment / Plan     Brief Patient Summary:  Danny Savage is a 63 y.o. male who Left partial nephrectomy with fluid vs abscess collection left kidney near surgical site.    Active Hospital Problems:  Active Hospital Problems    Diagnosis    • **Left flank pain    • Ruptured infrarenal abdominal aortic aneurysm (AAA)      Plan:   He is doing better.  Hopefully he will be extubated soon.  We will have to monitor him as far as the left renal fluid collection.  If he becomes septic post AAA repair and colectomy we will need to drain the area of  fluid collection.  But at this point we will just monitor him.    DVT prophylaxis:  Mechanical DVT prophylaxis orders are present.    CODE STATUS:    Level Of Support Discussed With: Patient  Code Status (Patient has no pulse and is not breathing): CPR (Attempt to Resuscitate)  Medical Interventions (Patient has pulse or is breathing): Full Support    Disposition:  I expect patient to be discharged per hospitalist group..    Iliana Ozuna MD

## 2023-12-28 ENCOUNTER — APPOINTMENT (OUTPATIENT)
Facility: HOSPITAL | Age: 63
DRG: 268 | End: 2023-12-28
Payer: COMMERCIAL

## 2023-12-28 LAB
ABO GROUP BLD: NORMAL
ALBUMIN SERPL-MCNC: 2.3 G/DL (ref 3.5–5.2)
ALBUMIN SERPL-MCNC: 2.4 G/DL (ref 3.5–5.2)
ALBUMIN/GLOB SERPL: 0.9 G/DL
ALP SERPL-CCNC: 86 U/L (ref 39–117)
ALT SERPL W P-5'-P-CCNC: 131 U/L (ref 1–41)
ANION GAP SERPL CALCULATED.3IONS-SCNC: 13.1 MMOL/L (ref 5–15)
ANION GAP SERPL CALCULATED.3IONS-SCNC: 15.7 MMOL/L (ref 5–15)
APTT PPP: 33.6 SECONDS (ref 78–95.9)
ARTERIAL PATENCY WRIST A: ABNORMAL
AST SERPL-CCNC: 212 U/L (ref 1–40)
BACTERIA SPEC AEROBE CULT: NORMAL
BACTERIA SPEC AEROBE CULT: NORMAL
BASE EXCESS BLDA CALC-SCNC: 0.1 MMOL/L (ref -2–2)
BASOPHILS # BLD AUTO: 0.15 10*3/MM3 (ref 0–0.2)
BASOPHILS NFR BLD AUTO: 0.6 % (ref 0–1.5)
BDY SITE: ABNORMAL
BILIRUB SERPL-MCNC: 2 MG/DL (ref 0–1.2)
BLD GP AB SCN SERPL QL: NEGATIVE
BUN SERPL-MCNC: 25 MG/DL (ref 8–23)
BUN SERPL-MCNC: 34 MG/DL (ref 8–23)
BUN/CREAT SERPL: 10.4 (ref 7–25)
BUN/CREAT SERPL: 9 (ref 7–25)
CA-I BLDA-SCNC: 0.94 MMOL/L (ref 1.13–1.32)
CALCIUM SPEC-SCNC: 7.3 MG/DL (ref 8.6–10.5)
CALCIUM SPEC-SCNC: 7.5 MG/DL (ref 8.6–10.5)
CHLORIDE BLDA-SCNC: 103 MMOL/L (ref 98–106)
CHLORIDE SERPL-SCNC: 100 MMOL/L (ref 98–107)
CHLORIDE SERPL-SCNC: 101 MMOL/L (ref 98–107)
CO2 SERPL-SCNC: 22.3 MMOL/L (ref 22–29)
CO2 SERPL-SCNC: 22.9 MMOL/L (ref 22–29)
COHGB MFR BLD: 0.3 % (ref 0–1.5)
CREAT SERPL-MCNC: 2.79 MG/DL (ref 0.76–1.27)
CREAT SERPL-MCNC: 3.27 MG/DL (ref 0.76–1.27)
CYTO UR: NORMAL
CYTO UR: NORMAL
DEPRECATED RDW RBC AUTO: 48.8 FL (ref 37–54)
EGFRCR SERPLBLD CKD-EPI 2021: 20.2 ML/MIN/1.73
EGFRCR SERPLBLD CKD-EPI 2021: 24.4 ML/MIN/1.73
EOSINOPHIL # BLD AUTO: 0 10*3/MM3 (ref 0–0.4)
EOSINOPHIL NFR BLD AUTO: 0 % (ref 0.3–6.2)
ERYTHROCYTE [DISTWIDTH] IN BLOOD BY AUTOMATED COUNT: 15.3 % (ref 12.3–15.4)
FHHB: 6.8 % (ref 0–5)
GAS FLOW AIRWAY: ABNORMAL L/MIN
GLOBULIN UR ELPH-MCNC: 2.7 GM/DL
GLUCOSE BLDA-MCNC: 110 MG/DL (ref 70–99)
GLUCOSE BLDC GLUCOMTR-MCNC: 101 MG/DL (ref 70–99)
GLUCOSE BLDC GLUCOMTR-MCNC: 90 MG/DL (ref 70–99)
GLUCOSE SERPL-MCNC: 109 MG/DL (ref 65–99)
GLUCOSE SERPL-MCNC: 113 MG/DL (ref 65–99)
HBV SURFACE AB SER RIA-ACNC: REACTIVE
HBV SURFACE AG SERPL QL IA: NORMAL
HCO3 BLDA-SCNC: 23.6 MMOL/L (ref 22–26)
HCT VFR BLD AUTO: 23 % (ref 37.5–51)
HGB BLD-MCNC: 7.9 G/DL (ref 13–17.7)
HGB BLDA-MCNC: 8.6 G/DL (ref 13.8–16.4)
IMM GRANULOCYTES # BLD AUTO: 0.37 10*3/MM3 (ref 0–0.05)
IMM GRANULOCYTES NFR BLD AUTO: 1.5 % (ref 0–0.5)
INHALED O2 CONCENTRATION: 35 %
INR PPP: 1.03 (ref 0.86–1.15)
LAB AP CASE REPORT: NORMAL
LAB AP CASE REPORT: NORMAL
LAB AP CLINICAL INFORMATION: NORMAL
LAB AP CLINICAL INFORMATION: NORMAL
LACTATE BLDA-SCNC: 1.24 MMOL/L (ref 0.5–2)
LYMPHOCYTES # BLD AUTO: 1.12 10*3/MM3 (ref 0.7–3.1)
LYMPHOCYTES NFR BLD AUTO: 4.6 % (ref 19.6–45.3)
MAGNESIUM SERPL-MCNC: 2.1 MG/DL (ref 1.6–2.4)
MAGNESIUM SERPL-MCNC: 2.2 MG/DL (ref 1.6–2.4)
MCH RBC QN AUTO: 29.8 PG (ref 26.6–33)
MCHC RBC AUTO-ENTMCNC: 34.3 G/DL (ref 31.5–35.7)
MCV RBC AUTO: 86.8 FL (ref 79–97)
METHGB BLD QL: 0.2 % (ref 0–1.5)
MODALITY: ABNORMAL
MONOCYTES # BLD AUTO: 0.85 10*3/MM3 (ref 0.1–0.9)
MONOCYTES NFR BLD AUTO: 3.5 % (ref 5–12)
NEUTROPHILS NFR BLD AUTO: 21.92 10*3/MM3 (ref 1.7–7)
NEUTROPHILS NFR BLD AUTO: 89.8 % (ref 42.7–76)
NOTE: ABNORMAL
NRBC BLD AUTO-RTO: 0.2 /100 WBC (ref 0–0.2)
OXYHGB MFR BLDV: 92.7 % (ref 94–99)
PATH REPORT.FINAL DX SPEC: NORMAL
PATH REPORT.FINAL DX SPEC: NORMAL
PATH REPORT.GROSS SPEC: NORMAL
PATH REPORT.GROSS SPEC: NORMAL
PCO2 BLDA: 33.5 MM HG (ref 35–45)
PH BLDA: 7.47 PH UNITS (ref 7.35–7.45)
PHOSPHATE SERPL-MCNC: 2.4 MG/DL (ref 2.5–4.5)
PHOSPHATE SERPL-MCNC: 3.7 MG/DL (ref 2.5–4.5)
PLATELET # BLD AUTO: 76 10*3/MM3 (ref 140–450)
PMV BLD AUTO: 11.8 FL (ref 6–12)
PO2 BLD: 204 MM[HG] (ref 0–500)
PO2 BLDA: 71.3 MM HG (ref 80–100)
POTASSIUM BLDA-SCNC: 3.94 MMOL/L (ref 3.5–5)
POTASSIUM SERPL-SCNC: 3.9 MMOL/L (ref 3.5–5.2)
POTASSIUM SERPL-SCNC: 4.3 MMOL/L (ref 3.5–5.2)
PROT SERPL-MCNC: 5 G/DL (ref 6–8.5)
PROTHROMBIN TIME: 13.7 SECONDS (ref 11.8–14.9)
RBC # BLD AUTO: 2.65 10*6/MM3 (ref 4.14–5.8)
RBC MORPH BLD: NORMAL
RH BLD: NEGATIVE
SAO2 % BLDCOA: 93.2 % (ref 95–99)
SMALL PLATELETS BLD QL SMEAR: NORMAL
SODIUM BLDA-SCNC: 135.2 MMOL/L (ref 136–146)
SODIUM SERPL-SCNC: 136 MMOL/L (ref 136–145)
SODIUM SERPL-SCNC: 139 MMOL/L (ref 136–145)
T&S EXPIRATION DATE: NORMAL
WBC MORPH BLD: NORMAL
WBC NRBC COR # BLD AUTO: 24.41 10*3/MM3 (ref 3.4–10.8)

## 2023-12-28 PROCEDURE — 85610 PROTHROMBIN TIME: CPT | Performed by: SURGERY

## 2023-12-28 PROCEDURE — 94799 UNLISTED PULMONARY SVC/PX: CPT

## 2023-12-28 PROCEDURE — 85025 COMPLETE CBC W/AUTO DIFF WBC: CPT | Performed by: INTERNAL MEDICINE

## 2023-12-28 PROCEDURE — 83050 HGB METHEMOGLOBIN QUAN: CPT | Performed by: PHYSICIAN ASSISTANT

## 2023-12-28 PROCEDURE — 83735 ASSAY OF MAGNESIUM: CPT | Performed by: INTERNAL MEDICINE

## 2023-12-28 PROCEDURE — 94003 VENT MGMT INPAT SUBQ DAY: CPT

## 2023-12-28 PROCEDURE — 80053 COMPREHEN METABOLIC PANEL: CPT | Performed by: INTERNAL MEDICINE

## 2023-12-28 PROCEDURE — 25010000002 HEPARIN (PORCINE) PER 1000 UNITS: Performed by: INTERNAL MEDICINE

## 2023-12-28 PROCEDURE — 82948 REAGENT STRIP/BLOOD GLUCOSE: CPT

## 2023-12-28 PROCEDURE — 93970 EXTREMITY STUDY: CPT

## 2023-12-28 PROCEDURE — 99291 CRITICAL CARE FIRST HOUR: CPT | Performed by: INTERNAL MEDICINE

## 2023-12-28 PROCEDURE — 99024 POSTOP FOLLOW-UP VISIT: CPT | Performed by: SURGERY

## 2023-12-28 PROCEDURE — 82805 BLOOD GASES W/O2 SATURATION: CPT | Performed by: PHYSICIAN ASSISTANT

## 2023-12-28 PROCEDURE — 25010000002 AMPICILLIN-SULBACTAM PER 1.5 G: Performed by: INTERNAL MEDICINE

## 2023-12-28 PROCEDURE — 25010000002 HEPARIN (PORCINE) 25000-0.45 UT/250ML-% SOLUTION: Performed by: SURGERY

## 2023-12-28 PROCEDURE — 83735 ASSAY OF MAGNESIUM: CPT | Performed by: PHYSICIAN ASSISTANT

## 2023-12-28 PROCEDURE — 93971 EXTREMITY STUDY: CPT | Performed by: SURGERY

## 2023-12-28 PROCEDURE — 85007 BL SMEAR W/DIFF WBC COUNT: CPT | Performed by: INTERNAL MEDICINE

## 2023-12-28 PROCEDURE — 25010000002 CEFTRIAXONE PER 250 MG: Performed by: SURGERY

## 2023-12-28 PROCEDURE — 25010000002 MORPHINE PER 10 MG: Performed by: SURGERY

## 2023-12-28 PROCEDURE — 85730 THROMBOPLASTIN TIME PARTIAL: CPT | Performed by: SURGERY

## 2023-12-28 PROCEDURE — 93971 EXTREMITY STUDY: CPT

## 2023-12-28 PROCEDURE — 84100 ASSAY OF PHOSPHORUS: CPT | Performed by: INTERNAL MEDICINE

## 2023-12-28 PROCEDURE — 25010000002 FENTANYL CITRATE (PF) 50 MCG/ML SOLUTION: Performed by: SURGERY

## 2023-12-28 PROCEDURE — 82375 ASSAY CARBOXYHB QUANT: CPT | Performed by: PHYSICIAN ASSISTANT

## 2023-12-28 RX ORDER — HEPARIN SODIUM 10000 [USP'U]/100ML
18 INJECTION, SOLUTION INTRAVENOUS
Status: DISCONTINUED | OUTPATIENT
Start: 2023-12-28 | End: 2024-01-02

## 2023-12-28 RX ORDER — HEPARIN SODIUM 1000 [USP'U]/ML
3000 INJECTION, SOLUTION INTRAVENOUS; SUBCUTANEOUS AS NEEDED
Status: DISCONTINUED | OUTPATIENT
Start: 2023-12-28 | End: 2024-01-12 | Stop reason: HOSPADM

## 2023-12-28 RX ADMIN — Medication 10 ML: at 08:06

## 2023-12-28 RX ADMIN — FAMOTIDINE 20 MG: 10 INJECTION INTRAVENOUS at 08:06

## 2023-12-28 RX ADMIN — DEXMEDETOMIDINE HYDROCHLORIDE 0.3 MCG/KG/HR: 4 INJECTION, SOLUTION INTRAVENOUS at 03:18

## 2023-12-28 RX ADMIN — MORPHINE SULFATE 2 MG: 2 INJECTION, SOLUTION INTRAMUSCULAR; INTRAVENOUS at 22:45

## 2023-12-28 RX ADMIN — SODIUM CHLORIDE 3 G: 900 INJECTION INTRAVENOUS at 17:01

## 2023-12-28 RX ADMIN — FENTANYL CITRATE 25 MCG: 50 INJECTION, SOLUTION INTRAMUSCULAR; INTRAVENOUS at 16:39

## 2023-12-28 RX ADMIN — DOCUSATE SODIUM 50MG AND SENNOSIDES 8.6MG 2 TABLET: 8.6; 5 TABLET, FILM COATED ORAL at 08:06

## 2023-12-28 RX ADMIN — FAMOTIDINE 20 MG: 10 INJECTION INTRAVENOUS at 20:25

## 2023-12-28 RX ADMIN — CALCIUM CARBONATE 2 TABLET: 500 TABLET, CHEWABLE ORAL at 16:38

## 2023-12-28 RX ADMIN — FENTANYL CITRATE 25 MCG: 50 INJECTION, SOLUTION INTRAMUSCULAR; INTRAVENOUS at 19:11

## 2023-12-28 RX ADMIN — CEFTRIAXONE SODIUM 2000 MG: 2 INJECTION, POWDER, FOR SOLUTION INTRAMUSCULAR; INTRAVENOUS at 09:55

## 2023-12-28 RX ADMIN — BUMETANIDE 4 MG: 0.25 INJECTION INTRAMUSCULAR; INTRAVENOUS at 02:22

## 2023-12-28 RX ADMIN — DOCUSATE SODIUM 50MG AND SENNOSIDES 8.6MG 2 TABLET: 8.6; 5 TABLET, FILM COATED ORAL at 20:25

## 2023-12-28 RX ADMIN — BUMETANIDE 4 MG: 0.25 INJECTION INTRAMUSCULAR; INTRAVENOUS at 08:05

## 2023-12-28 RX ADMIN — HEPARIN SODIUM 3000 UNITS: 1000 INJECTION INTRAVENOUS; SUBCUTANEOUS at 15:32

## 2023-12-28 RX ADMIN — BUMETANIDE 4 MG: 0.25 INJECTION INTRAMUSCULAR; INTRAVENOUS at 16:38

## 2023-12-28 RX ADMIN — HEPARIN SODIUM 18 UNITS/KG/HR: 10000 INJECTION, SOLUTION INTRAVENOUS at 23:20

## 2023-12-28 NOTE — CONSULTS
"Nutrition Services    Patient Name: Danny Svaage  YOB: 1960  MRN: [id number]  Admission date: 12/23/2023      CLINICAL NUTRITION ASSESSMENT      Reason for Assessment  Identified at Risk by Screening Criteria      H&P:  Past Medical History:   Diagnosis Date    Allergy     Aortic aneurysm     4.7 just found has not f/u    H/O Kidney stone     Hiatal hernia     Kidney mass     left    Renal cancer         Current Problems:   Active Hospital Problems    Diagnosis     **Left flank pain     Ruptured infrarenal abdominal aortic aneurysm (AAA)         Nutrition/Diet History         Narrative   Patient admitted with left flank pain, ruptured AAA.  S/P open AAA repair.  Patient went back to OR 12/26/2023. Now S/P left open colectomy with ostomy creation.  Plan is to extubate today. Physician notes indicate pt is +11L fluid balance today with orders for hemodialysis.    Pt reviewed and visited during Critical Care Rounds.  Pt extubated and educational interventions inappropriate at this time.  Feeding infusing matches feeding ordered, (Trickle feeing Peptamen AF @ 10 ml/hr).  Pt started on regular diet, regular texture, thin liquids.   RD consulted with surgeon related to diet and ng tube.  Surgeon suggested to start pt with clear liquids and advance as tolerated.  Agreed to keep NG tube in place, start with clear liquids and add appropriate calorie/protein oral supplements and advance diet and supplements as tolerated.  Pt receiving clear liquid diet and Boost Breeze with meals.      Anthropometrics        Current Height, Weight Height: 177.8 cm (70\")  Weight: 80.5 kg (177 lb 7.5 oz)   Current BMI Body mass index is 25.46 kg/m².   BMI Classification Overweight   % %   Adjusted Body Weight (ABW)    Weight Hx  Wt Readings from Last 30 Encounters:   12/24/23 0027 80.5 kg (177 lb 7.5 oz)   12/23/23 1948 79.9 kg (176 lb 2.4 oz)   12/22/23 0706 78.8 kg (173 lb 11.6 oz)   12/06/23 0958 79.9 kg (176 lb " "3.2 oz)   11/22/23 1338 78.2 kg (172 lb 6.4 oz)   11/13/23 0628 78.2 kg (172 lb 6.4 oz)   10/30/23 1440 79.9 kg (176 lb 2.4 oz)   08/17/23 1952 78.8 kg (173 lb 11.6 oz)   08/17/23 1537 80.3 kg (177 lb 0.5 oz)   08/15/23 0554 81.2 kg (179 lb 0.2 oz)          Wt Change Observation Stable x 4 months      Estimated/Assessed Needs  Estimated Needs based on: Critical Care Guidelines       Energy Requirements 12-25 kcal/kg   EST Needs (kcal/day) 966-2013 kcal        Protein Requirements 1.2-2.0 g/kg   EST Daily Needs (g/day)  g       Fluid Requirements 25 ml/kg    Estimated Needs (mL/day) 2013     Labs/Medications         Pertinent Labs Reviewed.   Results from last 7 days   Lab Units 12/28/23  0911 12/28/23  0400 12/27/23  1017 12/27/23  0644 12/27/23  0551 12/26/23  1507   SODIUM mmol/L  --  139  --   --  142 141   SODIUM, ARTERIAL mmol/L 135.2*  --  138.7   < >  --   --    POTASSIUM mmol/L  --  4.3  --   --  4.5 4.0   CHLORIDE mmol/L  --  101  --   --  103 104   CO2 mmol/L  --  22.3  --   --  24.3 25.2   BUN mg/dL  --  34*  --   --  48* 37*   CREATININE mg/dL  --  3.27*  --   --  4.60* 3.44*   CALCIUM mg/dL  --  7.3*  --   --  7.2* 7.2*   BILIRUBIN mg/dL  --  2.0*  --   --  1.6* 1.2   ALK PHOS U/L  --  86  --   --  68 57   ALT (SGPT) U/L  --  131*  --   --  233* 281*   AST (SGOT) U/L  --  212*  --   --  360* 507*   GLUCOSE mg/dL  --  113*  --   --  99 100*   GLUCOSE, ARTERIAL mg/dL 110*  --  99   < >  --   --     < > = values in this interval not displayed.     Results from last 7 days   Lab Units 12/28/23  0400 12/27/23  0551 12/26/23  1507   MAGNESIUM mg/dL 2.2 2.4 2.4   PHOSPHORUS mg/dL 3.7 5.5*  --    HEMOGLOBIN g/dL 7.9* 8.3* 8.6*   HEMATOCRIT % 23.0* 24.3* 24.8*     COVID19   Date Value Ref Range Status   12/23/2023 Not Detected Not Detected - Ref. Range Final     No results found for: \"HGBA1C\"      Pertinent Medications Reviewed.     Malnutrition Severity Assessment                Nutrition Diagnosis      "    Nutrition Dx Problem 1 Inadequate energy Intake related to Inability to consume sufficient energy as evidenced by NPO.       Nutrition Intervention           Current Nutrition Orders & Evaluation of Intake       Current PO Diet Diet: Liquid Diets; Clear Liquid; Texture: Regular Texture (IDDSI 7); Fluid Consistency: Thin (IDDSI 0)   Supplement Orders Placed This Encounter      Dietary Nutrition Supplements Boost Breeze (Ensure Clear)           Nutrition Intervention/Prescription:  Provides 750 kcals, 27 grams of protein               Medical Nutrition Therapy/Nutrition Education          Learner     Readiness Patient  Education not indicated at this time     Method     Response N/A  N/A     Monitor/Evaluation        Monitor Per protocol, I&O, Pertinent labs, Weight, Skin status, GI status, POC/GOC, Diet advancement       Nutrition Discharge Plan         To be determined       Electronically signed by:  Kandis Salinas RD  12/28/23 15:52 EST

## 2023-12-28 NOTE — PLAN OF CARE
Goal Outcome Evaluation:   Patient currently on vc/ac settings of 26r, 450vt, +5p and 40% @ this time. Patient did tolerate weaning for a while this am. Switched him over around 0500am on 12/26 and patient made it until the end of physician rounding and then his CO2 was elevating and he was starting to get tired. Patient switched back to rest through the night and patient was started on dialysis this evening. Possibly try and switch back to PS in the am if patient still able to follow commands. No issues or changes at this time.

## 2023-12-28 NOTE — PLAN OF CARE
Problem: Adult Inpatient Plan of Care  Goal: Plan of Care Review  Outcome: Ongoing, Progressing     Problem: Adult Inpatient Plan of Care  Goal: Patient-Specific Goal (Individualized)  Outcome: Ongoing, Progressing   Goal Outcome Evaluation:

## 2023-12-28 NOTE — PROGRESS NOTES
[facility]     Progress Note    Patient Name: Danny Savage  : 1960  MRN: [id number]  Primary Care Physician:  Kodi Pichardo MD  Date of admission: 2023    Subjective   Subjective     POD #4 status post open repair of ruptured abdominal aorta aneurysm, POD #2 status post abdominal reexploration, sigmoidectomy with end colostomy, closure of abdominal wound    Extubated earlier this morning.  Doing okay, no complaints.    Started dialysis earlier today.    Objective   Objective     Vitals:   Temp:  [96.8 °F (36 °C)-98.2 °F (36.8 °C)] 98.2 °F (36.8 °C)  Heart Rate:  [] 95  Resp:  [15-26] 20  BP: (103-158)/(60-87) 158/83  Flow (L/min):  [4] 4  FiO2 (%):  [35 %-45 %] 35 %    Physical Exam   General: Awake, alert   Abdomen: Benign.  Wound VAC in place.  Stoma appears satisfactory.  Extremities: Symmetric, moderate edema.    Hgb: 7.9  WBC: 24.41  Creatinine: 3.27    Assessment & Plan   Assessment / Plan     Assessment/Plan:    Patient remains critically ill in ICU.  Improving, extubated.  Continue current care.    Active Hospital Problems:  Active Hospital Problems    Diagnosis     **Left flank pain     Ruptured infrarenal abdominal aortic aneurysm (AAA)            Electronically signed by Swapnil Velazco MD, 23, 10:25 AM EST.

## 2023-12-28 NOTE — PLAN OF CARE
Goal Outcome Evaluation:  Plan of Care Reviewed With: patient        Progress: improving  Outcome Evaluation: Pt was extubated around 9:30 this morning. Pt placed on 4L nasal cannula and is resting comfortably at this time. Will continue to monitor throughout shift & titrate O2 as pt tolerates.

## 2023-12-28 NOTE — PROGRESS NOTES
[facility]     Progress Note    Patient Name: Danny Savage  : 1960  MRN: [id number]  Primary Care Physician:  Kodi Pichardo MD  Date of admission: 2023    Subjective   Subjective     Chief Complaint: No complaints.    Back Pain      Patient Reports Pt. Is extubated and is following verbal commands.He has had dialysis.    Review of Systems   Musculoskeletal:  Positive for back pain.       Objective   Objective     Vitals:   Temp:  [95.7 °F (35.4 °C)-97.5 °F (36.4 °C)] 97.5 °F (36.4 °C)  Heart Rate:  [73-98] 97  Resp:  [25-26] 26  BP: ()/(53-86) 111/71  FiO2 (%):  [35 %-45 %] 35 %    Physical Exam   AF vss.    He is awake alert.  Result Review    Result Review:  I have personally reviewed the results from the time of this admission to 2023 09:33 EST and agree with these findings:  [x]  Laboratory list / accordion  []  Microbiology  []  Radiology  []  EKG/Telemetry   []  Cardiology/Vascular   []  Pathology  []  Old records  []  Other:  Most notable findings include: WBC 24 AND creatinine is 24.4.      Assessment & Plan   Assessment / Plan     Brief Patient Summary:  Danny Savage is a 63 y.o. male who PATIENT had left partial nephrectomy.  He was admitted for severe pain in left flank and abdomen.  He subsequently ruptured his AAA and had to have emergency surgery to repair the rupture and also had partial colectomy.  He is extubated today and is doing well. His CT shows a fluid collection left perinephric region with some air bubles inside the collection.    Active Hospital Problems:  Active Hospital Problems    Diagnosis    • **Left flank pain    • Ruptured infrarenal abdominal aortic aneurysm (AAA)      Plan:   He had collection of fluid at t he site of left partial nephrectomy with some gas bubbles but it did get smaller from 4.5 cm on  and 3 cm on .  At this point he just got extubated and is on hemodialysis.  We will monitor his clinical  condition for now.    DVT prophylaxis:  Mechanical DVT prophylaxis orders are present.    CODE STATUS:    Level Of Support Discussed With: Patient  Code Status (Patient has no pulse and is not breathing): CPR (Attempt to Resuscitate)  Medical Interventions (Patient has pulse or is breathing): Full Support    Disposition:  I expect patient to be discharged unsure.    Iliana Ozuna MD

## 2023-12-28 NOTE — NURSING NOTE
HD completed.  3L removed.  BP tolerated well.  HD IJ works well.  Dressing CDI, last changed 12/27

## 2023-12-28 NOTE — SIGNIFICANT NOTE
Wound Eval / Progress Noted     [facility]     Patient Name: Danny Savage  : 1960  MRN: [id number]  Today's Date: 2023                 Admit Date: 2023    Visit Dx:    ICD-10-CM ICD-9-CM   1. Sepsis, due to unspecified organism, unspecified whether acute organ dysfunction present  A41.9 038.9     995.91   2. Fever, unspecified fever cause  R50.9 780.60   3. Intra-abdominal fluid collection  R18.8 789.59   4. Abdominal aortic aneurysm (AAA) without rupture, unspecified part  I71.40 441.4   5. Ruptured infrarenal abdominal aortic aneurysm (AAA)  I71.33 441.3         Left flank pain    Ruptured infrarenal abdominal aortic aneurysm (AAA)        Past Medical History:   Diagnosis Date    Allergy     Aortic aneurysm     4.7 just found has not f/u    H/O Kidney stone     Hiatal hernia     Kidney mass     left    Renal cancer         Past Surgical History:   Procedure Laterality Date    ABDOMINAL AORTIC ANEURYSM REPAIR N/A 2023    Procedure: ABDOMINAL AORTIC ANEURYSM REPAIR;  Surgeon: Koffi Agosto MD;  Location: [facility] MAIN OR;  Service: Vascular;  Laterality: N/A;    COLON RESECTION N/A 2023    Procedure: COLON RESECTION;  Surgeon: Hernan Mallory MD;  Location: [facility] MAIN OR;  Service: General;  Laterality: N/A;  EXPLORATORY LAPAROTOMY, REPAIR OF SMALL BOWEL MESENTERY, APPENDECTOMY, LEFT HEMICOLECTOMY, CREATION OF OSTOMY, ABDOMINAL CLOSURE    EXPLORATORY LAPAROTOMY N/A 2023    Procedure: LAPAROTOMY EXPLORATORY;  Surgeon: Koffi Agosto MD;  Location: [facility] MAIN OR;  Service: Vascular;  Laterality: N/A;  Exploratory Laparotomy    KIDNEY STONE SURGERY      NEPHRECTOMY PARTIAL Left 2023    Procedure: NEPHRECTOMY PARTIAL LAPAROSCOPIC WITH DAVINCI ROBOT, left;  Surgeon: Michelle Cai MD;  Location: [facility] MAIN OR;  Service: Robotics - DaVinci;  Laterality: Left;    URETEROSCOPY LASER LITHOTRIPSY WITH STENT INSERTION Left 2023    Procedure: CYSTOSCOPY URETEROSCOPY  RETROGRADE PYELOGRAM STENT INSERTION, left;  Surgeon: Michelle Cai MD;  Location: [facility];  Service: Urology;  Laterality: Left;     Wound Check / Follow-up: Patient seen today for ostomy follow-up and education. Patient's wife is present at bedside. Patient remains in CICU, intubated on a mechanical ventilator. He is awake and alert, interacting with staff. Plan is to extubate him this morning. Ostomy pouching system is intact with no stool noted at this time. Small amount of serosanguineous drainage noted in pouching system. Hole cut in abdominal binder to allow for access of pouching system. Prevena incisional wound VAC remains intact to midline abdominal incision. Foam is well compressed. No signs of lifting or leaking noted. No drainage noted in wound VAC canister. Plan for pouching system change tomorrow with patient and patient's wife. Patient's wife states she has been reviewing educational materials left at bedside. Will follow-up tomorrow.       Impression: New colostomy. Surgical incision to midline abdomen with Prevena incisional wound VAC in place.      Short term goals: Regain skin integrity, colostomy management, ostomy education, negative pressure wound therapy.        Enedina Norman RN    12/28/2023    15:24 EST

## 2023-12-28 NOTE — PROGRESS NOTES
" LOS: 5 days   Patient Care Team:  Kodi Pichardo MD as PCP - General (Internal Medicine)  Lisset Harrington APRN as Nurse Practitioner (Urology)    Chief Complaint: ROLAND    Subjective     History of Present Illness  Pt awake on vent.  Had finished HD earlier toay.  2.5L UF  Uop decreased significantly now.    Subjective  Unable to obtain    History taken from: chart family    Objective     Vital Sign Min/Max for last 24 hours  Temp  Min: 95.7 °F (35.4 °C)  Max: 97.8 °F (36.6 °C)   BP  Min: 98/53  Max: 160/89   Pulse  Min: 73  Max: 98   Resp  Min: 25  Max: 26   SpO2  Min: 89 %  Max: 100 %   No data recorded   No data recorded     Flowsheet Rows      Flowsheet Row First Filed Value   Admission Height 177.8 cm (70\") Documented at 12/23/2023 1948   Admission Weight 79.9 kg (176 lb 2.4 oz) Documented at 12/23/2023 1948            No intake/output data recorded.  I/O last 3 completed shifts:  In: 632 [I.V.:580; Other:20; NG/GT:32]  Out: 3050 [Urine:215; Emesis/NG output:325; Stool:10]    Objective:  General Appearance:  Comfortable.    Vital signs: (most recent): Blood pressure 111/71, pulse 97, temperature 97.5 °F (36.4 °C), temperature source Bladder, resp. rate 26, height 177.8 cm (70\"), weight 80.5 kg (177 lb 7.5 oz), SpO2 96%.  Vital signs are normal.    Output: Minimal urine output.    HEENT: Normal HEENT exam.    Lungs:  Normal effort and normal respiratory rate.    Heart: Normal rate.  Regular rhythm.    Abdomen: Abdomen is soft.  Bowel sounds are normal.     Extremities: There is dependent edema.    Pulses: Distal pulses are intact.    Neurological: Patient is alert.    Pupils:  Pupils are equal, round, and reactive to light.    Skin:  Warm and dry.              Results Review:     I reviewed the patient's new clinical results.    WBC WBC   Date Value Ref Range Status   12/28/2023 24.41 (H) 3.40 - 10.80 10*3/mm3 Final   12/27/2023 17.50 (H) 3.40 - 10.80 10*3/mm3 Final   12/26/2023 12.18 (H) 3.40 - " "10.80 10*3/mm3 Final   12/26/2023 12.78 (H) 3.40 - 10.80 10*3/mm3 Final   12/25/2023 11.23 (H) 3.40 - 10.80 10*3/mm3 Final   12/25/2023 10.77 3.40 - 10.80 10*3/mm3 Final   12/25/2023 7.18 3.40 - 10.80 10*3/mm3 Final      HGB Hemoglobin   Date Value Ref Range Status   12/28/2023 7.9 (L) 13.0 - 17.7 g/dL Final   12/27/2023 8.3 (L) 13.0 - 17.7 g/dL Final   12/26/2023 8.6 (L) 13.0 - 17.7 g/dL Final   12/26/2023 8.5 (L) 13.0 - 17.7 g/dL Final   12/25/2023 6.9 (C) 13.0 - 17.7 g/dL Final   12/25/2023 6.6 (C) 13.0 - 17.7 g/dL Final   12/25/2023 5.7 (C) 13.0 - 17.7 g/dL Final      HCT Hematocrit   Date Value Ref Range Status   12/28/2023 23.0 (L) 37.5 - 51.0 % Final   12/27/2023 24.3 (L) 37.5 - 51.0 % Final   12/26/2023 24.8 (L) 37.5 - 51.0 % Final   12/26/2023 24.2 (L) 37.5 - 51.0 % Final   12/25/2023 19.3 (C) 37.5 - 51.0 % Final   12/25/2023 18.7 (C) 37.5 - 51.0 % Final   12/25/2023 16.7 (C) 37.5 - 51.0 % Final      Platlets No results found for: \"LABPLAT\"   MCV MCV   Date Value Ref Range Status   12/28/2023 86.8 79.0 - 97.0 fL Final   12/27/2023 87.4 79.0 - 97.0 fL Final   12/26/2023 86.1 79.0 - 97.0 fL Final   12/26/2023 85.5 79.0 - 97.0 fL Final   12/25/2023 83.9 79.0 - 97.0 fL Final   12/25/2023 83.5 79.0 - 97.0 fL Final   12/25/2023 84.3 79.0 - 97.0 fL Final          Sodium Sodium   Date Value Ref Range Status   12/28/2023 139 136 - 145 mmol/L Final   12/27/2023 142 136 - 145 mmol/L Final   12/26/2023 141 136 - 145 mmol/L Final   12/26/2023 141 136 - 145 mmol/L Final   12/25/2023 141 136 - 145 mmol/L Final   12/25/2023 142 136 - 145 mmol/L Final   12/25/2023 140 136 - 145 mmol/L Final     Sodium, Arterial   Date Value Ref Range Status   12/27/2023 138.7 136 - 146 mmol/L Final   12/27/2023 138.6 136 - 146 mmol/L Final   12/26/2023 138.3 136 - 146 mmol/L Final   12/26/2023 138.3 136 - 146 mmol/L Final   12/25/2023 135.5 (L) 136 - 146 mmol/L Final   12/25/2023 135.1 (L) 136 - 146 mmol/L Final      Potassium Potassium "   Date Value Ref Range Status   12/28/2023 4.3 3.5 - 5.2 mmol/L Final   12/27/2023 4.5 3.5 - 5.2 mmol/L Final   12/26/2023 4.0 3.5 - 5.2 mmol/L Final   12/26/2023 3.9 3.5 - 5.2 mmol/L Final   12/25/2023 3.9 3.5 - 5.2 mmol/L Final   12/25/2023 4.1 3.5 - 5.2 mmol/L Final   12/25/2023 3.7 3.5 - 5.2 mmol/L Final      Chloride Chloride   Date Value Ref Range Status   12/28/2023 101 98 - 107 mmol/L Final   12/27/2023 103 98 - 107 mmol/L Final   12/26/2023 104 98 - 107 mmol/L Final   12/26/2023 104 98 - 107 mmol/L Final   12/25/2023 102 98 - 107 mmol/L Final   12/25/2023 102 98 - 107 mmol/L Final   12/25/2023 100 98 - 107 mmol/L Final      CO2 CO2   Date Value Ref Range Status   12/28/2023 22.3 22.0 - 29.0 mmol/L Final   12/27/2023 24.3 22.0 - 29.0 mmol/L Final   12/26/2023 25.2 22.0 - 29.0 mmol/L Final   12/26/2023 27.5 22.0 - 29.0 mmol/L Final   12/25/2023 27.8 22.0 - 29.0 mmol/L Final   12/25/2023 26.4 22.0 - 29.0 mmol/L Final   12/25/2023 25.9 22.0 - 29.0 mmol/L Final      BUN BUN   Date Value Ref Range Status   12/28/2023 34 (H) 8 - 23 mg/dL Final   12/27/2023 48 (H) 8 - 23 mg/dL Final   12/26/2023 37 (H) 8 - 23 mg/dL Final   12/26/2023 29 (H) 8 - 23 mg/dL Final   12/25/2023 25 (H) 8 - 23 mg/dL Final   12/25/2023 19 8 - 23 mg/dL Final   12/25/2023 17 8 - 23 mg/dL Final      Creatinine Creatinine   Date Value Ref Range Status   12/28/2023 3.27 (H) 0.76 - 1.27 mg/dL Final   12/27/2023 4.60 (H) 0.76 - 1.27 mg/dL Final   12/26/2023 3.44 (H) 0.76 - 1.27 mg/dL Final   12/26/2023 3.34 (H) 0.76 - 1.27 mg/dL Final   12/25/2023 2.62 (H) 0.76 - 1.27 mg/dL Final   12/25/2023 2.47 (H) 0.76 - 1.27 mg/dL Final   12/25/2023 1.89 (H) 0.76 - 1.27 mg/dL Final      Calcium Calcium   Date Value Ref Range Status   12/28/2023 7.3 (L) 8.6 - 10.5 mg/dL Final   12/27/2023 7.2 (L) 8.6 - 10.5 mg/dL Final   12/26/2023 7.2 (L) 8.6 - 10.5 mg/dL Final   12/26/2023 7.1 (L) 8.6 - 10.5 mg/dL Final   12/25/2023 7.6 (L) 8.6 - 10.5 mg/dL Final  "  12/25/2023 7.9 (L) 8.6 - 10.5 mg/dL Final   12/25/2023 7.9 (L) 8.6 - 10.5 mg/dL Final      PO4 No results found for: \"CAPO4\"   Albumin Albumin   Date Value Ref Range Status   12/28/2023 2.3 (L) 3.5 - 5.2 g/dL Final   12/27/2023 2.2 (L) 3.5 - 5.2 g/dL Final   12/26/2023 2.2 (L) 3.5 - 5.2 g/dL Final   12/26/2023 2.2 (L) 3.5 - 5.2 g/dL Final   12/25/2023 2.5 (L) 3.5 - 5.2 g/dL Final   12/25/2023 2.4 (L) 3.5 - 5.2 g/dL Final   12/25/2023 2.4 (L) 3.5 - 5.2 g/dL Final      Magnesium Magnesium   Date Value Ref Range Status   12/28/2023 2.2 1.6 - 2.4 mg/dL Final   12/27/2023 2.4 1.6 - 2.4 mg/dL Final   12/26/2023 2.4 1.6 - 2.4 mg/dL Final   12/26/2023 2.4 1.6 - 2.4 mg/dL Final   12/25/2023 2.3 1.6 - 2.4 mg/dL Final   12/25/2023 2.5 (H) 1.6 - 2.4 mg/dL Final   12/25/2023 2.2 1.6 - 2.4 mg/dL Final      Uric Acid No results found for: \"URICACID\"     Medication Review:   bumetanide, 4 mg, Intravenous, Q8H  cefTRIAXone, 2,000 mg, Intravenous, Q24H  famotidine, 20 mg, Intravenous, Q12H  senna-docusate sodium, 2 tablet, Nasogastric, BID  sodium chloride, 10 mL, Intravenous, Q12H          Assessment & Plan       Left flank pain    Ruptured infrarenal abdominal aortic aneurysm (AAA)      Assessment & Plan  ROLAND-  pt with previous partial left nephrectomy and baseline creatinine closer to 0.9-1.0.  Had noted hypotension with AAA rupture s/p repair.  Oliguric at this time on IV bumex.  Likely ATN from hypotension vs. Atheroembolic injury. Also received IV contrast with CTA. D/w wife at bedside.  Continue IV bumex 4mg IV q8hrs.  D/w pulm and dialysis yesterday for volume, will plan again today.  Bp marginal today.  If hypotension becomes issue may need to transition to crrt but appears stable for HD at this time.  AAA rupture-  s/p open repair infrarenal aorta.  Ischemic bowel- s/p left colectomy  Resp Failure-  intubated at this time.  Shock- sepsis, hemorrhagic component with AAA rupture.    Sepsis-  blood cx with strep pyogenes.  " On vanc/cefepime.  Anemia-  prbc's prn.    Adin Hinton MD  12/28/23  07:57 EST

## 2023-12-28 NOTE — PROGRESS NOTES
CENTRAL LINE PLACEMENT NOTE  Indication: Hemodialysis  (Catheter exchange with current central line)  Consent obtained: yes  Time out: performed w/ RN at bedside.     Procedure:   The patient was placed in the supine position and the skin over the patient's right internal jugular vein was prepped with chlorhexidine. The patient was draped with full body drape, sterile procedure was used.  The skin was infiltrated with local anesthesia over the insertion site with 5 mL of 1% lidocaine under ultrasound guidance.  Large-bore needle was used to cannulate the vessel with return of dark blood.  The guidewire was inserted into the needle using the Seldinger technique and then needle was removed. Ultrasound was used to confirm the wire was in the vein. A small nick was made in the skin & vessel was dilated w/ dilator. A triple-lumen catheter was then placed into the vessel over the wire & wire was removed. All 3 ports freely flush & draw.  The catheter was securely fastened to the skin with suture. Next a sterile dressing was placed & a CXR ordered to confirm positioning of the line.     The patient tolerated the procedure well  Complications: no immediate complications noted  Chest xray obtained post procedure        Electronically signed by ROEL Negro, 12/28/23, 1:57 PM EST.

## 2023-12-28 NOTE — PLAN OF CARE
Goal Outcome Evaluation:     Plan of care reviewed with: Patient and family    Progress: Ongoing, progressing    Outcome: Patient A&Ox4. NSR 80s-100s. Patient extubated at 0930 and currently on 2L NC, no SOA or labored breathing noted. Dialysis completed today 3L removed. Rico remains in place.

## 2023-12-28 NOTE — PROGRESS NOTES
[facility]     Progress Note    Patient Name: Danny Savage  : 1960  MRN: [id number]  Primary Care Physician:  Kodi Pichardo MD  Date of admission: 2023    Subjective   Subjective     Chief Complaint: Status post ruptured AAA repair and left hemicolectomy    HPI:  Patient intubated but alert and following commands, responding to questions      Objective   Objective     Vitals:   Temp:  [95.7 °F (35.4 °C)-97.5 °F (36.4 °C)] 97.5 °F (36.4 °C)  Heart Rate:  [73-98] 97  Resp:  [25-26] 26  BP: ()/(53-86) 111/71  FiO2 (%):  [35 %-45 %] 35 %    Physical Exam  Constitutional:       Appearance: Normal appearance.   Cardiovascular:      Rate and Rhythm: Normal rate.   Pulmonary:      Effort: Pulmonary effort is normal.   Abdominal:      Palpations: Abdomen is soft.     Ostomy pink and viable, still just bowel sweat    Result Review    Result Review:  I have personally reviewed the results from the time of this admission to 2023 09:06 EST and agree with these findings:  [x]  Laboratory  []  Microbiology  []  Radiology  []  EKG/Telemetry   []  Cardiology/Vascular   []  Pathology  []  Old records  []  Other:  Most notable findings include: WBC 24    Assessment & Plan   Assessment / Plan     Brief Patient Summary:  Danny Savage is a 63 y.o. male who status post left hemicolectomy    Active Hospital Problems:  Active Hospital Problems    Diagnosis    • **Left flank pain    • Ruptured infrarenal abdominal aortic aneurysm (AAA)      Plan:  Okay to continue trickle feeds  Awaiting bowel function  No new issues from a general surgical standpoint  Continue current care  Hopefully can extubate today       DVT prophylaxis:  Mechanical DVT prophylaxis orders are present.    CODE STATUS:   Level Of Support Discussed With: Patient  Code Status (Patient has no pulse and is not breathing): CPR (Attempt to Resuscitate)  Medical Interventions (Patient has pulse or is breathing): Full  Mervin  LF:6/2/21 #90  LV:8/19/21   Support    Disposition:  I expect patient to be discharged unsure.    Electronically signed by Hernan Mallory MD, 12/28/23, 9:06 AM EST.

## 2023-12-28 NOTE — PROGRESS NOTES
Pulmonary / Critical Care Progress Note      Patient Name: Danny Savage  : 1960  MRN: [id number]  Attending:  Eric Martin MD   Date of admission: 2023    Subjective   Subjective   Patient critically ill with ruptured AAA s/p open repair, hemorrhagic shock, Streptococcus pyogenes bacteremia    Over the past 24 hours  HD catheter placed.  HD started 2.5L removed  Does wake up and follow commands, off Propofol and Fent since day prior  On Levophed at 0.02; not requiring Vaso  No stool output noted  Urine output has dropped off in the last 24 hours, creatinine up --> 4.6  Received IV Dilaudid and was with decrease in mental status and put back on ACVC    Today  Alert and following commands  Failed SBT early this a.m.  secondary to apnea,  on Precedex.  Precedex stopped  SBT attempted again around 0730.   Patient tolerating    x 24H, + 11 fluid balance.    On Bumex 4mg IV Q8  Family at bedside        Review of Systems  Following commands  Post op pain with movmement noted  Weakness      Objective   Objective     Vitals:   Vital signs for last 24 hours:  Temp:  [96.8 °F (36 °C)-98.2 °F (36.8 °C)] 98.2 °F (36.8 °C)  Heart Rate:  [] 95  Resp:  [15-26] 20  BP: (103-158)/(60-87) 158/83  FiO2 (%):  [35 %-45 %] 35 %    Intake/Output last 3 shifts:  I/O last 3 completed shifts:  In: 632 [I.V.:580; Other:20; NG/GT:32]  Out: 3050 [Urine:215; Emesis/NG output:325; Stool:10]    Vent settings for last 24 hours:  Mode: VC/AC  FiO2 (%):  [35 %-45 %] 35 %  S RR:  [26] 26  S VT:  [500 mL] 500 mL  PEEP/CPAP (cm H2O):  [5 cm H20] 5 cm H20  ND SUP:  [10 cm H20] 10 cm H20  MAP (cm H2O):  [8.1-12] 9.5    Physical Exam   Critically ill on mechanical ventilator  Open abdomen, wound VAC   Does follow commands, no focal deficits    Result Review    I have personally reviewed the results from the time of this admission to 2023 13:27 EST and agree with these findings:  [x]  Laboratory  [x]   Microbiology  [x]  Radiology  []  EKG/Telemetry   []  Cardiology/Vascular   []  Pathology  []  Old records  []  Other:  Most notable findings include:   12/22/2023 blood culture Streptococcus pyogenes group A        Lab 12/28/23  0911 12/28/23  0400 12/27/23  1017 12/27/23  0644 12/27/23  0551 12/26/23  1507 12/26/23  1157 12/26/23  0818 12/26/23  0639 12/25/23  2358 12/25/23  1803 12/25/23  1712 12/25/23  1259 12/25/23  1118 12/25/23  0632 12/25/23  0532   WBC  --  24.41*  --   --  17.50* 12.18*  --  12.78*  --  11.23* 10.77  --  7.18  --   --  10.74   HEMOGLOBIN  --  7.9*  --   --  8.3* 8.6*  --  8.5*  --  6.9* 6.6*  --  5.7*  --   --  11.6*   HEMATOCRIT  --  23.0*  --   --  24.3* 24.8*  --  24.2*  --  19.3* 18.7*  --  16.7*  --   --  33.9*   PLATELETS  --  76*  --   --  70* 72*  --  68*  --  88* 105*  --  41*  --   --  69*   SODIUM  --  139  --   --  142 141  --  141  --  141 142  --   --  140  --  137   SODIUM, ARTERIAL 135.2*  --  138.7 138.6  --   --  138.3  --    < >  --   --    < >  --   --    < >  --    POTASSIUM  --  4.3  --   --  4.5 4.0  --  3.9  --  3.9 4.1  --   --  3.7  --  3.6   CHLORIDE  --  101  --   --  103 104  --  104  --  102 102  --   --  100  --  98   CO2  --  22.3  --   --  24.3 25.2  --  27.5  --  27.8 26.4  --   --  25.9  --  22.7   BUN  --  34*  --   --  48* 37*  --  29*  --  25* 19  --   --  17  --  14   CREATININE  --  3.27*  --   --  4.60* 3.44*  --  3.34*  --  2.62* 2.47*  --   --  1.89*  --  1.54*   GLUCOSE  --  113*  --   --  99 100*  --  152*  --  172* 186*  --   --  276*  --  328*   GLUCOSE, ARTERIAL 110*  --  99 86  --   --  97  --    < >  --   --    < >  --   --    < >  --    CALCIUM  --  7.3*  --   --  7.2* 7.2*  --  7.1*  --  7.6* 7.9*  --   --  7.9*  --  8.1*   PHOSPHORUS  --  3.7  --   --  5.5*  --   --  3.4  --  3.4 3.8  --   --  3.5  --  3.2   TOTAL PROTEIN  --  5.0*  --   --  4.4* 4.0*  --  3.9*  --  4.0* 3.8*  --   --  3.3*  --  3.3*   ALBUMIN  --  2.3*  --   --  2.2*  2.2*  --  2.2*  --  2.5* 2.4*  --   --  2.4*  --  2.2*   GLOBULIN  --  2.7  --   --  2.2 1.8  --  1.7  --  1.5 1.4  --   --  0.9  --  1.1    < > = values in this interval not displayed.         Assessment & Plan   Assessment / Plan     Active Hospital Problems:  Active Hospital Problems    Diagnosis     **Left flank pain     Ruptured infrarenal abdominal aortic aneurysm (AAA)      Impression:  Ruptured infrarenal AAA s/p open repair  Hemorrhagic shock  Hypovolemic shock  Acute hypoxic respiratory failure requiring mechanical ventilation  Septic shock with Streptococcus pyogenes bacteremia  Ischemic sigmoid colon s/p colostomy  History of RCC s/p partial nephrectomy on 11/13  Acute kidney injury [place] ATN  Clinically significant lactic acidosis  Chronically immunosuppressed on CellCept  Hypocalcemia  Hypokalemia  Pneumonitis  SVT    Plan:  -Tolerating SBT today without difficulty.  Plan for extubation to nasal cannula.  Titrate O2 to maintain SpO2 90% or greater  -Nephrology on board.  Patient with +11 L fluid balance.   x 24 hours.  HD orders per them today.  Appreciate their assistance  -Patient with bilateral pleural effusions.  Will be status post HD x 2 today.  Repeat chest x-ray in a.m.  Pressors currently has been weaned off  Dialysis today with ultrafiltration  -DC Precedex  -With fentanyl IV push as needed for pain; IV Tylenol ordered as well  -Vascular surgery following, appreciate service  -General surgery consulted, appreciate service  -Blood cultures previously Streptococcus pyogenes, respiratory culture with Moraxella Atlantae.   DC Ceftriaxone.  Started Unasyn   -Hold home chronic immunosuppression CellCept indefinitely  -Dietitian on board.  Tolerating trickle tube feeds x 24 hours  -Consult speech therapy to evaluate for swallow.  Diet per them      PUPppx: Pepcid  DVT prophylaxis: None at this time, follow    DVT prophylaxis:  Mechanical DVT prophylaxis orders are present.    CODE STATUS:    Level Of Support Discussed With: Patient  Code Status (Patient has no pulse and is not breathing): CPR (Attempt to Resuscitate)  Medical Interventions (Patient has pulse or is breathing): Full Support    ITrinidad PA-C spent 15 minutes critical care time in accordance to split shared billing.  Electronically signed by ROEL Negro, 12/28/23, 1:37 PM EST.        Total CTT by our service is 45 minutes    I have spent a total of 30 minutes if Critical care time    The patient is critically ill in the ICU with shock ruptured AAA.  Multiple metabolic and electrolyte degrangments and SVTMultidisciplinary bedside critical care rounds were performed with nursing staff, respiratory therapy, pharmacy, nutritional services, social work. I have personally reviewed the chart, labs and any pertinent imaging available.      .Electronically signed by Clayton Holland DO, 12/28/23, 2:41 PM EST.

## 2023-12-29 ENCOUNTER — APPOINTMENT (OUTPATIENT)
Dept: CT IMAGING | Facility: HOSPITAL | Age: 63
DRG: 268 | End: 2023-12-29
Payer: COMMERCIAL

## 2023-12-29 ENCOUNTER — APPOINTMENT (OUTPATIENT)
Dept: GENERAL RADIOLOGY | Facility: HOSPITAL | Age: 63
DRG: 268 | End: 2023-12-29
Payer: COMMERCIAL

## 2023-12-29 ENCOUNTER — APPOINTMENT (OUTPATIENT)
Facility: HOSPITAL | Age: 63
DRG: 268 | End: 2023-12-29
Payer: COMMERCIAL

## 2023-12-29 LAB
ALBUMIN SERPL-MCNC: 2.3 G/DL (ref 3.5–5.2)
ALBUMIN/GLOB SERPL: 0.9 G/DL
ALP SERPL-CCNC: 109 U/L (ref 39–117)
ALT SERPL W P-5'-P-CCNC: 80 U/L (ref 1–41)
ANION GAP SERPL CALCULATED.3IONS-SCNC: 11 MMOL/L (ref 5–15)
APTT PPP: 81.8 SECONDS (ref 78–95.9)
APTT PPP: 94 SECONDS (ref 78–95.9)
AST SERPL-CCNC: 136 U/L (ref 1–40)
BACTERIA SPEC AEROBE CULT: NORMAL
BASOPHILS # BLD AUTO: 0.17 10*3/MM3 (ref 0–0.2)
BASOPHILS NFR BLD AUTO: 0.6 % (ref 0–1.5)
BH CV LOW VAS LEFT POSTERIOR TIBIAL VESSEL: 1
BH CV LOWER VASCULAR LEFT COMMON FEMORAL AUGMENT: NORMAL
BH CV LOWER VASCULAR LEFT COMMON FEMORAL COMPETENT: NORMAL
BH CV LOWER VASCULAR LEFT COMMON FEMORAL COMPRESS: NORMAL
BH CV LOWER VASCULAR LEFT COMMON FEMORAL PHASIC: NORMAL
BH CV LOWER VASCULAR LEFT COMMON FEMORAL SPONT: NORMAL
BH CV LOWER VASCULAR LEFT DISTAL FEMORAL COMPRESS: NORMAL
BH CV LOWER VASCULAR LEFT GASTRONEMIUS COMPRESS: NORMAL
BH CV LOWER VASCULAR LEFT GREATER SAPH AK COMPRESS: NORMAL
BH CV LOWER VASCULAR LEFT GREATER SAPH BK COMPRESS: NORMAL
BH CV LOWER VASCULAR LEFT LESSER SAPH COMPRESS: NORMAL
BH CV LOWER VASCULAR LEFT MID FEMORAL AUGMENT: NORMAL
BH CV LOWER VASCULAR LEFT MID FEMORAL COMPETENT: NORMAL
BH CV LOWER VASCULAR LEFT MID FEMORAL COMPRESS: NORMAL
BH CV LOWER VASCULAR LEFT MID FEMORAL PHASIC: NORMAL
BH CV LOWER VASCULAR LEFT MID FEMORAL SPONT: NORMAL
BH CV LOWER VASCULAR LEFT PERONEAL COMPRESS: NORMAL
BH CV LOWER VASCULAR LEFT POPLITEAL AUGMENT: NORMAL
BH CV LOWER VASCULAR LEFT POPLITEAL COMPETENT: NORMAL
BH CV LOWER VASCULAR LEFT POPLITEAL COMPRESS: NORMAL
BH CV LOWER VASCULAR LEFT POPLITEAL PHASIC: NORMAL
BH CV LOWER VASCULAR LEFT POPLITEAL SPONT: NORMAL
BH CV LOWER VASCULAR LEFT POSTERIOR TIBIAL COMPRESS: NORMAL
BH CV LOWER VASCULAR LEFT POSTERIOR TIBIAL THROMBUS: NORMAL
BH CV LOWER VASCULAR LEFT PROXIMAL FEMORAL COMPRESS: NORMAL
BH CV LOWER VASCULAR RIGHT COMMON FEMORAL AUGMENT: NORMAL
BH CV LOWER VASCULAR RIGHT COMMON FEMORAL COMPETENT: NORMAL
BH CV LOWER VASCULAR RIGHT COMMON FEMORAL COMPRESS: NORMAL
BH CV LOWER VASCULAR RIGHT COMMON FEMORAL PHASIC: NORMAL
BH CV LOWER VASCULAR RIGHT COMMON FEMORAL SPONT: NORMAL
BH CV VAS PRELIMINARY FINDINGS SCRIPTING: 1
BILIRUB SERPL-MCNC: 1.8 MG/DL (ref 0–1.2)
BUN SERPL-MCNC: 35 MG/DL (ref 8–23)
BUN/CREAT SERPL: 9.6 (ref 7–25)
CALCIUM SPEC-SCNC: 7.2 MG/DL (ref 8.6–10.5)
CHLORIDE SERPL-SCNC: 103 MMOL/L (ref 98–107)
CO2 SERPL-SCNC: 24 MMOL/L (ref 22–29)
CREAT SERPL-MCNC: 3.65 MG/DL (ref 0.76–1.27)
DEPRECATED RDW RBC AUTO: 51.8 FL (ref 37–54)
EGFRCR SERPLBLD CKD-EPI 2021: 17.7 ML/MIN/1.73
EOSINOPHIL # BLD AUTO: 0.06 10*3/MM3 (ref 0–0.4)
EOSINOPHIL NFR BLD AUTO: 0.2 % (ref 0.3–6.2)
ERYTHROCYTE [DISTWIDTH] IN BLOOD BY AUTOMATED COUNT: 15.9 % (ref 12.3–15.4)
GLOBULIN UR ELPH-MCNC: 2.7 GM/DL
GLUCOSE SERPL-MCNC: 75 MG/DL (ref 65–99)
HCT VFR BLD AUTO: 21.2 % (ref 37.5–51)
HGB BLD-MCNC: 7 G/DL (ref 13–17.7)
IMM GRANULOCYTES # BLD AUTO: 0.98 10*3/MM3 (ref 0–0.05)
IMM GRANULOCYTES NFR BLD AUTO: 3.3 % (ref 0–0.5)
LYMPHOCYTES # BLD AUTO: 1.22 10*3/MM3 (ref 0.7–3.1)
LYMPHOCYTES NFR BLD AUTO: 4.1 % (ref 19.6–45.3)
MAGNESIUM SERPL-MCNC: 2.2 MG/DL (ref 1.6–2.4)
MCH RBC QN AUTO: 29.4 PG (ref 26.6–33)
MCHC RBC AUTO-ENTMCNC: 33 G/DL (ref 31.5–35.7)
MCV RBC AUTO: 89.1 FL (ref 79–97)
MONOCYTES # BLD AUTO: 1.23 10*3/MM3 (ref 0.1–0.9)
MONOCYTES NFR BLD AUTO: 4.1 % (ref 5–12)
NEUTROPHILS NFR BLD AUTO: 26.24 10*3/MM3 (ref 1.7–7)
NEUTROPHILS NFR BLD AUTO: 87.7 % (ref 42.7–76)
NRBC BLD AUTO-RTO: 0.2 /100 WBC (ref 0–0.2)
PHOSPHATE SERPL-MCNC: 2.7 MG/DL (ref 2.5–4.5)
PLATELET # BLD AUTO: 96 10*3/MM3 (ref 140–450)
PMV BLD AUTO: 10.9 FL (ref 6–12)
POTASSIUM SERPL-SCNC: 4.2 MMOL/L (ref 3.5–5.2)
PROCALCITONIN SERPL-MCNC: 11.16 NG/ML (ref 0–0.25)
PROT SERPL-MCNC: 5 G/DL (ref 6–8.5)
QT INTERVAL: 409 MS
QTC INTERVAL: 463 MS
RBC # BLD AUTO: 2.38 10*6/MM3 (ref 4.14–5.8)
SODIUM SERPL-SCNC: 138 MMOL/L (ref 136–145)
WBC NRBC COR # BLD AUTO: 29.9 10*3/MM3 (ref 3.4–10.8)

## 2023-12-29 PROCEDURE — 99024 POSTOP FOLLOW-UP VISIT: CPT | Performed by: SURGERY

## 2023-12-29 PROCEDURE — 25010000002 HEPARIN (PORCINE) 25000-0.45 UT/250ML-% SOLUTION: Performed by: SURGERY

## 2023-12-29 PROCEDURE — 25510000001 IOPAMIDOL PER 1 ML: Performed by: INTERNAL MEDICINE

## 2023-12-29 PROCEDURE — 99291 CRITICAL CARE FIRST HOUR: CPT | Performed by: INTERNAL MEDICINE

## 2023-12-29 PROCEDURE — 85730 THROMBOPLASTIN TIME PARTIAL: CPT | Performed by: INTERNAL MEDICINE

## 2023-12-29 PROCEDURE — 97605 NEG PRS WND THER DME<=50SQCM: CPT

## 2023-12-29 PROCEDURE — 97165 OT EVAL LOW COMPLEX 30 MIN: CPT

## 2023-12-29 PROCEDURE — 85025 COMPLETE CBC W/AUTO DIFF WBC: CPT | Performed by: INTERNAL MEDICINE

## 2023-12-29 PROCEDURE — 87449 NOS EACH ORGANISM AG IA: CPT | Performed by: PHYSICIAN ASSISTANT

## 2023-12-29 PROCEDURE — 87305 ASPERGILLUS AG IA: CPT | Performed by: PHYSICIAN ASSISTANT

## 2023-12-29 PROCEDURE — 93971 EXTREMITY STUDY: CPT

## 2023-12-29 PROCEDURE — 92610 EVALUATE SWALLOWING FUNCTION: CPT

## 2023-12-29 PROCEDURE — 25010000002 MORPHINE PER 10 MG: Performed by: SURGERY

## 2023-12-29 PROCEDURE — 87040 BLOOD CULTURE FOR BACTERIA: CPT | Performed by: INTERNAL MEDICINE

## 2023-12-29 PROCEDURE — 84145 PROCALCITONIN (PCT): CPT | Performed by: INTERNAL MEDICINE

## 2023-12-29 PROCEDURE — 93971 EXTREMITY STUDY: CPT | Performed by: SURGERY

## 2023-12-29 PROCEDURE — 84100 ASSAY OF PHOSPHORUS: CPT | Performed by: INTERNAL MEDICINE

## 2023-12-29 PROCEDURE — 74177 CT ABD & PELVIS W/CONTRAST: CPT

## 2023-12-29 PROCEDURE — 97161 PT EVAL LOW COMPLEX 20 MIN: CPT

## 2023-12-29 PROCEDURE — 71045 X-RAY EXAM CHEST 1 VIEW: CPT

## 2023-12-29 PROCEDURE — 83735 ASSAY OF MAGNESIUM: CPT | Performed by: INTERNAL MEDICINE

## 2023-12-29 PROCEDURE — 25010000002 FENTANYL CITRATE (PF) 50 MCG/ML SOLUTION: Performed by: SURGERY

## 2023-12-29 PROCEDURE — 25010000002 LABETALOL 5 MG/ML SOLUTION: Performed by: SURGERY

## 2023-12-29 PROCEDURE — 25010000002 AMPICILLIN-SULBACTAM PER 1.5 G: Performed by: INTERNAL MEDICINE

## 2023-12-29 PROCEDURE — 80053 COMPREHEN METABOLIC PANEL: CPT | Performed by: INTERNAL MEDICINE

## 2023-12-29 RX ORDER — OXYCODONE HYDROCHLORIDE AND ACETAMINOPHEN 5; 325 MG/1; MG/1
1 TABLET ORAL EVERY 4 HOURS PRN
Status: DISCONTINUED | OUTPATIENT
Start: 2023-12-29 | End: 2024-01-03

## 2023-12-29 RX ORDER — HYDROCODONE BITARTRATE AND ACETAMINOPHEN 5; 325 MG/1; MG/1
1 TABLET ORAL ONCE
Status: COMPLETED | OUTPATIENT
Start: 2023-12-29 | End: 2023-12-29

## 2023-12-29 RX ORDER — CALCIUM CARBONATE 500 MG/1
2 TABLET, CHEWABLE ORAL 3 TIMES DAILY PRN
Status: DISCONTINUED | OUTPATIENT
Start: 2023-12-29 | End: 2024-01-03

## 2023-12-29 RX ORDER — BISACODYL 10 MG
10 SUPPOSITORY, RECTAL RECTAL DAILY PRN
Status: DISCONTINUED | OUTPATIENT
Start: 2023-12-29 | End: 2024-01-03

## 2023-12-29 RX ORDER — POLYETHYLENE GLYCOL 3350 17 G/17G
17 POWDER, FOR SOLUTION ORAL DAILY PRN
Status: DISCONTINUED | OUTPATIENT
Start: 2023-12-29 | End: 2024-01-03

## 2023-12-29 RX ORDER — AMOXICILLIN 250 MG
2 CAPSULE ORAL 2 TIMES DAILY
Status: DISCONTINUED | OUTPATIENT
Start: 2023-12-29 | End: 2024-01-03

## 2023-12-29 RX ORDER — ACETAMINOPHEN 325 MG/1
650 TABLET ORAL EVERY 4 HOURS PRN
Status: DISCONTINUED | OUTPATIENT
Start: 2023-12-29 | End: 2023-12-29

## 2023-12-29 RX ADMIN — SODIUM CHLORIDE 3 G: 900 INJECTION INTRAVENOUS at 17:21

## 2023-12-29 RX ADMIN — FENTANYL CITRATE 25 MCG: 50 INJECTION, SOLUTION INTRAMUSCULAR; INTRAVENOUS at 10:44

## 2023-12-29 RX ADMIN — Medication 10 ML: at 08:11

## 2023-12-29 RX ADMIN — LABETALOL HYDROCHLORIDE 10 MG: 5 INJECTION, SOLUTION INTRAVENOUS at 01:25

## 2023-12-29 RX ADMIN — HYDROCODONE BITARTRATE AND ACETAMINOPHEN 1 TABLET: 5; 325 TABLET ORAL at 08:09

## 2023-12-29 RX ADMIN — BUMETANIDE 4 MG: 0.25 INJECTION INTRAMUSCULAR; INTRAVENOUS at 17:19

## 2023-12-29 RX ADMIN — FAMOTIDINE 20 MG: 10 INJECTION INTRAVENOUS at 08:09

## 2023-12-29 RX ADMIN — Medication 10 ML: at 20:41

## 2023-12-29 RX ADMIN — MORPHINE SULFATE 2 MG: 2 INJECTION, SOLUTION INTRAMUSCULAR; INTRAVENOUS at 03:29

## 2023-12-29 RX ADMIN — DOCUSATE SODIUM 50MG AND SENNOSIDES 8.6MG 2 TABLET: 8.6; 5 TABLET, FILM COATED ORAL at 08:09

## 2023-12-29 RX ADMIN — HEPARIN SODIUM 18 UNITS/KG/HR: 10000 INJECTION, SOLUTION INTRAVENOUS at 14:44

## 2023-12-29 RX ADMIN — FAMOTIDINE 20 MG: 10 INJECTION INTRAVENOUS at 20:41

## 2023-12-29 RX ADMIN — FENTANYL CITRATE 25 MCG: 50 INJECTION, SOLUTION INTRAMUSCULAR; INTRAVENOUS at 00:46

## 2023-12-29 RX ADMIN — DOCUSATE SODIUM 50 MG AND SENNOSIDES 8.6 MG 2 TABLET: 8.6; 5 TABLET, FILM COATED ORAL at 20:41

## 2023-12-29 RX ADMIN — OXYCODONE AND ACETAMINOPHEN 1 TABLET: 5; 325 TABLET ORAL at 14:22

## 2023-12-29 RX ADMIN — BUMETANIDE 4 MG: 0.25 INJECTION INTRAMUSCULAR; INTRAVENOUS at 00:41

## 2023-12-29 RX ADMIN — OXYCODONE AND ACETAMINOPHEN 1 TABLET: 5; 325 TABLET ORAL at 18:34

## 2023-12-29 RX ADMIN — OXYCODONE AND ACETAMINOPHEN 1 TABLET: 5; 325 TABLET ORAL at 23:09

## 2023-12-29 RX ADMIN — BUMETANIDE 4 MG: 0.25 INJECTION INTRAMUSCULAR; INTRAVENOUS at 08:09

## 2023-12-29 RX ADMIN — IOPAMIDOL 89 ML: 755 INJECTION, SOLUTION INTRAVENOUS at 16:07

## 2023-12-29 NOTE — THERAPY EVALUATION
Acute Care - Speech Language Pathology   Swallow Initial Evaluation  Evel[facility]     Patient Name: Danny Savage  : 1960  MRN: [id number]  Today's Date: 2023               Admit Date: 2023    Visit Dx:     ICD-10-CM ICD-9-CM   1. Sepsis, due to unspecified organism, unspecified whether acute organ dysfunction present  A41.9 038.9     995.91   2. Fever, unspecified fever cause  R50.9 780.60   3. Intra-abdominal fluid collection  R18.8 789.59   4. Abdominal aortic aneurysm (AAA) without rupture, unspecified part  I71.40 441.4   5. Ruptured infrarenal abdominal aortic aneurysm (AAA)  I71.33 441.3     Patient Active Problem List   Diagnosis    Left renal mass    Kidney stone    Left ureteral stone    Left flank pain    Ruptured infrarenal abdominal aortic aneurysm (AAA)     Past Medical History:   Diagnosis Date    Allergy     Aortic aneurysm     4.7 just found has not f/u    H/O Kidney stone     Hiatal hernia     Kidney mass     left    Renal cancer      Past Surgical History:   Procedure Laterality Date    ABDOMINAL AORTIC ANEURYSM REPAIR N/A 2023    Procedure: ABDOMINAL AORTIC ANEURYSM REPAIR;  Surgeon: Koffi Agosto MD;  Location: [facility] MAIN OR;  Service: Vascular;  Laterality: N/A;    COLON RESECTION N/A 2023    Procedure: COLON RESECTION;  Surgeon: Hernan Mallory MD;  Location: [facility] MAIN OR;  Service: General;  Laterality: N/A;  EXPLORATORY LAPAROTOMY, REPAIR OF SMALL BOWEL MESENTERY, APPENDECTOMY, LEFT HEMICOLECTOMY, CREATION OF OSTOMY, ABDOMINAL CLOSURE    EXPLORATORY LAPAROTOMY N/A 2023    Procedure: LAPAROTOMY EXPLORATORY;  Surgeon: Koffi Agosto MD;  Location: [facility] MAIN OR;  Service: Vascular;  Laterality: N/A;  Exploratory Laparotomy    KIDNEY STONE SURGERY      NEPHRECTOMY PARTIAL Left 2023    Procedure: NEPHRECTOMY PARTIAL LAPAROSCOPIC WITH DAVINCI ROBOT, left;  Surgeon: Michelle Cai MD;  Location: [facility] MAIN OR;  Service: Robotics - DaVinci;   Laterality: Left;    URETEROSCOPY LASER LITHOTRIPSY WITH STENT INSERTION Left 08/18/2023    Procedure: CYSTOSCOPY URETEROSCOPY RETROGRADE PYELOGRAM STENT INSERTION, left;  Surgeon: Michelle Cai MD;  [facility] MAIN OR;  Service: Urology;  Laterality: Left;     PAIN SCALE: None noted    PRECAUTIONS/CONTRAINDICATIONS: None noted    SUSPECTED ABUSE/NEGLECT/EXPLOITATION: None noted    SOCIAL/PSYCHOLOGICAL NEEDS/BARRIERS: None noted    PAST SOCIAL HISTORY: Lives at home    PRIOR FUNCTION: Independent    PATIENT GOALS/EXPECTATIONS: Did not report    HISTORY: This patient is a very pleasant 63-year-old male admitted with the above diagnosis.  Status post ruptured abdominal abdominal aortic aneurysm status postrepair.  Colon resection on 12/26/2023.  Patient started on trickle feeds and tolerated it advance to clear liquid diet.  Patient required vent support from 12/24/2023 through 12/28/2023.    CURRENT DIET LEVEL: Clear liquids    OBJECTIVE:    TEST ADMINISTERED: Clinical dysphagia evaluation    COGNITION/SAFETY AWARENESS: Alert    BEHAVIORAL OBSERVATIONS: Cooperative    ORAL MOTOR EXAM: Lingual and labial strength and range of motion within functional limits    VOICE QUALITY: Within normal limits    REFLEX EXAM: Deferred    POSTURE: 90 degrees upright    FEEDING/SWALLOWING FUNCTION: Assessed with clear liquids only    CLINICAL OBSERVATIONS: Oral stage is characterized by good bolus preparation and control.  Timely oral transit.  Pharyngeal phase appears timely with good laryngeal elevation per palpation.  No clinical signs or symptoms of aspiration noted.  Vocal quality remained clear to auscultation even when challenged with large sequential straw trials.    DYSPHAGIA CRITERIA: N/A    FUNCTIONAL ASSESSMENT INSTRUMENT: Patient currently scored a level 7 of 7 on Functional Communication Measures for swallowing indicating a 0% limitation in function.    ASSESSMENT/ PLAN OF CARE:  Pt presents with functional  oropharyngeal swallow.  No clinical signs or symptoms of aspiration noted.  Vocal quality remained clear to auscultation.    REHAB POTENTIAL:  Pt has good rehab potential.  The following limitations may influence improvement/ length of tx medical status.    RECOMMENDATIONS:   1.   DIET: Continue clear liquid diet.  Advance as tolerated if surgery indicates.    2.  POSITION: 90 degrees upright for all intake    3.  COMPENSATORY STRATEGIES: Slow rate, small bites/drinks    Speech therapy will follow peripherally to ensure tolerance of regular solids and thin liquids when patient is cleared for surgery to advance    Pt/responsible party agrees with plan of care and has been informed of all alternatives, risks and benefits.        EDUCATION  The patient has been educated in the following areas:   Dysphagia (Swallowing Impairment).          Ivonne Meza, SLP  12/29/2023

## 2023-12-29 NOTE — PROGRESS NOTES
[facility]     Progress Note    Patient Name: Danny Savage  : 1960  MRN: [id number]  Primary Care Physician:  Kodi Pichardo MD  Date of admission: 2023    Subjective   Subjective     POD #5 status post open repair of ruptured abdominal aorta aneurysm, POD #3 status post abdominal reexploration, sigmoidectomy with end colostomy, closure of abdominal wound    Doing okay.  No complaints.    Diagnosed with left leg DVT last night.    Objective   Objective     Vitals:   Temp:  [98.6 °F (37 °C)-99.6 °F (37.6 °C)] 99.1 °F (37.3 °C)  Heart Rate:  [] 97  Resp:  [13-26] 19  BP: (129-167)/(68-96) 145/80  Flow (L/min):  [2-4] 2    Physical Exam   General: Awake, alert   Abdomen: Benign.  Wound VAC in place.  Stoma appears satisfactory.  Extremities: Symmetric, moderate edema.    Hgb: 7.9  WBC: 29.90  Creatinine: 3.65    Assessment & Plan   Assessment / Plan     Assessment/Plan:    Patient remains critically ill in ICU.  Left leg DVT, on heparin.  Continuing rise on leukocytosis, undergoing evaluation by ICU.      Active Hospital Problems:  Active Hospital Problems    Diagnosis     **Left flank pain     Ruptured infrarenal abdominal aortic aneurysm (AAA)            Electronically signed by Swapnil Velazco MD, 23, 10:25 AM EST.

## 2023-12-29 NOTE — PLAN OF CARE
Problem: Adult Inpatient Plan of Care  Goal: Plan of Care Review  Outcome: Ongoing, Progressing  Flowsheets (Taken 12/29/2023 1802)  Progress: improving  Plan of Care Reviewed With:   patient   spouse  Outcome Evaluation: Patient aaox4, respirations even and unlabored, O2 per 2LNC. VSS. Pain medications administered per order. CT with contrast completed. waiting on dialysis at this time. NG discontinued per order. Diet advanced as tolerated. Wound nurse completed wound care on ostomy and midline abdominal incision. patient participated in PT and stood at bedside this day. will continue to monitor.  Goal: Patient-Specific Goal (Individualized)  Outcome: Ongoing, Progressing  Goal: Absence of Hospital-Acquired Illness or Injury  Outcome: Ongoing, Progressing  Intervention: Identify and Manage Fall Risk  Recent Flowsheet Documentation  Taken 12/29/2023 1700 by Regina Parrish RN  Safety Promotion/Fall Prevention: safety round/check completed  Taken 12/29/2023 1600 by Regina Parrish RN  Safety Promotion/Fall Prevention: safety round/check completed  Taken 12/29/2023 1500 by Regina Parrish RN  Safety Promotion/Fall Prevention: safety round/check completed  Taken 12/29/2023 1400 by Regina Parrish RN  Safety Promotion/Fall Prevention: safety round/check completed  Taken 12/29/2023 1300 by Regina Parrihs RN  Safety Promotion/Fall Prevention: safety round/check completed  Taken 12/29/2023 1200 by Regina Parrish RN  Safety Promotion/Fall Prevention: safety round/check completed  Taken 12/29/2023 1100 by Regina Parrish RN  Safety Promotion/Fall Prevention: safety round/check completed  Taken 12/29/2023 1000 by Regina Parrish RN  Safety Promotion/Fall Prevention: safety round/check completed  Taken 12/29/2023 0900 by Regina Parrish RN  Safety Promotion/Fall Prevention: safety round/check completed  Taken 12/29/2023 0800 by Regina Parrish RN  Safety Promotion/Fall Prevention: safety round/check completed  Taken  12/29/2023 0700 by Regina Parrish RN  Safety Promotion/Fall Prevention: safety round/check completed  Intervention: Prevent Skin Injury  Recent Flowsheet Documentation  Taken 12/29/2023 1600 by Regina Parrish RN  Body Position:   turned   right   upper extremity elevated   lower extremity elevated  Taken 12/29/2023 1400 by Regina Parrish RN  Body Position:   turned   left   upper extremity elevated   lower extremity elevated  Taken 12/29/2023 1200 by Regina Parrish RN  Body Position:   turned   right   upper extremity elevated   lower extremity elevated  Taken 12/29/2023 1051 by Regina Parrish RN  Body Position:   turned   supine   upper extremity elevated   lower extremity elevated  Taken 12/29/2023 0809 by Regina Parrish RN  Skin Protection:   tubing/devices free from skin contact   adhesive use limited   skin-to-device areas padded   pulse oximeter probe site changed   incontinence pads utilized  Taken 12/29/2023 0800 by Regina Parrish RN  Body Position:   turned   right   upper extremity elevated   lower extremity elevated  Intervention: Prevent and Manage VTE (Venous Thromboembolism) Risk  Recent Flowsheet Documentation  Taken 12/29/2023 1043 by Regina Parrish RN  Activity Management:   sitting, edge of bed   standing at bedside  Taken 12/29/2023 0809 by Regina Parrish RN  VTE Prevention/Management:   right   sequential compression devices on  Intervention: Prevent Infection  Recent Flowsheet Documentation  Taken 12/29/2023 0800 by Regina Parrish RN  Infection Prevention: hand hygiene promoted  Goal: Optimal Comfort and Wellbeing  Outcome: Ongoing, Progressing  Intervention: Monitor Pain and Promote Comfort  Recent Flowsheet Documentation  Taken 12/29/2023 1422 by Regina Parrish RN  Pain Management Interventions: see MAR  Taken 12/29/2023 1044 by Regina Parrish RN  Pain Management Interventions: see MAR  Taken 12/29/2023 0809 by Regina Parrish RN  Pain Management Interventions: see  MAR  Intervention: Provide Person-Centered Care  Recent Flowsheet Documentation  Taken 12/29/2023 0809 by Regina Parrish, RN  Trust Relationship/Rapport:   care explained   choices provided  Goal: Readiness for Transition of Care  Outcome: Ongoing, Progressing   Goal Outcome Evaluation:  Plan of Care Reviewed With: patient, spouse        Progress: improving  Outcome Evaluation: Patient aaox4, respirations even and unlabored, O2 per 2LNC. VSS. Pain medications administered per order. CT with contrast completed. waiting on dialysis at this time. NG discontinued per order. Diet advanced as tolerated. Wound nurse completed wound care on ostomy and midline abdominal incision. patient participated in PT and stood at bedside this day. will continue to monitor.

## 2023-12-29 NOTE — PROGRESS NOTES
" LOS: 6 days   Patient Care Team:  Kodi Pichardo MD as PCP - General (Internal Medicine)  Lisset Harrington APRN as Nurse Practitioner (Urology)    Chief Complaint: ROLAND    Subjective     History of Present Illness  Pt extubated now, awake and alert  HD yesterday 3L UF.  No acute complaints, dry mouth    Subjective:  Symptoms:  No shortness of breath, chest pain, chest pressure or anxiety.    Diet:  No nausea or vomiting.      History taken from: chart family    Objective     Vital Sign Min/Max for last 24 hours  Temp  Min: 98.1 °F (36.7 °C)  Max: 99.6 °F (37.6 °C)   BP  Min: 129/85  Max: 167/81   Pulse  Min: 89  Max: 107   Resp  Min: 13  Max: 20   SpO2  Min: 90 %  Max: 99 %   Flow (L/min)  Min: 2  Max: 4   No data recorded     Flowsheet Rows      Flowsheet Row First Filed Value   Admission Height 177.8 cm (70\") Documented at 12/23/2023 1948   Admission Weight 79.9 kg (176 lb 2.4 oz) Documented at 12/23/2023 1948            No intake/output data recorded.  I/O last 3 completed shifts:  In: 1143 [I.V.:743; Other:150; NG/GT:250]  Out: 5675 [Urine:175]    Objective:  General Appearance:  Comfortable.    Vital signs: (most recent): Blood pressure 146/79, pulse 98, temperature 99.6 °F (37.6 °C), temperature source Bladder, resp. rate 20, height 177.8 cm (70\"), weight 80.5 kg (177 lb 7.5 oz), SpO2 96%.  Vital signs are normal.    Output: Minimal urine output.    HEENT: Normal HEENT exam.    Lungs:  Normal effort and normal respiratory rate.    Heart: Normal rate.  Regular rhythm.    Abdomen: Abdomen is soft.  Hypoactive bowel sounds.     Extremities: There is dependent edema.    Pulses: Distal pulses are intact.    Neurological: Patient is alert and oriented to person, place and time.    Pupils:  Pupils are equal, round, and reactive to light.    Skin:  Warm and dry.              Results Review:     I reviewed the patient's new clinical results.    WBC WBC   Date Value Ref Range Status   12/29/2023 29.90 (H) " "3.40 - 10.80 10*3/mm3 Final   12/28/2023 24.41 (H) 3.40 - 10.80 10*3/mm3 Final   12/27/2023 17.50 (H) 3.40 - 10.80 10*3/mm3 Final   12/26/2023 12.18 (H) 3.40 - 10.80 10*3/mm3 Final   12/26/2023 12.78 (H) 3.40 - 10.80 10*3/mm3 Final      HGB Hemoglobin   Date Value Ref Range Status   12/29/2023 7.0 (L) 13.0 - 17.7 g/dL Final   12/28/2023 7.9 (L) 13.0 - 17.7 g/dL Final   12/27/2023 8.3 (L) 13.0 - 17.7 g/dL Final   12/26/2023 8.6 (L) 13.0 - 17.7 g/dL Final   12/26/2023 8.5 (L) 13.0 - 17.7 g/dL Final      HCT Hematocrit   Date Value Ref Range Status   12/29/2023 21.2 (L) 37.5 - 51.0 % Final   12/28/2023 23.0 (L) 37.5 - 51.0 % Final   12/27/2023 24.3 (L) 37.5 - 51.0 % Final   12/26/2023 24.8 (L) 37.5 - 51.0 % Final   12/26/2023 24.2 (L) 37.5 - 51.0 % Final      Platlets No results found for: \"LABPLAT\"   MCV MCV   Date Value Ref Range Status   12/29/2023 89.1 79.0 - 97.0 fL Final   12/28/2023 86.8 79.0 - 97.0 fL Final   12/27/2023 87.4 79.0 - 97.0 fL Final   12/26/2023 86.1 79.0 - 97.0 fL Final   12/26/2023 85.5 79.0 - 97.0 fL Final          Sodium Sodium   Date Value Ref Range Status   12/29/2023 138 136 - 145 mmol/L Final   12/28/2023 136 136 - 145 mmol/L Final   12/28/2023 139 136 - 145 mmol/L Final   12/27/2023 142 136 - 145 mmol/L Final   12/26/2023 141 136 - 145 mmol/L Final   12/26/2023 141 136 - 145 mmol/L Final     Sodium, Arterial   Date Value Ref Range Status   12/28/2023 135.2 (L) 136 - 146 mmol/L Final   12/27/2023 138.7 136 - 146 mmol/L Final   12/27/2023 138.6 136 - 146 mmol/L Final   12/26/2023 138.3 136 - 146 mmol/L Final      Potassium Potassium   Date Value Ref Range Status   12/29/2023 4.2 3.5 - 5.2 mmol/L Final   12/28/2023 3.9 3.5 - 5.2 mmol/L Final   12/28/2023 4.3 3.5 - 5.2 mmol/L Final   12/27/2023 4.5 3.5 - 5.2 mmol/L Final   12/26/2023 4.0 3.5 - 5.2 mmol/L Final   12/26/2023 3.9 3.5 - 5.2 mmol/L Final      Chloride Chloride   Date Value Ref Range Status   12/29/2023 103 98 - 107 mmol/L Final " "  12/28/2023 100 98 - 107 mmol/L Final   12/28/2023 101 98 - 107 mmol/L Final   12/27/2023 103 98 - 107 mmol/L Final   12/26/2023 104 98 - 107 mmol/L Final   12/26/2023 104 98 - 107 mmol/L Final      CO2 CO2   Date Value Ref Range Status   12/29/2023 24.0 22.0 - 29.0 mmol/L Final   12/28/2023 22.9 22.0 - 29.0 mmol/L Final   12/28/2023 22.3 22.0 - 29.0 mmol/L Final   12/27/2023 24.3 22.0 - 29.0 mmol/L Final   12/26/2023 25.2 22.0 - 29.0 mmol/L Final   12/26/2023 27.5 22.0 - 29.0 mmol/L Final      BUN BUN   Date Value Ref Range Status   12/29/2023 35 (H) 8 - 23 mg/dL Final   12/28/2023 25 (H) 8 - 23 mg/dL Final   12/28/2023 34 (H) 8 - 23 mg/dL Final   12/27/2023 48 (H) 8 - 23 mg/dL Final   12/26/2023 37 (H) 8 - 23 mg/dL Final   12/26/2023 29 (H) 8 - 23 mg/dL Final      Creatinine Creatinine   Date Value Ref Range Status   12/29/2023 3.65 (H) 0.76 - 1.27 mg/dL Final   12/28/2023 2.79 (H) 0.76 - 1.27 mg/dL Final   12/28/2023 3.27 (H) 0.76 - 1.27 mg/dL Final   12/27/2023 4.60 (H) 0.76 - 1.27 mg/dL Final   12/26/2023 3.44 (H) 0.76 - 1.27 mg/dL Final   12/26/2023 3.34 (H) 0.76 - 1.27 mg/dL Final      Calcium Calcium   Date Value Ref Range Status   12/29/2023 7.2 (L) 8.6 - 10.5 mg/dL Final   12/28/2023 7.5 (L) 8.6 - 10.5 mg/dL Final   12/28/2023 7.3 (L) 8.6 - 10.5 mg/dL Final   12/27/2023 7.2 (L) 8.6 - 10.5 mg/dL Final   12/26/2023 7.2 (L) 8.6 - 10.5 mg/dL Final   12/26/2023 7.1 (L) 8.6 - 10.5 mg/dL Final      PO4 No results found for: \"CAPO4\"   Albumin Albumin   Date Value Ref Range Status   12/29/2023 2.3 (L) 3.5 - 5.2 g/dL Final   12/28/2023 2.4 (L) 3.5 - 5.2 g/dL Final   12/28/2023 2.3 (L) 3.5 - 5.2 g/dL Final   12/27/2023 2.2 (L) 3.5 - 5.2 g/dL Final   12/26/2023 2.2 (L) 3.5 - 5.2 g/dL Final   12/26/2023 2.2 (L) 3.5 - 5.2 g/dL Final      Magnesium Magnesium   Date Value Ref Range Status   12/29/2023 2.2 1.6 - 2.4 mg/dL Final   12/28/2023 2.1 1.6 - 2.4 mg/dL Final   12/28/2023 2.2 1.6 - 2.4 mg/dL Final   12/27/2023 2.4 " "1.6 - 2.4 mg/dL Final   12/26/2023 2.4 1.6 - 2.4 mg/dL Final   12/26/2023 2.4 1.6 - 2.4 mg/dL Final      Uric Acid No results found for: \"URICACID\"     Medication Review:   ampicillin-sulbactam, 3 g, Intravenous, Q24H  bumetanide, 4 mg, Intravenous, Q8H  famotidine, 20 mg, Intravenous, Q12H  senna-docusate sodium, 2 tablet, Nasogastric, BID  sodium chloride, 10 mL, Intravenous, Q12H          Assessment & Plan       Left flank pain    Ruptured infrarenal abdominal aortic aneurysm (AAA)      Assessment & Plan  ROLAND-  pt with previous partial left nephrectomy and baseline creatinine closer to 0.9-1.0.  Had noted hypotension with AAA rupture s/p repair.  Oliguric at this time on IV bumex.  Likely ATN from hypotension vs. Atheroembolic injury. Also received IV contrast with CTA.  Continue IV bumex 4mg IV q8hrs. Bp elevated.  Will plan dialysis again today for volume and monitor.  AAA rupture-  s/p open repair infrarenal aorta.  Ischemic bowel- s/p left colectomy  Resp Failure-  extubated.  Shock- sepsis, hemorrhagic component with AAA rupture. Resolved now.   Sepsis-  blood cx with strep pyogenes.  On abx per primary.  Anemia-  prbc's prn.    Adin Hinton MD  12/29/23  08:12 EST          "

## 2023-12-29 NOTE — PROGRESS NOTES
[facility]     Progress Note    Patient Name: Danny Savage  : 1960  MRN: [id number]  Primary Care Physician:  Kodi Pichardo MD  Date of admission: 2023    Subjective   Subjective     Chief Complaint: Back pain    Back Pain      Patient Reports back pain.  Patient is status post left partial nephrectomy in November of this year.  Postoperatively he was diagnosed with an abscess in the left perirenal area that measures 4-1/2 cm.  He came back to the emergency room with severe abdominal pain and the collection was 3 cm.  He was then diagnosed with a ruptured aortic aneurysm underwent emergent surgery.  Currently he is still in the intensive care unit he is doing well he has a Rico catheter that is draining clear yellow urine.  He is off the ventilator he is off the pressors.    Review of Systems   Musculoskeletal:  Positive for back pain.       Objective   Objective     Vitals:   Temp:  [98.2 °F (36.8 °C)-99.6 °F (37.6 °C)] 99.1 °F (37.3 °C)  Heart Rate:  [] 99  Resp:  [13-20] 19  BP: (129-167)/(68-96) 159/84  Flow (L/min):  [2-4] 2    Physical Exam   Awake alert oriented x 3  Afebrile vital signs are stable.  Result Review    Result Review:  I have personally reviewed the results from the time of this admission to 2023 10:01 EST and agree with these findings:  [x]  Laboratory list / accordion  []  Microbiology  []  Radiology  []  EKG/Telemetry   []  Cardiology/Vascular   []  Pathology  []  Old records  []  Other:  Most notable findings include: White blood cell count 29.9.      Assessment & Plan   Assessment / Plan     Brief Patient Summary:  Danny Savage is a 63 y.o. male who please look at the HPI.    Active Hospital Problems:  Active Hospital Problems    Diagnosis     **Left flank pain     Ruptured infrarenal abdominal aortic aneurysm (AAA)      Plan:   His white blood cell count is very high blood cultures have been ordered.  Overall he is doing much better.  He is  off the ventilator off pressors.  Continue with current treatment.    DVT prophylaxis:  Medical and mechanical DVT prophylaxis orders are present.    CODE STATUS:    Level Of Support Discussed With: Patient  Code Status (Patient has no pulse and is not breathing): CPR (Attempt to Resuscitate)  Medical Interventions (Patient has pulse or is breathing): Full Support    Disposition:  I expect patient to be discharged home.    Iliana Ozuna MD

## 2023-12-29 NOTE — PROGRESS NOTES
Pulmonary / Critical Care Progress Note      Patient Name: Danny Savage  : 1960  MRN: [id number]  Attending:  Eric Martin MD   Date of admission: 2023    Subjective   Subjective   Patient critically ill with ruptured AAA s/p open repair, hemorrhagic shock, Streptococcus pyogenes bacteremia    HD :  2.5L removed  HD :  3 L removed      Over the past 24 hours  Alert and following commands  Failed SBT early this a.m.  secondary to apnea,  on Precedex.  Precedex stopped  SBT attempted again around 07.   Patient tolerating  - then extubated   x 24H, + 11 fluid balance.    On Bumex 4mg IV Q8  Family at bedside    Today  On nasal cannula at 2LNC  Stood up at side of bed today with walker and PT  With post op abdominal pain requiring PRN pain medication  WBC with upward trend @ 29  Venous doppler positive for Acute DVT in LLE; started on Heparin gtt  UOP @ 100mL x 24H; continues on Bumex; +9L fluid balance  Sputum culture from  positive for Morganella Atlantae; blood cultures from  positive for Streptococcus pyogenes; on Unasyn  Awake, alert and answering questions  Is complaining of having hiccups since extubated day prior      Review of Systems  Following commands  Post op pain with movmement noted  Weakness  Hiccups      Objective   Objective     Vitals:   Vital signs for last 24 hours:  Temp:  [98.6 °F (37 °C)-99.6 °F (37.6 °C)] 99.4 °F (37.4 °C)  Heart Rate:  [] 105  Resp:  [13-26] 16  BP: (129-167)/(68-96) 162/81    Intake/Output last 3 shifts:  I/O last 3 completed shifts:  In: 1143 [I.V.:743; Other:150; NG/GT:250]  Out: 5675 [Urine:175]    Vent settings for last 24 hours:       Physical Exam   Vital Signs Reviewed  General: Weak, deconditioned, alert  Chest:  good aeration, crackles in bases bilaterally, no work of breathing noted at rest  CV: RRR, no MGR, pulses 2+, equal.  Abd: Postop dressing in place with wound VAC; colostomy with stool output  noted  Neuro:  A&Ox3, CN grossly intact, no focal deficits.        Result Review    I have personally reviewed the results from the time of this admission to 12/29/2023 13:55 EST and agree with these findings:  [x]  Laboratory  [x]  Microbiology  [x]  Radiology  []  EKG/Telemetry   []  Cardiology/Vascular   []  Pathology  []  Old records  []  Other:  Most notable findings include:   12/22/2023 blood culture Streptococcus pyogenes group A  12/24 sputum culture with Moraxella a        Lab 12/29/23  0540 12/28/23  1842 12/28/23  0911 12/28/23  0400 12/27/23  1017 12/27/23  0644 12/27/23  0551 12/26/23  1507 12/26/23  1157 12/26/23  0818 12/26/23  0639 12/25/23  2358 12/25/23  1803   WBC 29.90*  --   --  24.41*  --   --  17.50* 12.18*  --  12.78*  --  11.23* 10.77   HEMOGLOBIN 7.0*  --   --  7.9*  --   --  8.3* 8.6*  --  8.5*  --  6.9* 6.6*   HEMATOCRIT 21.2*  --   --  23.0*  --   --  24.3* 24.8*  --  24.2*  --  19.3* 18.7*   PLATELETS 96*  --   --  76*  --   --  70* 72*  --  68*  --  88* 105*   SODIUM 138 136  --  139  --   --  142 141  --  141  --  141 142   SODIUM, ARTERIAL  --   --  135.2*  --  138.7 138.6  --   --    < >  --    < >  --   --    POTASSIUM 4.2 3.9  --  4.3  --   --  4.5 4.0  --  3.9  --  3.9 4.1   CHLORIDE 103 100  --  101  --   --  103 104  --  104  --  102 102   CO2 24.0 22.9  --  22.3  --   --  24.3 25.2  --  27.5  --  27.8 26.4   BUN 35* 25*  --  34*  --   --  48* 37*  --  29*  --  25* 19   CREATININE 3.65* 2.79*  --  3.27*  --   --  4.60* 3.44*  --  3.34*  --  2.62* 2.47*   GLUCOSE 75 109*  --  113*  --   --  99 100*  --  152*  --  172* 186*   GLUCOSE, ARTERIAL  --   --  110*  --  99 86  --   --    < >  --    < >  --   --    CALCIUM 7.2* 7.5*  --  7.3*  --   --  7.2* 7.2*  --  7.1*  --  7.6* 7.9*   PHOSPHORUS 2.7 2.4*  --  3.7  --   --  5.5*  --   --  3.4  --  3.4 3.8   TOTAL PROTEIN 5.0*  --   --  5.0*  --   --  4.4* 4.0*  --  3.9*  --  4.0* 3.8*   ALBUMIN 2.3* 2.4*  --  2.3*  --   --  2.2* 2.2*   --  2.2*  --  2.5* 2.4*   GLOBULIN 2.7  --   --  2.7  --   --  2.2 1.8  --  1.7  --  1.5 1.4    < > = values in this interval not displayed.         Assessment & Plan   Assessment / Plan     Active Hospital Problems:  Active Hospital Problems    Diagnosis     **Left flank pain     Ruptured infrarenal abdominal aortic aneurysm (AAA)      Impression:  Ruptured infrarenal AAA s/p open repair  Hemorrhagic shock  Hypovolemic shock  Acute hypoxic respiratory failure requiring mechanical ventilation  Septic shock with Streptococcus pyogenes bacteremia  Moraxella pneumonia  Ischemic sigmoid colon s/p colostomy  History of RCC s/p partial nephrectomy on 11/13  Acute kidney injury Falguni ATN  Clinically significant lactic acidosis  Chronically immunosuppressed on CellCept  Hypocalcemia  Hypokalemia  Pneumonitis  SVT    Plan:  -With acute left lower extremity DVT.  Continue heparin drip  -Obtain CT of abdomen pelvis with IV contrast d/w renal servie and they are with this  -Continues on supplemental O2.  Titrate to maintain SpO2 90% or greater  -Encourage use of I-S and flutter valve throughout the day  -PT on board to help with ambulation  -Nephrology on board.  Patient with +9 L fluid balance.  UOP 100x 24 hours.  HD orders per them today.  Appreciate their assistance  -Patient with bilateral pleural effusions.  Continues with hemodialysis scheduled.  On 2 LNC.  Did bedside ultrasound today, pleural effusions not amenable to thoracentesis  -Remains off pressors  -General surgery on board.  Postop care per them, NG tube being removed today with liquid diet advance as tolerated.    -As needed pain medications with morphine and fentanyl.  Added as needed p.o. pain medication  -Vascular surgery following, appreciate service  -Urology on board.  Patient status post partial nephrectomy.  Appreciate their assistance  -Blood cultures previously Streptococcus pyogenes, respiratory culture with Moraxella Atlantae.   Continue Unasyn  to complete therapy  Will need long term abx and even suppressive therapy   -Hold home chronic immunosuppression CellCept indefinitely  -Dietitian on board.    -Speech therapy on board  -PT/OT on board      PUPppx: Pepcid  DVT prophylaxis: Heparin drip    DVT prophylaxis:  Medical and mechanical DVT prophylaxis orders are present.    CODE STATUS:   Level Of Support Discussed With: Patient  Code Status (Patient has no pulse and is not breathing): CPR (Attempt to Resuscitate)  Medical Interventions (Patient has pulse or is breathing): Full Support    ITrinidad PA-C spent 10 minutes critical care time in accordance to split shared billing.  Electronically signed by ROEL Negro, 12/29/23, 2:14 PM EST.    Total CTT by our service is 45 minutes    I have spent a total of 35 minutes if Critical care time    The patient is critically ill in the ICU with shock ruptured AAA.  Multiple metabolic and electrolyte degrangments and SVTMultidisciplinary bedside critical care rounds were performed with nursing staff, respiratory therapy, pharmacy, nutritional services, social work. I have personally reviewed the chart, labs and any pertinent imaging available.      Electronically signed by Clayton Holland DO, 12/29/23, 3:47 PM EST.

## 2023-12-29 NOTE — THERAPY EVALUATION
Acute Care - Physical Therapy Initial Evaluation   Evelina     Patient Name: Danny Savage  : 1960  MRN: [id number]  Today's Date: 2023      Visit Dx:     ICD-10-CM ICD-9-CM   1. Sepsis, due to unspecified organism, unspecified whether acute organ dysfunction present  A41.9 038.9     995.91   2. Fever, unspecified fever cause  R50.9 780.60   3. Intra-abdominal fluid collection  R18.8 789.59   4. Abdominal aortic aneurysm (AAA) without rupture, unspecified part  I71.40 441.4   5. Ruptured infrarenal abdominal aortic aneurysm (AAA)  I71.33 441.3   6. Decreased activities of daily living (ADL)  Z78.9 V49.89   7. Difficulty walking  R26.2 719.7     Patient Active Problem List   Diagnosis    Left renal mass    Kidney stone    Left ureteral stone    Left flank pain    Ruptured infrarenal abdominal aortic aneurysm (AAA)     Past Medical History:   Diagnosis Date    Allergy     Aortic aneurysm     4.7 just found has not f/u    H/O Kidney stone     Hiatal hernia     Kidney mass     left    Renal cancer      Past Surgical History:   Procedure Laterality Date    ABDOMINAL AORTIC ANEURYSM REPAIR N/A 2023    Procedure: ABDOMINAL AORTIC ANEURYSM REPAIR;  Surgeon: Koffi Agosto MD;  Location: [facility] MAIN OR;  Service: Vascular;  Laterality: N/A;    COLON RESECTION N/A 2023    Procedure: COLON RESECTION;  Surgeon: Hernan Mallory MD;  Location: [facility] MAIN OR;  Service: General;  Laterality: N/A;  EXPLORATORY LAPAROTOMY, REPAIR OF SMALL BOWEL MESENTERY, APPENDECTOMY, LEFT HEMICOLECTOMY, CREATION OF OSTOMY, ABDOMINAL CLOSURE    EXPLORATORY LAPAROTOMY N/A 2023    Procedure: LAPAROTOMY EXPLORATORY;  Surgeon: Koffi Agosto MD;  Location: [facility] MAIN OR;  Service: Vascular;  Laterality: N/A;  Exploratory Laparotomy    KIDNEY STONE SURGERY      NEPHRECTOMY PARTIAL Left 2023    Procedure: NEPHRECTOMY PARTIAL LAPAROSCOPIC WITH DAVINCI ROBOT, left;  Surgeon: Michelle Cai MD;  Location:   [facility] MAIN OR;  Service: Robotics - DaVinci;  Laterality: Left;    URETEROSCOPY LASER LITHOTRIPSY WITH STENT INSERTION Left 08/18/2023    Procedure: CYSTOSCOPY URETEROSCOPY RETROGRADE PYELOGRAM STENT INSERTION, left;  Surgeon: Michelle Cai MD;  Location: [facility] MAIN OR;  Service: Urology;  Laterality: Left;     PT Assessment (last 12 hours)       PT Evaluation and Treatment       Row Name 12/29/23 1520          Physical Therapy Time and Intention    Document Type evaluation  -AV     Mode of Treatment individual therapy;physical therapy  -AV       Row Name 12/29/23 1520          General Information    Patient Profile Reviewed yes  -AV     Prior Level of Function independent:;all household mobility;gait;transfer;ADL's  Ambulated without an assistive device. No home O2.  -AV     Existing Precautions/Restrictions fall  -AV       Row Name 12/29/23 1520          Living Environment    Current Living Arrangements home  -AV     Home Accessibility stairs within home  -AV     People in Home spouse  -AV       Row Name 12/29/23 1520          Stairs Within Home, Primary    Stairs, Within Home, Primary Patient reports living in a tri-level home with bedroom on the upper level.  -AV     Stair Railings, Within Home, Primary railings safe and in good condition  -AV       Row Name 12/29/23 1531          Range of Motion (ROM)    Range of Motion bilateral lower extremities;ROM is WFL  -AV       Row Name 12/29/23 1531          Strength (Manual Muscle Testing)    Strength (Manual Muscle Testing) right lower extremity strength;left lower extremity strength  -AV     Left Lower Extremity Strength hip;knee;ankle  -AV     Hip, Left (Strength) 2+/5  -AV     Knee, Left (Strength) 3-/5  -AV     Ankle, Left (Strength) 3/5  -AV     Right Lower Extremity Strength hip;knee;ankle  -AV     Hip, Right (Strength) 3/5  -AV     Knee, Right (Strength) 3+/5  -AV     Ankle, Right (Strength) 3/5  -AV       Row Name 12/29/23 1531          Bed Mobility     Bed Mobility sit-supine  -AV     Sit-Supine [place] (Bed Mobility) maximum assist (25% patient effort)  -AV     Comment, (Bed Mobility) Patient seated edge of bed upon therapist entry. Had just returned to sitting after standing with OT.  -AV       Row Name 12/29/23 1531          Transfers    Comment, (Transfers) Declined transfer as he had just completed standing with OT. Max x2 per OT.  -AV       Row Name 12/29/23 1531          Safety Issues, Functional Mobility    Impairments Affecting Function (Mobility) balance;endurance/activity tolerance;pain;strength  -AV       Row Name 12/29/23 1531          Motor Skills    Therapeutic Exercise other (see comments)  Patient and spouse educated in BLE exercises to promote strengthening and to maximize independence with mobility. Exercises include: ankle pumps, quad sets, glute sets, SAQ. Patient and spouse verbalized understanding  -AV       Row Name             Wound 12/24/23 2035 midline abdomen Incision    Wound - Properties Group Placement Date: 12/24/23  - Placement Time: 2035 -MH Orientation: midline  - Location: abdomen  -MH Primary Wound Type: Incision  -MH    Retired Wound - Properties Group Placement Date: 12/24/23  - Placement Time: 2035 - Orientation: midline  - Location: abdomen  -MH Primary Wound Type: Incision  -MH    Retired Wound - Properties Group Date first assessed: 12/24/23  - Time first assessed: 2035 - Location: abdomen  -MH Primary Wound Type: Incision  -MH      Row Name             NPWT (Negative Pressure Wound Therapy) 12/26/23 midline abdominal incision    NPWT (Negative Pressure Wound Therapy) - Properties Group Placement Date: 12/26/23  -NH Location: midline abdominal incision  -NH    Retired NPWT (Negative Pressure Wound Therapy) - Properties Group Placement Date: 12/26/23  -NH Location: midline abdominal incision  -NH    Retired NPWT (Negative Pressure Wound Therapy) - Properties Group Placement Date: 12/26/23  -NH  Location: midline abdominal incision  -NH      Row Name 12/29/23 1531          Plan of Care Review    Plan of Care Reviewed With patient;spouse  -AV     Progress no change  -AV     Outcome Evaluation Patient presents with deficits in balance, strength, transfers, and ambulation. Patient will benefit from skilled PT services to address these mobility deficits and decrease risk of falls.  -AV       Row Name 12/29/23 1531          Therapy Assessment/Plan (PT)    Rehab Potential (PT) good, to achieve stated therapy goals  -AV     Criteria for Skilled Interventions Met (PT) yes;meets criteria  -AV     Therapy Frequency (PT) daily  -AV     Predicted Duration of Therapy Intervention (PT) 10 days  -AV     Problem List (PT) problems related to;balance;mobility;strength;pain  -AV     Activity Limitations Related to Problem List (PT) unable to transfer safely;unable to ambulate safely  -AV       Row Name 12/29/23 1531          PT Evaluation Complexity    History, PT Evaluation Complexity 1-2 personal factors and/or comorbidities  -AV     Examination of Body Systems (PT Eval Complexity) total of 4 or more elements  -AV     Clinical Presentation (PT Evaluation Complexity) stable  -AV     Clinical Decision Making (PT Evaluation Complexity) low complexity  -AV     Overall Complexity (PT Evaluation Complexity) low complexity  -AV       Row Name 12/29/23 1531          Therapy Plan Review/Discharge Plan (PT)    Therapy Plan Review (PT) evaluation/treatment results reviewed;patient;spouse/significant other  -AV       Row Name 12/29/23 2851          Physical Therapy Goals    Bed Mobility Goal Selection (PT) bed mobility, PT goal 1  -AV     Transfer Goal Selection (PT) transfer, PT goal 1  -AV     Gait Training Goal Selection (PT) gait training, PT goal 1  -AV       Row Name 12/29/23 7321          Bed Mobility Goal 1 (PT)    Activity/Assistive Device (Bed Mobility Goal 1, PT) sit to supine/supine to sit  -AV     [place] Level/Cues  Needed (Bed Mobility Goal 1, PT) minimum assist (75% or more patient effort)  -AV     Time Frame (Bed Mobility Goal 1, PT) 10 days  -AV       Row Name 12/29/23 1531          Transfer Goal 1 (PT)    Activity/Assistive Device (Transfer Goal 1, PT) sit-to-stand/stand-to-sit;bed-to-chair/chair-to-bed;walker, rolling  -AV     [place] Level/Cues Needed (Transfer Goal 1, PT) minimum assist (75% or more patient effort)  -AV     Time Frame (Transfer Goal 1, PT) 10 days  -AV       Row Name 12/29/23 1531          Gait Training Goal 1 (PT)    Activity/Assistive Device (Gait Training Goal 1, PT) gait (walking locomotion);assistive device use;walker, rolling  -AV     [place] Level (Gait Training Goal 1, PT) minimum assist (75% or more patient effort)  -AV     Distance (Gait Training Goal 1, PT) 50  -AV     Time Frame (Gait Training Goal 1, PT) 10 days  -AV               User Key  (r) = Recorded By, (t) = Taken By, (c) = Cosigned By      Initials Name Provider Type    [facility] Enedina Norman, RN Registered Nurse     Shahid Howard RN Registered Nurse    Amrik Nagel, PT Physical Therapist                    Physical Therapy Education       Title: PT OT SLP Therapies (In Progress)       Topic: Physical Therapy (In Progress)       Point: Mobility training (Done)       Learning Progress Summary             Patient Acceptance, E,TB, VU by AV at 12/29/2023 1536                         Point: Home exercise program (Not Started)       Learner Progress:  Not documented in this visit.              Point: Body mechanics (Done)       Learning Progress Summary             Patient Acceptance, E,TB, VU by AV at 12/29/2023 1536                         Point: Precautions (Done)       Learning Progress Summary             Patient Acceptance, E,TB, VU by AV at 12/29/2023 1536                                         User Key       Initials Effective Dates Name Provider Type [facility] 06/11/21 -  Amrik Sánchez, PT  Physical Therapist PT                  PT Recommendation and Plan  Anticipated Discharge Disposition (PT): inpatient rehabilitation facility  Planned Therapy Interventions (PT): balance training, bed mobility training, gait training, home exercise program, neuromuscular re-education, strengthening, transfer training  Therapy Frequency (PT): daily  Plan of Care Reviewed With: patient, spouse  Progress: no change  Outcome Evaluation: Patient presents with deficits in balance, strength, transfers, and ambulation. Patient will benefit from skilled PT services to address these mobility deficits and decrease risk of falls.   Outcome Measures       Row Name 12/29/23 1535             How much help from another person do you currently need...    Turning from your back to your side while in flat bed without using bedrails? 2  -AV      Moving from lying on back to sitting on the side of a flat bed without bedrails? 2  -AV      Moving to and from a bed to a chair (including a wheelchair)? 1  -AV      Standing up from a chair using your arms (e.g., wheelchair, bedside chair)? 1  -AV      Climbing 3-5 steps with a railing? 1  -AV      To walk in hospital room? 1  -AV      AM-PAC 6 Clicks Score (PT) 8  -AV      Highest Level of Mobility Goal 3 --> Sit at edge of bed  -AV         Functional Assessment    Outcome Measure Options AM-PAC 6 Clicks Basic Mobility (PT)  -AV                User Key  (r) = Recorded By, (t) = Taken By, (c) = Cosigned By      Initials Name Provider Type    Amrik Nagel, PT Physical Therapist                     Time Calculation:    PT Charges       Row Name 12/29/23 1535             Time Calculation    PT Received On 12/29/23  -AV      PT Goal Re-Cert Due Date 01/07/24  -AV         Untimed Charges    PT Eval/Re-eval Minutes 32  -AV         Total Minutes    Untimed Charges Total Minutes 32  -AV       Total Minutes 32  -AV                User Key  (r) = Recorded By, (t) = Taken By, (c) = Cosigned By       Initials Name Provider Type    AV Amrik Sánchez, PT Physical Therapist                  Therapy Charges for Today       Code Description Service Date Service Provider Modifiers Qty    89823482589 HC PT EVAL LOW COMPLEXITY 3 12/29/2023 Amrik Sánchez, PT GP 1            PT G-Codes  Outcome Measure Options: AM-PAC 6 Clicks Basic Mobility (PT)  AM-PAC 6 Clicks Score (PT): 8  AM-PAC 6 Clicks Score (OT): 12    Amrik Sánchez PT  12/29/2023

## 2023-12-29 NOTE — PLAN OF CARE
Goal Outcome Evaluation:  Plan of Care Reviewed With: patient, spouse        Progress: no change  Outcome Evaluation: Patient presents with limitations in self-care, functional transfers, balance, endurance, and BUE strength. He would benefit from continued skilled occupational therapy services to maximize independence with ADLs/functional transfers.      Anticipated Discharge Disposition (OT): inpatient rehabilitation facility

## 2023-12-29 NOTE — PROGRESS NOTES
Psychiatric     Progress Note    Patient Name: Danny Savage  : 1960  MRN: [id number]  Primary Care Physician:  Kodi Pichardo MD  Date of admission: 2023    Subjective   Subjective     Chief Complaint: Ruptured AAA with colonic ischemia    HPI:  Patient Reports feeling well today.  Having ostomy output.      Objective   Objective     Vitals:   Temp:  [98.6 °F (37 °C)-99.6 °F (37.6 °C)] 99.4 °F (37.4 °C)  Heart Rate:  [] 105  Resp:  [13-26] 16  BP: (129-167)/(68-96) 162/81  Flow (L/min):  [2-4] 2    Physical Exam  Constitutional:       Appearance: Normal appearance.   Cardiovascular:      Rate and Rhythm: Normal rate.   Pulmonary:      Effort: Pulmonary effort is normal.   Abdominal:      Palpations: Abdomen is soft.         Result Review    Result Review:  I have personally reviewed the results from the time of this admission to 2023 14:04 EST and agree with these findings:  [x]  Laboratory  []  Microbiology  []  Radiology  []  EKG/Telemetry   []  Cardiology/Vascular   []  Pathology  []  Old records  []  Other:  Most notable findings include: whiteCount 29    Assessment & Plan   Assessment / Plan     Brief Patient Summary:  Danny Savage is a 63 y.o. male who status post left colectomy after ruptured AAA for colonic ischemia    Active Hospital Problems:  Active Hospital Problems    Diagnosis     **Left flank pain     Ruptured infrarenal abdominal aortic aneurysm (AAA)      Plan:  Plan for CT abdomen pelvis per [facility] today-I think this is reasonable looking for a source of the white count  His ostomy is pink and viable with stool output  Other than his white count he looks very good clinically so I am okay with removing NG tube and advancing diet       DVT prophylaxis:  Medical and mechanical DVT prophylaxis orders are present.    CODE STATUS:   Level Of Support Discussed With: Patient  Code Status (Patient has no pulse and is not breathing): CPR (Attempt to  Resuscitate)  Medical Interventions (Patient has pulse or is breathing): Full Support    Disposition:  I expect patient to be discharged unsure.    Electronically signed by Hernan Mallory MD, 12/29/23, 2:04 PM EST.

## 2023-12-29 NOTE — CONSULTS
Purpose of the visit was to evaluate for: support for patient/family. Spoke with RN, patient, and family and discussed clarify code status and determination of decision maker.      Assessment: Patient with a history of Ruptured infrarenal AAA s/p open repair, Hemorrhagic shock, Hypovolemic shock, Acute hypoxic respiratory failure requiring mechanical ventilation,  Septic shock with Streptococcus pyogenes bacteremia  SVT, History of RCC s/p partial nephrectomy on 11/13  Acute kidney injury,Clinically significant lactic acidosis  Chronically immunosuppressed on CellCept, Hypocalcemia  Hypokalemia    Patient is currently CCU. He was extubated yesterday. He is alert and responds appropriately. He is scheduled to have hemodialysis today.   He remains on 02 via [place]. He appears in no acute distress at this time. He has no c/o pain or discomfort as he was just given pain meds. (See MAR for med administration)  His wife is at bedside. I introduced myself and my roll with Palliative care. She is so very appreciative of the support. She is very appreciative  of the life saving care her  has received. All questions and concerns have been addressed.     Recommendations/Plan:  Full support.with all interventions.  .    Tasks Completed: Emotional Support.    Palliative care will continue to follow and support as needed    Daisy RIVERA RN, BSN  Palliative Care

## 2023-12-29 NOTE — PLAN OF CARE
Goal Outcome Evaluation:  Plan of Care Reviewed With: patient, spouse        Progress: no change  Outcome Evaluation: Patient presents with deficits in balance, strength, transfers, and ambulation. Patient will benefit from skilled PT services to address these mobility deficits and decrease risk of falls.      Anticipated Discharge Disposition (PT): inpatient rehabilitation facility

## 2023-12-29 NOTE — SIGNIFICANT NOTE
Wound Eval / Progress Noted     [place]     Patient Name: Danny Savage  : 1960  MRN: [id number]  Today's Date: 2023                 Admit Date: 2023    Visit Dx:    ICD-10-CM ICD-9-CM   1. Sepsis, due to unspecified organism, unspecified whether acute organ dysfunction present  A41.9 038.9     995.91   2. Fever, unspecified fever cause  R50.9 780.60   3. Intra-abdominal fluid collection  R18.8 789.59   4. Abdominal aortic aneurysm (AAA) without rupture, unspecified part  I71.40 441.4   5. Ruptured infrarenal abdominal aortic aneurysm (AAA)  I71.33 441.3   6. Decreased activities of daily living (ADL)  Z78.9 V49.89   7. Difficulty walking  R26.2 719.7         Left flank pain    Ruptured infrarenal abdominal aortic aneurysm (AAA)        Past Medical History:   Diagnosis Date    Allergy     Aortic aneurysm     4.7 just found has not f/u    H/O Kidney stone     Hiatal hernia     Kidney mass     left    Renal cancer         Past Surgical History:   Procedure Laterality Date    ABDOMINAL AORTIC ANEURYSM REPAIR N/A 2023    Procedure: ABDOMINAL AORTIC ANEURYSM REPAIR;  Surgeon: Koffi Agosto MD;  Location: [facility] MAIN OR;  Service: Vascular;  Laterality: N/A;    COLON RESECTION N/A 2023    Procedure: COLON RESECTION;  Surgeon: Hernan Mallory MD;  Location: [facility] MAIN OR;  Service: General;  Laterality: N/A;  EXPLORATORY LAPAROTOMY, REPAIR OF SMALL BOWEL MESENTERY, APPENDECTOMY, LEFT HEMICOLECTOMY, CREATION OF OSTOMY, ABDOMINAL CLOSURE    EXPLORATORY LAPAROTOMY N/A 2023    Procedure: LAPAROTOMY EXPLORATORY;  Surgeon: Koffi Agosto MD;  Location: [facility] MAIN OR;  Service: Vascular;  Laterality: N/A;  Exploratory Laparotomy    KIDNEY STONE SURGERY      NEPHRECTOMY PARTIAL Left 2023    Procedure: NEPHRECTOMY PARTIAL LAPAROSCOPIC WITH DAVINCI ROBOT, left;  Surgeon: Michelle Cai MD;  Location: [facility] MAIN OR;  Service: Robotics - DaVinci;  Laterality: Left;     "URETEROSCOPY LASER LITHOTRIPSY WITH STENT INSERTION Left 08/18/2023    Procedure: CYSTOSCOPY URETEROSCOPY RETROGRADE PYELOGRAM STENT INSERTION, left;  Surgeon: Michelle Cai MD;  Location: [facility] MAIN OR;  Service: Urology;  Laterality: Left;        12/29/23 1415   Wound 12/24/23 2035 midline abdomen Incision   Placement Date/Time: 12/24/23 2035   Orientation: midline  Location: abdomen  Primary Wound Type: Incision   Wound Image    Dressing Appearance dry;intact   Closure Staples   Base clean;dry   Periwound dry;intact   Periwound Temperature warm   Periwound Skin Turgor soft   Edges rolled/closed;open   Drainage Characteristics/Odor serosanguineous;bleeding controlled   Drainage Amount scant   Care, Wound cleansed with;sterile normal saline;negative pressure wound therapy   Dressing Care dressing applied;foam;transparent film   Periwound Care absorptive dressing applied;barrier film applied   NPWT (Negative Pressure Wound Therapy) 12/26/23 midline abdominal incision   Placement Date: 12/26/23   Location: midline abdominal incision   Therapy Setting continuous therapy   Dressing foam, black   Pressure Setting 125 mmHg   Sponges Inserted 2   Sponges Removed 1   Finger sweep complete Yes   Colostomy LLQ   Placement date: If unknown, DO NOT use \"Add Comment\" note/Placement time: If unknown, DO NOT use \"Add Comment\" note: 12/26/23 1315   Inserted by: DR HOLGUIN  Hand Hygiene Completed: Yes  Location: LLQ   Wound Image    Stomal Appliance 1 piece;Clean;Dry;Intact;Drainable;Changed   Stoma Appearance moist;red;protruding above skin level   Peristomal Assessment Clean;Intact   Accessories/Skin Care cleansed with water;skin sealant   Stoma Function stool;serosanguineous   Stool Color brown;red, dark   Stool Consistency liquid   Treatment Bag change;Site care   Output (mL) 50 mL       Wound Check / Follow-up: Patient seen today for ostomy follow-up and ongoing education. Patient status post abdominal aortic aneurysm " repair on 12/24 and exploratory laparotomy with colostomy placement on 12/26. Patient remains in CICU. He was extubated yesterday and is now on nasal cannula. He is awake, alert, and oriented at time of visit. Patient's spouse is present at bedside.     General surgeon at bedside at time of visit. Surgeon orders for Prevena incisional wound VAC to be removed to assess incision to rule out signs of infection then replace incisional wound VAC using black foam if incision site looks appropriate and notify him of assessment findings. Removed incisional wound VAC dressing with use of adhesive remover. Midline abdominal incision is closed with staples. There is small superficial opening with scant amount of bloody drainage noted. No evidence of dehiscence is noted. No purulence or signs of infection noted. Tissue surrounding incision is intact. Cleansed incision with normal saline and gauze. Applied skin barrier wipe to surrounding tissue. Applied strips of transparent drape surrounding incision for protection. Applied one strip of black foam over incision line then secured with transparent drape. Applied second piece of black foam as landing pad then secured with transparent drape. VAC attached, foam compressed, no signs of lifting or leaking. VAC functioning properly without alarms. Notified surgeon of assessment findings and incisional wound VAC replacement.    Colostomy pouching system is intact with stool and serosanguineous drainage noted in pouch. Removed pouch with use of adhesive remover. Stoma is red and moist. Peristomal tissue is intact with no signs of irritation. Cleansed stoma and peristomal tissue with warm water and washcloth. Measured stoma then cut new pouch to fit. Applied skin barrier wipe to peristomal tissue. Placed new one piece flat pouch. Seal obtained. No signs of lifting or leaking noted. Verbalized each step as I performed them for educational purposes. All of patient's and spouse's questions  were answered. Planning for next pouch change next week where patient's spouse will assist. Encouraged patient and spouse to write down any questions or concerns that may arise and we will address at next visit. They both verbalized understanding.      Impression: New colostomy. Midline abdominal incision with incisional wound VAC in place.      Short term goals: Regain skin integrity, colostomy management, ostomy education, negative pressure wound therapy.        Enedina Norman RN    12/29/2023    18:10 EST

## 2023-12-29 NOTE — PROGRESS NOTES
[facility]   Hospitalist Progress Note  Date: 2023  Patient Name: Danny Savage  : 1960  MRN: [id number]  Date of admission: 2023  Room/Bed: [facility]      Subjective   Subjective     Chief Complaint: Back pain    Summary:Danny Savage is a 63 y.o. male Danny Savage is a 63 y.o. male with past medical history of recently diagnosed renal cell carcinoma s/p partial left kidney nephrectomy in 2023 (follows Dr.O Espino as outpatient) and infrarenal aortic aneurysm (scan on  showing AAA of 4.7 cm) who presented with complaints of left lower back pain and fever since past 3 days.  Patient had CT abdomen scan done early December and was evaluated by urologist as outpatient.  Patient presented to ER 1 day prior with left flank pain where CT abdomen was obtained showing mildly decreased fluid collection at partial left nephrectomy bed containing decreasing but persistent small gas bubbles; findings consistent with postoperative seroma/hematoma; questionable infection.  Case was discussed with Dr. Spicer and patient was discharged home.  But patient's pain got worse on the day of presentation and was brought to ED.  Patient's left flank pain was severe, about 10 x 10 in intensity on presentation.  Patient was febrile with temp 102.6.  Lactic acid was normal.  Urinalysis was not significant for UTI.  CT abdomen pelvis with contrast showed stable fluid collection along the left nephrectomy bed, likely postoperative seroma/hematoma although infection cannot be ruled out. Abdominal aortic aneurysm of 5.1 cm.  Patient was started on IV antibiotics and was admitted as a case of possible abscess in the left nephrectomy bed.  Urology was consulted.     Hospital course complicated by sudden unresponsive on 2023.  Vital signs showed blood pressure 42/32.Patient was pale and clammy with agonal breathing.  No pulse was felt which seemed like PEA arrest after which CODE BLUE was  called.  Patient was getting IV fluid bolus.  Before starting CPR, patient had feeble pulse after which CPR was held.  Patient was then intubated at bedside. He was then transferred to critical care unit.  Given patient has expanding AAA  with eccentric mural thrombus/hematoma measuring up to 5.1 centimeter maximally on CT abdomen of 12/22; there is also concern for aortic rupture. 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 Vascular Surgeon was consulted immediately and patient underwent urgent Open repair of ruptured infrarenal abdominal aortic aneurysm with a tube graft and mobilization of the omentum with coverage of the graft using pedicled omental flap.  Patient taken back to the OR on 12/26/2023 to look for sources of blood loss from prior surgeries.  Graft was in good position, no obvious leak was identified, patient was found to have nonviable colon and patient underwent partial colectomy by general surgery.    Interval Followup: Patient successfully extubated yesterday, white blood cell count continues to rise, patient with mild fever      Objective   Objective     Vitals:   Temp:  [98.6 °F (37 °C)-99.6 °F (37.6 °C)] 99.1 °F (37.3 °C)  Heart Rate:  [] 97  Resp:  [13-26] 19  BP: (129-167)/(68-96) 145/80  Flow (L/min):  [2-4] 2    Physical Exam   GEN: No acute distress  HEENT: Moist mucous membranes  LUNGS: Equal chest rise bilaterally  CARDIAC: Regular rate and rhythm  NEURO: Moving all 4 extremities spontaneously  SKIN: No obvious breakdown      Result Review    Result Review:  I have personally reviewed these results:  [x]  Laboratory      Lab 12/29/23  0540 12/28/23  2220 12/28/23  0911 12/28/23  0400 12/27/23  1017 12/27/23  0644 12/27/23  0621 12/27/23  0551 12/26/23  1507 12/25/23  0632 12/25/23  0611 12/25/23  0532 12/25/23  0325 12/25/23  0141 12/25/23  0035 12/24/23  1935 12/24/23  1754 12/24/23  1636 12/24/23  1459   WBC 29.90*   --   --  24.41*  --   --   --  17.50* 12.18*   < >  --  10.74  --  8.30  --   --   --   --  20.44*   HEMOGLOBIN 7.0*  --   --  7.9*  --   --   --  8.3* 8.6*   < >  --  11.6*  --  13.8  --   --   --   --  9.1*   HEMATOCRIT 21.2*  --   --  23.0*  --   --   --  24.3* 24.8*   < >  --  33.9*  --  42.6  --   --   --   --  29.0*   PLATELETS 96*  --   --  76*  --   --   --  70* 72*   < >  --  69*  --  62*  --   --   --   --  263   NEUTROS ABS 26.24*  --   --  21.92*  --   --   --   --   --   --   --   --   --  7.14*  --   --   --   --  15.22*   IMMATURE GRANS (ABS) 0.98*  --   --  0.37*  --   --   --   --   --   --   --   --   --   --   --   --   --   --  0.12*   LYMPHS ABS 1.22  --   --  1.12  --   --   --   --   --   --   --   --   --   --   --   --   --   --  2.48   MONOS ABS 1.23*  --   --  0.85  --   --   --   --   --   --   --   --   --   --   --   --   --   --  2.55*   EOS ABS 0.06  --   --  0.00  --   --   --   --   --   --   --   --   --   --   --   --   --   --  0.02   MCV 89.1  --   --  86.8  --   --   --  87.4 86.1   < >  --  84.3  --  89.5  --   --   --   --  91.2   PROCALCITONIN 11.16*  --   --   --   --   --   --   --   --   --   --  8.45*  --   --  2.07*  --   --   --  1.32*   LACTATE  --   --   --   --   --   --   --   --  1.3   < >  --  9.5*   < >  --  18.6*  --  11.2*  --  7.7*   LACTATE, ARTERIAL  --   --  1.24  --  1.49 1.53  --   --   --    < >  --   --    < >  --   --    < >  --    < >  --    PROTIME  --  13.7  --   --   --   --  14.1  --  14.9   < > 20.8*  --   --   --  24.3*   < > 17.6*  --   --    APTT 81.8 33.6*  --   --   --   --   --   --   --   --  45.0*  --   --   --  111.0*   < > 24.6  --   --    D DIMER QUANT  --   --   --   --   --   --   --   --   --   --   --   --   --   --   --   --  19.79*  --   --     < > = values in this interval not displayed.         Lab 12/29/23  0540 12/28/23  1842 12/28/23  0911 12/28/23  0400 12/27/23  1017 12/27/23  0644   SODIUM 138 136  --  139  --   --     SODIUM, ARTERIAL  --   --  135.2*  --  138.7 138.6   POTASSIUM 4.2 3.9  --  4.3  --   --    CHLORIDE 103 100  --  101  --   --    CO2 24.0 22.9  --  22.3  --   --    ANION GAP 11.0 13.1  --  15.7*  --   --    BUN 35* 25*  --  34*  --   --    CREATININE 3.65* 2.79*  --  3.27*  --   --    EGFR 17.7* 24.4*  --  20.2*  --   --    GLUCOSE 75 109*  --  113*  --   --    GLUCOSE, ARTERIAL  --   --  110*  --  99 86   CALCIUM 7.2* 7.5*  --  7.3*  --   --    IONIZED CALCIUM  --   --  0.94*  --  0.94* 0.84*   MAGNESIUM 2.2 2.1  --  2.2  --   --    PHOSPHORUS 2.7 2.4*  --  3.7  --   --          Lab 12/29/23  0540 12/28/23  1842 12/28/23  0400 12/27/23  0551 12/25/23  0532 12/25/23  0035   TOTAL PROTEIN 5.0*  --  5.0* 4.4*   < > 2.8*   ALBUMIN 2.3* 2.4* 2.3* 2.2*   < > 1.8*   GLOBULIN 2.7  --  2.7 2.2   < > 1.0   ALT (SGPT) 80*  --  131* 233*   < > 550*   AST (SGOT) 136*  --  212* 360*   < > 444*   BILIRUBIN 1.8*  --  2.0* 1.6*   < > 0.4   ALK PHOS 109  --  86 68   < > 35*   AMYLASE  --   --   --   --   --  194*   LIPASE  --   --   --   --   --  20    < > = values in this interval not displayed.         Lab 12/28/23  2220 12/27/23  0621 12/26/23  1507 12/24/23  2153 12/24/23  1754 12/24/23  1459   PROBNP  --   --   --   --   --  1,898.0*   HSTROP T  --   --   --   --  467* 114*   PROTIME 13.7 14.1 14.9   < > 17.6*  --    INR 1.03 1.07 1.15   < > 1.42*  --     < > = values in this interval not displayed.             Lab 12/24/23 2032   ABO TYPING A   RH TYPING Negative   ANTIBODY SCREEN Negative         Lab 12/28/23  0911 12/27/23  1017 12/27/23  0644   PH, ARTERIAL 7.466* 7.189* 7.422   PCO2, ARTERIAL 33.5* 70.2* 35.7   PO2 ART 71.3* 65.0* 86.8   O2 SATURATION ART 93.2* 86.4* 95.0   FIO2 35 45 45   HCO3 ART 23.6 26.2* 22.7   BASE EXCESS ART 0.1 -2.8* -1.4   CARBOXYHEMOGLOBIN 0.3 0.3 0.2     Brief Urine Lab Results  (Last result in the past 365 days)        Color   Clarity   Blood   Leuk Est   Nitrite   Protein   CREAT   Urine  HCG        12/23/23 2054 Yellow   Clear   Small (1+)   Negative   Negative   100 mg/dL (2+)                 [x]  Microbiology   Microbiology Results (last 10 days)       Procedure Component Value - Date/Time    Respiratory Culture - Sputum, ET Suction [[id number]]  (Abnormal) Collected: 12/24/23 1638    Lab Status: Final result Specimen: Sputum from ET Suction Updated: 12/27/23 1040     Respiratory Culture Light growth (2+) Moraxella atlantae     Gram Stain Few (2+) Gram negative bacilli      Rare (1+) WBCs seen    Blood Culture - Blood, Arm, Left [[id number]]  (Normal) Collected: 12/24/23 1459    Lab Status: Preliminary result Specimen: Blood from Arm, Left Updated: 12/28/23 1531     Blood Culture No growth at 4 days    Blood Culture - Blood, Arm, Right [[id number]]  (Normal) Collected: 12/23/23 2205    Lab Status: Final result Specimen: Blood from Arm, Right Updated: 12/28/23 2215     Blood Culture No growth at 5 days    Blood Culture - Blood, Arm, Left [[id number]]  (Normal) Collected: 12/23/23 2205    Lab Status: Final result Specimen: Blood from Arm, Left Updated: 12/28/23 2215     Blood Culture No growth at 5 days    COVID-19, FLU A/B, RSV PCR 1 HR TAT - Swab, Nasopharynx [[id number]]  (Normal) Collected: 12/23/23 2204    Lab Status: Final result Specimen: Swab from Nasopharynx Updated: 12/23/23 2303     COVID19 Not Detected     Influenza A PCR Not Detected     Influenza B PCR Not Detected     RSV, PCR Not Detected    Narrative:      Fact sheet for providers: https://www.fda.gov/media/274242/download    Fact sheet for patients: https://www.fda.gov/media/537820/download    Test performed by PCR.    Blood Culture - Blood, Arm, Right [[id number]]  (Abnormal)  (Susceptibility) Collected: 12/22/23 0815    Lab Status: Final result Specimen: Blood from Arm, Right Updated: 12/25/23 0652     Blood Culture Streptococcus pyogenes (Group A)     Isolated from Aerobic and Anaerobic Bottles     Gram Stain Aerobic Bottle Gram  positive cocci in pairs and chains      Anaerobic Bottle Gram positive cocci in pairs and chains    Susceptibility        Streptococcus pyogenes (Group A)      LINDSEY      Ceftriaxone Susceptible      Levofloxacin Susceptible      Penicillin G Susceptible      Vancomycin Susceptible                           Blood Culture - Blood, Arm, Left [[id number]]  (Abnormal) Collected: 12/22/23 0815    Lab Status: Final result Specimen: Blood from Arm, Left Updated: 12/25/23 0652     Blood Culture Streptococcus pyogenes (Group A)     Isolated from Aerobic and Anaerobic Bottles     Gram Stain Aerobic Bottle Gram positive cocci in pairs and chains      Anaerobic Bottle Gram positive cocci in pairs and chains    Narrative:      Refer to previous blood culture collected on 12/22/2023 0815 for MICs.      COVID PRE-OP / PRE-PROCEDURE SCREENING ORDER (NO ISOLATION) - Swab, Nasopharynx [[id number]]  (Normal) Collected: 12/22/23 0710    Lab Status: Final result Specimen: Swab from Nasopharynx Updated: 12/22/23 0802    Narrative:      The following orders were created for panel order COVID PRE-OP / PRE-PROCEDURE SCREENING ORDER (NO ISOLATION) - Swab, Nasopharynx.  Procedure                               Abnormality         Status                     ---------                               -----------         ------                     COVID-19, FLU A/B, RSV P...[[id number]]  Normal              Final result                 Please view results for these tests on the individual orders.    COVID-19, FLU A/B, RSV PCR 1 HR TAT - Swab, Nasopharynx [[id number]]  (Normal) Collected: 12/22/23 0710    Lab Status: Final result Specimen: Swab from Nasopharynx Updated: 12/22/23 0802     COVID19 Not Detected     Influenza A PCR Not Detected     Influenza B PCR Not Detected     RSV, PCR Not Detected    Narrative:      Fact sheet for providers: https://www.fda.gov/media/112635/download    Fact sheet for patients:  https://www.fda.gov/media/162680/download    Test performed by PCR.          [x]  Radiology  XR Chest 1 View    Result Date: 12/29/2023    1. Diffuse bilateral airspace opacities appear unchanged, which could reflect pulmonary edema or pneumonia. 2. Small bilateral pleural effusions.       GELY MONTE MD       Electronically Signed and Approved By: GELY MONTE MD on 12/29/2023 at 6:04             XR Chest 1 View    Result Date: 12/27/2023     Right jugular central venous catheter tip is in the right atrium.  No pneumothorax is seen.  ET tube and NG tube remain in place.  Bilateral airspace disease and pleural effusions are unchanged.       BENITO PRADHAN MD       Electronically Signed and Approved By: BENITO PRADHAN MD on 12/27/2023 at 13:21             XR Chest 1 View    Result Date: 12/27/2023    1. Endotracheal tube in good position. 2. Bilateral airspace opacities, which could reflect pulmonary edema or pneumonia. 3. Moderate bilateral pleural effusions.       GELY MONTE MD       Electronically Signed and Approved By: GELY MONTE MD on 12/27/2023 at 4:42             CT Angiogram Abdomen Pelvis    Result Date: 12/24/2023    1. Development of aorto-caval fistula with large amount of active extravasation of hemorrhage into right abdomen and retroperitoneal space likely from a ruptured IVC.  Findings were discussed by Dr. Cervantes to vascular surgery at approximately 1845 hours. 2. Bibasilar consolidations within lung bases, which could reflect atelectasis, aspiration, and/or pneumonia. 3. Small fluid collection along left nephrectomy site is unchanged. 4. Air within urinary bladder, likely secondary to Rico catheterization.     GELY MONTE MD       Electronically Signed and Approved By: GELY MONTE MD on 12/24/2023 at 19:37             XR Abdomen KUB    Result Date: 12/24/2023    1. Mild prominence of small bowel loops within the mid abdomen.  The findings are nonspecific and may be  related to adynamic ileus.  Developing bowel obstruction could have this appearance.  Recommend follow-up to ensure resolution.     GELY MONTOYA MD       Electronically Signed and Approved By: GELY MONTOYA MD on 12/24/2023 at 16:07             XR Chest 1 View    Result Date: 12/24/2023    1. The endotracheal tube distal tip is seen approximately 3-4 cm above the prosper. 2. The nasogastric tube is positioned in the stomach in the left upper quadrant. 3. The right IJ central line distal tip is noted near the SVC/right atrial junction.  The line is ready for use.  There is no pneumothorax. 4. Evidence for subtle ill-defined infiltrates throughout the mid to lower lungs bilaterally.  There may also be a small left pleural effusion.       GELY MONTOYA MD       Electronically Signed and Approved By: GELY MONTOYA MD on 12/24/2023 at 15:13             CT Abdomen Pelvis With Contrast    Result Date: 12/23/2023    1. Stable fluid collection along left nephrectomy bed, likely a postoperative seroma/hematoma, although infection cannot be excluded on imaging alone. 2. No new acute process identified within abdomen/pelvis. 3. Stable abdominal aortic aneurysm.     GELY MONTE MD       Electronically Signed and Approved By: GELY MONTE MD on 12/23/2023 at 22:07             XR Chest 1 View    Result Date: 12/23/2023   No acute cardiopulmonary process identified.       GELY MONTE MD       Electronically Signed and Approved By: GELY MONTE MD on 12/23/2023 at 21:49             CT Abdomen Pelvis With Contrast    Result Date: 12/22/2023    1. Mildly decreased fluid collection at the partial left nephrectomy bed measuring 1.4 x 2.9 centimeter (previously 2 x 3.2 centimeter), containing decreasing but persistent small gas bubbles.  Findings favor postoperative seroma/hematoma +/-infection.  There does not appear to be a mature rim enhancing wall to suggest true abscess or drainable collection.  Decrease size  argues against urinoma. 2. Grossly stable infrarenal abdominal aortic aneurysm with eccentric mural thrombus/hematoma measuring up to 5.1 centimeter maximally. 3. Hepatic steatosis. 4. Other chronic/ancillary finding similar to recent comparison.      DOMINIK NORTON MD       Electronically Signed and Approved By: DOMINIK NORTON MD on 12/22/2023 at 9:36             XR Chest 1 View    Result Date: 12/22/2023   No active cardiopulmonary disease       BERNIE SEXTON MD       Electronically Signed and Approved By: BERNIE SEXTON MD on 12/22/2023 at 7:25            []  EKG/Telemetry   []  Cardiology/Vascular   []  Pathology  []  Old records  []  Other:    Assessment & Plan   Assessment / Plan     Assessment:  Ruptured infrarenal AAA s/p open repair  Hemorrhagic shock  Hypovolemic shock  Acute hypoxic respiratory failure requiring mechanical ventilation  Septic shock with Streptococcus pyogenes bacteremia  SVT  History of RCC s/p partial nephrectomy on 11/13  Acute kidney injury  Clinically significant lactic acidosis  Chronically immunosuppressed on CellCept  Hypocalcemia  Hypokalemia    Plan:  Patient currently being managed in critical care unit; appreciate intensivist input.  Patient had spontaneous rupture of AAA s/p Open repair of ruptured infrarenal abdominal aortic aneurysm with a tube graft and mobilization of the omentum with coverage of the graft using pedicled omental flap on 12/24.  Vascular surgery following the patient; appreciate further input.,   Successfully extubated on 12/28/2023  Replace electrolytes as needed  Continue hemodialysis  Off pressors, monitor blood pressure  Urology consulted and following, appreciate their recommendations  Continue Unasyn  Repeat blood cultures, procalcitonin today  Repeat CT scan of the abdomen and pelvis to evaluate for worsening fluid collections and sources of worsening leukocytosis  Wean vent and extubate as able  Discussed management plan with pulmonology      Discussed with RN.    DVT prophylaxis:  Medical and mechanical DVT prophylaxis orders are present.    CODE STATUS:   Level Of Support Discussed With: Patient  Code Status (Patient has no pulse and is not breathing): CPR (Attempt to Resuscitate)  Medical Interventions (Patient has pulse or is breathing): Full Support    Pt is critically ill in ICU with shock, blood loss anemia.  I have spent 32 minutes of critical care time reviewing previous documentation, reviewing all pertinent telemetry, labs, and imaging studies, examining the patient, modifying the care plan and discussing the patient´s condition and care plan with the consultants, available family and during rounds. This does not include any procedures performed.      Electronically signed by Eric Martin MD, 12/29/23, 12:36 PM EST.

## 2023-12-29 NOTE — THERAPY EVALUATION
Patient Name: Danny Savage  : 1960    MRN: [id number]                              Today's Date: 2023       Admit Date: 2023    Visit Dx:     ICD-10-CM ICD-9-CM   1. Sepsis, due to unspecified organism, unspecified whether acute organ dysfunction present  A41.9 038.9     995.91   2. Fever, unspecified fever cause  R50.9 780.60   3. Intra-abdominal fluid collection  R18.8 789.59   4. Abdominal aortic aneurysm (AAA) without rupture, unspecified part  I71.40 441.4   5. Ruptured infrarenal abdominal aortic aneurysm (AAA)  I71.33 441.3   6. Decreased activities of daily living (ADL)  Z78.9 V49.89     Patient Active Problem List   Diagnosis    Left renal mass    Kidney stone    Left ureteral stone    Left flank pain    Ruptured infrarenal abdominal aortic aneurysm (AAA)     Past Medical History:   Diagnosis Date    Allergy     Aortic aneurysm     4.7 just found has not f/u    H/O Kidney stone     Hiatal hernia     Kidney mass     left    Renal cancer      Past Surgical History:   Procedure Laterality Date    ABDOMINAL AORTIC ANEURYSM REPAIR N/A 2023    Procedure: ABDOMINAL AORTIC ANEURYSM REPAIR;  Surgeon: Koffi Agosto MD;  Location: [facility] MAIN OR;  Service: Vascular;  Laterality: N/A;    COLON RESECTION N/A 2023    Procedure: COLON RESECTION;  Surgeon: Hernan Mallory MD;  Location: [facility] MAIN OR;  Service: General;  Laterality: N/A;  EXPLORATORY LAPAROTOMY, REPAIR OF SMALL BOWEL MESENTERY, APPENDECTOMY, LEFT HEMICOLECTOMY, CREATION OF OSTOMY, ABDOMINAL CLOSURE    EXPLORATORY LAPAROTOMY N/A 2023    Procedure: LAPAROTOMY EXPLORATORY;  Surgeon: Koffi Agosto MD;  Location: [facility] MAIN OR;  Service: Vascular;  Laterality: N/A;  Exploratory Laparotomy    KIDNEY STONE SURGERY      NEPHRECTOMY PARTIAL Left 2023    Procedure: NEPHRECTOMY PARTIAL LAPAROSCOPIC WITH DAVINCI ROBOT, left;  Surgeon: Michelle Cai MD;  Location: [facility] MAIN OR;  Service: Robotics - DaVinci;   Laterality: Left;    URETEROSCOPY LASER LITHOTRIPSY WITH STENT INSERTION Left 08/18/2023    Procedure: CYSTOSCOPY URETEROSCOPY RETROGRADE PYELOGRAM STENT INSERTION, left;  Surgeon: Michelle Cai MD;  Location: [facility] MAIN OR;  Service: Urology;  Laterality: Left;      General Information       [facility] Name 12/29/23 1514          OT Time and Intention    Document Type evaluation  -     Mode of Treatment individual therapy;occupational therapy  -[facility] Name 12/29/23 1514          General Information    Patient Profile Reviewed yes  -     Prior Level of Function --  (I) with ADLs, ambulated w/o a device, has a step over tub where he stands to shower with shower chair available, elevated commode, stands to groom, drives, and no home O2.  -     Existing Precautions/Restrictions fall  -     Barriers to Rehab none identified  -[facility] Name 12/29/23 1514          Occupational Profile    Reason for Services/Referral (Occupational Profile) Patient is a 63 year old male who is currently status post open repair of ruptured infrarenal abdominal aortic aneurysm and mobilization of the omentum with coverage of the graft on December 24th, 2023 as well as exploratory laparotomy, repair of hole in the mesentery of the terminal ileum, appendectomy, left colon resection with ostomy creation, and delayed abdominal closure on December 26th, 2023. Occupational therapy consulted due to recent decline in ADLs/functional transfers. No previous occupational therapy services for current condition.  -[facility] Name 12/29/23 1514          Living Environment    People in Home spouse  -[facility] Name 12/29/23 1514          Stairs Within Home, Primary    Stairs, Within Home, Primary Patient reports living in a tri-level home with the living area on the main level and his bedroom on the upper level.  -     Stair Railings, Within Home, Primary railings safe and in good condition  -       Row Name 12/29/23 1514           Cognition    Orientation Status (Cognition) oriented x 4  -       Row Name 12/29/23 1514          Safety Issues, Functional Mobility    Impairments Affecting Function (Mobility) balance;endurance/activity tolerance;strength;pain  -               User Key  (r) = Recorded By, (t) = Taken By, (c) = Cosigned By      Initials Name Provider Type     Jasmyn Colon OT Occupational Therapist                     Mobility/ADL's       Row Name 12/29/23 1519          Bed Mobility    Bed Mobility supine-sit;sit-supine  -     Supine-Sit [place] (Bed Mobility) maximum assist (25% patient effort)  -     Sit-Supine [place] (Bed Mobility) maximum assist (25% patient effort)  -     Bed Mobility, Safety Issues decreased use of arms for pushing/pulling;decreased use of legs for bridging/pushing  -     Assistive Device (Bed Mobility) draw sheet;bed rails;head of bed elevated  -[facility] Name 12/29/23 1519          Transfers    Transfers sit-stand transfer;stand-sit transfer  -LF       Row Name 12/29/23 1519          Sit-Stand Transfer    Sit-Stand [place] (Transfers) maximum assist (25% patient effort);2 person assist  -     Assistive Device (Sit-Stand Transfers) walker, front-wheeled  -[facility] Name 12/29/23 1519          Stand-Sit Transfer    Stand-Sit [place] (Transfers) maximum assist (25% patient effort);2 person assist  -     Assistive Device (Stand-Sit Transfers) walker, front-wheeled  -[facility] Name 12/29/23 1519          Activities of Daily Living    BADL Assessment/Intervention bathing;upper body dressing;lower body dressing;grooming;feeding;toileting  -LF       Row Name 12/29/23 1519          Bathing Assessment/Intervention    [place] Level (Bathing) bathing skills;upper body;moderate assist (50% patient effort);lower body;maximum assist (25% patient effort);dependent (less than 25% patient effort)  -[facility] Name 12/29/23 1519          Upper Body Dressing  Assessment/Training    [place] Level (Upper Body Dressing) upper body dressing skills;moderate assist (50% patient effort)  -[facility] Name 12/29/23 1519          Lower Body Dressing Assessment/Training    [place] Level (Lower Body Dressing) lower body dressing skills;maximum assist (25% patient effort);dependent (less than 25% patient effort)  -[facility] Name 12/29/23 1519          Grooming Assessment/Training    [place] Level (Grooming) grooming skills;minimum assist (75% patient effort)  -[facility] Name 12/29/23 1519          Self-Feeding Assessment/Training    [place] Level (Feeding) feeding skills;minimum assist (75% patient effort)  -[facility] Name 12/29/23 1519          Toileting Assessment/Training    [place] Level (Toileting) toileting skills;maximum assist (25% patient effort);dependent (less than 25% patient effort)  -     Comment, (Toileting) Ostomy and strickland catheter currently in place.  -               User Key  (r) = Recorded By, (t) = Taken By, (c) = Cosigned By      Initials Name Provider Type     Jasmyn Colon OT Occupational Therapist                   Obj/Interventions       Row Name 12/29/23 1520          Sensory Assessment (Somatosensory)    Sensory Assessment (Somatosensory) UE sensation intact  -LF       Row Name 12/29/23 1520          Vision Assessment/Intervention    Visual Impairment/Limitations WFL  -LF       Row Name 12/29/23 1520          Range of Motion Comprehensive    General Range of Motion bilateral upper extremity ROM WFL  -LF       Row Name 12/29/23 1520          Strength Comprehensive (MMT)    Comment, General Manual Muscle Testing (MMT) Assessment 4-/5 BUEs  -LF       Row Name 12/29/23 1520          Motor Skills    Motor Skills coordination;functional endurance  -     Coordination bilateral;upper extremity;WFL  -     Functional Endurance Poor+/Fair-  -LF       Row Name 12/29/23 1520          Balance    Balance Assessment  sitting dynamic balance;standing static balance  -LF     Dynamic Sitting Balance standby assist  -LF     Position, Sitting Balance supported;sitting edge of bed  -LF     Static Standing Balance maximum assist  -LF     Position/Device Used, Standing Balance supported;walker, front-wheeled  -LF               User Key  (r) = Recorded By, (t) = Taken By, (c) = Cosigned By      Initials Name Provider Type    LF Jasmyn Colon, OT Occupational Therapist                   Goals/Plan       Row Name 12/29/23 1521          Bed Mobility Goal 1 (OT)    Activity/Assistive Device (Bed Mobility Goal 1, OT) bed mobility activities, all  -LF     [place] Level/Cues Needed (Bed Mobility Goal 1, OT) minimum assist (75% or more patient effort)  -LF     Time Frame (Bed Mobility Goal 1, OT) long term goal (LTG);10 days  -       Row Name 12/29/23 1521          Transfer Goal 1 (OT)    Activity/Assistive Device (Transfer Goal 1, OT) transfers, all  -LF     [place] Level/Cues Needed (Transfer Goal 1, OT) minimum assist (75% or more patient effort)  -LF     Time Frame (Transfer Goal 1, OT) long term goal (LTG);10 days  -       Row Name 12/29/23 1521          Bathing Goal 1 (OT)    Activity/Device (Bathing Goal 1, OT) bathing skills, all  -LF     [place] Level/Cues Needed (Bathing Goal 1, OT) minimum assist (75% or more patient effort)  -LF     Time Frame (Bathing Goal 1, OT) long term goal (LTG);10 days  -       Row Name 12/29/23 1521          Dressing Goal 1 (OT)    Activity/Device (Dressing Goal 1, OT) dressing skills, all  -LF     [place]/Cues Needed (Dressing Goal 1, OT) minimum assist (75% or more patient effort)  -LF     Time Frame (Dressing Goal 1, OT) long term goal (LTG);10 days  -       Row Name 12/29/23 1521          Toileting Goal 1 (OT)    Activity/Device (Toileting Goal 1, OT) toileting skills, all  -LF     [place] Level/Cues Needed (Toileting Goal 1, OT) minimum assist (75% or more patient  effort)  -LF     Time Frame (Toileting Goal 1, OT) long term goal (LTG);10 days  -       Row Name 12/29/23 1521          Strength Goal 1 (OT)    Strength Goal 1 (OT) Patient will increase BUE strength by 1/2 grade to support ADLs/functional transfers.  -LF     Time Frame (Strength Goal 1, OT) long term goal (LTG);10 days  -LF       Row Name 12/29/23 1521          Problem Specific Goal 1 (OT)    Problem Specific Goal 1 (OT) Patient will demonstrate fair+ endurance to support ADLs/functional transfers.  -LF     Time Frame (Problem Specific Goal 1, OT) long term goal (LTG);10 days  -       Row Name 12/29/23 1521          Therapy Assessment/Plan (OT)    Planned Therapy Interventions (OT) activity tolerance training;BADL retraining;functional balance retraining;occupation/activity based interventions;patient/caregiver education/training;strengthening exercise;transfer/mobility retraining  -               User Key  (r) = Recorded By, (t) = Taken By, (c) = Cosigned By      Initials Name Provider Type    LF Jasmyn Colon, OT Occupational Therapist                   Clinical Impression       Row Name 12/29/23 1520          Pain Assessment    Additional Documentation Pain Scale: FACES Pre/Post-Treatment (Group)  -       Row Name 12/29/23 1520          Pain Scale: FACES Pre/Post-Treatment    Pain: FACES Scale, Pretreatment 2-->hurts little bit  -LF     Posttreatment Pain Rating 4-->hurts little more  -LF     Pain Location generalized  -       Row Name 12/29/23 1520          Plan of Care Review    Plan of Care Reviewed With patient;spouse  -     Progress no change  -     Outcome Evaluation Patient presents with limitations in self-care, functional transfers, balance, endurance, and BUE strength. He would benefit from continued skilled occupational therapy services to maximize independence with ADLs/functional transfers.  -       Row Name 12/29/23 1522          Therapy Assessment/Plan (OT)    Patient/Family  Therapy Goal Statement (OT) To maximize independence.  -     Rehab Potential (OT) good, to achieve stated therapy goals  -     Criteria for Skilled Therapeutic Interventions Met (OT) yes;meets criteria;skilled treatment is necessary  -     Therapy Frequency (OT) 5 times/wk  -       Row Name 12/29/23 1520          Therapy Plan Review/Discharge Plan (OT)    Equipment Needs Upon Discharge (OT) walker, rolling;commode chair  -LF     Anticipated Discharge Disposition (OT) inpatient rehabilitation facility  -       Row Name 12/29/23 1520          Vital Signs    O2 Delivery Pre Treatment nasal cannula  -LF     O2 Delivery Intra Treatment nasal cannula  -LF     O2 Delivery Post Treatment nasal cannula  -LF       Row Name 12/29/23 1520          Positioning and Restraints    Pre-Treatment Position in bed  -LF     Post Treatment Position bed  -LF     In Bed supine;call light within reach;encouraged to call for assist;with family/caregiver;with nsg  -LF               User Key  (r) = Recorded By, (t) = Taken By, (c) = Cosigned By      Initials Name Provider Type     Jasmyn Colon, OT Occupational Therapist                   Outcome Measures       Row Name 12/29/23 1524          How much help from another is currently needed...    Putting on and taking off regular lower body clothing? 1  -LF     Bathing (including washing, rinsing, and drying) 2  -LF     Toileting (which includes using toilet bed pan or urinal) 1  -LF     Putting on and taking off regular upper body clothing 2  -LF     Taking care of personal grooming (such as brushing teeth) 3  -LF     Eating meals 3  -LF     AM-PAC 6 Clicks Score (OT) 12  -LF       Row Name 12/29/23 0809          How much help from another person do you currently need...    Turning from your back to your side while in flat bed without using bedrails? 2  -KS     Moving from lying on back to sitting on the side of a flat bed without bedrails? 2  -KS     Moving to and from a bed to  a chair (including a wheelchair)? 2  -KS     Standing up from a chair using your arms (e.g., wheelchair, bedside chair)? 2  -KS     Climbing 3-5 steps with a railing? 1  -KS     To walk in hospital room? 1  -KS     AM-PAC 6 Clicks Score (PT) 10  -KS     Highest Level of Mobility Goal 4 --> Transfer to chair/commode  -KS       Row Name 12/29/23 1524          Functional Assessment    Outcome Measure Options AM-PAC 6 Clicks Daily Activity (OT);Optimal Instrument  -LF       Row Name 12/29/23 1524          Optimal Instrument    Optimal Instrument Optimal - 3  -LF     Bending/Stooping 5  -LF     Standing 4  -LF     Reaching 2  -LF     From the list, choose the 3 activities you would most like to be able to do without any difficulty Bending/stooping;Standing;Reaching  -LF     Total Score Optimal - 3 11  -LF               User Key  (r) = Recorded By, (t) = Taken By, (c) = Cosigned By      Initials Name Provider Type    LF Jasmyn Colon OT Occupational Therapist    Regina Benitez, RN Registered Nurse                    Occupational Therapy Education       Title: PT OT SLP Therapies (Done)       Topic: Occupational Therapy (Done)       Point: ADL training (Done)       Description:   Instruct learner(s) on proper safety adaptation and remediation techniques during self care or transfers.   Instruct in proper use of assistive devices.                  Learning Progress Summary             Patient Acceptance, E,TB, VU by  at 12/29/2023 1525   Significant Other Acceptance, E,TB, VU by  at 12/29/2023 1525                         Point: Precautions (Done)       Description:   Instruct learner(s) on prescribed precautions during self-care and functional transfers.                  Learning Progress Summary             Patient Acceptance, E,TB, VU by  at 12/29/2023 1525   Significant Other Acceptance, E,TB, VU by  at 12/29/2023 1525                         Point: Body mechanics (Done)       Description:   Instruct  learner(s) on proper positioning and spine alignment during self-care, functional mobility activities and/or exercises.                  Learning Progress Summary             Patient Acceptance, E,TB, VU by  at 12/29/2023 1525   Significant Other Acceptance, E,TB, VU by  at 12/29/2023 1525                                         User Key       Initials Effective Dates Name Provider Type Discipline     06/16/21 -  Jasmyn Colon, OT Occupational Therapist OT                  OT Recommendation and Plan  Planned Therapy Interventions (OT): activity tolerance training, BADL retraining, functional balance retraining, occupation/activity based interventions, patient/caregiver education/training, strengthening exercise, transfer/mobility retraining  Therapy Frequency (OT): 5 times/wk  Plan of Care Review  Plan of Care Reviewed With: patient, spouse  Progress: no change  Outcome Evaluation: Patient presents with limitations in self-care, functional transfers, balance, endurance, and BUE strength. He would benefit from continued skilled occupational therapy services to maximize independence with ADLs/functional transfers.     Time Calculation:   Evaluation Complexity (OT)  Review Occupational Profile/Medical/Therapy History Complexity: brief/low complexity  Assessment, Occupational Performance/Identification of Deficit Complexity: 3-5 performance deficits  Clinical Decision Making Complexity (OT): problem focused assessment/low complexity  Overall Complexity of Evaluation (OT): low complexity     Time Calculation- OT       Row Name 12/29/23 1525             Time Calculation- OT    OT Received On 12/29/23  -      OT Goal Re-Cert Due Date 01/07/24  -LF         Untimed Charges    OT Eval/Re-eval Minutes 50  -LF         Total Minutes    Untimed Charges Total Minutes 50  -LF       Total Minutes 50  -LF                User Key  (r) = Recorded By, (t) = Taken By, (c) = Cosigned By      Initials Name Provider Type      Jasmyn Colon OT Occupational Therapist                  Therapy Charges for Today       Code Description Service Date Service Provider Modifiers Qty    33591726034 HC OT EVAL LOW COMPLEXITY 4 12/29/2023 Jasmyn Colon OT GO 1                 Jasmyn Colon OT  12/29/2023

## 2023-12-30 LAB
ABO GROUP BLD: NORMAL
ALBUMIN SERPL-MCNC: 2.2 G/DL (ref 3.5–5.2)
ALBUMIN SERPL-MCNC: 2.4 G/DL (ref 3.5–5.2)
ALBUMIN/GLOB SERPL: 0.6 G/DL
ALBUMIN/GLOB SERPL: 0.6 G/DL
ALP SERPL-CCNC: 122 U/L (ref 39–117)
ALP SERPL-CCNC: 155 U/L (ref 39–117)
ALT SERPL W P-5'-P-CCNC: 52 U/L (ref 1–41)
ALT SERPL W P-5'-P-CCNC: 54 U/L (ref 1–41)
ANION GAP SERPL CALCULATED.3IONS-SCNC: 12.3 MMOL/L (ref 5–15)
ANION GAP SERPL CALCULATED.3IONS-SCNC: 13.2 MMOL/L (ref 5–15)
ANISOCYTOSIS BLD QL: ABNORMAL
APTT PPP: 85.9 SECONDS (ref 78–95.9)
AST SERPL-CCNC: 79 U/L (ref 1–40)
AST SERPL-CCNC: 95 U/L (ref 1–40)
BH BB BLOOD EXPIRATION DATE: NORMAL
BH BB BLOOD TYPE BARCODE: 600
BH BB DISPENSE STATUS: NORMAL
BH BB PRODUCT CODE: NORMAL
BH BB UNIT NUMBER: NORMAL
BH CV LOW VAS RIGHT GASTRONEMIUS VESSEL: 1
BH CV LOW VAS RIGHT PERONEAL VESSEL: 1
BH CV LOWER VASCULAR RIGHT COMMON FEMORAL AUGMENT: NORMAL
BH CV LOWER VASCULAR RIGHT COMMON FEMORAL COMPETENT: NORMAL
BH CV LOWER VASCULAR RIGHT COMMON FEMORAL COMPRESS: NORMAL
BH CV LOWER VASCULAR RIGHT COMMON FEMORAL PHASIC: NORMAL
BH CV LOWER VASCULAR RIGHT COMMON FEMORAL SPONT: NORMAL
BH CV LOWER VASCULAR RIGHT DISTAL FEMORAL COMPRESS: NORMAL
BH CV LOWER VASCULAR RIGHT GASTRONEMIUS COMPRESS: NORMAL
BH CV LOWER VASCULAR RIGHT GASTRONEMIUS THROMBUS: NORMAL
BH CV LOWER VASCULAR RIGHT GREATER SAPH AK COMPRESS: NORMAL
BH CV LOWER VASCULAR RIGHT GREATER SAPH BK COMPRESS: NORMAL
BH CV LOWER VASCULAR RIGHT LESSER SAPH COMPRESS: NORMAL
BH CV LOWER VASCULAR RIGHT MID FEMORAL AUGMENT: NORMAL
BH CV LOWER VASCULAR RIGHT MID FEMORAL COMPETENT: NORMAL
BH CV LOWER VASCULAR RIGHT MID FEMORAL COMPRESS: NORMAL
BH CV LOWER VASCULAR RIGHT MID FEMORAL PHASIC: NORMAL
BH CV LOWER VASCULAR RIGHT MID FEMORAL SPONT: NORMAL
BH CV LOWER VASCULAR RIGHT PERONEAL COMPRESS: NORMAL
BH CV LOWER VASCULAR RIGHT PERONEAL THROMBUS: NORMAL
BH CV LOWER VASCULAR RIGHT POPLITEAL AUGMENT: NORMAL
BH CV LOWER VASCULAR RIGHT POPLITEAL COMPETENT: NORMAL
BH CV LOWER VASCULAR RIGHT POPLITEAL COMPRESS: NORMAL
BH CV LOWER VASCULAR RIGHT POPLITEAL PHASIC: NORMAL
BH CV LOWER VASCULAR RIGHT POPLITEAL SPONT: NORMAL
BH CV LOWER VASCULAR RIGHT POSTERIOR TIBIAL COMPRESS: NORMAL
BH CV LOWER VASCULAR RIGHT PROXIMAL FEMORAL COMPRESS: NORMAL
BH CV VAS PRELIMINARY FINDINGS SCRIPTING: 1
BILIRUB SERPL-MCNC: 2.3 MG/DL (ref 0–1.2)
BILIRUB SERPL-MCNC: 3.2 MG/DL (ref 0–1.2)
BLD GP AB SCN SERPL QL: NEGATIVE
BUN SERPL-MCNC: 15 MG/DL (ref 8–23)
BUN SERPL-MCNC: 28 MG/DL (ref 8–23)
BUN/CREAT SERPL: 6.5 (ref 7–25)
BUN/CREAT SERPL: 8.1 (ref 7–25)
CALCIUM SPEC-SCNC: 7.5 MG/DL (ref 8.6–10.5)
CALCIUM SPEC-SCNC: 8.1 MG/DL (ref 8.6–10.5)
CHLORIDE SERPL-SCNC: 98 MMOL/L (ref 98–107)
CHLORIDE SERPL-SCNC: 99 MMOL/L (ref 98–107)
CO2 SERPL-SCNC: 21.7 MMOL/L (ref 22–29)
CO2 SERPL-SCNC: 22.8 MMOL/L (ref 22–29)
CREAT SERPL-MCNC: 2.32 MG/DL (ref 0.76–1.27)
CREAT SERPL-MCNC: 3.47 MG/DL (ref 0.76–1.27)
CROSSMATCH INTERPRETATION: NORMAL
DEPRECATED RDW RBC AUTO: 52.9 FL (ref 37–54)
EGFRCR SERPLBLD CKD-EPI 2021: 18.8 ML/MIN/1.73
EGFRCR SERPLBLD CKD-EPI 2021: 30.4 ML/MIN/1.73
EOSINOPHIL # BLD MANUAL: 0.26 10*3/MM3 (ref 0–0.4)
EOSINOPHIL NFR BLD MANUAL: 1 % (ref 0.3–6.2)
ERYTHROCYTE [DISTWIDTH] IN BLOOD BY AUTOMATED COUNT: 16.2 % (ref 12.3–15.4)
GLOBULIN UR ELPH-MCNC: 3.4 GM/DL
GLOBULIN UR ELPH-MCNC: 3.9 GM/DL
GLUCOSE SERPL-MCNC: 66 MG/DL (ref 65–99)
GLUCOSE SERPL-MCNC: 74 MG/DL (ref 65–99)
HCT VFR BLD AUTO: 20 % (ref 37.5–51)
HCT VFR BLD AUTO: 27.1 % (ref 37.5–51)
HGB BLD-MCNC: 6.6 G/DL (ref 13–17.7)
HGB BLD-MCNC: 9.2 G/DL (ref 13–17.7)
IRON 24H UR-MRATE: 21 MCG/DL (ref 59–158)
IRON SATN MFR SERPL: 13 % (ref 20–50)
LYMPHOCYTES # BLD MANUAL: 0.51 10*3/MM3 (ref 0.7–3.1)
LYMPHOCYTES NFR BLD MANUAL: 6 % (ref 5–12)
MAGNESIUM SERPL-MCNC: 2.2 MG/DL (ref 1.6–2.4)
MCH RBC QN AUTO: 29.6 PG (ref 26.6–33)
MCHC RBC AUTO-ENTMCNC: 33 G/DL (ref 31.5–35.7)
MCV RBC AUTO: 89.7 FL (ref 79–97)
MONOCYTES # BLD: 1.54 10*3/MM3 (ref 0.1–0.9)
NEUTROPHILS # BLD AUTO: 23.31 10*3/MM3 (ref 1.7–7)
NEUTROPHILS NFR BLD MANUAL: 87 % (ref 42.7–76)
NEUTS BAND NFR BLD MANUAL: 4 % (ref 0–5)
NT-PROBNP SERPL-MCNC: ABNORMAL PG/ML (ref 0–900)
PHOSPHATE SERPL-MCNC: 2.6 MG/DL (ref 2.5–4.5)
PLATELET # BLD AUTO: 126 10*3/MM3 (ref 140–450)
PMV BLD AUTO: 11.5 FL (ref 6–12)
POTASSIUM SERPL-SCNC: 3.7 MMOL/L (ref 3.5–5.2)
POTASSIUM SERPL-SCNC: 5.2 MMOL/L (ref 3.5–5.2)
PROT SERPL-MCNC: 5.6 G/DL (ref 6–8.5)
PROT SERPL-MCNC: 6.3 G/DL (ref 6–8.5)
RBC # BLD AUTO: 2.23 10*6/MM3 (ref 4.14–5.8)
RH BLD: NEGATIVE
SMALL PLATELETS BLD QL SMEAR: ABNORMAL
SODIUM SERPL-SCNC: 133 MMOL/L (ref 136–145)
SODIUM SERPL-SCNC: 134 MMOL/L (ref 136–145)
T&S EXPIRATION DATE: NORMAL
TIBC SERPL-MCNC: 164 MCG/DL (ref 298–536)
TRANSFERRIN SERPL-MCNC: 110 MG/DL (ref 200–360)
UNIT  ABO: NORMAL
UNIT  RH: NORMAL
VARIANT LYMPHS NFR BLD MANUAL: 2 % (ref 19.6–45.3)
WBC MORPH BLD: NORMAL
WBC NRBC COR # BLD AUTO: 25.62 10*3/MM3 (ref 3.4–10.8)

## 2023-12-30 PROCEDURE — 85025 COMPLETE CBC W/AUTO DIFF WBC: CPT | Performed by: INTERNAL MEDICINE

## 2023-12-30 PROCEDURE — 86900 BLOOD TYPING SEROLOGIC ABO: CPT | Performed by: PHYSICIAN ASSISTANT

## 2023-12-30 PROCEDURE — 83735 ASSAY OF MAGNESIUM: CPT | Performed by: INTERNAL MEDICINE

## 2023-12-30 PROCEDURE — 25010000002 MORPHINE PER 10 MG: Performed by: SURGERY

## 2023-12-30 PROCEDURE — 85007 BL SMEAR W/DIFF WBC COUNT: CPT | Performed by: INTERNAL MEDICINE

## 2023-12-30 PROCEDURE — 86850 RBC ANTIBODY SCREEN: CPT | Performed by: PHYSICIAN ASSISTANT

## 2023-12-30 PROCEDURE — 86900 BLOOD TYPING SEROLOGIC ABO: CPT

## 2023-12-30 PROCEDURE — 84466 ASSAY OF TRANSFERRIN: CPT | Performed by: PHYSICIAN ASSISTANT

## 2023-12-30 PROCEDURE — 80053 COMPREHEN METABOLIC PANEL: CPT | Performed by: PHYSICIAN ASSISTANT

## 2023-12-30 PROCEDURE — 83880 ASSAY OF NATRIURETIC PEPTIDE: CPT | Performed by: PHYSICIAN ASSISTANT

## 2023-12-30 PROCEDURE — 25010000002 AMPICILLIN-SULBACTAM PER 1.5 G: Performed by: INTERNAL MEDICINE

## 2023-12-30 PROCEDURE — 85730 THROMBOPLASTIN TIME PARTIAL: CPT | Performed by: INTERNAL MEDICINE

## 2023-12-30 PROCEDURE — 85018 HEMOGLOBIN: CPT | Performed by: PHYSICIAN ASSISTANT

## 2023-12-30 PROCEDURE — 86923 COMPATIBILITY TEST ELECTRIC: CPT

## 2023-12-30 PROCEDURE — 25010000002 HEPARIN (PORCINE) PER 1000 UNITS: Performed by: INTERNAL MEDICINE

## 2023-12-30 PROCEDURE — 99291 CRITICAL CARE FIRST HOUR: CPT | Performed by: INTERNAL MEDICINE

## 2023-12-30 PROCEDURE — 25010000002 HEPARIN (PORCINE) 25000-0.45 UT/250ML-% SOLUTION: Performed by: SURGERY

## 2023-12-30 PROCEDURE — 99024 POSTOP FOLLOW-UP VISIT: CPT | Performed by: SURGERY

## 2023-12-30 PROCEDURE — 25010000002 FENTANYL CITRATE (PF) 50 MCG/ML SOLUTION: Performed by: SURGERY

## 2023-12-30 PROCEDURE — P9016 RBC LEUKOCYTES REDUCED: HCPCS

## 2023-12-30 PROCEDURE — 99233 SBSQ HOSP IP/OBS HIGH 50: CPT | Performed by: INTERNAL MEDICINE

## 2023-12-30 PROCEDURE — 84100 ASSAY OF PHOSPHORUS: CPT | Performed by: INTERNAL MEDICINE

## 2023-12-30 PROCEDURE — 86901 BLOOD TYPING SEROLOGIC RH(D): CPT | Performed by: PHYSICIAN ASSISTANT

## 2023-12-30 PROCEDURE — 80053 COMPREHEN METABOLIC PANEL: CPT | Performed by: INTERNAL MEDICINE

## 2023-12-30 PROCEDURE — 83540 ASSAY OF IRON: CPT | Performed by: PHYSICIAN ASSISTANT

## 2023-12-30 PROCEDURE — 85014 HEMATOCRIT: CPT | Performed by: PHYSICIAN ASSISTANT

## 2023-12-30 RX ORDER — OXYCODONE AND ACETAMINOPHEN 10; 325 MG/1; MG/1
1 TABLET ORAL EVERY 4 HOURS PRN
Status: DISCONTINUED | OUTPATIENT
Start: 2023-12-30 | End: 2024-01-03

## 2023-12-30 RX ORDER — FAMOTIDINE 10 MG/ML
20 INJECTION, SOLUTION INTRAVENOUS DAILY
Status: DISCONTINUED | OUTPATIENT
Start: 2023-12-31 | End: 2024-01-03

## 2023-12-30 RX ADMIN — SODIUM CHLORIDE 3 G: 900 INJECTION INTRAVENOUS at 17:16

## 2023-12-30 RX ADMIN — BUMETANIDE 4 MG: 0.25 INJECTION INTRAMUSCULAR; INTRAVENOUS at 08:33

## 2023-12-30 RX ADMIN — OXYCODONE AND ACETAMINOPHEN 1 TABLET: 5; 325 TABLET ORAL at 05:45

## 2023-12-30 RX ADMIN — HEPARIN SODIUM 18 UNITS/KG/HR: 10000 INJECTION, SOLUTION INTRAVENOUS at 08:09

## 2023-12-30 RX ADMIN — HEPARIN SODIUM 3000 UNITS: 1000 INJECTION INTRAVENOUS; SUBCUTANEOUS at 15:19

## 2023-12-30 RX ADMIN — FENTANYL CITRATE 25 MCG: 50 INJECTION, SOLUTION INTRAMUSCULAR; INTRAVENOUS at 11:40

## 2023-12-30 RX ADMIN — DOCUSATE SODIUM 50 MG AND SENNOSIDES 8.6 MG 2 TABLET: 8.6; 5 TABLET, FILM COATED ORAL at 09:21

## 2023-12-30 RX ADMIN — OXYCODONE AND ACETAMINOPHEN 1 TABLET: 10; 325 TABLET ORAL at 21:31

## 2023-12-30 RX ADMIN — FENTANYL CITRATE 25 MCG: 50 INJECTION, SOLUTION INTRAMUSCULAR; INTRAVENOUS at 14:13

## 2023-12-30 RX ADMIN — BUMETANIDE 4 MG: 0.25 INJECTION INTRAMUSCULAR; INTRAVENOUS at 17:12

## 2023-12-30 RX ADMIN — OXYCODONE AND ACETAMINOPHEN 1 TABLET: 10; 325 TABLET ORAL at 17:25

## 2023-12-30 RX ADMIN — Medication 10 ML: at 20:03

## 2023-12-30 RX ADMIN — MORPHINE SULFATE 2 MG: 2 INJECTION, SOLUTION INTRAMUSCULAR; INTRAVENOUS at 01:21

## 2023-12-30 RX ADMIN — BUMETANIDE 4 MG: 0.25 INJECTION INTRAMUSCULAR; INTRAVENOUS at 01:06

## 2023-12-30 RX ADMIN — MORPHINE SULFATE 2 MG: 2 INJECTION, SOLUTION INTRAMUSCULAR; INTRAVENOUS at 08:33

## 2023-12-30 RX ADMIN — Medication 10 ML: at 08:33

## 2023-12-30 RX ADMIN — OXYCODONE AND ACETAMINOPHEN 1 TABLET: 10; 325 TABLET ORAL at 12:40

## 2023-12-30 RX ADMIN — MORPHINE SULFATE 2 MG: 2 INJECTION, SOLUTION INTRAMUSCULAR; INTRAVENOUS at 19:38

## 2023-12-30 RX ADMIN — FAMOTIDINE 20 MG: 10 INJECTION INTRAVENOUS at 08:33

## 2023-12-30 NOTE — PROGRESS NOTES
[facility]     Progress Note    Patient Name: Danny Savage  : 1960  MRN: [id number]  Primary Care Physician:  Kodi Pichardo MD  Date of admission: 2023    Subjective   Subjective     POD #6 status post open repair of ruptured abdominal aorta aneurysm, POD #4 status post abdominal reexploration, sigmoidectomy with end colostomy, closure of abdominal wound    Doing okay.  No complaints.  Tolerating some PO intake with ostomy output.    Diagnosed with left leg DVT and now on heparin gtt.  Hgb 6.6 and receiving transfusion.     Objective   Objective     Vitals:   Temp:  [99.2 °F (37.3 °C)-99.7 °F (37.6 °C)] 99.7 °F (37.6 °C)  Heart Rate:  [] 93  Resp:  [14-22] 21  BP: (138-174)/() 151/94  Flow (L/min):  [2] 2    Physical Exam   General: Awake, alert   Abdomen: Benign.  Wound VAC in place.  Stoma appears satisfactory.  Extremities: Symmetric, moderate edema.    Hgb: 6.6  WBC: 25.62  Creatinine: 3.47    Assessment & Plan   Assessment / Plan     Assessment/Plan:    Patient remains critically ill in ICU.  Left leg DVT, on heparin.  Continuing rise on leukocytosis, undergoing evaluation by ICU.  Continue empiric abx.  CT negative for any post-op abscess or surgical issue other than splenic and renal infarcts which may be cause of leukocytosis. Already on heparin gtt.  Tolerating HD via R IJ temp catheter.    Increase activity and ambulation as able.        Active Hospital Problems:  Active Hospital Problems    Diagnosis     **Left flank pain     Ruptured infrarenal abdominal aortic aneurysm (AAA)

## 2023-12-30 NOTE — PROGRESS NOTES
[facility]   Hospitalist Progress Note  Date: 2023  Patient Name: Danny Savage  : 1960  MRN: [id number]  Date of admission: 2023  Room/Bed: [facility] [place]      Subjective   Subjective     Chief Complaint: Back pain    Summary:Danny Savage is a 63 y.o. male Danny Savage is a 63 y.o. male with past medical history of recently diagnosed renal cell carcinoma s/p partial left kidney nephrectomy in 2023 (follows Dr.O Espino as outpatient) and infrarenal aortic aneurysm (scan on  showing AAA of 4.7 cm) who presented with complaints of left lower back pain and fever since past 3 days.  Patient had CT abdomen scan done early December and was evaluated by urologist as outpatient.  Patient presented to ER 1 day prior with left flank pain where CT abdomen was obtained showing mildly decreased fluid collection at partial left nephrectomy bed containing decreasing but persistent small gas bubbles; findings consistent with postoperative seroma/hematoma; questionable infection.  Case was discussed with Dr. Spicer and patient was discharged home.  But patient's pain got worse on the day of presentation and was brought to ED.  Patient's left flank pain was severe, about 10 x 10 in intensity on presentation.  Patient was febrile with temp 102.6.  Lactic acid was normal.  Urinalysis was not significant for UTI.  CT abdomen pelvis with contrast showed stable fluid collection along the left nephrectomy bed, likely postoperative seroma/hematoma although infection cannot be ruled out. Abdominal aortic aneurysm of 5.1 cm.  Patient was started on IV antibiotics and was admitted as a case of possible abscess in the left nephrectomy bed.  Urology was consulted.     Hospital course complicated by sudden unresponsive on 2023.  Vital signs showed blood pressure 42/32.Patient was pale and clammy with agonal breathing.  No pulse was felt which seemed like PEA arrest after which CODE BLUE was  called.  Patient was getting IV fluid bolus.  Before starting CPR, patient had feeble pulse after which CPR was held.  Patient was then intubated at bedside. He was then transferred to critical care unit.  Given patient has expanding AAA  with eccentric mural thrombus/hematoma measuring up to 5.1 centimeter maximally on CT abdomen of 12/22; there is also concern for aortic rupture. 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 Vascular Surgeon was consulted immediately and patient underwent urgent Open repair of ruptured infrarenal abdominal aortic aneurysm with a tube graft and mobilization of the omentum with coverage of the graft using pedicled omental flap.  Patient taken back to the OR on 12/26/2023 to look for sources of blood loss from prior surgeries.  Graft was in good position, no obvious leak was identified, patient was found to have nonviable colon and patient underwent partial colectomy by general surgery.    Interval Followup: No acute events overnight, patient resting comfortably in bed, family at bedside      Objective   Objective     Vitals:   Temp:  [99.2 °F (37.3 °C)-99.7 °F (37.6 °C)] 99.7 °F (37.6 °C)  Heart Rate:  [] 94  Resp:  [14-22] 22  BP: (138-174)/(74-95) 174/93  Flow (L/min):  [2] 2    Physical Exam   GEN: No acute distress  HEENT: Moist mucous membranes  LUNGS: Equal chest rise bilaterally  CARDIAC: Regular rate and rhythm  NEURO: Moving all 4 extremities spontaneously  SKIN: No obvious breakdown      Result Review    Result Review:  I have personally reviewed these results:  [x]  Laboratory      Lab 12/30/23  0537 12/29/23  1139 12/29/23  0540 12/28/23  2220 12/28/23  0911 12/28/23  0400 12/27/23  1017 12/27/23  0644 12/27/23  0621 12/27/23  0551 12/26/23  1507 12/25/23  0611 12/25/23  0532 12/25/23  0141 12/25/23  0035 12/24/23  1935 12/24/23  1754 12/24/23  1636 12/24/23  1459   WBC 25.62*  --  29.90*  --   --   24.41*  --   --   --    < > 12.18*   < > 10.74   < >  --   --   --   --  20.44*   HEMOGLOBIN 6.6*  --  7.0*  --   --  7.9*  --   --   --    < > 8.6*   < > 11.6*   < >  --   --   --   --  9.1*   HEMATOCRIT 20.0*  --  21.2*  --   --  23.0*  --   --   --    < > 24.8*   < > 33.9*   < >  --   --   --   --  29.0*   PLATELETS 126*  --  96*  --   --  76*  --   --   --    < > 72*   < > 69*   < >  --   --   --   --  263   NEUTROS ABS 23.31*  --  26.24*  --   --  21.92*  --   --   --   --   --   --   --    < >  --   --   --   --  15.22*   IMMATURE GRANS (ABS)  --   --  0.98*  --   --  0.37*  --   --   --   --   --   --   --   --   --   --   --   --  0.12*   LYMPHS ABS  --   --  1.22  --   --  1.12  --   --   --   --   --   --   --   --   --   --   --   --  2.48   MONOS ABS  --   --  1.23*  --   --  0.85  --   --   --   --   --   --   --   --   --   --   --   --  2.55*   EOS ABS 0.26  --  0.06  --   --  0.00  --   --   --   --   --   --   --   --   --   --   --   --  0.02   MCV 89.7  --  89.1  --   --  86.8  --   --   --    < > 86.1   < > 84.3   < >  --   --   --   --  91.2   PROCALCITONIN  --   --  11.16*  --   --   --   --   --   --   --   --   --  8.45*  --  2.07*  --   --   --  1.32*   LACTATE  --   --   --   --   --   --   --   --   --   --  1.3   < > 9.5*   < > 18.6*  --  11.2*  --  7.7*   LACTATE, ARTERIAL  --   --   --   --  1.24  --  1.49 1.53  --   --   --    < >  --    < >  --    < >  --    < >  --    PROTIME  --   --   --  13.7  --   --   --   --  14.1  --  14.9   < >  --   --  24.3*   < > 17.6*  --   --    APTT 85.9 94.0 81.8 33.6*  --   --   --   --   --   --   --    < >  --   --  111.0*   < > 24.6  --   --    D DIMER QUANT  --   --   --   --   --   --   --   --   --   --   --   --   --   --   --   --  19.79*  --   --     < > = values in this interval not displayed.         Lab 12/30/23  0537 12/29/23  0540 12/28/23  1842 12/28/23  0911 12/28/23  0400 12/27/23  1017 12/27/23  0644   SODIUM 133* 138 136  --    < >   --   --    SODIUM, ARTERIAL  --   --   --  135.2*  --  138.7 138.6   POTASSIUM 5.2 4.2 3.9  --    < >  --   --    CHLORIDE 99 103 100  --    < >  --   --    CO2 21.7* 24.0 22.9  --    < >  --   --    ANION GAP 12.3 11.0 13.1  --    < >  --   --    BUN 28* 35* 25*  --    < >  --   --    CREATININE 3.47* 3.65* 2.79*  --    < >  --   --    EGFR 18.8* 17.7* 24.4*  --    < >  --   --    GLUCOSE 66 75 109*  --    < >  --   --    GLUCOSE, ARTERIAL  --   --   --  110*  --  99 86   CALCIUM 7.5* 7.2* 7.5*  --    < >  --   --    IONIZED CALCIUM  --   --   --  0.94*  --  0.94* 0.84*   MAGNESIUM 2.2 2.2 2.1  --    < >  --   --    PHOSPHORUS 2.6 2.7 2.4*  --    < >  --   --     < > = values in this interval not displayed.         Lab 12/30/23  0537 12/29/23  0540 12/28/23  1842 12/28/23  0400 12/25/23  0532 12/25/23  0035   TOTAL PROTEIN 5.6* 5.0*  --  5.0*   < > 2.8*   ALBUMIN 2.2* 2.3* 2.4* 2.3*   < > 1.8*   GLOBULIN 3.4 2.7  --  2.7   < > 1.0   ALT (SGPT) 54* 80*  --  131*   < > 550*   AST (SGOT) 95* 136*  --  212*   < > 444*   BILIRUBIN 2.3* 1.8*  --  2.0*   < > 0.4   ALK PHOS 122* 109  --  86   < > 35*   AMYLASE  --   --   --   --   --  194*   LIPASE  --   --   --   --   --  20    < > = values in this interval not displayed.         Lab 12/30/23  0537 12/28/23  2220 12/27/23  0621 12/26/23  1507 12/24/23  2153 12/24/23  1754 12/24/23  1459   PROBNP 30,309.0*  --   --   --   --   --  1,898.0*   HSTROP T  --   --   --   --   --  467* 114*   PROTIME  --  13.7 14.1 14.9   < > 17.6*  --    INR  --  1.03 1.07 1.15   < > 1.42*  --     < > = values in this interval not displayed.             Lab 12/30/23  0645 12/24/23 2032   ABO TYPING A A   RH TYPING Negative Negative   ANTIBODY SCREEN Negative Negative         Lab 12/28/23  0911 12/27/23  1017 12/27/23  0644   PH, ARTERIAL 7.466* 7.189* 7.422   PCO2, ARTERIAL 33.5* 70.2* 35.7   PO2 ART 71.3* 65.0* 86.8   O2 SATURATION ART 93.2* 86.4* 95.0   FIO2 35 45 45   HCO3 ART 23.6 26.2*  22.7   BASE EXCESS ART 0.1 -2.8* -1.4   CARBOXYHEMOGLOBIN 0.3 0.3 0.2     Brief Urine Lab Results  (Last result in the past 365 days)        Color   Clarity   Blood   Leuk Est   Nitrite   Protein   CREAT   Urine HCG        12/23/23 2054 Yellow   Clear   Small (1+)   Negative   Negative   100 mg/dL (2+)                 [x]  Microbiology   Microbiology Results (last 10 days)       Procedure Component Value - Date/Time    Blood Culture - Blood, Arm, Left [[id number]]  (Normal) Collected: 12/29/23 1139    Lab Status: Preliminary result Specimen: Blood from Arm, Left Updated: 12/30/23 1216     Blood Culture No growth at 24 hours    Blood Culture - Blood, Chest, Left [[id number]]  (Normal) Collected: 12/29/23 1002    Lab Status: Preliminary result Specimen: Blood from Chest, Left Updated: 12/30/23 1030     Blood Culture No growth at 24 hours    Respiratory Culture - Sputum, ET Suction [[id number]]  (Abnormal) Collected: 12/24/23 1638    Lab Status: Final result Specimen: Sputum from ET Suction Updated: 12/27/23 1040     Respiratory Culture Light growth (2+) Moraxella atlantae     Gram Stain Few (2+) Gram negative bacilli      Rare (1+) WBCs seen    Blood Culture - Blood, Arm, Left [[id number]]  (Normal) Collected: 12/24/23 1459    Lab Status: Final result Specimen: Blood from Arm, Left Updated: 12/29/23 1531     Blood Culture No growth at 5 days    Blood Culture - Blood, Arm, Right [[id number]]  (Normal) Collected: 12/23/23 2205    Lab Status: Final result Specimen: Blood from Arm, Right Updated: 12/28/23 2215     Blood Culture No growth at 5 days    Blood Culture - Blood, Arm, Left [[id number]]  (Normal) Collected: 12/23/23 2205    Lab Status: Final result Specimen: Blood from Arm, Left Updated: 12/28/23 2215     Blood Culture No growth at 5 days    COVID-19, FLU A/B, RSV PCR 1 HR TAT - Swab, Nasopharynx [[id number]]  (Normal) Collected: 12/23/23 2204    Lab Status: Final result Specimen: Swab from Nasopharynx Updated: 12/23/23  2303     COVID19 Not Detected     Influenza A PCR Not Detected     Influenza B PCR Not Detected     RSV, PCR Not Detected    Narrative:      Fact sheet for providers: https://www.fda.gov/media/213089/download    Fact sheet for patients: https://www.fda.gov/media/251197/download    Test performed by PCR.    Blood Culture - Blood, Arm, Right [[id number]]  (Abnormal)  (Susceptibility) Collected: 12/22/23 0815    Lab Status: Final result Specimen: Blood from Arm, Right Updated: 12/25/23 0652     Blood Culture Streptococcus pyogenes (Group A)     Isolated from Aerobic and Anaerobic Bottles     Gram Stain Aerobic Bottle Gram positive cocci in pairs and chains      Anaerobic Bottle Gram positive cocci in pairs and chains    Susceptibility        Streptococcus pyogenes (Group A)      LINDSEY      Ceftriaxone Susceptible      Levofloxacin Susceptible      Penicillin G Susceptible      Vancomycin Susceptible                           Blood Culture - Blood, Arm, Left [[id number]]  (Abnormal) Collected: 12/22/23 0815    Lab Status: Final result Specimen: Blood from Arm, Left Updated: 12/25/23 0652     Blood Culture Streptococcus pyogenes (Group A)     Isolated from Aerobic and Anaerobic Bottles     Gram Stain Aerobic Bottle Gram positive cocci in pairs and chains      Anaerobic Bottle Gram positive cocci in pairs and chains    Narrative:      Refer to previous blood culture collected on 12/22/2023 0815 for MICs.      COVID PRE-OP / PRE-PROCEDURE SCREENING ORDER (NO ISOLATION) - Swab, Nasopharynx [[id number]]  (Normal) Collected: 12/22/23 0710    Lab Status: Final result Specimen: Swab from Nasopharynx Updated: 12/22/23 0802    Narrative:      The following orders were created for panel order COVID PRE-OP / PRE-PROCEDURE SCREENING ORDER (NO ISOLATION) - Swab, Nasopharynx.  Procedure                               Abnormality         Status                     ---------                               -----------         ------                      COVID-19, FLU A/B, RSV P...[[id number]]  Normal              Final result                 Please view results for these tests on the individual orders.    COVID-19, FLU A/B, RSV PCR 1 HR TAT - Swab, Nasopharynx [[id number]]  (Normal) Collected: 12/22/23 0710    Lab Status: Final result Specimen: Swab from Nasopharynx Updated: 12/22/23 0802     COVID19 Not Detected     Influenza A PCR Not Detected     Influenza B PCR Not Detected     RSV, PCR Not Detected    Narrative:      Fact sheet for providers: https://www.fda.gov/media/196073/download    Fact sheet for patients: https://www.fda.gov/media/033799/download    Test performed by PCR.          [x]  Radiology  CT Abdomen Pelvis With Contrast    Result Date: 12/29/2023   1. Evidence of interval left hemicolectomy with colostomy placement as well as abdominal aortic aneurysm and aortic caval fistula repair since 12/24/2023.  There is a persistent extensive para-aortic right retroperitoneal hematoma without acute or new hemorrhage identified.  No loculated fluid or fluid and air collections are seen to indicate abscess.  2. Development of multiple splenic and bilateral renal infarcts new since 12/24/2023. 3. Small bilateral pleural effusions have increased in size since 12/24/2023.  There is also anasarca.    Report was called to [facility] nurse at 1651 hours on 12/29/2023.    COBY DEGROOT DO       Electronically Signed and Approved By: COBY DEGROOT DO on 12/29/2023 at 16:53             XR Chest 1 View    Result Date: 12/29/2023    1. Diffuse bilateral airspace opacities appear unchanged, which could reflect pulmonary edema or pneumonia. 2. Small bilateral pleural effusions.       GELY MONTE MD       Electronically Signed and Approved By: GELY MONTE MD on 12/29/2023 at 6:04             XR Chest 1 View    Result Date: 12/27/2023     Right jugular central venous catheter tip is in the right atrium.  No pneumothorax is seen.  ET tube and NG tube  remain in place.  Bilateral airspace disease and pleural effusions are unchanged.       BENITO PRADHAN MD       Electronically Signed and Approved By: BENITO PRADHAN MD on 12/27/2023 at 13:21             XR Chest 1 View    Result Date: 12/27/2023    1. Endotracheal tube in good position. 2. Bilateral airspace opacities, which could reflect pulmonary edema or pneumonia. 3. Moderate bilateral pleural effusions.       GELY MONTE MD       Electronically Signed and Approved By: GELY MONTE MD on 12/27/2023 at 4:42             CT Angiogram Abdomen Pelvis    Result Date: 12/24/2023    1. Development of aorto-caval fistula with large amount of active extravasation of hemorrhage into right abdomen and retroperitoneal space likely from a ruptured IVC.  Findings were discussed by Dr. Cervantes to vascular surgery at approximately 1845 hours. 2. Bibasilar consolidations within lung bases, which could reflect atelectasis, aspiration, and/or pneumonia. 3. Small fluid collection along left nephrectomy site is unchanged. 4. Air within urinary bladder, likely secondary to Rico catheterization.     GELY MONTE MD       Electronically Signed and Approved By: GELY MONTE MD on 12/24/2023 at 19:37             XR Abdomen KUB    Result Date: 12/24/2023    1. Mild prominence of small bowel loops within the mid abdomen.  The findings are nonspecific and may be related to adynamic ileus.  Developing bowel obstruction could have this appearance.  Recommend follow-up to ensure resolution.     GELY MONTOYA MD       Electronically Signed and Approved By: GELY MONTOYA MD on 12/24/2023 at 16:07             XR Chest 1 View    Result Date: 12/24/2023    1. The endotracheal tube distal tip is seen approximately 3-4 cm above the prosper. 2. The nasogastric tube is positioned in the stomach in the left upper quadrant. 3. The right IJ central line distal tip is noted near the SVC/right atrial junction.  The line is ready for  use.  There is no pneumothorax. 4. Evidence for subtle ill-defined infiltrates throughout the mid to lower lungs bilaterally.  There may also be a small left pleural effusion.       GELY MONTOYA MD       Electronically Signed and Approved By: GELY MONTOYA MD on 12/24/2023 at 15:13             CT Abdomen Pelvis With Contrast    Result Date: 12/23/2023    1. Stable fluid collection along left nephrectomy bed, likely a postoperative seroma/hematoma, although infection cannot be excluded on imaging alone. 2. No new acute process identified within abdomen/pelvis. 3. Stable abdominal aortic aneurysm.     GELY MONTE MD       Electronically Signed and Approved By: GELY MONTE MD on 12/23/2023 at 22:07             XR Chest 1 View    Result Date: 12/23/2023   No acute cardiopulmonary process identified.       GELY MONTE MD       Electronically Signed and Approved By: GELY MONTE MD on 12/23/2023 at 21:49            []  EKG/Telemetry   []  Cardiology/Vascular   []  Pathology  []  Old records  []  Other:    Assessment & Plan   Assessment / Plan     Assessment:  Ruptured infrarenal AAA s/p open repair  Hemorrhagic shock  Hypovolemic shock  Acute hypoxic respiratory failure requiring mechanical ventilation  Septic shock with Streptococcus pyogenes bacteremia  SVT  History of RCC s/p partial nephrectomy on 11/13  Acute kidney injury  Clinically significant lactic acidosis  Chronically immunosuppressed on CellCept  Hypocalcemia  Hypokalemia    Plan:  Patient currently being managed in critical care unit; appreciate intensivist input.  Patient had spontaneous rupture of AAA s/p Open repair of ruptured infrarenal abdominal aortic aneurysm with a tube graft and mobilization of the omentum with coverage of the graft using pedicled omental flap on 12/24.  Vascular surgery following the patient; appreciate further input.,   Successfully extubated on 12/28/2023  Replace electrolytes as needed  Continue  hemodialysis  Off pressors, monitor blood pressure  Urology consulted and following, appreciate their recommendations  Continue Unasyn  Repeat blood cultures pending, procalcitonin elevated  CT scan of the abdomen pelvis personally reviewed, no obvious source of infection  Wean O2 as able  Discussed management plan with pulmonology     Discussed with RN.    DVT prophylaxis:  Medical and mechanical DVT prophylaxis orders are present.    CODE STATUS:   Level Of Support Discussed With: Patient  Code Status (Patient has no pulse and is not breathing): CPR (Attempt to Resuscitate)  Medical Interventions (Patient has pulse or is breathing): Full Support    Pt is critically ill in ICU with shock, blood loss anemia.  I have spent 32 minutes of critical care time reviewing previous documentation, reviewing all pertinent telemetry, labs, and imaging studies, examining the patient, modifying the care plan and discussing the patient´s condition and care plan with the consultants, available family and during rounds. This does not include any procedures performed.      Electronically signed by Eric Martin MD, 12/30/23, 1:16 PM EST.

## 2023-12-30 NOTE — PROGRESS NOTES
Pulmonary / Critical Care Progress Note      Patient Name: Danny Savage  : 1960  MRN: [id number]  Attending:  Eric Martin MD   Date of admission: 2023    Subjective   Subjective   Patient critically ill with ruptured AAA s/p open repair, hemorrhagic shock, Streptococcus pyogenes bacteremia    HD :  2.5L removed  HD :  3 L removed  HD : 2.5 L removed    Over the past 24 hours  On nasal cannula at 2LNC  Stood up at side of bed today with walker and PT  With post op abdominal pain requiring PRN pain medication  WBC with upward trend @ 29  Venous doppler positive for Acute DVT in LLE; acute right lower extremity DVT in the peroneal and gastrocnemius; started on Heparin gtt  UOP @ 100mL x 24H; continues on Bumex; +9L fluid balance  Sputum culture from  positive for Morganella Atlantae; blood cultures from  positive for Streptococcus pyogenes; on Unasyn  Awake, alert and answering questions  Is complaining of having hiccups since extubated day prior  NG tube removed  Received HD  Worked with PT      Today  In bed with head of bed elevated  Complains of back pain; does not want to get out of bed with PT   x 24 hours  Hemoglobin 6.6; continues on heparin drip  Continues on nasal cannula  WBC with downward trend now at 25    Review of Systems  Following commands  Post op pain with movement noted  Weakness  Hiccups      Objective   Objective     Vitals:   Vital signs for last 24 hours:  Temp:  [99.2 °F (37.3 °C)-99.7 °F (37.6 °C)] 99.7 °F (37.6 °C)  Heart Rate:  [] 93  Resp:  [14-22] 21  BP: (138-174)/() 151/94    Intake/Output last 3 shifts:  I/O last 3 completed shifts:  In: 1144 [P.O.:600; I.V.:424; NG/GT:120]  Out: 3150 [Urine:600; Stool:50]    Vent settings for last 24 hours:       Physical Exam   Vital Signs Reviewed  General: Weak, deconditioned, alert  Chest:  good aeration, crackles in bases bilaterally, no work of breathing noted at rest  CV:  RRR, no MGR, pulses 2+, equal.  Abd: Postop dressing in place with wound VAC; colostomy with stool output noted  Neuro:  A&Ox3, CN grossly intact, no focal deficits.        Result Review    I have personally reviewed the results from the time of this admission to 12/30/2023 14:19 EST and agree with these findings:  [x]  Laboratory  [x]  Microbiology  [x]  Radiology  []  EKG/Telemetry   []  Cardiology/Vascular   []  Pathology  []  Old records  []  Other:  Most notable findings include:   12/22/2023 blood culture Streptococcus pyogenes group A  12/24 sputum culture with Moraxella a        Lab 12/30/23  0537 12/29/23  0540 12/28/23  1842 12/28/23  0911 12/28/23  0400 12/27/23  1017 12/27/23  0644 12/27/23  0551 12/26/23  1507 12/26/23  1157 12/26/23  0818 12/26/23  0639 12/25/23  2358   WBC 25.62* 29.90*  --   --  24.41*  --   --  17.50* 12.18*  --  12.78*  --  11.23*   HEMOGLOBIN 6.6* 7.0*  --   --  7.9*  --   --  8.3* 8.6*  --  8.5*  --  6.9*   HEMATOCRIT 20.0* 21.2*  --   --  23.0*  --   --  24.3* 24.8*  --  24.2*  --  19.3*   PLATELETS 126* 96*  --   --  76*  --   --  70* 72*  --  68*  --  88*   SODIUM 133* 138 136  --  139  --   --  142 141  --  141  --  141   SODIUM, ARTERIAL  --   --   --  135.2*  --  138.7 138.6  --   --    < >  --    < >  --    POTASSIUM 5.2 4.2 3.9  --  4.3  --   --  4.5 4.0  --  3.9  --  3.9   CHLORIDE 99 103 100  --  101  --   --  103 104  --  104  --  102   CO2 21.7* 24.0 22.9  --  22.3  --   --  24.3 25.2  --  27.5  --  27.8   BUN 28* 35* 25*  --  34*  --   --  48* 37*  --  29*  --  25*   CREATININE 3.47* 3.65* 2.79*  --  3.27*  --   --  4.60* 3.44*  --  3.34*  --  2.62*   GLUCOSE 66 75 109*  --  113*  --   --  99 100*  --  152*  --  172*   GLUCOSE, ARTERIAL  --   --   --  110*  --  99 86  --   --    < >  --    < >  --    CALCIUM 7.5* 7.2* 7.5*  --  7.3*  --   --  7.2* 7.2*  --  7.1*  --  7.6*   PHOSPHORUS 2.6 2.7 2.4*  --  3.7  --   --  5.5*  --   --  3.4  --  3.4   TOTAL PROTEIN 5.6*  5.0*  --   --  5.0*  --   --  4.4* 4.0*  --  3.9*  --  4.0*   ALBUMIN 2.2* 2.3* 2.4*  --  2.3*  --   --  2.2* 2.2*  --  2.2*  --  2.5*   GLOBULIN 3.4 2.7  --   --  2.7  --   --  2.2 1.8  --  1.7  --  1.5    < > = values in this interval not displayed.         Assessment & Plan   Assessment / Plan     Active Hospital Problems:  Active Hospital Problems    Diagnosis     **Left flank pain     Ruptured infrarenal abdominal aortic aneurysm (AAA)      Impression:  Ruptured infrarenal AAA s/p open repair  Hemorrhagic shock  Hypovolemic shock  Acute hypoxic respiratory failure requiring mechanical ventilation  Septic shock with Streptococcus pyogenes bacteremia  Moraxella pneumonia  Ischemic sigmoid colon s/p colostomy  History of RCC s/p partial nephrectomy on 11/13  Acute kidney injury [place] ATN  Clinically significant lactic acidosis  Chronically immunosuppressed on CellCept  Hypocalcemia  Hypokalemia  Pneumonitis  SVT    Plan:  -With acute bilateral lower extremity DVT.  Continue heparin drip  -Trend H&H.  Recommend transfusion for Hgb 8 or less.  [facility]  -CT of abdomen pelvis reviewed; without findings of abscess, stable hematoma  -Nephrology on board.  HD per them.  Patient was 7.8 L fluid balance.   mL x 24 hours.  Appreciate their assistance  -Continues on supplemental O2.  Titrate to maintain SpO2 90% or greater  -Encourage use of I-S and flutter valve throughout the day  -PT on board to help with ambulation  -Patient with bilateral pleural effusions.  Continues with hemodialysis scheduled.  On 2 LNC.  Bedside ultrasound on 12/29, pleural effusions not amenable to thoracentesis  -Remains off pressors  -General surgery on board.  Postop care per them    -As needed pain medications with morphine and  p.o. pain medication  -Vascular surgery following, appreciate service  -Urology on board.  Patient status post partial nephrectomy.  Appreciate their assistance  -Blood cultures  previously Streptococcus pyogenes, respiratory culture with Moraxella Atlantae.   Continue Unasyn to complete therapy  Will need long term abx and even suppressive therapy   -Hold home chronic immunosuppression CellCept indefinitely  -Dietitian on board.    -Speech therapy on board  -PT/OT on board      PUPppx: Pepcid  DVT prophylaxis: Heparin drip    DVT prophylaxis:  Medical and mechanical DVT prophylaxis orders are present.    CODE STATUS:   Level Of Support Discussed With: Patient  Code Status (Patient has no pulse and is not breathing): CPR (Attempt to Resuscitate)  Medical Interventions (Patient has pulse or is breathing): Full Support    ITrinidad PA-C .  Electronically signed by ROEL Negro, 12/30/23, 2:25 PM EST.    This visit was performed by BOTH a physician and an APC.  I spent more than 51% of time caring for this patient I personally evaluated and examined the patient. I performed all aspects of MDM as documented. , I have reviewed and confirmed the accuracy of the patient's history as documented in this note. and I have reexamined the patient and the results are consistent with the previously documented exam. I have updated the documentation as necessary    Electronically signed by Clayton Holland DO, 12/30/23, 7:58 PM EST.

## 2023-12-30 NOTE — CONSULTS
"Nutrition Services    Patient Name: Danny Savage  YOB: 1960  MRN: [id number]  Admission date: 12/23/2023      CLINICAL NUTRITION ASSESSMENT      Reason for Assessment  Identified at Risk by Screening Criteria      H&P:  Past Medical History:   Diagnosis Date    Allergy     Aortic aneurysm     4.7 just found has not f/u    H/O Kidney stone     Hiatal hernia     Kidney mass     left    Renal cancer         Current Problems:   Active Hospital Problems    Diagnosis     **Left flank pain     Ruptured infrarenal abdominal aortic aneurysm (AAA)         Nutrition/Diet History         Narrative   Patient admitted with left flank pain, ruptured AAA.  S/P open AAA repair.  Patient went back to OR 12/26/2023. Now S/P left open colectomy with ostomy creation.  Pt successfully extubated 12/28/2023.       Pt tolerating full liquid diet and does not feel like he is ready to advance to solids.  Surgeon has indicated gut is safe to advance diet per pt request. Nursing ordered Ensure Surgery this day to be received on meal trays.   Will provide 660 calories, 39 grams protein.  Monitor pt acceptance/intake.  Monitor midline incision healing.      Anthropometrics        Current Height, Weight Height: 177.8 cm (70\")  Weight: 80.5 kg (177 lb 7.5 oz)   Current BMI Body mass index is 25.46 kg/m².   BMI Classification Overweight   % %   Adjusted Body Weight (ABW) NA   Weight Hx  Wt Readings from Last 30 Encounters:   12/24/23 0027 80.5 kg (177 lb 7.5 oz)   12/23/23 1948 79.9 kg (176 lb 2.4 oz)   12/22/23 0706 78.8 kg (173 lb 11.6 oz)   12/06/23 0958 79.9 kg (176 lb 3.2 oz)   11/22/23 1338 78.2 kg (172 lb 6.4 oz)   11/13/23 0628 78.2 kg (172 lb 6.4 oz)   10/30/23 1440 79.9 kg (176 lb 2.4 oz)   08/17/23 1952 78.8 kg (173 lb 11.6 oz)   08/17/23 1537 80.3 kg (177 lb 0.5 oz)   08/15/23 0554 81.2 kg (179 lb 0.2 oz)          Wt Change Observation Stable x 4 months      Estimated/Assessed Needs  Estimated Needs based on: " "Current Body Weight       Energy Requirements 30 kcal/kg    EST Needs (kcal/day)  2415 kcal        Protein Requirements 1.2-2.0 g/kg   EST Daily Needs (g/day)  g       Fluid Requirements 25 ml/kg    Estimated Needs (mL/day) 2013     Labs/Medications         Pertinent Labs Reviewed.   Results from last 7 days   Lab Units 12/30/23  0537 12/29/23  0540 12/28/23  1842 12/28/23  0911 12/28/23  0400   SODIUM mmol/L 133* 138 136  --  139   SODIUM, ARTERIAL   --   --   --    < >  --    POTASSIUM mmol/L 5.2 4.2 3.9  --  4.3   CHLORIDE mmol/L 99 103 100  --  101   CO2 mmol/L 21.7* 24.0 22.9  --  22.3   BUN mg/dL 28* 35* 25*  --  34*   CREATININE mg/dL 3.47* 3.65* 2.79*  --  3.27*   CALCIUM mg/dL 7.5* 7.2* 7.5*  --  7.3*   BILIRUBIN mg/dL 2.3* 1.8*  --   --  2.0*   ALK PHOS U/L 122* 109  --   --  86   ALT (SGPT) U/L 54* 80*  --   --  131*   AST (SGOT) U/L 95* 136*  --   --  212*   GLUCOSE mg/dL 66 75 109*  --  113*   GLUCOSE, ARTERIAL   --   --   --    < >  --     < > = values in this interval not displayed.     Results from last 7 days   Lab Units 12/30/23 0537 12/29/23  0540 12/28/23  1842   MAGNESIUM mg/dL 2.2 2.2 2.1   PHOSPHORUS mg/dL 2.6 2.7 2.4*   HEMOGLOBIN g/dL 6.6* 7.0*  --    HEMATOCRIT % 20.0* 21.2*  --      COVID19   Date Value Ref Range Status   12/23/2023 Not Detected Not Detected - Ref. Range Final     No results found for: \"HGBA1C\"      Pertinent Medications Reviewed.     Malnutrition Severity Assessment                Nutrition Diagnosis         Nutrition Dx Problem 1 Inadequate energy Intake related to GI dysfunction as evidenced by  Full liquid diet       Nutrition Intervention           Current Nutrition Orders & Evaluation of Intake       Current PO Diet Diet: Liquid Diets; Full Liquid; Texture: Regular Texture (IDDSI 7); Fluid Consistency: Thin (IDDSI 0)   Supplement Orders Placed This Encounter      Dietary Nutrition Supplements Ensure Surgery; vanilla           Nutrition " Intervention/Prescription:  Provides 660 kcals, 39 grams of protein               Medical Nutrition Therapy/Nutrition Education          Learner     Readiness Patient  Education not indicated at this time     Method     Response N/A  N/A     Monitor/Evaluation        Monitor Per protocol, Pertinent labs, Weight, Skin status, GI status, POC/GOC, Diet advancement       Nutrition Discharge Plan         To be determined       Electronically signed by:  Kandis Salinas RD  12/30/23 14:22 EST

## 2023-12-30 NOTE — PROGRESS NOTES
[facility]     Progress Note    Patient Name: Danny Savage  : 1960  MRN: [id number]  Primary Care Physician:  Kodi Pichardo MD  Date of admission: 2023    Subjective   Subjective     Chief Complaint: No complaints this morning    Back Pain      Patient Reports patient is doing well.  He has a Rico catheter and the urine is clear.  Patient had a CT scan yesterday and left fluid collection by the kidney remained stable.  Today's creatinine is 3.47 hemoglobin 6.6 and hematocrit 20.  He is being prepared to receive a transfusion of blood.    Review of Systems   Musculoskeletal:  Positive for back pain.       Objective   Objective     Vitals:   Temp:  [98.9 °F (37.2 °C)-99.7 °F (37.6 °C)] 99.7 °F (37.6 °C)  Heart Rate:  [] 100  Resp:  [14-26] 14  BP: (138-166)/(74-93) 160/91  Flow (L/min):  [2] 2    Physical Exam   Awake alert oriented  Afebrile vital signs are stable  Result Review    Result Review:  I have personally reviewed the results from the time of this admission to 2023 06:59 EST and agree with these findings:  [x]  Laboratory list / accordion  []  Microbiology  [x]  Radiology  []  EKG/Telemetry   []  Cardiology/Vascular   []  Pathology  []  Old records  []  Other:  Most notable findings include: Significant anemia with hemoglobin and hematocrit at 6.6 and 22.  CT scan findings of left perirenal collection of fluid remained stable.    Assessment & Plan   Assessment / Plan     Brief Patient Summary:  Danny Savage is a 63 y.o. male who patient is doing well he is extubated off of pressors and getting ready to receive transfusion of blood.    Active Hospital Problems:  Active Hospital Problems    Diagnosis     **Left flank pain     Ruptured infrarenal abdominal aortic aneurysm (AAA)      Plan:   The fluid collection by the surgical site in the left kidney remains stable.  He is anemic receiving transfusion today continue with Rico catheterization while in the unit.   Please call for questions.    DVT prophylaxis:  Medical and mechanical DVT prophylaxis orders are present.    CODE STATUS:    Level Of Support Discussed With: Patient  Code Status (Patient has no pulse and is not breathing): CPR (Attempt to Resuscitate)  Medical Interventions (Patient has pulse or is breathing): Full Support    Disposition:  I expect patient to be discharged per hospitalist group.    Iliana Ozuna MD

## 2023-12-30 NOTE — PROGRESS NOTES
" LOS: 7 days   Patient Care Team:  Kodi Pichardo MD as PCP - General (Internal Medicine)  Lisset Harrington APRN as Nurse Practitioner (Urology)    Chief Complaint: ROLAND    Subjective     History of Present Illness  Pt awake and alert.  Family at bedside.  No acute complaints, dry mouth    Subjective:  Symptoms:  No shortness of breath, chest pain, chest pressure or anxiety.    Diet:  No nausea or vomiting.      History taken from: chart family    Objective     Vital Sign Min/Max for last 24 hours  Temp  Min: 98.9 °F (37.2 °C)  Max: 99.7 °F (37.6 °C)   BP  Min: 138/74  Max: 167/83   Pulse  Min: 89  Max: 105   Resp  Min: 14  Max: 26   SpO2  Min: 90 %  Max: 96 %   Flow (L/min)  Min: 2  Max: 2   No data recorded     Flowsheet Rows      Flowsheet Row First Filed Value   Admission Height 177.8 cm (70\") Documented at 12/23/2023 1948   Admission Weight 79.9 kg (176 lb 2.4 oz) Documented at 12/23/2023 1948            No intake/output data recorded.  I/O last 3 completed shifts:  In: 1144 [P.O.:600; I.V.:424; NG/GT:120]  Out: 3150 [Urine:600; Stool:50]    Objective:  General Appearance:  Comfortable.    Vital signs: (most recent): Blood pressure 162/85, pulse 103, temperature 99.7 °F (37.6 °C), temperature source Bladder, resp. rate 14, height 177.8 cm (70\"), weight 80.5 kg (177 lb 7.5 oz), SpO2 92%.  Vital signs are normal.    Output: Minimal urine output.    HEENT: Normal HEENT exam.    Lungs:  Normal effort and normal respiratory rate.    Heart: Normal rate.  Regular rhythm.    Abdomen: Abdomen is soft.  Hypoactive bowel sounds.     Extremities: There is dependent edema.    Pulses: Distal pulses are intact.    Neurological: Patient is alert and oriented to person, place and time.    Pupils:  Pupils are equal, round, and reactive to light.    Skin:  Warm and dry.              Results Review:     I reviewed the patient's new clinical results.    WBC WBC   Date Value Ref Range Status   12/30/2023 25.62 (H) 3.40 - " "10.80 10*3/mm3 Final   12/29/2023 29.90 (H) 3.40 - 10.80 10*3/mm3 Final   12/28/2023 24.41 (H) 3.40 - 10.80 10*3/mm3 Final      HGB Hemoglobin   Date Value Ref Range Status   12/30/2023 6.6 (C) 13.0 - 17.7 g/dL Final   12/29/2023 7.0 (L) 13.0 - 17.7 g/dL Final   12/28/2023 7.9 (L) 13.0 - 17.7 g/dL Final      HCT Hematocrit   Date Value Ref Range Status   12/30/2023 20.0 (C) 37.5 - 51.0 % Final   12/29/2023 21.2 (L) 37.5 - 51.0 % Final   12/28/2023 23.0 (L) 37.5 - 51.0 % Final      Platlets No results found for: \"LABPLAT\"   MCV MCV   Date Value Ref Range Status   12/30/2023 89.7 79.0 - 97.0 fL Final   12/29/2023 89.1 79.0 - 97.0 fL Final   12/28/2023 86.8 79.0 - 97.0 fL Final          Sodium Sodium   Date Value Ref Range Status   12/30/2023 133 (L) 136 - 145 mmol/L Final   12/29/2023 138 136 - 145 mmol/L Final   12/28/2023 136 136 - 145 mmol/L Final   12/28/2023 139 136 - 145 mmol/L Final     Sodium, Arterial   Date Value Ref Range Status   12/28/2023 135.2 (L) 136 - 146 mmol/L Final   12/27/2023 138.7 136 - 146 mmol/L Final      Potassium Potassium   Date Value Ref Range Status   12/30/2023 5.2 3.5 - 5.2 mmol/L Final     Comment:     Slight hemolysis detected by analyzer. Result may be falsely elevated.   12/29/2023 4.2 3.5 - 5.2 mmol/L Final   12/28/2023 3.9 3.5 - 5.2 mmol/L Final   12/28/2023 4.3 3.5 - 5.2 mmol/L Final      Chloride Chloride   Date Value Ref Range Status   12/30/2023 99 98 - 107 mmol/L Final   12/29/2023 103 98 - 107 mmol/L Final   12/28/2023 100 98 - 107 mmol/L Final   12/28/2023 101 98 - 107 mmol/L Final      CO2 CO2   Date Value Ref Range Status   12/30/2023 21.7 (L) 22.0 - 29.0 mmol/L Final   12/29/2023 24.0 22.0 - 29.0 mmol/L Final   12/28/2023 22.9 22.0 - 29.0 mmol/L Final   12/28/2023 22.3 22.0 - 29.0 mmol/L Final      BUN BUN   Date Value Ref Range Status   12/30/2023 28 (H) 8 - 23 mg/dL Final   12/29/2023 35 (H) 8 - 23 mg/dL Final   12/28/2023 25 (H) 8 - 23 mg/dL Final   12/28/2023 34 (H) " "8 - 23 mg/dL Final      Creatinine Creatinine   Date Value Ref Range Status   12/30/2023 3.47 (H) 0.76 - 1.27 mg/dL Final   12/29/2023 3.65 (H) 0.76 - 1.27 mg/dL Final   12/28/2023 2.79 (H) 0.76 - 1.27 mg/dL Final   12/28/2023 3.27 (H) 0.76 - 1.27 mg/dL Final      Calcium Calcium   Date Value Ref Range Status   12/30/2023 7.5 (L) 8.6 - 10.5 mg/dL Final   12/29/2023 7.2 (L) 8.6 - 10.5 mg/dL Final   12/28/2023 7.5 (L) 8.6 - 10.5 mg/dL Final   12/28/2023 7.3 (L) 8.6 - 10.5 mg/dL Final      PO4 No results found for: \"CAPO4\"   Albumin Albumin   Date Value Ref Range Status   12/30/2023 2.2 (L) 3.5 - 5.2 g/dL Final   12/29/2023 2.3 (L) 3.5 - 5.2 g/dL Final   12/28/2023 2.4 (L) 3.5 - 5.2 g/dL Final   12/28/2023 2.3 (L) 3.5 - 5.2 g/dL Final      Magnesium Magnesium   Date Value Ref Range Status   12/30/2023 2.2 1.6 - 2.4 mg/dL Final   12/29/2023 2.2 1.6 - 2.4 mg/dL Final   12/28/2023 2.1 1.6 - 2.4 mg/dL Final   12/28/2023 2.2 1.6 - 2.4 mg/dL Final      Uric Acid No results found for: \"URICACID\"     Medication Review:   ampicillin-sulbactam, 3 g, Intravenous, Q24H  bumetanide, 4 mg, Intravenous, Q8H  famotidine, 20 mg, Intravenous, Q12H  senna-docusate sodium, 2 tablet, Oral, BID  sodium chloride, 10 mL, Intravenous, Q12H          Assessment & Plan       Left flank pain    Ruptured infrarenal abdominal aortic aneurysm (AAA)      Assessment & Plan  ROLAND-  pt with previous partial left nephrectomy and baseline creatinine closer to 0.9-1.0.  Had noted hypotension with AAA rupture s/p repair.  Borderline liguric at this time on IV bumex, 500ml recorded.  Likely ATN from hypotension vs. Atheroembolic injury.  Noted renal infarcts seen on repeat CT.  Also received IV contrast with CTA.  Had to have repeated contrast for possible abd abscess yesterday.  Continue IV bumex 4mg IV q8hrs. Bp elevated.  Will plan dialysis again today for volume and monitor.  AAA rupture-  s/p open repair infrarenal aorta.  Ischemic bowel- s/p left " colectomy, ostomy.  Resp Failure-  extubated.  Shock- sepsis, hemorrhagic component with AAA rupture. Resolved now.   Sepsis-  blood cx with strep pyogenes.  On abx per primary.  Anemia-  prbc's prn.    Adin Hinton MD  12/30/23  08:58 EST

## 2023-12-30 NOTE — PLAN OF CARE
Problem: Adult Inpatient Plan of Care  Goal: Plan of Care Review  Outcome: Ongoing, Progressing  Flowsheets (Taken 12/30/2023 1831)  Plan of Care Reviewed With: patient  Outcome Evaluation: patient remains aaox4, more groggy this day, patient received HD with 4 liters removed. Jaundice noted. md aware and labs ordered. VSS. o2 per 2l nc. pain medications administerd per order. no output via ostomy this day. wound vac remains in place. poor appetitie remains. nutritional shakes ordered. patient ecouraged to drink and eat to increase protein intake. patient and family verbalizes understanding.  Goal: Patient-Specific Goal (Individualized)  Outcome: Ongoing, Progressing  Goal: Absence of Hospital-Acquired Illness or Injury  Outcome: Ongoing, Progressing  Intervention: Identify and Manage Fall Risk  Recent Flowsheet Documentation  Taken 12/30/2023 1800 by Regina Parrish RN  Safety Promotion/Fall Prevention: safety round/check completed  Taken 12/30/2023 1700 by Regina Parrish RN  Safety Promotion/Fall Prevention: safety round/check completed  Taken 12/30/2023 1600 by Regina Parrish RN  Safety Promotion/Fall Prevention: safety round/check completed  Taken 12/30/2023 1500 by Regina Parrish RN  Safety Promotion/Fall Prevention: safety round/check completed  Taken 12/30/2023 1400 by Regina Parrish RN  Safety Promotion/Fall Prevention: safety round/check completed  Taken 12/30/2023 1300 by Regina Parrish RN  Safety Promotion/Fall Prevention: safety round/check completed  Taken 12/30/2023 1200 by Regina Parrish RN  Safety Promotion/Fall Prevention: safety round/check completed  Taken 12/30/2023 1100 by Regina Parrish RN  Safety Promotion/Fall Prevention: safety round/check completed  Taken 12/30/2023 1000 by Regina Parrish RN  Safety Promotion/Fall Prevention: safety round/check completed  Taken 12/30/2023 0900 by Regina Parrish RN  Safety Promotion/Fall Prevention: safety round/check completed  Taken 12/30/2023  0800 by Regina Parrish RN  Safety Promotion/Fall Prevention: safety round/check completed  Taken 12/30/2023 0700 by Regina Parrish RN  Safety Promotion/Fall Prevention: safety round/check completed  Intervention: Prevent Skin Injury  Recent Flowsheet Documentation  Taken 12/30/2023 1730 by Regina Parrish RN  Body Position:   turned   right   upper extremity elevated   lower extremity elevated  Taken 12/30/2023 1600 by Regina Parrish RN  Body Position:   turned   left   upper extremity elevated   lower extremity elevated  Taken 12/30/2023 1430 by Regina Parrish RN  Body Position:   turned   right   upper extremity elevated   lower extremity elevated  Taken 12/30/2023 1400 by Regina Parrish RN  Body Position:   weight shifting   lower extremity elevated   upper extremity elevated  Taken 12/30/2023 1200 by Regina Parrish RN  Body Position: (receiving dialysis, shifted weight in bed)   weight shifting   supine  Taken 12/30/2023 1000 by Regina Parrish RN  Body Position:   turned   supine   upper extremity elevated   lower extremity elevated  Taken 12/30/2023 0800 by Regina Parrish RN  Skin Protection:   tubing/devices free from skin contact   skin-to-device areas padded  Intervention: Prevent and Manage VTE (Venous Thromboembolism) Risk  Recent Flowsheet Documentation  Taken 12/30/2023 1600 by Regina Parrish RN  Activity Management: patient refuses activity  Taken 12/30/2023 0800 by Regina Parrish RN  Activity Management: activity encouraged  VTE Prevention/Management:   compression stockings on   bilateral  Range of Motion: (per wife) ROM (range of motion) performed  Intervention: Prevent Infection  Recent Flowsheet Documentation  Taken 12/30/2023 0800 by Regina Parrish RN  Infection Prevention: hand hygiene promoted  Goal: Optimal Comfort and Wellbeing  Outcome: Ongoing, Progressing  Intervention: Monitor Pain and Promote Comfort  Recent Flowsheet Documentation  Taken 12/30/2023 1725 by Regina Parrish  RN  Pain Management Interventions: see MAR  Taken 12/30/2023 1413 by Regina Parrish RN  Pain Management Interventions: see MAR  Taken 12/30/2023 1241 by Regina Parrish RN  Pain Management Interventions: see MAR  Taken 12/30/2023 1200 by Regina Parrish RN  Pain Management Interventions: see MAR  Taken 12/30/2023 0800 by Regina Parrish RN  Pain Management Interventions: see MAR  Goal: Readiness for Transition of Care  Outcome: Ongoing, Progressing   Goal Outcome Evaluation:  Plan of Care Reviewed With: patient        Progress: improving  Outcome Evaluation: patient remains aaox4, more groggy this day, patient received HD with 4 liters removed. Jaundice noted. md aware and labs ordered. VSS. o2 per 2l nc. pain medications administerd per order. no output via ostomy this day. wound vac remains in place. poor appetitie remains. nutritional shakes ordered. patient ecouraged to drink and eat to increase protein intake. patient and family verbalizes understanding.

## 2023-12-30 NOTE — PROGRESS NOTES
[facility]     Progress Note    Patient Name: Danny Savage  : 1960  MRN: [id number]  Primary Care Physician:  Kodi Pichardo MD  Date of admission: 2023    Subjective   Subjective     Chief Complaint: Colon ischemia    HPI:  Patient Reports feeling well today.  Having ostomy output.  No nausea or vomiting.  Tolerating full liquids.      Objective   Objective     Vitals:   Temp:  [98.9 °F (37.2 °C)-99.7 °F (37.6 °C)] 99.7 °F (37.6 °C)  Heart Rate:  [] 100  Resp:  [14-22] 16  BP: (138-170)/(74-93) 170/89  Flow (L/min):  [2] 2    Physical Exam  Constitutional:       Appearance: Normal appearance.   Cardiovascular:      Rate and Rhythm: Normal rate.   Pulmonary:      Effort: Pulmonary effort is normal.   Abdominal:      Palpations: Abdomen is soft.         Result Review    Result Review:  I have personally reviewed the results from the time of this admission to 2023 10:22 EST and agree with these findings:  [x]  Laboratory  []  Microbiology  []  Radiology  []  EKG/Telemetry   []  Cardiology/Vascular   []  Pathology  []  Old records  []  Other:  Most notable findings include: Slightly improved leukocytosis    Assessment & Plan   Assessment / Plan     Brief Patient Summary:  Danny Savage is a 63 y.o. male who had colonic ischemia secondary to repair of ruptured AAA    Active Hospital Problems:  Active Hospital Problems    Diagnosis     **Left flank pain     Ruptured infrarenal abdominal aortic aneurysm (AAA)      Plan:  From a general surgery standpoint patient is stable  He is tolerating diet and having ostomy output  Patient reports he does not feel like advancing his diet and is okay on full liquids today  I am fine with advancing diet as tolerated  As he is stable from a general surgery standpoint, I will be following intermittently       DVT prophylaxis:  Medical and mechanical DVT prophylaxis orders are present.    CODE STATUS:   Level Of Support Discussed With:  Patient  Code Status (Patient has no pulse and is not breathing): CPR (Attempt to Resuscitate)  Medical Interventions (Patient has pulse or is breathing): Full Support    Disposition:  I expect patient to be discharged unsure.    Electronically signed by Hernan Mallory MD, 12/30/23, 10:22 AM EST.

## 2023-12-31 ENCOUNTER — APPOINTMENT (OUTPATIENT)
Dept: GENERAL RADIOLOGY | Facility: HOSPITAL | Age: 63
DRG: 268 | End: 2023-12-31
Payer: COMMERCIAL

## 2023-12-31 LAB
ALBUMIN SERPL-MCNC: 2.3 G/DL (ref 3.5–5.2)
ALBUMIN/GLOB SERPL: 0.6 G/DL
ALP SERPL-CCNC: 124 U/L (ref 39–117)
ALT SERPL W P-5'-P-CCNC: 44 U/L (ref 1–41)
ANION GAP SERPL CALCULATED.3IONS-SCNC: 16.3 MMOL/L (ref 5–15)
APTT PPP: 100.9 SECONDS (ref 78–95.9)
AST SERPL-CCNC: 58 U/L (ref 1–40)
BASOPHILS # BLD AUTO: 0.13 10*3/MM3 (ref 0–0.2)
BASOPHILS NFR BLD AUTO: 0.5 % (ref 0–1.5)
BH BB BLOOD EXPIRATION DATE: NORMAL
BH BB BLOOD EXPIRATION DATE: NORMAL
BH BB BLOOD TYPE BARCODE: 600
BH BB BLOOD TYPE BARCODE: 600
BH BB DISPENSE STATUS: NORMAL
BH BB DISPENSE STATUS: NORMAL
BH BB PRODUCT CODE: NORMAL
BH BB PRODUCT CODE: NORMAL
BH BB UNIT NUMBER: NORMAL
BH BB UNIT NUMBER: NORMAL
BILIRUB SERPL-MCNC: 3.5 MG/DL (ref 0–1.2)
BUN SERPL-MCNC: 33 MG/DL (ref 8–23)
BUN/CREAT SERPL: 9.5 (ref 7–25)
CALCIUM SPEC-SCNC: 7.8 MG/DL (ref 8.6–10.5)
CHLORIDE SERPL-SCNC: 96 MMOL/L (ref 98–107)
CO2 SERPL-SCNC: 19.7 MMOL/L (ref 22–29)
CREAT SERPL-MCNC: 3.46 MG/DL (ref 0.76–1.27)
CROSSMATCH INTERPRETATION: NORMAL
CROSSMATCH INTERPRETATION: NORMAL
D-LACTATE SERPL-SCNC: 2.4 MMOL/L (ref 0.5–2)
DEPRECATED RDW RBC AUTO: 51.1 FL (ref 37–54)
EGFRCR SERPLBLD CKD-EPI 2021: 18.8 ML/MIN/1.73
EOSINOPHIL # BLD AUTO: 0.08 10*3/MM3 (ref 0–0.4)
EOSINOPHIL NFR BLD AUTO: 0.3 % (ref 0.3–6.2)
ERYTHROCYTE [DISTWIDTH] IN BLOOD BY AUTOMATED COUNT: 16 % (ref 12.3–15.4)
GLOBULIN UR ELPH-MCNC: 3.7 GM/DL
GLUCOSE BLDC GLUCOMTR-MCNC: 85 MG/DL (ref 70–99)
GLUCOSE SERPL-MCNC: 92 MG/DL (ref 65–99)
HCT VFR BLD AUTO: 23.2 % (ref 37.5–51)
HCT VFR BLD AUTO: 29.2 % (ref 37.5–51)
HGB BLD-MCNC: 7.7 G/DL (ref 13–17.7)
HGB BLD-MCNC: 9.6 G/DL (ref 13–17.7)
IMM GRANULOCYTES # BLD AUTO: 1.24 10*3/MM3 (ref 0–0.05)
IMM GRANULOCYTES NFR BLD AUTO: 4.6 % (ref 0–0.5)
LIPASE SERPL-CCNC: 14 U/L (ref 13–60)
LYMPHOCYTES # BLD AUTO: 1.65 10*3/MM3 (ref 0.7–3.1)
LYMPHOCYTES NFR BLD AUTO: 6.2 % (ref 19.6–45.3)
MAGNESIUM SERPL-MCNC: 2.3 MG/DL (ref 1.6–2.4)
MCH RBC QN AUTO: 29.8 PG (ref 26.6–33)
MCHC RBC AUTO-ENTMCNC: 33.2 G/DL (ref 31.5–35.7)
MCV RBC AUTO: 89.9 FL (ref 79–97)
MONOCYTES # BLD AUTO: 2.56 10*3/MM3 (ref 0.1–0.9)
MONOCYTES NFR BLD AUTO: 9.5 % (ref 5–12)
NEUTROPHILS NFR BLD AUTO: 21.16 10*3/MM3 (ref 1.7–7)
NEUTROPHILS NFR BLD AUTO: 78.9 % (ref 42.7–76)
NRBC BLD AUTO-RTO: 0.2 /100 WBC (ref 0–0.2)
PHOSPHATE SERPL-MCNC: 5.5 MG/DL (ref 2.5–4.5)
PLATELET # BLD AUTO: 193 10*3/MM3 (ref 140–450)
PMV BLD AUTO: 11 FL (ref 6–12)
POTASSIUM SERPL-SCNC: 4.2 MMOL/L (ref 3.5–5.2)
PROT SERPL-MCNC: 6 G/DL (ref 6–8.5)
RBC # BLD AUTO: 2.58 10*6/MM3 (ref 4.14–5.8)
SODIUM SERPL-SCNC: 132 MMOL/L (ref 136–145)
UNIT  ABO: NORMAL
UNIT  ABO: NORMAL
UNIT  RH: NORMAL
UNIT  RH: NORMAL
WBC NRBC COR # BLD AUTO: 26.82 10*3/MM3 (ref 3.4–10.8)

## 2023-12-31 PROCEDURE — 82948 REAGENT STRIP/BLOOD GLUCOSE: CPT

## 2023-12-31 PROCEDURE — 86923 COMPATIBILITY TEST ELECTRIC: CPT

## 2023-12-31 PROCEDURE — 85730 THROMBOPLASTIN TIME PARTIAL: CPT | Performed by: INTERNAL MEDICINE

## 2023-12-31 PROCEDURE — 99291 CRITICAL CARE FIRST HOUR: CPT | Performed by: INTERNAL MEDICINE

## 2023-12-31 PROCEDURE — 97110 THERAPEUTIC EXERCISES: CPT

## 2023-12-31 PROCEDURE — 85018 HEMOGLOBIN: CPT | Performed by: INTERNAL MEDICINE

## 2023-12-31 PROCEDURE — 85025 COMPLETE CBC W/AUTO DIFF WBC: CPT | Performed by: INTERNAL MEDICINE

## 2023-12-31 PROCEDURE — 83690 ASSAY OF LIPASE: CPT | Performed by: SURGERY

## 2023-12-31 PROCEDURE — 85014 HEMATOCRIT: CPT | Performed by: INTERNAL MEDICINE

## 2023-12-31 PROCEDURE — 25010000002 HEPARIN (PORCINE) 25000-0.45 UT/250ML-% SOLUTION: Performed by: SURGERY

## 2023-12-31 PROCEDURE — 25010000002 FENTANYL CITRATE (PF) 50 MCG/ML SOLUTION: Performed by: SURGERY

## 2023-12-31 PROCEDURE — 25810000003 SODIUM CHLORIDE 0.9 % SOLUTION: Performed by: PHYSICIAN ASSISTANT

## 2023-12-31 PROCEDURE — 83735 ASSAY OF MAGNESIUM: CPT | Performed by: INTERNAL MEDICINE

## 2023-12-31 PROCEDURE — P9016 RBC LEUKOCYTES REDUCED: HCPCS

## 2023-12-31 PROCEDURE — 83605 ASSAY OF LACTIC ACID: CPT | Performed by: INTERNAL MEDICINE

## 2023-12-31 PROCEDURE — 36430 TRANSFUSION BLD/BLD COMPNT: CPT

## 2023-12-31 PROCEDURE — 99233 SBSQ HOSP IP/OBS HIGH 50: CPT | Performed by: INTERNAL MEDICINE

## 2023-12-31 PROCEDURE — 86900 BLOOD TYPING SEROLOGIC ABO: CPT

## 2023-12-31 PROCEDURE — 84100 ASSAY OF PHOSPHORUS: CPT | Performed by: INTERNAL MEDICINE

## 2023-12-31 PROCEDURE — 25010000002 AMPICILLIN-SULBACTAM PER 1.5 G: Performed by: INTERNAL MEDICINE

## 2023-12-31 PROCEDURE — 80053 COMPREHEN METABOLIC PANEL: CPT | Performed by: INTERNAL MEDICINE

## 2023-12-31 PROCEDURE — 71045 X-RAY EXAM CHEST 1 VIEW: CPT

## 2023-12-31 RX ORDER — SODIUM BICARBONATE 650 MG/1
650 TABLET ORAL 3 TIMES DAILY
Status: DISCONTINUED | OUTPATIENT
Start: 2023-12-31 | End: 2024-01-02

## 2023-12-31 RX ORDER — NOREPINEPHRINE BITARTRATE 0.03 MG/ML
.02-.3 INJECTION, SOLUTION INTRAVENOUS
Status: DISCONTINUED | OUTPATIENT
Start: 2023-12-31 | End: 2024-01-06

## 2023-12-31 RX ORDER — CHOLECALCIFEROL (VITAMIN D3) 125 MCG
10 CAPSULE ORAL NIGHTLY
Status: DISCONTINUED | OUTPATIENT
Start: 2023-12-31 | End: 2024-01-03

## 2023-12-31 RX ADMIN — OXYCODONE AND ACETAMINOPHEN 1 TABLET: 5; 325 TABLET ORAL at 06:12

## 2023-12-31 RX ADMIN — DOCUSATE SODIUM 50 MG AND SENNOSIDES 8.6 MG 2 TABLET: 8.6; 5 TABLET, FILM COATED ORAL at 08:28

## 2023-12-31 RX ADMIN — OXYCODONE AND ACETAMINOPHEN 1 TABLET: 5; 325 TABLET ORAL at 17:33

## 2023-12-31 RX ADMIN — Medication 10 ML: at 08:28

## 2023-12-31 RX ADMIN — OXYCODONE AND ACETAMINOPHEN 1 TABLET: 5; 325 TABLET ORAL at 01:35

## 2023-12-31 RX ADMIN — NOREPINEPHRINE BITARTRATE 0.02 MCG/KG/MIN: 1 INJECTION, SOLUTION, CONCENTRATE INTRAVENOUS at 09:02

## 2023-12-31 RX ADMIN — FENTANYL CITRATE 25 MCG: 50 INJECTION, SOLUTION INTRAMUSCULAR; INTRAVENOUS at 14:51

## 2023-12-31 RX ADMIN — Medication 10 ML: at 20:46

## 2023-12-31 RX ADMIN — BUMETANIDE 4 MG: 0.25 INJECTION INTRAMUSCULAR; INTRAVENOUS at 08:28

## 2023-12-31 RX ADMIN — SODIUM BICARBONATE 650 MG TABLET 650 MG: at 11:30

## 2023-12-31 RX ADMIN — SODIUM CHLORIDE 3 G: 900 INJECTION INTRAVENOUS at 17:33

## 2023-12-31 RX ADMIN — DOCUSATE SODIUM 50 MG AND SENNOSIDES 8.6 MG 2 TABLET: 8.6; 5 TABLET, FILM COATED ORAL at 20:38

## 2023-12-31 RX ADMIN — BUMETANIDE 4 MG: 0.25 INJECTION INTRAMUSCULAR; INTRAVENOUS at 00:12

## 2023-12-31 RX ADMIN — BUMETANIDE 4 MG: 0.25 INJECTION INTRAMUSCULAR; INTRAVENOUS at 15:45

## 2023-12-31 RX ADMIN — SODIUM BICARBONATE 650 MG TABLET 650 MG: at 15:44

## 2023-12-31 RX ADMIN — Medication 10 MG: at 20:38

## 2023-12-31 RX ADMIN — HEPARIN SODIUM 18 UNITS/KG/HR: 10000 INJECTION, SOLUTION INTRAVENOUS at 18:16

## 2023-12-31 RX ADMIN — OXYCODONE AND ACETAMINOPHEN 1 TABLET: 5; 325 TABLET ORAL at 11:30

## 2023-12-31 RX ADMIN — FAMOTIDINE 20 MG: 10 INJECTION INTRAVENOUS at 08:28

## 2023-12-31 RX ADMIN — DEXTROSE MONOHYDRATE 25 G: 25 INJECTION, SOLUTION INTRAVENOUS at 08:20

## 2023-12-31 RX ADMIN — HEPARIN SODIUM 18 UNITS/KG/HR: 10000 INJECTION, SOLUTION INTRAVENOUS at 00:54

## 2023-12-31 RX ADMIN — OXYCODONE AND ACETAMINOPHEN 1 TABLET: 10; 325 TABLET ORAL at 21:20

## 2023-12-31 RX ADMIN — SODIUM BICARBONATE 650 MG TABLET 650 MG: at 20:38

## 2023-12-31 NOTE — PROGRESS NOTES
" LOS: 8 days   Patient Care Team:  Kodi Pichardo MD as PCP - General (Internal Medicine)  Lisset Harrington APRN as Nurse Practitioner (Urology)    Chief Complaint: ROLAND    Subjective     History of Present Illness  Pt awake and alert.  Family at bedside.  Hasn't had dialysis today.  BP on low side.  Not on pressors.    Subjective:  Symptoms:  No shortness of breath, chest pain, chest pressure or anxiety.    Diet:  No nausea or vomiting.      History taken from: chart family    Objective     Vital Sign Min/Max for last 24 hours  Temp  Min: 98.6 °F (37 °C)  Max: 100.1 °F (37.8 °C)   BP  Min: 95/60  Max: 174/93   Pulse  Min: 93  Max: 104   Resp  Min: 10  Max: 22   SpO2  Min: 86 %  Max: 99 %   Flow (L/min)  Min: 2  Max: 2   No data recorded     Flowsheet Rows      Flowsheet Row First Filed Value   Admission Height 177.8 cm (70\") Documented at 12/23/2023 1948   Admission Weight 79.9 kg (176 lb 2.4 oz) Documented at 12/23/2023 1948            I/O this shift:  In: 125 [P.O.:100; IV Piggyback:25]  Out: -   I/O last 3 completed shifts:  In: 1673.8 [P.O.:120; I.V.:795; Blood:558.8; Other:200]  Out: 6800 [Urine:300]    Objective:  General Appearance:  Comfortable.    Vital signs: (most recent): Blood pressure 95/60, pulse 94, temperature 98.6 °F (37 °C), temperature source Bladder, resp. rate 10, height 177.8 cm (70\"), weight 80.5 kg (177 lb 7.5 oz), SpO2 96%.  Vital signs are normal.    Output: Minimal urine output.    HEENT: Normal HEENT exam.    Lungs:  Normal effort and normal respiratory rate.    Heart: Normal rate.  Regular rhythm.    Abdomen: Abdomen is soft.  Hypoactive bowel sounds.     Extremities: There is dependent edema.    Pulses: Distal pulses are intact.    Neurological: Patient is alert and oriented to person, place and time.    Pupils:  Pupils are equal, round, and reactive to light.    Skin:  Warm and dry.              Results Review:     I reviewed the patient's new clinical results.    WBC WBC " "  Date Value Ref Range Status   12/31/2023 26.82 (H) 3.40 - 10.80 10*3/mm3 Final   12/30/2023 25.62 (H) 3.40 - 10.80 10*3/mm3 Final   12/29/2023 29.90 (H) 3.40 - 10.80 10*3/mm3 Final      HGB Hemoglobin   Date Value Ref Range Status   12/31/2023 7.7 (L) 13.0 - 17.7 g/dL Final   12/30/2023 9.2 (L) 13.0 - 17.7 g/dL Final   12/30/2023 6.6 (C) 13.0 - 17.7 g/dL Final   12/29/2023 7.0 (L) 13.0 - 17.7 g/dL Final      HCT Hematocrit   Date Value Ref Range Status   12/31/2023 23.2 (L) 37.5 - 51.0 % Final   12/30/2023 27.1 (L) 37.5 - 51.0 % Final   12/30/2023 20.0 (C) 37.5 - 51.0 % Final   12/29/2023 21.2 (L) 37.5 - 51.0 % Final      Platlets No results found for: \"LABPLAT\"   MCV MCV   Date Value Ref Range Status   12/31/2023 89.9 79.0 - 97.0 fL Final   12/30/2023 89.7 79.0 - 97.0 fL Final   12/29/2023 89.1 79.0 - 97.0 fL Final          Sodium Sodium   Date Value Ref Range Status   12/31/2023 132 (L) 136 - 145 mmol/L Final   12/30/2023 134 (L) 136 - 145 mmol/L Final   12/30/2023 133 (L) 136 - 145 mmol/L Final   12/29/2023 138 136 - 145 mmol/L Final   12/28/2023 136 136 - 145 mmol/L Final      Potassium Potassium   Date Value Ref Range Status   12/31/2023 4.2 3.5 - 5.2 mmol/L Final   12/30/2023 3.7 3.5 - 5.2 mmol/L Final   12/30/2023 5.2 3.5 - 5.2 mmol/L Final     Comment:     Slight hemolysis detected by analyzer. Result may be falsely elevated.   12/29/2023 4.2 3.5 - 5.2 mmol/L Final   12/28/2023 3.9 3.5 - 5.2 mmol/L Final      Chloride Chloride   Date Value Ref Range Status   12/31/2023 96 (L) 98 - 107 mmol/L Final   12/30/2023 98 98 - 107 mmol/L Final   12/30/2023 99 98 - 107 mmol/L Final   12/29/2023 103 98 - 107 mmol/L Final   12/28/2023 100 98 - 107 mmol/L Final      CO2 CO2   Date Value Ref Range Status   12/31/2023 19.7 (L) 22.0 - 29.0 mmol/L Final   12/30/2023 22.8 22.0 - 29.0 mmol/L Final   12/30/2023 21.7 (L) 22.0 - 29.0 mmol/L Final   12/29/2023 24.0 22.0 - 29.0 mmol/L Final   12/28/2023 22.9 22.0 - 29.0 mmol/L " "Final      BUN BUN   Date Value Ref Range Status   12/31/2023 33 (H) 8 - 23 mg/dL Final   12/30/2023 15 8 - 23 mg/dL Final   12/30/2023 28 (H) 8 - 23 mg/dL Final   12/29/2023 35 (H) 8 - 23 mg/dL Final   12/28/2023 25 (H) 8 - 23 mg/dL Final      Creatinine Creatinine   Date Value Ref Range Status   12/31/2023 3.46 (H) 0.76 - 1.27 mg/dL Final   12/30/2023 2.32 (H) 0.76 - 1.27 mg/dL Final   12/30/2023 3.47 (H) 0.76 - 1.27 mg/dL Final   12/29/2023 3.65 (H) 0.76 - 1.27 mg/dL Final   12/28/2023 2.79 (H) 0.76 - 1.27 mg/dL Final      Calcium Calcium   Date Value Ref Range Status   12/31/2023 7.8 (L) 8.6 - 10.5 mg/dL Final   12/30/2023 8.1 (L) 8.6 - 10.5 mg/dL Final   12/30/2023 7.5 (L) 8.6 - 10.5 mg/dL Final   12/29/2023 7.2 (L) 8.6 - 10.5 mg/dL Final   12/28/2023 7.5 (L) 8.6 - 10.5 mg/dL Final      PO4 No results found for: \"CAPO4\"   Albumin Albumin   Date Value Ref Range Status   12/31/2023 2.3 (L) 3.5 - 5.2 g/dL Final   12/30/2023 2.4 (L) 3.5 - 5.2 g/dL Final   12/30/2023 2.2 (L) 3.5 - 5.2 g/dL Final   12/29/2023 2.3 (L) 3.5 - 5.2 g/dL Final   12/28/2023 2.4 (L) 3.5 - 5.2 g/dL Final      Magnesium Magnesium   Date Value Ref Range Status   12/31/2023 2.3 1.6 - 2.4 mg/dL Final   12/30/2023 2.2 1.6 - 2.4 mg/dL Final   12/29/2023 2.2 1.6 - 2.4 mg/dL Final   12/28/2023 2.1 1.6 - 2.4 mg/dL Final      Uric Acid No results found for: \"URICACID\"     Medication Review:   ampicillin-sulbactam, 3 g, Intravenous, Q24H  bumetanide, 4 mg, Intravenous, Q8H  famotidine, 20 mg, Intravenous, Daily  senna-docusate sodium, 2 tablet, Oral, BID  sodium chloride, 10 mL, Intravenous, Q12H          Assessment & Plan       Left flank pain    Ruptured infrarenal abdominal aortic aneurysm (AAA)      Assessment & Plan  ROLAND-  pt with previous partial left nephrectomy and baseline creatinine closer to 0.9-1.0.  Had noted hypotension with AAA rupture s/p repair. on IV bumex.  Likely ATN from hypotension vs. Atheroembolic injury.  Noted renal infarcts " seen on repeat CT.  Also received IV contrast with CTA.  Had to have repeated contrast for possible abd abscess yesterday.  Continue IV bumex 4mg IV q8hrs. Bp elevated.  Bp on low side today.  Will hold dialysis today and evaluate in AM.  Volume looks better on exam.  AAA rupture-  s/p open repair infrarenal aorta.  Ischemic bowel- s/p left colectomy, ostomy.  Shock- sepsis, hemorrhagic component with AAA rupture. Resolved now.   Sepsis-  blood cx with strep pyogenes.  On abx per primary.  Met Acidosis-  adding oral bicarb today.  Anemia-  prbc's prn.    Adin Hinton MD  12/31/23  10:17 EST

## 2023-12-31 NOTE — PLAN OF CARE
Goal Outcome Evaluation:        Plan of care reviewed with: Patient and family    Progress: Ongoing, no change    Outcome: A&Ox4 with increased lethargy, NSR 90s, 2L NC. No HD today. Patient had a vagal episode this am, became hypotensive 86/72 and passed out when trying to sit up on the side of the bed. Now, patient is resting comfortably with around the clock pain meds given for generalized pain.

## 2023-12-31 NOTE — PROGRESS NOTES
[facility]     Progress Note    Patient Name: Danny Savage  : 1960  MRN: [id number]  Primary Care Physician:  Kodi Pichardo MD  Date of admission: 2023    Subjective   Subjective     POD #7 status post open repair of ruptured abdominal aorta aneurysm, POD #5 status post abdominal reexploration, sigmoidectomy with end colostomy, closure of abdominal wound    Marginal BP this AM and had vagal episode.  Some RLQ slight TTP. Tolerating some PO intake with ostomy output.    Diagnosed with left leg DVT and now on heparin gtt.  Hgb 7.7 after receiving 2 units PRBC's yesterday.     Objective   Objective     Vitals:   Temp:  [98.6 °F (37 °C)-100.1 °F (37.8 °C)] 98.6 °F (37 °C)  Heart Rate:  [] 94  Resp:  [10-22] 10  BP: ()/() 95/60  Flow (L/min):  [2] 2    Physical Exam   General: Awake, alert   Abdomen: Slight TTP to RLQ.  Wound VAC in place.  Stoma appears satisfactory.  Extremities: Symmetric, moderate edema.    Hgb: 7.7  WBC: 23.2  Creatinine: 3.46  Lacate: 2.4    Assessment & Plan   Assessment / Plan     Assessment/Plan:    Patient remains critically ill in ICU.  Left leg DVT, on heparin.  Continuing rise on leukocytosis, undergoing evaluation by ICU.  Continue empiric abx.  CT negative for any post-op abscess or surgical issue other than splenic and renal infarcts which may be cause of leukocytosis. Already on heparin gtt.  Will hold HD today and he will have 2 units PRBC's transfused today.  Currently on levophed gtt for marginal BP this AM.   Increase activity, ambulation and diet as able.        Active Hospital Problems:  Active Hospital Problems    Diagnosis     **Left flank pain     Ruptured infrarenal abdominal aortic aneurysm (AAA)

## 2023-12-31 NOTE — PLAN OF CARE
Goal Outcome Evaluation:      VSS. Tmax 100.3. Sleeping between care. No new issues at this time.

## 2023-12-31 NOTE — THERAPY TREATMENT NOTE
Acute Care - Physical Therapy Treatment Note   [place]     Patient Name: Danny Savage  : 1960  MRN: [id number]  Today's Date: 2023      Visit Dx:     ICD-10-CM ICD-9-CM   1. Sepsis, due to unspecified organism, unspecified whether acute organ dysfunction present  A41.9 038.9     995.91   2. Fever, unspecified fever cause  R50.9 780.60   3. Intra-abdominal fluid collection  R18.8 789.59   4. Abdominal aortic aneurysm (AAA) without rupture, unspecified part  I71.40 441.4   5. Ruptured infrarenal abdominal aortic aneurysm (AAA)  I71.33 441.3   6. Decreased activities of daily living (ADL)  Z78.9 V49.89   7. Difficulty walking  R26.2 719.7     Patient Active Problem List   Diagnosis    Left renal mass    Kidney stone    Left ureteral stone    Left flank pain    Ruptured infrarenal abdominal aortic aneurysm (AAA)     Past Medical History:   Diagnosis Date    Allergy     Aortic aneurysm     4.7 just found has not f/u    H/O Kidney stone     Hiatal hernia     Kidney mass     left    Renal cancer      Past Surgical History:   Procedure Laterality Date    ABDOMINAL AORTIC ANEURYSM REPAIR N/A 2023    Procedure: ABDOMINAL AORTIC ANEURYSM REPAIR;  Surgeon: Koffi Agosto MD;  Location: formerly [facility] MAIN OR;  Service: Vascular;  Laterality: N/A;    COLON RESECTION N/A 2023    Procedure: COLON RESECTION;  Surgeon: Hernan Mallory MD;  Location: formerly [facility] MAIN OR;  Service: General;  Laterality: N/A;  EXPLORATORY LAPAROTOMY, REPAIR OF SMALL BOWEL MESENTERY, APPENDECTOMY, LEFT HEMICOLECTOMY, CREATION OF OSTOMY, ABDOMINAL CLOSURE    EXPLORATORY LAPAROTOMY N/A 2023    Procedure: LAPAROTOMY EXPLORATORY;  Surgeon: Koffi Agosto MD;  Location: formerly [facility] MAIN OR;  Service: Vascular;  Laterality: N/A;  Exploratory Laparotomy    KIDNEY STONE SURGERY      NEPHRECTOMY PARTIAL Left 2023    Procedure: NEPHRECTOMY PARTIAL LAPAROSCOPIC WITH DAVINCI ROBOT, left;  Surgeon: Michelle Cai MD;  Location: formerly [facility]  MAIN OR;  Service: Robotics - [place];  Laterality: Left;    URETEROSCOPY LASER LITHOTRIPSY WITH STENT INSERTION Left 08/18/2023    Procedure: CYSTOSCOPY URETEROSCOPY RETROGRADE PYELOGRAM STENT INSERTION, left;  Surgeon: Michelle Cai MD;  Location: [facility] MAIN OR;  Service: Urology;  Laterality: Left;     PT Assessment (last 12 hours)       PT Evaluation and Treatment       Row Name 12/31/23 1155          Physical Therapy Time and Intention    Subjective Information complains of;weakness;fatigue;pain;dyspnea  -DK     Document Type therapy note (daily note)  -DK     Mode of Treatment individual therapy;physical therapy  -DK     Patient Effort fair  -DK     Symptoms Noted During/After Treatment fatigue;increased pain;shortness of breath  -DK     Comment RN reports pt nearly passing out earlier today while sitting EOB.  Transfers deferred this session due to pt fatigue.  -       Row Name 12/31/23 1155          Pain    Pretreatment Pain Rating 3/10  -DK     Posttreatment Pain Rating 3/10  -DK     Pain Location generalized  -DK     Pain Location - abdomen;hip;foot  -DK     Pain Intervention(s) Distraction;Therapeutic presence  -       Row Name 12/31/23 1155          Cognition    Affect/Mental Status (Cognition) low arousal/lethargic  -DK     Orientation Status (Cognition) oriented x 4  -DK     Follows Commands (Cognition) WFL  -DK     Cognitive Function WFL  -DK     Personal Safety Interventions gait belt;nonskid shoes/slippers when out of bed;supervised activity  -       Row Name 12/31/23 1155          Motor Skills    Motor Skills --  therapeutic exercises  -DK     Coordination WFL  -DK     Therapeutic Exercise hip;knee;ankle  -DK     Additional Documentation --  Pt was somewhat stiff in bilateral hips and in the right ankle DF.  No transfers at RN request.  -       Row Name 12/31/23 1155          Hip (Therapeutic Exercise)    Hip (Therapeutic Exercise) AAROM (active assistive range of motion)  -DK     Hip  AAROM (Therapeutic Exercise) bilateral;flexion;extension;aBduction;aDduction;supine;10 repetitions;2 sets  -DK       Row Name 12/31/23 1155          Knee (Therapeutic Exercise)    Knee (Therapeutic Exercise) AAROM (active assistive range of motion)  -     Knee AAROM (Therapeutic Exercise) bilateral;flexion;extension;supine;10 repetitions;2 sets  -DK       Row Name 12/31/23 1155          Ankle (Therapeutic Exercise)    Ankle (Therapeutic Exercise) AAROM (active assistive range of motion)  -     Ankle AAROM (Therapeutic Exercise) bilateral;dorsiflexion;plantarflexion;supine;10 repetitions;2 sets  -DK       Row Name             Wound 12/24/23 2035 midline abdomen Incision    Wound - Properties Group Placement Date: 12/24/23  - Placement Time: 2035 -MH Orientation: midline  - Location: abdomen  -MH Primary Wound Type: Incision  -MH    Retired Wound - Properties Group Placement Date: 12/24/23  - Placement Time: 2035 -MH Orientation: midline  - Location: abdomen  -MH Primary Wound Type: Incision  -MH    Retired Wound - Properties Group Date first assessed: 12/24/23  - Time first assessed: 2035 - Location: abdomen  -MH Primary Wound Type: Incision  -      Row Name             NPWT (Negative Pressure Wound Therapy) 12/26/23 midline abdominal incision    NPWT (Negative Pressure Wound Therapy) - Properties Group Placement Date: 12/26/23  -NH Location: midline abdominal incision  -NH    Retired NPWT (Negative Pressure Wound Therapy) - Properties Group Placement Date: 12/26/23  -NH Location: midline abdominal incision  -NH    Retired NPWT (Negative Pressure Wound Therapy) - Properties Group Placement Date: 12/26/23  -NH Location: midline abdominal incision  -NH      Row Name 12/31/23 1155          Plan of Care Review    Plan of Care Reviewed With patient;spouse  -DK     Progress no change  -DK       Row Name 12/31/23 1153          Positioning and Restraints    Pre-Treatment Position in bed  -DK     Post  Treatment Position bed  -DK     In Bed supine;call light within reach;encouraged to call for assist;exit alarm on;with family/caregiver;side rails up x2;legs elevated;heels elevated  -DK       Row Name 12/31/23 1154          Therapy Assessment/Plan (PT)    Rehab Potential (PT) fair, will monitor progress closely  -     Criteria for Skilled Interventions Met (PT) skilled treatment is necessary  -DK     Therapy Frequency (PT) daily  -DK     Problem List (PT) problems related to;balance;mobility;range of motion (ROM);strength;pain  -DK     Activity Limitations Related to Problem List (PT) unable to ambulate safely;unable to transfer safely  -DK       Row Name 12/31/23 1151          Progress Summary (PT)    Progress Toward Functional Goals (PT) progress toward functional goals is fair  -               User Key  (r) = Recorded By, (t) = Taken By, (c) = Cosigned By      Initials Name Provider Type    Enedina Barnhart, RN Registered Nurse    Kathie Saldaña PTA Physical Therapist Assistant    Shahid Alvares RN Registered Nurse                    Physical Therapy Education       Title: PT OT SLP Therapies (In Progress)       Topic: Physical Therapy (In Progress)       Point: Mobility training (Done)       Learning Progress Summary             Patient Acceptance, E,TB, VU by AV at 12/29/2023 1536                         Point: Home exercise program (Not Started)       Learner Progress:  Not documented in this visit.              Point: Body mechanics (Done)       Learning Progress Summary             Patient Acceptance, E,TB, VU by AV at 12/29/2023 1536                         Point: Precautions (Done)       Learning Progress Summary             Patient Acceptance, E,TB, VU by AV at 12/29/2023 1536                                         User Key       Initials Effective Dates Name Provider Type Discipline    AV 06/11/21 -  Amrik Sánchez, PT Physical Therapist PT                  PT Recommendation and  Plan  Planned Therapy Interventions (PT): balance training, bed mobility training, gait training, home exercise program, strengthening, transfer training  Therapy Frequency (PT): daily  Progress Summary (PT)  Progress Toward Functional Goals (PT): progress toward functional goals is fair  Plan of Care Reviewed With: patient, spouse  Progress: no change   Outcome Measures       Row Name 12/31/23 1155 12/29/23 1535          How much help from another person do you currently need...    Turning from your back to your side while in flat bed without using bedrails? 2  -DK 2  -AV     Moving from lying on back to sitting on the side of a flat bed without bedrails? 2  -DK 2  -AV     Moving to and from a bed to a chair (including a wheelchair)? 1  -DK 1  -AV     Standing up from a chair using your arms (e.g., wheelchair, bedside chair)? 2  -DK 1  -AV     Climbing 3-5 steps with a railing? 1  -DK 1  -AV     To walk in hospital room? 1  -DK 1  -AV     AM-PAC 6 Clicks Score (PT) 9  -DK 8  -AV     Highest Level of Mobility Goal 3 --> Sit at edge of bed  -DK 3 --> Sit at edge of bed  -AV        Functional Assessment    Outcome Measure Options AM-PAC 6 Clicks Basic Mobility (PT)  -DK AM-PAC 6 Clicks Basic Mobility (PT)  -AV               User Key  (r) = Recorded By, (t) = Taken By, (c) = Cosigned By      Initials Name Provider Type    Kathie Saldaña, PTA Physical Therapist Assistant    AV Amrik Sánchez, PT Physical Therapist                     Time Calculation:    PT Charges       Row Name 12/31/23 1159             Time Calculation    PT Received On 12/31/23  -DK      PT Goal Re-Cert Due Date 01/07/24  -DK         Timed Charges    75563 - PT Therapeutic Exercise Minutes 14  -DK      38883 - PT Therapeutic Activity Minutes 4  -DK         Total Minutes    Timed Charges Total Minutes 18  -DK       Total Minutes 18  -DK                User Key  (r) = Recorded By, (t) = Taken By, (c) = Cosigned By      Initials Name Provider  Type    DK Kathie Sullivan PTA Physical Therapist Assistant                  Therapy Charges for Today       Code Description Service Date Service Provider Modifiers Qty    66690415601 HC PT THER PROC EA 15 MIN 12/31/2023 Kathie Sullivan PTA GP 1            PT G-Codes  Outcome Measure Options: AM-PAC 6 Clicks Basic Mobility (PT)  AM-PAC 6 Clicks Score (PT): 9  AM-PAC 6 Clicks Score (OT): 12    Kathie Sullivan PTA  12/31/2023

## 2023-12-31 NOTE — PROGRESS NOTES
Pulmonary / Critical Care Progress Note      Patient Name: Danny Savage  : 1960  MRN: [id number]  Attending:  Eric Martin MD   Date of admission: 2023    Subjective   Subjective   Patient critically ill with ruptured AAA s/p open repair, hemorrhagic shock, Streptococcus pyogenes bacteremia    HD :  2.5L removed  HD :  3 L removed  HD : 2.5 L removed    Over the past 24 hours  In bed with head of bed elevated  Complains of back pain; does not want to get out of bed with PT   x 24 hours  Hemoglobin 6.6; continues on heparin drip  Continues on nasal cannula  WBC with downward trend now at 25  Received HD  2 units PBRC's transfused    Today  Syncopal episode with standing  Hgb dropped 7.7  On 2 L nasal cannula received HD  Very weak  Did not sleep overnight    Review of Systems  Following commands  Post op pain with movement noted  Weakness  Hiccups      Objective   Objective     Vitals:   Vital signs for last 24 hours:  Temp:  [98.6 °F (37 °C)-100.1 °F (37.8 °C)] 99.1 °F (37.3 °C)  Heart Rate:  [] 97  Resp:  [10-26] 16  BP: ()/(60-94) 119/76    Intake/Output last 3 shifts:  I/O last 3 completed shifts:  In: 1673.8 [P.O.:120; I.V.:795; Blood:558.8; Other:200]  Out: 6800 [Urine:300]    Vent settings for last 24 hours:       Physical Exam   Vital Signs Reviewed  General: Weak, deconditioned, alert  Chest:  good aeration, crackles in bases bilaterally, no work of breathing noted at rest  CV: RRR, no MGR, pulses 2+, equal.  Abd: Postop dressing in place with wound VAC; colostomy with stool output noted  Neuro:  A&Ox3, CN grossly intact, no focal deficits.        Result Review    I have personally reviewed the results from the time of this admission to 2023 17:09 EST and agree with these findings:  [x]  Laboratory  [x]  Microbiology  [x]  Radiology  []  EKG/Telemetry   []  Cardiology/Vascular   []  Pathology  []  Old records  []  Other:  Most notable  findings include:   12/22/2023 blood culture Streptococcus pyogenes group A  12/24 sputum culture with Moraxella a        Lab 12/31/23  1544 12/31/23  0524 12/30/23  1723 12/30/23  0537 12/29/23  0540 12/28/23  1842 12/28/23  0911 12/28/23  0400 12/27/23  0644 12/27/23  0551 12/26/23  1507 12/26/23  1157 12/26/23  0818   WBC  --  26.82*  --  25.62* 29.90*  --   --  24.41*  --  17.50* 12.18*  --  12.78*   HEMOGLOBIN 9.6* 7.7* 9.2* 6.6* 7.0*  --   --  7.9*  --  8.3* 8.6*  --  8.5*   HEMATOCRIT 29.2* 23.2* 27.1* 20.0* 21.2*  --   --  23.0*  --  24.3* 24.8*  --  24.2*   PLATELETS  --  193  --  126* 96*  --   --  76*  --  70* 72*  --  68*   SODIUM  --  132* 134* 133* 138 136  --  139  --  142 141  --  141   SODIUM, ARTERIAL  --   --   --   --   --   --  135.2*  --    < >  --   --    < >  --    POTASSIUM  --  4.2 3.7 5.2 4.2 3.9  --  4.3  --  4.5 4.0  --  3.9   CHLORIDE  --  96* 98 99 103 100  --  101  --  103 104  --  104   CO2  --  19.7* 22.8 21.7* 24.0 22.9  --  22.3  --  24.3 25.2  --  27.5   BUN  --  33* 15 28* 35* 25*  --  34*  --  48* 37*  --  29*   CREATININE  --  3.46* 2.32* 3.47* 3.65* 2.79*  --  3.27*  --  4.60* 3.44*  --  3.34*   GLUCOSE  --  92 74 66 75 109*  --  113*  --  99 100*  --  152*   GLUCOSE, ARTERIAL  --   --   --   --   --   --  110*  --    < >  --   --    < >  --    CALCIUM  --  7.8* 8.1* 7.5* 7.2* 7.5*  --  7.3*  --  7.2* 7.2*  --  7.1*   PHOSPHORUS  --  5.5*  --  2.6 2.7 2.4*  --  3.7  --  5.5*  --   --  3.4   TOTAL PROTEIN  --  6.0 6.3 5.6* 5.0*  --   --  5.0*  --  4.4* 4.0*  --  3.9*   ALBUMIN  --  2.3* 2.4* 2.2* 2.3* 2.4*  --  2.3*  --  2.2* 2.2*  --  2.2*   GLOBULIN  --  3.7 3.9 3.4 2.7  --   --  2.7  --  2.2 1.8  --  1.7    < > = values in this interval not displayed.         Assessment & Plan   Assessment / Plan     Active Hospital Problems:  Active Hospital Problems    Diagnosis     **Left flank pain     Ruptured infrarenal abdominal aortic aneurysm (AAA)      Impression:  Ruptured  infrarenal AAA s/p open repair  Hemorrhagic shock  Hypovolemic shock  Acute hypoxic respiratory failure requiring mechanical ventilation  Septic shock with Streptococcus pyogenes bacteremia  Moraxella pneumonia  Ischemic sigmoid colon s/p colostomy  History of RCC s/p partial nephrectomy on 11/13  Acute kidney injury Isabella ATN  Clinically significant lactic acidosis  Chronically immunosuppressed on CellCept  Hypocalcemia  Hypokalemia  Pneumonitis  SVT    Plan:  -Levophed ordered to maintain MAP 65 or greater post syncopal episode  -With acute bilateral lower extremity DVT.  Continue heparin drip  -Trend H&H.  Recommend transfusion for Hgb 8 or less.  2 units PBRC's suffused on 12/30.  Repeat 2 units PBRC's today  -CT of abdomen pelvis reviewed; without findings of abscess, stable hematoma  -Nephrology on board.  HD per them.  Appreciate their assistance  -Continues on supplemental O2.  Titrate to maintain SpO2 90% or greater  -Encourage use of IS and flutter valve throughout the day  -PT on board to help with ambulation  -Patient with bilateral pleural effusions.  Continues with hemodialysis scheduled.  On 2 LNC.  Bedside ultrasound on 12/29, pleural effusions not amenable to thoracentesis.  Chest x-ray today with decreasing size  -General surgery on board.  Postop care per them    -As needed pain medications with morphine and  p.o. pain medication  -Vascular surgery following, appreciate service  -Urology on board.  Patient status post partial nephrectomy.  Appreciate their assistance  -Blood cultures previously Streptococcus pyogenes, respiratory culture with Moraxella Atlantae.   Continue Unasyn to complete therapy  Will need long term abx and even suppressive therapy   -Hold home chronic immunosuppression CellCept indefinitely  -Dietitian on board.    -Speech therapy on board  -PT/OT on board      PUPppx: Pepcid  DVT prophylaxis: Heparin drip    DVT prophylaxis:  Medical and mechanical DVT prophylaxis orders  are present.    CODE STATUS:   Level Of Support Discussed With: Patient  Code Status (Patient has no pulse and is not breathing): CPR (Attempt to Resuscitate)  Medical Interventions (Patient has pulse or is breathing): Full Support    This visit was performed by BOTH a physician and an APC.  I spent more than 51% of time caring for this patient I personally evaluated and examined the patient. I performed all aspects of MDM as documented. , I have reviewed and confirmed the accuracy of the patient's history as documented in this note. and I have reexamined the patient and the results are consistent with the previously documented exam. I have updated the documentation as necessary    Electronically signed by ROEL Negro, 12/31/23, 5:14 PM EST.    Electronically signed by Clayton Holland DO, 12/31/23, 7:46 PM EST.

## 2023-12-31 NOTE — PROGRESS NOTES
[facility]     Progress Note    Patient Name: Danny Savage  : 1960  MRN: [id number]  Primary Care Physician:  Kodi Pichardo MD  Date of admission: 2023    Subjective   Subjective     Chief Complaint: No complaints today    Back Pain      Patient Reports patient is status post left partial nephrectomy.  His postoperative course was complicated with sepsis and it was noted that he had a collection of fluid next to the surgical site on the left side.  This collection of fluid has some air bubbles in there and it was worrisome that he had developed an abscess there we were monitoring him unfortunately he had a AAA that also ruptured and he had to have that repaired emergently.  Currently patient is in the ICU recovering well he had a temperature last night of 100.1 but his vital signs remained stable.    Review of Systems   Musculoskeletal:  Positive for back pain.       Objective   Objective     Vitals:   Temp:  [99.1 °F (37.3 °C)-100.1 °F (37.8 °C)] 99.1 °F (37.3 °C)  Heart Rate:  [] 96  Resp:  [14-22] 19  BP: ()/() 108/67  Flow (L/min):  [2] 2    Physical Exam   Awake alert oriented x 3  Afebrile vital signs are stable  Result Review    Result Review:  I have personally reviewed the results from the time of this admission to 2023 09:10 EST and agree with these findings:  [x]  Laboratory list / accordion  []  Microbiology  [x]  Radiology  []  EKG/Telemetry   []  Cardiology/Vascular   []  Pathology  []  Old records  []  Other:  Most notable findings include: Fluid collection remained stable from his most recent CT scan on the left side.      Assessment & Plan   Assessment / Plan     Brief Patient Summary:  Danny Savage is a 63 y.o. male who patient status post left partial nephrectomy with questionable area of abscess near the surgical site but this area has remained stable and has progressively gotten smaller.  Patient had rupture of a AAA and had to have  that repaired in currently he is recovering in the intensive care unit and doing well.    Active Hospital Problems:  Active Hospital Problems    Diagnosis     **Left flank pain     Ruptured infrarenal abdominal aortic aneurysm (AAA)      Plan:   Continue to monitor him.  At this point I do not recommend any interventions about the fluid collection in the left perinephric region.  It appears to have remained stable and getting smaller.    DVT prophylaxis:  Medical and mechanical DVT prophylaxis orders are present.    CODE STATUS:    Level Of Support Discussed With: Patient  Code Status (Patient has no pulse and is not breathing): CPR (Attempt to Resuscitate)  Medical Interventions (Patient has pulse or is breathing): Full Support    Disposition:  I expect patient to be discharged per hospitalist group.    Iliana Ozuna MD

## 2023-12-31 NOTE — PROGRESS NOTES
[facility]   Hospitalist Progress Note  Date: 2023  Patient Name: Danny Savage  : 1960  MRN: [id number]  Date of admission: 2023  Room/Bed: [facility] [place]      Subjective   Subjective     Chief Complaint: Back pain    Summary:Danny Savage is a 63 y.o. male Danny Savage is a 63 y.o. male with past medical history of recently diagnosed renal cell carcinoma s/p partial left kidney nephrectomy in 2023 (follows Dr.O Espino as outpatient) and infrarenal aortic aneurysm (scan on  showing AAA of 4.7 cm) who presented with complaints of left lower back pain and fever since past 3 days.  Patient had CT abdomen scan done early December and was evaluated by urologist as outpatient.  Patient presented to ER 1 day prior with left flank pain where CT abdomen was obtained showing mildly decreased fluid collection at partial left nephrectomy bed containing decreasing but persistent small gas bubbles; findings consistent with postoperative seroma/hematoma; questionable infection.  Case was discussed with Dr. Spicer and patient was discharged home.  But patient's pain got worse on the day of presentation and was brought to ED.  Patient's left flank pain was severe, about 10 x 10 in intensity on presentation.  Patient was febrile with temp 102.6.  Lactic acid was normal.  Urinalysis was not significant for UTI.  CT abdomen pelvis with contrast showed stable fluid collection along the left nephrectomy bed, likely postoperative seroma/hematoma although infection cannot be ruled out. Abdominal aortic aneurysm of 5.1 cm.  Patient was started on IV antibiotics and was admitted as a case of possible abscess in the left nephrectomy bed.  Urology was consulted.     Hospital course complicated by sudden unresponsive on 2023.  Vital signs showed blood pressure 42/32.Patient was pale and clammy with agonal breathing.  No pulse was felt which seemed like PEA arrest after which CODE BLUE was  called.  Patient was getting IV fluid bolus.  Before starting CPR, patient had feeble pulse after which CPR was held.  Patient was then intubated at bedside. He was then transferred to critical care unit.  Given patient has expanding AAA  with eccentric mural thrombus/hematoma measuring up to 5.1 centimeter maximally on CT abdomen of 12/22; there is also concern for aortic rupture. 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 Vascular Surgeon was consulted immediately and patient underwent urgent Open repair of ruptured infrarenal abdominal aortic aneurysm with a tube graft and mobilization of the omentum with coverage of the graft using pedicled omental flap.  Patient taken back to the OR on 12/26/2023 to look for sources of blood loss from prior surgeries.  Graft was in good position, no obvious leak was identified, patient was found to have nonviable colon and patient underwent partial colectomy by general surgery.    Interval Followup: No acute events overnight, patient did have to be placed on Levophed for short period secondary to dialysis      Objective   Objective     Vitals:   Temp:  [98.6 °F (37 °C)-100.1 °F (37.8 °C)] 99.1 °F (37.3 °C)  Heart Rate:  [] 95  Resp:  [10-26] 26  BP: ()/() 121/79  Flow (L/min):  [2] 2    Physical Exam   GEN: No acute distress  HEENT: Moist mucous membranes  LUNGS: Equal chest rise bilaterally  CARDIAC: Regular rate and rhythm  NEURO: Moving all 4 extremities spontaneously  SKIN: No obvious breakdown      Result Review    Result Review:  I have personally reviewed these results:  [x]  Laboratory      Lab 12/31/23  0830 12/31/23  0524 12/30/23  1723 12/30/23  0537 12/29/23  1139 12/29/23  0540 12/28/23  2220 12/28/23  0911 12/28/23  0400 12/27/23  1017 12/27/23  0644 12/27/23  0621 12/27/23  0551 12/26/23  1507 12/25/23  0611 12/25/23  0532 12/25/23  0141 12/25/23  0035 12/24/23  1935  12/24/23  1754   WBC  --  26.82*  --  25.62*  --  29.90*  --   --  24.41*  --   --   --    < > 12.18*   < > 10.74   < >  --   --   --    HEMOGLOBIN  --  7.7* 9.2* 6.6*  --  7.0*  --   --  7.9*  --   --   --    < > 8.6*   < > 11.6*   < >  --   --   --    HEMATOCRIT  --  23.2* 27.1* 20.0*  --  21.2*  --   --  23.0*  --   --   --    < > 24.8*   < > 33.9*   < >  --   --   --    PLATELETS  --  193  --  126*  --  96*  --   --  76*  --   --   --    < > 72*   < > 69*   < >  --   --   --    NEUTROS ABS  --  21.16*  --  23.31*  --  26.24*  --   --  21.92*  --   --   --   --   --   --   --    < >  --   --   --    IMMATURE GRANS (ABS)  --  1.24*  --   --   --  0.98*  --   --  0.37*  --   --   --   --   --   --   --   --   --   --   --    LYMPHS ABS  --  1.65  --   --   --  1.22  --   --  1.12  --   --   --   --   --   --   --   --   --   --   --    MONOS ABS  --  2.56*  --   --   --  1.23*  --   --  0.85  --   --   --   --   --   --   --   --   --   --   --    EOS ABS  --  0.08  --  0.26  --  0.06  --   --  0.00  --   --   --   --   --   --   --   --   --   --   --    MCV  --  89.9  --  89.7  --  89.1  --   --  86.8  --   --   --    < > 86.1   < > 84.3   < >  --   --   --    PROCALCITONIN  --   --   --   --   --  11.16*  --   --   --   --   --   --   --   --   --  8.45*  --  2.07*  --   --    LACTATE 2.4*  --   --   --   --   --   --   --   --   --   --   --   --  1.3   < > 9.5*   < > 18.6*  --  11.2*   LACTATE, ARTERIAL  --   --   --   --   --   --   --  1.24  --  1.49   < >  --   --   --    < >  --    < >  --    < >  --    PROTIME  --   --   --   --   --   --  13.7  --   --   --   --  14.1  --  14.9   < >  --   --  24.3*   < > 17.6*   APTT  --  100.9*  --  85.9 94.0 81.8 33.6*  --   --   --   --   --   --   --    < >  --   --  111.0*   < > 24.6   D DIMER QUANT  --   --   --   --   --   --   --   --   --   --   --   --   --   --   --   --   --   --   --  19.79*    < > = values in this interval not displayed.         Lab  12/31/23  0524 12/30/23  1723 12/30/23  0537 12/29/23  0540 12/28/23  1842 12/28/23  0911 12/28/23  0400 12/27/23  1017 12/27/23  0644   SODIUM 132* 134* 133* 138   < >  --    < >  --   --    SODIUM, ARTERIAL  --   --   --   --   --  135.2*  --  138.7 138.6   POTASSIUM 4.2 3.7 5.2 4.2   < >  --    < >  --   --    CHLORIDE 96* 98 99 103   < >  --    < >  --   --    CO2 19.7* 22.8 21.7* 24.0   < >  --    < >  --   --    ANION GAP 16.3* 13.2 12.3 11.0   < >  --    < >  --   --    BUN 33* 15 28* 35*   < >  --    < >  --   --    CREATININE 3.46* 2.32* 3.47* 3.65*   < >  --    < >  --   --    EGFR 18.8* 30.4* 18.8* 17.7*   < >  --    < >  --   --    GLUCOSE 92 74 66 75   < >  --    < >  --   --    GLUCOSE, ARTERIAL  --   --   --   --   --  110*  --  99 86   CALCIUM 7.8* 8.1* 7.5* 7.2*   < >  --    < >  --   --    IONIZED CALCIUM  --   --   --   --   --  0.94*  --  0.94* 0.84*   MAGNESIUM 2.3  --  2.2 2.2   < >  --    < >  --   --    PHOSPHORUS 5.5*  --  2.6 2.7   < >  --    < >  --   --     < > = values in this interval not displayed.         Lab 12/31/23 0524 12/30/23  1723 12/30/23  0537 12/25/23  0532 12/25/23  0035   TOTAL PROTEIN 6.0 6.3 5.6*   < > 2.8*   ALBUMIN 2.3* 2.4* 2.2*   < > 1.8*   GLOBULIN 3.7 3.9 3.4   < > 1.0   ALT (SGPT) 44* 52* 54*   < > 550*   AST (SGOT) 58* 79* 95*   < > 444*   BILIRUBIN 3.5* 3.2* 2.3*   < > 0.4   ALK PHOS 124* 155* 122*   < > 35*   AMYLASE  --   --   --   --  194*   LIPASE 14  --   --   --  20    < > = values in this interval not displayed.         Lab 12/30/23  0537 12/28/23  2220 12/27/23  0621 12/26/23  1507 12/24/23  2153 12/24/23  1754 12/24/23  1459   PROBNP 30,309.0*  --   --   --   --   --  1,898.0*   HSTROP T  --   --   --   --   --  467* 114*   PROTIME  --  13.7 14.1 14.9   < > 17.6*  --    INR  --  1.03 1.07 1.15   < > 1.42*  --     < > = values in this interval not displayed.             Lab 12/30/23  0645 12/30/23  0537 12/24/23 2032   IRON  --  21*  --    IRON  SATURATION (TSAT)  --  13*  --    TIBC  --  164*  --    TRANSFERRIN  --  110*  --    ABO TYPING A  --  A   RH TYPING Negative  --  Negative   ANTIBODY SCREEN Negative  --  Negative         Lab 12/28/23  0911 12/27/23  1017 12/27/23  0644   PH, ARTERIAL 7.466* 7.189* 7.422   PCO2, ARTERIAL 33.5* 70.2* 35.7   PO2 ART 71.3* 65.0* 86.8   O2 SATURATION ART 93.2* 86.4* 95.0   FIO2 35 45 45   HCO3 ART 23.6 26.2* 22.7   BASE EXCESS ART 0.1 -2.8* -1.4   CARBOXYHEMOGLOBIN 0.3 0.3 0.2     Brief Urine Lab Results  (Last result in the past 365 days)        Color   Clarity   Blood   Leuk Est   Nitrite   Protein   CREAT   Urine HCG        12/23/23 2054 Yellow   Clear   Small (1+)   Negative   Negative   100 mg/dL (2+)                 [x]  Microbiology   Microbiology Results (last 10 days)       Procedure Component Value - Date/Time    Blood Culture - Blood, Arm, Left [[id number]]  (Normal) Collected: 12/29/23 1139    Lab Status: Preliminary result Specimen: Blood from Arm, Left Updated: 12/31/23 1216     Blood Culture No growth at 2 days    Blood Culture - Blood, Chest, Left [[id number]]  (Normal) Collected: 12/29/23 1002    Lab Status: Preliminary result Specimen: Blood from Chest, Left Updated: 12/31/23 1030     Blood Culture No growth at 2 days    Respiratory Culture - Sputum, ET Suction [[id number]]  (Abnormal) Collected: 12/24/23 1638    Lab Status: Final result Specimen: Sputum from ET Suction Updated: 12/27/23 1040     Respiratory Culture Light growth (2+) Moraxella atlantae     Gram Stain Few (2+) Gram negative bacilli      Rare (1+) WBCs seen    Blood Culture - Blood, Arm, Left [[id number]]  (Normal) Collected: 12/24/23 1459    Lab Status: Final result Specimen: Blood from Arm, Left Updated: 12/29/23 1531     Blood Culture No growth at 5 days    Blood Culture - Blood, Arm, Right [[id number]]  (Normal) Collected: 12/23/23 2205    Lab Status: Final result Specimen: Blood from Arm, Right Updated: 12/28/23 6201     Blood Culture  No growth at 5 days    Blood Culture - Blood, Arm, Left [[id number]]  (Normal) Collected: 12/23/23 2205    Lab Status: Final result Specimen: Blood from Arm, Left Updated: 12/28/23 2215     Blood Culture No growth at 5 days    COVID-19, FLU A/B, RSV PCR 1 HR TAT - Swab, Nasopharynx [[id number]]  (Normal) Collected: 12/23/23 2204    Lab Status: Final result Specimen: Swab from Nasopharynx Updated: 12/23/23 2303     COVID19 Not Detected     Influenza A PCR Not Detected     Influenza B PCR Not Detected     RSV, PCR Not Detected    Narrative:      Fact sheet for providers: https://www.fda.gov/media/611561/download    Fact sheet for patients: https://www.fda.gov/media/113094/download    Test performed by PCR.    Blood Culture - Blood, Arm, Right [[id number]]  (Abnormal)  (Susceptibility) Collected: 12/22/23 0815    Lab Status: Final result Specimen: Blood from Arm, Right Updated: 12/25/23 0652     Blood Culture Streptococcus pyogenes (Group A)     Isolated from Aerobic and Anaerobic Bottles     Gram Stain Aerobic Bottle Gram positive cocci in pairs and chains      Anaerobic Bottle Gram positive cocci in pairs and chains    Susceptibility        Streptococcus pyogenes (Group A)      LINDSEY      Ceftriaxone Susceptible      Levofloxacin Susceptible      Penicillin G Susceptible      Vancomycin Susceptible                           Blood Culture - Blood, Arm, Left [[id number]]  (Abnormal) Collected: 12/22/23 0815    Lab Status: Final result Specimen: Blood from Arm, Left Updated: 12/25/23 0652     Blood Culture Streptococcus pyogenes (Group A)     Isolated from Aerobic and Anaerobic Bottles     Gram Stain Aerobic Bottle Gram positive cocci in pairs and chains      Anaerobic Bottle Gram positive cocci in pairs and chains    Narrative:      Refer to previous blood culture collected on 12/22/2023 0815 for MICs.      COVID PRE-OP / PRE-PROCEDURE SCREENING ORDER (NO ISOLATION) - Swab, Nasopharynx [[id number]]  (Normal) Collected:  12/22/23 0710    Lab Status: Final result Specimen: Swab from Nasopharynx Updated: 12/22/23 0802    Narrative:      The following orders were created for panel order COVID PRE-OP / PRE-PROCEDURE SCREENING ORDER (NO ISOLATION) - Swab, Nasopharynx.  Procedure                               Abnormality         Status                     ---------                               -----------         ------                     COVID-19, FLU A/B, RSV P...[[id number]]  Normal              Final result                 Please view results for these tests on the individual orders.    COVID-19, FLU A/B, RSV PCR 1 HR TAT - Swab, Nasopharynx [[id number]]  (Normal) Collected: 12/22/23 0710    Lab Status: Final result Specimen: Swab from Nasopharynx Updated: 12/22/23 0802     COVID19 Not Detected     Influenza A PCR Not Detected     Influenza B PCR Not Detected     RSV, PCR Not Detected    Narrative:      Fact sheet for providers: https://www.fda.gov/media/193072/download    Fact sheet for patients: https://www.fda.gov/media/179837/download    Test performed by PCR.          [x]  Radiology  XR Chest 1 View    Result Date: 12/31/2023   Favorable progression is suggested radiographically with decreased atelectasis and/or infiltrate(s) bilaterally.  There may be decreased pleural effusions, as well.      Please note that portions of this note were completed with a voice recognition program.  SHIN NUNEZ JR, MD       Electronically Signed and Approved By: SHIN NUNEZ JR, MD on 12/31/2023 at 6:22              CT Abdomen Pelvis With Contrast    Result Date: 12/29/2023   1. Evidence of interval left hemicolectomy with colostomy placement as well as abdominal aortic aneurysm and aortic caval fistula repair since 12/24/2023.  There is a persistent extensive para-aortic right retroperitoneal hematoma without acute or new hemorrhage identified.  No loculated fluid or fluid and air collections are seen to indicate abscess.  2. Development of  multiple splenic and bilateral renal infarcts new since 12/24/2023. 3. Small bilateral pleural effusions have increased in size since 12/24/2023.  There is also anasarca.    Report was called to [facility] nurse at 1651 hours on 12/29/2023.    COBY DEGROOT DO       Electronically Signed and Approved By: COBY DEGROOT DO on 12/29/2023 at 16:53             XR Chest 1 View    Result Date: 12/29/2023    1. Diffuse bilateral airspace opacities appear unchanged, which could reflect pulmonary edema or pneumonia. 2. Small bilateral pleural effusions.       GELY MONTE MD       Electronically Signed and Approved By: GELY MONTE MD on 12/29/2023 at 6:04             XR Chest 1 View    Result Date: 12/27/2023     Right jugular central venous catheter tip is in the right atrium.  No pneumothorax is seen.  ET tube and NG tube remain in place.  Bilateral airspace disease and pleural effusions are unchanged.       BENITO PRADHAN MD       Electronically Signed and Approved By: BENITO PRADHAN MD on 12/27/2023 at 13:21             XR Chest 1 View    Result Date: 12/27/2023    1. Endotracheal tube in good position. 2. Bilateral airspace opacities, which could reflect pulmonary edema or pneumonia. 3. Moderate bilateral pleural effusions.       GELY MONTE MD       Electronically Signed and Approved By: GELY MONTE MD on 12/27/2023 at 4:42             CT Angiogram Abdomen Pelvis    Result Date: 12/24/2023    1. Development of aorto-caval fistula with large amount of active extravasation of hemorrhage into right abdomen and retroperitoneal space likely from a ruptured IVC.  Findings were discussed by Dr. Cervantes to vascular surgery at approximately 1845 hours. 2. Bibasilar consolidations within lung bases, which could reflect atelectasis, aspiration, and/or pneumonia. 3. Small fluid collection along left nephrectomy site is unchanged. 4. Air within urinary bladder, likely secondary to Rico catheterization.      GELY MONTE MD       Electronically Signed and Approved By: GELY MONTE MD on 12/24/2023 at 19:37             XR Abdomen KUB    Result Date: 12/24/2023    1. Mild prominence of small bowel loops within the mid abdomen.  The findings are nonspecific and may be related to adynamic ileus.  Developing bowel obstruction could have this appearance.  Recommend follow-up to ensure resolution.     GELY MONTOYA MD       Electronically Signed and Approved By: GELY MONTOYA MD on 12/24/2023 at 16:07             XR Chest 1 View    Result Date: 12/24/2023    1. The endotracheal tube distal tip is seen approximately 3-4 cm above the prosper. 2. The nasogastric tube is positioned in the stomach in the left upper quadrant. 3. The right IJ central line distal tip is noted near the SVC/right atrial junction.  The line is ready for use.  There is no pneumothorax. 4. Evidence for subtle ill-defined infiltrates throughout the mid to lower lungs bilaterally.  There may also be a small left pleural effusion.       GELY MONTOYA MD       Electronically Signed and Approved By: GELY MONTOYA MD on 12/24/2023 at 15:13             CT Abdomen Pelvis With Contrast    Result Date: 12/23/2023    1. Stable fluid collection along left nephrectomy bed, likely a postoperative seroma/hematoma, although infection cannot be excluded on imaging alone. 2. No new acute process identified within abdomen/pelvis. 3. Stable abdominal aortic aneurysm.     GELY MONTE MD       Electronically Signed and Approved By: GELY MONTE MD on 12/23/2023 at 22:07             XR Chest 1 View    Result Date: 12/23/2023   No acute cardiopulmonary process identified.       GELY MONTE MD       Electronically Signed and Approved By: GELY MONTE MD on 12/23/2023 at 21:49            []  EKG/Telemetry   []  Cardiology/Vascular   []  Pathology  []  Old records  []  Other:    Assessment & Plan   Assessment / Plan     Assessment:  Ruptured infrarenal  AAA s/p open repair  Hemorrhagic shock  Hypovolemic shock  Acute hypoxic respiratory failure requiring mechanical ventilation  Septic shock with Streptococcus pyogenes bacteremia  SVT  History of RCC s/p partial nephrectomy on 11/13  Acute kidney injury  Clinically significant lactic acidosis  Chronically immunosuppressed on CellCept  Hypocalcemia  Hypokalemia    Plan:  Patient currently being managed in critical care unit; appreciate intensivist input.  Patient had spontaneous rupture of AAA s/p Open repair of ruptured infrarenal abdominal aortic aneurysm with a tube graft and mobilization of the omentum with coverage of the graft using pedicled omental flap on 12/24.  Vascular surgery following the patient; appreciate further input.,   Successfully extubated on 12/28/2023, on 2 L nasal cannula currently  Replace electrolytes as needed  Continue hemodialysis  Off pressors again this a.m., monitor blood pressure  Urology consulted and following, appreciate their recommendations  Continue Unasyn  Repeat blood cultures with no growth at 2 days, procalcitonin elevated on last check  CT scan of the abdomen pelvis personally reviewed, no obvious source of infection  Wean O2 as able  Discussed management plan with pulmonology     Discussed with RN.    DVT prophylaxis:  Medical and mechanical DVT prophylaxis orders are present.    CODE STATUS:   Level Of Support Discussed With: Patient  Code Status (Patient has no pulse and is not breathing): CPR (Attempt to Resuscitate)  Medical Interventions (Patient has pulse or is breathing): Full Support    Pt is critically ill in ICU with shock, blood loss anemia.  I have spent 33 minutes of critical care time reviewing previous documentation, reviewing all pertinent telemetry, labs, and imaging studies, examining the patient, modifying the care plan and discussing the patient´s condition and care plan with the consultants, available family and during rounds. This does not include  any procedures performed.      Electronically signed by Eric Martin MD, 12/31/23, 12:18 PM EST.

## 2024-01-01 ENCOUNTER — APPOINTMENT (OUTPATIENT)
Dept: CT IMAGING | Facility: HOSPITAL | Age: 64
DRG: 268 | End: 2024-01-01
Payer: COMMERCIAL

## 2024-01-01 LAB
ALBUMIN SERPL-MCNC: 2.2 G/DL (ref 3.5–5.2)
ALBUMIN/GLOB SERPL: 0.6 G/DL
ALP SERPL-CCNC: 112 U/L (ref 39–117)
ALT SERPL W P-5'-P-CCNC: 36 U/L (ref 1–41)
ANION GAP SERPL CALCULATED.3IONS-SCNC: 17.7 MMOL/L (ref 5–15)
APTT PPP: 89.9 SECONDS (ref 78–95.9)
AST SERPL-CCNC: 50 U/L (ref 1–40)
BASOPHILS # BLD AUTO: 0.16 10*3/MM3 (ref 0–0.2)
BASOPHILS NFR BLD AUTO: 0.6 % (ref 0–1.5)
BH BB BLOOD EXPIRATION DATE: NORMAL
BH BB BLOOD EXPIRATION DATE: NORMAL
BH BB BLOOD TYPE BARCODE: 600
BH BB BLOOD TYPE BARCODE: 9500
BH BB DISPENSE STATUS: NORMAL
BH BB DISPENSE STATUS: NORMAL
BH BB PRODUCT CODE: NORMAL
BH BB PRODUCT CODE: NORMAL
BH BB UNIT NUMBER: NORMAL
BH BB UNIT NUMBER: NORMAL
BILIRUB SERPL-MCNC: 4.1 MG/DL (ref 0–1.2)
BUN SERPL-MCNC: 56 MG/DL (ref 8–23)
BUN/CREAT SERPL: 11.5 (ref 7–25)
CALCIUM SPEC-SCNC: 7.3 MG/DL (ref 8.6–10.5)
CHLORIDE SERPL-SCNC: 95 MMOL/L (ref 98–107)
CO2 SERPL-SCNC: 19.3 MMOL/L (ref 22–29)
CREAT SERPL-MCNC: 4.89 MG/DL (ref 0.76–1.27)
CROSSMATCH INTERPRETATION: NORMAL
CROSSMATCH INTERPRETATION: NORMAL
D-LACTATE SERPL-SCNC: 2 MMOL/L (ref 0.5–2)
DEPRECATED RDW RBC AUTO: 49.3 FL (ref 37–54)
EGFRCR SERPLBLD CKD-EPI 2021: 12.4 ML/MIN/1.73
EOSINOPHIL # BLD AUTO: 0.16 10*3/MM3 (ref 0–0.4)
EOSINOPHIL NFR BLD AUTO: 0.6 % (ref 0.3–6.2)
ERYTHROCYTE [DISTWIDTH] IN BLOOD BY AUTOMATED COUNT: 16.1 % (ref 12.3–15.4)
GLOBULIN UR ELPH-MCNC: 3.9 GM/DL
GLUCOSE BLDC GLUCOMTR-MCNC: 118 MG/DL (ref 70–99)
GLUCOSE SERPL-MCNC: 101 MG/DL (ref 65–99)
HCT VFR BLD AUTO: 23.8 % (ref 37.5–51)
HGB BLD-MCNC: 7.9 G/DL (ref 13–17.7)
IMM GRANULOCYTES # BLD AUTO: 1.7 10*3/MM3 (ref 0–0.05)
IMM GRANULOCYTES NFR BLD AUTO: 6 % (ref 0–0.5)
LYMPHOCYTES # BLD AUTO: 2.13 10*3/MM3 (ref 0.7–3.1)
LYMPHOCYTES NFR BLD AUTO: 7.5 % (ref 19.6–45.3)
MAGNESIUM SERPL-MCNC: 2.5 MG/DL (ref 1.6–2.4)
MCH RBC QN AUTO: 28.6 PG (ref 26.6–33)
MCHC RBC AUTO-ENTMCNC: 33.2 G/DL (ref 31.5–35.7)
MCV RBC AUTO: 86.2 FL (ref 79–97)
MONOCYTES # BLD AUTO: 2.82 10*3/MM3 (ref 0.1–0.9)
MONOCYTES NFR BLD AUTO: 9.9 % (ref 5–12)
NEUTROPHILS NFR BLD AUTO: 21.46 10*3/MM3 (ref 1.7–7)
NEUTROPHILS NFR BLD AUTO: 75.4 % (ref 42.7–76)
NRBC BLD AUTO-RTO: 0.4 /100 WBC (ref 0–0.2)
PHOSPHATE SERPL-MCNC: 5.8 MG/DL (ref 2.5–4.5)
PLATELET # BLD AUTO: 286 10*3/MM3 (ref 140–450)
PMV BLD AUTO: 11.5 FL (ref 6–12)
POTASSIUM SERPL-SCNC: 4.7 MMOL/L (ref 3.5–5.2)
PROCALCITONIN SERPL-MCNC: 13.44 NG/ML (ref 0–0.25)
PROT SERPL-MCNC: 6.1 G/DL (ref 6–8.5)
QT INTERVAL: 319 MS
QT INTERVAL: 328 MS
QTC INTERVAL: 441 MS
QTC INTERVAL: 459 MS
RBC # BLD AUTO: 2.76 10*6/MM3 (ref 4.14–5.8)
SODIUM SERPL-SCNC: 132 MMOL/L (ref 136–145)
UNIT  ABO: NORMAL
UNIT  ABO: NORMAL
UNIT  RH: NORMAL
UNIT  RH: NORMAL
WBC NRBC COR # BLD AUTO: 28.43 10*3/MM3 (ref 3.4–10.8)

## 2024-01-01 PROCEDURE — 85025 COMPLETE CBC W/AUTO DIFF WBC: CPT | Performed by: INTERNAL MEDICINE

## 2024-01-01 PROCEDURE — 25010000002 HEPARIN (PORCINE) 25000-0.45 UT/250ML-% SOLUTION: Performed by: SURGERY

## 2024-01-01 PROCEDURE — 74176 CT ABD & PELVIS W/O CONTRAST: CPT

## 2024-01-01 PROCEDURE — 83735 ASSAY OF MAGNESIUM: CPT | Performed by: PHYSICIAN ASSISTANT

## 2024-01-01 PROCEDURE — 85730 THROMBOPLASTIN TIME PARTIAL: CPT | Performed by: INTERNAL MEDICINE

## 2024-01-01 PROCEDURE — 25010000002 MORPHINE PER 10 MG: Performed by: INTERNAL MEDICINE

## 2024-01-01 PROCEDURE — 25010000002 AMPICILLIN-SULBACTAM PER 1.5 G: Performed by: INTERNAL MEDICINE

## 2024-01-01 PROCEDURE — 83605 ASSAY OF LACTIC ACID: CPT | Performed by: SURGERY

## 2024-01-01 PROCEDURE — 80053 COMPREHEN METABOLIC PANEL: CPT | Performed by: PHYSICIAN ASSISTANT

## 2024-01-01 PROCEDURE — 84100 ASSAY OF PHOSPHORUS: CPT | Performed by: PHYSICIAN ASSISTANT

## 2024-01-01 PROCEDURE — 99291 CRITICAL CARE FIRST HOUR: CPT | Performed by: INTERNAL MEDICINE

## 2024-01-01 PROCEDURE — 84145 PROCALCITONIN (PCT): CPT | Performed by: PHYSICIAN ASSISTANT

## 2024-01-01 PROCEDURE — 82948 REAGENT STRIP/BLOOD GLUCOSE: CPT

## 2024-01-01 PROCEDURE — 25010000002 BUMETANIDE PER 0.5 MG: Performed by: INTERNAL MEDICINE

## 2024-01-01 RX ORDER — QUETIAPINE FUMARATE 25 MG/1
25 TABLET, FILM COATED ORAL NIGHTLY
Status: DISCONTINUED | OUTPATIENT
Start: 2024-01-01 | End: 2024-01-03

## 2024-01-01 RX ORDER — ESCITALOPRAM OXALATE 10 MG/1
5 TABLET ORAL DAILY
Status: DISCONTINUED | OUTPATIENT
Start: 2024-01-01 | End: 2024-01-03

## 2024-01-01 RX ORDER — DEXMEDETOMIDINE HYDROCHLORIDE 4 UG/ML
.2-1.5 INJECTION, SOLUTION INTRAVENOUS
Status: DISCONTINUED | OUTPATIENT
Start: 2024-01-01 | End: 2024-01-01

## 2024-01-01 RX ORDER — TRAZODONE HYDROCHLORIDE 50 MG/1
50 TABLET ORAL NIGHTLY
Status: DISCONTINUED | OUTPATIENT
Start: 2024-01-01 | End: 2024-01-03

## 2024-01-01 RX ORDER — HALOPERIDOL 5 MG/ML
4 INJECTION INTRAMUSCULAR EVERY 4 HOURS PRN
Status: DISCONTINUED | OUTPATIENT
Start: 2024-01-01 | End: 2024-01-07

## 2024-01-01 RX ADMIN — OXYCODONE AND ACETAMINOPHEN 1 TABLET: 5; 325 TABLET ORAL at 09:10

## 2024-01-01 RX ADMIN — SODIUM BICARBONATE 650 MG TABLET 650 MG: at 15:24

## 2024-01-01 RX ADMIN — FAMOTIDINE 20 MG: 10 INJECTION INTRAVENOUS at 09:02

## 2024-01-01 RX ADMIN — OXYCODONE AND ACETAMINOPHEN 1 TABLET: 5; 325 TABLET ORAL at 20:22

## 2024-01-01 RX ADMIN — BUMETANIDE 4 MG: 0.25 INJECTION INTRAMUSCULAR; INTRAVENOUS at 17:39

## 2024-01-01 RX ADMIN — OXYCODONE AND ACETAMINOPHEN 1 TABLET: 10; 325 TABLET ORAL at 05:43

## 2024-01-01 RX ADMIN — TRAZODONE HYDROCHLORIDE 50 MG: 50 TABLET ORAL at 20:23

## 2024-01-01 RX ADMIN — BUMETANIDE 4 MG: 0.25 INJECTION INTRAMUSCULAR; INTRAVENOUS at 00:37

## 2024-01-01 RX ADMIN — MORPHINE SULFATE 4 MG: 4 INJECTION, SOLUTION INTRAMUSCULAR; INTRAVENOUS at 17:39

## 2024-01-01 RX ADMIN — Medication 10 ML: at 20:24

## 2024-01-01 RX ADMIN — Medication 10 MG: at 20:23

## 2024-01-01 RX ADMIN — HEPARIN SODIUM 18 UNITS/KG/HR: 10000 INJECTION, SOLUTION INTRAVENOUS at 14:58

## 2024-01-01 RX ADMIN — QUETIAPINE FUMARATE 25 MG: 25 TABLET ORAL at 20:23

## 2024-01-01 RX ADMIN — DEXMEDETOMIDINE HYDROCHLORIDE 0.2 MCG/KG/HR: 4 INJECTION, SOLUTION INTRAVENOUS at 01:45

## 2024-01-01 RX ADMIN — OXYCODONE AND ACETAMINOPHEN 1 TABLET: 10; 325 TABLET ORAL at 13:31

## 2024-01-01 RX ADMIN — BUMETANIDE 4 MG: 0.25 INJECTION INTRAMUSCULAR; INTRAVENOUS at 09:02

## 2024-01-01 RX ADMIN — SODIUM BICARBONATE 650 MG TABLET 650 MG: at 09:10

## 2024-01-01 RX ADMIN — ESCITALOPRAM OXALATE 5 MG: 10 TABLET ORAL at 15:24

## 2024-01-01 RX ADMIN — Medication 10 ML: at 09:02

## 2024-01-01 RX ADMIN — SODIUM BICARBONATE 650 MG TABLET 650 MG: at 20:23

## 2024-01-01 RX ADMIN — SODIUM CHLORIDE 3 G: 900 INJECTION INTRAVENOUS at 17:39

## 2024-01-01 NOTE — PROGRESS NOTES
Pulmonary / Critical Care Progress Note      Patient Name: Danny Savage  : 1960  MRN: [id number]  Attending:  Eric Martin MD   Date of admission: 2023    Subjective   Subjective   Patient critically ill with ruptured AAA s/p open repair, hemorrhagic shock, Streptococcus pyogenes bacteremia    HD :  2.5L removed  HD :  3 L removed  HD : 2.5 L removed    Over the past 24 hours  Syncopal episode with standing  Hgb dropped 7.7  On 2 L nasal cannula received HD  Very weak  Did not sleep overnight    Today  Continues with intermittent pain, well-controlled with as needed oral pain medication  States he hurts all over  Feels very weak does not want to get up  Hemoglobin 7.9  Patient states he did not sleep well last night, intermittent waking periods  Patient spouse at bedside states he is having hallucinations as well    Review of Systems  Following commands  Post op pain with movement noted  Weakness  Hiccups      Objective   Objective     Vitals:   Vital signs for last 24 hours:  Temp:  [99 °F (37.2 °C)-99.3 °F (37.4 °C)] 99 °F (37.2 °C)  Heart Rate:  [] 94  Resp:  [12-26] 22  BP: ()/(49-84) 94/66    Intake/Output last 3 shifts:  I/O last 3 completed shifts:  In: 1725 [P.O.:320; I.V.:740; Blood:640; IV Piggyback:25]  Out: 120 [Urine:120]    Vent settings for last 24 hours:       Physical Exam   Vital Signs Reviewed  General: Weak, deconditioned, alert  Chest:  good aeration, crackles in bases bilaterally, no work of breathing noted at rest  CV: RRR, no MGR, pulses 2+, equal.  Abd: Postop dressing in place with wound VAC; colostomy with stool output noted  Neuro:  A&Ox3, CN grossly intact, no focal deficits.        Result Review    I have personally reviewed the results from the time of this admission to 2024 10:55 EST and agree with these findings:  [x]  Laboratory  [x]  Microbiology  [x]  Radiology  []  EKG/Telemetry   []  Cardiology/Vascular   []   Pathology  []  Old records  []  Other:  Most notable findings include:   12/22/2023 blood culture Streptococcus pyogenes group A  12/24 sputum culture with Moraxella a        Lab 01/01/24  0528 12/31/23  1544 12/31/23  0524 12/30/23  1723 12/30/23  0537 12/29/23  0540 12/28/23  1842 12/28/23  0911 12/28/23  0400 12/27/23  0644 12/27/23  0551 12/26/23  1507   WBC 28.43*  --  26.82*  --  25.62* 29.90*  --   --  24.41*  --  17.50* 12.18*   HEMOGLOBIN 7.9* 9.6* 7.7* 9.2* 6.6* 7.0*  --   --  7.9*  --  8.3* 8.6*   HEMATOCRIT 23.8* 29.2* 23.2* 27.1* 20.0* 21.2*  --   --  23.0*  --  24.3* 24.8*   PLATELETS 286  --  193  --  126* 96*  --   --  76*  --  70* 72*   SODIUM 132*  --  132* 134* 133* 138 136  --  139  --  142 141   SODIUM, ARTERIAL  --   --   --   --   --   --   --  135.2*  --    < >  --   --    POTASSIUM 4.7  --  4.2 3.7 5.2 4.2 3.9  --  4.3  --  4.5 4.0   CHLORIDE 95*  --  96* 98 99 103 100  --  101  --  103 104   CO2 19.3*  --  19.7* 22.8 21.7* 24.0 22.9  --  22.3  --  24.3 25.2   BUN 56*  --  33* 15 28* 35* 25*  --  34*  --  48* 37*   CREATININE 4.89*  --  3.46* 2.32* 3.47* 3.65* 2.79*  --  3.27*  --  4.60* 3.44*   GLUCOSE 101*  --  92 74 66 75 109*  --  113*  --  99 100*   GLUCOSE, ARTERIAL  --   --   --   --   --   --   --  110*  --    < >  --   --    CALCIUM 7.3*  --  7.8* 8.1* 7.5* 7.2* 7.5*  --  7.3*  --  7.2* 7.2*   PHOSPHORUS 5.8*  --  5.5*  --  2.6 2.7 2.4*  --  3.7  --  5.5*  --    TOTAL PROTEIN 6.1  --  6.0 6.3 5.6* 5.0*  --   --  5.0*  --  4.4* 4.0*   ALBUMIN 2.2*  --  2.3* 2.4* 2.2* 2.3* 2.4*  --  2.3*  --  2.2* 2.2*   GLOBULIN 3.9  --  3.7 3.9 3.4 2.7  --   --  2.7  --  2.2 1.8    < > = values in this interval not displayed.         Assessment & Plan   Assessment / Plan     Active Hospital Problems:  Active Hospital Problems    Diagnosis     **Left flank pain     Ruptured infrarenal abdominal aortic aneurysm (AAA)      Impression:  Ruptured infrarenal AAA s/p open repair  Hemorrhagic  shock  Hypovolemic shock  Acute hypoxic respiratory failure requiring mechanical ventilation  Septic shock with Streptococcus pyogenes bacteremia  Moraxella pneumonia  Ischemic sigmoid colon s/p colostomy  History of RCC s/p partial nephrectomy on 11/13  Acute kidney injury Falguni ATN  Clinically significant lactic acidosis  Chronically immunosuppressed on CellCept  Hypocalcemia  Hypokalemia  Pneumonitis  SVT    Plan:  -Levophed on board to maintain MAP 65 or greater   -With acute bilateral lower extremity DVT.  Continue heparin drip  -Trend H&H.  Recommend transfusion for Hgb 8 or less.  2 units [facility]'s suffused on 12/30.  Repeat 2 units [facility]'s 12/31  -CT of abdomen pelvis reviewed; without findings of abscess, stable hematoma  -Nephrology on board.  HD per them.  Appreciate their assistance  -Continues on supplemental O2.  Titrate to maintain SpO2 90% or greater  -Encourage use of IS and flutter valve throughout the day  -PT on board to help with ambulation  -Patient with bilateral pleural effusions.  Continues with hemodialysis scheduled.  On 2 LNC.  Bedside ultrasound on 12/29, pleural effusions not amenable to thoracentesis.  Chest x-ray with decreasing size  -General surgery on board.  Postop care per them    -As needed pain medications with morphine and  p.o. pain medication  -Vascular surgery following, appreciate service  -Urology on board.  Patient status post partial nephrectomy.  Appreciate their assistance  -Blood cultures previously Streptococcus pyogenes, respiratory culture with Moraxella Atlantae.   Continue Unasyn to complete therapy  Will need long term abx and even suppressive therapy   -Hold home chronic immunosuppression CellCept indefinitely  -Precedex ordered if needed at night given insomnia  -Start Seroquel 25mg nightly tonight.   PRN Haldol ordered too  -Dietitian on board.    -Speech therapy on board  -PT/OT on board    PUPppx: Pepcid  DVT prophylaxis: Heparin drip    DVT  prophylaxis:  Medical and mechanical DVT prophylaxis orders are present.    CODE STATUS:   Level Of Support Discussed With: Patient  Code Status (Patient has no pulse and is not breathing): CPR (Attempt to Resuscitate)  Medical Interventions (Patient has pulse or is breathing): Full Support      Patient is critically ill with ruptured AAA, DVTs, hemorrhagic shock, acute kidney injury requiring renal replacement therapy. We have personally reviewed all pertinent labs, imaging, microbiology and documentation. We have discussed care with the primary service as well as at multidisciplinary critical care rounds with the bedside nurse, respiratory therapist, pharmacist and all other ancillary services. 35 minutes of critical care time was spent managing this patient, excluding procedures. Of this time, I spent 25 minutes  in accordance with split shared billing.    Electronically signed by Yinka Lloyd MD, 01/01/24, 5:15 PM EST.

## 2024-01-01 NOTE — PLAN OF CARE
Goal Outcome Evaluation:        Plan of care reviewed with: Patient and family    Progress: Ongoing, no change    Outcome: A&Ox4, pt very lethargic. NSR 90s. 2L NC, IS used Q2H. Patient was up to chair for 5 minutes and then transferred to speciality mattress. Unmanageable pain in lower back down right extrermity and groin throughout shift, new bruising in this area, CT completed. Levophed @0.06 and Heparin @18.     No HD no blood transfused this shift.

## 2024-01-01 NOTE — PLAN OF CARE
Goal Outcome Evaluation: remains on levophed and Heparin gtts c/o abd pain, not wanting to turn percocet given as requested. Refused Fentanyl,  Ptt 89.9, next due in am, BLE pulses positive per doppler.

## 2024-01-01 NOTE — PROGRESS NOTES
[facility]     Progress Note    Patient Name: Danny Savage  : 1960  MRN: [id number]  Primary Care Physician:  Kodi Pichardo MD  Date of admission: 2023    Subjective   Subjective     POD #8 status post open repair of ruptured abdominal aorta aneurysm, POD #6 status post abdominal reexploration, sigmoidectomy with end colostomy, closure of abdominal wound.    Marginal BP this AM and had vagal episode.  Some RLQ slight TTP. Tolerating some PO intake with ostomy output.    Diagnosed with left leg DVT and now on heparin gtt.  Hgb 7.9 after receiving 2 units PRBC's yesterday.     Objective   Objective     Vitals:   Temp:  [98.9 °F (37.2 °C)-99.3 °F (37.4 °C)] 98.9 °F (37.2 °C)  Heart Rate:  [] 97  Resp:  [12-26] 24  BP: ()/(49-84) 104/69  Flow (L/min):  [2] 2    Physical Exam   General: Awake, alert   Abdomen: Slight TTP to RLQ.  Wound VAC in place.  Stoma appears satisfactory.  Extremities: Symmetric, moderate edema and some mild mottling to B feet. B PT dopplerable with R DP.  R leg slightly weaker with some numbness per patient but sensorimotor intact on exam.    Hgb: 7.9  WBC: 23.8  Creatinine: 4.89  Lacate: 2.4--> 2.0    Assessment & Plan   Assessment / Plan     Assessment/Plan:    Patient remains critically ill in ICU.  Left leg DVT, on heparin.  Continuing rise on leukocytosis, undergoing evaluation by ICU.  Continue empiric abx.  CT negative for any post-op abscess or surgical issue other than splenic and renal infarcts which may be cause of leukocytosis. Already on heparin gtt.  Will hold HD today and no plan for transfusion. Currently on levophed gtt for marginal BP this AM.   Increase activity, up to chair and diet as able.    Plan for dobhoff tube for feeds by critical care tomorrow.      Active Hospital Problems:  Active Hospital Problems    Diagnosis     **Left flank pain     Ruptured infrarenal abdominal aortic aneurysm (AAA)

## 2024-01-01 NOTE — PROGRESS NOTES
[facility]     Progress Note    Patient Name: Danny Savage  : 1960  MRN: [id number]  Primary Care Physician:  Kodi Pichardo MD  Date of admission: 2023    Subjective   Subjective     Chief Complaint: No complaints    Back Pain      Patient Reports patient is status post a left partial nephrectomy postoperative course has been complicated by fluid collection in the initially appeared to be infected because it had air bubbles in the he was put on watchful waiting and then a follow-up CT showed the area in question to have gotten smaller.  Patient then unfortunately ruptured a aortic aneurysm and had to have aneurysm repair emergently.  Currently he is still in the ICU he receives dialysis and he remains stable.    Review of Systems   Musculoskeletal:  Positive for back pain.       Objective   Objective     Vitals:   Temp:  [99 °F (37.2 °C)-99.3 °F (37.4 °C)] 99 °F (37.2 °C)  Heart Rate:  [] 94  Resp:  [12-26] 22  BP: ()/(49-84) 94/66  Flow (L/min):  [2] 2    Physical Exam   Awake alert oriented x 3  Afebrile vital signs are stable  Result Review    Result Review:  I have personally reviewed the results from the time of this admission to 2024 10:35 EST and agree with these findings:  [x]  Laboratory list / accordion  []  Microbiology  []  Radiology  []  EKG/Telemetry   []  Cardiology/Vascular   []  Pathology  []  Old records  []  Other:  Most notable findings include: White blood cell count increasing to 28.43.      Assessment & Plan   Assessment / Plan     Brief Patient Summary:  Danny Savage is a 63 y.o. male who patient is stable doing better but his white blood cell count is increased to 28,000.  His creatinine is 4.89.    Active Hospital Problems:  Active Hospital Problems    Diagnosis     **Left flank pain     Ruptured infrarenal abdominal aortic aneurysm (AAA)      Plan:   Far as urologically is concerned there is no recommendations today.  His white count is  increased and there must be a source of infection somewhere.  His hemoglobin also has decreased to 7.9 and hemoglobin hematocrit was 23.8 so that needs to be addressed as well.  Continue with current treatment no recommendations at this point.    DVT prophylaxis:  Medical and mechanical DVT prophylaxis orders are present.    CODE STATUS:    Level Of Support Discussed With: Patient  Code Status (Patient has no pulse and is not breathing): CPR (Attempt to Resuscitate)  Medical Interventions (Patient has pulse or is breathing): Full Support    Disposition:  I expect patient to be discharged per hospitalist.    Iliana Ozuna MD

## 2024-01-01 NOTE — PROGRESS NOTES
" LOS: 9 days   Patient Care Team:  Kodi Pichardo MD as PCP - General (Internal Medicine)  Lisset Harrington APRN as Nurse Practitioner (Urology)    Chief Complaint: ROLAND    Subjective     History of Present Illness  Pt awake and alert.  Family at bedside.  Back on levophed now    Subjective:  Symptoms:  No shortness of breath, chest pain, chest pressure or anxiety.    Diet:  No nausea or vomiting.      History taken from: chart family    Objective     Vital Sign Min/Max for last 24 hours  Temp  Min: 99 °F (37.2 °C)  Max: 99.3 °F (37.4 °C)   BP  Min: 71/51  Max: 132/83   Pulse  Min: 85  Max: 102   Resp  Min: 10  Max: 26   SpO2  Min: 93 %  Max: 99 %   Flow (L/min)  Min: 2  Max: 2   No data recorded     Flowsheet Rows      Flowsheet Row First Filed Value   Admission Height 177.8 cm (70\") Documented at 12/23/2023 1948   Admission Weight 79.9 kg (176 lb 2.4 oz) Documented at 12/23/2023 1948            I/O this shift:  In: 100 [P.O.:100]  Out: -   I/O last 3 completed shifts:  In: 1725 [P.O.:320; I.V.:740; Blood:640; IV Piggyback:25]  Out: 120 [Urine:120]    Objective:  General Appearance:  Comfortable.    Vital signs: (most recent): Blood pressure 94/66, pulse 94, temperature 99 °F (37.2 °C), temperature source Bladder, resp. rate 22, height 177.8 cm (70\"), weight 80.5 kg (177 lb 7.5 oz), SpO2 97%.  Vital signs are normal.    Output: Minimal urine output.    HEENT: Normal HEENT exam.    Lungs:  Normal effort and normal respiratory rate.    Heart: Normal rate.  Regular rhythm.    Abdomen: Abdomen is soft.  Hypoactive bowel sounds.     Extremities: There is dependent edema.    Pulses: Distal pulses are intact.    Neurological: Patient is alert and oriented to person, place and time.    Pupils:  Pupils are equal, round, and reactive to light.    Skin:  Warm and dry.              Results Review:     I reviewed the patient's new clinical results.    WBC WBC   Date Value Ref Range Status   01/01/2024 28.43 (H) 3.40 " "- 10.80 10*3/mm3 Final   12/31/2023 26.82 (H) 3.40 - 10.80 10*3/mm3 Final   12/30/2023 25.62 (H) 3.40 - 10.80 10*3/mm3 Final      HGB Hemoglobin   Date Value Ref Range Status   01/01/2024 7.9 (L) 13.0 - 17.7 g/dL Final   12/31/2023 9.6 (L) 13.0 - 17.7 g/dL Final   12/31/2023 7.7 (L) 13.0 - 17.7 g/dL Final   12/30/2023 9.2 (L) 13.0 - 17.7 g/dL Final   12/30/2023 6.6 (C) 13.0 - 17.7 g/dL Final      HCT Hematocrit   Date Value Ref Range Status   01/01/2024 23.8 (L) 37.5 - 51.0 % Final   12/31/2023 29.2 (L) 37.5 - 51.0 % Final   12/31/2023 23.2 (L) 37.5 - 51.0 % Final   12/30/2023 27.1 (L) 37.5 - 51.0 % Final   12/30/2023 20.0 (C) 37.5 - 51.0 % Final      Platlets No results found for: \"LABPLAT\"   MCV MCV   Date Value Ref Range Status   01/01/2024 86.2 79.0 - 97.0 fL Final   12/31/2023 89.9 79.0 - 97.0 fL Final   12/30/2023 89.7 79.0 - 97.0 fL Final          Sodium Sodium   Date Value Ref Range Status   01/01/2024 132 (L) 136 - 145 mmol/L Final   12/31/2023 132 (L) 136 - 145 mmol/L Final   12/30/2023 134 (L) 136 - 145 mmol/L Final   12/30/2023 133 (L) 136 - 145 mmol/L Final      Potassium Potassium   Date Value Ref Range Status   01/01/2024 4.7 3.5 - 5.2 mmol/L Final   12/31/2023 4.2 3.5 - 5.2 mmol/L Final   12/30/2023 3.7 3.5 - 5.2 mmol/L Final   12/30/2023 5.2 3.5 - 5.2 mmol/L Final     Comment:     Slight hemolysis detected by analyzer. Result may be falsely elevated.      Chloride Chloride   Date Value Ref Range Status   01/01/2024 95 (L) 98 - 107 mmol/L Final   12/31/2023 96 (L) 98 - 107 mmol/L Final   12/30/2023 98 98 - 107 mmol/L Final   12/30/2023 99 98 - 107 mmol/L Final      CO2 CO2   Date Value Ref Range Status   01/01/2024 19.3 (L) 22.0 - 29.0 mmol/L Final   12/31/2023 19.7 (L) 22.0 - 29.0 mmol/L Final   12/30/2023 22.8 22.0 - 29.0 mmol/L Final   12/30/2023 21.7 (L) 22.0 - 29.0 mmol/L Final      BUN BUN   Date Value Ref Range Status   01/01/2024 56 (H) 8 - 23 mg/dL Final   12/31/2023 33 (H) 8 - 23 mg/dL " "Final   12/30/2023 15 8 - 23 mg/dL Final   12/30/2023 28 (H) 8 - 23 mg/dL Final      Creatinine Creatinine   Date Value Ref Range Status   01/01/2024 4.89 (H) 0.76 - 1.27 mg/dL Final   12/31/2023 3.46 (H) 0.76 - 1.27 mg/dL Final   12/30/2023 2.32 (H) 0.76 - 1.27 mg/dL Final   12/30/2023 3.47 (H) 0.76 - 1.27 mg/dL Final      Calcium Calcium   Date Value Ref Range Status   01/01/2024 7.3 (L) 8.6 - 10.5 mg/dL Final   12/31/2023 7.8 (L) 8.6 - 10.5 mg/dL Final   12/30/2023 8.1 (L) 8.6 - 10.5 mg/dL Final   12/30/2023 7.5 (L) 8.6 - 10.5 mg/dL Final      PO4 No results found for: \"CAPO4\"   Albumin Albumin   Date Value Ref Range Status   01/01/2024 2.2 (L) 3.5 - 5.2 g/dL Final   12/31/2023 2.3 (L) 3.5 - 5.2 g/dL Final   12/30/2023 2.4 (L) 3.5 - 5.2 g/dL Final   12/30/2023 2.2 (L) 3.5 - 5.2 g/dL Final      Magnesium Magnesium   Date Value Ref Range Status   01/01/2024 2.5 (H) 1.6 - 2.4 mg/dL Final   12/31/2023 2.3 1.6 - 2.4 mg/dL Final   12/30/2023 2.2 1.6 - 2.4 mg/dL Final      Uric Acid No results found for: \"URICACID\"     Medication Review:   ampicillin-sulbactam, 3 g, Intravenous, Q24H  bumetanide, 4 mg, Intravenous, Q8H  famotidine, 20 mg, Intravenous, Daily  melatonin, 10 mg, Oral, Nightly  senna-docusate sodium, 2 tablet, Oral, BID  sodium bicarbonate, 650 mg, Oral, TID  sodium chloride, 10 mL, Intravenous, Q12H          Assessment & Plan       Left flank pain    Ruptured infrarenal abdominal aortic aneurysm (AAA)      Assessment & Plan  ROLAND-  pt with previous partial left nephrectomy and baseline creatinine closer to 0.9-1.0.  Had noted hypotension with AAA rupture s/p repair. on IV bumex.  Likely ATN from hypotension vs. Atheroembolic injury.  Noted renal infarcts seen on repeat CT.  Also received IV contrast with CTA.  Continue IV bumex 4mg IV q8hrs.  Bp on low side today, now on pressors.  Not planning dialysis today, will order for am.  AAA rupture-  s/p open repair infrarenal aorta.  Ischemic bowel- s/p left " colectomy, ostomy.  Shock- sepsis, hemorrhagic component with AAA rupture.   Sepsis-  blood cx with strep pyogenes previously.  On abx per primary.  Met Acidosis-  on oral bicarb.  Anemia-  prbc's prn.    Adin Hinton MD  01/01/24  09:53 EST

## 2024-01-02 ENCOUNTER — APPOINTMENT (OUTPATIENT)
Dept: GENERAL RADIOLOGY | Facility: HOSPITAL | Age: 64
DRG: 268 | End: 2024-01-02
Payer: COMMERCIAL

## 2024-01-02 PROBLEM — I82.4Z9: Status: ACTIVE | Noted: 2023-12-23

## 2024-01-02 LAB
ABO GROUP BLD: NORMAL
ALBUMIN SERPL-MCNC: 2.2 G/DL (ref 3.5–5.2)
ALBUMIN SERPL-MCNC: 2.5 G/DL (ref 3.5–5.2)
ALBUMIN SERPL-MCNC: 3 G/DL (ref 3.5–5.2)
ALBUMIN/GLOB SERPL: 0.6 G/DL
ALP SERPL-CCNC: 122 U/L (ref 39–117)
ALT SERPL W P-5'-P-CCNC: 100 U/L (ref 1–41)
ANION GAP SERPL CALCULATED.3IONS-SCNC: 19.7 MMOL/L (ref 5–15)
ANION GAP SERPL CALCULATED.3IONS-SCNC: 25.5 MMOL/L (ref 5–15)
ANION GAP SERPL CALCULATED.3IONS-SCNC: 26.1 MMOL/L (ref 5–15)
APTT PPP: 43.3 SECONDS (ref 78–95.9)
ARTERIAL PATENCY WRIST A: POSITIVE
AST SERPL-CCNC: 248 U/L (ref 1–40)
BASE EXCESS BLDA CALC-SCNC: -2.2 MMOL/L (ref -2–2)
BDY SITE: ABNORMAL
BILIRUB SERPL-MCNC: 6.5 MG/DL (ref 0–1.2)
BLD GP AB SCN SERPL QL: NEGATIVE
BUN SERPL-MCNC: 67 MG/DL (ref 8–23)
BUN SERPL-MCNC: 75 MG/DL (ref 8–23)
BUN SERPL-MCNC: 81 MG/DL (ref 8–23)
BUN/CREAT SERPL: 13 (ref 7–25)
BUN/CREAT SERPL: 13.6 (ref 7–25)
BUN/CREAT SERPL: 14.1 (ref 7–25)
CA-I BLDA-SCNC: 0.84 MMOL/L (ref 1.13–1.32)
CA-I BLDA-SCNC: 0.85 MMOL/L (ref 1.13–1.32)
CA-I BLDA-SCNC: 0.88 MMOL/L (ref 1.13–1.32)
CALCIUM SPEC-SCNC: 6.6 MG/DL (ref 8.6–10.5)
CALCIUM SPEC-SCNC: 6.7 MG/DL (ref 8.6–10.5)
CALCIUM SPEC-SCNC: 7.3 MG/DL (ref 8.6–10.5)
CHLORIDE BLDA-SCNC: 100 MMOL/L (ref 98–106)
CHLORIDE SERPL-SCNC: 93 MMOL/L (ref 98–107)
CHLORIDE SERPL-SCNC: 95 MMOL/L (ref 98–107)
CHLORIDE SERPL-SCNC: 97 MMOL/L (ref 98–107)
CO2 SERPL-SCNC: 12.5 MMOL/L (ref 22–29)
CO2 SERPL-SCNC: 12.9 MMOL/L (ref 22–29)
CO2 SERPL-SCNC: 19.3 MMOL/L (ref 22–29)
COHGB MFR BLD: 1.6 % (ref 0–1.5)
CREAT SERPL-MCNC: 4.74 MG/DL (ref 0.76–1.27)
CREAT SERPL-MCNC: 5.77 MG/DL (ref 0.76–1.27)
CREAT SERPL-MCNC: 5.96 MG/DL (ref 0.76–1.27)
D-LACTATE SERPL-SCNC: 7.8 MMOL/L (ref 0.5–2)
DEPRECATED RDW RBC AUTO: 53.9 FL (ref 37–54)
EGFRCR SERPLBLD CKD-EPI 2021: 10.2 ML/MIN/1.73
EGFRCR SERPLBLD CKD-EPI 2021: 12.9 ML/MIN/1.73
EGFRCR SERPLBLD CKD-EPI 2021: 9.8 ML/MIN/1.73
ERYTHROCYTE [DISTWIDTH] IN BLOOD BY AUTOMATED COUNT: 16.6 % (ref 12.3–15.4)
FHHB: 9.1 % (ref 0–5)
GAS FLOW AIRWAY: 2 LPM
GLOBULIN UR ELPH-MCNC: 3.9 GM/DL
GLUCOSE BLDA-MCNC: 256 MG/DL (ref 70–99)
GLUCOSE BLDC GLUCOMTR-MCNC: 179 MG/DL (ref 70–99)
GLUCOSE BLDC GLUCOMTR-MCNC: 18 MG/DL (ref 70–99)
GLUCOSE BLDC GLUCOMTR-MCNC: 204 MG/DL (ref 70–99)
GLUCOSE BLDC GLUCOMTR-MCNC: 207 MG/DL (ref 70–99)
GLUCOSE BLDC GLUCOMTR-MCNC: 211 MG/DL (ref 70–99)
GLUCOSE BLDC GLUCOMTR-MCNC: 212 MG/DL (ref 70–99)
GLUCOSE BLDC GLUCOMTR-MCNC: 258 MG/DL (ref 70–99)
GLUCOSE BLDC GLUCOMTR-MCNC: 33 MG/DL (ref 70–99)
GLUCOSE BLDC GLUCOMTR-MCNC: 45 MG/DL (ref 70–99)
GLUCOSE BLDC GLUCOMTR-MCNC: 73 MG/DL (ref 70–99)
GLUCOSE SERPL-MCNC: 226 MG/DL (ref 65–99)
GLUCOSE SERPL-MCNC: 227 MG/DL (ref 65–99)
GLUCOSE SERPL-MCNC: 55 MG/DL (ref 65–99)
HCO3 BLDA-SCNC: 21 MMOL/L (ref 22–26)
HCT VFR BLD AUTO: 21.8 % (ref 37.5–51)
HGB BLD-MCNC: 7.1 G/DL (ref 13–17.7)
HGB BLDA-MCNC: 8.6 G/DL (ref 13.8–16.4)
INHALED O2 CONCENTRATION: 28 %
LACTATE BLDA-SCNC: 2.34 MMOL/L (ref 0.5–2)
LIPASE SERPL-CCNC: 23 U/L (ref 13–60)
MAGNESIUM SERPL-MCNC: 2.4 MG/DL (ref 1.6–2.4)
MAGNESIUM SERPL-MCNC: 2.6 MG/DL (ref 1.6–2.4)
MAGNESIUM SERPL-MCNC: 2.8 MG/DL (ref 1.6–2.4)
MCH RBC QN AUTO: 29.5 PG (ref 26.6–33)
MCHC RBC AUTO-ENTMCNC: 32.6 G/DL (ref 31.5–35.7)
MCV RBC AUTO: 90.5 FL (ref 79–97)
METHGB BLD QL: 0.7 % (ref 0–1.5)
MODALITY: ABNORMAL
NOTE: ABNORMAL
OXYHGB MFR BLDV: 88.6 % (ref 94–99)
PCO2 BLDA: 29.7 MM HG (ref 35–45)
PH BLDA: 7.47 PH UNITS (ref 7.35–7.45)
PHOSPHATE SERPL-MCNC: 7 MG/DL (ref 2.5–4.5)
PHOSPHATE SERPL-MCNC: 8.9 MG/DL (ref 2.5–4.5)
PHOSPHATE SERPL-MCNC: 9.3 MG/DL (ref 2.5–4.5)
PLATELET # BLD AUTO: 423 10*3/MM3 (ref 140–450)
PMV BLD AUTO: 11 FL (ref 6–12)
PO2 BLD: 209 MM[HG] (ref 0–500)
PO2 BLDA: 58.5 MM HG (ref 80–100)
POTASSIUM BLDA-SCNC: 4.71 MMOL/L (ref 3.5–5)
POTASSIUM SERPL-SCNC: 5.4 MMOL/L (ref 3.5–5.2)
POTASSIUM SERPL-SCNC: 6.2 MMOL/L (ref 3.5–5.2)
POTASSIUM SERPL-SCNC: 6.2 MMOL/L (ref 3.5–5.2)
PROCALCITONIN SERPL-MCNC: 11.77 NG/ML (ref 0–0.25)
PROT SERPL-MCNC: 6.4 G/DL (ref 6–8.5)
RBC # BLD AUTO: 2.41 10*6/MM3 (ref 4.14–5.8)
RH BLD: NEGATIVE
SAO2 % BLDCOA: 90.7 % (ref 95–99)
SODIUM BLDA-SCNC: 133.4 MMOL/L (ref 136–146)
SODIUM SERPL-SCNC: 131 MMOL/L (ref 136–145)
SODIUM SERPL-SCNC: 134 MMOL/L (ref 136–145)
SODIUM SERPL-SCNC: 136 MMOL/L (ref 136–145)
T&S EXPIRATION DATE: NORMAL
WBC NRBC COR # BLD AUTO: 43.46 10*3/MM3 (ref 3.4–10.8)

## 2024-01-02 PROCEDURE — 25010000002 VASOPRESSIN 0.2 UNIT/ML SOLUTION: Performed by: PHYSICIAN ASSISTANT

## 2024-01-02 PROCEDURE — 02HV33Z INSERTION OF INFUSION DEVICE INTO SUPERIOR VENA CAVA, PERCUTANEOUS APPROACH: ICD-10-PCS | Performed by: INTERNAL MEDICINE

## 2024-01-02 PROCEDURE — 84145 PROCALCITONIN (PCT): CPT | Performed by: PHYSICIAN ASSISTANT

## 2024-01-02 PROCEDURE — 99024 POSTOP FOLLOW-UP VISIT: CPT | Performed by: SURGERY

## 2024-01-02 PROCEDURE — 25810000003 SODIUM CHLORIDE 0.9 % SOLUTION: Performed by: PHYSICIAN ASSISTANT

## 2024-01-02 PROCEDURE — 25810000003 DEXTROSE-NACL PER 500 ML: Performed by: PHYSICIAN ASSISTANT

## 2024-01-02 PROCEDURE — 86900 BLOOD TYPING SEROLOGIC ABO: CPT

## 2024-01-02 PROCEDURE — 74018 RADEX ABDOMEN 1 VIEW: CPT

## 2024-01-02 PROCEDURE — 36556 INSERT NON-TUNNEL CV CATH: CPT | Performed by: INTERNAL MEDICINE

## 2024-01-02 PROCEDURE — 99291 CRITICAL CARE FIRST HOUR: CPT | Performed by: INTERNAL MEDICINE

## 2024-01-02 PROCEDURE — 83690 ASSAY OF LIPASE: CPT | Performed by: PHYSICIAN ASSISTANT

## 2024-01-02 PROCEDURE — 36600 WITHDRAWAL OF ARTERIAL BLOOD: CPT | Performed by: INTERNAL MEDICINE

## 2024-01-02 PROCEDURE — 85027 COMPLETE CBC AUTOMATED: CPT | Performed by: PHYSICIAN ASSISTANT

## 2024-01-02 PROCEDURE — 80053 COMPREHEN METABOLIC PANEL: CPT | Performed by: PHYSICIAN ASSISTANT

## 2024-01-02 PROCEDURE — B548ZZA ULTRASONOGRAPHY OF SUPERIOR VENA CAVA, GUIDANCE: ICD-10-PCS | Performed by: INTERNAL MEDICINE

## 2024-01-02 PROCEDURE — 86901 BLOOD TYPING SEROLOGIC RH(D): CPT | Performed by: INTERNAL MEDICINE

## 2024-01-02 PROCEDURE — 76937 US GUIDE VASCULAR ACCESS: CPT

## 2024-01-02 PROCEDURE — 25010000002 ALBUMIN HUMAN 25% PER 50 ML: Performed by: INTERNAL MEDICINE

## 2024-01-02 PROCEDURE — 85730 THROMBOPLASTIN TIME PARTIAL: CPT | Performed by: INTERNAL MEDICINE

## 2024-01-02 PROCEDURE — 83735 ASSAY OF MAGNESIUM: CPT | Performed by: PHYSICIAN ASSISTANT

## 2024-01-02 PROCEDURE — 82375 ASSAY CARBOXYHB QUANT: CPT | Performed by: INTERNAL MEDICINE

## 2024-01-02 PROCEDURE — 94799 UNLISTED PULMONARY SVC/PX: CPT

## 2024-01-02 PROCEDURE — 99292 CRITICAL CARE ADDL 30 MIN: CPT | Performed by: INTERNAL MEDICINE

## 2024-01-02 PROCEDURE — 86900 BLOOD TYPING SEROLOGIC ABO: CPT | Performed by: INTERNAL MEDICINE

## 2024-01-02 PROCEDURE — 71045 X-RAY EXAM CHEST 1 VIEW: CPT

## 2024-01-02 PROCEDURE — 83605 ASSAY OF LACTIC ACID: CPT | Performed by: PHYSICIAN ASSISTANT

## 2024-01-02 PROCEDURE — 36556 INSERT NON-TUNNEL CV CATH: CPT

## 2024-01-02 PROCEDURE — 82330 ASSAY OF CALCIUM: CPT | Performed by: INTERNAL MEDICINE

## 2024-01-02 PROCEDURE — P9047 ALBUMIN (HUMAN), 25%, 50ML: HCPCS | Performed by: INTERNAL MEDICINE

## 2024-01-02 PROCEDURE — 82805 BLOOD GASES W/O2 SATURATION: CPT | Performed by: INTERNAL MEDICINE

## 2024-01-02 PROCEDURE — 5A1D90Z PERFORMANCE OF URINARY FILTRATION, CONTINUOUS, GREATER THAN 18 HOURS PER DAY: ICD-10-PCS | Performed by: INTERNAL MEDICINE

## 2024-01-02 PROCEDURE — 87040 BLOOD CULTURE FOR BACTERIA: CPT | Performed by: PHYSICIAN ASSISTANT

## 2024-01-02 PROCEDURE — P9016 RBC LEUKOCYTES REDUCED: HCPCS

## 2024-01-02 PROCEDURE — 25010000002 HYDROMORPHONE 1 MG/ML SOLUTION: Performed by: INTERNAL MEDICINE

## 2024-01-02 PROCEDURE — 83735 ASSAY OF MAGNESIUM: CPT | Performed by: INTERNAL MEDICINE

## 2024-01-02 PROCEDURE — 0 DEXTROSE 5 % SOLUTION: Performed by: INTERNAL MEDICINE

## 2024-01-02 PROCEDURE — 84100 ASSAY OF PHOSPHORUS: CPT | Performed by: PHYSICIAN ASSISTANT

## 2024-01-02 PROCEDURE — 25010000002 BUMETANIDE PER 0.5 MG: Performed by: INTERNAL MEDICINE

## 2024-01-02 PROCEDURE — 83050 HGB METHEMOGLOBIN QUAN: CPT | Performed by: INTERNAL MEDICINE

## 2024-01-02 PROCEDURE — 86850 RBC ANTIBODY SCREEN: CPT | Performed by: INTERNAL MEDICINE

## 2024-01-02 PROCEDURE — 25010000002 VASOPRESSIN 0.2 UNIT/ML SOLUTION: Performed by: INTERNAL MEDICINE

## 2024-01-02 PROCEDURE — 25010000002 FENTANYL CITRATE (PF) 50 MCG/ML SOLUTION: Performed by: INTERNAL MEDICINE

## 2024-01-02 PROCEDURE — 86923 COMPATIBILITY TEST ELECTRIC: CPT

## 2024-01-02 PROCEDURE — 25810000003 LACTATED RINGERS SOLUTION: Performed by: INTERNAL MEDICINE

## 2024-01-02 PROCEDURE — 76937 US GUIDE VASCULAR ACCESS: CPT | Performed by: INTERNAL MEDICINE

## 2024-01-02 PROCEDURE — 25010000002 FENTANYL CITRATE (PF) 50 MCG/ML SOLUTION: Performed by: PHYSICIAN ASSISTANT

## 2024-01-02 PROCEDURE — 25010000002 AMPICILLIN-SULBACTAM PER 1.5 G: Performed by: INTERNAL MEDICINE

## 2024-01-02 PROCEDURE — 82948 REAGENT STRIP/BLOOD GLUCOSE: CPT

## 2024-01-02 PROCEDURE — 25010000002 MORPHINE PER 10 MG: Performed by: INTERNAL MEDICINE

## 2024-01-02 RX ORDER — ALBUMIN (HUMAN) 12.5 G/50ML
25 SOLUTION INTRAVENOUS ONCE
Status: COMPLETED | OUTPATIENT
Start: 2024-01-02 | End: 2024-01-02

## 2024-01-02 RX ORDER — ALBUMIN (HUMAN) 12.5 G/50ML
50 SOLUTION INTRAVENOUS ONCE
Status: DISCONTINUED | OUTPATIENT
Start: 2024-01-02 | End: 2024-01-02

## 2024-01-02 RX ORDER — MIDODRINE HYDROCHLORIDE 5 MG/1
5 TABLET ORAL
Status: DISCONTINUED | OUTPATIENT
Start: 2024-01-02 | End: 2024-01-12 | Stop reason: HOSPADM

## 2024-01-02 RX ORDER — SODIUM BICARBONATE 650 MG/1
1300 TABLET ORAL 3 TIMES DAILY
Status: DISCONTINUED | OUTPATIENT
Start: 2024-01-02 | End: 2024-01-02

## 2024-01-02 RX ORDER — FENTANYL CITRATE 50 UG/ML
50 INJECTION, SOLUTION INTRAMUSCULAR; INTRAVENOUS EVERY 4 HOURS PRN
Status: DISCONTINUED | OUTPATIENT
Start: 2024-01-02 | End: 2024-01-11

## 2024-01-02 RX ORDER — CALCIUM CHLORIDE, MAGNESIUM CHLORIDE, SODIUM CHLORIDE, SODIUM BICARBONATE, POTASSIUM CHLORIDE AND SODIUM PHOSPHATE DIBASIC DIHYDRATE 3.68; 3.05; 6.34; 3.09; .314; .187 G/L; G/L; G/L; G/L; G/L; G/L
1500 INJECTION INTRAVENOUS CONTINUOUS
Status: DISCONTINUED | OUTPATIENT
Start: 2024-01-02 | End: 2024-01-06

## 2024-01-02 RX ORDER — FENTANYL CITRATE 50 UG/ML
25 INJECTION, SOLUTION INTRAMUSCULAR; INTRAVENOUS ONCE
Status: COMPLETED | OUTPATIENT
Start: 2024-01-02 | End: 2024-01-02

## 2024-01-02 RX ORDER — HEPARIN SODIUM 1000 [USP'U]/ML
INJECTION, SOLUTION INTRAVENOUS; SUBCUTANEOUS AS NEEDED
Status: DISCONTINUED | OUTPATIENT
Start: 2024-01-02 | End: 2024-01-12 | Stop reason: HOSPADM

## 2024-01-02 RX ORDER — DEXTROSE AND SODIUM CHLORIDE 5; .9 G/100ML; G/100ML
50 INJECTION, SOLUTION INTRAVENOUS CONTINUOUS
Status: DISCONTINUED | OUTPATIENT
Start: 2024-01-02 | End: 2024-01-03

## 2024-01-02 RX ADMIN — TRAZODONE HYDROCHLORIDE 50 MG: 50 TABLET ORAL at 21:36

## 2024-01-02 RX ADMIN — VASOPRESSIN 0.04 UNITS/MIN: 0.2 INJECTION INTRAVENOUS at 16:00

## 2024-01-02 RX ADMIN — NOREPINEPHRINE BITARTRATE 0.16 MCG/KG/MIN: 1 INJECTION, SOLUTION, CONCENTRATE INTRAVENOUS at 01:28

## 2024-01-02 RX ADMIN — VASOPRESSIN 0.04 UNITS/MIN: 0.2 INJECTION INTRAVENOUS at 23:17

## 2024-01-02 RX ADMIN — CALCIUM CHLORIDE, MAGNESIUM CHLORIDE, SODIUM CHLORIDE, SODIUM BICARBONATE, POTASSIUM CHLORIDE AND SODIUM PHOSPHATE DIBASIC DIHYDRATE 1500 ML/HR: 3.68; 3.05; 6.34; 3.09; .314; .187 INJECTION INTRAVENOUS at 14:31

## 2024-01-02 RX ADMIN — ALBUMIN (HUMAN) 25 G: 0.25 INJECTION, SOLUTION INTRAVENOUS at 14:45

## 2024-01-02 RX ADMIN — BUMETANIDE 4 MG: 0.25 INJECTION INTRAMUSCULAR; INTRAVENOUS at 08:35

## 2024-01-02 RX ADMIN — Medication 10 MG: at 21:36

## 2024-01-02 RX ADMIN — Medication 10 ML: at 08:35

## 2024-01-02 RX ADMIN — DEXTROSE AND SODIUM CHLORIDE 50 ML/HR: 5; 900 INJECTION, SOLUTION INTRAVENOUS at 07:05

## 2024-01-02 RX ADMIN — DEXTROSE 15 G: 15 GEL ORAL at 06:10

## 2024-01-02 RX ADMIN — SODIUM BICARBONATE 100 MEQ: 84 INJECTION INTRAVENOUS at 14:35

## 2024-01-02 RX ADMIN — DEXTROSE MONOHYDRATE 25 G: 25 INJECTION, SOLUTION INTRAVENOUS at 06:06

## 2024-01-02 RX ADMIN — QUETIAPINE FUMARATE 25 MG: 25 TABLET ORAL at 21:36

## 2024-01-02 RX ADMIN — SODIUM CHLORIDE 3 G: 900 INJECTION INTRAVENOUS at 23:17

## 2024-01-02 RX ADMIN — FENTANYL CITRATE 25 MCG: 50 INJECTION, SOLUTION INTRAMUSCULAR; INTRAVENOUS at 10:43

## 2024-01-02 RX ADMIN — SODIUM BICARBONATE 150 MEQ: 84 INJECTION, SOLUTION INTRAVENOUS at 14:56

## 2024-01-02 RX ADMIN — FAMOTIDINE 20 MG: 10 INJECTION INTRAVENOUS at 08:35

## 2024-01-02 RX ADMIN — DOCUSATE SODIUM 50 MG AND SENNOSIDES 8.6 MG 2 TABLET: 8.6; 5 TABLET, FILM COATED ORAL at 08:35

## 2024-01-02 RX ADMIN — OXYCODONE AND ACETAMINOPHEN 1 TABLET: 5; 325 TABLET ORAL at 08:36

## 2024-01-02 RX ADMIN — SODIUM CHLORIDE 3 G: 900 INJECTION INTRAVENOUS at 14:56

## 2024-01-02 RX ADMIN — CALCIUM CHLORIDE, MAGNESIUM CHLORIDE, SODIUM CHLORIDE, SODIUM BICARBONATE, POTASSIUM CHLORIDE AND SODIUM PHOSPHATE DIBASIC DIHYDRATE 1500 ML/HR: 3.68; 3.05; 6.34; 3.09; .314; .187 INJECTION INTRAVENOUS at 17:40

## 2024-01-02 RX ADMIN — SODIUM BICARBONATE 650 MG TABLET 650 MG: at 08:35

## 2024-01-02 RX ADMIN — NOREPINEPHRINE BITARTRATE 0.14 MCG/KG/MIN: 1 INJECTION, SOLUTION, CONCENTRATE INTRAVENOUS at 12:06

## 2024-01-02 RX ADMIN — ESCITALOPRAM OXALATE 5 MG: 10 TABLET ORAL at 08:36

## 2024-01-02 RX ADMIN — CALCIUM CARBONATE 2 TABLET: 500 TABLET, CHEWABLE ORAL at 17:11

## 2024-01-02 RX ADMIN — OXYCODONE AND ACETAMINOPHEN 1 TABLET: 5; 325 TABLET ORAL at 03:18

## 2024-01-02 RX ADMIN — FENTANYL CITRATE 25 MCG: 50 INJECTION, SOLUTION INTRAMUSCULAR; INTRAVENOUS at 14:05

## 2024-01-02 RX ADMIN — MORPHINE SULFATE 4 MG: 4 INJECTION, SOLUTION INTRAMUSCULAR; INTRAVENOUS at 00:42

## 2024-01-02 RX ADMIN — VASOPRESSIN 0.03 UNITS/MIN: 0.2 INJECTION INTRAVENOUS at 07:38

## 2024-01-02 RX ADMIN — CALCIUM CHLORIDE, MAGNESIUM CHLORIDE, SODIUM CHLORIDE, SODIUM BICARBONATE, POTASSIUM CHLORIDE AND SODIUM PHOSPHATE DIBASIC DIHYDRATE 1500 ML/HR: 3.68; 3.05; 6.34; 3.09; .314; .187 INJECTION INTRAVENOUS at 23:19

## 2024-01-02 RX ADMIN — DEXTROSE MONOHYDRATE 25 G: 25 INJECTION, SOLUTION INTRAVENOUS at 05:02

## 2024-01-02 RX ADMIN — Medication 10 ML: at 20:10

## 2024-01-02 RX ADMIN — ALBUMIN (HUMAN) 25 G: 0.25 INJECTION, SOLUTION INTRAVENOUS at 15:04

## 2024-01-02 RX ADMIN — DEXTROSE MONOHYDRATE 25 G: 25 INJECTION, SOLUTION INTRAVENOUS at 05:31

## 2024-01-02 RX ADMIN — SODIUM CHLORIDE, POTASSIUM CHLORIDE, SODIUM LACTATE AND CALCIUM CHLORIDE 1000 ML: 600; 310; 30; 20 INJECTION, SOLUTION INTRAVENOUS at 14:35

## 2024-01-02 NOTE — PROGRESS NOTES
[facility]     Progress Note    Patient Name: Danny Savage  : 1960  MRN: [id number]  Primary Care Physician:  Kodi Pichardo MD  Date of admission: 2023    Subjective   Subjective     POD #9 status post open repair of ruptured abdominal aorta aneurysm, POD #7 status post abdominal reexploration, sigmoidectomy with end colostomy, closure of abdominal wound    Patient without complaints.    Heparin had to be stopped due to what appears to be expanding retroperitoneal hematoma.  Asked to evaluate for IVC filter.  On pressors to support blood pressure.  Transitioning to CRRT.    Objective   Objective     Vitals:   Temp:  [96.6 °F (35.9 °C)-97.9 °F (36.6 °C)] 96.6 °F (35.9 °C)  Heart Rate:  [] 93  Resp:  [11-26] 12  BP: ()/(52-82) 79/57  Flow (L/min):  [2] 2    Physical Exam   General: Awake, alert   Extremities: Symmetric, moderate edema.    Hgb: 7.1  WBC: 43.46  Lactate: 7.8    Assessment & Plan   Assessment / Plan     Assessment/Plan:    Patient remains critically ill in ICU.  Persistent leukocytosis.  Off heparin due to concern for expansion of hematoma.  I have discussed with the wife who is at the bedside regarding IVC filter.  I have discussed with her in detail the mechanics of the procedure, the indications, benefits, risks, alternatives, as well as potential complications to include but not limited to infection, bleeding, hematoma, transfusion, reoperation.  She appears to understand and desires to proceed.  There was some thought on proceeding to IVC filter today but due to his current overall status it was felt that at this time the risk of the transport exceeded the benefit.  Will plan on proceeding with IVC filter as soon as reasonably stable.  Discussed with ICU attending.        Active Hospital Problems:  Active Hospital Problems    Diagnosis     **Left flank pain     Ruptured infrarenal abdominal aortic aneurysm (AAA)            Electronically signed by Swapnil  MD Mora, 12/27/23, 10:25 AM EST.

## 2024-01-02 NOTE — SIGNIFICANT NOTE
Wound Eval / Progress Noted     [place]     Patient Name: Danny Savage  : 1960  MRN: [id number]  Today's Date: 2024                 Admit Date: 2023    Visit Dx:    ICD-10-CM ICD-9-CM   1. Sepsis, due to unspecified organism, unspecified whether acute organ dysfunction present  A41.9 038.9     995.91   2. Fever, unspecified fever cause  R50.9 780.60   3. Intra-abdominal fluid collection  R18.8 789.59   4. Abdominal aortic aneurysm (AAA) without rupture, unspecified part  I71.40 441.4   5. Ruptured infrarenal abdominal aortic aneurysm (AAA)  I71.33 441.3   6. Decreased activities of daily living (ADL)  Z78.9 V49.89   7. Difficulty walking  R26.2 719.7   8. DVT, lower extremity, distal, acute, unspecified laterality  I82.4Z9 453.42         Left flank pain    Ruptured infrarenal abdominal aortic aneurysm (AAA)    DVT, lower extremity, distal, acute, unspecified laterality        Past Medical History:   Diagnosis Date    Allergy     Aortic aneurysm     4.7 just found has not f/u    H/O Kidney stone     Hiatal hernia     Kidney mass     left    Renal cancer         Past Surgical History:   Procedure Laterality Date    ABDOMINAL AORTIC ANEURYSM REPAIR N/A 2023    Procedure: ABDOMINAL AORTIC ANEURYSM REPAIR;  Surgeon: Koffi Agosto MD;  Location: [facility] MAIN OR;  Service: Vascular;  Laterality: N/A;    COLON RESECTION N/A 2023    Procedure: COLON RESECTION;  Surgeon: Hernan Mallory MD;  Location: [facility] MAIN OR;  Service: General;  Laterality: N/A;  EXPLORATORY LAPAROTOMY, REPAIR OF SMALL BOWEL MESENTERY, APPENDECTOMY, LEFT HEMICOLECTOMY, CREATION OF OSTOMY, ABDOMINAL CLOSURE    EXPLORATORY LAPAROTOMY N/A 2023    Procedure: LAPAROTOMY EXPLORATORY;  Surgeon: Koffi Agosto MD;  Location: [facility] MAIN OR;  Service: Vascular;  Laterality: N/A;  Exploratory Laparotomy    KIDNEY STONE SURGERY      NEPHRECTOMY PARTIAL Left 2023    Procedure: NEPHRECTOMY PARTIAL LAPAROSCOPIC WITH  "DAVINCI ROBOT, left;  Surgeon: Michelle Cai MD;  Location: [facility];  Service: Robotics - DaVinci;  Laterality: Left;    URETEROSCOPY LASER LITHOTRIPSY WITH STENT INSERTION Left 08/18/2023    Procedure: CYSTOSCOPY URETEROSCOPY RETROGRADE PYELOGRAM STENT INSERTION, left;  Surgeon: Michelle Cai MD;  Location: [facility] MAIN OR;  Service: Urology;  Laterality: Left;        01/02/24 1330   Wound 12/24/23 2035 midline abdomen Incision   Placement Date/Time: 12/24/23 2035   Orientation: midline  Location: abdomen  Primary Wound Type: Incision   Wound Image    Dressing Appearance dry;intact;no drainage   Closure Staples   Base moist;red;clean;dry   Periwound dry;intact;swelling   Periwound Temperature warm   Periwound Skin Turgor firm   Edges rolled/closed;open   Drainage Characteristics/Odor bleeding controlled;serosanguineous   Drainage Amount scant   Care, Wound cleansed with;sterile normal saline   Dressing Care dressing applied;abdominal pad   Periwound Care absorptive dressing applied   NPWT (Negative Pressure Wound Therapy) 12/26/23 midline abdominal incision   Placement Date: 12/26/23   Location: midline abdominal incision   Therapy Setting vacuum off   Sponges Removed 2   Colostomy LLQ   Placement date: If unknown, DO NOT use \"Add Comment\" note/Placement time: If unknown, DO NOT use \"Add Comment\" note: 12/26/23 1315   Inserted by: DR HOLGUIN  Hand Hygiene Completed: Yes  Location: LLQ   Wound Image    Stomal Appliance 1 piece;Clean;Dry;Intact;Drainable;Changed   Stoma Appearance moist;pink;protruding above skin level   Peristomal Assessment Clean;Intact   Accessories/Skin Care cleansed with water;skin sealant   Stoma Function stool   Stool Color brown, dark   Stool Consistency liquid   Treatment Bag change;Site care     Wound Check / Follow-up: Patient seen today for ostomy follow-up and ongoing education. Patient status post abdominal aortic aneurysm repair on 12/24 and exploratory laparotomy " with colostomy placement on 12/26. Patient remains in CICU. He is awake and alert at time of visit. Patient's spouse is present at bedside.    Spoke with general surgeon prior to visiting patient. MD states may remove incisional wound VAC and leave off if incision looks unchanged. Removed incisional wound VAC dressing with use of adhesive remover. Midline abdominal incision is closed with staples. There are two small superficial openings noted to incision with scant amount of bloody drainage. No evidence of dehiscence is noted. No purulence or signs of infection noted. Tissue surrounding incision remains intact. Cleansed incision with normal saline and gauze. Applied ABD pads and secured with paper tape. Recommending daily dressing changes. Patient's abdomen is distended upon assessment. Spoke with surgeon who states OK to place abdominal binder back on patient. Primary RN notified.    Colostomy pouching system is intact with stool noted. Removed pouch with use of adhesive remover. Stoma is pink and moist. Peristomal tissue is intact with no signs of irritation. Cleansed stoma and peristomal tissue warm water washcloth. Cut new pouch to fit. Applied skin barrier wipe to peristomal tissue. Placed new one piece flat pouch. Seal obtained. No signs of lifting or leaking noted.    Lower abdominal fold with linear superficial tissue loss. Recommending skin care/skin protection with application of barrier cream.      Impression: New colostomy. Midline abdominal incision. Linear superficial tissue loss.      Short term goals: Regain skin integrity, skin protection, colostomy management, ostomy education, daily dressing changes, skin care.       Enedina Norman RN    1/2/2024    15:20 EST

## 2024-01-02 NOTE — PROGRESS NOTES
" LOS: 10 days   Patient Care Team:  Kodi Pichardo MD as PCP - General (Internal Medicine)  Lisset Harrington APRN as Nurse Practitioner (Urology)    Chief Complaint: ROLAND    Subjective     History of Present Illness  Pt awake and alert.  Appears more restless today.  Pt on levo/vasso now  Dialysis moved to stat this AM.  Seen on dialysis  BP seems to be tolerating at this time.  UF changed to rinse only.    Subjective:  Symptoms:  No shortness of breath, chest pain, chest pressure or anxiety.    Diet:  No nausea or vomiting.      History taken from: chart family    Objective     Vital Sign Min/Max for last 24 hours  Temp  Min: 96.6 °F (35.9 °C)  Max: 98.9 °F (37.2 °C)   BP  Min: 70/60  Max: 137/75   Pulse  Min: 89  Max: 107   Resp  Min: 11  Max: 26   SpO2  Min: 85 %  Max: 100 %   Flow (L/min)  Min: 2  Max: 2   Weight  Min: 96.6 kg (212 lb 14.4 oz)  Max: 96.6 kg (212 lb 14.4 oz)     Flowsheet Rows      Flowsheet Row First Filed Value   Admission Height 177.8 cm (70\") Documented at 12/23/2023 1948   Admission Weight 79.9 kg (176 lb 2.4 oz) Documented at 12/23/2023 1948            No intake/output data recorded.  I/O last 3 completed shifts:  In: 1015 [P.O.:320; I.V.:695]  Out: 10 [Urine:10]    Objective:  General Appearance:  Uncomfortable.    Vital signs: (most recent): Blood pressure (!) 79/57, pulse 93, temperature 96.6 °F (35.9 °C), temperature source Rectal, resp. rate 12, height 177.8 cm (70\"), weight 96.6 kg (212 lb 14.4 oz), SpO2 (!) 85%.    Output: Minimal urine output.    HEENT: Normal HEENT exam.    Lungs:  Normal effort and normal respiratory rate.    Heart: Tachycardia.  Regular rhythm.    Abdomen: Hypoactive bowel sounds.     Extremities: There is dependent edema.    Pulses: Distal pulses are intact.    Neurological: Patient is alert and oriented to person, place and time.    Pupils:  Pupils are equal, round, and reactive to light.    Skin:  Warm and dry.              Results Review:     I " "reviewed the patient's new clinical results.    WBC WBC   Date Value Ref Range Status   01/02/2024 43.46 (C) 3.40 - 10.80 10*3/mm3 Final   01/01/2024 28.43 (H) 3.40 - 10.80 10*3/mm3 Final   12/31/2023 26.82 (H) 3.40 - 10.80 10*3/mm3 Final      HGB Hemoglobin   Date Value Ref Range Status   01/02/2024 7.1 (L) 13.0 - 17.7 g/dL Final   01/01/2024 7.9 (L) 13.0 - 17.7 g/dL Final   12/31/2023 9.6 (L) 13.0 - 17.7 g/dL Final   12/31/2023 7.7 (L) 13.0 - 17.7 g/dL Final   12/30/2023 9.2 (L) 13.0 - 17.7 g/dL Final      HCT Hematocrit   Date Value Ref Range Status   01/02/2024 21.8 (L) 37.5 - 51.0 % Final   01/01/2024 23.8 (L) 37.5 - 51.0 % Final   12/31/2023 29.2 (L) 37.5 - 51.0 % Final   12/31/2023 23.2 (L) 37.5 - 51.0 % Final   12/30/2023 27.1 (L) 37.5 - 51.0 % Final      Platlets No results found for: \"LABPLAT\"   MCV MCV   Date Value Ref Range Status   01/02/2024 90.5 79.0 - 97.0 fL Final   01/01/2024 86.2 79.0 - 97.0 fL Final   12/31/2023 89.9 79.0 - 97.0 fL Final          Sodium Sodium   Date Value Ref Range Status   01/02/2024 134 (L) 136 - 145 mmol/L Final   01/01/2024 132 (L) 136 - 145 mmol/L Final   12/31/2023 132 (L) 136 - 145 mmol/L Final   12/30/2023 134 (L) 136 - 145 mmol/L Final      Potassium Potassium   Date Value Ref Range Status   01/02/2024 6.2 (C) 3.5 - 5.2 mmol/L Final   01/01/2024 4.7 3.5 - 5.2 mmol/L Final   12/31/2023 4.2 3.5 - 5.2 mmol/L Final   12/30/2023 3.7 3.5 - 5.2 mmol/L Final      Chloride Chloride   Date Value Ref Range Status   01/02/2024 95 (L) 98 - 107 mmol/L Final   01/01/2024 95 (L) 98 - 107 mmol/L Final   12/31/2023 96 (L) 98 - 107 mmol/L Final   12/30/2023 98 98 - 107 mmol/L Final      CO2 CO2   Date Value Ref Range Status   01/02/2024 12.9 (L) 22.0 - 29.0 mmol/L Final   01/01/2024 19.3 (L) 22.0 - 29.0 mmol/L Final   12/31/2023 19.7 (L) 22.0 - 29.0 mmol/L Final   12/30/2023 22.8 22.0 - 29.0 mmol/L Final      BUN BUN   Date Value Ref Range Status   01/02/2024 75 (H) 8 - 23 mg/dL Final " "  01/01/2024 56 (H) 8 - 23 mg/dL Final   12/31/2023 33 (H) 8 - 23 mg/dL Final   12/30/2023 15 8 - 23 mg/dL Final      Creatinine Creatinine   Date Value Ref Range Status   01/02/2024 5.77 (H) 0.76 - 1.27 mg/dL Final   01/01/2024 4.89 (H) 0.76 - 1.27 mg/dL Final   12/31/2023 3.46 (H) 0.76 - 1.27 mg/dL Final   12/30/2023 2.32 (H) 0.76 - 1.27 mg/dL Final      Calcium Calcium   Date Value Ref Range Status   01/02/2024 7.3 (L) 8.6 - 10.5 mg/dL Final   01/01/2024 7.3 (L) 8.6 - 10.5 mg/dL Final   12/31/2023 7.8 (L) 8.6 - 10.5 mg/dL Final   12/30/2023 8.1 (L) 8.6 - 10.5 mg/dL Final      PO4 No results found for: \"CAPO4\"   Albumin Albumin   Date Value Ref Range Status   01/02/2024 2.5 (L) 3.5 - 5.2 g/dL Final   01/01/2024 2.2 (L) 3.5 - 5.2 g/dL Final   12/31/2023 2.3 (L) 3.5 - 5.2 g/dL Final   12/30/2023 2.4 (L) 3.5 - 5.2 g/dL Final      Magnesium Magnesium   Date Value Ref Range Status   01/02/2024 2.8 (H) 1.6 - 2.4 mg/dL Final   01/01/2024 2.5 (H) 1.6 - 2.4 mg/dL Final   12/31/2023 2.3 1.6 - 2.4 mg/dL Final      Uric Acid No results found for: \"URICACID\"     Medication Review:   ampicillin-sulbactam, 3 g, Intravenous, Q24H  bumetanide, 4 mg, Intravenous, Q8H  escitalopram, 5 mg, Oral, Daily  famotidine, 20 mg, Intravenous, Daily  melatonin, 10 mg, Oral, Nightly  QUEtiapine, 25 mg, Oral, Nightly  senna-docusate sodium, 2 tablet, Oral, BID  sodium bicarbonate, 1,300 mg, Oral, TID  sodium chloride, 10 mL, Intravenous, Q12H  traZODone, 50 mg, Oral, Nightly          Assessment & Plan       Left flank pain    Ruptured infrarenal abdominal aortic aneurysm (AAA)      Assessment & Plan  ROLAND-  pt with previous partial left nephrectomy and baseline creatinine closer to 0.9-1.0.  Had noted hypotension with AAA rupture s/p repair. on IV bumex.  Likely ATN from hypotension vs. Atheroembolic injury.  Noted renal infarcts seen on repeat CT.  Also received IV contrast with CTA.  Will hold IV bumex for now.  BP remains low, on levophed, " vasopressin started.  Tolerating dialysis currently but may need to transition to CRRT.  AAA rupture-  s/p open repair infrarenal aorta.  Ischemic bowel- s/p left colectomy, ostomy.  Shock- sepsis, hemorrhagic component with AAA rupture.   Sepsis-  blood cx with strep pyogenes previously.  On abx per primary.  Elevated procalcitonin.  Met Acidosis-  on oral bicarb.  Dialysis today.  Anemia-  prbc's prn.  Hyperkalemia-  worsened with acidosis, likely due to reabsorption of blood from previous bleed.     Adin Hinton MD  01/02/24  09:05 EST

## 2024-01-02 NOTE — PROCEDURES
Central line placement note    Indication: Dialysis catheter clotted off, need CRRT    Consent obtained: From family    Timeout: Performed    Procedure: The patient was placed in the supine position and the skin over the patient's left femoral vein was prepped with chlorhexidine. The patient was draped with full body draped sterile procedure was used.  The skin was infiltrated with local anesthesia with over the insertion site 5 ML's of 1% lidocaine under ultrasound guidance.  Large-bore needle was used to cannulate the vessel with return of dark blood.  The guidewire was inserted into the needle using the Seldinger technique. Ultrasound was used to confirm the wire was in the vein. A triple-lumen Shiley catheter was placed into the vessel.  All 3 ports freely flush and draw.  The catheter was securely fastened the skin with suture.  Next a sterile dressing was placed and a x-ray was not indicated.    The patient tolerated the procedure well    Complications: none    Electronically signed by Yinka Lloyd MD, 01/02/24, 2:11 PM EST.

## 2024-01-02 NOTE — PROGRESS NOTES
[facility]     Progress Note    Patient Name: Danny Savage  : 1960  MRN: [id number]  Primary Care Physician:  Kodi Pichardo MD  Date of admission: 2023    Subjective   Subjective     Chief Complaint: Ruptured AAA    HPI:  Patient getting dialysis and feeding tube placed      Objective   Objective     Vitals:   Temp:  [96.6 °F (35.9 °C)-97.9 °F (36.6 °C)] 96.6 °F (35.9 °C)  Heart Rate:  [] 93  Resp:  [11-26] 12  BP: ()/(52-82) 79/57  Flow (L/min):  [2] 2    Physical Exam  Constitutional:       Appearance: Normal appearance.   Cardiovascular:      Rate and Rhythm: Normal rate.   Pulmonary:      Effort: Pulmonary effort is normal.   Abdominal:      Palpations: Abdomen is soft.         Result Review    Result Review:  I have personally reviewed the results from the time of this admission to 2024 13:17 EST and agree with these findings:  []  Laboratory  []  Microbiology  []  Radiology  []  EKG/Telemetry   []  Cardiology/Vascular   []  Pathology  []  Old records  []  Other:      Assessment & Plan   Assessment / Plan     Brief Patient Summary:  Danny Savage is a 63 y.o. male who status post left colectomy after open repair of AAA    Active Hospital Problems:  Active Hospital Problems    Diagnosis     **Left flank pain     Ruptured infrarenal abdominal aortic aneurysm (AAA)      Plan:  No new surgical issues  Continue current care  Will continue to follow intermittently       DVT prophylaxis:  Medical and mechanical DVT prophylaxis orders are present.    CODE STATUS:   Level Of Support Discussed With: Patient  Code Status (Patient has no pulse and is not breathing): CPR (Attempt to Resuscitate)  Medical Interventions (Patient has pulse or is breathing): Full Support    Disposition:  I expect patient to be discharged unsure.    Electronically signed by Hernan Mallory MD, 24, 1:17 PM EST.

## 2024-01-02 NOTE — PLAN OF CARE
Goal Outcome Evaluation:   Patient remains on levophed and vaso this shift. Started on bicarb drip. Started CRRT, had to get a new femoral dialysis catheter placed. Patient handled procedure well. 2 units of PRBC given. Patient resting at this time, pain is under control. AB, RN.

## 2024-01-02 NOTE — CONSULTS
"Nutrition Services    Patient Name: Danny Savage  YOB: 1960  MRN: [id number]  Admission date: 12/23/2023      CLINICAL NUTRITION ASSESSMENT      Reason for Assessment  Physician Consult and Tube Feeding Assessment     H&P:  Past Medical History:   Diagnosis Date    Allergy     Aortic aneurysm     4.7 just found has not f/u    H/O Kidney stone     Hiatal hernia     Kidney mass     left    Renal cancer         Current Problems:   Active Hospital Problems    Diagnosis     **Left flank pain     Ruptured infrarenal abdominal aortic aneurysm (AAA)         Nutrition/Diet History         Narrative   Patient admitted with left flank pain, ruptured AAA.  S/P open AAA repair.  Patient went back to OR 12/26/2023. Now S/P left open colectomy with ostomy creation.  Pt extubated 12/28/2023.       Patient is consuming 0-25% of meals.  Cortrak has been ordered and enteral nutrition to start today.      Weight trending up, patient is +8L fluid balance for admission.  K+ elevated.  MD notes plan for HD today.       Anthropometrics        Current Height, Weight Height: 177.8 cm (70\")  Weight: 96.6 kg (212 lb 14.4 oz)   Current BMI Body mass index is 30.55 kg/m².   BMI Classification Obese Class I   % %   Adjusted Body Weight (ABW)    Weight Hx  Wt Readings from Last 30 Encounters:   01/01/24 1600 96.6 kg (212 lb 14.4 oz)   12/24/23 0027 80.5 kg (177 lb 7.5 oz)   12/23/23 1948 79.9 kg (176 lb 2.4 oz)   12/22/23 0706 78.8 kg (173 lb 11.6 oz)   12/06/23 0958 79.9 kg (176 lb 3.2 oz)   11/22/23 1338 78.2 kg (172 lb 6.4 oz)   11/13/23 0628 78.2 kg (172 lb 6.4 oz)   10/30/23 1440 79.9 kg (176 lb 2.4 oz)   08/17/23 1952 78.8 kg (173 lb 11.6 oz)   08/17/23 1537 80.3 kg (177 lb 0.5 oz)   08/15/23 0554 81.2 kg (179 lb 0.2 oz)          Wt Change Observation Stable x 4 months   Trending up, consistent with positive fluid balance.     Estimated/Assessed Needs  Estimated Needs based on: Current Body Weight       Energy " "Requirements 25 kcal/kg    EST Needs (kcal/day)  2415 kcal        Protein Requirements 1.2-1.3 g/kg   EST Daily Needs (g/day) 116-126 g       Fluid Requirements 20-25 ml/kg    Estimated Needs (mL/day) 6027-8701 ml     Labs/Medications         Pertinent Labs Reviewed.   Results from last 7 days   Lab Units 01/02/24  0406 01/01/24  0528 12/31/23  0524   SODIUM mmol/L 134* 132* 132*   POTASSIUM mmol/L 6.2* 4.7 4.2   CHLORIDE mmol/L 95* 95* 96*   CO2 mmol/L 12.9* 19.3* 19.7*   BUN mg/dL 75* 56* 33*   CREATININE mg/dL 5.77* 4.89* 3.46*   CALCIUM mg/dL 7.3* 7.3* 7.8*   BILIRUBIN mg/dL 6.5* 4.1* 3.5*   ALK PHOS U/L 122* 112 124*   ALT (SGPT) U/L 100* 36 44*   AST (SGOT) U/L 248* 50* 58*   GLUCOSE mg/dL 55* 101* 92     Results from last 7 days   Lab Units 01/02/24  0453 01/02/24  0406 01/01/24  0528 12/31/23  1544 12/31/23  0524   MAGNESIUM mg/dL  --  2.8* 2.5*  --  2.3   PHOSPHORUS mg/dL  --  9.3* 5.8*  --  5.5*   HEMOGLOBIN g/dL 7.1*  --  7.9*   < > 7.7*   HEMATOCRIT % 21.8*  --  23.8*   < > 23.2*    < > = values in this interval not displayed.     COVID19   Date Value Ref Range Status   12/23/2023 Not Detected Not Detected - Ref. Range Final     No results found for: \"HGBA1C\"      Pertinent Medications Reviewed.     Malnutrition Severity Assessment                Nutrition Diagnosis         Nutrition Dx Problem 1 Inadequate energy Intake related to GI dysfunction as evidenced by  Full liquid diet       Nutrition Intervention           Current Nutrition Orders & Evaluation of Intake       Current PO Diet Diet: Regular/House Diet, Renal Diets; Low Potassium, Low Phosphorus; Texture: Regular Texture (IDDSI 7); Fluid Consistency: Thin (IDDSI 0)   Supplement Orders Placed This Encounter      Dietary Nutrition Supplements Ensure Surgery; vanilla      DIET MESSAGE Guest tray, regular diet      Place Feeding Tube Per [employer]           Nutrition Intervention/Prescription:  Provides 660 kcals, 39 grams of protein     "   Novasource Renal @ 55 ml/hr x 22 hours/day  Free water flushes 25 ml every 3 hours   Provides 2420 kcal, 110 g pro, 1059 ml fluid         Medical Nutrition Therapy/Nutrition Education          Learner     Readiness Patient  Education not indicated at this time     Method     Response N/A  N/A     Monitor/Evaluation        Monitor Per protocol, Pertinent labs, Weight, Skin status, GI status, POC/GOC, Diet advancement       Nutrition Discharge Plan         To be determined       Electronically signed by:  Shannon Acharya RD  01/02/24 07:18 EST

## 2024-01-02 NOTE — PROGRESS NOTES
Cortrak team unable to advance tube past 45 cm. Patient to transition from HD to CRRT so Cortrak team planned to come back to advance. Revisited to advance x 2. Both times, patient having bedside procedure performed. Tube will need to be advanced before use. Communicated with IV Therapy, plans for advancement some time this evening.

## 2024-01-02 NOTE — PROCEDURES
Ultrasound-guided for vascular access    Ultrasound was used to identify the left femoral vein. Images saved. Site marked for central line    CPT codes 44052    Electronically signed by Yinka Lloyd MD, 01/02/24, 2:09 PM EST.

## 2024-01-02 NOTE — PROGRESS NOTES
Pulmonary / Critical Care Progress Note      Patient Name: Danny Savage  : 1960  MRN: [id number]  Attending:  Eric Martin MD   Date of admission: 2023    Subjective   Subjective   Patient critically ill with ruptured AAA s/p open repair, hemorrhagic shock, Streptococcus pyogenes bacteremia    Hemoglobin slowly dropping  CT abdomen and pelvis overnight with with enlarging of intra-abdominal hematoma  Is on heparin drip for bilateral DVTs which was stopped today.  Discussed with vascular surgery replacing IVC filter  Is having significant pain and very weak and frail.  Also blood pressure is borderline soft and on pressors  Receiving blood this morning  Having trouble with dialysis catheter and nephrology is converting over to CRRT  Patient's very weak and frail complaining of pain with any movement  Now with worsening acidosis and lactic acid up to 8  On multiple pressors concerning for a worsening hemorrhagic shock  CRRT apparently not working and dialysis catheter not working    Objective   Objective     Vitals:   Vital signs for last 24 hours:  Temp:  [96.6 °F (35.9 °C)-97.9 °F (36.6 °C)] 96.6 °F (35.9 °C)  Heart Rate:  [] 93  Resp:  [11-26] 12  BP: ()/(52-82) 79/57    Intake/Output last 3 shifts:  I/O last 3 completed shifts:  In: 1015 [P.O.:320; I.V.:695]  Out: 10 [Urine:10]    Vent settings for last 24 hours:       Physical Exam   Vital Signs Reviewed  critically ill-appearing  General: Weak, deconditioned, alert  Chest:  good aeration, unchanged crackles in bases bilaterally, no work of breathing noted at rest  CV: RRR, no MGR, pulses 2+, equal.  Abd: Postop dressing in place with wound VAC; colostomy with stool output noted, bruising noted throughout the groins which is worsening, does have painful groin sites  Neuro:  A&Ox3, CN grossly intact, no focal deficits.        Result Review    I have personally reviewed the results from the time of this admission to  1/2/2024 12:35 EST and agree with these findings:  [x]  Laboratory  [x]  Microbiology  [x]  Radiology  [x]  EKG/Telemetry   [x]  Cardiology/Vascular   []  Pathology  []  Old records  []  Other:  Most notable findings include:       Lab 01/02/24  0453 01/02/24  0406 01/01/24  0528 12/31/23  1544 12/31/23  0524 12/30/23  1723 12/30/23  0537 12/29/23  0540 12/28/23  1842 12/28/23  0911 12/28/23  0400 12/27/23  0644 12/27/23  0551   WBC 43.46*  --  28.43*  --  26.82*  --  25.62* 29.90*  --   --  24.41*  --  17.50*   HEMOGLOBIN 7.1*  --  7.9* 9.6* 7.7* 9.2* 6.6* 7.0*  --   --  7.9*  --  8.3*   HEMATOCRIT 21.8*  --  23.8* 29.2* 23.2* 27.1* 20.0* 21.2*  --   --  23.0*  --  24.3*   PLATELETS 423  --  286  --  193  --  126* 96*  --   --  76*  --  70*   SODIUM  --  134* 132*  --  132* 134* 133* 138 136  --  139  --  142   SODIUM, ARTERIAL  --   --   --   --   --   --   --   --   --    < >  --    < >  --    POTASSIUM  --  6.2* 4.7  --  4.2 3.7 5.2 4.2 3.9  --  4.3  --  4.5   CHLORIDE  --  95* 95*  --  96* 98 99 103 100  --  101  --  103   CO2  --  12.9* 19.3*  --  19.7* 22.8 21.7* 24.0 22.9  --  22.3  --  24.3   BUN  --  75* 56*  --  33* 15 28* 35* 25*  --  34*  --  48*   CREATININE  --  5.77* 4.89*  --  3.46* 2.32* 3.47* 3.65* 2.79*  --  3.27*  --  4.60*   GLUCOSE  --  55* 101*  --  92 74 66 75 109*  --  113*  --  99   GLUCOSE, ARTERIAL  --   --   --   --   --   --   --   --   --    < >  --    < >  --    CALCIUM  --  7.3* 7.3*  --  7.8* 8.1* 7.5* 7.2* 7.5*  --  7.3*  --  7.2*   PHOSPHORUS  --  9.3* 5.8*  --  5.5*  --  2.6 2.7 2.4*  --  3.7  --  5.5*   TOTAL PROTEIN  --  6.4 6.1  --  6.0 6.3 5.6* 5.0*  --   --  5.0*  --  4.4*   ALBUMIN  --  2.5* 2.2*  --  2.3* 2.4* 2.2* 2.3* 2.4*  --  2.3*  --  2.2*   GLOBULIN  --  3.9 3.9  --  3.7 3.9 3.4 2.7  --   --  2.7  --  2.2    < > = values in this interval not displayed.     Procalcitonin 11    Assessment & Plan   Assessment / Plan     Active Hospital Problems:  Active Hospital  Problems    Diagnosis     **Left flank pain     Ruptured infrarenal abdominal aortic aneurysm (AAA)      Impression:  Ruptured infrarenal AAA s/p open repair  Acute blood loss anemia  Enlarging intra-abdominal hematoma  Hemorrhagic shock  Hypovolemic shock  Acute hypoxic respiratory failure requiring mechanical ventilation  Septic shock with Streptococcus pyogenes bacteremia  Moraxella pneumonia  Ischemic sigmoid colon s/p colostomy  History of RCC s/p partial nephrectomy on 11/13  Acute kidney injury secondary to acute tubular necrosis requiring renal replacement therapy  Clinically significant lactic acidosis  Chronically immunosuppressed on CellCept  Hypocalcemia  Hypokalemia resolved now with hyperkalemia  SVT  Lactic acidosis, clinically significant    Plan:  Patient with worsening acidosis, shock, hyperkalemia likely due to worsening intra-abdominal hematoma  Transfused 2 units of packed red blood cells.  Awaiting for blood to arrive  Give 1 amp of bicarb and start bicarb drip.  DC oral bicarb replacement.  Give 50 g albumin and 1 L of lactated Ringer's  Switching over to CRRT once I placed a fresh dialysis catheter.  Would discontinue bicarb drip after bridging on CRRT for 4 hours  Trend lactic acid  I did review the CT of the abdomen and pelvis last night and discussed it with radiology.  Concerning for worsening intra-abdominal bleeding.  I will hold heparin drip for now.  Given direct contraindication to heparin with acute DVTs, he will need an IVC filter.  Discussed with vascular surgery today to place and appreciate their assistance  Blood pressures are soft and continue norepinephrine to keep mean arterial pressure greater than 65.  I think transfusing blood will also help with this.  Until blood pressures improve transition patient to CRRT.  Appreciate n nephrology input  Wean off norepinephrine and vasopressin to keep mean arterial pressure greater than 65  Place Cortrak and start tube feeds per  dietitian recommendations  Start midodrine 5 mg 3 times daily tomorrow  Will use fentanyl as needed for pain.  Patient reports increased somnolence with Dilaudid and would like to avoid morphine while on dialysis  Continue Unasyn to complete 14 days of therapy for bacteremia  Continue Seroquel nightly to see if this helps with delirium.  Also has Haldol as needed  Continue D5 infusion until we place Cortrak to avoid hypoglycemia  Hold home chronic immunosuppression CellCept indefinitely  Will ultimately need LTAC once patient is stabilized    Discussed with Mora Kent and Mario      DVT prophylaxis:  Medical and mechanical DVT prophylaxis orders are present.    CODE STATUS:   Level Of Support Discussed With: Patient  Code Status (Patient has no pulse and is not breathing): CPR (Attempt to Resuscitate)  Medical Interventions (Patient has pulse or is breathing): Full Support      Patient is critically ill with ruptured AAA, DVTs, hemorrhagic shock, requiring blood transfusion, intra-abdominal hematoma, acute kidney injury requiring renal replacement therapy. I have personally reviewed all pertinent labs, imaging, microbiology and documentation. I have discussed care with the primary service as well as at multidisciplinary critical care rounds with the bedside nurse, respiratory therapist, pharmacist and all other ancillary services. 107 minutes of critical care time was spent managing this patient, excluding procedures.     Electronically signed by Yinka Lloyd MD, 01/02/24, 2:31 PM EST.

## 2024-01-02 NOTE — PROGRESS NOTES
[facility]   Hospitalist Progress Note  Date: 2024  Patient Name: Danny Savage  : 1960  MRN: [id number]  Date of admission: 2023  Room/Bed: [facility]      Subjective   Subjective     Chief Complaint: Back pain    Summary:Danny Savage is a 63 y.o. male Danny Savage is a 63 y.o. male with past medical history of recently diagnosed renal cell carcinoma s/p partial left kidney nephrectomy in 2023 (follows Dr.O Espino as outpatient) and infrarenal aortic aneurysm (scan on  showing AAA of 4.7 cm) who presented with complaints of left lower back pain and fever since past 3 days.  Patient had CT abdomen scan done early December and was evaluated by urologist as outpatient.  Patient presented to ER 1 day prior with left flank pain where CT abdomen was obtained showing mildly decreased fluid collection at partial left nephrectomy bed containing decreasing but persistent small gas bubbles; findings consistent with postoperative seroma/hematoma; questionable infection.  Case was discussed with Dr. Spicer and patient was discharged home.  But patient's pain got worse on the day of presentation and was brought to ED.  Patient's left flank pain was severe, about 10 x 10 in intensity on presentation.  Patient was febrile with temp 102.6.  Lactic acid was normal.  Urinalysis was not significant for UTI.  CT abdomen pelvis with contrast showed stable fluid collection along the left nephrectomy bed, likely postoperative seroma/hematoma although infection cannot be ruled out. Abdominal aortic aneurysm of 5.1 cm.  Patient was started on IV antibiotics and was admitted as a case of possible abscess in the left nephrectomy bed.  Urology was consulted.     Hospital course complicated by sudden unresponsive on 2023.  Vital signs showed blood pressure 42/32.Patient was pale and clammy with agonal breathing.  No pulse was felt which seemed like PEA arrest after which CODE BLUE was  called.  Patient was getting IV fluid bolus.  Before starting CPR, patient had feeble pulse after which CPR was held.  Patient was then intubated at bedside. He was then transferred to critical care unit.  Given patient has expanding AAA  with eccentric mural thrombus/hematoma measuring up to 5.1 centimeter maximally on CT abdomen of 12/22; there is also concern for aortic rupture. 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 Vascular Surgeon was consulted immediately and patient underwent urgent Open repair of ruptured infrarenal abdominal aortic aneurysm with a tube graft and mobilization of the omentum with coverage of the graft using pedicled omental flap.  Patient taken back to the OR on 12/26/2023 to look for sources of blood loss from prior surgeries.  Graft was in good position, no obvious leak was identified, patient was found to have nonviable colon and patient underwent partial colectomy by general surgery.    Patient's had prolonged course with continued anemia, repeat scans concerning for worsening hematoma, patient was found to have DVT and has been on heparin for this.  Vascular surgery made aware as we are requesting filter placement.  Patient continues to require dialysis, patient very weak and debilitated from hospitalization and surgeries patient may require LTAC placement    Interval Followup: Patient uncomfortable this a.m., currently undergoing dialysis, plan to transfuse blood today for hypotension and low blood counts, CT scan yesterday with enlarging hematoma, vascular surgery aware and will evaluate patient for possible filter placement so heparin can be discontinued      Objective   Objective     Vitals:   Temp:  [96.6 °F (35.9 °C)-97.9 °F (36.6 °C)] 96.6 °F (35.9 °C)  Heart Rate:  [] 93  Resp:  [11-26] 12  BP: ()/(52-82) 79/57  Flow (L/min):  [2] 2    Physical Exam   GEN: No acute distress  HEENT: Moist mucous  membranes  LUNGS: Equal chest rise bilaterally  CARDIAC: Regular rate and rhythm  NEURO: Moving all 4 extremities spontaneously  SKIN: No obvious breakdown      Result Review    Result Review:  I have personally reviewed these results:  [x]  Laboratory      Lab 01/02/24  0951 01/02/24  0453 01/02/24  0406 01/01/24  0528 12/31/23  1544 12/31/23  0830 12/31/23  0524 12/30/23  1723 12/30/23  0537 12/29/23  1139 12/29/23  0540 12/28/23  2220 12/27/23  0644 12/27/23  0621 12/27/23  0551 12/26/23  1507   WBC  --  43.46*  --  28.43*  --   --  26.82*  --  25.62*  --  29.90*  --    < >  --    < > 12.18*   HEMOGLOBIN  --  7.1*  --  7.9* 9.6*  --  7.7*   < > 6.6*  --  7.0*  --    < >  --    < > 8.6*   HEMATOCRIT  --  21.8*  --  23.8* 29.2*  --  23.2*   < > 20.0*  --  21.2*  --    < >  --    < > 24.8*   PLATELETS  --  423  --  286  --   --  193  --  126*  --  96*  --    < >  --    < > 72*   NEUTROS ABS  --   --   --  21.46*  --   --  21.16*  --  23.31*  --  26.24*  --    < >  --   --   --    IMMATURE GRANS (ABS)  --   --   --  1.70*  --   --  1.24*  --   --   --  0.98*  --    < >  --   --   --    LYMPHS ABS  --   --   --  2.13  --   --  1.65  --   --   --  1.22  --    < >  --   --   --    MONOS ABS  --   --   --  2.82*  --   --  2.56*  --   --   --  1.23*  --    < >  --   --   --    EOS ABS  --   --   --  0.16  --   --  0.08  --  0.26  --  0.06  --    < >  --   --   --    MCV  --  90.5  --  86.2  --   --  89.9  --  89.7  --  89.1  --    < >  --    < > 86.1   PROCALCITONIN  --   --  11.77* 13.44*  --   --   --   --   --   --  11.16*  --   --   --   --   --    LACTATE 7.8*  --   --  2.0  --  2.4*  --   --   --   --   --   --   --   --   --  1.3   LACTATE, ARTERIAL  --   --   --   --   --   --   --   --   --   --   --   --    < >  --   --   --    PROTIME  --   --   --   --   --   --   --   --   --   --   --  13.7  --  14.1  --  14.9   APTT  --  43.3*  --  89.9  --   --  100.9*  --  85.9   < > 81.8 33.6*   < >  --   --   --     < >  = values in this interval not displayed.         Lab 01/02/24  0406 01/01/24  0528 12/31/23  0524 12/28/23  1842 12/28/23  0911 12/28/23  0400 12/27/23  1017 12/27/23  0644   SODIUM 134* 132* 132*   < >  --    < >  --   --    SODIUM, ARTERIAL  --   --   --   --  135.2*  --  138.7 138.6   POTASSIUM 6.2* 4.7 4.2   < >  --    < >  --   --    CHLORIDE 95* 95* 96*   < >  --    < >  --   --    CO2 12.9* 19.3* 19.7*   < >  --    < >  --   --    ANION GAP 26.1* 17.7* 16.3*   < >  --    < >  --   --    BUN 75* 56* 33*   < >  --    < >  --   --    CREATININE 5.77* 4.89* 3.46*   < >  --    < >  --   --    EGFR 10.2* 12.4* 18.8*   < >  --    < >  --   --    GLUCOSE 55* 101* 92   < >  --    < >  --   --    GLUCOSE, ARTERIAL  --   --   --   --  110*  --  99 86   CALCIUM 7.3* 7.3* 7.8*   < >  --    < >  --   --    IONIZED CALCIUM  --   --   --   --  0.94*  --  0.94* 0.84*   MAGNESIUM 2.8* 2.5* 2.3   < >  --    < >  --   --    PHOSPHORUS 9.3* 5.8* 5.5*   < >  --    < >  --   --     < > = values in this interval not displayed.         Lab 01/02/24  0406 01/01/24  0528 12/31/23  0524   TOTAL PROTEIN 6.4 6.1 6.0   ALBUMIN 2.5* 2.2* 2.3*   GLOBULIN 3.9 3.9 3.7   ALT (SGPT) 100* 36 44*   AST (SGOT) 248* 50* 58*   BILIRUBIN 6.5* 4.1* 3.5*   ALK PHOS 122* 112 124*   LIPASE 23  --  14         Lab 12/30/23  0537 12/28/23  2220 12/27/23  0621 12/26/23  1507   PROBNP 30,309.0*  --   --   --    PROTIME  --  13.7 14.1 14.9   INR  --  1.03 1.07 1.15             Lab 01/02/24  0951 12/30/23  0645 12/30/23  0645 12/30/23  0537   IRON  --   --   --  21*   IRON SATURATION (TSAT)  --   --   --  13*   TIBC  --   --   --  164*   TRANSFERRIN  --   --   --  110*   ABO TYPING A   < > A  --    RH TYPING Negative   < > Negative  --    ANTIBODY SCREEN Negative  --  Negative  --     < > = values in this interval not displayed.         Lab 12/28/23  0911 12/27/23  1017 12/27/23  0644   PH, ARTERIAL 7.466* 7.189* 7.422   PCO2, ARTERIAL 33.5* 70.2* 35.7   PO2 ART  71.3* 65.0* 86.8   O2 SATURATION ART 93.2* 86.4* 95.0   FIO2 35 45 45   HCO3 ART 23.6 26.2* 22.7   BASE EXCESS ART 0.1 -2.8* -1.4   CARBOXYHEMOGLOBIN 0.3 0.3 0.2     Brief Urine Lab Results  (Last result in the past 365 days)        Color   Clarity   Blood   Leuk Est   Nitrite   Protein   CREAT   Urine HCG        12/23/23 2054 Yellow   Clear   Small (1+)   Negative   Negative   100 mg/dL (2+)                 [x]  Microbiology   Microbiology Results (last 10 days)       Procedure Component Value - Date/Time    Blood Culture - Blood, Arm, Left [[id number]]  (Normal) Collected: 12/29/23 1139    Lab Status: Preliminary result Specimen: Blood from Arm, Left Updated: 01/01/24 1215     Blood Culture No growth at 3 days    Blood Culture - Blood, Chest, Left [[id number]]  (Normal) Collected: 12/29/23 1002    Lab Status: Preliminary result Specimen: Blood from Chest, Left Updated: 01/02/24 1030     Blood Culture No growth at 4 days    Respiratory Culture - Sputum, ET Suction [[id number]]  (Abnormal) Collected: 12/24/23 1638    Lab Status: Final result Specimen: Sputum from ET Suction Updated: 12/27/23 1040     Respiratory Culture Light growth (2+) Moraxella atlantae     Gram Stain Few (2+) Gram negative bacilli      Rare (1+) WBCs seen    Blood Culture - Blood, Arm, Left [[id number]]  (Normal) Collected: 12/24/23 1459    Lab Status: Final result Specimen: Blood from Arm, Left Updated: 12/29/23 1531     Blood Culture No growth at 5 days    Blood Culture - Blood, Arm, Right [[id number]]  (Normal) Collected: 12/23/23 2205    Lab Status: Final result Specimen: Blood from Arm, Right Updated: 12/28/23 2215     Blood Culture No growth at 5 days    Blood Culture - Blood, Arm, Left [[id number]]  (Normal) Collected: 12/23/23 2205    Lab Status: Final result Specimen: Blood from Arm, Left Updated: 12/28/23 2215     Blood Culture No growth at 5 days    COVID-19, FLU A/B, RSV PCR 1 HR TAT - Swab, Nasopharynx [[id number]]  (Normal) Collected:  12/23/23 2204    Lab Status: Final result Specimen: Swab from Nasopharynx Updated: 12/23/23 2303     COVID19 Not Detected     Influenza A PCR Not Detected     Influenza B PCR Not Detected     RSV, PCR Not Detected    Narrative:      Fact sheet for providers: https://www.fda.gov/media/412568/download    Fact sheet for patients: https://www.fda.gov/media/859408/download    Test performed by PCR.          [x]  Radiology  CT Abdomen Pelvis Without Contrast    Result Date: 1/1/2024    CT scan of the abdomen and pelvis without contrast demonstrating moderate right and small left effusions, larger in comparison to 9/29/2023..  Compressive atelectasis is again seen at the lung bases.  Ill-defined irregular retroperitoneal mass appears larger in comparison to the previous study concerning for interval hemorrhage.  An ill-defined 9.5 cm component with layering density is consistent with hematoma.  I discussed findings with Dr. Lloyd at 1855.     BENITO PRADHAN MD       Electronically Signed and Approved By: BENITO PRADHAN MD on 1/01/2024 at 18:56             XR Chest 1 View    Result Date: 12/31/2023   Favorable progression is suggested radiographically with decreased atelectasis and/or infiltrate(s) bilaterally.  There may be decreased pleural effusions, as well.      Please note that portions of this note were completed with a voice recognition program.  SHIN NUNEZ JR, MD       Electronically Signed and Approved By: SHIN NUNEZ JR, MD on 12/31/2023 at 6:22              CT Abdomen Pelvis With Contrast    Result Date: 12/29/2023   1. Evidence of interval left hemicolectomy with colostomy placement as well as abdominal aortic aneurysm and aortic caval fistula repair since 12/24/2023.  There is a persistent extensive para-aortic right retroperitoneal hematoma without acute or new hemorrhage identified.  No loculated fluid or fluid and air collections are seen to indicate abscess.  2. Development of multiple splenic and  bilateral renal infarcts new since 12/24/2023. 3. Small bilateral pleural effusions have increased in size since 12/24/2023.  There is also anasarca.    Report was called to [facility] CCU nurse at 1651 hours on 12/29/2023.    COBY DEGROOT DO       Electronically Signed and Approved By: COBY DEGROOT DO on 12/29/2023 at 16:53             XR Chest 1 View    Result Date: 12/29/2023    1. Diffuse bilateral airspace opacities appear unchanged, which could reflect pulmonary edema or pneumonia. 2. Small bilateral pleural effusions.       GELY MONTE MD       Electronically Signed and Approved By: GELY MONTE MD on 12/29/2023 at 6:04             XR Chest 1 View    Result Date: 12/27/2023     Right jugular central venous catheter tip is in the right atrium.  No pneumothorax is seen.  ET tube and NG tube remain in place.  Bilateral airspace disease and pleural effusions are unchanged.       BENITO PRADHAN MD       Electronically Signed and Approved By: BENITO PRADHAN MD on 12/27/2023 at 13:21             XR Chest 1 View    Result Date: 12/27/2023    1. Endotracheal tube in good position. 2. Bilateral airspace opacities, which could reflect pulmonary edema or pneumonia. 3. Moderate bilateral pleural effusions.       GELY MONTE MD       Electronically Signed and Approved By: GELY MONTE MD on 12/27/2023 at 4:42            []  EKG/Telemetry   []  Cardiology/Vascular   []  Pathology  []  Old records  []  Other:    Assessment & Plan   Assessment / Plan     Assessment:  Ruptured infrarenal AAA s/p open repair  Hemorrhagic shock  Hypovolemic shock  Acute hypoxic respiratory failure requiring mechanical ventilation  Septic shock with Streptococcus pyogenes bacteremia  SVT  History of RCC s/p partial nephrectomy on 11/13  Acute kidney injury  Clinically significant lactic acidosis  Chronically immunosuppressed on CellCept  Hypocalcemia  Hypokalemia    Plan:  Patient currently being managed in critical care unit;  appreciate intensivist input.  Patient had spontaneous rupture of AAA s/p Open repair of ruptured infrarenal abdominal aortic aneurysm with a tube graft and mobilization of the omentum with coverage of the graft using pedicled omental flap on 12/24.  Vascular surgery following the patient; appreciate further input.,  Possible filter as above  Successfully extubated on 12/28/2023, on 2 L nasal cannula currently  Replace electrolytes as needed, hyper kalemia today requiring dialysis  Continue hemodialysis as needed  On norepinephrine and vasopressin, wean as able  Urology consulted and following, appreciate their recommendations  Continue Unasyn  Repeat blood cultures with no growth at 2 days, procalcitonin elevated on last check  CT scan of the abdomen pelvis personally reviewed, no obvious source of infection  Wean O2 as able  Continue Lexapro for depression  Continue trazodone for sleep  Discussed management plan with pulmonology     Discussed with RN.    DVT prophylaxis:  Medical and mechanical DVT prophylaxis orders are present.    CODE STATUS:   Level Of Support Discussed With: Patient  Code Status (Patient has no pulse and is not breathing): CPR (Attempt to Resuscitate)  Medical Interventions (Patient has pulse or is breathing): Full Support    Pt is critically ill in ICU with shock, blood loss anemia.  I have spent 33 minutes of critical care time reviewing previous documentation, reviewing all pertinent telemetry, labs, and imaging studies, examining the patient, modifying the care plan and discussing the patient´s condition and care plan with the consultants, available family and during rounds. This does not include any procedures performed.      Electronically signed by Eric Martin MD, 01/02/24, 11:36 AM EST.

## 2024-01-02 NOTE — PLAN OF CARE
Goal Outcome Evaluation:      Goals of care discussed with: Patient and wife    Outcome: Pt is A&Ox4 w/ intermittent confusion throughout night. Pt also had morphine during the night. NSR in the 90's, Pt on 2L NC. Pain control throughout the night. This am pt BS was 18. Gave 3 Amps of D50 and 1 oral gel to finally get sugar up this am. Potassium this am was 6.2. Srinivas Velarde was notified and dialysis moved to stat. He was also notified of pt being on LEVO @ 0.16 and pressures being soft throughout the night. Keep Dr. Pichardo updated on pt status today during HD, may move to CRRT Per Srinivas Velarde.

## 2024-01-03 LAB
ALBUMIN SERPL-MCNC: 3 G/DL (ref 3.5–5.2)
ALBUMIN SERPL-MCNC: 3.1 G/DL (ref 3.5–5.2)
ALBUMIN/GLOB SERPL: 0.9 G/DL
ALBUMIN/GLOB SERPL: 0.9 G/DL
ALP SERPL-CCNC: 107 U/L (ref 39–117)
ALP SERPL-CCNC: 112 U/L (ref 39–117)
ALT SERPL W P-5'-P-CCNC: 220 U/L (ref 1–41)
ALT SERPL W P-5'-P-CCNC: 228 U/L (ref 1–41)
AMMONIA BLD-SCNC: 22 UMOL/L (ref 16–60)
ANION GAP SERPL CALCULATED.3IONS-SCNC: 14.7 MMOL/L (ref 5–15)
ANION GAP SERPL CALCULATED.3IONS-SCNC: 15.9 MMOL/L (ref 5–15)
ANION GAP SERPL CALCULATED.3IONS-SCNC: 16.5 MMOL/L (ref 5–15)
ANION GAP SERPL CALCULATED.3IONS-SCNC: 17.8 MMOL/L (ref 5–15)
APTT PPP: 38.1 SECONDS (ref 24.2–34.2)
AST SERPL-CCNC: 600 U/L (ref 1–40)
AST SERPL-CCNC: 742 U/L (ref 1–40)
BACTERIA SPEC AEROBE CULT: NORMAL
BACTERIA SPEC AEROBE CULT: NORMAL
BILIRUB SERPL-MCNC: 8.3 MG/DL (ref 0–1.2)
BILIRUB SERPL-MCNC: 8.4 MG/DL (ref 0–1.2)
BUN SERPL-MCNC: 57 MG/DL (ref 8–23)
BUN SERPL-MCNC: 58 MG/DL (ref 8–23)
BUN SERPL-MCNC: 61 MG/DL (ref 8–23)
BUN SERPL-MCNC: 67 MG/DL (ref 8–23)
BUN/CREAT SERPL: 12.9 (ref 7–25)
BUN/CREAT SERPL: 14.4 (ref 7–25)
BUN/CREAT SERPL: 15 (ref 7–25)
BUN/CREAT SERPL: 15.2 (ref 7–25)
CA-I BLDA-SCNC: 0.85 MMOL/L (ref 1.13–1.32)
CA-I BLDA-SCNC: 0.86 MMOL/L (ref 1.13–1.32)
CA-I BLDA-SCNC: 0.95 MMOL/L (ref 1.13–1.32)
CA-I BLDA-SCNC: 0.96 MMOL/L (ref 1.13–1.32)
CALCIUM SPEC-SCNC: 6.7 MG/DL (ref 8.6–10.5)
CALCIUM SPEC-SCNC: 6.8 MG/DL (ref 8.6–10.5)
CALCIUM SPEC-SCNC: 7.4 MG/DL (ref 8.6–10.5)
CALCIUM SPEC-SCNC: 7.4 MG/DL (ref 8.6–10.5)
CHLORIDE SERPL-SCNC: 98 MMOL/L (ref 98–107)
CHLORIDE SERPL-SCNC: 98 MMOL/L (ref 98–107)
CHLORIDE SERPL-SCNC: 99 MMOL/L (ref 98–107)
CHLORIDE SERPL-SCNC: 99 MMOL/L (ref 98–107)
CO2 SERPL-SCNC: 18.2 MMOL/L (ref 22–29)
CO2 SERPL-SCNC: 19.1 MMOL/L (ref 22–29)
CO2 SERPL-SCNC: 19.3 MMOL/L (ref 22–29)
CO2 SERPL-SCNC: 19.5 MMOL/L (ref 22–29)
CREAT SERPL-MCNC: 3.74 MG/DL (ref 0.76–1.27)
CREAT SERPL-MCNC: 4.06 MG/DL (ref 0.76–1.27)
CREAT SERPL-MCNC: 4.49 MG/DL (ref 0.76–1.27)
CREAT SERPL-MCNC: 4.65 MG/DL (ref 0.76–1.27)
D-LACTATE SERPL-SCNC: 2.5 MMOL/L (ref 0.5–2)
DEPRECATED RDW RBC AUTO: 49.7 FL (ref 37–54)
EGFRCR SERPLBLD CKD-EPI 2021: 13.2 ML/MIN/1.73
EGFRCR SERPLBLD CKD-EPI 2021: 13.8 ML/MIN/1.73
EGFRCR SERPLBLD CKD-EPI 2021: 15.5 ML/MIN/1.73
EGFRCR SERPLBLD CKD-EPI 2021: 17.2 ML/MIN/1.73
ERYTHROCYTE [DISTWIDTH] IN BLOOD BY AUTOMATED COUNT: 16 % (ref 12.3–15.4)
FIBRINOGEN PPP-MCNC: 547 MG/DL (ref 215–521)
GLOBULIN UR ELPH-MCNC: 3.2 GM/DL
GLOBULIN UR ELPH-MCNC: 3.2 GM/DL
GLUCOSE BLDC GLUCOMTR-MCNC: 132 MG/DL (ref 70–99)
GLUCOSE BLDC GLUCOMTR-MCNC: 146 MG/DL (ref 70–99)
GLUCOSE BLDC GLUCOMTR-MCNC: 150 MG/DL (ref 70–99)
GLUCOSE BLDC GLUCOMTR-MCNC: 174 MG/DL (ref 70–99)
GLUCOSE BLDC GLUCOMTR-MCNC: 193 MG/DL (ref 70–99)
GLUCOSE BLDC GLUCOMTR-MCNC: 222 MG/DL (ref 70–99)
GLUCOSE BLDC GLUCOMTR-MCNC: 243 MG/DL (ref 70–99)
GLUCOSE BLDC GLUCOMTR-MCNC: 263 MG/DL (ref 70–99)
GLUCOSE SERPL-MCNC: 200 MG/DL (ref 65–99)
GLUCOSE SERPL-MCNC: 224 MG/DL (ref 65–99)
GLUCOSE SERPL-MCNC: 243 MG/DL (ref 65–99)
GLUCOSE SERPL-MCNC: 258 MG/DL (ref 65–99)
HCT VFR BLD AUTO: 21.3 % (ref 37.5–51)
HCT VFR BLD AUTO: 22.4 % (ref 37.5–51)
HGB BLD-MCNC: 7 G/DL (ref 13–17.7)
HGB BLD-MCNC: 7.4 G/DL (ref 13–17.7)
INR PPP: 1.54 (ref 0.86–1.15)
INR PPP: 1.96 (ref 0.86–1.15)
LARGE PLATELETS: ABNORMAL
LYMPHOCYTES # BLD MANUAL: 1.47 10*3/MM3 (ref 0.7–3.1)
LYMPHOCYTES NFR BLD MANUAL: 4 % (ref 5–12)
MACROCYTES BLD QL SMEAR: ABNORMAL
MAGNESIUM SERPL-MCNC: 2.4 MG/DL (ref 1.6–2.4)
MAGNESIUM SERPL-MCNC: 2.4 MG/DL (ref 1.6–2.4)
MAGNESIUM SERPL-MCNC: 2.5 MG/DL (ref 1.6–2.4)
MAGNESIUM SERPL-MCNC: 2.5 MG/DL (ref 1.6–2.4)
MCH RBC QN AUTO: 29.1 PG (ref 26.6–33)
MCHC RBC AUTO-ENTMCNC: 33 G/DL (ref 31.5–35.7)
MCV RBC AUTO: 88.2 FL (ref 79–97)
MONOCYTES # BLD: 1.17 10*3/MM3 (ref 0.1–0.9)
NEUTROPHILS # BLD AUTO: 26.72 10*3/MM3 (ref 1.7–7)
NEUTROPHILS NFR BLD MANUAL: 91 % (ref 42.7–76)
NRBC SPEC MANUAL: 4 /100 WBC (ref 0–0.2)
OVALOCYTES BLD QL SMEAR: ABNORMAL
PHOSPHATE SERPL-MCNC: 4.1 MG/DL (ref 2.5–4.5)
PHOSPHATE SERPL-MCNC: 4.3 MG/DL (ref 2.5–4.5)
PHOSPHATE SERPL-MCNC: 5.1 MG/DL (ref 2.5–4.5)
PHOSPHATE SERPL-MCNC: 5.5 MG/DL (ref 2.5–4.5)
PLATELET # BLD AUTO: 344 10*3/MM3 (ref 140–450)
PMV BLD AUTO: 11.3 FL (ref 6–12)
POLYCHROMASIA BLD QL SMEAR: ABNORMAL
POTASSIUM SERPL-SCNC: 4.5 MMOL/L (ref 3.5–5.2)
POTASSIUM SERPL-SCNC: 4.5 MMOL/L (ref 3.5–5.2)
POTASSIUM SERPL-SCNC: 4.8 MMOL/L (ref 3.5–5.2)
POTASSIUM SERPL-SCNC: 4.9 MMOL/L (ref 3.5–5.2)
PROT SERPL-MCNC: 6.2 G/DL (ref 6–8.5)
PROT SERPL-MCNC: 6.2 G/DL (ref 6–8.5)
PROTHROMBIN TIME: 18.8 SECONDS (ref 11.8–14.9)
PROTHROMBIN TIME: 22.6 SECONDS (ref 11.8–14.9)
RBC # BLD AUTO: 2.54 10*6/MM3 (ref 4.14–5.8)
SODIUM SERPL-SCNC: 133 MMOL/L (ref 136–145)
SODIUM SERPL-SCNC: 134 MMOL/L (ref 136–145)
TARGETS BLD QL SMEAR: ABNORMAL
VARIANT LYMPHS NFR BLD MANUAL: 5 % (ref 19.6–45.3)
WBC MORPH BLD: NORMAL
WBC NRBC COR # BLD AUTO: 29.36 10*3/MM3 (ref 3.4–10.8)

## 2024-01-03 PROCEDURE — 85384 FIBRINOGEN ACTIVITY: CPT | Performed by: NURSE PRACTITIONER

## 2024-01-03 PROCEDURE — 85610 PROTHROMBIN TIME: CPT | Performed by: SURGERY

## 2024-01-03 PROCEDURE — 85007 BL SMEAR W/DIFF WBC COUNT: CPT | Performed by: INTERNAL MEDICINE

## 2024-01-03 PROCEDURE — 99024 POSTOP FOLLOW-UP VISIT: CPT | Performed by: SURGERY

## 2024-01-03 PROCEDURE — 82330 ASSAY OF CALCIUM: CPT | Performed by: INTERNAL MEDICINE

## 2024-01-03 PROCEDURE — 82948 REAGENT STRIP/BLOOD GLUCOSE: CPT

## 2024-01-03 PROCEDURE — 0 DEXTROSE 5 % SOLUTION: Performed by: NURSE PRACTITIONER

## 2024-01-03 PROCEDURE — 99291 CRITICAL CARE FIRST HOUR: CPT | Performed by: INTERNAL MEDICINE

## 2024-01-03 PROCEDURE — 85730 THROMBOPLASTIN TIME PARTIAL: CPT | Performed by: SURGERY

## 2024-01-03 PROCEDURE — 85018 HEMOGLOBIN: CPT | Performed by: NURSE PRACTITIONER

## 2024-01-03 PROCEDURE — 25010000002 HALOPERIDOL LACTATE PER 5 MG: Performed by: PHYSICIAN ASSISTANT

## 2024-01-03 PROCEDURE — P9059 PLASMA, FRZ BETWEEN 8-24HOUR: HCPCS

## 2024-01-03 PROCEDURE — 25810000003 SODIUM CHLORIDE 0.9 % SOLUTION: Performed by: PHYSICIAN ASSISTANT

## 2024-01-03 PROCEDURE — 25010000002 ONDANSETRON PER 1 MG: Performed by: SURGERY

## 2024-01-03 PROCEDURE — 25810000003 DEXTROSE-NACL PER 500 ML: Performed by: PHYSICIAN ASSISTANT

## 2024-01-03 PROCEDURE — 25010000002 AMPICILLIN-SULBACTAM PER 1.5 G: Performed by: INTERNAL MEDICINE

## 2024-01-03 PROCEDURE — 83735 ASSAY OF MAGNESIUM: CPT | Performed by: INTERNAL MEDICINE

## 2024-01-03 PROCEDURE — 84100 ASSAY OF PHOSPHORUS: CPT | Performed by: INTERNAL MEDICINE

## 2024-01-03 PROCEDURE — 85025 COMPLETE CBC W/AUTO DIFF WBC: CPT | Performed by: INTERNAL MEDICINE

## 2024-01-03 PROCEDURE — 82140 ASSAY OF AMMONIA: CPT | Performed by: PHYSICIAN ASSISTANT

## 2024-01-03 PROCEDURE — 86927 PLASMA FRESH FROZEN: CPT

## 2024-01-03 PROCEDURE — 85014 HEMATOCRIT: CPT | Performed by: NURSE PRACTITIONER

## 2024-01-03 PROCEDURE — P9016 RBC LEUKOCYTES REDUCED: HCPCS

## 2024-01-03 PROCEDURE — 80053 COMPREHEN METABOLIC PANEL: CPT | Performed by: PHYSICIAN ASSISTANT

## 2024-01-03 PROCEDURE — 86900 BLOOD TYPING SEROLOGIC ABO: CPT

## 2024-01-03 PROCEDURE — 84100 ASSAY OF PHOSPHORUS: CPT | Performed by: PHYSICIAN ASSISTANT

## 2024-01-03 PROCEDURE — 25010000002 CALCIUM GLUCONATE 2-0.675 GM/100ML-% SOLUTION: Performed by: NURSE PRACTITIONER

## 2024-01-03 PROCEDURE — 83605 ASSAY OF LACTIC ACID: CPT | Performed by: PHYSICIAN ASSISTANT

## 2024-01-03 PROCEDURE — 25010000002 FENTANYL CITRATE (PF) 50 MCG/ML SOLUTION: Performed by: INTERNAL MEDICINE

## 2024-01-03 PROCEDURE — 85610 PROTHROMBIN TIME: CPT | Performed by: NURSE PRACTITIONER

## 2024-01-03 PROCEDURE — 25010000002 VASOPRESSIN 0.2 UNIT/ML SOLUTION: Performed by: INTERNAL MEDICINE

## 2024-01-03 PROCEDURE — 86923 COMPATIBILITY TEST ELECTRIC: CPT

## 2024-01-03 RX ORDER — POLYETHYLENE GLYCOL 3350 17 G/17G
17 POWDER, FOR SOLUTION ORAL DAILY PRN
Status: DISCONTINUED | OUTPATIENT
Start: 2024-01-03 | End: 2024-01-12 | Stop reason: HOSPADM

## 2024-01-03 RX ORDER — QUETIAPINE FUMARATE 25 MG/1
50 TABLET, FILM COATED ORAL NIGHTLY
Status: DISCONTINUED | OUTPATIENT
Start: 2024-01-03 | End: 2024-01-03

## 2024-01-03 RX ORDER — AMOXICILLIN 250 MG
2 CAPSULE ORAL 2 TIMES DAILY
Status: DISCONTINUED | OUTPATIENT
Start: 2024-01-03 | End: 2024-01-12 | Stop reason: HOSPADM

## 2024-01-03 RX ORDER — PANTOPRAZOLE SODIUM 40 MG/10ML
40 INJECTION, POWDER, LYOPHILIZED, FOR SOLUTION INTRAVENOUS EVERY 12 HOURS SCHEDULED
Status: DISCONTINUED | OUTPATIENT
Start: 2024-01-03 | End: 2024-01-09

## 2024-01-03 RX ORDER — NICOTINE POLACRILEX 4 MG
15 LOZENGE BUCCAL
Status: DISCONTINUED | OUTPATIENT
Start: 2024-01-03 | End: 2024-01-12 | Stop reason: HOSPADM

## 2024-01-03 RX ORDER — IBUPROFEN 600 MG/1
1 TABLET ORAL
Status: DISCONTINUED | OUTPATIENT
Start: 2024-01-03 | End: 2024-01-12 | Stop reason: HOSPADM

## 2024-01-03 RX ORDER — ACETAMINOPHEN 325 MG/1
650 TABLET ORAL ONCE
Status: COMPLETED | OUTPATIENT
Start: 2024-01-03 | End: 2024-01-03

## 2024-01-03 RX ORDER — QUETIAPINE FUMARATE 25 MG/1
50 TABLET, FILM COATED ORAL NIGHTLY
Status: DISCONTINUED | OUTPATIENT
Start: 2024-01-03 | End: 2024-01-06

## 2024-01-03 RX ORDER — OXYCODONE AND ACETAMINOPHEN 10; 325 MG/1; MG/1
1 TABLET ORAL EVERY 4 HOURS PRN
Status: DISCONTINUED | OUTPATIENT
Start: 2024-01-03 | End: 2024-01-05

## 2024-01-03 RX ORDER — TRAZODONE HYDROCHLORIDE 50 MG/1
50 TABLET ORAL NIGHTLY
Status: DISCONTINUED | OUTPATIENT
Start: 2024-01-03 | End: 2024-01-05

## 2024-01-03 RX ORDER — BISACODYL 10 MG
10 SUPPOSITORY, RECTAL RECTAL DAILY PRN
Status: DISCONTINUED | OUTPATIENT
Start: 2024-01-03 | End: 2024-01-12 | Stop reason: HOSPADM

## 2024-01-03 RX ORDER — CALCIUM CARBONATE 500 MG/1
2 TABLET, CHEWABLE ORAL 3 TIMES DAILY PRN
Status: DISCONTINUED | OUTPATIENT
Start: 2024-01-03 | End: 2024-01-12 | Stop reason: HOSPADM

## 2024-01-03 RX ORDER — OXYCODONE HYDROCHLORIDE AND ACETAMINOPHEN 5; 325 MG/1; MG/1
1 TABLET ORAL EVERY 4 HOURS PRN
Status: DISPENSED | OUTPATIENT
Start: 2024-01-03 | End: 2024-01-05

## 2024-01-03 RX ORDER — DEXTROSE MONOHYDRATE 25 G/50ML
25 INJECTION, SOLUTION INTRAVENOUS
Status: DISCONTINUED | OUTPATIENT
Start: 2024-01-03 | End: 2024-01-12 | Stop reason: HOSPADM

## 2024-01-03 RX ORDER — DEXTROSE MONOHYDRATE 50 MG/ML
50 INJECTION, SOLUTION INTRAVENOUS CONTINUOUS
Status: DISCONTINUED | OUTPATIENT
Start: 2024-01-03 | End: 2024-01-04

## 2024-01-03 RX ORDER — ESCITALOPRAM OXALATE 10 MG/1
5 TABLET ORAL DAILY
Status: DISCONTINUED | OUTPATIENT
Start: 2024-01-04 | End: 2024-01-12 | Stop reason: HOSPADM

## 2024-01-03 RX ORDER — CALCIUM GLUCONATE 20 MG/ML
2000 INJECTION, SOLUTION INTRAVENOUS ONCE
Status: COMPLETED | OUTPATIENT
Start: 2024-01-03 | End: 2024-01-03

## 2024-01-03 RX ORDER — CHOLECALCIFEROL (VITAMIN D3) 125 MCG
10 CAPSULE ORAL NIGHTLY
Status: DISCONTINUED | OUTPATIENT
Start: 2024-01-03 | End: 2024-01-12 | Stop reason: HOSPADM

## 2024-01-03 RX ADMIN — ACETAMINOPHEN 650 MG: 325 TABLET ORAL at 21:18

## 2024-01-03 RX ADMIN — Medication 10 MG: at 20:40

## 2024-01-03 RX ADMIN — ONDANSETRON 4 MG: 2 INJECTION INTRAMUSCULAR; INTRAVENOUS at 17:13

## 2024-01-03 RX ADMIN — Medication 10 ML: at 20:11

## 2024-01-03 RX ADMIN — MIDODRINE HYDROCHLORIDE 5 MG: 5 TABLET ORAL at 07:43

## 2024-01-03 RX ADMIN — FAMOTIDINE 20 MG: 10 INJECTION INTRAVENOUS at 08:04

## 2024-01-03 RX ADMIN — HALOPERIDOL LACTATE 4 MG: 5 INJECTION, SOLUTION INTRAMUSCULAR at 01:40

## 2024-01-03 RX ADMIN — VASOPRESSIN 0.04 UNITS/MIN: 0.2 INJECTION INTRAVENOUS at 06:30

## 2024-01-03 RX ADMIN — MIDODRINE HYDROCHLORIDE 5 MG: 5 TABLET ORAL at 11:51

## 2024-01-03 RX ADMIN — CALCIUM CHLORIDE, MAGNESIUM CHLORIDE, SODIUM CHLORIDE, SODIUM BICARBONATE, POTASSIUM CHLORIDE AND SODIUM PHOSPHATE DIBASIC DIHYDRATE 1500 ML/HR: 3.68; 3.05; 6.34; 3.09; .314; .187 INJECTION INTRAVENOUS at 16:36

## 2024-01-03 RX ADMIN — NOREPINEPHRINE BITARTRATE 0.14 MCG/KG/MIN: 1 INJECTION, SOLUTION, CONCENTRATE INTRAVENOUS at 02:51

## 2024-01-03 RX ADMIN — VASOPRESSIN 0.04 UNITS/MIN: 0.2 INJECTION INTRAVENOUS at 21:09

## 2024-01-03 RX ADMIN — DOCUSATE SODIUM 50 MG AND SENNOSIDES 8.6 MG 2 TABLET: 8.6; 5 TABLET, FILM COATED ORAL at 08:04

## 2024-01-03 RX ADMIN — CALCIUM GLUCONATE 2000 MG: 20 INJECTION, SOLUTION INTRAVENOUS at 07:43

## 2024-01-03 RX ADMIN — CALCIUM CHLORIDE, MAGNESIUM CHLORIDE, SODIUM CHLORIDE, SODIUM BICARBONATE, POTASSIUM CHLORIDE AND SODIUM PHOSPHATE DIBASIC DIHYDRATE 1500 ML/HR: 3.68; 3.05; 6.34; 3.09; .314; .187 INJECTION INTRAVENOUS at 02:52

## 2024-01-03 RX ADMIN — SODIUM CHLORIDE 3 G: 900 INJECTION INTRAVENOUS at 11:52

## 2024-01-03 RX ADMIN — CALCIUM CHLORIDE, MAGNESIUM CHLORIDE, SODIUM CHLORIDE, SODIUM BICARBONATE, POTASSIUM CHLORIDE AND SODIUM PHOSPHATE DIBASIC DIHYDRATE 1500 ML/HR: 3.68; 3.05; 6.34; 3.09; .314; .187 INJECTION INTRAVENOUS at 01:00

## 2024-01-03 RX ADMIN — DEXTROSE AND SODIUM CHLORIDE 50 ML/HR: 5; 900 INJECTION, SOLUTION INTRAVENOUS at 01:20

## 2024-01-03 RX ADMIN — CALCIUM CHLORIDE, MAGNESIUM CHLORIDE, SODIUM CHLORIDE, SODIUM BICARBONATE, POTASSIUM CHLORIDE AND SODIUM PHOSPHATE DIBASIC DIHYDRATE 1500 ML/HR: 3.68; 3.05; 6.34; 3.09; .314; .187 INJECTION INTRAVENOUS at 19:30

## 2024-01-03 RX ADMIN — PANTOPRAZOLE SODIUM 40 MG: 40 INJECTION, POWDER, FOR SOLUTION INTRAVENOUS at 20:39

## 2024-01-03 RX ADMIN — MIDODRINE HYDROCHLORIDE 5 MG: 5 TABLET ORAL at 18:01

## 2024-01-03 RX ADMIN — OXYCODONE AND ACETAMINOPHEN 1 TABLET: 5; 325 TABLET ORAL at 20:40

## 2024-01-03 RX ADMIN — SODIUM CHLORIDE 3 G: 900 INJECTION INTRAVENOUS at 23:15

## 2024-01-03 RX ADMIN — ESCITALOPRAM OXALATE 5 MG: 10 TABLET ORAL at 08:04

## 2024-01-03 RX ADMIN — OXYCODONE AND ACETAMINOPHEN 1 TABLET: 5; 325 TABLET ORAL at 05:14

## 2024-01-03 RX ADMIN — TRAZODONE HYDROCHLORIDE 50 MG: 50 TABLET ORAL at 20:40

## 2024-01-03 RX ADMIN — CALCIUM CHLORIDE, MAGNESIUM CHLORIDE, SODIUM CHLORIDE, SODIUM BICARBONATE, POTASSIUM CHLORIDE AND SODIUM PHOSPHATE DIBASIC DIHYDRATE 1500 ML/HR: 3.68; 3.05; 6.34; 3.09; .314; .187 INJECTION INTRAVENOUS at 05:19

## 2024-01-03 RX ADMIN — CALCIUM CHLORIDE, MAGNESIUM CHLORIDE, SODIUM CHLORIDE, SODIUM BICARBONATE, POTASSIUM CHLORIDE AND SODIUM PHOSPHATE DIBASIC DIHYDRATE 1500 ML/HR: 3.68; 3.05; 6.34; 3.09; .314; .187 INJECTION INTRAVENOUS at 21:47

## 2024-01-03 RX ADMIN — QUETIAPINE FUMARATE 50 MG: 25 TABLET ORAL at 20:40

## 2024-01-03 RX ADMIN — VASOPRESSIN 0.04 UNITS/MIN: 0.2 INJECTION INTRAVENOUS at 14:07

## 2024-01-03 RX ADMIN — Medication 10 ML: at 08:04

## 2024-01-03 RX ADMIN — FENTANYL CITRATE 50 MCG: 50 INJECTION, SOLUTION INTRAMUSCULAR; INTRAVENOUS at 18:21

## 2024-01-03 NOTE — SIGNIFICANT NOTE
01/03/24 1250   Plan   Plan Referral sent to [facility] for review at request of family. Esther preferred because they have family there and could stay with them to visit with patient.

## 2024-01-03 NOTE — PROGRESS NOTES
" LOS: 11 days   Patient Care Team:  Kodi Pichardo MD as PCP - General (Internal Medicine)  Lisset Harrington APRN as Nurse Practitioner (Urology)    Chief Complaint: ROLAND    Subjective     History of Present Illness  Pt seen earlier today  Pt remains on levo/vasso  Started on CRRT yesterday, appears to be doing well today  No problems with dialysis  Wife at bedside.    Subjective:  Symptoms:  No shortness of breath, chest pain, chest pressure or anxiety.    Diet:  No nausea or vomiting.      History taken from: chart family    Objective     Vital Sign Min/Max for last 24 hours  Temp  Min: 97.8 °F (36.6 °C)  Max: 98.9 °F (37.2 °C)   BP  Min: 78/47  Max: 141/71   Pulse  Min: 88  Max: 115   No data recorded   SpO2  Min: 91 %  Max: 100 %   Flow (L/min)  Min: 2  Max: 5   Weight  Min: 91 kg (200 lb 11.2 oz)  Max: 91 kg (200 lb 11.2 oz)     Flowsheet Rows      Flowsheet Row First Filed Value   Admission Height 177.8 cm (70\") Documented at 12/23/2023 1948   Admission Weight 79.9 kg (176 lb 2.4 oz) Documented at 12/23/2023 1948            I/O this shift:  In: 2805 [P.O.:562; I.V.:1510.4; Blood:732.6]  Out: 2315 [Stool:350]  I/O last 3 completed shifts:  In: 6803.6 [P.O.:526.9; I.V.:5565; Blood:711.7]  Out: 4304 [Stool:100]    Objective:  General Appearance:  Comfortable.    Vital signs: (most recent): Blood pressure 132/63, pulse 96, temperature 98.2 °F (36.8 °C), resp. rate 23, height 177.8 cm (70\"), weight 91 kg (200 lb 11.2 oz), SpO2 98%.    Output: No urine output.    HEENT: Normal HEENT exam.    Lungs:  Normal effort and normal respiratory rate.  Breath sounds clear to auscultation.    Heart: Normal rate.  Regular rhythm.    Abdomen: Hypoactive bowel sounds.     Extremities: There is dependent edema.    Pulses: Distal pulses are intact.    Neurological: Patient is alert and oriented to person, place and time.    Pupils:  Pupils are equal, round, and reactive to light.    Skin:  Warm and dry.  " "            Results Review:     I reviewed the patient's new clinical results.    WBC WBC   Date Value Ref Range Status   01/03/2024 29.36 (H) 3.40 - 10.80 10*3/mm3 Final   01/02/2024 43.46 (C) 3.40 - 10.80 10*3/mm3 Final   01/01/2024 28.43 (H) 3.40 - 10.80 10*3/mm3 Final      HGB Hemoglobin   Date Value Ref Range Status   01/03/2024 7.0 (L) 13.0 - 17.7 g/dL Final   01/03/2024 7.4 (L) 13.0 - 17.7 g/dL Final   01/02/2024 7.1 (L) 13.0 - 17.7 g/dL Final   01/01/2024 7.9 (L) 13.0 - 17.7 g/dL Final      HCT Hematocrit   Date Value Ref Range Status   01/03/2024 21.3 (L) 37.5 - 51.0 % Final   01/03/2024 22.4 (L) 37.5 - 51.0 % Final   01/02/2024 21.8 (L) 37.5 - 51.0 % Final   01/01/2024 23.8 (L) 37.5 - 51.0 % Final      Platlets No results found for: \"LABPLAT\"   MCV MCV   Date Value Ref Range Status   01/03/2024 88.2 79.0 - 97.0 fL Final   01/02/2024 90.5 79.0 - 97.0 fL Final   01/01/2024 86.2 79.0 - 97.0 fL Final          Sodium Sodium   Date Value Ref Range Status   01/03/2024 133 (L) 136 - 145 mmol/L Final   01/03/2024 134 (L) 136 - 145 mmol/L Final   01/03/2024 134 (L) 136 - 145 mmol/L Final   01/02/2024 136 136 - 145 mmol/L Final   01/02/2024 131 (L) 136 - 145 mmol/L Final   01/02/2024 134 (L) 136 - 145 mmol/L Final   01/01/2024 132 (L) 136 - 145 mmol/L Final     Sodium, Arterial   Date Value Ref Range Status   01/02/2024 133.4 (L) 136 - 146 mmol/L Final      Potassium Potassium   Date Value Ref Range Status   01/03/2024 4.5 3.5 - 5.2 mmol/L Final   01/03/2024 4.8 3.5 - 5.2 mmol/L Final   01/03/2024 4.9 3.5 - 5.2 mmol/L Final   01/02/2024 5.4 (H) 3.5 - 5.2 mmol/L Final   01/02/2024 6.2 (C) 3.5 - 5.2 mmol/L Final   01/02/2024 6.2 (C) 3.5 - 5.2 mmol/L Final   01/01/2024 4.7 3.5 - 5.2 mmol/L Final      Chloride Chloride   Date Value Ref Range Status   01/03/2024 99 98 - 107 mmol/L Final   01/03/2024 99 98 - 107 mmol/L Final   01/03/2024 98 98 - 107 mmol/L Final   01/02/2024 97 (L) 98 - 107 mmol/L Final   01/02/2024 93 (L) " "98 - 107 mmol/L Final   01/02/2024 95 (L) 98 - 107 mmol/L Final   01/01/2024 95 (L) 98 - 107 mmol/L Final      CO2 CO2   Date Value Ref Range Status   01/03/2024 19.3 (L) 22.0 - 29.0 mmol/L Final   01/03/2024 19.1 (L) 22.0 - 29.0 mmol/L Final   01/03/2024 18.2 (L) 22.0 - 29.0 mmol/L Final   01/02/2024 19.3 (L) 22.0 - 29.0 mmol/L Final   01/02/2024 12.5 (L) 22.0 - 29.0 mmol/L Final   01/02/2024 12.9 (L) 22.0 - 29.0 mmol/L Final   01/01/2024 19.3 (L) 22.0 - 29.0 mmol/L Final      BUN BUN   Date Value Ref Range Status   01/03/2024 61 (H) 8 - 23 mg/dL Final   01/03/2024 58 (H) 8 - 23 mg/dL Final   01/03/2024 67 (H) 8 - 23 mg/dL Final   01/02/2024 67 (H) 8 - 23 mg/dL Final   01/02/2024 81 (H) 8 - 23 mg/dL Final   01/02/2024 75 (H) 8 - 23 mg/dL Final   01/01/2024 56 (H) 8 - 23 mg/dL Final      Creatinine Creatinine   Date Value Ref Range Status   01/03/2024 4.06 (H) 0.76 - 1.27 mg/dL Final   01/03/2024 4.49 (H) 0.76 - 1.27 mg/dL Final   01/03/2024 4.65 (H) 0.76 - 1.27 mg/dL Final   01/02/2024 4.74 (H) 0.76 - 1.27 mg/dL Final   01/02/2024 5.96 (H) 0.76 - 1.27 mg/dL Final   01/02/2024 5.77 (H) 0.76 - 1.27 mg/dL Final   01/01/2024 4.89 (H) 0.76 - 1.27 mg/dL Final      Calcium Calcium   Date Value Ref Range Status   01/03/2024 7.4 (L) 8.6 - 10.5 mg/dL Final   01/03/2024 6.8 (L) 8.6 - 10.5 mg/dL Final   01/03/2024 6.7 (L) 8.6 - 10.5 mg/dL Final   01/02/2024 6.6 (L) 8.6 - 10.5 mg/dL Final   01/02/2024 6.7 (L) 8.6 - 10.5 mg/dL Final   01/02/2024 7.3 (L) 8.6 - 10.5 mg/dL Final   01/01/2024 7.3 (L) 8.6 - 10.5 mg/dL Final      PO4 No results found for: \"CAPO4\"   Albumin Albumin   Date Value Ref Range Status   01/03/2024 3.0 (L) 3.5 - 5.2 g/dL Final   01/03/2024 3.0 (L) 3.5 - 5.2 g/dL Final   01/03/2024 3.0 (L) 3.5 - 5.2 g/dL Final   01/02/2024 3.0 (L) 3.5 - 5.2 g/dL Final   01/02/2024 2.2 (L) 3.5 - 5.2 g/dL Final   01/02/2024 2.5 (L) 3.5 - 5.2 g/dL Final   01/01/2024 2.2 (L) 3.5 - 5.2 g/dL Final      Magnesium Magnesium   Date " "Value Ref Range Status   01/03/2024 2.5 (H) 1.6 - 2.4 mg/dL Final   01/03/2024 2.5 (H) 1.6 - 2.4 mg/dL Final   01/03/2024 2.4 1.6 - 2.4 mg/dL Final   01/03/2024 2.4 1.6 - 2.4 mg/dL Final   01/02/2024 2.4 1.6 - 2.4 mg/dL Final   01/02/2024 2.6 (H) 1.6 - 2.4 mg/dL Final   01/02/2024 2.8 (H) 1.6 - 2.4 mg/dL Final   01/01/2024 2.5 (H) 1.6 - 2.4 mg/dL Final      Uric Acid No results found for: \"URICACID\"     Medication Review:   ampicillin-sulbactam, 3 g, Intravenous, Q12H  [START ON 1/4/2024] escitalopram, 5 mg, Nasogastric, Daily  melatonin, 10 mg, Nasogastric, Nightly  midodrine, 5 mg, Nasogastric, TID AC  pantoprazole, 40 mg, Intravenous, Q12H  QUEtiapine, 50 mg, Nasogastric, Nightly  senna-docusate sodium, 2 tablet, Nasogastric, BID  sodium chloride, 10 mL, Intravenous, Q12H  traZODone, 50 mg, Nasogastric, Nightly          Assessment & Plan       Left flank pain    Ruptured infrarenal abdominal aortic aneurysm (AAA)    DVT, lower extremity, distal, acute, unspecified laterality      Assessment & Plan  ROLAND-  pt with previous partial left nephrectomy and baseline creatinine closer to 0.9-1.0.  Had noted hypotension with AAA rupture s/p repair.  Likely ATN from hypotension vs. Atheroembolic injury.  Noted renal infarcts seen on repeat CT.  Also received IV contrast with CTA.  Will hold IV bumex for now.  BP remains low, on levophed, vasopressin started.  Transitioned to CRRT yesterday, appears to be doing well today.  Continue same.  AAA rupture-  s/p open repair infrarenal aorta.  Ischemic bowel- s/p left colectomy, ostomy.  Shock- sepsis, hemorrhagic component with AAA rupture.   Sepsis-  blood cx with strep pyogenes previously.  On abx per primary.  Elevated procalcitonin.  Met Acidosis-  bicarb held.  Improved now with CRRT.  Anemia-  prbc's prn.  Hyperkalemia-  worsened with acidosis, likely due to reabsorption of blood from previous bleed. Improved  DVT- simran LE.  Possible filter soon, not able to tolerated " heparin gtt.    Adin Hinton MD  01/03/24  17:16 EST

## 2024-01-03 NOTE — SIGNIFICANT NOTE
Tube was initial placed by RD but unable to advance past 45cm.  After a couple attempts, I was able to successfully advance the Cortrak tube to 100cm.  Secured with Corgrip.  Tube flushes easily, confirmation tracing placed in chart

## 2024-01-03 NOTE — NURSING NOTE
Visited with Mr Savage in his room in the CCU. He is resting quietly at present time. His wife and daughter are at bedside . Mr Savage has had to receive medication for anxiety as well as meds for his hemodynamic instability. He is receiving artificial nutrition via coretrak.  His wife is anxious about the possibility of discharge. Emotional support provided.    Palliative care will continue to monitor and support family as well

## 2024-01-03 NOTE — PROGRESS NOTES
Pulmonary / Critical Care Progress Note      Patient Name: Danny Savage  : 1960  MRN: [id number]  Attending:  Eric Martin MD   Date of admission: 2023    Subjective   Subjective   Patient critically ill with ruptured AAA s/p open repair, hemorrhagic shock, Streptococcus pyogenes bacteremia    Over the past 24 hours, had worsening lactic acidosis, with concern for evolving hematoma consistent with hemorrhagic shock, required increased vasopressors, given PRBCs, HD catheter exchanged reinitiated on CRRT, on Seroquel for delirium    Overnight received Haldol x 1, up to 6 L nasal cannula    This morning  Patient is awake, does follow commands, no acute distress  On 6 L nasal cannula O2 sat 93%  On Levophed at 0.04 mcg/kg/min  Hemoglobin at 7.4  Remains on CRRT fluid removal 300 cc / 24 hours  Lactic acid improving down to 2.5  Calcium low  Heparin drip indefinitely on standby, vascular consulted for IVC      Objective   Objective     Vitals:   Vital signs for last 24 hours:  Temp:  [97 °F (36.1 °C)-98.9 °F (37.2 °C)] 98.9 °F (37.2 °C)  Heart Rate:  [] 100  Resp:  [23] 23  BP: ()/() 98/60    Intake/Output last 3 shifts:  I/O last 3 completed shifts:  In: 6803.6 [P.O.:526.9; I.V.:5565; Blood:711.7]  Out: 4304 [Stool:100]    Physical Exam   Vital Signs Reviewed  critically ill-appearing, on nasal cannula  General: Weak, deconditioned, alert  Chest:  good aeration, unchanged crackles in bases bilaterally, no work of breathing noted at rest  CV: RRR, no MGR, pulses 2+, equal.  Abd: Soft, ND, NT, midline incision in place, colostomy in place  Neuro:  A&Ox3, intermittently disoriented about the course of events, CN grossly intact, no focal deficits.    Result Review    I have personally reviewed the results from the time of this admission to 1/3/2024 09:59 EST and agree with these findings:  [x]  Laboratory  [x]  Microbiology  [x]  Radiology  [x]  EKG/Telemetry   [x]   Cardiology/Vascular   []  Pathology  []  Old records  []  Other:  Most notable findings include:       Lab 01/03/24  0358 01/03/24  0009 01/02/24  2232 01/02/24  1722 01/02/24  1422 01/02/24  0453 01/02/24  0406 01/01/24  0528 12/31/23  1544 12/31/23  0524 12/30/23  1723 12/30/23  0537 12/29/23  0540 12/28/23  0911 12/28/23  0400   WBC 29.36*  --   --   --   --  43.46*  --  28.43*  --  26.82*  --  25.62* 29.90*  --  24.41*   HEMOGLOBIN 7.4*  --   --   --   --  7.1*  --  7.9* 9.6* 7.7* 9.2* 6.6* 7.0*  --  7.9*   HEMATOCRIT 22.4*  --   --   --   --  21.8*  --  23.8* 29.2* 23.2* 27.1* 20.0* 21.2*  --  23.0*   PLATELETS 344  --   --   --   --  423  --  286  --  193  --  126* 96*  --  76*   SODIUM 134* 134*  --  136 131*  --  134* 132*  --  132* 134* 133* 138   < > 139   SODIUM, ARTERIAL  --   --  133.4*  --   --   --   --   --   --   --   --   --   --    < >  --    POTASSIUM 4.8 4.9  --  5.4* 6.2*  --  6.2* 4.7  --  4.2 3.7 5.2 4.2   < > 4.3   CHLORIDE 99 98  --  97* 93*  --  95* 95*  --  96* 98 99 103   < > 101   CO2 19.1* 18.2*  --  19.3* 12.5*  --  12.9* 19.3*  --  19.7* 22.8 21.7* 24.0   < > 22.3   BUN 58* 67*  --  67* 81*  --  75* 56*  --  33* 15 28* 35*   < > 34*   CREATININE 4.49* 4.65*  --  4.74* 5.96*  --  5.77* 4.89*  --  3.46* 2.32* 3.47* 3.65*   < > 3.27*   GLUCOSE 258* 243*  --  226* 227*  --  55* 101*  --  92 74 66 75   < > 113*   GLUCOSE, ARTERIAL  --   --  256*  --   --   --   --   --   --   --   --   --   --    < >  --    CALCIUM 6.8* 6.7*  --  6.6* 6.7*  --  7.3* 7.3*  --  7.8* 8.1* 7.5* 7.2*   < > 7.3*   PHOSPHORUS 5.1* 5.5*  --  7.0* 8.9*  --  9.3* 5.8*  --  5.5*  --  2.6 2.7   < > 3.7   TOTAL PROTEIN 6.2 6.2  --   --   --   --  6.4 6.1  --  6.0 6.3 5.6* 5.0*  --  5.0*   ALBUMIN 3.0* 3.0*  --  3.0* 2.2*  --  2.5* 2.2*  --  2.3* 2.4* 2.2* 2.3*   < > 2.3*   GLOBULIN 3.2 3.2  --   --   --   --  3.9 3.9  --  3.7 3.9 3.4 2.7  --  2.7    < > = values in this interval not displayed.     Procalcitonin  11    Assessment & Plan   Assessment / Plan     Active Hospital Problems:  Active Hospital Problems    Diagnosis     **Left flank pain     Ruptured infrarenal abdominal aortic aneurysm (AAA)     DVT, lower extremity, distal, acute, unspecified laterality      Impression:  Ruptured infrarenal AAA s/p open repair  Acute blood loss anemia  Intra-abdominal hematoma  Hemorrhagic shock  Hypovolemic shock  Acute hypoxic respiratory failure requiring mechanical ventilation  Septic shock with Streptococcus pyogenes bacteremia  Moraxella pneumonia  Ischemic sigmoid colon s/p colostomy  History of RCC s/p partial nephrectomy on 11/13  Acute kidney injury secondary to acute tubular necrosis requiring renal replacement therapy  Clinically significant lactic acidosis  Chronically immunosuppressed on CellCept  Hypocalcemia  Hypokalemia resolved now with hyperkalemia  SVT  Lactic acidosis, clinically significant     Plan:  -Hemoglobin stable at 7.4, transfuse for hemoglobin less than 7 or evidence of shock  -INR 1.6.  Discussed with Dr. Collins vascular surgery.  Agree with giving 2 units of FFP today  -Continue Levophed to maintain MAP at 65  -Continue midodrine 5 milligrams 3 times daily  -Lactic acid down to 2.5  -Continue to hold heparin drip, with acute DVTs, vascular surgery has been consulted for IVC filter appreciate assistance  -Continue CRRT maintain net negative fluid balance, appreciate nephrology assistance  -Continue supplemental oxygen, on 6 L nasal cannula  -Aspiration precautions, incentive spirometry  -Continue core track, tube feeds per dietitian  -Blood glucose improved, dextrose/bicarb discontinued  -Continue as needed Haldol as needed  -Increase Seroquel to 50 mg nightly, continue trazodone  -As needed fentanyl for pain  -Continue Unasyn to complete 14 days of therapy for bacteremia  -Leukocytosis decreased.  Will monitor for now.  I see no evidence of new infection  -Hold home chronic immunosuppression  CellCept indefinitely  -Will ultimately need LTAC once patient is stabilized       PUP PPx: Pepcid  DVT prophylaxis: Planning for IVC filter  Medical and mechanical DVT prophylaxis orders are present.    CODE STATUS:   Level Of Support Discussed With: Patient  Code Status (Patient has no pulse and is not breathing): CPR (Attempt to Resuscitate)  Medical Interventions (Patient has pulse or is breathing): Full Support    I Livkathleen Gomez, BAKARI, spent 15 minutes critical care time in accordance to split shared billing.    Patient is critically ill with ruptured AAA, DVTs, hemorrhagic shock, requiring blood transfusion, intra-abdominal hematoma, acute kidney injury requiring renal replacement therapy.. We have personally reviewed all pertinent labs, imaging, microbiology and documentation. We have discussed care with the primary service as well as at multidisciplinary critical care rounds with the bedside nurse, respiratory therapist, pharmacist and all other ancillary services. 55 minutes of critical care time was spent managing this patient, excluding procedures. Of this time, I spent 40 minutes in accordance with split shared billing.    Electronically signed by Yinka Lloyd MD, 01/03/24, 1:55 PM EST.

## 2024-01-03 NOTE — PROGRESS NOTES
[facility]   Hospitalist Progress Note  Date: 1/3/2024  Patient Name: Danny Savage  : 1960  MRN: [id number]  Date of admission: 2023  [place] [facility] [place]      Subjective   Subjective     Chief Complaint: Back pain    Summary:63-year-old male presented to the hospital with complaints of left lower back pain and fever x 3 days, at that time patient was status post nephrectomy and there was concern for postoperative seroma/hematoma, patient was discharged home but pain began getting worse 10 out of 10 intensity on repeat presentation patient was febrile to 102.6, repeat CT scan showed likely postoperative seroma/hematoma although infection could not be ruled out, there was an abdominal aortic aneurysm of 5.1 cm.  The patient was started on IV antibiotics and patient was admitted for possible abscess in the nephrectomy bed, patient went unresponsive on 2023, blood pressure was 42/32, patient was pale and clammy, CODE BLUE was called, patient received IV fluids, patient was intubated, he was found to have expanding AAA with eccentric mural thrombus/hematoma measuring 5.1 cm, vascular surgeon consulted and patient underwent urgent open repair of ruptured infrarenal AAA with tube graft and mobilization of the omentum, patient taken back to the OR on 2023 to look for sources of blood loss, he was found to have nonviable colon and patient underwent partial colectomy by Dr. Mallory.  Patient has had prolonged course with continued anemia, repeat scans concerning for worsening hematoma, patient was found to have DVTs and was on heparin drip.  Heparin has had to be discontinued and vascular surgery planning for IVC filter placement.  Patient's blood pressure did not tolerate hemodialysis and patient had to be switched to CRRT.  Patient remains on antibiotics but repeat blood cultures are negative.  Patient will likely require LTAC placement when he stabilizes medically    Interval Followup: No  acute events overnight, patient resting comfortably in bed, tolerating CRRT      Objective   Objective     Vitals:   Temp:  [97.2 °F (36.2 °C)-98.9 °F (37.2 °C)] 98.9 °F (37.2 °C)  Heart Rate:  [] 92  Resp:  [23] 23  BP: ()/() 102/53  Flow (L/min):  [2-5] 5    Physical Exam   GEN: No acute distress  HEENT: Moist mucous membranes  LUNGS: Equal chest rise bilaterally  CARDIAC: Regular rate and rhythm  NEURO: Moving all 4 extremities spontaneously  SKIN: No obvious breakdown      Result Review    Result Review:  I have personally reviewed these results:  [x]  Laboratory      Lab 01/03/24  0814 01/03/24  0358 01/03/24  0009 01/02/24  2232 01/02/24  0951 01/02/24  0453 01/02/24  0406 01/01/24  0528 12/31/23  0830 12/31/23  0524 12/30/23  1723 12/30/23  0537 12/29/23  1139 12/29/23  0540 12/28/23  2220   WBC  --  29.36*  --   --   --  43.46*  --  28.43*  --  26.82*  --  25.62*  --  29.90*  --    HEMOGLOBIN  --  7.4*  --   --   --  7.1*  --  7.9*   < > 7.7*   < > 6.6*  --  7.0*  --    HEMATOCRIT  --  22.4*  --   --   --  21.8*  --  23.8*   < > 23.2*   < > 20.0*  --  21.2*  --    PLATELETS  --  344  --   --   --  423  --  286  --  193  --  126*  --  96*  --    NEUTROS ABS  --  26.72*  --   --   --   --   --  21.46*  --  21.16*  --  23.31*  --  26.24*  --    IMMATURE GRANS (ABS)  --   --   --   --   --   --   --  1.70*  --  1.24*  --   --   --  0.98*  --    LYMPHS ABS  --   --   --   --   --   --   --  2.13  --  1.65  --   --   --  1.22  --    MONOS ABS  --   --   --   --   --   --   --  2.82*  --  2.56*  --   --   --  1.23*  --    EOS ABS  --   --   --   --   --   --   --  0.16  --  0.08  --  0.26  --  0.06  --    MCV  --  88.2  --   --   --  90.5  --  86.2  --  89.9  --  89.7  --  89.1  --    PROCALCITONIN  --   --   --   --   --   --  11.77* 13.44*  --   --   --   --   --  11.16*  --    LACTATE  --   --  2.5*  --  7.8*  --   --  2.0   < >  --   --   --   --   --   --    LACTATE, ARTERIAL  --   --   --   2.34*  --   --   --   --   --   --   --   --   --   --   --    PROTIME 22.6*  --   --   --   --   --   --   --   --   --   --   --   --   --  13.7   APTT 38.1*  --   --   --   --  43.3*  --  89.9  --  100.9*  --  85.9   < > 81.8 33.6*    < > = values in this interval not displayed.         Lab 01/03/24  1147 01/03/24  0358 01/03/24  0012 01/03/24  0009 01/02/24 2232 01/02/24  1722   SODIUM  --  134*  --  134*  --  136   SODIUM, ARTERIAL  --   --   --   --  133.4*  --    POTASSIUM  --  4.8  --  4.9  --  5.4*   CHLORIDE  --  99  --  98  --  97*   CO2  --  19.1*  --  18.2*  --  19.3*   ANION GAP  --  15.9*  --  17.8*  --  19.7*   BUN  --  58*  --  67*  --  67*   CREATININE  --  4.49*  --  4.65*  --  4.74*   EGFR  --  13.8*  --  13.2*  --  12.9*   GLUCOSE  --  258*  --  243*  --  226*   GLUCOSE, ARTERIAL  --   --   --   --  256*  --    CALCIUM  --  6.8*  --  6.7*  --  6.6*   IONIZED CALCIUM 0.96* 0.86* 0.85*  --  0.88* 0.84*   MAGNESIUM 2.5* 2.4  --  2.4  --  2.4   PHOSPHORUS  --  5.1*  --  5.5*  --  7.0*         Lab 01/03/24  0358 01/03/24  0009 01/02/24  1722 01/02/24  1422 01/02/24  0406 01/01/24  0528 12/31/23  0524   TOTAL PROTEIN 6.2 6.2  --   --  6.4   < > 6.0   ALBUMIN 3.0* 3.0* 3.0*   < > 2.5*   < > 2.3*   GLOBULIN 3.2 3.2  --   --  3.9   < > 3.7   ALT (SGPT) 220* 228*  --   --  100*   < > 44*   AST (SGOT) 600* 742*  --   --  248*   < > 58*   BILIRUBIN 8.3* 8.4*  --   --  6.5*   < > 3.5*   ALK PHOS 112 107  --   --  122*   < > 124*   LIPASE  --   --   --   --  23  --  14    < > = values in this interval not displayed.         Lab 01/03/24  0814 12/30/23  0537 12/28/23  2220   PROBNP  --  30,309.0*  --    PROTIME 22.6*  --  13.7   INR 1.96*  --  1.03             Lab 01/02/24  0951 12/30/23  0645 12/30/23  0645 12/30/23  0537   IRON  --   --   --  21*   IRON SATURATION (TSAT)  --   --   --  13*   TIBC  --   --   --  164*   TRANSFERRIN  --   --   --  110*   ABO TYPING A   < > A  --    RH TYPING Negative   < >  Negative  --    ANTIBODY SCREEN Negative  --  Negative  --     < > = values in this interval not displayed.         Lab 01/02/24  2232 12/28/23  0911   PH, ARTERIAL 7.467* 7.466*   PCO2, ARTERIAL 29.7* 33.5*   PO2 ART 58.5* 71.3*   O2 SATURATION ART 90.7* 93.2*   FIO2 28 35   HCO3 ART 21.0* 23.6   BASE EXCESS ART -2.2* 0.1   CARBOXYHEMOGLOBIN 1.6* 0.3     Brief Urine Lab Results  (Last result in the past 365 days)        Color   Clarity   Blood   Leuk Est   Nitrite   Protein   CREAT   Urine HCG        12/23/23 2054 Yellow   Clear   Small (1+)   Negative   Negative   100 mg/dL (2+)                 [x]  Microbiology   Microbiology Results (last 10 days)       Procedure Component Value - Date/Time    Blood Culture - Blood, Hand, Left [[id number]]  (Normal) Collected: 01/02/24 1216    Lab Status: Preliminary result Specimen: Blood from Hand, Left Updated: 01/03/24 1230     Blood Culture No growth at 24 hours    Blood Culture - Blood, Arm, Left [[id number]]  (Normal) Collected: 12/29/23 1139    Lab Status: Final result Specimen: Blood from Arm, Left Updated: 01/03/24 1215     Blood Culture No growth at 5 days    Blood Culture - Blood, Chest, Left [[id number]]  (Normal) Collected: 12/29/23 1002    Lab Status: Final result Specimen: Blood from Chest, Left Updated: 01/03/24 1030     Blood Culture No growth at 5 days    Respiratory Culture - Sputum, ET Suction [[id number]]  (Abnormal) Collected: 12/24/23 1638    Lab Status: Final result Specimen: Sputum from ET Suction Updated: 12/27/23 1040     Respiratory Culture Light growth (2+) Moraxella atlantae     Gram Stain Few (2+) Gram negative bacilli      Rare (1+) WBCs seen    Blood Culture - Blood, Arm, Left [[id number]]  (Normal) Collected: 12/24/23 1459    Lab Status: Final result Specimen: Blood from Arm, Left Updated: 12/29/23 1531     Blood Culture No growth at 5 days          [x]  Radiology  XR Abdomen KUB    Result Date: 1/2/2024    1. Small bore feeding tube tip terminating  at the junction of the 3rd and 4th portion of the duodenum.      DOMONIQUE SALDAÑA MD       Electronically Signed and Approved By: DOMONIQUE SALDAÑA MD on 1/02/2024 at 19:43             XR Chest 1 View    Result Date: 1/2/2024    1. Mild to moderate perihilar and basilar airspace disease suspected to represent pulmonary edema.  Pneumonia is also in the differential 2. Small bilateral pleural effusions, unchanged 3. Feeding tube with the tip near the gastroesophageal junction.       Herve Garnett M.D.       Electronically Signed and Approved By: Herve Garnett M.D. on 1/02/2024 at 13:03             CT Abdomen Pelvis Without Contrast    Result Date: 1/1/2024    CT scan of the abdomen and pelvis without contrast demonstrating moderate right and small left effusions, larger in comparison to 9/29/2023..  Compressive atelectasis is again seen at the lung bases.  Ill-defined irregular retroperitoneal mass appears larger in comparison to the previous study concerning for interval hemorrhage.  An ill-defined 9.5 cm component with layering density is consistent with hematoma.  I discussed findings with Dr. Lloyd at 1855.     BENITO PRADHAN MD       Electronically Signed and Approved By: BENITO PRADHAN MD on 1/01/2024 at 18:56             XR Chest 1 View    Result Date: 12/31/2023   Favorable progression is suggested radiographically with decreased atelectasis and/or infiltrate(s) bilaterally.  There may be decreased pleural effusions, as well.      Please note that portions of this note were completed with a voice recognition program.  SHIN NUNEZ JR, MD       Electronically Signed and Approved By: SHIN NUNEZ JR, MD on 12/31/2023 at 6:22              CT Abdomen Pelvis With Contrast    Result Date: 12/29/2023   1. Evidence of interval left hemicolectomy with colostomy placement as well as abdominal aortic aneurysm and aortic caval fistula repair since 12/24/2023.  There is a persistent extensive para-aortic right  retroperitoneal hematoma without acute or new hemorrhage identified.  No loculated fluid or fluid and air collections are seen to indicate abscess.  2. Development of multiple splenic and bilateral renal infarcts new since 12/24/2023. 3. Small bilateral pleural effusions have increased in size since 12/24/2023.  There is also anasarca.    Report was called to [facility] CCU nurse at 1651 hours on 12/29/2023.    COBY DEGROOT DO       Electronically Signed and Approved By: COBY DEGROOT DO on 12/29/2023 at 16:53             XR Chest 1 View    Result Date: 12/29/2023    1. Diffuse bilateral airspace opacities appear unchanged, which could reflect pulmonary edema or pneumonia. 2. Small bilateral pleural effusions.       GELY MONTE MD       Electronically Signed and Approved By: GELY MONTE MD on 12/29/2023 at 6:04             XR Chest 1 View    Result Date: 12/27/2023     Right jugular central venous catheter tip is in the right atrium.  No pneumothorax is seen.  ET tube and NG tube remain in place.  Bilateral airspace disease and pleural effusions are unchanged.       BENITO PRADHAN MD       Electronically Signed and Approved By: BENITO PRADHAN MD on 12/27/2023 at 13:21             XR Chest 1 View    Result Date: 12/27/2023    1. Endotracheal tube in good position. 2. Bilateral airspace opacities, which could reflect pulmonary edema or pneumonia. 3. Moderate bilateral pleural effusions.       GELY MONTE MD       Electronically Signed and Approved By: GELY MONTE MD on 12/27/2023 at 4:42            []  EKG/Telemetry   []  Cardiology/Vascular   []  Pathology  []  Old records  []  Other:    Assessment & Plan   Assessment / Plan     Assessment:  Ruptured infrarenal AAA s/p open repair  Hemorrhagic shock  Hypovolemic shock  Acute hypoxic respiratory failure requiring mechanical ventilation  Septic shock with Streptococcus pyogenes bacteremia  SVT  History of RCC s/p partial nephrectomy on 11/13  Acute  kidney injury  Clinically significant lactic acidosis  Chronically immunosuppressed on CellCept  Hypocalcemia  Hypokalemia    Plan:  Patient currently being managed in critical care unit; appreciate intensivist input.  Patient had spontaneous rupture of AAA s/p Open repair of ruptured infrarenal abdominal aortic aneurysm with a tube graft and mobilization of the omentum with coverage of the graft using pedicled omental flap on 12/24.  Vascular surgery following the patient; appreciate further input.,  Possible filter as above  Successfully extubated on 12/28/2023, on 2 L nasal cannula currently  Continue CRRT  On norepinephrine and vasopressin, wean as able  Urology consulted and following, appreciate their recommendations  Continue Unasyn  Repeat blood cultures with no growth at 2 days, procalcitonin elevated on last check  CT scan of the abdomen pelvis personally reviewed, no obvious source of infection  Wean O2 as able  Continue Lexapro for depression  Continue trazodone for sleep  Discussed management plan with pulmonology     Discussed with RN.    DVT prophylaxis:  Medical and mechanical DVT prophylaxis orders are present.    CODE STATUS:   Level Of Support Discussed With: Patient  Code Status (Patient has no pulse and is not breathing): CPR (Attempt to Resuscitate)  Medical Interventions (Patient has pulse or is breathing): Full Support    Pt is critically ill in ICU with shock, blood loss anemia.  I have spent 32 minutes of critical care time reviewing previous documentation, reviewing all pertinent telemetry, labs, and imaging studies, examining the patient, modifying the care plan and discussing the patient´s condition and care plan with the consultants, available family and during rounds. This does not include any procedures performed.      Electronically signed by Eric Martin MD, 01/03/24, 12:44 PM EST.

## 2024-01-03 NOTE — PLAN OF CARE
Goal Outcome Evaluation:      CRRT patient tolerating hemodynamically but has become increasing confused an agitated. RN began with notifying wife of change, wife opted to come in and try to soothe patient. After multiple unsuccessful attempts, RN reached out for restraint order due to pt attempting to pull out dialysis catheter and coretrack. X1 dose of haldol given, patient rested comfortably, however, RN had to increase levo.    Levo .08, vaso .04, 4k/2.6cal dialysis solution.

## 2024-01-03 NOTE — PROGRESS NOTES
[facility]     Progress Note    Patient Name: Danny Savage  : 1960  MRN: [id number]  Primary Care Physician:  Kodi Pichardo MD  Date of admission: 2023    Subjective   Subjective     POD #10 status post open repair of ruptured abdominal aorta aneurysm, POD #8 status post abdominal reexploration, sigmoidectomy with end colostomy, closure of abdominal wound    Patient without complaints.    Has become confused overnight.      Objective   Objective     Vitals:   Temp:  [97 °F (36.1 °C)-98.9 °F (37.2 °C)] 98.9 °F (37.2 °C)  Heart Rate:  [] 100  Resp:  [23] 23  BP: ()/() 98/60  Flow (L/min):  [2-5] 5    Physical Exam   General: Sleepy, no acute distress  Extremities: Symmetric, moderate edema.    Hgb: 7.4  WBC: 29.36  Lactate: 2.5    Assessment & Plan   Assessment / Plan     Assessment/Plan:    Patient remains critically ill in ICU.  Persistent leukocytosis.  Off heparin due to concern for expansion of hematoma.  IVC filter when medically more stable.      Active Hospital Problems:  Active Hospital Problems    Diagnosis     **Left flank pain     DVT, lower extremity, distal, acute, unspecified laterality     Ruptured infrarenal abdominal aortic aneurysm (AAA)            Electronically signed by Swapnil Velazco MD, 23, 10:25 AM EST.

## 2024-01-03 NOTE — PLAN OF CARE
Goal Outcome Evaluation:   Patient with decreasing pressor requirements majority of the shift and minimal pain. Around 1600 blood was noted in his ostomy bag and emptied. General surgery contacted, nothing from there end. Giving 2 units PRBC, monitoring H/H, fibrinogen and output. Patient complaining of 10/10 pain on his right side. Patient is also vomiting with no relief from zofran. See MAR. Patient remains on CRRT with good response. DAPHNEY FORDE .

## 2024-01-03 NOTE — PROGRESS NOTES
Nutrition Services    Patient Name: Danny Savage  YOB: 1960  MRN: [id number]  Admission date: 12/23/2023    PROGRESS NOTE      Encounter Information: EN Follow Up       PO Diet: Diet: Regular/House Diet, Renal Diets; Low Potassium, Low Phosphorus; Texture: Regular Texture (IDDSI 7); Fluid Consistency: Thin (IDDSI 0)   PO Supplements: NPO   PO Intake:  NPO       Current nutrition support: Novasource Renal @ 55 ml/hr x 22 hours/day  Free water flushes 25 ml every 3 hours   Provides 2420 kcal, 110 g pro, 1059 ml fluid        Estimated Needs: 2415 kcal, 116-126 g pro, 5134-5591 ml fluid        Nutrition support review: Tolerating well at goal rate.  Now on CRRT and trying to remove fluid.         Labs (reviewed below): Reviewed.          GI Function:  No issues noted. Having ostomy output.         Nutrition Intervention Updates: Continue current nutrition plan of care.       Results from last 7 days   Lab Units 01/03/24  1147 01/03/24  0358 01/03/24  0009 01/02/24  1422 01/02/24  0406   SODIUM mmol/L 133* 134* 134*   < > 134*   SODIUM, ARTERIAL   --   --   --    < >  --    POTASSIUM mmol/L 4.5 4.8 4.9   < > 6.2*   CHLORIDE mmol/L 99 99 98   < > 95*   CO2 mmol/L 19.3* 19.1* 18.2*   < > 12.9*   BUN mg/dL 61* 58* 67*   < > 75*   CREATININE mg/dL 4.06* 4.49* 4.65*   < > 5.77*   CALCIUM mg/dL 7.4* 6.8* 6.7*   < > 7.3*   BILIRUBIN mg/dL  --  8.3* 8.4*  --  6.5*   ALK PHOS U/L  --  112 107  --  122*   ALT (SGPT) U/L  --  220* 228*  --  100*   AST (SGOT) U/L  --  600* 742*  --  248*   GLUCOSE mg/dL 224* 258* 243*   < > 55*   GLUCOSE, ARTERIAL   --   --   --    < >  --     < > = values in this interval not displayed.     Results from last 7 days   Lab Units 01/03/24  1147 01/03/24  0358 01/03/24  0009   MAGNESIUM mg/dL 2.5* 2.4 2.4   PHOSPHORUS mg/dL 4.3 5.1* 5.5*   HEMOGLOBIN g/dL  --  7.4*  --    HEMATOCRIT %  --  22.4*  --      COVID19   Date Value Ref Range Status   12/23/2023 Not Detected Not Detected -  "Ref. Range Final     No results found for: \"HGBA1C\"    RD to follow up per protocol.    Electronically signed by:  Shannon Acharya RD  01/03/24 13:12 EST   "

## 2024-01-03 NOTE — SIGNIFICANT NOTE
Wound Eval / Progress Noted     [facility]     Patient Name: Danny Savage  : 1960  MRN: [id number]  Today's Date: 1/3/2024                 Admit Date: 2023    Visit Dx:    ICD-10-CM ICD-9-CM   1. Sepsis, due to unspecified organism, unspecified whether acute organ dysfunction present  A41.9 038.9     995.91   2. Fever, unspecified fever cause  R50.9 780.60   3. Intra-abdominal fluid collection  R18.8 789.59   4. Abdominal aortic aneurysm (AAA) without rupture, unspecified part  I71.40 441.4   5. Ruptured infrarenal abdominal aortic aneurysm (AAA)  I71.33 441.3   6. Decreased activities of daily living (ADL)  Z78.9 V49.89   7. Difficulty walking  R26.2 719.7   8. DVT, lower extremity, distal, acute, unspecified laterality  I82.4Z9 453.42         Left flank pain    Ruptured infrarenal abdominal aortic aneurysm (AAA)    DVT, lower extremity, distal, acute, unspecified laterality        Past Medical History:   Diagnosis Date    Allergy     Aortic aneurysm     4.7 just found has not f/u    H/O Kidney stone     Hiatal hernia     Kidney mass     left    Renal cancer         Past Surgical History:   Procedure Laterality Date    ABDOMINAL AORTIC ANEURYSM REPAIR N/A 2023    Procedure: ABDOMINAL AORTIC ANEURYSM REPAIR;  Surgeon: Koffi Agosto MD;  Location: [facility] MAIN OR;  Service: Vascular;  Laterality: N/A;    COLON RESECTION N/A 2023    Procedure: COLON RESECTION;  Surgeon: Hernan Mallory MD;  Location: [facility] MAIN OR;  Service: General;  Laterality: N/A;  EXPLORATORY LAPAROTOMY, REPAIR OF SMALL BOWEL MESENTERY, APPENDECTOMY, LEFT HEMICOLECTOMY, CREATION OF OSTOMY, ABDOMINAL CLOSURE    EXPLORATORY LAPAROTOMY N/A 2023    Procedure: LAPAROTOMY EXPLORATORY;  Surgeon: Koffi Agosto MD;  Location: [facility] MAIN OR;  Service: Vascular;  Laterality: N/A;  Exploratory Laparotomy    KIDNEY STONE SURGERY      NEPHRECTOMY PARTIAL Left 2023    Procedure: NEPHRECTOMY PARTIAL LAPAROSCOPIC WITH  DAVINCI ROBOT, left;  Surgeon: Michelle Cai MD;  Location: [facility];  Service: Robotics - DaVinci;  Laterality: Left;    URETEROSCOPY LASER LITHOTRIPSY WITH STENT INSERTION Left 08/18/2023    Procedure: CYSTOSCOPY URETEROSCOPY RETROGRADE PYELOGRAM STENT INSERTION, left;  Surgeon: Michelle Cai MD;  Location: [facility];  Service: Urology;  Laterality: Left;     Wound Check / Follow-up: Patient seen today for ostomy follow-up and ongoing education. Patient remains in CICU. He is now on CRRT. Cortrak in place with continuous tube feeds running. Patient is awake and alert. Wife is present at bedside. Discussed nutritional changes in the first 4-6 week postoperative period. Encouraged patient's wife to continue to review educational materials given to her. She verbalized understanding to all education provided. All questions were answered. Ostomy pouching system dry and intact with no signs of lifting or leaking. Midline abdominal dressing is in place.        Impression: New colostomy.      Short term goals: Colostomy management, ostomy education.      Enedina Norman RN    1/3/2024    13:25 EST

## 2024-01-04 PROBLEM — Z98.890 S/P AAA (ABDOMINAL AORTIC ANEURYSM) REPAIR: Status: ACTIVE | Noted: 2024-01-04

## 2024-01-04 PROBLEM — Z98.890 S/P EXPLORATORY LAPAROTOMY: Status: ACTIVE | Noted: 2024-01-04

## 2024-01-04 PROBLEM — N20.0 KIDNEY STONE: Status: RESOLVED | Noted: 2023-08-17 | Resolved: 2024-01-04

## 2024-01-04 PROBLEM — Z86.79 S/P AAA (ABDOMINAL AORTIC ANEURYSM) REPAIR: Status: ACTIVE | Noted: 2024-01-04

## 2024-01-04 PROBLEM — N20.1 LEFT URETERAL STONE: Status: RESOLVED | Noted: 2023-08-17 | Resolved: 2024-01-04

## 2024-01-04 LAB
ALBUMIN SERPL-MCNC: 3 G/DL (ref 3.5–5.2)
ALBUMIN SERPL-MCNC: 3.1 G/DL (ref 3.5–5.2)
ALBUMIN/GLOB SERPL: 0.8 G/DL
ALP SERPL-CCNC: 114 U/L (ref 39–117)
ALT SERPL W P-5'-P-CCNC: 153 U/L (ref 1–41)
ANION GAP SERPL CALCULATED.3IONS-SCNC: 12.3 MMOL/L (ref 5–15)
ANION GAP SERPL CALCULATED.3IONS-SCNC: 15.6 MMOL/L (ref 5–15)
ANION GAP SERPL CALCULATED.3IONS-SCNC: 16.2 MMOL/L (ref 5–15)
ANION GAP SERPL CALCULATED.3IONS-SCNC: 18.3 MMOL/L (ref 5–15)
ANION GAP SERPL CALCULATED.3IONS-SCNC: 18.3 MMOL/L (ref 5–15)
APTT PPP: 32.9 SECONDS (ref 24.2–34.2)
AST SERPL-CCNC: 197 U/L (ref 1–40)
BASOPHILS # BLD AUTO: 0.13 10*3/MM3 (ref 0–0.2)
BASOPHILS NFR BLD AUTO: 0.5 % (ref 0–1.5)
BH BB BLOOD EXPIRATION DATE: NORMAL
BH BB BLOOD EXPIRATION DATE: NORMAL
BH BB BLOOD TYPE BARCODE: 600
BH BB BLOOD TYPE BARCODE: 600
BH BB DISPENSE STATUS: NORMAL
BH BB DISPENSE STATUS: NORMAL
BH BB PRODUCT CODE: NORMAL
BH BB PRODUCT CODE: NORMAL
BH BB UNIT NUMBER: NORMAL
BH BB UNIT NUMBER: NORMAL
BILIRUB SERPL-MCNC: 10.1 MG/DL (ref 0–1.2)
BUN SERPL-MCNC: 48 MG/DL (ref 8–23)
BUN SERPL-MCNC: 50 MG/DL (ref 8–23)
BUN SERPL-MCNC: 52 MG/DL (ref 8–23)
BUN SERPL-MCNC: 52 MG/DL (ref 8–23)
BUN SERPL-MCNC: 53 MG/DL (ref 8–23)
BUN/CREAT SERPL: 14.5 (ref 7–25)
BUN/CREAT SERPL: 15.4 (ref 7–25)
BUN/CREAT SERPL: 15.7 (ref 7–25)
BUN/CREAT SERPL: 15.7 (ref 7–25)
BUN/CREAT SERPL: 15.8 (ref 7–25)
CA-I BLDA-SCNC: 0.97 MMOL/L (ref 1.13–1.32)
CA-I BLDA-SCNC: 1 MMOL/L (ref 1.13–1.32)
CA-I BLDA-SCNC: 1 MMOL/L (ref 1.13–1.32)
CA-I BLDA-SCNC: 1.01 MMOL/L (ref 1.13–1.32)
CALCIUM SPEC-SCNC: 7.5 MG/DL (ref 8.6–10.5)
CALCIUM SPEC-SCNC: 7.7 MG/DL (ref 8.6–10.5)
CALCIUM SPEC-SCNC: 7.9 MG/DL (ref 8.6–10.5)
CHLORIDE SERPL-SCNC: 100 MMOL/L (ref 98–107)
CHLORIDE SERPL-SCNC: 101 MMOL/L (ref 98–107)
CHLORIDE SERPL-SCNC: 102 MMOL/L (ref 98–107)
CO2 SERPL-SCNC: 18.7 MMOL/L (ref 22–29)
CO2 SERPL-SCNC: 18.7 MMOL/L (ref 22–29)
CO2 SERPL-SCNC: 19.4 MMOL/L (ref 22–29)
CO2 SERPL-SCNC: 19.8 MMOL/L (ref 22–29)
CO2 SERPL-SCNC: 20.7 MMOL/L (ref 22–29)
CREAT SERPL-MCNC: 3.04 MG/DL (ref 0.76–1.27)
CREAT SERPL-MCNC: 3.31 MG/DL (ref 0.76–1.27)
CREAT SERPL-MCNC: 3.31 MG/DL (ref 0.76–1.27)
CREAT SERPL-MCNC: 3.45 MG/DL (ref 0.76–1.27)
CREAT SERPL-MCNC: 3.45 MG/DL (ref 0.76–1.27)
CROSSMATCH INTERPRETATION: NORMAL
CROSSMATCH INTERPRETATION: NORMAL
D-LACTATE SERPL-SCNC: 1 MMOL/L (ref 0.5–2)
DEPRECATED RDW RBC AUTO: 50.3 FL (ref 37–54)
DPYD GENE MUT ANL BLD/T: POSITIVE
EGFRCR SERPLBLD CKD-EPI 2021: 18.9 ML/MIN/1.73
EGFRCR SERPLBLD CKD-EPI 2021: 18.9 ML/MIN/1.73
EGFRCR SERPLBLD CKD-EPI 2021: 19.9 ML/MIN/1.73
EGFRCR SERPLBLD CKD-EPI 2021: 19.9 ML/MIN/1.73
EGFRCR SERPLBLD CKD-EPI 2021: 22 ML/MIN/1.73
EOSINOPHIL # BLD AUTO: 0.06 10*3/MM3 (ref 0–0.4)
EOSINOPHIL NFR BLD AUTO: 0.2 % (ref 0.3–6.2)
ERYTHROCYTE [DISTWIDTH] IN BLOOD BY AUTOMATED COUNT: 15.9 % (ref 12.3–15.4)
FUNGITELL VALUE: 83.22 PG/ML
GALACTOMANNAN AG SPEC IA-ACNC: 0.05 INDEX (ref 0–0.49)
GLOBULIN UR ELPH-MCNC: 3.8 GM/DL
GLUCOSE BLDC GLUCOMTR-MCNC: 104 MG/DL (ref 70–99)
GLUCOSE BLDC GLUCOMTR-MCNC: 104 MG/DL (ref 70–99)
GLUCOSE BLDC GLUCOMTR-MCNC: 110 MG/DL (ref 70–99)
GLUCOSE BLDC GLUCOMTR-MCNC: 110 MG/DL (ref 70–99)
GLUCOSE BLDC GLUCOMTR-MCNC: 114 MG/DL (ref 70–99)
GLUCOSE BLDC GLUCOMTR-MCNC: 116 MG/DL (ref 70–99)
GLUCOSE BLDC GLUCOMTR-MCNC: 127 MG/DL (ref 70–99)
GLUCOSE BLDC GLUCOMTR-MCNC: 128 MG/DL (ref 70–99)
GLUCOSE BLDC GLUCOMTR-MCNC: 128 MG/DL (ref 70–99)
GLUCOSE BLDC GLUCOMTR-MCNC: 130 MG/DL (ref 70–99)
GLUCOSE BLDC GLUCOMTR-MCNC: 92 MG/DL (ref 70–99)
GLUCOSE BLDC GLUCOMTR-MCNC: 95 MG/DL (ref 70–99)
GLUCOSE BLDC GLUCOMTR-MCNC: 96 MG/DL (ref 70–99)
GLUCOSE BLDC GLUCOMTR-MCNC: 96 MG/DL (ref 70–99)
GLUCOSE SERPL-MCNC: 106 MG/DL (ref 65–99)
GLUCOSE SERPL-MCNC: 107 MG/DL (ref 65–99)
GLUCOSE SERPL-MCNC: 126 MG/DL (ref 65–99)
GLUCOSE SERPL-MCNC: 126 MG/DL (ref 65–99)
GLUCOSE SERPL-MCNC: 151 MG/DL (ref 65–99)
HCT VFR BLD AUTO: 26.5 % (ref 37.5–51)
HCT VFR BLD AUTO: 27.6 % (ref 37.5–51)
HGB BLD-MCNC: 8.7 G/DL (ref 13–17.7)
HGB BLD-MCNC: 9.1 G/DL (ref 13–17.7)
IMM GRANULOCYTES # BLD AUTO: 1.81 10*3/MM3 (ref 0–0.05)
IMM GRANULOCYTES NFR BLD AUTO: 6.9 % (ref 0–0.5)
IMP & REVIEW OF LAB RESULTS: ABNORMAL
INR PPP: 1.45 (ref 0.86–1.15)
LYMPHOCYTES # BLD AUTO: 1.21 10*3/MM3 (ref 0.7–3.1)
LYMPHOCYTES NFR BLD AUTO: 4.6 % (ref 19.6–45.3)
Lab: ABNORMAL
Lab: ABNORMAL
MAGNESIUM SERPL-MCNC: 2.4 MG/DL (ref 1.6–2.4)
MAGNESIUM SERPL-MCNC: 2.5 MG/DL (ref 1.6–2.4)
MAGNESIUM SERPL-MCNC: 2.6 MG/DL (ref 1.6–2.4)
MAGNESIUM SERPL-MCNC: 2.7 MG/DL (ref 1.6–2.4)
MCH RBC QN AUTO: 29.9 PG (ref 26.6–33)
MCHC RBC AUTO-ENTMCNC: 32.8 G/DL (ref 31.5–35.7)
MCV RBC AUTO: 91.1 FL (ref 79–97)
MONOCYTES # BLD AUTO: 1.72 10*3/MM3 (ref 0.1–0.9)
MONOCYTES NFR BLD AUTO: 6.6 % (ref 5–12)
NEUTROPHILS NFR BLD AUTO: 21.17 10*3/MM3 (ref 1.7–7)
NEUTROPHILS NFR BLD AUTO: 81.2 % (ref 42.7–76)
NRBC BLD AUTO-RTO: 1.8 /100 WBC (ref 0–0.2)
PATHOLOGIST NAME: ABNORMAL
PHOSPHATE SERPL-MCNC: 4.8 MG/DL (ref 2.5–4.5)
PHOSPHATE SERPL-MCNC: 5.2 MG/DL (ref 2.5–4.5)
PLATELET # BLD AUTO: 296 10*3/MM3 (ref 140–450)
PMV BLD AUTO: 10.9 FL (ref 6–12)
POTASSIUM SERPL-SCNC: 4.6 MMOL/L (ref 3.5–5.2)
POTASSIUM SERPL-SCNC: 4.7 MMOL/L (ref 3.5–5.2)
POTASSIUM SERPL-SCNC: 4.8 MMOL/L (ref 3.5–5.2)
PROT SERPL-MCNC: 6.8 G/DL (ref 6–8.5)
PROTHROMBIN TIME: 17.9 SECONDS (ref 11.8–14.9)
RBC # BLD AUTO: 2.91 10*6/MM3 (ref 4.14–5.8)
REFERENCE VALUE: ABNORMAL
SODIUM SERPL-SCNC: 133 MMOL/L (ref 136–145)
SODIUM SERPL-SCNC: 137 MMOL/L (ref 136–145)
SODIUM SERPL-SCNC: 137 MMOL/L (ref 136–145)
SODIUM SERPL-SCNC: 138 MMOL/L (ref 136–145)
SODIUM SERPL-SCNC: 138 MMOL/L (ref 136–145)
UNIT  ABO: NORMAL
UNIT  ABO: NORMAL
UNIT  RH: NORMAL
UNIT  RH: NORMAL
WBC NRBC COR # BLD AUTO: 26.1 10*3/MM3 (ref 3.4–10.8)

## 2024-01-04 PROCEDURE — 25010000002 VASOPRESSIN 0.2 UNIT/ML SOLUTION: Performed by: INTERNAL MEDICINE

## 2024-01-04 PROCEDURE — 82948 REAGENT STRIP/BLOOD GLUCOSE: CPT

## 2024-01-04 PROCEDURE — 99024 POSTOP FOLLOW-UP VISIT: CPT | Performed by: SURGERY

## 2024-01-04 PROCEDURE — 92610 EVALUATE SWALLOWING FUNCTION: CPT

## 2024-01-04 PROCEDURE — 83735 ASSAY OF MAGNESIUM: CPT | Performed by: INTERNAL MEDICINE

## 2024-01-04 PROCEDURE — 85025 COMPLETE CBC W/AUTO DIFF WBC: CPT | Performed by: INTERNAL MEDICINE

## 2024-01-04 PROCEDURE — 80053 COMPREHEN METABOLIC PANEL: CPT | Performed by: INTERNAL MEDICINE

## 2024-01-04 PROCEDURE — 82330 ASSAY OF CALCIUM: CPT | Performed by: INTERNAL MEDICINE

## 2024-01-04 PROCEDURE — 85018 HEMOGLOBIN: CPT | Performed by: INTERNAL MEDICINE

## 2024-01-04 PROCEDURE — 25010000002 AMPICILLIN-SULBACTAM PER 1.5 G: Performed by: INTERNAL MEDICINE

## 2024-01-04 PROCEDURE — 85610 PROTHROMBIN TIME: CPT | Performed by: SURGERY

## 2024-01-04 PROCEDURE — 84100 ASSAY OF PHOSPHORUS: CPT | Performed by: INTERNAL MEDICINE

## 2024-01-04 PROCEDURE — 85730 THROMBOPLASTIN TIME PARTIAL: CPT | Performed by: SURGERY

## 2024-01-04 PROCEDURE — 83605 ASSAY OF LACTIC ACID: CPT | Performed by: PHYSICIAN ASSISTANT

## 2024-01-04 PROCEDURE — 25010000002 CALCIUM GLUCONATE-NACL 1-0.675 GM/50ML-% SOLUTION: Performed by: NURSE PRACTITIONER

## 2024-01-04 PROCEDURE — 99233 SBSQ HOSP IP/OBS HIGH 50: CPT | Performed by: FAMILY MEDICINE

## 2024-01-04 PROCEDURE — 85014 HEMATOCRIT: CPT | Performed by: INTERNAL MEDICINE

## 2024-01-04 PROCEDURE — 99233 SBSQ HOSP IP/OBS HIGH 50: CPT | Performed by: INTERNAL MEDICINE

## 2024-01-04 RX ORDER — CALCIUM GLUCONATE 20 MG/ML
1000 INJECTION, SOLUTION INTRAVENOUS ONCE
Status: COMPLETED | OUTPATIENT
Start: 2024-01-04 | End: 2024-01-04

## 2024-01-04 RX ADMIN — OXYCODONE AND ACETAMINOPHEN 1 TABLET: 5; 325 TABLET ORAL at 13:23

## 2024-01-04 RX ADMIN — OXYCODONE AND ACETAMINOPHEN 1 TABLET: 10; 325 TABLET ORAL at 01:24

## 2024-01-04 RX ADMIN — CALCIUM CHLORIDE, MAGNESIUM CHLORIDE, SODIUM CHLORIDE, SODIUM BICARBONATE, POTASSIUM CHLORIDE AND SODIUM PHOSPHATE DIBASIC DIHYDRATE 1500 ML/HR: 3.68; 3.05; 6.34; 3.09; .314; .187 INJECTION INTRAVENOUS at 23:07

## 2024-01-04 RX ADMIN — CALCIUM CHLORIDE, MAGNESIUM CHLORIDE, SODIUM CHLORIDE, SODIUM BICARBONATE, POTASSIUM CHLORIDE AND SODIUM PHOSPHATE DIBASIC DIHYDRATE 1500 ML/HR: 3.68; 3.05; 6.34; 3.09; .314; .187 INJECTION INTRAVENOUS at 20:12

## 2024-01-04 RX ADMIN — Medication 10 ML: at 21:46

## 2024-01-04 RX ADMIN — QUETIAPINE FUMARATE 50 MG: 25 TABLET ORAL at 21:46

## 2024-01-04 RX ADMIN — CALCIUM CHLORIDE, MAGNESIUM CHLORIDE, SODIUM CHLORIDE, SODIUM BICARBONATE, POTASSIUM CHLORIDE AND SODIUM PHOSPHATE DIBASIC DIHYDRATE 1500 ML/HR: 3.68; 3.05; 6.34; 3.09; .314; .187 INJECTION INTRAVENOUS at 01:18

## 2024-01-04 RX ADMIN — CALCIUM GLUCONATE 1000 MG: 20 INJECTION, SOLUTION INTRAVENOUS at 08:03

## 2024-01-04 RX ADMIN — MIDODRINE HYDROCHLORIDE 5 MG: 5 TABLET ORAL at 11:10

## 2024-01-04 RX ADMIN — PANTOPRAZOLE SODIUM 40 MG: 40 INJECTION, POWDER, FOR SOLUTION INTRAVENOUS at 21:46

## 2024-01-04 RX ADMIN — MIDODRINE HYDROCHLORIDE 5 MG: 5 TABLET ORAL at 06:30

## 2024-01-04 RX ADMIN — PANTOPRAZOLE SODIUM 40 MG: 40 INJECTION, POWDER, FOR SOLUTION INTRAVENOUS at 09:17

## 2024-01-04 RX ADMIN — SODIUM CHLORIDE 3 G: 900 INJECTION INTRAVENOUS at 12:29

## 2024-01-04 RX ADMIN — OXYCODONE AND ACETAMINOPHEN 1 TABLET: 10; 325 TABLET ORAL at 17:11

## 2024-01-04 RX ADMIN — OXYCODONE AND ACETAMINOPHEN 1 TABLET: 10; 325 TABLET ORAL at 09:18

## 2024-01-04 RX ADMIN — Medication 10 ML: at 09:18

## 2024-01-04 RX ADMIN — MIDODRINE HYDROCHLORIDE 5 MG: 5 TABLET ORAL at 17:13

## 2024-01-04 RX ADMIN — TRAZODONE HYDROCHLORIDE 50 MG: 50 TABLET ORAL at 21:46

## 2024-01-04 RX ADMIN — VASOPRESSIN 0.04 UNITS/MIN: 0.2 INJECTION INTRAVENOUS at 08:04

## 2024-01-04 RX ADMIN — Medication 10 MG: at 21:46

## 2024-01-04 RX ADMIN — CALCIUM CHLORIDE, MAGNESIUM CHLORIDE, SODIUM CHLORIDE, SODIUM BICARBONATE, POTASSIUM CHLORIDE AND SODIUM PHOSPHATE DIBASIC DIHYDRATE 1500 ML/HR: 3.68; 3.05; 6.34; 3.09; .314; .187 INJECTION INTRAVENOUS at 04:11

## 2024-01-04 RX ADMIN — OXYCODONE AND ACETAMINOPHEN 1 TABLET: 5; 325 TABLET ORAL at 05:24

## 2024-01-04 RX ADMIN — ESCITALOPRAM OXALATE 5 MG: 10 TABLET ORAL at 09:17

## 2024-01-04 NOTE — NURSING NOTE
MD notified of temp increase post blood wanted tylenol x1 and to notify blood bank. RN did this, no other interventions and to continue with blood infusion.

## 2024-01-04 NOTE — NURSING NOTE
Visited Mr Crowe room. He appears to be resting quietly at this time.his wife tells me, he had just been medicated d/t pain an anxiety. He is on continuous CRRT, levo and vaso infusing d/t hemodynamic  instability. 02 cont via NC. MD is following and trending d/t GI bleeding.   Mr Savage appears very frail.  Support given to wife.   Palliative will continue to follow for emotion support.  Daisy RIVERA RN, BSN  Palliative Care

## 2024-01-04 NOTE — PLAN OF CARE
Goal Outcome Evaluation:         Patient was off levo/vaso for some time tonight, vaso placed back on. CRRT, tolerating well. RN transitioned to pulling even, dayshift to evaluate if more fluid needs to be removed. Medicated x2 for pain. Pt. Vomited twice this shift green/brown liquid. Ostomy continues to have dark green/bright red blood out. Monitoring H&H per general surgery. Patient is AxOx3-4 intermittently confused to situation, which is improvement to previous night.     Dianna RN

## 2024-01-04 NOTE — SIGNIFICANT NOTE
Wound Eval / Progress Noted     Evelina     Patient Name: Danny Savage  : 1960  MRN: [id number]  Today's Date: 2024                 Admit Date: 2023    Visit Dx:    ICD-10-CM ICD-9-CM   1. Sepsis, due to unspecified organism, unspecified whether acute organ dysfunction present  A41.9 038.9     995.91   2. Fever, unspecified fever cause  R50.9 780.60   3. Intra-abdominal fluid collection  R18.8 789.59   4. Abdominal aortic aneurysm (AAA) without rupture, unspecified part  I71.40 441.4   5. Ruptured infrarenal abdominal aortic aneurysm (AAA)  I71.33 441.3   6. Decreased activities of daily living (ADL)  Z78.9 V49.89   7. Difficulty walking  R26.2 719.7   8. DVT, lower extremity, distal, acute, unspecified laterality  I82.4Z9 453.42   9. Dysphagia, oropharyngeal  R13.12 787.22         Left flank pain    Ruptured infrarenal abdominal aortic aneurysm (AAA)    DVT, lower extremity, distal, acute, unspecified laterality    S/P Exploratory laparotomy; appendectomy; left colon resection with creation of ostomy    S/P Open repair of ruptured infrarenal abdominal aortic aneurysm        Past Medical History:   Diagnosis Date    Allergy     Aortic aneurysm     4.7 just found has not f/u    H/O Kidney stone     Hiatal hernia     Kidney mass     left    Kidney stone     Renal cancer     S/P Exploratory laparotomy; appendectomy; left colon resection with creation of ostomy 2024    S/P Open repair of ruptured infrarenal abdominal aortic aneurysm 2024        Past Surgical History:   Procedure Laterality Date    ABDOMINAL AORTIC ANEURYSM REPAIR N/A 2023    Procedure: ABDOMINAL AORTIC ANEURYSM REPAIR;  Surgeon: Koffi Agosto MD;  Location: [facility] MAIN OR;  Service: Vascular;  Laterality: N/A;    COLON RESECTION N/A 2023    Procedure: COLON RESECTION;  Surgeon: Hernan Mallory MD;  Location: [facility] MAIN OR;  Service: General;  Laterality: N/A;  EXPLORATORY LAPAROTOMY, REPAIR OF SMALL  "BOWEL MESENTERY, APPENDECTOMY, LEFT HEMICOLECTOMY, CREATION OF OSTOMY, ABDOMINAL CLOSURE    EXPLORATORY LAPAROTOMY N/A 12/26/2023    Procedure: LAPAROTOMY EXPLORATORY;  Surgeon: Koffi Agosto MD;  Location: [facility] MAIN OR;  Service: Vascular;  Laterality: N/A;  Exploratory Laparotomy    KIDNEY STONE SURGERY      NEPHRECTOMY PARTIAL Left 11/13/2023    Procedure: NEPHRECTOMY PARTIAL LAPAROSCOPIC WITH DAVINCI ROBOT, left;  Surgeon: Michelle Cai MD;  Location: [facility] MAIN OR;  Service: Robotics - DaVinci;  Laterality: Left;    URETEROSCOPY LASER LITHOTRIPSY WITH STENT INSERTION Left 08/18/2023    Procedure: CYSTOSCOPY URETEROSCOPY RETROGRADE PYELOGRAM STENT INSERTION, left;  Surgeon: Michelle Cai MD;  Location: [facility] MAIN OR;  Service: Urology;  Laterality: Left;         Physical Assessment:     01/04/24 1211   Colostomy LLQ   Placement date: If unknown, DO NOT use \"Add Comment\" note/Placement time: If unknown, DO NOT use \"Add Comment\" note: 12/26/23 1315   Inserted by: DR HOLGUIN  Hand Hygiene Completed: Yes  Location: LLQ   Stomal Appliance 1 piece;Drainable;Intact   Stoma Appearance red;moist;protruding above skin level   Peristomal Assessment CAMRON   Stoma Function stool   Stool Color red, bright;brown, dark   Stool Consistency liquid          Wound Check / Follow-up:  Patient seen today for Ostomy follow-up. Patient remains in CICU and is on CRRT currently. Wife at bedside.  Patient is awake, alert and appropriate.     Ostomy pouching system is intact with no signs of leaking or lifting. Abdominal binder also in place. There is brown liquid stool as well as bright red streaks within pouching system. General surgery aware of blood presence in pouching system as patient's wife states treatment options have been reviewed and discussed.  Patient has no current complaints or concerns related to his ostomy.     Discussed with patient and wife the importance of keeping pouch emptied as well as avoiding " excessive gas accumulation. Discussed burping of pouch and ensuring that they keep an eye on the amount of drainage or air within pouching system. Encouraged participation in care.     Patient is still likely going to LTAC at this time.     Impression: Recent Colostomy    Short term goals:  Ostomy management.     Danyelle Cisneros RN    1/4/2024    16:55 EST

## 2024-01-04 NOTE — PROGRESS NOTES
" LOS: 12 days   Patient Care Team:  Kodi Pichardo MD as PCP - General (Internal Medicine)  Lisset Harrington APRN as Nurse Practitioner (Urology)    Chief Complaint: ROLAND    Subjective     History of Present Illness  Pt on vasopressin now.  Levo off.  Remains on CRRT  No problems with dialysis  Wife at bedside.    Subjective:  Symptoms:  No shortness of breath, chest pain, chest pressure or anxiety.    Diet:  No nausea or vomiting.      History taken from: chart family    Objective     Vital Sign Min/Max for last 24 hours  Temp  Min: 98 °F (36.7 °C)  Max: 100.2 °F (37.9 °C)   BP  Min: 78/47  Max: 141/64   Pulse  Min: 81  Max: 102   No data recorded   SpO2  Min: 91 %  Max: 100 %   No data recorded   Weight  Min: 87.5 kg (192 lb 12.8 oz)  Max: 87.5 kg (192 lb 12.8 oz)     Flowsheet Rows      Flowsheet Row First Filed Value   Admission Height 177.8 cm (70\") Documented at 12/23/2023 1948   Admission Weight 79.9 kg (176 lb 2.4 oz) Documented at 12/23/2023 1948            I/O this shift:  In: 25.7 [I.V.:25.7]  Out: 86   I/O last 3 completed shifts:  In: 6498.3 [P.O.:1122.9; I.V.:4292.8; Blood:1082.6]  Out: 9247 [Stool:850]    Objective:  General Appearance:  Comfortable.    Vital signs: (most recent): Blood pressure 119/65, pulse 82, temperature 98.6 °F (37 °C), temperature source Oral, resp. rate 23, height 177.8 cm (70\"), weight 87.5 kg (192 lb 12.8 oz), SpO2 99%.    Output: No urine output.    HEENT: Normal HEENT exam.    Lungs:  Normal effort and normal respiratory rate.  Breath sounds clear to auscultation.    Heart: Normal rate.  Regular rhythm.    Abdomen: Hypoactive bowel sounds.     Extremities: There is dependent edema.    Pulses: Distal pulses are intact.    Neurological: Patient is alert and oriented to person, place and time.    Pupils:  Pupils are equal, round, and reactive to light.    Skin:  Warm and dry.              Results Review:     I reviewed the patient's new clinical results.    WBC WBC " "  Date Value Ref Range Status   01/04/2024 26.10 (H) 3.40 - 10.80 10*3/mm3 Final   01/03/2024 29.36 (H) 3.40 - 10.80 10*3/mm3 Final   01/02/2024 43.46 (C) 3.40 - 10.80 10*3/mm3 Final      HGB Hemoglobin   Date Value Ref Range Status   01/04/2024 8.7 (L) 13.0 - 17.7 g/dL Final   01/04/2024 9.1 (L) 13.0 - 17.7 g/dL Final   01/03/2024 7.0 (L) 13.0 - 17.7 g/dL Final   01/03/2024 7.4 (L) 13.0 - 17.7 g/dL Final   01/02/2024 7.1 (L) 13.0 - 17.7 g/dL Final      HCT Hematocrit   Date Value Ref Range Status   01/04/2024 26.5 (L) 37.5 - 51.0 % Final   01/04/2024 27.6 (L) 37.5 - 51.0 % Final   01/03/2024 21.3 (L) 37.5 - 51.0 % Final   01/03/2024 22.4 (L) 37.5 - 51.0 % Final   01/02/2024 21.8 (L) 37.5 - 51.0 % Final      Platlets No results found for: \"LABPLAT\"   MCV MCV   Date Value Ref Range Status   01/04/2024 91.1 79.0 - 97.0 fL Final   01/03/2024 88.2 79.0 - 97.0 fL Final   01/02/2024 90.5 79.0 - 97.0 fL Final          Sodium Sodium   Date Value Ref Range Status   01/04/2024 138 136 - 145 mmol/L Final   01/04/2024 138 136 - 145 mmol/L Final   01/04/2024 137 136 - 145 mmol/L Final   01/03/2024 134 (L) 136 - 145 mmol/L Final   01/03/2024 133 (L) 136 - 145 mmol/L Final   01/03/2024 134 (L) 136 - 145 mmol/L Final   01/03/2024 134 (L) 136 - 145 mmol/L Final   01/02/2024 136 136 - 145 mmol/L Final   01/02/2024 131 (L) 136 - 145 mmol/L Final   01/02/2024 134 (L) 136 - 145 mmol/L Final     Sodium, Arterial   Date Value Ref Range Status   01/02/2024 133.4 (L) 136 - 146 mmol/L Final      Potassium Potassium   Date Value Ref Range Status   01/04/2024 4.8 3.5 - 5.2 mmol/L Final   01/04/2024 4.8 3.5 - 5.2 mmol/L Final   01/04/2024 4.7 3.5 - 5.2 mmol/L Final   01/03/2024 4.5 3.5 - 5.2 mmol/L Final   01/03/2024 4.5 3.5 - 5.2 mmol/L Final   01/03/2024 4.8 3.5 - 5.2 mmol/L Final   01/03/2024 4.9 3.5 - 5.2 mmol/L Final   01/02/2024 5.4 (H) 3.5 - 5.2 mmol/L Final   01/02/2024 6.2 (C) 3.5 - 5.2 mmol/L Final   01/02/2024 6.2 (C) 3.5 - 5.2 " mmol/L Final      Chloride Chloride   Date Value Ref Range Status   01/04/2024 101 98 - 107 mmol/L Final   01/04/2024 101 98 - 107 mmol/L Final   01/04/2024 102 98 - 107 mmol/L Final   01/03/2024 98 98 - 107 mmol/L Final   01/03/2024 99 98 - 107 mmol/L Final   01/03/2024 99 98 - 107 mmol/L Final   01/03/2024 98 98 - 107 mmol/L Final   01/02/2024 97 (L) 98 - 107 mmol/L Final   01/02/2024 93 (L) 98 - 107 mmol/L Final   01/02/2024 95 (L) 98 - 107 mmol/L Final      CO2 CO2   Date Value Ref Range Status   01/04/2024 18.7 (L) 22.0 - 29.0 mmol/L Final   01/04/2024 18.7 (L) 22.0 - 29.0 mmol/L Final   01/04/2024 19.4 (L) 22.0 - 29.0 mmol/L Final   01/03/2024 19.5 (L) 22.0 - 29.0 mmol/L Final   01/03/2024 19.3 (L) 22.0 - 29.0 mmol/L Final   01/03/2024 19.1 (L) 22.0 - 29.0 mmol/L Final   01/03/2024 18.2 (L) 22.0 - 29.0 mmol/L Final   01/02/2024 19.3 (L) 22.0 - 29.0 mmol/L Final   01/02/2024 12.5 (L) 22.0 - 29.0 mmol/L Final   01/02/2024 12.9 (L) 22.0 - 29.0 mmol/L Final      BUN BUN   Date Value Ref Range Status   01/04/2024 52 (H) 8 - 23 mg/dL Final   01/04/2024 52 (H) 8 - 23 mg/dL Final   01/04/2024 53 (H) 8 - 23 mg/dL Final   01/03/2024 57 (H) 8 - 23 mg/dL Final   01/03/2024 61 (H) 8 - 23 mg/dL Final   01/03/2024 58 (H) 8 - 23 mg/dL Final   01/03/2024 67 (H) 8 - 23 mg/dL Final   01/02/2024 67 (H) 8 - 23 mg/dL Final   01/02/2024 81 (H) 8 - 23 mg/dL Final   01/02/2024 75 (H) 8 - 23 mg/dL Final      Creatinine Creatinine   Date Value Ref Range Status   01/04/2024 3.31 (H) 0.76 - 1.27 mg/dL Final   01/04/2024 3.31 (H) 0.76 - 1.27 mg/dL Final   01/04/2024 3.45 (H) 0.76 - 1.27 mg/dL Final   01/03/2024 3.74 (H) 0.76 - 1.27 mg/dL Final   01/03/2024 4.06 (H) 0.76 - 1.27 mg/dL Final   01/03/2024 4.49 (H) 0.76 - 1.27 mg/dL Final   01/03/2024 4.65 (H) 0.76 - 1.27 mg/dL Final   01/02/2024 4.74 (H) 0.76 - 1.27 mg/dL Final   01/02/2024 5.96 (H) 0.76 - 1.27 mg/dL Final   01/02/2024 5.77 (H) 0.76 - 1.27 mg/dL Final      Calcium Calcium  "  Date Value Ref Range Status   01/04/2024 7.7 (L) 8.6 - 10.5 mg/dL Final   01/04/2024 7.7 (L) 8.6 - 10.5 mg/dL Final   01/04/2024 7.5 (L) 8.6 - 10.5 mg/dL Final   01/03/2024 7.4 (L) 8.6 - 10.5 mg/dL Final   01/03/2024 7.4 (L) 8.6 - 10.5 mg/dL Final   01/03/2024 6.8 (L) 8.6 - 10.5 mg/dL Final   01/03/2024 6.7 (L) 8.6 - 10.5 mg/dL Final   01/02/2024 6.6 (L) 8.6 - 10.5 mg/dL Final   01/02/2024 6.7 (L) 8.6 - 10.5 mg/dL Final   01/02/2024 7.3 (L) 8.6 - 10.5 mg/dL Final      PO4 No results found for: \"CAPO4\"   Albumin Albumin   Date Value Ref Range Status   01/04/2024 3.0 (L) 3.5 - 5.2 g/dL Final   01/04/2024 3.0 (L) 3.5 - 5.2 g/dL Final   01/04/2024 3.0 (L) 3.5 - 5.2 g/dL Final   01/03/2024 3.1 (L) 3.5 - 5.2 g/dL Final   01/03/2024 3.0 (L) 3.5 - 5.2 g/dL Final   01/03/2024 3.0 (L) 3.5 - 5.2 g/dL Final   01/03/2024 3.0 (L) 3.5 - 5.2 g/dL Final   01/02/2024 3.0 (L) 3.5 - 5.2 g/dL Final   01/02/2024 2.2 (L) 3.5 - 5.2 g/dL Final   01/02/2024 2.5 (L) 3.5 - 5.2 g/dL Final      Magnesium Magnesium   Date Value Ref Range Status   01/04/2024 2.5 (H) 1.6 - 2.4 mg/dL Final   01/04/2024 2.4 1.6 - 2.4 mg/dL Final   01/03/2024 2.5 (H) 1.6 - 2.4 mg/dL Final   01/03/2024 2.5 (H) 1.6 - 2.4 mg/dL Final   01/03/2024 2.4 1.6 - 2.4 mg/dL Final   01/03/2024 2.4 1.6 - 2.4 mg/dL Final   01/02/2024 2.4 1.6 - 2.4 mg/dL Final   01/02/2024 2.6 (H) 1.6 - 2.4 mg/dL Final   01/02/2024 2.8 (H) 1.6 - 2.4 mg/dL Final      Uric Acid No results found for: \"URICACID\"     Medication Review:   ampicillin-sulbactam, 3 g, Intravenous, Q12H  escitalopram, 5 mg, Nasogastric, Daily  melatonin, 10 mg, Nasogastric, Nightly  midodrine, 5 mg, Nasogastric, TID AC  pantoprazole, 40 mg, Intravenous, Q12H  QUEtiapine, 50 mg, Nasogastric, Nightly  senna-docusate sodium, 2 tablet, Nasogastric, BID  sodium chloride, 10 mL, Intravenous, Q12H  traZODone, 50 mg, Nasogastric, Nightly          Assessment & Plan       Left flank pain    Ruptured infrarenal abdominal aortic " aneurysm (AAA)    DVT, lower extremity, distal, acute, unspecified laterality    S/P Exploratory laparotomy; appendectomy; left colon resection with creation of ostomy    S/P Open repair of ruptured infrarenal abdominal aortic aneurysm      Assessment & Plan  ROLAND-  pt with previous partial left nephrectomy and baseline creatinine closer to 0.9-1.0.  Had noted hypotension with AAA rupture s/p repair.  Likely ATN from hypotension vs. Atheroembolic injury.  Noted renal infarcts seen on repeat CT.  Also received IV contrast with CTA.  Will hold IV bumex for now.  BP remains low, on levophed, vasopressin started.  Transitioned to CRRT, appears to be doing well today.  Continue same.  AAA rupture-  s/p open repair infrarenal aorta.  Ischemic bowel- s/p left colectomy, ostomy.  Shock- sepsis, hemorrhagic component with AAA rupture.   Sepsis-  blood cx with strep pyogenes previously.  On abx per primary.  Elevated procalcitonin.  Met Acidosis-  bicarb held.  Improved now with CRRT.  Anemia-  prbc's prn.  Hyperkalemia-  worsened with acidosis, likely due to reabsorption of blood from previous bleed. Improved  DVT- simran LE.  Possible filter soon, not able to tolerated heparin gtt.    Adin Hinton MD  01/04/24  09:00 EST

## 2024-01-04 NOTE — THERAPY EVALUATION
Acute Care - Speech Language Pathology   Swallow Initial Evaluation  [place]     Patient Name: Danny Savage  : 1960  MRN: [id number]  Today's Date: 2024               Admit Date: 2023    Visit Dx:     ICD-10-CM ICD-9-CM   1. Sepsis, due to unspecified organism, unspecified whether acute organ dysfunction present  A41.9 038.9     995.91   2. Fever, unspecified fever cause  R50.9 780.60   3. Intra-abdominal fluid collection  R18.8 789.59   4. Abdominal aortic aneurysm (AAA) without rupture, unspecified part  I71.40 441.4   5. Ruptured infrarenal abdominal aortic aneurysm (AAA)  I71.33 441.3   6. Decreased activities of daily living (ADL)  Z78.9 V49.89   7. Difficulty walking  R26.2 719.7   8. DVT, lower extremity, distal, acute, unspecified laterality  I82.4Z9 453.42   9. Dysphagia, oropharyngeal  R13.12 787.22     Patient Active Problem List   Diagnosis    Left renal mass    Left flank pain    Ruptured infrarenal abdominal aortic aneurysm (AAA)    DVT, lower extremity, distal, acute, unspecified laterality    S/P Exploratory laparotomy; appendectomy; left colon resection with creation of ostomy    S/P Open repair of ruptured infrarenal abdominal aortic aneurysm     Past Medical History:   Diagnosis Date    Allergy     Aortic aneurysm     4.7 just found has not f/u    H/O Kidney stone     Hiatal hernia     Kidney mass     left    Kidney stone     Renal cancer     S/P Exploratory laparotomy; appendectomy; left colon resection with creation of ostomy 2024    S/P Open repair of ruptured infrarenal abdominal aortic aneurysm 2024     Past Surgical History:   Procedure Laterality Date    ABDOMINAL AORTIC ANEURYSM REPAIR N/A 2023    Procedure: ABDOMINAL AORTIC ANEURYSM REPAIR;  Surgeon: Koffi Agosto MD;  Location: [facility] MAIN OR;  Service: Vascular;  Laterality: N/A;    COLON RESECTION N/A 2023    Procedure: COLON RESECTION;  Surgeon: Hernan Mallory MD;  Location: [facility]  MAIN OR;  Service: General;  Laterality: N/A;  EXPLORATORY LAPAROTOMY, REPAIR OF SMALL BOWEL MESENTERY, APPENDECTOMY, LEFT HEMICOLECTOMY, CREATION OF OSTOMY, ABDOMINAL CLOSURE    EXPLORATORY LAPAROTOMY N/A 12/26/2023    Procedure: LAPAROTOMY EXPLORATORY;  Surgeon: Koffi Agosto MD;  Location: [facility] MAIN OR;  Service: Vascular;  Laterality: N/A;  Exploratory Laparotomy    KIDNEY STONE SURGERY      NEPHRECTOMY PARTIAL Left 11/13/2023    Procedure: NEPHRECTOMY PARTIAL LAPAROSCOPIC WITH DAVINCI ROBOT, left;  Surgeon: Michelle Cai MD;  Location: [facility] MAIN OR;  Service: Robotics - DaVinci;  Laterality: Left;    URETEROSCOPY LASER LITHOTRIPSY WITH STENT INSERTION Left 08/18/2023    Procedure: CYSTOSCOPY URETEROSCOPY RETROGRADE PYELOGRAM STENT INSERTION, left;  Surgeon: Michelle Cai MD;  Location: [facility] MAIN OR;  Service: Urology;  Laterality: Left;       PAIN SCALE: None noted    PRECAUTIONS/CONTRAINDICATIONS: None noted    SUSPECTED ABUSE/NEGLECT/EXPLOITATION: None noted    SOCIAL/PSYCHOLOGICAL NEEDS/BARRIERS: None noted    PAST SOCIAL HISTORY: Lives at home    PRIOR FUNCTION: Independent    PATIENT GOALS/EXPECTATIONS: Did not report    HISTORY: This patient is a very pleasant 63-year-old male admitted with the above diagnosis.  Status post ruptured abdominal abdominal aortic aneurysm status postrepair.  Colon resection on 12/26/2023.  Patient started on trickle feeds and tolerated it advance to clear liquid diet.  Patient required vent support from 12/24/2023 through 12/28/2023.  Patient started on diet and tolerated well other than reduced intake. Cortrak placed due to fluctuating level of alertness and poor intake.  Tube feeds placed on hold due to vomiting.  No more vomiting reported since yesterday.  ST asked to re-assess.     CURRENT DIET LEVEL: NPO - Cortrak    OBJECTIVE:    TEST ADMINISTERED: Clinical dysphagia evaluation    COGNITION/SAFETY AWARENESS: Alert and cooperative    BEHAVIORAL  OBSERVATIONS: Pleasant    ORAL MOTOR EXAM: Generalized oral motor weakness.     VOICE QUALITY: Vocal quality is weaker    REFLEX EXAM: Deferred    POSTURE: 90 degrees upright    FEEDING/SWALLOWING FUNCTION: Patient assessed with thin liquids, puree consistencies and regular solids.     CLINICAL OBSERVATIONS: Oral stage is characterized by good bolus preparation and control. Mildly prolonged mastication with solids. Patient reporting xerostomia.  Prolonged oral transit with solids.   Pharyngeal phase appears timely with good laryngeal elevation per palpation.  Patient does report globus sensation with solids with throat clearing and multiple swallows to clear.  No clinical signs or symptoms of aspiration noted with single sips of thin liquids or puree consistencies.        DYSPHAGIA CRITERIA: Risk of aspiration.     FUNCTIONAL ASSESSMENT INSTRUMENT: Patient currently scored a level 4of 7 on Functional Communication Measures for swallowing indicating a 40-59% limitation in function.    ASSESSMENT/ PLAN OF CARE:  Pt presents with mild to moderate pharyngeal dysphagia with report of globus sensation with solids.  No clinical signs of aspiration were noted.    REHAB POTENTIAL:  Pt has good rehab potential.  The following limitations may influence improvement/ length of tx medical status.    RECOMMENDATIONS:   1.   DIET: Full liquid diet.    2.  POSITION: 90 degrees upright for all intake    3.  COMPENSATORY STRATEGIES: Slow rate, small bites/drinks    ST will follow up in am and attempt to advance diet.     Pt/responsible party agrees with plan of care and has been informed of all alternatives, risks and benefits.        EDUCATION  The patient has been educated in the following areas:   Dysphagia (Swallowing Impairment).          Ivonne Meza, SLP  1/4/2024

## 2024-01-04 NOTE — PROGRESS NOTES
[facility]     Progress Note    Patient Name: Danny Savage  : 1960  MRN: [id number]  Primary Care Physician:  Kodi Pichardo MD  Date of admission: 2023    Subjective   Subjective     Chief Complaint: Blood per stoma    HPI:  Nursing reports blood per stoma when emptying the bag.  Patient had some nausea vomiting overnight      Objective   Objective     Vitals:   Temp:  [98 °F (36.7 °C)-100.2 °F (37.9 °C)] 98.6 °F (37 °C)  Heart Rate:  [] 82  BP: ()/(40-78) 119/65    Physical Exam  Constitutional:       Appearance: Normal appearance.   Cardiovascular:      Rate and Rhythm: Normal rate.   Pulmonary:      Effort: Pulmonary effort is normal.   Abdominal:      Tenderness: There is abdominal tenderness.     Stoma is darker today    Result Review    Result Review:  I have personally reviewed the results from the time of this admission to 2024 09:35 EST and agree with these findings:  [x]  Laboratory  []  Microbiology  []  Radiology  []  EKG/Telemetry   []  Cardiology/Vascular   []  Pathology  []  Old records  []  Other:  Most notable findings include: White count still elevated, hemoglobin 8.7    Assessment & Plan   Assessment / Plan     Brief Patient Summary:  Danny Savage is a 63 y.o. male who status post left colectomy with blood per ostomy    Active Hospital Problems:  Active Hospital Problems    Diagnosis    • **Left flank pain    • S/P Exploratory laparotomy; appendectomy; left colon resection with creation of ostomy    • S/P Open repair of ruptured infrarenal abdominal aortic aneurysm    • Ruptured infrarenal abdominal aortic aneurysm (AAA)    • DVT, lower extremity, distal, acute, unspecified laterality      Plan:  The stoma is darker-to be expected given his recent pressor requirements  Unsure of source of GI bleeding  As always GI bleeds are difficult to diagnose/localize  If he has worsening bleeding or continued bleeding the best option to try and localize  the GI bleed is a CTA  CTA may be difficult given his CRRT and renal issues  Could also potentially attempt a bleeding scan although these take longer typically than a CTA  At this point in time I do not have a surgical target for any bleeding so any surgery would be shot in the dark  Could also consider a scope with GI if there were some reason to believe that he was having bleeding from the colon or stomach, but again the level of bleeding is so low it would likely be difficult to evaluate endoscopically  Continue to monitor  Continue serial H&H's       DVT prophylaxis:  Medical and mechanical DVT prophylaxis orders are present.    CODE STATUS:   Level Of Support Discussed With: Patient  Code Status (Patient has no pulse and is not breathing): CPR (Attempt to Resuscitate)  Medical Interventions (Patient has pulse or is breathing): Full Support    Disposition:  I expect patient to be discharged unsure.    Electronically signed by Hernan Mallory MD, 01/04/24, 9:35 AM EST.

## 2024-01-04 NOTE — PROGRESS NOTES
Pulmonary / Critical Care Progress Note      Patient Name: Danny Savage  : 1960  MRN: [id number]  Attending:  Arpit Franks MD   Date of admission: 2023    Subjective   Subjective   Patient critically ill with ruptured AAA s/p open repair, hemorrhagic shock, Streptococcus pyogenes bacteremia    Over the past 24 hours, patient remained on nasal cannula, continuous CRRT, low-dose vasopressors, did have some bright red blood out of ostomy received 2 units PRBCs and FFP for elevated INR, had nausea, and 2 episodes of vomiting tube feeds placed on hold    Overnight, no acute events, less delirium    This morning  Patient is awake, resting comfortably on nasal cannula  Levophed on standby, on vasopressin at 0.04  Hemoglobin at 8.7, stool in ostomy is now brown no evidence of bleeding  On CRRT with net negative goal  INR improved at 1.45  Ionized calcium level      Objective   Objective     Vitals:   Vital signs for last 24 hours:  Temp:  [98 °F (36.7 °C)-100.2 °F (37.9 °C)] 98.6 °F (37 °C)  Heart Rate:  [] 89  BP: ()/(40-78) 117/70    Intake/Output last 3 shifts:  I/O last 3 completed shifts:  In: 6498.3 [P.O.:1122.9; I.V.:4292.8; Blood:1082.6]  Out: 9247 [Stool:850]    Physical Exam   Vital Signs Reviewed  Chronically and critically ill-appearing male on nasal cannula, NG in place  General: Weak, deconditioned, alert  Chest:  good aeration, unchanged crackles in bases bilaterally, no work of breathing noted at rest  CV: RRR, no MGR, pulses 2+, equal.  Abd: Soft, ND, NT, midline incision in place, colostomy in place  Neuro:  A&Ox3, intermittently disoriented about the course of events, CN grossly intact, no focal deficits.    Result Review    I have personally reviewed the results from the time of this admission to 2024 07:54 EST and agree with these findings:  [x]  Laboratory  [x]  Microbiology  [x]  Radiology  [x]  EKG/Telemetry   [x]  Cardiology/Vascular   []  Pathology  []  Old  records  []  Other:  Most notable findings include:       Lab 01/04/24  0602 01/04/24  0152 01/04/24  0024 01/03/24  1640 01/03/24  1147 01/03/24  0358 01/03/24  0009 01/02/24  2232 01/02/24  1722 01/02/24  1422 01/02/24  0453 01/02/24  0406 01/01/24  0528 12/31/23  1544 12/31/23  0524 12/30/23  1723 12/30/23  0537 12/29/23  0540   WBC 26.10*  --   --   --   --  29.36*  --   --   --   --  43.46*  --  28.43*  --  26.82*  --  25.62* 29.90*   HEMOGLOBIN 8.7* 9.1*  --  7.0*  --  7.4*  --   --   --   --  7.1*  --  7.9* 9.6* 7.7* 9.2* 6.6* 7.0*   HEMATOCRIT 26.5* 27.6*  --  21.3*  --  22.4*  --   --   --   --  21.8*  --  23.8* 29.2* 23.2* 27.1* 20.0* 21.2*   PLATELETS 296  --   --   --   --  344  --   --   --   --  423  --  286  --  193  --  126* 96*   SODIUM 138  138  --  137 134* 133* 134* 134*  --  136   < >  --  134* 132*  --  132* 134* 133* 138   SODIUM, ARTERIAL  --   --   --   --   --   --   --  133.4*  --   --   --   --   --   --   --   --   --   --    POTASSIUM 4.8  4.8  --  4.7 4.5 4.5 4.8 4.9  --  5.4*   < >  --  6.2* 4.7  --  4.2 3.7 5.2 4.2   CHLORIDE 101  101  --  102 98 99 99 98  --  97*   < >  --  95* 95*  --  96* 98 99 103   CO2 18.7*  18.7*  --  19.4* 19.5* 19.3* 19.1* 18.2*  --  19.3*   < >  --  12.9* 19.3*  --  19.7* 22.8 21.7* 24.0   BUN 52*  52*  --  53* 57* 61* 58* 67*  --  67*   < >  --  75* 56*  --  33* 15 28* 35*   CREATININE 3.31*  3.31*  --  3.45* 3.74* 4.06* 4.49* 4.65*  --  4.74*   < >  --  5.77* 4.89*  --  3.46* 2.32* 3.47* 3.65*   GLUCOSE 126*  126*  --  151* 200* 224* 258* 243*  --  226*   < >  --  55* 101*  --  92 74 66 75   GLUCOSE, ARTERIAL  --   --   --   --   --   --   --  256*  --   --   --   --   --   --   --   --   --   --    CALCIUM 7.7*  7.7*  --  7.5* 7.4* 7.4* 6.8* 6.7*  --  6.6*   < >  --  7.3* 7.3*  --  7.8* 8.1* 7.5* 7.2*   PHOSPHORUS 4.8*  --  4.8* 4.1 4.3 5.1* 5.5*  --  7.0*   < >  --  9.3* 5.8*  --  5.5*  --  2.6 2.7   TOTAL PROTEIN 6.8  --   --   --   --  6.2 6.2   --   --   --   --  6.4 6.1  --  6.0 6.3 5.6* 5.0*   ALBUMIN 3.0*  3.0*  --  3.0* 3.1* 3.0* 3.0* 3.0*  --  3.0*   < >  --  2.5* 2.2*  --  2.3* 2.4* 2.2* 2.3*   GLOBULIN 3.8  --   --   --   --  3.2 3.2  --   --   --   --  3.9 3.9  --  3.7 3.9 3.4 2.7    < > = values in this interval not displayed.       Assessment & Plan   Assessment / Plan     Active Hospital Problems:  Active Hospital Problems    Diagnosis     **Left flank pain     S/P Exploratory laparotomy; appendectomy; left colon resection with creation of ostomy     S/P Open repair of ruptured infrarenal abdominal aortic aneurysm     Ruptured infrarenal abdominal aortic aneurysm (AAA)     DVT, lower extremity, distal, acute, unspecified laterality      Impression:  Ruptured infrarenal AAA s/p open repair  Acute blood loss anemia  Intra-abdominal hematoma  Hemorrhagic shock  Hypovolemic shock  Acute hypoxic respiratory failure requiring mechanical ventilation  Septic shock with Streptococcus pyogenes bacteremia  Moraxella pneumonia  Ischemic sigmoid colon s/p colostomy  History of RCC s/p partial nephrectomy on 11/13  Acute kidney injury secondary to acute tubular necrosis requiring renal replacement therapy  Clinically significant lactic acidosis  Chronically immunosuppressed on CellCept  Hypocalcemia  Hypokalemia resolved now with hyperkalemia  SVT  Lactic acidosis, clinically significant     Plan:  -Continue supplemental oxygen via nasal cannula will wean for SpO2 greater than 90  -Aspiration precautions, incentive spirometry  -Hemoglobin stable at 8.7, INR improved to 1.45  -No evidence of bleeding, transfuse for hemoglobin less than 7 or evidence of shock  -Levophed on standby, continue vasopressin titrate for MAP of 65  -Lactic acid cleared  -Continue midodrine 5 milligrams 3 times daily  -CRRT ongoing, per nephrology service  -Patient has acute bilateral DVTs vascular surgery has been consulted for IVC filter  -Unable to reinitiate heparin drip due to  previous retroperitoneal bleed  -Core track in place, okay to resume tube feeds  -Okay to have speech therapy evaluate patient  -Continue nightly Seroquel, trazodone and as needed Haldol  -As needed fentanyl for pain  -Leukocytosis improving, I see no evidence of new infection  -Continue Unasyn to complete 14 days of therapy for bacteremia  -Leukocytosis decreased.  Will monitor for now.  I see no evidence of new infection  -Hold home chronic immunosuppression CellCept indefinitely  -Will ultimately need LTAC once patient is stabilized  -Replace IV calcium today     PUP PPx: Protonix  DVT prophylaxis: Planning for IVC filter  Medical and mechanical DVT prophylaxis orders are present.    CODE STATUS:   Level Of Support Discussed With: Patient  Code Status (Patient has no pulse and is not breathing): CPR (Attempt to Resuscitate)  Medical Interventions (Patient has pulse or is breathing): Full Support    ALBERTINA LivBAKARI Rosales, spent 15 minutes critical care time in accordance to split shared billing.    Patient is critically ill with ruptured AAA, DVTs, hemorrhagic shock, requiring blood transfusion, intra-abdominal hematoma, acute kidney injury requiring renal replacement therapy.. We have personally reviewed all pertinent labs, imaging, microbiology and documentation. We have discussed care with the primary service as well as at multidisciplinary critical care rounds with the bedside nurse, respiratory therapist, pharmacist and all other ancillary services. 39 minutes of critical care time was spent managing this patient, excluding procedures. Of this time, I spent 24 minutes in accordance with split shared billing.    Electronically signed by Yinka Lloyd MD, 01/04/24, 3:23 PM EST.

## 2024-01-04 NOTE — PROGRESS NOTES
[facility]     Progress Note    Patient Name: Danny Savage  : 1960  MRN: [id number]  Primary Care Physician:  Kodi Pichardo MD  Date of admission: 2023    Subjective   Subjective     POD #15 status post open repair of ruptured abdominal aorta aneurysm, POD #13 status post abdominal reexploration, sigmoidectomy with end colostomy, closure of abdominal wound    Feeling better.  Had some nausea and vomiting yesterday but seems to be soft now.    Plans for LTAC.      Objective   Objective     Vitals:   Temp:  [97.7 °F (36.5 °C)-100.2 °F (37.9 °C)] 98.1 °F (36.7 °C)  Heart Rate:  [] 85  BP: ()/(40-81) 132/69    Physical Exam   General: Sleepy, no acute distress  Extremities: Symmetric, moderate edema.    Hgb: 8.3  WBC: 12.51      Assessment & Plan   Assessment / Plan     Assessment/Plan:    Significant improvement in overall status.  Will start planning for TDC if WBC reaches normal range and patient continues hemodynamically stable.      Active Hospital Problems:  Active Hospital Problems    Diagnosis     **Left flank pain     DVT, lower extremity, distal, acute, unspecified laterality     S/P Exploratory laparotomy; appendectomy; left colon resection with creation of ostomy     S/P Open repair of ruptured infrarenal abdominal aortic aneurysm     Ruptured infrarenal abdominal aortic aneurysm (AAA)            Electronically signed by Swapnil Velazco MD, 23, 10:25 AM EST.

## 2024-01-04 NOTE — H&P (VIEW-ONLY)
[facility]     Progress Note    Patient Name: Danny Savage  : 1960  MRN: [id number]  Primary Care Physician:  Kodi Pichardo MD  Date of admission: 2023    Subjective   Subjective     POD #11 status post open repair of ruptured abdominal aorta aneurysm, POD #9 status post abdominal reexploration, sigmoidectomy with end colostomy, closure of abdominal wound    Sleepy.  No complaints.    Continues on CRRT and on and off pressors.    Objective   Objective     Vitals:   Temp:  [97.7 °F (36.5 °C)-100.2 °F (37.9 °C)] 98.1 °F (36.7 °C)  Heart Rate:  [] 85  BP: ()/(40-81) 132/69    Physical Exam   General: Sleepy, no acute distress  Extremities: Symmetric, moderate edema.    Hgb: 8.7  WBC: 26.10  Lactate: 1.0    Assessment & Plan   Assessment / Plan     Assessment/Plan:    Patient remains critically ill in ICU.  Persistent leukocytosis.  Off heparin due to concern for expansion of hematoma.  IVC filter when medically more stable.      Active Hospital Problems:  Active Hospital Problems    Diagnosis    • **Left flank pain    • DVT, lower extremity, distal, acute, unspecified laterality    • S/P Exploratory laparotomy; appendectomy; left colon resection with creation of ostomy    • S/P Open repair of ruptured infrarenal abdominal aortic aneurysm    • Ruptured infrarenal abdominal aortic aneurysm (AAA)            Electronically signed by Swapnil Velazco MD, 23, 10:25 AM EST.

## 2024-01-04 NOTE — PROGRESS NOTES
Nutrition Services    Patient Name: Danny Savage  YOB: 1960  MRN: [id number]  Admission date: 12/23/2023    PROGRESS NOTE      Encounter Information: EN Follow Up       PO Diet: Diet: Regular/House Diet, Renal Diets; Low Potassium, Low Phosphorus; Texture: Regular Texture (IDDSI 7); Fluid Consistency: Thin (IDDSI 0)   PO Supplements: NPO   PO Intake:  NPO       Current nutrition support: On hold.         Estimated Needs: 2415 kcal, 116-126 g pro, 1344-4022 ml fluid        Nutrition support review: Continues CRRT.  Patient had vomiting overnight and EN is on hold.  Discussed in multidisciplinary rounds.  Plan to resume at trophic rate today.         Labs (reviewed below): Reviewed.          GI Function:  Nausea/vomiting overnight. Continues to have ostomy output.         Nutrition Intervention Updates: Novasource Renal @ 15 ml/hr x 22 hrs  Provides 660 kcal, 30 g pro, 234 ml      Results from last 7 days   Lab Units 01/04/24  0602 01/04/24  0024 01/03/24  1640 01/03/24  1147 01/03/24  0358 01/03/24  0009   SODIUM mmol/L 138  138 137 134*   < > 134* 134*   POTASSIUM mmol/L 4.8  4.8 4.7 4.5   < > 4.8 4.9   CHLORIDE mmol/L 101  101 102 98   < > 99 98   CO2 mmol/L 18.7*  18.7* 19.4* 19.5*   < > 19.1* 18.2*   BUN mg/dL 52*  52* 53* 57*   < > 58* 67*   CREATININE mg/dL 3.31*  3.31* 3.45* 3.74*   < > 4.49* 4.65*   CALCIUM mg/dL 7.7*  7.7* 7.5* 7.4*   < > 6.8* 6.7*   BILIRUBIN mg/dL 10.1*  --   --   --  8.3* 8.4*   ALK PHOS U/L 114  --   --   --  112 107   ALT (SGPT) U/L 153*  --   --   --  220* 228*   AST (SGOT) U/L 197*  --   --   --  600* 742*   GLUCOSE mg/dL 126*  126* 151* 200*   < > 258* 243*    < > = values in this interval not displayed.     Results from last 7 days   Lab Units 01/04/24  0602 01/04/24  0152 01/04/24  0024 01/03/24  1640   MAGNESIUM mg/dL 2.5*  --  2.4 2.5*   PHOSPHORUS mg/dL 4.8*  --  4.8* 4.1   HEMOGLOBIN g/dL 8.7*   < >  --  7.0*   HEMATOCRIT % 26.5*   < >  --  21.3*     "< > = values in this interval not displayed.     COVID19   Date Value Ref Range Status   12/23/2023 Not Detected Not Detected - Ref. Range Final     No results found for: \"HGBA1C\"    RD to follow up per protocol.    Electronically signed by:  Shannon Acharya RD  01/04/24 12:12 EST   "

## 2024-01-04 NOTE — NURSING NOTE
Patient alert and oriented. CRRT. Patient changed to specialty mattress. Levo off. Vasopressin off at this time. Patient turned every two hours. Pain medication given. See EMAR. Brown liquid stool coming from colostomy. 150ml out of colostomy  Tube feeds on 15 ml/hr. Nova source renal. Full liquid diet. SCD's on patient. Bladder scan done only 24 ml in bladder.

## 2024-01-05 LAB
ALBUMIN SERPL-MCNC: 2.7 G/DL (ref 3.5–5.2)
ALBUMIN SERPL-MCNC: 2.8 G/DL (ref 3.5–5.2)
ALBUMIN SERPL-MCNC: 2.9 G/DL (ref 3.5–5.2)
ALBUMIN SERPL-MCNC: 2.9 G/DL (ref 3.5–5.2)
ALBUMIN/GLOB SERPL: 0.8 G/DL
ALP SERPL-CCNC: 114 U/L (ref 39–117)
ALT SERPL W P-5'-P-CCNC: 127 U/L (ref 1–41)
ANION GAP SERPL CALCULATED.3IONS-SCNC: 14.9 MMOL/L (ref 5–15)
ANION GAP SERPL CALCULATED.3IONS-SCNC: 15.5 MMOL/L (ref 5–15)
ANION GAP SERPL CALCULATED.3IONS-SCNC: 16.2 MMOL/L (ref 5–15)
ANION GAP SERPL CALCULATED.3IONS-SCNC: 16.3 MMOL/L (ref 5–15)
APTT PPP: 38.9 SECONDS (ref 24.2–34.2)
APTT PPP: 94.7 SECONDS (ref 78–95.9)
ARTERIAL PATENCY WRIST A: POSITIVE
AST SERPL-CCNC: 147 U/L (ref 1–40)
BASE EXCESS BLDA CALC-SCNC: -5.3 MMOL/L (ref -2–2)
BDY SITE: ABNORMAL
BH BB BLOOD EXPIRATION DATE: NORMAL
BH BB BLOOD TYPE BARCODE: 600
BH BB BLOOD TYPE BARCODE: 600
BH BB BLOOD TYPE BARCODE: 6200
BH BB BLOOD TYPE BARCODE: 6200
BH BB BLOOD TYPE BARCODE: 8400
BH BB BLOOD TYPE BARCODE: 8400
BH BB DISPENSE STATUS: NORMAL
BH BB PRODUCT CODE: NORMAL
BH BB UNIT NUMBER: NORMAL
BILIRUB SERPL-MCNC: 10.3 MG/DL (ref 0–1.2)
BUN SERPL-MCNC: 41 MG/DL (ref 8–23)
BUN SERPL-MCNC: 46 MG/DL (ref 8–23)
BUN/CREAT SERPL: 13.6 (ref 7–25)
BUN/CREAT SERPL: 14.1 (ref 7–25)
BUN/CREAT SERPL: 14.2 (ref 7–25)
BUN/CREAT SERPL: 15.8 (ref 7–25)
CA-I BLDA-SCNC: 0.99 MMOL/L (ref 1.13–1.32)
CA-I BLDA-SCNC: 1 MMOL/L (ref 1.13–1.32)
CA-I BLDA-SCNC: 1.01 MMOL/L (ref 1.13–1.32)
CA-I BLDA-SCNC: 1.01 MMOL/L (ref 1.13–1.32)
CA-I BLDA-SCNC: 1.03 MMOL/L (ref 1.13–1.32)
CALCIUM SPEC-SCNC: 7.5 MG/DL (ref 8.6–10.5)
CALCIUM SPEC-SCNC: 7.6 MG/DL (ref 8.6–10.5)
CALCIUM SPEC-SCNC: 7.6 MG/DL (ref 8.6–10.5)
CALCIUM SPEC-SCNC: 7.8 MG/DL (ref 8.6–10.5)
CHLORIDE BLDA-SCNC: 103 MMOL/L (ref 98–106)
CHLORIDE SERPL-SCNC: 101 MMOL/L (ref 98–107)
CHLORIDE SERPL-SCNC: 102 MMOL/L (ref 98–107)
CO2 SERPL-SCNC: 18.7 MMOL/L (ref 22–29)
CO2 SERPL-SCNC: 18.8 MMOL/L (ref 22–29)
CO2 SERPL-SCNC: 19.1 MMOL/L (ref 22–29)
CO2 SERPL-SCNC: 20.5 MMOL/L (ref 22–29)
COHGB MFR BLD: 1.8 % (ref 0–1.5)
CREAT SERPL-MCNC: 2.92 MG/DL (ref 0.76–1.27)
CREAT SERPL-MCNC: 3.01 MG/DL (ref 0.76–1.27)
CREAT SERPL-MCNC: 3.23 MG/DL (ref 0.76–1.27)
CREAT SERPL-MCNC: 3.26 MG/DL (ref 0.76–1.27)
CROSSMATCH INTERPRETATION: NORMAL
CROSSMATCH INTERPRETATION: NORMAL
D-LACTATE SERPL-SCNC: 1.2 MMOL/L (ref 0.5–2)
DEPRECATED RDW RBC AUTO: 52.3 FL (ref 37–54)
EGFRCR SERPLBLD CKD-EPI 2021: 20.2 ML/MIN/1.73
EGFRCR SERPLBLD CKD-EPI 2021: 20.5 ML/MIN/1.73
EGFRCR SERPLBLD CKD-EPI 2021: 22.3 ML/MIN/1.73
EGFRCR SERPLBLD CKD-EPI 2021: 23.1 ML/MIN/1.73
ERYTHROCYTE [DISTWIDTH] IN BLOOD BY AUTOMATED COUNT: 18.5 % (ref 12.3–15.4)
FHHB: 6.9 % (ref 0–5)
GAS FLOW AIRWAY: 6 LPM
GLOBULIN UR ELPH-MCNC: 3.7 GM/DL
GLUCOSE BLDA-MCNC: 162 MG/DL (ref 70–99)
GLUCOSE BLDC GLUCOMTR-MCNC: 103 MG/DL (ref 70–99)
GLUCOSE BLDC GLUCOMTR-MCNC: 161 MG/DL (ref 70–99)
GLUCOSE BLDC GLUCOMTR-MCNC: 69 MG/DL (ref 70–99)
GLUCOSE BLDC GLUCOMTR-MCNC: 79 MG/DL (ref 70–99)
GLUCOSE BLDC GLUCOMTR-MCNC: 89 MG/DL (ref 70–99)
GLUCOSE BLDC GLUCOMTR-MCNC: 90 MG/DL (ref 70–99)
GLUCOSE BLDC GLUCOMTR-MCNC: 92 MG/DL (ref 70–99)
GLUCOSE BLDC GLUCOMTR-MCNC: 95 MG/DL (ref 70–99)
GLUCOSE SERPL-MCNC: 193 MG/DL (ref 65–99)
GLUCOSE SERPL-MCNC: 82 MG/DL (ref 65–99)
GLUCOSE SERPL-MCNC: 88 MG/DL (ref 65–99)
GLUCOSE SERPL-MCNC: 95 MG/DL (ref 65–99)
HCO3 BLDA-SCNC: 18.8 MMOL/L (ref 22–26)
HCT VFR BLD AUTO: 26.4 % (ref 37.5–51)
HGB BLD-MCNC: 8.3 G/DL (ref 13–17.7)
HGB BLDA-MCNC: 10.9 G/DL (ref 13.8–16.4)
INHALED O2 CONCENTRATION: 44 %
INR PPP: 1.6 (ref 0.86–1.15)
INR PPP: 1.71 (ref 0.86–1.15)
LACTATE BLDA-SCNC: 1.11 MMOL/L (ref 0.5–2)
MAGNESIUM SERPL-MCNC: 2.5 MG/DL (ref 1.6–2.4)
MCH RBC QN AUTO: 29.4 PG (ref 26.6–33)
MCHC RBC AUTO-ENTMCNC: 31.4 G/DL (ref 31.5–35.7)
MCV RBC AUTO: 93.6 FL (ref 79–97)
METHGB BLD QL: 0.4 % (ref 0–1.5)
MODALITY: ABNORMAL
NOTE: ABNORMAL
OXYHGB MFR BLDV: 90.9 % (ref 94–99)
PCO2 BLDA: 32.1 MM HG (ref 35–45)
PH BLDA: 7.39 PH UNITS (ref 7.35–7.45)
PHOSPHATE SERPL-MCNC: 5.1 MG/DL (ref 2.5–4.5)
PHOSPHATE SERPL-MCNC: 5.2 MG/DL (ref 2.5–4.5)
PHOSPHATE SERPL-MCNC: 5.2 MG/DL (ref 2.5–4.5)
PHOSPHATE SERPL-MCNC: 5.3 MG/DL (ref 2.5–4.5)
PLATELET # BLD AUTO: 272 10*3/MM3 (ref 140–450)
PMV BLD AUTO: 10.4 FL (ref 6–12)
PO2 BLD: 154 MM[HG] (ref 0–500)
PO2 BLDA: 67.7 MM HG (ref 80–100)
POTASSIUM BLDA-SCNC: 4.38 MMOL/L (ref 3.5–5)
POTASSIUM SERPL-SCNC: 4.5 MMOL/L (ref 3.5–5.2)
POTASSIUM SERPL-SCNC: 4.6 MMOL/L (ref 3.5–5.2)
POTASSIUM SERPL-SCNC: 5 MMOL/L (ref 3.5–5.2)
POTASSIUM SERPL-SCNC: 5.3 MMOL/L (ref 3.5–5.2)
PROT SERPL-MCNC: 6.6 G/DL (ref 6–8.5)
PROTHROMBIN TIME: 19.3 SECONDS (ref 11.8–14.9)
PROTHROMBIN TIME: 20.4 SECONDS (ref 11.8–14.9)
RBC # BLD AUTO: 2.82 10*6/MM3 (ref 4.14–5.8)
SAO2 % BLDCOA: 92.9 % (ref 95–99)
SODIUM BLDA-SCNC: 136.4 MMOL/L (ref 136–146)
SODIUM SERPL-SCNC: 135 MMOL/L (ref 136–145)
SODIUM SERPL-SCNC: 136 MMOL/L (ref 136–145)
SODIUM SERPL-SCNC: 137 MMOL/L (ref 136–145)
SODIUM SERPL-SCNC: 137 MMOL/L (ref 136–145)
UNIT  ABO: NORMAL
UNIT  RH: NORMAL
WBC NRBC COR # BLD AUTO: 18.83 10*3/MM3 (ref 3.4–10.8)

## 2024-01-05 PROCEDURE — 85610 PROTHROMBIN TIME: CPT | Performed by: SURGERY

## 2024-01-05 PROCEDURE — 84100 ASSAY OF PHOSPHORUS: CPT | Performed by: FAMILY MEDICINE

## 2024-01-05 PROCEDURE — C1894 INTRO/SHEATH, NON-LASER: HCPCS | Performed by: SURGERY

## 2024-01-05 PROCEDURE — 82330 ASSAY OF CALCIUM: CPT | Performed by: SURGERY

## 2024-01-05 PROCEDURE — 75825 VEIN X-RAY TRUNK: CPT | Performed by: SURGERY

## 2024-01-05 PROCEDURE — 36600 WITHDRAWAL OF ARTERIAL BLOOD: CPT | Performed by: NURSE PRACTITIONER

## 2024-01-05 PROCEDURE — 85027 COMPLETE CBC AUTOMATED: CPT | Performed by: FAMILY MEDICINE

## 2024-01-05 PROCEDURE — 25010000002 HEPARIN (PORCINE) 25000-0.45 UT/250ML-% SOLUTION: Performed by: SURGERY

## 2024-01-05 PROCEDURE — 83735 ASSAY OF MAGNESIUM: CPT | Performed by: INTERNAL MEDICINE

## 2024-01-05 PROCEDURE — 0 IODIXANOL PER 1 ML: Performed by: SURGERY

## 2024-01-05 PROCEDURE — 83050 HGB METHEMOGLOBIN QUAN: CPT | Performed by: NURSE PRACTITIONER

## 2024-01-05 PROCEDURE — 83605 ASSAY OF LACTIC ACID: CPT | Performed by: PHYSICIAN ASSISTANT

## 2024-01-05 PROCEDURE — 82330 ASSAY OF CALCIUM: CPT | Performed by: INTERNAL MEDICINE

## 2024-01-05 PROCEDURE — 99233 SBSQ HOSP IP/OBS HIGH 50: CPT | Performed by: FAMILY MEDICINE

## 2024-01-05 PROCEDURE — C1769 GUIDE WIRE: HCPCS | Performed by: SURGERY

## 2024-01-05 PROCEDURE — C1769 GUIDE WIRE: HCPCS

## 2024-01-05 PROCEDURE — 25010000002 AMPICILLIN-SULBACTAM PER 1.5 G: Performed by: INTERNAL MEDICINE

## 2024-01-05 PROCEDURE — 84100 ASSAY OF PHOSPHORUS: CPT | Performed by: NURSE PRACTITIONER

## 2024-01-05 PROCEDURE — 36010 PLACE CATHETER IN VEIN: CPT | Performed by: SURGERY

## 2024-01-05 PROCEDURE — 25010000002 AMPICILLIN PER 500 MG: Performed by: INTERNAL MEDICINE

## 2024-01-05 PROCEDURE — 85730 THROMBOPLASTIN TIME PARTIAL: CPT | Performed by: SURGERY

## 2024-01-05 PROCEDURE — 99291 CRITICAL CARE FIRST HOUR: CPT | Performed by: INTERNAL MEDICINE

## 2024-01-05 PROCEDURE — 82948 REAGENT STRIP/BLOOD GLUCOSE: CPT

## 2024-01-05 PROCEDURE — 82805 BLOOD GASES W/O2 SATURATION: CPT | Performed by: NURSE PRACTITIONER

## 2024-01-05 PROCEDURE — 82375 ASSAY CARBOXYHB QUANT: CPT | Performed by: NURSE PRACTITIONER

## 2024-01-05 PROCEDURE — C1887 CATHETER, GUIDING: HCPCS | Performed by: SURGERY

## 2024-01-05 PROCEDURE — 80053 COMPREHEN METABOLIC PANEL: CPT | Performed by: NURSE PRACTITIONER

## 2024-01-05 PROCEDURE — 83735 ASSAY OF MAGNESIUM: CPT | Performed by: SURGERY

## 2024-01-05 RX ORDER — OXYCODONE HYDROCHLORIDE AND ACETAMINOPHEN 5; 325 MG/1; MG/1
1 TABLET ORAL EVERY 4 HOURS PRN
Status: DISCONTINUED | OUTPATIENT
Start: 2024-01-05 | End: 2024-01-05

## 2024-01-05 RX ORDER — OXYCODONE HYDROCHLORIDE AND ACETAMINOPHEN 5; 325 MG/1; MG/1
1 TABLET ORAL EVERY 4 HOURS PRN
Status: DISCONTINUED | OUTPATIENT
Start: 2024-01-05 | End: 2024-01-12 | Stop reason: HOSPADM

## 2024-01-05 RX ORDER — LIDOCAINE HYDROCHLORIDE 20 MG/ML
INJECTION, SOLUTION INFILTRATION; PERINEURAL
Status: DISCONTINUED | OUTPATIENT
Start: 2024-01-05 | End: 2024-01-05 | Stop reason: HOSPADM

## 2024-01-05 RX ORDER — IODIXANOL 320 MG/ML
INJECTION, SOLUTION INTRAVASCULAR
Status: DISCONTINUED | OUTPATIENT
Start: 2024-01-05 | End: 2024-01-05 | Stop reason: HOSPADM

## 2024-01-05 RX ORDER — HEPARIN SODIUM 10000 [USP'U]/100ML
18 INJECTION, SOLUTION INTRAVENOUS
Status: DISCONTINUED | OUTPATIENT
Start: 2024-01-05 | End: 2024-01-12 | Stop reason: HOSPADM

## 2024-01-05 RX ORDER — OXYCODONE AND ACETAMINOPHEN 10; 325 MG/1; MG/1
1 TABLET ORAL EVERY 4 HOURS PRN
Status: DISCONTINUED | OUTPATIENT
Start: 2024-01-05 | End: 2024-01-12 | Stop reason: HOSPADM

## 2024-01-05 RX ORDER — NITROGLYCERIN 0.4 MG/1
0.4 TABLET SUBLINGUAL
Status: DISCONTINUED | OUTPATIENT
Start: 2024-01-05 | End: 2024-01-12 | Stop reason: HOSPADM

## 2024-01-05 RX ORDER — TRAZODONE HYDROCHLORIDE 50 MG/1
50 TABLET ORAL NIGHTLY PRN
Status: DISCONTINUED | OUTPATIENT
Start: 2024-01-05 | End: 2024-01-12 | Stop reason: HOSPADM

## 2024-01-05 RX ADMIN — OXYCODONE AND ACETAMINOPHEN 1 TABLET: 10; 325 TABLET ORAL at 20:15

## 2024-01-05 RX ADMIN — OXYCODONE AND ACETAMINOPHEN 1 TABLET: 5; 325 TABLET ORAL at 15:50

## 2024-01-05 RX ADMIN — ESCITALOPRAM OXALATE 5 MG: 10 TABLET ORAL at 08:23

## 2024-01-05 RX ADMIN — OXYCODONE AND ACETAMINOPHEN 1 TABLET: 5; 325 TABLET ORAL at 09:54

## 2024-01-05 RX ADMIN — Medication 10 ML: at 08:26

## 2024-01-05 RX ADMIN — CALCIUM CHLORIDE, MAGNESIUM CHLORIDE, SODIUM CHLORIDE, SODIUM BICARBONATE, POTASSIUM CHLORIDE AND SODIUM PHOSPHATE DIBASIC DIHYDRATE 1500 ML/HR: 3.68; 3.05; 6.34; 3.09; .314; .187 INJECTION INTRAVENOUS at 18:20

## 2024-01-05 RX ADMIN — CALCIUM CHLORIDE, MAGNESIUM CHLORIDE, SODIUM CHLORIDE, SODIUM BICARBONATE, POTASSIUM CHLORIDE AND SODIUM PHOSPHATE DIBASIC DIHYDRATE 1500 ML/HR: 3.68; 3.05; 6.34; 3.09; .314; .187 INJECTION INTRAVENOUS at 04:08

## 2024-01-05 RX ADMIN — MIDODRINE HYDROCHLORIDE 5 MG: 5 TABLET ORAL at 11:53

## 2024-01-05 RX ADMIN — HEPARIN SODIUM 18 UNITS/KG/HR: 10000 INJECTION, SOLUTION INTRAVENOUS at 14:46

## 2024-01-05 RX ADMIN — DOCUSATE SODIUM 50MG AND SENNOSIDES 8.6MG 2 TABLET: 8.6; 5 TABLET, FILM COATED ORAL at 20:15

## 2024-01-05 RX ADMIN — SODIUM CHLORIDE 3 G: 900 INJECTION INTRAVENOUS at 11:45

## 2024-01-05 RX ADMIN — Medication 10 MG: at 20:15

## 2024-01-05 RX ADMIN — AMPICILLIN SODIUM 2 G: 2 INJECTION, POWDER, FOR SOLUTION INTRAVENOUS at 20:17

## 2024-01-05 RX ADMIN — SODIUM CHLORIDE 3 G: 900 INJECTION INTRAVENOUS at 00:17

## 2024-01-05 RX ADMIN — CALCIUM CHLORIDE, MAGNESIUM CHLORIDE, SODIUM CHLORIDE, SODIUM BICARBONATE, POTASSIUM CHLORIDE AND SODIUM PHOSPHATE DIBASIC DIHYDRATE 1500 ML/HR: 3.68; 3.05; 6.34; 3.09; .314; .187 INJECTION INTRAVENOUS at 01:09

## 2024-01-05 RX ADMIN — DEXTROSE MONOHYDRATE 25 G: 25 INJECTION, SOLUTION INTRAVENOUS at 11:45

## 2024-01-05 RX ADMIN — DEXTROSE MONOHYDRATE 25 G: 25 INJECTION, SOLUTION INTRAVENOUS at 05:20

## 2024-01-05 RX ADMIN — PANTOPRAZOLE SODIUM 40 MG: 40 INJECTION, POWDER, FOR SOLUTION INTRAVENOUS at 08:26

## 2024-01-05 RX ADMIN — MIDODRINE HYDROCHLORIDE 5 MG: 5 TABLET ORAL at 08:23

## 2024-01-05 RX ADMIN — CALCIUM CHLORIDE, MAGNESIUM CHLORIDE, SODIUM CHLORIDE, SODIUM BICARBONATE, POTASSIUM CHLORIDE AND SODIUM PHOSPHATE DIBASIC DIHYDRATE 1500 ML/HR: 3.68; 3.05; 6.34; 3.09; .314; .187 INJECTION INTRAVENOUS at 15:08

## 2024-01-05 RX ADMIN — AMPICILLIN SODIUM 2 G: 2 INJECTION, POWDER, FOR SOLUTION INTRAVENOUS at 15:17

## 2024-01-05 RX ADMIN — MIDODRINE HYDROCHLORIDE 5 MG: 5 TABLET ORAL at 17:28

## 2024-01-05 RX ADMIN — PANTOPRAZOLE SODIUM 40 MG: 40 INJECTION, POWDER, FOR SOLUTION INTRAVENOUS at 20:16

## 2024-01-05 NOTE — NURSING NOTE
Patient CRRT treatment discontinued at this time at 0845am due to potential filter clotting. Dr. Hinton called at 0900am to determine if CRRT needed to be restarted. Dr. Hinton stated to restart CRRT at this time due to him not coming until a later time. Dr. Collins is wanting to do IVC filter procedure within the next couple hours. Dr. Hinton called at 1000 am and okay'd CRRT to be paused until procedure is finished.

## 2024-01-05 NOTE — SIGNIFICANT NOTE
Wound Eval / Progress Noted     Evelina     Patient Name: Danny Savage  : 1960  MRN: [id number]  Today's Date: 2024                 Admit Date: 2023    Visit Dx:    ICD-10-CM ICD-9-CM   1. Sepsis, due to unspecified organism, unspecified whether acute organ dysfunction present  A41.9 038.9     995.91   2. Fever, unspecified fever cause  R50.9 780.60   3. Intra-abdominal fluid collection  R18.8 789.59   4. Abdominal aortic aneurysm (AAA) without rupture, unspecified part  I71.40 441.4   5. Ruptured infrarenal abdominal aortic aneurysm (AAA)  I71.33 441.3   6. Decreased activities of daily living (ADL)  Z78.9 V49.89   7. Difficulty walking  R26.2 719.7   8. DVT, lower extremity, distal, acute, unspecified laterality  I82.4Z9 453.42   9. Dysphagia, oropharyngeal  R13.12 787.22   10. S/P Open repair of ruptured infrarenal abdominal aortic aneurysm  Z98.890 V45.89    Z86.79    11. S/P AAA (abdominal aortic aneurysm) repair  Z98.890 V45.89    Z86.79          Left flank pain    Ruptured infrarenal abdominal aortic aneurysm (AAA)    DVT, lower extremity, distal, acute, unspecified laterality    S/P Exploratory laparotomy; appendectomy; left colon resection with creation of ostomy    S/P Open repair of ruptured infrarenal abdominal aortic aneurysm        Past Medical History:   Diagnosis Date    Allergy     Aortic aneurysm     4.7 just found has not f/u    H/O Kidney stone     Hiatal hernia     Kidney mass     left    Kidney stone     Renal cancer     S/P Exploratory laparotomy; appendectomy; left colon resection with creation of ostomy 2024    S/P Open repair of ruptured infrarenal abdominal aortic aneurysm 2024        Past Surgical History:   Procedure Laterality Date    ABDOMINAL AORTIC ANEURYSM REPAIR N/A 2023    Procedure: ABDOMINAL AORTIC ANEURYSM REPAIR;  Surgeon: Koffi Agosto MD;  Location: [facility] MAIN OR;  Service: Vascular;  Laterality: N/A;    COLON RESECTION N/A  "12/26/2023    Procedure: COLON RESECTION;  Surgeon: Hernan Mallory MD;  Location: [facility] MAIN OR;  Service: General;  Laterality: N/A;  EXPLORATORY LAPAROTOMY, REPAIR OF SMALL BOWEL MESENTERY, APPENDECTOMY, LEFT HEMICOLECTOMY, CREATION OF OSTOMY, ABDOMINAL CLOSURE    EXPLORATORY LAPAROTOMY N/A 12/26/2023    Procedure: LAPAROTOMY EXPLORATORY;  Surgeon: Koffi Agosto MD;  Location: [facility] MAIN OR;  Service: Vascular;  Laterality: N/A;  Exploratory Laparotomy    KIDNEY STONE SURGERY      NEPHRECTOMY PARTIAL Left 11/13/2023    Procedure: NEPHRECTOMY PARTIAL LAPAROSCOPIC WITH DAVINCI ROBOT, left;  Surgeon: Michelle Cai MD;  Location: [facility] MAIN OR;  Service: Robotics - DaVinci;  Laterality: Left;    URETEROSCOPY LASER LITHOTRIPSY WITH STENT INSERTION Left 08/18/2023    Procedure: CYSTOSCOPY URETEROSCOPY RETROGRADE PYELOGRAM STENT INSERTION, left;  Surgeon: Michelle Cai MD;  Location[facility];  Service: Urology;  Laterality: Left;        01/05/24 1044   Colostomy LLQ   Placement date: If unknown, DO NOT use \"Add Comment\" note/Placement time: If unknown, DO NOT use \"Add Comment\" note: 12/26/23 1315   Inserted by: DR MALLORY  Hand Hygiene Completed: Yes  Location: LLQ   Stomal Appliance 1 piece;Clean;Dry;Intact;Drainable   Stoma Function stool   Stool Color brown   Stool Consistency liquid;soft;mucoid   Output (mL) 175 mL     Wound Check / Follow-up: Patient seen today for ostomy follow-up and ongoing education. Patient remains in CICU. Remains on CRRT. Cortrak in place but tube feeds not running at this time due to patient having procedure in cath lab today. Patient's wife is present at bedside. Patient is resting with eyes closed with no signs or symptoms of distress at this time. Ostomy pouching system is clean, dry, and intact. No signs of lifting or leaking noted. Pouching system emptied prior to leaving room. Loose liquid and mucoid brown stool noted. 175mL stool output documented. Patient's " wife denies any needs or concerns at this time. Discharge plan is still for LTAC placement.       Impression: New colostomy.        Short term goals: Colostomy management, ostomy education.       Enedina Norman RN    1/5/2024    14:02 EST

## 2024-01-05 NOTE — PROGRESS NOTES
" LOS: 13 days   Patient Care Team:  Kodi Pichardo MD as PCP - General (Internal Medicine)  Lisset Harrington APRN as Nurse Practitioner (Urology)    Chief Complaint: ROLAND    Subjective     History of Present Illness  Pt on CRRT, paused for filter placement now  Down getting this now.    Subjective:  Symptoms:  No shortness of breath, chest pain, chest pressure or anxiety.    Diet:  No nausea or vomiting.      History taken from: chart family    Objective     Vital Sign Min/Max for last 24 hours  Temp  Min: 97.2 °F (36.2 °C)  Max: 98.6 °F (37 °C)   BP  Min: 91/61  Max: 143/75   Pulse  Min: 79  Max: 98   No data recorded   SpO2  Min: 94 %  Max: 99 %   Flow (L/min)  Min: 6  Max: 6   No data recorded     Flowsheet Rows      Flowsheet Row First Filed Value   Admission Height 177.8 cm (70\") Documented at 12/23/2023 1948   Admission Weight 79.9 kg (176 lb 2.4 oz) Documented at 12/23/2023 1948            I/O this shift:  In: 0   Out: 50   I/O last 3 completed shifts:  In: 1683.2 [P.O.:148; I.V.:1535.2]  Out: 4162 [Stool:740]    Objective:  General Appearance:  Comfortable.    Vital signs: (most recent): Blood pressure 129/73, pulse 86, temperature 98.6 °F (37 °C), resp. rate 23, height 177.8 cm (70\"), weight 87.5 kg (192 lb 12.8 oz), SpO2 94%.    Output: No urine output.    HEENT: Normal HEENT exam.    Lungs:  Normal effort and normal respiratory rate.  Breath sounds clear to auscultation.    Heart: Normal rate.  Regular rhythm.    Abdomen: Hypoactive bowel sounds.     Extremities: There is dependent edema.    Pulses: Distal pulses are intact.    Neurological: Patient is alert and oriented to person, place and time.    Pupils:  Pupils are equal, round, and reactive to light.    Skin:  Warm and dry.              Results Review:     I reviewed the patient's new clinical results.    WBC WBC   Date Value Ref Range Status   01/05/2024 18.83 (H) 3.40 - 10.80 10*3/mm3 Final   01/04/2024 26.10 (H) 3.40 - 10.80 10*3/mm3 " "Final   01/03/2024 29.36 (H) 3.40 - 10.80 10*3/mm3 Final      HGB Hemoglobin   Date Value Ref Range Status   01/05/2024 8.3 (L) 13.0 - 17.7 g/dL Final   01/04/2024 8.7 (L) 13.0 - 17.7 g/dL Final   01/04/2024 9.1 (L) 13.0 - 17.7 g/dL Final   01/03/2024 7.0 (L) 13.0 - 17.7 g/dL Final   01/03/2024 7.4 (L) 13.0 - 17.7 g/dL Final      HCT Hematocrit   Date Value Ref Range Status   01/05/2024 26.4 (L) 37.5 - 51.0 % Final   01/04/2024 26.5 (L) 37.5 - 51.0 % Final   01/04/2024 27.6 (L) 37.5 - 51.0 % Final   01/03/2024 21.3 (L) 37.5 - 51.0 % Final   01/03/2024 22.4 (L) 37.5 - 51.0 % Final      Platlets No results found for: \"LABPLAT\"   MCV MCV   Date Value Ref Range Status   01/05/2024 93.6 79.0 - 97.0 fL Final   01/04/2024 91.1 79.0 - 97.0 fL Final   01/03/2024 88.2 79.0 - 97.0 fL Final          Sodium Sodium   Date Value Ref Range Status   01/05/2024 135 (L) 136 - 145 mmol/L Final   01/05/2024 136 136 - 145 mmol/L Final   01/04/2024 137 136 - 145 mmol/L Final   01/04/2024 137 136 - 145 mmol/L Final   01/04/2024 133 (L) 136 - 145 mmol/L Final   01/04/2024 138 136 - 145 mmol/L Final   01/04/2024 138 136 - 145 mmol/L Final   01/04/2024 137 136 - 145 mmol/L Final   01/03/2024 134 (L) 136 - 145 mmol/L Final   01/03/2024 133 (L) 136 - 145 mmol/L Final   01/03/2024 134 (L) 136 - 145 mmol/L Final   01/03/2024 134 (L) 136 - 145 mmol/L Final   01/02/2024 136 136 - 145 mmol/L Final   01/02/2024 131 (L) 136 - 145 mmol/L Final     Sodium, Arterial   Date Value Ref Range Status   01/05/2024 136.4 136 - 146 mmol/L Final   01/02/2024 133.4 (L) 136 - 146 mmol/L Final      Potassium Potassium   Date Value Ref Range Status   01/05/2024 4.5 3.5 - 5.2 mmol/L Final   01/05/2024 5.3 (H) 3.5 - 5.2 mmol/L Final   01/04/2024 5.0 3.5 - 5.2 mmol/L Final   01/04/2024 4.6 3.5 - 5.2 mmol/L Final   01/04/2024 4.8 3.5 - 5.2 mmol/L Final   01/04/2024 4.8 3.5 - 5.2 mmol/L Final   01/04/2024 4.8 3.5 - 5.2 mmol/L Final   01/04/2024 4.7 3.5 - 5.2 mmol/L Final "   01/03/2024 4.5 3.5 - 5.2 mmol/L Final   01/03/2024 4.5 3.5 - 5.2 mmol/L Final   01/03/2024 4.8 3.5 - 5.2 mmol/L Final   01/03/2024 4.9 3.5 - 5.2 mmol/L Final   01/02/2024 5.4 (H) 3.5 - 5.2 mmol/L Final   01/02/2024 6.2 (C) 3.5 - 5.2 mmol/L Final      Chloride Chloride   Date Value Ref Range Status   01/05/2024 101 98 - 107 mmol/L Final   01/05/2024 101 98 - 107 mmol/L Final   01/04/2024 101 98 - 107 mmol/L Final   01/04/2024 101 98 - 107 mmol/L Final   01/04/2024 100 98 - 107 mmol/L Final   01/04/2024 101 98 - 107 mmol/L Final   01/04/2024 101 98 - 107 mmol/L Final   01/04/2024 102 98 - 107 mmol/L Final   01/03/2024 98 98 - 107 mmol/L Final   01/03/2024 99 98 - 107 mmol/L Final   01/03/2024 99 98 - 107 mmol/L Final   01/03/2024 98 98 - 107 mmol/L Final   01/02/2024 97 (L) 98 - 107 mmol/L Final   01/02/2024 93 (L) 98 - 107 mmol/L Final      CO2 CO2   Date Value Ref Range Status   01/05/2024 19.1 (L) 22.0 - 29.0 mmol/L Final   01/05/2024 18.8 (L) 22.0 - 29.0 mmol/L Final   01/04/2024 20.5 (L) 22.0 - 29.0 mmol/L Final   01/04/2024 19.8 (L) 22.0 - 29.0 mmol/L Final   01/04/2024 20.7 (L) 22.0 - 29.0 mmol/L Final   01/04/2024 18.7 (L) 22.0 - 29.0 mmol/L Final   01/04/2024 18.7 (L) 22.0 - 29.0 mmol/L Final   01/04/2024 19.4 (L) 22.0 - 29.0 mmol/L Final   01/03/2024 19.5 (L) 22.0 - 29.0 mmol/L Final   01/03/2024 19.3 (L) 22.0 - 29.0 mmol/L Final   01/03/2024 19.1 (L) 22.0 - 29.0 mmol/L Final   01/03/2024 18.2 (L) 22.0 - 29.0 mmol/L Final   01/02/2024 19.3 (L) 22.0 - 29.0 mmol/L Final   01/02/2024 12.5 (L) 22.0 - 29.0 mmol/L Final      BUN BUN   Date Value Ref Range Status   01/05/2024 46 (H) 8 - 23 mg/dL Final   01/05/2024 41 (H) 8 - 23 mg/dL Final   01/04/2024 46 (H) 8 - 23 mg/dL Final   01/04/2024 48 (H) 8 - 23 mg/dL Final   01/04/2024 50 (H) 8 - 23 mg/dL Final   01/04/2024 52 (H) 8 - 23 mg/dL Final   01/04/2024 52 (H) 8 - 23 mg/dL Final   01/04/2024 53 (H) 8 - 23 mg/dL Final   01/03/2024 57 (H) 8 - 23 mg/dL Final  "  01/03/2024 61 (H) 8 - 23 mg/dL Final   01/03/2024 58 (H) 8 - 23 mg/dL Final   01/03/2024 67 (H) 8 - 23 mg/dL Final   01/02/2024 67 (H) 8 - 23 mg/dL Final   01/02/2024 81 (H) 8 - 23 mg/dL Final      Creatinine Creatinine   Date Value Ref Range Status   01/05/2024 3.26 (H) 0.76 - 1.27 mg/dL Final   01/05/2024 3.01 (H) 0.76 - 1.27 mg/dL Final   01/04/2024 2.92 (H) 0.76 - 1.27 mg/dL Final   01/04/2024 3.04 (H) 0.76 - 1.27 mg/dL Final   01/04/2024 3.45 (H) 0.76 - 1.27 mg/dL Final   01/04/2024 3.31 (H) 0.76 - 1.27 mg/dL Final   01/04/2024 3.31 (H) 0.76 - 1.27 mg/dL Final   01/04/2024 3.45 (H) 0.76 - 1.27 mg/dL Final   01/03/2024 3.74 (H) 0.76 - 1.27 mg/dL Final   01/03/2024 4.06 (H) 0.76 - 1.27 mg/dL Final   01/03/2024 4.49 (H) 0.76 - 1.27 mg/dL Final   01/03/2024 4.65 (H) 0.76 - 1.27 mg/dL Final   01/02/2024 4.74 (H) 0.76 - 1.27 mg/dL Final   01/02/2024 5.96 (H) 0.76 - 1.27 mg/dL Final      Calcium Calcium   Date Value Ref Range Status   01/05/2024 7.6 (L) 8.6 - 10.5 mg/dL Final   01/05/2024 7.8 (L) 8.6 - 10.5 mg/dL Final   01/04/2024 7.6 (L) 8.6 - 10.5 mg/dL Final   01/04/2024 7.7 (L) 8.6 - 10.5 mg/dL Final   01/04/2024 7.9 (L) 8.6 - 10.5 mg/dL Final   01/04/2024 7.7 (L) 8.6 - 10.5 mg/dL Final   01/04/2024 7.7 (L) 8.6 - 10.5 mg/dL Final   01/04/2024 7.5 (L) 8.6 - 10.5 mg/dL Final   01/03/2024 7.4 (L) 8.6 - 10.5 mg/dL Final   01/03/2024 7.4 (L) 8.6 - 10.5 mg/dL Final   01/03/2024 6.8 (L) 8.6 - 10.5 mg/dL Final   01/03/2024 6.7 (L) 8.6 - 10.5 mg/dL Final   01/02/2024 6.6 (L) 8.6 - 10.5 mg/dL Final   01/02/2024 6.7 (L) 8.6 - 10.5 mg/dL Final      PO4 No results found for: \"CAPO4\"   Albumin Albumin   Date Value Ref Range Status   01/05/2024 2.8 (L) 3.5 - 5.2 g/dL Final   01/05/2024 2.9 (L) 3.5 - 5.2 g/dL Final   01/04/2024 2.9 (L) 3.5 - 5.2 g/dL Final   01/04/2024 3.0 (L) 3.5 - 5.2 g/dL Final   01/04/2024 3.1 (L) 3.5 - 5.2 g/dL Final   01/04/2024 3.0 (L) 3.5 - 5.2 g/dL Final   01/04/2024 3.0 (L) 3.5 - 5.2 g/dL Final " "  01/04/2024 3.0 (L) 3.5 - 5.2 g/dL Final   01/03/2024 3.1 (L) 3.5 - 5.2 g/dL Final   01/03/2024 3.0 (L) 3.5 - 5.2 g/dL Final   01/03/2024 3.0 (L) 3.5 - 5.2 g/dL Final   01/03/2024 3.0 (L) 3.5 - 5.2 g/dL Final   01/02/2024 3.0 (L) 3.5 - 5.2 g/dL Final   01/02/2024 2.2 (L) 3.5 - 5.2 g/dL Final      Magnesium Magnesium   Date Value Ref Range Status   01/05/2024 2.5 (H) 1.6 - 2.4 mg/dL Final   01/05/2024 2.5 (H) 1.6 - 2.4 mg/dL Final   01/04/2024 2.5 (H) 1.6 - 2.4 mg/dL Final   01/04/2024 2.6 (H) 1.6 - 2.4 mg/dL Final   01/04/2024 2.7 (H) 1.6 - 2.4 mg/dL Final   01/04/2024 2.5 (H) 1.6 - 2.4 mg/dL Final   01/04/2024 2.4 1.6 - 2.4 mg/dL Final   01/03/2024 2.5 (H) 1.6 - 2.4 mg/dL Final   01/03/2024 2.5 (H) 1.6 - 2.4 mg/dL Final   01/03/2024 2.4 1.6 - 2.4 mg/dL Final   01/03/2024 2.4 1.6 - 2.4 mg/dL Final   01/02/2024 2.4 1.6 - 2.4 mg/dL Final   01/02/2024 2.6 (H) 1.6 - 2.4 mg/dL Final      Uric Acid No results found for: \"URICACID\"     Medication Review:   ampicillin-sulbactam, 3 g, Intravenous, Q12H  [MAR Hold] escitalopram, 5 mg, Nasogastric, Daily  [MAR Hold] melatonin, 10 mg, Nasogastric, Nightly  [MAR Hold] midodrine, 5 mg, Nasogastric, TID AC  [MAR Hold] pantoprazole, 40 mg, Intravenous, Q12H  [MAR Hold] QUEtiapine, 50 mg, Nasogastric, Nightly  [MAR Hold] senna-docusate sodium, 2 tablet, Nasogastric, BID  [MAR Hold] sodium chloride, 10 mL, Intravenous, Q12H  [MAR Hold] traZODone, 50 mg, Nasogastric, Nightly          Assessment & Plan       Left flank pain    Ruptured infrarenal abdominal aortic aneurysm (AAA)    DVT, lower extremity, distal, acute, unspecified laterality    S/P Exploratory laparotomy; appendectomy; left colon resection with creation of ostomy    S/P Open repair of ruptured infrarenal abdominal aortic aneurysm      Assessment & Plan  ROLAND-  pt with previous partial left nephrectomy and baseline creatinine closer to 0.9-1.0.  Had noted hypotension with AAA rupture s/p repair.  Likely ATN from " hypotension vs. Atheroembolic injury.  Noted renal infarcts seen on repeat CT.  Also received IV contrast with CTA.    Continue CRRT for now.  Once K+ stable will hold and try to transition back to HD.  AAA rupture-  s/p open repair infrarenal aorta.  Ischemic bowel- s/p left colectomy, ostomy.  Shock- sepsis, hemorrhagic component with AAA rupture.   Sepsis-  blood cx with strep pyogenes previously.  On abx per primary.  Elevated procalcitonin.  Met Acidosis-  bicarb held.  Improved now with CRRT.  Anemia-  prbc's prn.  Hyperkalemia-  worsened with acidosis, likely due to reabsorption of blood from previous bleed. Improved  DVT- simran LE.  Filter placement today.    Adin Hinton MD  01/05/24  13:27 EST

## 2024-01-05 NOTE — NURSING NOTE
RN reached out MD Mary Jane about trending potassium, current level 5.3. MD stated no change/intervention he will assess later.,

## 2024-01-05 NOTE — NURSING NOTE
Patient alert and oriented. Resting off and on. Tube feeds started at 1730 at 25 through [facility]. CRRT continued after surgery. Pain medication given as needed. See EMAR. Wife at bedside. All questions answered. SCD's on patient. Heparin drip going per protocol and MD order.

## 2024-01-05 NOTE — PLAN OF CARE
Goal Outcome Evaluation:      Remains off pressors. Continues to tolerate CRRT. NSR. No complaints of pain. AxOx4. TF stopped since midnight for possible IVC filter placement if CRRT is removed. Dennis was at bedside this shift order to dc plasma if pressures remained stable and he would reevaluate this morning.

## 2024-01-05 NOTE — NURSING NOTE
BAKARI Peck contacted due to patient appearing more lethargic. Vital signs are stable. Patient does stimulate and open eyes to deep stimulation. ABG with lytes done. Sugar was 89. Half amp of D50 was given per Dr. Vaughn. Patient sugar 161 prior to going to surgery.

## 2024-01-05 NOTE — PROGRESS NOTES
Nutrition Services    Patient Name: Danny Savage  YOB: 1960  MRN: [id number]  Admission date: 12/23/2023    PROGRESS NOTE      Encounter Information: EN Follow Up       PO Diet: Diet: Liquid Diets; Full Liquid; Fluid Consistency: Thin (IDDSI 0)   PO Supplements: NPO   PO Intake:  NPO       Current nutrition support: Novasource Renal @ 15 ml/hr x 22 hrs  Provides 660 kcal, 30 g pro, 234 ml    Currently on hold for possible IVC filter today.         Estimated Needs: 2415 kcal, 116-126 g pro, 4942-8578 ml fluid        Nutrition support review: Tolerated trophic rate yesterday.  EN on hold today.  Anticipate patient will be able to resume and work toward goal rate post-procedure.         Labs (reviewed below): Reviewed.  K+, Phos elevated.  Receiving CRRT.          GI Function:  Having ostomy output. No additional nausea/vomiting noted.          Nutrition Intervention Updates: Once able to resume EN, recommend to advance toward goal of:  Novasource Renal @ 55 ml/hr x 22 hours/day  Free water flushes 25 ml every 3 hours   Provides 2420 kcal, 110 g pro, 1059 ml fluid      Results from last 7 days   Lab Units 01/05/24  0433 01/04/24  2358 01/04/24  1835 01/04/24  1148 01/04/24  0602 01/03/24  1147 01/03/24  0358   SODIUM mmol/L 136 137 137   < > 138  138   < > 134*   POTASSIUM mmol/L 5.3* 5.0 4.6   < > 4.8  4.8   < > 4.8   CHLORIDE mmol/L 101 101 101   < > 101  101   < > 99   CO2 mmol/L 18.8* 20.5* 19.8*   < > 18.7*  18.7*   < > 19.1*   BUN mg/dL 41* 46* 48*   < > 52*  52*   < > 58*   CREATININE mg/dL 3.01* 2.92* 3.04*   < > 3.31*  3.31*   < > 4.49*   CALCIUM mg/dL 7.8* 7.6* 7.7*   < > 7.7*  7.7*   < > 6.8*   BILIRUBIN mg/dL 10.3*  --   --   --  10.1*  --  8.3*   ALK PHOS U/L 114  --   --   --  114  --  112   ALT (SGPT) U/L 127*  --   --   --  153*  --  220*   AST (SGOT) U/L 147*  --   --   --  197*  --  600*   GLUCOSE mg/dL 82 95 106*   < > 126*  126*   < > 258*    < > = values in this  "interval not displayed.     Results from last 7 days   Lab Units 01/05/24  0433 01/04/24  2358 01/04/24  1835   MAGNESIUM mg/dL 2.5* 2.5* 2.6*   PHOSPHORUS mg/dL 5.2* 5.3* 5.2*   HEMOGLOBIN g/dL 8.3*  --   --    HEMATOCRIT % 26.4*  --   --      COVID19   Date Value Ref Range Status   12/23/2023 Not Detected Not Detected - Ref. Range Final     No results found for: \"HGBA1C\"    RD to follow up per protocol.    Electronically signed by:  Shannon Acharya, LAURA  01/05/24 08:59 EST   "

## 2024-01-05 NOTE — PROGRESS NOTES
[facility]   Hospitalist Progress Note  Date: 2024  Patient Name: Danny Savage  : 1960  MRN: [id number]  Date of admission: 2023      Subjective   Subjective     Chief Complaint: Follow-up postop seroma    Summary:Danny Savage is a 63 y.o. male  presented to the hospital with complaints of left lower back pain and fever x 3 days, at that time patient was status post nephrectomy and there was concern for postoperative seroma/hematoma, patient was discharged home but pain began getting worse 10 out of 10 intensity on repeat presentation patient was febrile to 102.6, repeat CT scan showed likely postoperative seroma/hematoma although infection could not be ruled out, there was an abdominal aortic aneurysm of 5.1 cm.  The patient was started on IV antibiotics and patient was admitted for possible abscess in the nephrectomy bed, patient went unresponsive on 2023, blood pressure was 42/32, patient was pale and clammy, CODE BLUE was called, patient received IV fluids, patient was intubated, he was found to have expanding AAA with eccentric mural thrombus/hematoma measuring 5.1 cm, vascular surgeon consulted and patient underwent urgent open repair of ruptured infrarenal AAA with tube graft and mobilization of the omentum, patient taken back to the OR on 2023 to look for sources of blood loss, he was found to have nonviable colon and patient underwent partial colectomy by Dr. Mallory.  Patient has had prolonged course with continued anemia, repeat scans concerning for worsening hematoma, patient was found to have DVTs and was on heparin drip.  Heparin has had to be discontinued and vascular surgery planning for IVC filter placement.  Patient's blood pressure did not tolerate hemodialysis and patient had to be switched to CRRT.  Patient remains on antibiotics but repeat blood cultures are negative.  Patient will likely require LTAC placement when he stabilizes medically      Interval Followup: Patient lying in bed appears to be resting fairly comfortably.  Patient denies any new issues excited about being able to drink some fluids today as he has been cleared by speech for liquids.  Afebrile overnight.  Sinus rhythm 70s to 90s on telemetry reviewed.  Weaned off of Levophed remains on low-dose vasopressin.  Satting well on nasal cannula.  Remains on CRRT.  Bicarb improving.  No urine output this shift.  Leukocytosis improving slowly.  Hemoglobin responded appropriately to transfusion overnight repeat blood cultures negative to date.  No other issues per nursing..    Review of Systems  Constitutional: Negative for fatigue and fever.   HENT: Positive for sore throat and trouble swallowing.    Eyes: Negative for pain and discharge.   Respiratory: Negative for cough and shortness of breath.    Cardiovascular: Negative for chest pain and palpitations.   Gastrointestinal: Negative for abdominal pain, nausea and vomiting.   Endocrine: Negative for cold intolerance and heat intolerance.   Genitourinary: Negative for difficulty urinating and dysuria.   Musculoskeletal: Negative for back pain and neck stiffness.   Skin: Negative for color change and rash.   Neurological: Negative for syncope and headaches.   Hematological: Negative for adenopathy.   Psychiatric/Behavioral: Negative for confusion and hallucinations.    Objective   Objective     Vitals:   Temp:  [97.2 °F (36.2 °C)-99.5 °F (37.5 °C)] 97.2 °F (36.2 °C)  Heart Rate:  [] 79  BP: ()/(40-81) 103/69  Physical Exam   General: well-developed appearing stated age in no acute distress  HEENT: Normocephalic atraumatic moist membranes pupils equal round reactive light, no scleral icterus no conjunctival injection  Cardiovascular: regular rate and rhythm no murmurs rubs or gallops S1-S2, no lower extremity edema appreciated  Pulmonary: Crackles bilateral posterior lung fields no wheezes or rhonchi symmetric chest expansion,  unlabored, no conversational dyspnea appreciated  Gastrointestinal: Soft nondistended positive bowel sounds all 4 quadrants no rebound or guarding, surgical dressing over the midline clean dry intact, colostomy in left lower quadrant productive of stool  Musculoskeletal: No clubbing cyanosis, warm and well-perfused, calves soft symmetric nontender bilaterally  Skin: Clean dry without rashes  Neuro: Cranial nerves II through XII intact grossly no sensorimotor deficits appreciated bilateral upper and lower extremities  Psych: Patient is calm cooperative and appropriate with exam not responding to internal stimuli  : No Rico catheter no bladder distention no suprapubic tenderness    Result Review    Result Review:  I have personally reviewed these results and agree with these findings:  [x]  Laboratory  LAB RESULTS:      Lab 01/04/24  0602 01/04/24  0152 01/03/24  1640 01/03/24  0814 01/03/24  0358 01/03/24  0009 01/02/24  2232 01/02/24  0951 01/02/24  0453 01/02/24  0406 01/01/24  0528 12/31/23  0830 12/31/23  0524 12/30/23  1723 12/30/23  0537 12/29/23  1139 12/29/23  0540 12/28/23  2220 12/28/23  2220   WBC 26.10*  --   --   --  29.36*  --   --   --  43.46*  --  28.43*  --  26.82*  --  25.62*  --  29.90*   < >  --    HEMOGLOBIN 8.7* 9.1* 7.0*  --  7.4*  --   --   --  7.1*  --  7.9*   < > 7.7*   < > 6.6*  --  7.0*   < >  --    HEMATOCRIT 26.5* 27.6* 21.3*  --  22.4*  --   --   --  21.8*  --  23.8*   < > 23.2*   < > 20.0*  --  21.2*   < >  --    PLATELETS 296  --   --   --  344  --   --   --  423  --  286  --  193  --  126*  --  96*   < >  --    NEUTROS ABS 21.17*  --   --   --  26.72*  --   --   --   --   --  21.46*  --  21.16*  --  23.31*  --  26.24*  --   --    IMMATURE GRANS (ABS) 1.81*  --   --   --   --   --   --   --   --   --  1.70*  --  1.24*  --   --   --  0.98*  --   --    LYMPHS ABS 1.21  --   --   --   --   --   --   --   --   --  2.13  --  1.65  --   --   --  1.22  --   --    MONOS ABS 1.72*  --   --    --   --   --   --   --   --   --  2.82*  --  2.56*  --   --   --  1.23*  --   --    EOS ABS 0.06  --   --   --   --   --   --   --   --   --  0.16  --  0.08  --  0.26  --  0.06  --   --    MCV 91.1  --   --   --  88.2  --   --   --  90.5  --  86.2  --  89.9  --  89.7  --  89.1   < >  --    PROCALCITONIN  --   --   --   --   --   --   --   --   --  11.77* 13.44*  --   --   --   --   --  11.16*  --   --    LACTATE 1.0  --   --   --   --  2.5*  --  7.8*  --   --  2.0   < >  --   --   --   --   --   --   --    LACTATE, ARTERIAL  --   --   --   --   --   --  2.34*  --   --   --   --   --   --   --   --   --   --   --   --    PROTIME 17.9*  --  18.8* 22.6*  --   --   --   --   --   --   --   --   --   --   --   --   --   --  13.7   APTT 32.9  --   --  38.1*  --   --   --   --  43.3*  --  89.9  --  100.9*  --  85.9   < > 81.8  --  33.6*    < > = values in this interval not displayed.         Lab 01/04/24  1835 01/04/24  1148 01/04/24  0602 01/04/24  0024 01/03/24  1640   SODIUM 137 133* 138  138 137 134*   POTASSIUM 4.6 4.8 4.8  4.8 4.7 4.5   CHLORIDE 101 100 101  101 102 98   CO2 19.8* 20.7* 18.7*  18.7* 19.4* 19.5*   ANION GAP 16.2* 12.3 18.3*  18.3* 15.6* 16.5*   BUN 48* 50* 52*  52* 53* 57*   CREATININE 3.04* 3.45* 3.31*  3.31* 3.45* 3.74*   EGFR 22.0* 18.9* 19.9*  19.9* 18.9* 17.2*   GLUCOSE 106* 107* 126*  126* 151* 200*   CALCIUM 7.7* 7.9* 7.7*  7.7* 7.5* 7.4*   IONIZED CALCIUM 1.01* 1.00* 1.00* 0.97* 0.95*   MAGNESIUM 2.6* 2.7* 2.5* 2.4 2.5*   PHOSPHORUS 5.2* 4.8* 4.8* 4.8* 4.1         Lab 01/04/24  1835 01/04/24  1148 01/04/24  0602 01/04/24  0024 01/03/24  1640 01/03/24  1147 01/03/24  0358 01/03/24  0009 01/02/24  1422 01/02/24  0406 01/01/24  0528 12/31/23  0524   TOTAL PROTEIN  --   --  6.8  --   --   --  6.2 6.2  --  6.4 6.1 6.0   ALBUMIN 3.0* 3.1* 3.0*  3.0* 3.0* 3.1*   < > 3.0* 3.0*   < > 2.5* 2.2* 2.3*   GLOBULIN  --   --  3.8  --   --   --  3.2 3.2  --  3.9 3.9 3.7   ALT (SGPT)  --   --  153*   --   --   --  220* 228*  --  100* 36 44*   AST (SGOT)  --   --  197*  --   --   --  600* 742*  --  248* 50* 58*   BILIRUBIN  --   --  10.1*  --   --   --  8.3* 8.4*  --  6.5* 4.1* 3.5*   ALK PHOS  --   --  114  --   --   --  112 107  --  122* 112 124*   LIPASE  --   --   --   --   --   --   --   --   --  23  --  14    < > = values in this interval not displayed.         Lab 01/04/24  0602 01/03/24  1640 01/03/24  0814 12/30/23  0537 12/28/23  2220   PROBNP  --   --   --  30,309.0*  --    PROTIME 17.9* 18.8* 22.6*  --  13.7   INR 1.45* 1.54* 1.96*  --  1.03             Lab 01/02/24  0951 12/30/23  0645 12/30/23  0645 12/30/23  0537   IRON  --   --   --  21*   IRON SATURATION (TSAT)  --   --   --  13*   TIBC  --   --   --  164*   TRANSFERRIN  --   --   --  110*   ABO TYPING A   < > A  --    RH TYPING Negative   < > Negative  --    ANTIBODY SCREEN Negative  --  Negative  --     < > = values in this interval not displayed.         Lab 01/02/24 2232   PH, ARTERIAL 7.467*   PCO2, ARTERIAL 29.7*   PO2 ART 58.5*   O2 SATURATION ART 90.7*   FIO2 28   HCO3 ART 21.0*   BASE EXCESS ART -2.2*   CARBOXYHEMOGLOBIN 1.6*     Brief Urine Lab Results  (Last result in the past 365 days)        Color   Clarity   Blood   Leuk Est   Nitrite   Protein   CREAT   Urine HCG        12/23/23 2054 Yellow   Clear   Small (1+)   Negative   Negative   100 mg/dL (2+)                 Microbiology Results (last 10 days)       Procedure Component Value - Date/Time    Blood Culture - Blood, Arm, Left [[id number]]  (Normal) Collected: 01/02/24 1422    Lab Status: Preliminary result Specimen: Blood from Arm, Left Updated: 01/04/24 1430     Blood Culture No growth at 2 days    Blood Culture - Blood, Hand, Left [[id number]]  (Normal) Collected: 01/02/24 1216    Lab Status: Preliminary result Specimen: Blood from Hand, Left Updated: 01/04/24 1230     Blood Culture No growth at 2 days    Aspergillus Galactomannan Antigen - Blood, Blood, Central Line  [[id number]] Collected: 12/29/23 1936    Lab Status: Final result Specimen: Blood, Central Line Updated: 01/04/24 0608     Aspergillus Ag, BAL/Serum 0.05 Index     Narrative:      Performed at:  01 - [facility]  [address] [place], [place]  [id number]  : Laura Gautam MD, Phone:  [phone]  Performed at:  02 - [facility]  [address] [place], [place]  [id number]  : Deepthi Del Valle [facility], Phone:  [phone]    Blood Culture - Blood, Arm, Left [[id number]]  (Normal) Collected: 12/29/23 1139    Lab Status: Final result Specimen: Blood from Arm, Left Updated: 01/03/24 1215     Blood Culture No growth at 5 days    Blood Culture - Blood, Chest, Left [[id number]]  (Normal) Collected: 12/29/23 1002    Lab Status: Final result Specimen: Blood from Chest, Left Updated: 01/03/24 1030     Blood Culture No growth at 5 days            [x]  Microbiology  [x]  Radiology  XR Abdomen KUB    Result Date: 1/2/2024    1. Small bore feeding tube tip terminating at the junction of the 3rd and 4th portion of the duodenum.      DOMONIQUE SALDAÑA MD       Electronically Signed and Approved By: DOMONIQUE SALDAÑA MD on 1/02/2024 at 19:43             XR Chest 1 View    Result Date: 1/2/2024    1. Mild to moderate perihilar and basilar airspace disease suspected to represent pulmonary edema.  Pneumonia is also in the differential 2. Small bilateral pleural effusions, unchanged 3. Feeding tube with the tip near the gastroesophageal junction.       Herve Garnett M.D.       Electronically Signed and Approved By: Herve Garnett M.D. on 1/02/2024 at 13:03             CT Abdomen Pelvis Without Contrast    Result Date: 1/1/2024    CT scan of the abdomen and pelvis without contrast demonstrating moderate right and small left effusions, larger in comparison to 9/29/2023..  Compressive atelectasis is again seen at the lung bases.  Ill-defined irregular retroperitoneal mass appears larger in comparison to the previous study  concerning for interval hemorrhage.  An ill-defined 9.5 cm component with layering density is consistent with hematoma.  I discussed findings with Dr. Lloyd at 1855.     BENITO PRADHAN MD       Electronically Signed and Approved By: BENITO PRADHAN MD on 1/01/2024 at 18:56             XR Chest 1 View    Result Date: 12/31/2023   Favorable progression is suggested radiographically with decreased atelectasis and/or infiltrate(s) bilaterally.  There may be decreased pleural effusions, as well.      Please note that portions of this note were completed with a voice recognition program.  SHIN NUNEZ JR, MD       Electronically Signed and Approved By: SHIN NUNEZ JR, MD on 12/31/2023 at 6:22              CT Abdomen Pelvis With Contrast    Result Date: 12/29/2023   1. Evidence of interval left hemicolectomy with colostomy placement as well as abdominal aortic aneurysm and aortic caval fistula repair since 12/24/2023.  There is a persistent extensive para-aortic right retroperitoneal hematoma without acute or new hemorrhage identified.  No loculated fluid or fluid and air collections are seen to indicate abscess.  2. Development of multiple splenic and bilateral renal infarcts new since 12/24/2023. 3. Small bilateral pleural effusions have increased in size since 12/24/2023.  There is also anasarca.    Report was called to [facility] nurse at 1651 hours on 12/29/2023.    COBY DEGROOT DO       Electronically Signed and Approved By: COBY DEGROOT DO on 12/29/2023 at 16:53             XR Chest 1 View    Result Date: 12/29/2023    1. Diffuse bilateral airspace opacities appear unchanged, which could reflect pulmonary edema or pneumonia. 2. Small bilateral pleural effusions.       GELY MONTE MD       Electronically Signed and Approved By: GELY MONTE MD on 12/29/2023 at 6:04              [x]  EKG/Telemetry   [x]  Cardiology/Vascular   Echocardiogram 12/24/2023  •  Left ventricular ejection fraction appears  to be 66 - 70%.  •  Hyperdynamic.  The left ventricular cavity is small in size.  •  Left ventricular diastolic function was not assessed.  •  There is a trivial pericardial effusion.  •  No significant valvular abnormalities noted.  []  Pathology  []  Old records  [x]  Other:  Scheduled Meds:ampicillin-sulbactam, 3 g, Intravenous, Q12H  escitalopram, 5 mg, Nasogastric, Daily  melatonin, 10 mg, Nasogastric, Nightly  midodrine, 5 mg, Nasogastric, TID AC  pantoprazole, 40 mg, Intravenous, Q12H  QUEtiapine, 50 mg, Nasogastric, Nightly  senna-docusate sodium, 2 tablet, Nasogastric, BID  sodium chloride, 10 mL, Intravenous, Q12H  traZODone, 50 mg, Nasogastric, Nightly      Continuous Infusions:norepinephrine, 0.02-0.3 mcg/kg/min, Last Rate: Stopped (24 0230)  Phoxillum BK4/2.5, 1,500 mL/hr, Last Rate: 1,500 mL/hr (24)  vasopressin, 0.04 Units/min, Last Rate: Stopped (24 1730)      PRN Meds:.•  senna-docusate sodium **AND** polyethylene glycol **AND** [DISCONTINUED] bisacodyl **AND** bisacodyl  •  calcium carbonate  •  dextrose  •  dextrose  •  dextrose  •  dextrose  •  fentaNYL citrate (PF)  •  glucagon (human recombinant)  •  glucagon (human recombinant)  •  haloperidol lactate  •  heparin (porcine)  •  heparin (porcine)  •  labetalol  •  [] Morphine **AND** naloxone  •  nitroglycerin  •  ondansetron  •  oxyCODONE-acetaminophen  •  oxyCODONE-acetaminophen  •  sodium chloride  •  sodium chloride      Assessment & Plan   Assessment / Plan     Assessment/Plan:  Ruptured infrarenal AAA s/p open repair  Hemorrhagic shock  Hypovolemic shock  Acute hypoxic respiratory failure requiring mechanical ventilation  Septic shock with Streptococcus pyogenes bacteremia  SVT  History of RCC s/p partial nephrectomy on   Acute anuric renal failure requiring CRRT  Metabolic acidosis  Clinically significant lactic acidosis  Chronically immunosuppressed on CellCept  Hypocalcemia  Hypokalemia  Acute right  lower extremity DVT in the peroneal and gastrocnemius  Acute blood loss anemia  Ischemic bowel  Hyperkalemia  Anxiety        Patient admitted for further evaluation and treatment  Vascular surgery, general surgery, urology, nephrology and pulmonology critical care consulted thank you for assistance  Continue postop wound care per general surgery  Continue to monitor hemoglobin closely and transfuse to keep greater than 7  Continue to hold anticoagulation secondary to concern for occult bleed  Continue supplemental oxygen titrate to keep sats greater than 90%  Continue ampicillin-sulbactam empirically  Continue midodrine and titrate per pulmonology critical care  Continue vasopressin as needed to keep MAP greater than 60  Continue CRRT and transition to hemodialysis once off of pressors  Continue renal panel every 6 hours  Bladder scan x 1 to evaluate for possible urinary retention  Continue to monitor electrolytes replace as needed  Vascular surgery planning on IVC filter once stable  Continue Lexapro and Seroquel  Continue tube feeds  Advance diet per speech  Further inpatient orders recommendations per clinical course         Discussed plan with bedside RN as well as pulmonology critical care team.    Disposition: LTAC versus inpatient rehab hospital once stable.    DVT prophylaxis:  Medical and mechanical DVT prophylaxis orders are present.    CODE STATUS:   Level Of Support Discussed With: Patient  Code Status (Patient has no pulse and is not breathing): CPR (Attempt to Resuscitate)  Medical Interventions (Patient has pulse or is breathing): Full Support

## 2024-01-05 NOTE — INTERVAL H&P NOTE
Vascular surgery H&P/consult and progress notes reviewed.  Patient was examined and although a bit more lethargic since early this AM he remains hemodynamically stable.  I discussed the case with Dr. Agosto as well as wife and given the fact that he did come off of CRRT this morning and is no longer requiring pressors with a stable hemoglobin we do feel as though an attempt at IVC filter during this window is reasonable.  Discussed with the wife that should he have any signs of decompensation whatsoever while in the Cath Lab we will not proceed and will bring the patient back to the ICU for continued care.  Plan will be to reinitiate CRRT per nephrology after the procedure.  We will likely also see if the patient can tolerate prophylactic dose subcutaneous heparin to avoid significant propagation of his bilateral lower extremity DVTs although this would not cover him for a PE which is why the IVC filter is still indicated.  Risks including but not limited to bleeding, infection, need for multiple procedures, limb loss, IVC thrombosis, cardiac and/or pulmonary compromise and death as well as benefits and indications regarding Inferior vena cava filter placement under ultrasound and fluoroscopic guidance were discussed with patient and wife at bedside who voiced understanding and willingness to proceed.  All questions were answered.

## 2024-01-05 NOTE — PROGRESS NOTES
Pulmonary / Critical Care Progress Note      Patient Name: Danny Savage  : 1960  MRN: [id number]  Attending:  Arpit Franks MD   Date of admission: 2023    Subjective   Subjective   Patient critically ill with ruptured AAA s/p open repair, hemorrhagic shock, Streptococcus pyogenes bacteremia    Over the past 24 hours, patient on nasal cannula, vasopressors weaned, hemoglobin stable, no evidence of bleeding, receiving CRRT per nephrology    Overnight, no acute events, n.p.o. for IVC filter, did have marginal blood glucose    This morning  Patient is resting comfortably on nasal cannula  Delirium has improved  Remains off vasopressors  CRRT this morning  Planning for IVC filter this morning and then going back on CRRT after  Hemoglobin stable at 8.3/INR stable at 1.6  Core track in place      Objective   Objective     Vitals:   Vital signs for last 24 hours:  Temp:  [97.2 °F (36.2 °C)-98.6 °F (37 °C)] 98.6 °F (37 °C)  Heart Rate:  [79-98] 86  BP: ()/(55-81) 129/73    Intake/Output last 3 shifts:  I/O last 3 completed shifts:  In: 1683.2 [P.O.:148; I.V.:1535.2]  Out: 4162 [Stool:740]    Physical Exam   Vital Signs Reviewed  Chronically and critically ill-appearing male on nasal cannula, NG in place  General: Weak, deconditioned, alert  Chest:  good aeration, unchanged crackles in bases bilaterally, no work of breathing noted at rest  CV: RRR, no MGR, pulses 2+, equal.  Abd: Soft, ND, NT, midline incision in place, colostomy in place  Neuro:  A&Ox3, intermittently disoriented about the course of events, CN grossly intact, no focal deficits.    Result Review    I have personally reviewed the results from the time of this admission to 2024 13:46 EST and agree with these findings:  [x]  Laboratory  [x]  Microbiology  [x]  Radiology  [x]  EKG/Telemetry   [x]  Cardiology/Vascular   []  Pathology  []  Old records  []  Other:  Most notable findings include:       Lab 24  1214  01/05/24  1156 01/05/24  0433 01/04/24  2358 01/04/24  1835 01/04/24  1148 01/04/24  0602 01/04/24  0152 01/04/24  0024 01/03/24  1640 01/03/24  1147 01/03/24  0358 01/03/24  0009 01/02/24  1422 01/02/24  0453 01/02/24  0406 01/01/24  0528 12/31/23  1544 12/31/23  0524 12/30/23  1723 12/30/23  0537   WBC  --   --  18.83*  --   --   --  26.10*  --   --   --   --  29.36*  --   --  43.46*  --  28.43*  --  26.82*  --  25.62*   HEMOGLOBIN  --   --  8.3*  --   --   --  8.7* 9.1*  --  7.0*  --  7.4*  --   --  7.1*  --  7.9*   < > 7.7*   < > 6.6*   HEMATOCRIT  --   --  26.4*  --   --   --  26.5* 27.6*  --  21.3*  --  22.4*  --   --  21.8*  --  23.8*   < > 23.2*   < > 20.0*   PLATELETS  --   --  272  --   --   --  296  --   --   --   --  344  --   --  423  --  286  --  193  --  126*   SODIUM  --  135* 136 137 137 133* 138  138  --  137 134*   < > 134* 134*   < >  --  134* 132*  --  132*   < > 133*   SODIUM, ARTERIAL 136.4  --   --   --   --   --   --   --   --   --   --   --   --    < >  --   --   --   --   --   --   --    POTASSIUM  --  4.5 5.3* 5.0 4.6 4.8 4.8  4.8  --  4.7 4.5   < > 4.8 4.9   < >  --  6.2* 4.7  --  4.2   < > 5.2   CHLORIDE  --  101 101 101 101 100 101  101  --  102 98   < > 99 98   < >  --  95* 95*  --  96*   < > 99   CO2  --  19.1* 18.8* 20.5* 19.8* 20.7* 18.7*  18.7*  --  19.4* 19.5*   < > 19.1* 18.2*   < >  --  12.9* 19.3*  --  19.7*   < > 21.7*   BUN  --  46* 41* 46* 48* 50* 52*  52*  --  53* 57*   < > 58* 67*   < >  --  75* 56*  --  33*   < > 28*   CREATININE  --  3.26* 3.01* 2.92* 3.04* 3.45* 3.31*  3.31*  --  3.45* 3.74*   < > 4.49* 4.65*   < >  --  5.77* 4.89*  --  3.46*   < > 3.47*   GLUCOSE  --  193* 82 95 106* 107* 126*  126*  --  151* 200*   < > 258* 243*   < >  --  55* 101*  --  92   < > 66   GLUCOSE, ARTERIAL 162*  --   --   --   --   --   --   --   --   --   --   --   --    < >  --   --   --   --   --   --   --    CALCIUM  --  7.6* 7.8* 7.6* 7.7* 7.9* 7.7*  7.7*  --  7.5* 7.4*   < >  6.8* 6.7*   < >  --  7.3* 7.3*  --  7.8*   < > 7.5*   PHOSPHORUS  --  5.2* 5.2* 5.3* 5.2* 4.8* 4.8*  --  4.8* 4.1   < > 5.1* 5.5*   < >  --  9.3* 5.8*  --  5.5*  --  2.6   TOTAL PROTEIN  --   --  6.6  --   --   --  6.8  --   --   --   --  6.2 6.2  --   --  6.4 6.1  --  6.0   < > 5.6*   ALBUMIN  --  2.8* 2.9* 2.9* 3.0* 3.1* 3.0*  3.0*  --  3.0* 3.1*   < > 3.0* 3.0*   < >  --  2.5* 2.2*  --  2.3*   < > 2.2*   GLOBULIN  --   --  3.7  --   --   --  3.8  --   --   --   --  3.2 3.2  --   --  3.9 3.9  --  3.7   < > 3.4    < > = values in this interval not displayed.       Assessment & Plan   Assessment / Plan     Active Hospital Problems:  Active Hospital Problems    Diagnosis     **Left flank pain     S/P Exploratory laparotomy; appendectomy; left colon resection with creation of ostomy     S/P Open repair of ruptured infrarenal abdominal aortic aneurysm     Ruptured infrarenal abdominal aortic aneurysm (AAA)     DVT, lower extremity, distal, acute, unspecified laterality      Impression:  Ruptured infrarenal AAA s/p open repair  Acute blood loss anemia  Acute DVT with plan for IVC filter  Intra-abdominal hematoma  Hemorrhagic shock  Hypovolemic shock  Acute hypoxic respiratory failure requiring mechanical ventilation  Septic shock with Streptococcus pyogenes bacteremia  Moraxella pneumonia  Ischemic sigmoid colon s/p colostomy  History of RCC s/p partial nephrectomy on 11/13  Acute kidney injury secondary to acute tubular necrosis requiring renal replacement therapy  Clinically significant lactic acidosis  Chronically immunosuppressed on CellCept  Hypocalcemia  Hypokalemia resolved now with hyperkalemia  SVT  Lactic acidosis, clinically significant     Plan:  -Continue supplemental oxygen via nasal cannula will wean for SpO2 greater than 90  -Aspiration precautions, incentive spirometry  -Hemoglobin stable at 8.3  -No evidence of bleeding, transfuse for hemoglobin less than 7 or evidence of shock  -Currently not  requiring vasopressors  -Lactic acid cleared  -Continue midodrine 5 milligrams 3 times daily  -CRRT on hold for procedure, nephrology following  -Patient may need TDC prior to LTAC, has temporary left femoral dialysis catheter in place  -Patient has acute bilateral DVT, plan for IVC today  -Unable to reinitiate heparin drip due to previous retroperitoneal bleed  -Core track in place, okay to resume tube feeds post procedurally  -Speech therapy cleared for full liquid diet however not meeting nutritional needs  -Continue nightly Seroquel, trazodone and as needed Haldol  -As needed fentanyl for pain  -Leukocytosis improving, I see no evidence of new infection  -Continue Unasyn to complete 14 days of therapy for bacteremia  -Leukocytosis decreased.  Will monitor for now.  I see no evidence of new infection  -Hold home chronic immunosuppression CellCept indefinitely  -Will ultimately need LTAC once patient is stabilized     PUP PPx: Protonix  DVT prophylaxis: Planning for IVC filter  Medical and mechanical DVT prophylaxis orders are present.    CODE STATUS:   Level Of Support Discussed With: Patient  Code Status (Patient has no pulse and is not breathing): CPR (Attempt to Resuscitate)  Medical Interventions (Patient has pulse or is breathing): Full Support      Liv ENG APRN, spent 15 minutes critical care time in accordance to split shared billing.    Patient is critically ill with ruptured AAA, DVTs, hemorrhagic shock, requiring blood transfusion, intra-abdominal hematoma, acute kidney injury requiring renal replacement therapy.. We have personally reviewed all pertinent labs, imaging, microbiology and documentation. We have discussed care with the primary service as well as at multidisciplinary critical care rounds with the bedside nurse, respiratory therapist, pharmacist and all other ancillary services. 38 minutes of critical care time was spent managing this patient, excluding procedures. Of this time,  I spent 23 minutes in accordance with split shared billing.    Electronically signed by Yinka Lloyd MD, 01/05/24, 1:47 PM EST.

## 2024-01-06 LAB
ALBUMIN SERPL-MCNC: 2.8 G/DL (ref 3.5–5.2)
ALBUMIN SERPL-MCNC: 2.9 G/DL (ref 3.5–5.2)
ALBUMIN/GLOB SERPL: 0.7 G/DL
ALP SERPL-CCNC: 115 U/L (ref 39–117)
ALT SERPL W P-5'-P-CCNC: 103 U/L (ref 1–41)
ANION GAP SERPL CALCULATED.3IONS-SCNC: 15.8 MMOL/L (ref 5–15)
ANION GAP SERPL CALCULATED.3IONS-SCNC: 16 MMOL/L (ref 5–15)
ANION GAP SERPL CALCULATED.3IONS-SCNC: 16 MMOL/L (ref 5–15)
ANION GAP SERPL CALCULATED.3IONS-SCNC: 16.5 MMOL/L (ref 5–15)
APTT PPP: 97.1 SECONDS (ref 78–95.9)
AST SERPL-CCNC: 104 U/L (ref 1–40)
BASOPHILS # BLD AUTO: 0.06 10*3/MM3 (ref 0–0.2)
BASOPHILS NFR BLD AUTO: 0.4 % (ref 0–1.5)
BILIRUB SERPL-MCNC: 8.2 MG/DL (ref 0–1.2)
BUN SERPL-MCNC: 43 MG/DL (ref 8–23)
BUN SERPL-MCNC: 49 MG/DL (ref 8–23)
BUN/CREAT SERPL: 14.5 (ref 7–25)
BUN/CREAT SERPL: 14.8 (ref 7–25)
BUN/CREAT SERPL: 15.1 (ref 7–25)
BUN/CREAT SERPL: 15.3 (ref 7–25)
CA-I BLDA-SCNC: 1 MMOL/L (ref 1.13–1.32)
CA-I BLDA-SCNC: 1.01 MMOL/L (ref 1.13–1.32)
CALCIUM SPEC-SCNC: 7.5 MG/DL (ref 8.6–10.5)
CALCIUM SPEC-SCNC: 7.7 MG/DL (ref 8.6–10.5)
CALCIUM SPEC-SCNC: 7.8 MG/DL (ref 8.6–10.5)
CALCIUM SPEC-SCNC: 7.8 MG/DL (ref 8.6–10.5)
CHLORIDE SERPL-SCNC: 101 MMOL/L (ref 98–107)
CHLORIDE SERPL-SCNC: 102 MMOL/L (ref 98–107)
CHLORIDE SERPL-SCNC: 99 MMOL/L (ref 98–107)
CHLORIDE SERPL-SCNC: 99 MMOL/L (ref 98–107)
CO2 SERPL-SCNC: 18 MMOL/L (ref 22–29)
CO2 SERPL-SCNC: 19 MMOL/L (ref 22–29)
CO2 SERPL-SCNC: 19.2 MMOL/L (ref 22–29)
CO2 SERPL-SCNC: 19.5 MMOL/L (ref 22–29)
CREAT SERPL-MCNC: 2.85 MG/DL (ref 0.76–1.27)
CREAT SERPL-MCNC: 2.91 MG/DL (ref 0.76–1.27)
CREAT SERPL-MCNC: 2.97 MG/DL (ref 0.76–1.27)
CREAT SERPL-MCNC: 3.2 MG/DL (ref 0.76–1.27)
D-LACTATE SERPL-SCNC: 1.2 MMOL/L (ref 0.5–2)
DEPRECATED RDW RBC AUTO: 52.8 FL (ref 37–54)
EGFRCR SERPLBLD CKD-EPI 2021: 20.7 ML/MIN/1.73
EGFRCR SERPLBLD CKD-EPI 2021: 22.6 ML/MIN/1.73
EGFRCR SERPLBLD CKD-EPI 2021: 23.2 ML/MIN/1.73
EGFRCR SERPLBLD CKD-EPI 2021: 23.8 ML/MIN/1.73
EOSINOPHIL # BLD AUTO: 0.06 10*3/MM3 (ref 0–0.4)
EOSINOPHIL NFR BLD AUTO: 0.4 % (ref 0.3–6.2)
ERYTHROCYTE [DISTWIDTH] IN BLOOD BY AUTOMATED COUNT: 18.9 % (ref 12.3–15.4)
GLOBULIN UR ELPH-MCNC: 4.1 GM/DL
GLUCOSE BLDC GLUCOMTR-MCNC: 110 MG/DL (ref 70–99)
GLUCOSE BLDC GLUCOMTR-MCNC: 133 MG/DL (ref 70–99)
GLUCOSE SERPL-MCNC: 124 MG/DL (ref 65–99)
GLUCOSE SERPL-MCNC: 125 MG/DL (ref 65–99)
GLUCOSE SERPL-MCNC: 140 MG/DL (ref 65–99)
GLUCOSE SERPL-MCNC: 141 MG/DL (ref 65–99)
HCT VFR BLD AUTO: 26.1 % (ref 37.5–51)
HGB BLD-MCNC: 8.4 G/DL (ref 13–17.7)
IMM GRANULOCYTES # BLD AUTO: 0.24 10*3/MM3 (ref 0–0.05)
IMM GRANULOCYTES NFR BLD AUTO: 1.5 % (ref 0–0.5)
INR PPP: 1.71 (ref 0.86–1.15)
LYMPHOCYTES # BLD AUTO: 0.58 10*3/MM3 (ref 0.7–3.1)
LYMPHOCYTES NFR BLD AUTO: 3.6 % (ref 19.6–45.3)
MAGNESIUM SERPL-MCNC: 2.5 MG/DL (ref 1.6–2.4)
MAGNESIUM SERPL-MCNC: 2.6 MG/DL (ref 1.6–2.4)
MAGNESIUM SERPL-MCNC: 2.6 MG/DL (ref 1.6–2.4)
MCH RBC QN AUTO: 29.9 PG (ref 26.6–33)
MCHC RBC AUTO-ENTMCNC: 32.2 G/DL (ref 31.5–35.7)
MCV RBC AUTO: 92.9 FL (ref 79–97)
MONOCYTES # BLD AUTO: 0.84 10*3/MM3 (ref 0.1–0.9)
MONOCYTES NFR BLD AUTO: 5.3 % (ref 5–12)
NEUTROPHILS NFR BLD AUTO: 14.15 10*3/MM3 (ref 1.7–7)
NEUTROPHILS NFR BLD AUTO: 88.8 % (ref 42.7–76)
NRBC BLD AUTO-RTO: 0.5 /100 WBC (ref 0–0.2)
PHOSPHATE SERPL-MCNC: 4.6 MG/DL (ref 2.5–4.5)
PHOSPHATE SERPL-MCNC: 4.9 MG/DL (ref 2.5–4.5)
PHOSPHATE SERPL-MCNC: 5.1 MG/DL (ref 2.5–4.5)
PLATELET # BLD AUTO: 284 10*3/MM3 (ref 140–450)
PMV BLD AUTO: 10.2 FL (ref 6–12)
POTASSIUM SERPL-SCNC: 4.5 MMOL/L (ref 3.5–5.2)
POTASSIUM SERPL-SCNC: 4.6 MMOL/L (ref 3.5–5.2)
POTASSIUM SERPL-SCNC: 4.6 MMOL/L (ref 3.5–5.2)
POTASSIUM SERPL-SCNC: 4.7 MMOL/L (ref 3.5–5.2)
PROT SERPL-MCNC: 6.9 G/DL (ref 6–8.5)
PROTHROMBIN TIME: 20.4 SECONDS (ref 11.8–14.9)
RBC # BLD AUTO: 2.81 10*6/MM3 (ref 4.14–5.8)
SODIUM SERPL-SCNC: 134 MMOL/L (ref 136–145)
SODIUM SERPL-SCNC: 135 MMOL/L (ref 136–145)
SODIUM SERPL-SCNC: 136 MMOL/L (ref 136–145)
SODIUM SERPL-SCNC: 136 MMOL/L (ref 136–145)
WBC NRBC COR # BLD AUTO: 15.93 10*3/MM3 (ref 3.4–10.8)

## 2024-01-06 PROCEDURE — 99291 CRITICAL CARE FIRST HOUR: CPT | Performed by: INTERNAL MEDICINE

## 2024-01-06 PROCEDURE — 80053 COMPREHEN METABOLIC PANEL: CPT | Performed by: SURGERY

## 2024-01-06 PROCEDURE — 82948 REAGENT STRIP/BLOOD GLUCOSE: CPT

## 2024-01-06 PROCEDURE — 99233 SBSQ HOSP IP/OBS HIGH 50: CPT | Performed by: FAMILY MEDICINE

## 2024-01-06 PROCEDURE — 25010000002 AMPICILLIN PER 500 MG: Performed by: INTERNAL MEDICINE

## 2024-01-06 PROCEDURE — 82330 ASSAY OF CALCIUM: CPT | Performed by: SURGERY

## 2024-01-06 PROCEDURE — 83735 ASSAY OF MAGNESIUM: CPT | Performed by: SURGERY

## 2024-01-06 PROCEDURE — 84100 ASSAY OF PHOSPHORUS: CPT | Performed by: SURGERY

## 2024-01-06 PROCEDURE — 25010000002 FENTANYL CITRATE (PF) 50 MCG/ML SOLUTION: Performed by: SURGERY

## 2024-01-06 PROCEDURE — 85610 PROTHROMBIN TIME: CPT | Performed by: SURGERY

## 2024-01-06 PROCEDURE — 83605 ASSAY OF LACTIC ACID: CPT | Performed by: SURGERY

## 2024-01-06 PROCEDURE — B519YZZ FLUOROSCOPY OF INFERIOR VENA CAVA USING OTHER CONTRAST: ICD-10-PCS | Performed by: SURGERY

## 2024-01-06 PROCEDURE — 85730 THROMBOPLASTIN TIME PARTIAL: CPT | Performed by: INTERNAL MEDICINE

## 2024-01-06 PROCEDURE — 25010000002 HEPARIN (PORCINE) 25000-0.45 UT/250ML-% SOLUTION: Performed by: SURGERY

## 2024-01-06 PROCEDURE — 85025 COMPLETE CBC W/AUTO DIFF WBC: CPT | Performed by: SURGERY

## 2024-01-06 PROCEDURE — 25010000002 ONDANSETRON PER 1 MG: Performed by: SURGERY

## 2024-01-06 RX ORDER — QUETIAPINE FUMARATE 25 MG/1
25 TABLET, FILM COATED ORAL NIGHTLY
Status: DISCONTINUED | OUTPATIENT
Start: 2024-01-06 | End: 2024-01-12 | Stop reason: HOSPADM

## 2024-01-06 RX ORDER — SODIUM BICARBONATE 650 MG/1
650 TABLET ORAL 3 TIMES DAILY
Status: DISCONTINUED | OUTPATIENT
Start: 2024-01-06 | End: 2024-01-11

## 2024-01-06 RX ADMIN — AMPICILLIN SODIUM 2 G: 2 INJECTION, POWDER, FOR SOLUTION INTRAVENOUS at 08:19

## 2024-01-06 RX ADMIN — CALCIUM CHLORIDE, MAGNESIUM CHLORIDE, SODIUM CHLORIDE, SODIUM BICARBONATE, POTASSIUM CHLORIDE AND SODIUM PHOSPHATE DIBASIC DIHYDRATE 1500 ML/HR: 3.68; 3.05; 6.34; 3.09; .314; .187 INJECTION INTRAVENOUS at 01:11

## 2024-01-06 RX ADMIN — Medication 10 ML: at 08:19

## 2024-01-06 RX ADMIN — HEPARIN SODIUM 18 UNITS/KG/HR: 10000 INJECTION, SOLUTION INTRAVENOUS at 21:02

## 2024-01-06 RX ADMIN — Medication 10 MG: at 21:04

## 2024-01-06 RX ADMIN — SODIUM BICARBONATE 650 MG TABLET 650 MG: at 09:23

## 2024-01-06 RX ADMIN — CALCIUM CHLORIDE, MAGNESIUM CHLORIDE, SODIUM CHLORIDE, SODIUM BICARBONATE, POTASSIUM CHLORIDE AND SODIUM PHOSPHATE DIBASIC DIHYDRATE 1500 ML/HR: 3.68; 3.05; 6.34; 3.09; .314; .187 INJECTION INTRAVENOUS at 04:11

## 2024-01-06 RX ADMIN — MIDODRINE HYDROCHLORIDE 5 MG: 5 TABLET ORAL at 11:23

## 2024-01-06 RX ADMIN — OXYCODONE AND ACETAMINOPHEN 1 TABLET: 10; 325 TABLET ORAL at 16:58

## 2024-01-06 RX ADMIN — MIDODRINE HYDROCHLORIDE 5 MG: 5 TABLET ORAL at 16:59

## 2024-01-06 RX ADMIN — PANTOPRAZOLE SODIUM 40 MG: 40 INJECTION, POWDER, FOR SOLUTION INTRAVENOUS at 21:04

## 2024-01-06 RX ADMIN — AMPICILLIN SODIUM 2 G: 2 INJECTION, POWDER, FOR SOLUTION INTRAMUSCULAR; INTRAVENOUS at 20:46

## 2024-01-06 RX ADMIN — ONDANSETRON 4 MG: 2 INJECTION INTRAMUSCULAR; INTRAVENOUS at 20:46

## 2024-01-06 RX ADMIN — SODIUM BICARBONATE 650 MG TABLET 650 MG: at 21:04

## 2024-01-06 RX ADMIN — QUETIAPINE FUMARATE 25 MG: 25 TABLET ORAL at 21:04

## 2024-01-06 RX ADMIN — OXYCODONE AND ACETAMINOPHEN 1 TABLET: 10; 325 TABLET ORAL at 00:41

## 2024-01-06 RX ADMIN — Medication 10 ML: at 21:04

## 2024-01-06 RX ADMIN — OXYCODONE AND ACETAMINOPHEN 1 TABLET: 10; 325 TABLET ORAL at 10:00

## 2024-01-06 RX ADMIN — SODIUM BICARBONATE 650 MG TABLET 650 MG: at 16:59

## 2024-01-06 RX ADMIN — OXYCODONE AND ACETAMINOPHEN 1 TABLET: 10; 325 TABLET ORAL at 05:27

## 2024-01-06 RX ADMIN — HEPARIN SODIUM 18 UNITS/KG/HR: 10000 INJECTION, SOLUTION INTRAVENOUS at 05:27

## 2024-01-06 RX ADMIN — FENTANYL CITRATE 50 MCG: 50 INJECTION, SOLUTION INTRAMUSCULAR; INTRAVENOUS at 04:05

## 2024-01-06 RX ADMIN — MIDODRINE HYDROCHLORIDE 5 MG: 5 TABLET ORAL at 08:19

## 2024-01-06 RX ADMIN — OXYCODONE AND ACETAMINOPHEN 1 TABLET: 10; 325 TABLET ORAL at 20:46

## 2024-01-06 RX ADMIN — ONDANSETRON 4 MG: 2 INJECTION INTRAMUSCULAR; INTRAVENOUS at 12:34

## 2024-01-06 RX ADMIN — AMPICILLIN SODIUM 2 G: 2 INJECTION, POWDER, FOR SOLUTION INTRAVENOUS at 03:11

## 2024-01-06 RX ADMIN — PANTOPRAZOLE SODIUM 40 MG: 40 INJECTION, POWDER, FOR SOLUTION INTRAVENOUS at 08:19

## 2024-01-06 RX ADMIN — ESCITALOPRAM OXALATE 5 MG: 10 TABLET ORAL at 08:19

## 2024-01-06 NOTE — PROGRESS NOTES
" LOS: 14 days   Patient Care Team:  Kodi Pichardo MD as PCP - General (Internal Medicine)  Lisset Harrington APRN as Nurse Practitioner (Urology)    Chief Complaint: ROLAND    Subjective     History of Present Illness  Pt remains on CRRT  Off pressors now.  Awake and alert, wife at bedside.  NG in place.    Subjective:  Symptoms:  No shortness of breath, chest pain, chest pressure or anxiety.    Diet:  No nausea or vomiting.      History taken from: chart family    Objective     Vital Sign Min/Max for last 24 hours  Temp  Min: 97.5 °F (36.4 °C)  Max: 98.6 °F (37 °C)   BP  Min: 91/58  Max: 131/70   Pulse  Min: 82  Max: 95   Resp  Min: 17  Max: 18   SpO2  Min: 92 %  Max: 99 %   Flow (L/min)  Min: 2  Max: 6   Weight  Min: 80.6 kg (177 lb 12.8 oz)  Max: 80.6 kg (177 lb 12.8 oz)     Flowsheet Rows      Flowsheet Row First Filed Value   Admission Height 177.8 cm (70\") Documented at 12/23/2023 1948   Admission Weight 79.9 kg (176 lb 2.4 oz) Documented at 12/23/2023 1948            No intake/output data recorded.  I/O last 3 completed shifts:  In: 1983.6 [P.O.:1275; I.V.:708.6]  Out: 3300 [Stool:365]    Objective:  General Appearance:  Comfortable.    Vital signs: (most recent): Blood pressure 100/65, pulse 88, temperature 97.9 °F (36.6 °C), temperature source Oral, resp. rate 18, height 177.8 cm (70\"), weight 80.6 kg (177 lb 12.8 oz), SpO2 95%.    Output: No urine output.    HEENT: Normal HEENT exam.    Lungs:  Normal effort and normal respiratory rate.  Breath sounds clear to auscultation.    Heart: Normal rate.  Regular rhythm.    Abdomen: Hypoactive bowel sounds.     Extremities: There is dependent edema.    Pulses: Distal pulses are intact.    Neurological: Patient is alert and oriented to person, place and time.    Pupils:  Pupils are equal, round, and reactive to light.    Skin:  Warm and dry.              Results Review:     I reviewed the patient's new clinical results.    WBC WBC   Date Value Ref Range " "Status   01/06/2024 15.93 (H) 3.40 - 10.80 10*3/mm3 Final   01/05/2024 18.83 (H) 3.40 - 10.80 10*3/mm3 Final   01/04/2024 26.10 (H) 3.40 - 10.80 10*3/mm3 Final      HGB Hemoglobin   Date Value Ref Range Status   01/06/2024 8.4 (L) 13.0 - 17.7 g/dL Final   01/05/2024 8.3 (L) 13.0 - 17.7 g/dL Final   01/04/2024 8.7 (L) 13.0 - 17.7 g/dL Final   01/04/2024 9.1 (L) 13.0 - 17.7 g/dL Final   01/03/2024 7.0 (L) 13.0 - 17.7 g/dL Final      HCT Hematocrit   Date Value Ref Range Status   01/06/2024 26.1 (L) 37.5 - 51.0 % Final   01/05/2024 26.4 (L) 37.5 - 51.0 % Final   01/04/2024 26.5 (L) 37.5 - 51.0 % Final   01/04/2024 27.6 (L) 37.5 - 51.0 % Final   01/03/2024 21.3 (L) 37.5 - 51.0 % Final      Platlets No results found for: \"LABPLAT\"   MCV MCV   Date Value Ref Range Status   01/06/2024 92.9 79.0 - 97.0 fL Final   01/05/2024 93.6 79.0 - 97.0 fL Final   01/04/2024 91.1 79.0 - 97.0 fL Final          Sodium Sodium   Date Value Ref Range Status   01/06/2024 134 (L) 136 - 145 mmol/L Final   01/06/2024 136 136 - 145 mmol/L Final   01/05/2024 136 136 - 145 mmol/L Final   01/05/2024 137 136 - 145 mmol/L Final   01/05/2024 135 (L) 136 - 145 mmol/L Final   01/05/2024 136 136 - 145 mmol/L Final   01/04/2024 137 136 - 145 mmol/L Final   01/04/2024 137 136 - 145 mmol/L Final   01/04/2024 133 (L) 136 - 145 mmol/L Final   01/04/2024 138 136 - 145 mmol/L Final   01/04/2024 138 136 - 145 mmol/L Final   01/04/2024 137 136 - 145 mmol/L Final   01/03/2024 134 (L) 136 - 145 mmol/L Final   01/03/2024 133 (L) 136 - 145 mmol/L Final     Sodium, Arterial   Date Value Ref Range Status   01/05/2024 136.4 136 - 146 mmol/L Final      Potassium Potassium   Date Value Ref Range Status   01/06/2024 4.6 3.5 - 5.2 mmol/L Final   01/06/2024 4.6 3.5 - 5.2 mmol/L Final   01/05/2024 4.7 3.5 - 5.2 mmol/L Final   01/05/2024 4.6 3.5 - 5.2 mmol/L Final   01/05/2024 4.5 3.5 - 5.2 mmol/L Final   01/05/2024 5.3 (H) 3.5 - 5.2 mmol/L Final   01/04/2024 5.0 3.5 - 5.2 " mmol/L Final   01/04/2024 4.6 3.5 - 5.2 mmol/L Final   01/04/2024 4.8 3.5 - 5.2 mmol/L Final   01/04/2024 4.8 3.5 - 5.2 mmol/L Final   01/04/2024 4.8 3.5 - 5.2 mmol/L Final   01/04/2024 4.7 3.5 - 5.2 mmol/L Final   01/03/2024 4.5 3.5 - 5.2 mmol/L Final   01/03/2024 4.5 3.5 - 5.2 mmol/L Final      Chloride Chloride   Date Value Ref Range Status   01/06/2024 99 98 - 107 mmol/L Final   01/06/2024 102 98 - 107 mmol/L Final   01/05/2024 101 98 - 107 mmol/L Final   01/05/2024 102 98 - 107 mmol/L Final   01/05/2024 101 98 - 107 mmol/L Final   01/05/2024 101 98 - 107 mmol/L Final   01/04/2024 101 98 - 107 mmol/L Final   01/04/2024 101 98 - 107 mmol/L Final   01/04/2024 100 98 - 107 mmol/L Final   01/04/2024 101 98 - 107 mmol/L Final   01/04/2024 101 98 - 107 mmol/L Final   01/04/2024 102 98 - 107 mmol/L Final   01/03/2024 98 98 - 107 mmol/L Final   01/03/2024 99 98 - 107 mmol/L Final      CO2 CO2   Date Value Ref Range Status   01/06/2024 19.0 (L) 22.0 - 29.0 mmol/L Final   01/06/2024 18.0 (L) 22.0 - 29.0 mmol/L Final   01/05/2024 19.2 (L) 22.0 - 29.0 mmol/L Final   01/05/2024 18.7 (L) 22.0 - 29.0 mmol/L Final   01/05/2024 19.1 (L) 22.0 - 29.0 mmol/L Final   01/05/2024 18.8 (L) 22.0 - 29.0 mmol/L Final   01/04/2024 20.5 (L) 22.0 - 29.0 mmol/L Final   01/04/2024 19.8 (L) 22.0 - 29.0 mmol/L Final   01/04/2024 20.7 (L) 22.0 - 29.0 mmol/L Final   01/04/2024 18.7 (L) 22.0 - 29.0 mmol/L Final   01/04/2024 18.7 (L) 22.0 - 29.0 mmol/L Final   01/04/2024 19.4 (L) 22.0 - 29.0 mmol/L Final   01/03/2024 19.5 (L) 22.0 - 29.0 mmol/L Final   01/03/2024 19.3 (L) 22.0 - 29.0 mmol/L Final      BUN BUN   Date Value Ref Range Status   01/06/2024 43 (H) 8 - 23 mg/dL Final   01/06/2024 43 (H) 8 - 23 mg/dL Final   01/05/2024 43 (H) 8 - 23 mg/dL Final   01/05/2024 46 (H) 8 - 23 mg/dL Final   01/05/2024 46 (H) 8 - 23 mg/dL Final   01/05/2024 41 (H) 8 - 23 mg/dL Final   01/04/2024 46 (H) 8 - 23 mg/dL Final   01/04/2024 48 (H) 8 - 23 mg/dL Final  "  01/04/2024 50 (H) 8 - 23 mg/dL Final   01/04/2024 52 (H) 8 - 23 mg/dL Final   01/04/2024 52 (H) 8 - 23 mg/dL Final   01/04/2024 53 (H) 8 - 23 mg/dL Final   01/03/2024 57 (H) 8 - 23 mg/dL Final   01/03/2024 61 (H) 8 - 23 mg/dL Final      Creatinine Creatinine   Date Value Ref Range Status   01/06/2024 2.85 (H) 0.76 - 1.27 mg/dL Final   01/06/2024 2.91 (H) 0.76 - 1.27 mg/dL Final   01/05/2024 2.97 (H) 0.76 - 1.27 mg/dL Final   01/05/2024 3.23 (H) 0.76 - 1.27 mg/dL Final   01/05/2024 3.26 (H) 0.76 - 1.27 mg/dL Final   01/05/2024 3.01 (H) 0.76 - 1.27 mg/dL Final   01/04/2024 2.92 (H) 0.76 - 1.27 mg/dL Final   01/04/2024 3.04 (H) 0.76 - 1.27 mg/dL Final   01/04/2024 3.45 (H) 0.76 - 1.27 mg/dL Final   01/04/2024 3.31 (H) 0.76 - 1.27 mg/dL Final   01/04/2024 3.31 (H) 0.76 - 1.27 mg/dL Final   01/04/2024 3.45 (H) 0.76 - 1.27 mg/dL Final   01/03/2024 3.74 (H) 0.76 - 1.27 mg/dL Final   01/03/2024 4.06 (H) 0.76 - 1.27 mg/dL Final      Calcium Calcium   Date Value Ref Range Status   01/06/2024 7.7 (L) 8.6 - 10.5 mg/dL Final   01/06/2024 7.5 (L) 8.6 - 10.5 mg/dL Final   01/05/2024 7.8 (L) 8.6 - 10.5 mg/dL Final   01/05/2024 7.5 (L) 8.6 - 10.5 mg/dL Final   01/05/2024 7.6 (L) 8.6 - 10.5 mg/dL Final   01/05/2024 7.8 (L) 8.6 - 10.5 mg/dL Final   01/04/2024 7.6 (L) 8.6 - 10.5 mg/dL Final   01/04/2024 7.7 (L) 8.6 - 10.5 mg/dL Final   01/04/2024 7.9 (L) 8.6 - 10.5 mg/dL Final   01/04/2024 7.7 (L) 8.6 - 10.5 mg/dL Final   01/04/2024 7.7 (L) 8.6 - 10.5 mg/dL Final   01/04/2024 7.5 (L) 8.6 - 10.5 mg/dL Final   01/03/2024 7.4 (L) 8.6 - 10.5 mg/dL Final   01/03/2024 7.4 (L) 8.6 - 10.5 mg/dL Final      PO4 No results found for: \"CAPO4\"   Albumin Albumin   Date Value Ref Range Status   01/06/2024 2.8 (L) 3.5 - 5.2 g/dL Final   01/06/2024 2.8 (L) 3.5 - 5.2 g/dL Final   01/05/2024 2.8 (L) 3.5 - 5.2 g/dL Final   01/05/2024 2.7 (L) 3.5 - 5.2 g/dL Final   01/05/2024 2.8 (L) 3.5 - 5.2 g/dL Final   01/05/2024 2.9 (L) 3.5 - 5.2 g/dL Final " "  01/04/2024 2.9 (L) 3.5 - 5.2 g/dL Final   01/04/2024 3.0 (L) 3.5 - 5.2 g/dL Final   01/04/2024 3.1 (L) 3.5 - 5.2 g/dL Final   01/04/2024 3.0 (L) 3.5 - 5.2 g/dL Final   01/04/2024 3.0 (L) 3.5 - 5.2 g/dL Final   01/04/2024 3.0 (L) 3.5 - 5.2 g/dL Final   01/03/2024 3.1 (L) 3.5 - 5.2 g/dL Final   01/03/2024 3.0 (L) 3.5 - 5.2 g/dL Final      Magnesium Magnesium   Date Value Ref Range Status   01/06/2024 2.5 (H) 1.6 - 2.4 mg/dL Final   01/05/2024 2.6 (H) 1.6 - 2.4 mg/dL Final   01/05/2024 2.5 (H) 1.6 - 2.4 mg/dL Final   01/05/2024 2.5 (H) 1.6 - 2.4 mg/dL Final   01/05/2024 2.5 (H) 1.6 - 2.4 mg/dL Final   01/04/2024 2.5 (H) 1.6 - 2.4 mg/dL Final   01/04/2024 2.6 (H) 1.6 - 2.4 mg/dL Final   01/04/2024 2.7 (H) 1.6 - 2.4 mg/dL Final   01/04/2024 2.5 (H) 1.6 - 2.4 mg/dL Final   01/04/2024 2.4 1.6 - 2.4 mg/dL Final   01/03/2024 2.5 (H) 1.6 - 2.4 mg/dL Final   01/03/2024 2.5 (H) 1.6 - 2.4 mg/dL Final      Uric Acid No results found for: \"URICACID\"     Medication Review:   ampicillin, 2 g, Intravenous, Q6H  escitalopram, 5 mg, Nasogastric, Daily  melatonin, 10 mg, Nasogastric, Nightly  midodrine, 5 mg, Nasogastric, TID AC  pantoprazole, 40 mg, Intravenous, Q12H  QUEtiapine, 50 mg, Nasogastric, Nightly  senna-docusate sodium, 2 tablet, Nasogastric, BID  sodium chloride, 10 mL, Intravenous, Q12H          Assessment & Plan       Left flank pain    Ruptured infrarenal abdominal aortic aneurysm (AAA)    DVT, lower extremity, distal, acute, unspecified laterality    S/P Exploratory laparotomy; appendectomy; left colon resection with creation of ostomy    S/P Open repair of ruptured infrarenal abdominal aortic aneurysm      Assessment & Plan  ROLAND-  pt with previous partial left nephrectomy and baseline creatinine closer to 0.9-1.0.  Had noted hypotension with AAA rupture s/p repair.  Likely ATN from hypotension vs. Atheroembolic injury.  Noted renal infarcts seen on repeat CT.  Also received IV contrast with CTA.    Will hold CRRT " today and plan for HD in AM.  Will prob need to get TDC placed next week.  AAA rupture-  s/p open repair infrarenal aorta.  Ischemic bowel- s/p left colectomy, ostomy.  Shock- sepsis, hemorrhagic component with AAA rupture.   Sepsis-  blood cx with strep pyogenes previously.  On abx per primary.  Elevated procalcitonin.  Met Acidosis-  bicarb held.  Improved now with CRRT. Restart sodium bicarb per ng.  Anemia-  prbc's prn.  Hyperkalemia-  worsened with acidosis, likely due to reabsorption of blood from previous bleed. Improved  DVT- simran LE.  Unable to place filter, back on heparin gtt now.    Adin Hinton MD  01/06/24  07:55 EST

## 2024-01-06 NOTE — PROGRESS NOTES
[facility]   Hospitalist Progress Note  Date: 2024  Patient Name: Danny Savage  : 1960  MRN: [id number]  Date of admission: 2023      Subjective   Subjective     Chief Complaint: Follow-up postop seroma    Summary:Danny Savage is a 63 y.o. male  presented to the hospital with complaints of left lower back pain and fever x 3 days, at that time patient was status post nephrectomy and there was concern for postoperative seroma/hematoma, patient was discharged home but pain began getting worse 10 out of 10 intensity on repeat presentation patient was febrile to 102.6, repeat CT scan showed likely postoperative seroma/hematoma although infection could not be ruled out, there was an abdominal aortic aneurysm of 5.1 cm.  The patient was started on IV antibiotics and patient was admitted for possible abscess in the nephrectomy bed, patient went unresponsive on 2023, blood pressure was 42/32, patient was pale and clammy, CODE BLUE was called, patient received IV fluids, patient was intubated, he was found to have expanding AAA with eccentric mural thrombus/hematoma measuring 5.1 cm, vascular surgeon consulted and patient underwent urgent open repair of ruptured infrarenal AAA with tube graft and mobilization of the omentum, patient taken back to the OR on 2023 to look for sources of blood loss, he was found to have nonviable colon and patient underwent partial colectomy by Dr. Mallory.  Patient has had prolonged course with continued anemia, repeat scans concerning for worsening hematoma, patient was found to have right lower extremity DVT.  Patient initially started on heparin drip though hemoglobin trended down sharply concerning for occult bleed.  Heparin drip stopped and hemoglobin stabilized.  Patient's blood pressure did not tolerate hemodialysis and patient had to be switched to CRRT.  Patient remains on antibiotics but repeat blood cultures are negative.    Patient  able to be weaned off of pressors.  Vascular surgery planned on placing IVC filter though clot was found in the pararenal IVC on venogram and procedure was aborted.  Patient started back on heparin drip no bolus.   Patient will likely require LTAC placement when he stabilizes medically     Interval Followup: Patient lying in bed appears to be resting fairly comfortably.  Patient denies any new issues excited to restart liquid diet.  Afebrile overnight.  Sinus rhythm 70s to 90s on telemetry reviewed.  Remains off of Levophed and vasopressin yesterday afternoon.  Satting well on next liters nasal cannula.  CRRT restarted.  No urine output recorded this shift.  Heparin drip started without bolus.  Bicarb improving.  Leukocytosis improving.  Hemoglobin fairly stable.  Repeat blood cultures negative to date.  No other issues per nursing..    Review of Systems  Constitutional: Negative for fatigue and fever.   HENT: Positive for sore throat and trouble swallowing.    Eyes: Negative for pain and discharge.   Respiratory: Negative for cough and shortness of breath.    Cardiovascular: Negative for chest pain and palpitations.   Gastrointestinal: Negative for abdominal pain, nausea and vomiting.   Endocrine: Negative for cold intolerance and heat intolerance.   Genitourinary: Negative for difficulty urinating and dysuria.   Musculoskeletal: Negative for back pain and neck stiffness.   Skin: Negative for color change and rash.   Neurological: Negative for syncope and headaches.   Hematological: Negative for adenopathy.   Psychiatric/Behavioral: Negative for confusion and hallucinations.    Objective   Objective     Vitals:   Temp:  [97.7 °F (36.5 °C)-98.6 °F (37 °C)] 97.7 °F (36.5 °C)  Heart Rate:  [79-98] 94  BP: ()/(55-78) 102/56  Flow (L/min):  [3-6] 3  Physical Exam   General: well-developed appearing stated age in no acute distress  HEENT: Normocephalic atraumatic moist membranes pupils equal round reactive light,  no scleral icterus no conjunctival injection  Cardiovascular: regular rate and rhythm no murmurs rubs or gallops S1-S2, no lower extremity edema appreciated  Pulmonary: Crackles bilateral posterior lung fields no wheezes or rhonchi symmetric chest expansion, unlabored, no conversational dyspnea appreciated  Gastrointestinal: Soft nondistended positive bowel sounds all 4 quadrants no rebound or guarding, surgical dressing over the midline clean dry intact, colostomy in left lower quadrant productive of stool  Musculoskeletal: No clubbing cyanosis, warm and well-perfused, calves soft symmetric nontender bilaterally  Skin: Clean dry without rashes  Neuro: Cranial nerves II through XII intact grossly no sensorimotor deficits appreciated bilateral upper and lower extremities  Psych: Patient is calm cooperative and appropriate with exam not responding to internal stimuli  : No Rico catheter no bladder distention no suprapubic tenderness    Result Review    Result Review:  I have personally reviewed these results and agree with these findings:  [x]  Laboratory  LAB RESULTS:      Lab 01/05/24  1443 01/05/24  1214 01/05/24  0434 01/05/24  0433 01/04/24  0602 01/04/24  0152 01/03/24  1640 01/03/24  0814 01/03/24  0358 01/03/24  0009 01/02/24  2232 01/02/24  0951 01/02/24  0453 01/02/24  0406 01/01/24  0528 12/31/23  0830 12/31/23  0524 12/30/23  1723 12/30/23  0537   WBC  --   --   --  18.83* 26.10*  --   --   --  29.36*  --   --   --  43.46*  --  28.43*  --  26.82*  --  25.62*   HEMOGLOBIN  --   --   --  8.3* 8.7* 9.1* 7.0*  --  7.4*  --   --   --  7.1*  --  7.9*   < > 7.7*   < > 6.6*   HEMATOCRIT  --   --   --  26.4* 26.5* 27.6* 21.3*  --  22.4*  --   --   --  21.8*  --  23.8*   < > 23.2*   < > 20.0*   PLATELETS  --   --   --  272 296  --   --   --  344  --   --   --  423  --  286  --  193  --  126*   NEUTROS ABS  --   --   --   --  21.17*  --   --   --  26.72*  --   --   --   --   --  21.46*  --  21.16*  --  23.31*    IMMATURE GRANS (ABS)  --   --   --   --  1.81*  --   --   --   --   --   --   --   --   --  1.70*  --  1.24*  --   --    LYMPHS ABS  --   --   --   --  1.21  --   --   --   --   --   --   --   --   --  2.13  --  1.65  --   --    MONOS ABS  --   --   --   --  1.72*  --   --   --   --   --   --   --   --   --  2.82*  --  2.56*  --   --    EOS ABS  --   --   --   --  0.06  --   --   --   --   --   --   --   --   --  0.16  --  0.08  --  0.26   MCV  --   --   --  93.6 91.1  --   --   --  88.2  --   --   --  90.5  --  86.2  --  89.9  --  89.7   PROCALCITONIN  --   --   --   --   --   --   --   --   --   --   --   --   --  11.77* 13.44*  --   --   --   --    LACTATE  --   --   --  1.2 1.0  --   --   --   --  2.5*  --    < >  --   --  2.0   < >  --   --   --    LACTATE, ARTERIAL  --  1.11  --   --   --   --   --   --   --   --  2.34*  --   --   --   --   --   --   --   --    PROTIME 20.4*  --  19.3*  --  17.9*  --  18.8* 22.6*  --   --   --   --   --   --   --   --   --   --   --    APTT  --   --  38.9*  --  32.9  --   --  38.1*  --   --   --   --  43.3*  --  89.9  --  100.9*  --  85.9    < > = values in this interval not displayed.         Lab 01/05/24  1742 01/05/24  1214 01/05/24  1156 01/05/24 0433 01/04/24  6168 01/04/24  6416   SODIUM 137  --  135* 136 137 137   SODIUM, ARTERIAL  --  136.4  --   --   --   --    POTASSIUM 4.6  --  4.5 5.3* 5.0 4.6   CHLORIDE 102  --  101 101 101 101   CO2 18.7*  --  19.1* 18.8* 20.5* 19.8*   ANION GAP 16.3*  --  14.9 16.2* 15.5* 16.2*   BUN 46*  --  46* 41* 46* 48*   CREATININE 3.23*  --  3.26* 3.01* 2.92* 3.04*   EGFR 20.5*  --  20.2* 22.3* 23.1* 22.0*   GLUCOSE 88  --  193* 82 95 106*   GLUCOSE, ARTERIAL  --  162*  --   --   --   --    CALCIUM 7.5*  --  7.6* 7.8* 7.6* 7.7*   IONIZED CALCIUM 1.01* 1.01* 1.00* 1.03* 0.99* 1.01*   MAGNESIUM 2.5*  --  2.5* 2.5* 2.5* 2.6*   PHOSPHORUS 5.1*  --  5.2* 5.2* 5.3* 5.2*         Lab 01/05/24  1742 01/05/24  1156 01/05/24 0433 01/04/24  8878  01/04/24  1835 01/04/24  1148 01/04/24  0602 01/03/24  1147 01/03/24  0358 01/03/24  0009 01/02/24  1422 01/02/24  0406 01/01/24  0528 12/31/23  0524   TOTAL PROTEIN  --   --  6.6  --   --   --  6.8  --  6.2 6.2  --  6.4   < > 6.0   ALBUMIN 2.7* 2.8* 2.9* 2.9* 3.0*   < > 3.0*  3.0*   < > 3.0* 3.0*   < > 2.5*   < > 2.3*   GLOBULIN  --   --  3.7  --   --   --  3.8  --  3.2 3.2  --  3.9   < > 3.7   ALT (SGPT)  --   --  127*  --   --   --  153*  --  220* 228*  --  100*   < > 44*   AST (SGOT)  --   --  147*  --   --   --  197*  --  600* 742*  --  248*   < > 58*   BILIRUBIN  --   --  10.3*  --   --   --  10.1*  --  8.3* 8.4*  --  6.5*   < > 3.5*   ALK PHOS  --   --  114  --   --   --  114  --  112 107  --  122*   < > 124*   LIPASE  --   --   --   --   --   --   --   --   --   --   --  23  --  14    < > = values in this interval not displayed.         Lab 01/05/24  1443 01/05/24  0434 01/04/24  0602 01/03/24  1640 01/03/24  0814 12/30/23  0537   PROBNP  --   --   --   --   --  30,309.0*   PROTIME 20.4* 19.3* 17.9* 18.8* 22.6*  --    INR 1.71* 1.60* 1.45* 1.54* 1.96*  --              Lab 01/02/24  0951 12/30/23  0645 12/30/23  0645 12/30/23  0537   IRON  --   --   --  21*   IRON SATURATION (TSAT)  --   --   --  13*   TIBC  --   --   --  164*   TRANSFERRIN  --   --   --  110*   ABO TYPING A   < > A  --    RH TYPING Negative   < > Negative  --    ANTIBODY SCREEN Negative  --  Negative  --     < > = values in this interval not displayed.         Lab 01/05/24  1214 01/02/24  2232   PH, ARTERIAL 7.386 7.467*   PCO2, ARTERIAL 32.1* 29.7*   PO2 ART 67.7* 58.5*   O2 SATURATION ART 92.9* 90.7*   FIO2 44 28   HCO3 ART 18.8* 21.0*   BASE EXCESS ART -5.3* -2.2*   CARBOXYHEMOGLOBIN 1.8* 1.6*     Brief Urine Lab Results  (Last result in the past 365 days)        Color   Clarity   Blood   Leuk Est   Nitrite   Protein   CREAT   Urine HCG        12/23/23 2054 Yellow   Clear   Small (1+)   Negative   Negative   100 mg/dL (2+)                  Microbiology Results (last 10 days)       Procedure Component Value - Date/Time    Blood Culture - Blood, Arm, Left [[id number]]  (Normal) Collected: 01/02/24 1422    Lab Status: Preliminary result Specimen: Blood from Arm, Left Updated: 01/05/24 1430     Blood Culture No growth at 3 days    Blood Culture - Blood, Hand, Left [[id number]]  (Normal) Collected: 01/02/24 1216    Lab Status: Preliminary result Specimen: Blood from Hand, Left Updated: 01/05/24 1230     Blood Culture No growth at 3 days    Aspergillus Galactomannan Antigen - Blood, Blood, Central Line [[id number]] Collected: 12/29/23 1936    Lab Status: Final result Specimen: Blood, Central Line Updated: 01/04/24 0608     Aspergillus Ag, BAL/Serum 0.05 Index     Narrative:      Performed at:  [facility]  [address] [place], [place]  [id number]  : Laura Gautam MD, Phone:  [phone]  Performed at:  [facility]  [address] [place], [place]  [id number]  : Deepthi Del Valle [facility], Phone:  [phone]    Blood Culture - Blood, Arm, Left [[id number]]  (Normal) Collected: 12/29/23 1139    Lab Status: Final result Specimen: Blood from Arm, Left Updated: 01/03/24 1215     Blood Culture No growth at 5 days    Blood Culture - Blood, Chest, Left [[id number]]  (Normal) Collected: 12/29/23 1002    Lab Status: Final result Specimen: Blood from Chest, Left Updated: 01/03/24 1030     Blood Culture No growth at 5 days            [x]  Microbiology  [x]  Radiology  XR Abdomen KUB    Result Date: 1/2/2024    1. Small bore feeding tube tip terminating at the junction of the 3rd and 4th portion of the duodenum.      DOMONIQUE SALDAÑA MD       Electronically Signed and Approved By: DOMONIQUE SALDAÑA MD on 1/02/2024 at 19:43             XR Chest 1 View    Result Date: 1/2/2024    1. Mild to moderate perihilar and basilar airspace disease suspected to represent pulmonary edema.  Pneumonia is also in the differential 2. Small bilateral  pleural effusions, unchanged 3. Feeding tube with the tip near the gastroesophageal junction.       Herve Garnett M.D.       Electronically Signed and Approved By: Herve Garnett M.D. on 1/02/2024 at 13:03             CT Abdomen Pelvis Without Contrast    Result Date: 1/1/2024    CT scan of the abdomen and pelvis without contrast demonstrating moderate right and small left effusions, larger in comparison to 9/29/2023..  Compressive atelectasis is again seen at the lung bases.  Ill-defined irregular retroperitoneal mass appears larger in comparison to the previous study concerning for interval hemorrhage.  An ill-defined 9.5 cm component with layering density is consistent with hematoma.  I discussed findings with Dr. Lloyd at 1855.     BENITO PRADHAN MD       Electronically Signed and Approved By: BENITO PRADHAN MD on 1/01/2024 at 18:56             XR Chest 1 View    Result Date: 12/31/2023   Favorable progression is suggested radiographically with decreased atelectasis and/or infiltrate(s) bilaterally.  There may be decreased pleural effusions, as well.      Please note that portions of this note were completed with a voice recognition program.  SHIN NUNEZ JR, MD       Electronically Signed and Approved By: SHIN NUNEZ JR, MD on 12/31/2023 at 6:22              CT Abdomen Pelvis With Contrast    Result Date: 12/29/2023   1. Evidence of interval left hemicolectomy with colostomy placement as well as abdominal aortic aneurysm and aortic caval fistula repair since 12/24/2023.  There is a persistent extensive para-aortic right retroperitoneal hematoma without acute or new hemorrhage identified.  No loculated fluid or fluid and air collections are seen to indicate abscess.  2. Development of multiple splenic and bilateral renal infarcts new since 12/24/2023. 3. Small bilateral pleural effusions have increased in size since 12/24/2023.  There is also anasarca.    Report was called to [facility] nurse at 8022  hours on 2023.    COBY DEGROOT DO       Electronically Signed and Approved By: COBY DEGROOT DO on 2023 at 16:53             XR Chest 1 View    Result Date: 2023    1. Diffuse bilateral airspace opacities appear unchanged, which could reflect pulmonary edema or pneumonia. 2. Small bilateral pleural effusions.       GELY MONTE MD       Electronically Signed and Approved By: GELY MONTE MD on 2023 at 6:04              [x]  EKG/Telemetry   [x]  Cardiology/Vascular   Echocardiogram 2023  •  Left ventricular ejection fraction appears to be 66 - 70%.  •  Hyperdynamic.  The left ventricular cavity is small in size.  •  Left ventricular diastolic function was not assessed.  •  There is a trivial pericardial effusion.  •  No significant valvular abnormalities noted.  []  Pathology  []  Old records  [x]  Other:  Scheduled Meds:ampicillin, 2 g, Intravenous, Q6H  escitalopram, 5 mg, Nasogastric, Daily  melatonin, 10 mg, Nasogastric, Nightly  midodrine, 5 mg, Nasogastric, TID AC  pantoprazole, 40 mg, Intravenous, Q12H  QUEtiapine, 50 mg, Nasogastric, Nightly  senna-docusate sodium, 2 tablet, Nasogastric, BID  sodium chloride, 10 mL, Intravenous, Q12H      Continuous Infusions:heparin, 18 Units/kg/hr, Last Rate: 18 Units/kg/hr (24 1446)  norepinephrine, 0.02-0.3 mcg/kg/min, Last Rate: Stopped (24 0230)  Phoxillum BK4/2.5, 1,500 mL/hr, Last Rate: 1,500 mL/hr (24 1820)      PRN Meds:.•  senna-docusate sodium **AND** polyethylene glycol **AND** [DISCONTINUED] bisacodyl **AND** bisacodyl  •  calcium carbonate  •  dextrose  •  dextrose  •  dextrose  •  dextrose  •  fentaNYL citrate (PF)  •  glucagon (human recombinant)  •  glucagon (human recombinant)  •  haloperidol lactate  •  heparin (porcine)  •  heparin (porcine)  •  heparin  •  heparin  •  labetalol  •  [] Morphine **AND** naloxone  •  nitroglycerin  •  ondansetron  •  oxyCODONE-acetaminophen  •   oxyCODONE-acetaminophen  •  sodium chloride  •  sodium chloride  •  traZODone      Assessment & Plan   Assessment / Plan     Assessment/Plan:  Ruptured infrarenal AAA s/p open repair  Hemorrhagic shock  Hypovolemic shock  Acute hypoxic respiratory failure requiring mechanical ventilation  Septic shock with Streptococcus pyogenes bacteremia  SVT  History of RCC s/p partial nephrectomy on 11/13  Acute anuric renal failure requiring CRRT  Metabolic acidosis  Clinically significant lactic acidosis  Chronically immunosuppressed on CellCept  Hypocalcemia  Hypokalemia  Acute right lower extremity DVT in the peroneal and gastrocnemius  Acute blood loss anemia  Perirenal IVC thrombus  Ischemic bowel  Hyperkalemia  Anxiety        Patient admitted for further evaluation and treatment  Vascular surgery, general surgery, urology, nephrology and pulmonology critical care consulted thank you for assistance  Continue postop wound care per general surgery  Continue to monitor hemoglobin closely and transfuse to keep greater than 7  Start heparin drip without bolus and monitor very closely for signs of bleeding  Continue Protonix twice daily  Continue supplemental oxygen titrate to keep sats greater than 90%  Continue ampicillin-sulbactam empirically  Continue midodrine and titrate per pulmonology critical care  Continue vasopressin as needed to keep MAP greater than 60  Continue CRRT and transition to hemodialysis when OK with nephrology  Continue renal panel every 6 hours  Continue to monitor electrolytes replace as needed  Continue Lexapro and Seroquel  Continue tube feeds  Advance diet per speech  Further inpatient orders recommendations per clinical course         Discussed plan with bedside RN as well as pulmonology critical care team.    Disposition: LTAC versus inpatient rehab hospital once stable.    DVT prophylaxis:  Medical and mechanical DVT prophylaxis orders are present.    CODE STATUS:   Level Of Support Discussed  With: Patient  Code Status (Patient has no pulse and is not breathing): CPR (Attempt to Resuscitate)  Medical Interventions (Patient has pulse or is breathing): Full Support

## 2024-01-06 NOTE — PLAN OF CARE
Goal Outcome Evaluation:         Pt treated for pain with PRN orders. Turned frequently. Expressed sadness that he is stuck in the bed & eager to start rehab. VSS. Heparin gtt infusing & monitored/titrated as ordered. TF at goal. CRRT running smoothly as ordered. Nursing staff will continue to monitor.

## 2024-01-06 NOTE — PROGRESS NOTES
Nutrition Services    Patient Name: Danny Savage  YOB: 1960  MRN: [id number]  Admission date: 12/23/2023    PROGRESS NOTE      Encounter Information: EN Follow Up       PO Diet: Diet: Liquid Diets; Full Liquid; Fluid Consistency: Thin (IDDSI 0)   PO Supplements: Ensure Surgery TID   PO Intake:  0% documented        Current nutrition support: Novasource Renal @ 55 ml/hr x 22 hours/day  Free water flushes 25 ml every 3 hours   Provides 2420 kcal, 110 g pro, 1059 ml fluid        Estimated Needs: 2415 kcal, 116-126 g pro, 4349-1178 ml fluid        Nutrition support review: TF at goal. No intolerances noted.        Labs (reviewed below): Na 134 (L), Phos 4.9 (H), Mg 2.5 (H).  Receiving CRRT.          GI Function:  Having ostomy output.        Nutrition Intervention Updates: Continue current nutrition plan of care.      Results from last 7 days   Lab Units 01/06/24  0548 01/06/24  0221 01/05/24  2358 01/05/24  1156 01/05/24  0433 01/04/24  1148 01/04/24  0602   SODIUM mmol/L 134* 136 136   < > 136   < > 138  138   SODIUM, ARTERIAL   --   --   --    < >  --   --   --    POTASSIUM mmol/L 4.6 4.6 4.7   < > 5.3*   < > 4.8  4.8   CHLORIDE mmol/L 99 102 101   < > 101   < > 101  101   CO2 mmol/L 19.0* 18.0* 19.2*   < > 18.8*   < > 18.7*  18.7*   BUN mg/dL 43* 43* 43*   < > 41*   < > 52*  52*   CREATININE mg/dL 2.85* 2.91* 2.97*   < > 3.01*   < > 3.31*  3.31*   CALCIUM mg/dL 7.7* 7.5* 7.8*   < > 7.8*   < > 7.7*  7.7*   BILIRUBIN mg/dL  --  8.2*  --   --  10.3*  --  10.1*   ALK PHOS U/L  --  115  --   --  114  --  114   ALT (SGPT) U/L  --  103*  --   --  127*  --  153*   AST (SGOT) U/L  --  104*  --   --  147*  --  197*   GLUCOSE mg/dL 125* 140* 124*   < > 82   < > 126*  126*   GLUCOSE, ARTERIAL   --   --   --    < >  --   --   --     < > = values in this interval not displayed.     Results from last 7 days   Lab Units 01/06/24  0548 01/06/24  0221 01/05/24  2358 01/05/24  1742   MAGNESIUM mg/dL 2.5*   "--  2.6* 2.5*   PHOSPHORUS mg/dL 4.9*  --  5.1* 5.1*   HEMOGLOBIN g/dL  --  8.4*  --   --    HEMATOCRIT %  --  26.1*  --   --      COVID19   Date Value Ref Range Status   12/23/2023 Not Detected Not Detected - Ref. Range Final     No results found for: \"HGBA1C\"    RD to follow up per protocol.    Electronically signed by:  Sasha Alexander RD  01/06/24 08:43 EST   "

## 2024-01-06 NOTE — PROCEDURES
This is a delayed entry note from 1/5/2024.    Procedure: Attempted but unsuccessful IVC filter placement with inferior venacavogram    Findings: Patient found to have evidence of significant pararenal inferior vena cava thrombus burden precluding placement of a filter.  He does have known bilateral lower extremity DVTs.    Assessment and plan: Given the patient's recent open abdominal aortic aneurysm rupture repair and bilateral lower extremity DVTs and previous issues with drifting hemoglobin on attempts at heparin drip the plan was to place an IVC filter.  Unfortunately this thrombus within his IVC presents a contraindication to this.  After discussion with Dr. Koffi Agosto 1 of partners who actually performed his open AAA repair regarding the situation we have come to the conclusion that is in his best interest to attempt a heparin drip but this time without a bolus and see if he can tolerate this now that he is further out from his AAA surgery rupture repair.  Ultimately this is a difficult situation if he cannot tolerate anticoagulation in this fashion.  Will follow his hemoglobins and I have updated the intensivist and general surgery teams who are okay with this plan of action.

## 2024-01-06 NOTE — PROGRESS NOTES
[facility]     Progress Note    Patient Name: Danny Savage  : 1960  MRN: [id number]  Primary Care Physician:  Kodi Pichardo MD  Date of admission: 2023    Subjective   Subjective     POD #13 status post open repair of ruptured abdominal aorta aneurysm, POD #11 status post abdominal reexploration, sigmoidectomy with end colostomy, closure of abdominal wound  Now off CRRT as of this AM per nephrology and no pressors.  Attempted IVC filter placement yesterday unsuccessful due to thrombus at pararenal IVC level.  He is currently tolerating heparin gtt without bolus.    Objective   Objective     Vitals:   Temp:  [97.5 °F (36.4 °C)-98.1 °F (36.7 °C)] 97.9 °F (36.6 °C)  Heart Rate:  [82-95] 94  Resp:  [17-18] 18  BP: ()/(56-78) 111/73  Flow (L/min):  [2-6] 2    Physical Exam   General: Sleepy, no acute distress  Extremities: Symmetric, moderate edema.    Hgb: 8.4  WBC: 15.93  Lactate: 1.2    Assessment & Plan   Assessment / Plan     Assessment/Plan:    Patient remains critically ill in ICU.  Leukocytosis finally trending down.  Tolerating heparin gtt currently.    IVC filter unable to be placed due to IVC thrombus present.  Needs aggressive PT/OT now that off CRRT.  HD per nephrology.      Active Hospital Problems:  Active Hospital Problems    Diagnosis    • **Left flank pain    • S/P Exploratory laparotomy; appendectomy; left colon resection with creation of ostomy    • S/P Open repair of ruptured infrarenal abdominal aortic aneurysm    • Ruptured infrarenal abdominal aortic aneurysm (AAA)    • DVT, lower extremity, distal, acute, unspecified laterality

## 2024-01-06 NOTE — PROGRESS NOTES
Pulmonary / Critical Care Progress Note      Patient Name: Danny Savage  : 1960  MRN: [id number]  Attending:  Arpit Franks MD   Date of admission: 2023    Subjective   Subjective   Patient critically ill with ruptured AAA s/p open repair, hemorrhagic shock, Streptococcus pyogenes bacteremia    Over the past 24 hours patient taken to Cath Lab unable to place IVC filter due to thrombus in IVC, initiated on heparin drip without bolus, no evidence of bleeding remains stable, was on CRRT, tube feeds at goal, remained off vasopressors    No acute events overnight, did not take Seroquel    This morning  Patient is awake, no acute distress on room air  He is answering questions reporting he would like to sit on side of the bed  Has dry mouth  Delirium has improved  Left femoral HD catheter in place  Tube feeds going at goal  Extremely weak    Objective   Objective     Vitals:   Vital signs for last 24 hours:  Temp:  [97.5 °F (36.4 °C)-98.6 °F (37 °C)] 97.9 °F (36.6 °C)  Heart Rate:  [82-95] 94  Resp:  [17-18] 18  BP: ()/(56-78) 111/73    Intake/Output last 3 shifts:  I/O last 3 completed shifts:  In: 1983.6 [P.O.:1275; I.V.:708.6]  Out: 3300 [Stool:365]    Physical Exam   Vital Signs Reviewed  Chronically and critically ill-appearing male on room air, NG in place  General: Weak, deconditioned, alert  Chest:  good aeration, unchanged crackles in bases bilaterally, no work of breathing noted at rest  CV: RRR, no MGR, pulses 2+, equal.  Abd: Soft, ND, NT, midline incision in place, colostomy in place  Neuro:  A&Ox3,  CN grossly intact, no focal deficits.    Result Review    I have personally reviewed the results from the time of this admission to 2024 10:46 EST and agree with these findings:  [x]  Laboratory  [x]  Microbiology  [x]  Radiology  [x]  EKG/Telemetry   [x]  Cardiology/Vascular   []  Pathology  []  Old records  []  Other:  Most notable findings include:       Lab 24  0548  01/06/24  0221 01/05/24  2358 01/05/24  1742 01/05/24  1214 01/05/24  1156 01/05/24  0433 01/04/24  2358 01/04/24  1835 01/04/24  1148 01/04/24  0602 01/04/24  0152 01/04/24  0024 01/03/24  1640 01/03/24  1147 01/03/24  0358 01/03/24  0009 01/02/24  1422 01/02/24  0453 01/02/24  0406 01/01/24  0528 12/31/23  1544 12/31/23  0524   WBC  --  15.93*  --   --   --   --  18.83*  --   --   --  26.10*  --   --   --   --  29.36*  --   --  43.46*  --  28.43*  --  26.82*   HEMOGLOBIN  --  8.4*  --   --   --   --  8.3*  --   --   --  8.7* 9.1*  --  7.0*  --  7.4*  --   --  7.1*  --  7.9*   < > 7.7*   HEMATOCRIT  --  26.1*  --   --   --   --  26.4*  --   --   --  26.5* 27.6*  --  21.3*  --  22.4*  --   --  21.8*  --  23.8*   < > 23.2*   PLATELETS  --  284  --   --   --   --  272  --   --   --  296  --   --   --   --  344  --   --  423  --  286  --  193   SODIUM 134* 136 136 137  --  135* 136 137 137   < > 138  138  --    < > 134*   < > 134* 134*   < >  --  134* 132*  --  132*   SODIUM, ARTERIAL  --   --   --   --  136.4  --   --   --   --   --   --   --   --   --   --   --   --    < >  --   --   --   --   --    POTASSIUM 4.6 4.6 4.7 4.6  --  4.5 5.3* 5.0 4.6   < > 4.8  4.8  --    < > 4.5   < > 4.8 4.9   < >  --  6.2* 4.7  --  4.2   CHLORIDE 99 102 101 102  --  101 101 101 101   < > 101  101  --    < > 98   < > 99 98   < >  --  95* 95*  --  96*   CO2 19.0* 18.0* 19.2* 18.7*  --  19.1* 18.8* 20.5* 19.8*   < > 18.7*  18.7*  --    < > 19.5*   < > 19.1* 18.2*   < >  --  12.9* 19.3*  --  19.7*   BUN 43* 43* 43* 46*  --  46* 41* 46* 48*   < > 52*  52*  --    < > 57*   < > 58* 67*   < >  --  75* 56*  --  33*   CREATININE 2.85* 2.91* 2.97* 3.23*  --  3.26* 3.01* 2.92* 3.04*   < > 3.31*  3.31*  --    < > 3.74*   < > 4.49* 4.65*   < >  --  5.77* 4.89*  --  3.46*   GLUCOSE 125* 140* 124* 88  --  193* 82 95 106*   < > 126*  126*  --    < > 200*   < > 258* 243*   < >  --  55* 101*  --  92   GLUCOSE, ARTERIAL  --   --   --   --  162*   --   --   --   --   --   --   --   --   --   --   --   --    < >  --   --   --   --   --    CALCIUM 7.7* 7.5* 7.8* 7.5*  --  7.6* 7.8* 7.6* 7.7*   < > 7.7*  7.7*  --    < > 7.4*   < > 6.8* 6.7*   < >  --  7.3* 7.3*  --  7.8*   PHOSPHORUS 4.9*  --  5.1* 5.1*  --  5.2* 5.2* 5.3* 5.2*   < > 4.8*  --    < > 4.1   < > 5.1* 5.5*   < >  --  9.3* 5.8*  --  5.5*   TOTAL PROTEIN  --  6.9  --   --   --   --  6.6  --   --   --  6.8  --   --   --   --  6.2 6.2  --   --  6.4 6.1  --  6.0   ALBUMIN 2.8* 2.8* 2.8* 2.7*  --  2.8* 2.9* 2.9* 3.0*   < > 3.0*  3.0*  --    < > 3.1*   < > 3.0* 3.0*   < >  --  2.5* 2.2*  --  2.3*   GLOBULIN  --  4.1  --   --   --   --  3.7  --   --   --  3.8  --   --   --   --  3.2 3.2  --   --  3.9 3.9  --  3.7    < > = values in this interval not displayed.       Assessment & Plan   Assessment / Plan     Active Hospital Problems:  Active Hospital Problems    Diagnosis    • **Left flank pain    • S/P Exploratory laparotomy; appendectomy; left colon resection with creation of ostomy    • S/P Open repair of ruptured infrarenal abdominal aortic aneurysm    • Ruptured infrarenal abdominal aortic aneurysm (AAA)    • DVT, lower extremity, distal, acute, unspecified laterality      Impression:  Ruptured infrarenal AAA s/p open repair  Acute blood loss anemia  Acute DVT with plan for IVC filter  Intra-abdominal hematoma  Hemorrhagic shock  Hypovolemic shock  Acute hypoxic respiratory failure requiring mechanical ventilation  Septic shock with Streptococcus pyogenes bacteremia  Moraxella pneumonia  Ischemic sigmoid colon s/p colostomy  History of RCC s/p partial nephrectomy on 11/13  Acute kidney injury secondary to acute tubular necrosis requiring renal replacement therapy  Clinically significant lactic acidosis  Chronically immunosuppressed on CellCept  Hypocalcemia  Hypokalemia resolved now with hyperkalemia  SVT  Lactic acidosis, clinically significant     Plan:  -Patient currently on room air  -Continue  heparin drip, unable to obtain IVC filter due to IVC thrombus/bilateral DVTs  -Monitor for any signs of bleeding, hemoglobin stable  -Continue midodrine 5 mg 3 times daily  -Trazodone decreased to as needed, will reduce Seroquel to 25 nightly  -Continue delirium precautions  -Okay to dangle on side of bed  -PT/OT/speech  -Core track in place, continue tube feeds at goal  -Cleared per speech therapy however not meeting nutritional needs  -Nephrology following, HD per their service  -CRRT placed on hold, may need TDC early next week  -As needed Percocet and fentanyl for pain  -Continue Unasyn to complete 14 days of therapy for bacteremia  -Hold home chronic immunosuppression CellCept indefinitely  -Patient would benefit from LTAC    PUP PPx: Protonix  DVT prophylaxis: Planning for IVC filter  Medical and mechanical DVT prophylaxis orders are present.    CODE STATUS:   Level Of Support Discussed With: Patient  Code Status (Patient has no pulse and is not breathing): CPR (Attempt to Resuscitate)  Medical Interventions (Patient has pulse or is breathing): Full Support      Liv ENG APRN, spent 15 minutes critical care time in accordance to split shared billing.    Patient is critically ill with ruptured AAA, DVTs, hemorrhagic shock, requiring blood transfusion, intra-abdominal hematoma, acute kidney injury requiring renal replacement therapy.. We have personally reviewed all pertinent labs, imaging, microbiology and documentation. We have discussed care with the primary service as well as at multidisciplinary critical care rounds with the bedside nurse, respiratory therapist, pharmacist and all other ancillary services. 37 minutes of critical care time was spent managing this patient, excluding procedures. Of this time, I spent 22 minutes in accordance with split shared billing.    Electronically signed by Yinka Lloyd MD, 01/06/24, 2:33 PM EST.

## 2024-01-07 ENCOUNTER — APPOINTMENT (OUTPATIENT)
Dept: GENERAL RADIOLOGY | Facility: HOSPITAL | Age: 64
DRG: 268 | End: 2024-01-07
Payer: COMMERCIAL

## 2024-01-07 LAB
ANION GAP SERPL CALCULATED.3IONS-SCNC: 17.1 MMOL/L (ref 5–15)
APTT PPP: 79.6 SECONDS (ref 78–95.9)
BACTERIA SPEC AEROBE CULT: NORMAL
BACTERIA SPEC AEROBE CULT: NORMAL
BUN SERPL-MCNC: 64 MG/DL (ref 8–23)
BUN/CREAT SERPL: 15.8 (ref 7–25)
CALCIUM SPEC-SCNC: 7.8 MG/DL (ref 8.6–10.5)
CHLORIDE SERPL-SCNC: 98 MMOL/L (ref 98–107)
CO2 SERPL-SCNC: 17.9 MMOL/L (ref 22–29)
CREAT SERPL-MCNC: 4.05 MG/DL (ref 0.76–1.27)
DEPRECATED RDW RBC AUTO: 55.7 FL (ref 37–54)
EGFRCR SERPLBLD CKD-EPI 2021: 15.6 ML/MIN/1.73
ERYTHROCYTE [DISTWIDTH] IN BLOOD BY AUTOMATED COUNT: 20 % (ref 12.3–15.4)
GLUCOSE BLDC GLUCOMTR-MCNC: 108 MG/DL (ref 70–99)
GLUCOSE BLDC GLUCOMTR-MCNC: 120 MG/DL (ref 70–99)
GLUCOSE BLDC GLUCOMTR-MCNC: 81 MG/DL (ref 70–99)
GLUCOSE BLDC GLUCOMTR-MCNC: 87 MG/DL (ref 70–99)
GLUCOSE SERPL-MCNC: 83 MG/DL (ref 65–99)
HCT VFR BLD AUTO: 29.5 % (ref 37.5–51)
HGB BLD-MCNC: 9.4 G/DL (ref 13–17.7)
HOLD SPECIMEN: NORMAL
INR PPP: 1.47 (ref 0.86–1.15)
MAGNESIUM SERPL-MCNC: 2.6 MG/DL (ref 1.6–2.4)
MCH RBC QN AUTO: 29.8 PG (ref 26.6–33)
MCHC RBC AUTO-ENTMCNC: 31.9 G/DL (ref 31.5–35.7)
MCV RBC AUTO: 93.7 FL (ref 79–97)
PHOSPHATE SERPL-MCNC: 5.8 MG/DL (ref 2.5–4.5)
PLATELET # BLD AUTO: 330 10*3/MM3 (ref 140–450)
PMV BLD AUTO: 10.5 FL (ref 6–12)
POTASSIUM SERPL-SCNC: 5.5 MMOL/L (ref 3.5–5.2)
PROTHROMBIN TIME: 18.1 SECONDS (ref 11.8–14.9)
RBC # BLD AUTO: 3.15 10*6/MM3 (ref 4.14–5.8)
SODIUM SERPL-SCNC: 133 MMOL/L (ref 136–145)
WBC NRBC COR # BLD AUTO: 15.46 10*3/MM3 (ref 3.4–10.8)
WHOLE BLOOD HOLD COAG: NORMAL
WHOLE BLOOD HOLD SPECIMEN: NORMAL

## 2024-01-07 PROCEDURE — 25010000002 AMPICILLIN PER 500 MG: Performed by: INTERNAL MEDICINE

## 2024-01-07 PROCEDURE — 25010000002 PROCHLORPERAZINE 10 MG/2ML SOLUTION: Performed by: NURSE PRACTITIONER

## 2024-01-07 PROCEDURE — 25010000002 ONDANSETRON PER 1 MG: Performed by: SURGERY

## 2024-01-07 PROCEDURE — 74018 RADEX ABDOMEN 1 VIEW: CPT

## 2024-01-07 PROCEDURE — 85610 PROTHROMBIN TIME: CPT | Performed by: NURSE PRACTITIONER

## 2024-01-07 PROCEDURE — 84100 ASSAY OF PHOSPHORUS: CPT | Performed by: NURSE PRACTITIONER

## 2024-01-07 PROCEDURE — 25010000002 HEPARIN (PORCINE) 25000-0.45 UT/250ML-% SOLUTION: Performed by: SURGERY

## 2024-01-07 PROCEDURE — 83735 ASSAY OF MAGNESIUM: CPT | Performed by: NURSE PRACTITIONER

## 2024-01-07 PROCEDURE — 80048 BASIC METABOLIC PNL TOTAL CA: CPT | Performed by: NURSE PRACTITIONER

## 2024-01-07 PROCEDURE — 85730 THROMBOPLASTIN TIME PARTIAL: CPT | Performed by: INTERNAL MEDICINE

## 2024-01-07 PROCEDURE — 82948 REAGENT STRIP/BLOOD GLUCOSE: CPT

## 2024-01-07 PROCEDURE — 99233 SBSQ HOSP IP/OBS HIGH 50: CPT | Performed by: FAMILY MEDICINE

## 2024-01-07 PROCEDURE — 25010000002 METOCLOPRAMIDE PER 10 MG: Performed by: NURSE PRACTITIONER

## 2024-01-07 PROCEDURE — 85027 COMPLETE CBC AUTOMATED: CPT | Performed by: NURSE PRACTITIONER

## 2024-01-07 PROCEDURE — 25010000002 HEPARIN (PORCINE) PER 1000 UNITS: Performed by: SURGERY

## 2024-01-07 PROCEDURE — 25010000002 FENTANYL CITRATE (PF) 50 MCG/ML SOLUTION: Performed by: SURGERY

## 2024-01-07 PROCEDURE — 99291 CRITICAL CARE FIRST HOUR: CPT | Performed by: INTERNAL MEDICINE

## 2024-01-07 RX ORDER — PROCHLORPERAZINE EDISYLATE 5 MG/ML
2.5 INJECTION INTRAMUSCULAR; INTRAVENOUS EVERY 6 HOURS PRN
Status: DISCONTINUED | OUTPATIENT
Start: 2024-01-07 | End: 2024-01-12 | Stop reason: HOSPADM

## 2024-01-07 RX ORDER — METOCLOPRAMIDE HYDROCHLORIDE 5 MG/ML
5 INJECTION INTRAMUSCULAR; INTRAVENOUS ONCE
Status: COMPLETED | OUTPATIENT
Start: 2024-01-07 | End: 2024-01-07

## 2024-01-07 RX ADMIN — PROCHLORPERAZINE EDISYLATE 2.5 MG: 5 INJECTION INTRAMUSCULAR; INTRAVENOUS at 23:12

## 2024-01-07 RX ADMIN — HEPARIN SODIUM 18 UNITS/KG/HR: 10000 INJECTION, SOLUTION INTRAVENOUS at 12:10

## 2024-01-07 RX ADMIN — OXYCODONE AND ACETAMINOPHEN 1 TABLET: 10; 325 TABLET ORAL at 23:12

## 2024-01-07 RX ADMIN — AMPICILLIN SODIUM 2 G: 2 INJECTION, POWDER, FOR SOLUTION INTRAMUSCULAR; INTRAVENOUS at 23:13

## 2024-01-07 RX ADMIN — QUETIAPINE FUMARATE 25 MG: 25 TABLET ORAL at 23:12

## 2024-01-07 RX ADMIN — ONDANSETRON 4 MG: 2 INJECTION INTRAMUSCULAR; INTRAVENOUS at 04:06

## 2024-01-07 RX ADMIN — SODIUM BICARBONATE 650 MG TABLET 650 MG: at 16:58

## 2024-01-07 RX ADMIN — SODIUM BICARBONATE 650 MG TABLET 650 MG: at 23:12

## 2024-01-07 RX ADMIN — MIDODRINE HYDROCHLORIDE 5 MG: 5 TABLET ORAL at 11:02

## 2024-01-07 RX ADMIN — SODIUM BICARBONATE 650 MG TABLET 650 MG: at 08:07

## 2024-01-07 RX ADMIN — PANTOPRAZOLE SODIUM 40 MG: 40 INJECTION, POWDER, FOR SOLUTION INTRAVENOUS at 23:12

## 2024-01-07 RX ADMIN — METOCLOPRAMIDE HYDROCHLORIDE 5 MG: 5 INJECTION INTRAMUSCULAR; INTRAVENOUS at 11:01

## 2024-01-07 RX ADMIN — MIDODRINE HYDROCHLORIDE 5 MG: 5 TABLET ORAL at 08:07

## 2024-01-07 RX ADMIN — Medication 10 MG: at 23:12

## 2024-01-07 RX ADMIN — DOCUSATE SODIUM 50MG AND SENNOSIDES 8.6MG 2 TABLET: 8.6; 5 TABLET, FILM COATED ORAL at 08:07

## 2024-01-07 RX ADMIN — ESCITALOPRAM OXALATE 5 MG: 10 TABLET ORAL at 08:08

## 2024-01-07 RX ADMIN — FENTANYL CITRATE 50 MCG: 50 INJECTION, SOLUTION INTRAMUSCULAR; INTRAVENOUS at 00:09

## 2024-01-07 RX ADMIN — MIDODRINE HYDROCHLORIDE 5 MG: 5 TABLET ORAL at 16:58

## 2024-01-07 RX ADMIN — FENTANYL CITRATE 50 MCG: 50 INJECTION, SOLUTION INTRAMUSCULAR; INTRAVENOUS at 04:05

## 2024-01-07 RX ADMIN — ONDANSETRON 4 MG: 2 INJECTION INTRAMUSCULAR; INTRAVENOUS at 07:40

## 2024-01-07 RX ADMIN — HEPARIN SODIUM 2600 UNITS: 1000 INJECTION INTRAVENOUS; SUBCUTANEOUS at 22:50

## 2024-01-07 RX ADMIN — Medication 10 ML: at 08:07

## 2024-01-07 RX ADMIN — PROCHLORPERAZINE EDISYLATE 2.5 MG: 5 INJECTION INTRAMUSCULAR; INTRAVENOUS at 14:41

## 2024-01-07 RX ADMIN — OXYCODONE AND ACETAMINOPHEN 1 TABLET: 10; 325 TABLET ORAL at 16:49

## 2024-01-07 RX ADMIN — PANTOPRAZOLE SODIUM 40 MG: 40 INJECTION, POWDER, FOR SOLUTION INTRAVENOUS at 08:07

## 2024-01-07 NOTE — PROGRESS NOTES
[facility]   Hospitalist Progress Note  Date: 2024  Patient Name: Danny Savage  : 1960  MRN: [id number]  Date of admission: 2023      Subjective   Subjective     Chief Complaint: Follow-up postop seroma    Summary:Danny Savage is a 63 y.o. male  presented to the hospital with complaints of left lower back pain and fever x 3 days, at that time patient was status post nephrectomy and there was concern for postoperative seroma/hematoma, patient was discharged home but pain began getting worse 10 out of 10 intensity on repeat presentation patient was febrile to 102.6, repeat CT scan showed likely postoperative seroma/hematoma although infection could not be ruled out, there was an abdominal aortic aneurysm of 5.1 cm.  The patient was started on IV antibiotics and patient was admitted for possible abscess in the nephrectomy bed, patient went unresponsive on 2023, blood pressure was 42/32, patient was pale and clammy, CODE BLUE was called, patient received IV fluids, patient was intubated, he was found to have expanding AAA with eccentric mural thrombus/hematoma measuring 5.1 cm, vascular surgeon consulted and patient underwent urgent open repair of ruptured infrarenal AAA with tube graft and mobilization of the omentum, patient taken back to the OR on 2023 to look for sources of blood loss, he was found to have nonviable colon and patient underwent partial colectomy by Dr. Mallory.  Patient has had prolonged course with continued anemia, repeat scans concerning for worsening hematoma, patient was found to have right lower extremity DVT.  Patient initially started on heparin drip though hemoglobin trended down sharply concerning for occult bleed.  Heparin drip stopped and hemoglobin stabilized.  Patient's blood pressure did not tolerate hemodialysis and patient had to be switched to CRRT.  Patient remains on antibiotics but repeat blood cultures are negative.    Patient  able to be weaned off of pressors.  Vascular surgery planned on placing IVC filter though clot was found in the pararenal IVC on venogram and procedure was aborted.  Patient started back on heparin drip no bolus.   Patient will likely require LTAC placement when he stabilizes medically     Interval Followup: Patient lying in bed appears to be resting fairly comfortably.  Patient still having issues with nausea and vomiting overnight.  Still having output from ostomy.  Tube feeds restarted at trickle feeds this morning.  Afebrile overnight.  Sinus rhythm 80s to 90s on telemetry reviewed.  Blood pressure stable.  Remains off of Levophed and vasopressin.  Hemodialysis pending today.  Satting well on room air.  No urine output recorded this shift.  Serum potassium elevated this morning.  Bicarb remains low but stable.  Creatinine trending up.  Heparin drip therapeutic.  Bicarb low but stable.  Leukocytosis stable.  Hemoglobin improved.  Repeat blood cultures negative to date.  No other issues per nursing..    Review of Systems  Constitutional: Negative for fatigue and fever.   HENT: Positive for sore throat and trouble swallowing.    Eyes: Negative for pain and discharge.   Respiratory: Negative for cough and shortness of breath.    Cardiovascular: Negative for chest pain and palpitations.   Gastrointestinal: Negative for abdominal pain, positive nausea and vomiting.   Endocrine: Negative for cold intolerance and heat intolerance.   Genitourinary: Negative for difficulty urinating and dysuria.   Musculoskeletal: Negative for back pain and neck stiffness.   Skin: Negative for color change and rash.   Neurological: Negative for syncope and headaches.   Hematological: Negative for adenopathy.   Psychiatric/Behavioral: Negative for confusion and hallucinations.    Objective   Objective     Vitals:   Temp:  [97.4 °F (36.3 °C)-98.6 °F (37 °C)] 98.3 °F (36.8 °C)  Heart Rate:  [82-90] 89  Resp:  [14] 14  BP: (112-144)/(65-78)  131/74  Physical Exam   General: well-developed appearing stated age in no acute distress  HEENT: Normocephalic atraumatic moist membranes pupils equal round reactive light, no scleral icterus no conjunctival injection  Cardiovascular: regular rate and rhythm no murmurs rubs or gallops S1-S2, no lower extremity edema appreciated  Pulmonary: Crackles bilateral posterior lung fields no wheezes or rhonchi symmetric chest expansion, unlabored, no conversational dyspnea appreciated  Gastrointestinal: Soft nondistended positive bowel sounds all 4 quadrants no rebound or guarding,  midline incision well-approximated without erythema or drainage, colostomy in left lower quadrant productive of stool  Musculoskeletal: No clubbing cyanosis, warm and well-perfused, calves soft symmetric nontender bilaterally  Skin: Clean dry without rashes  Neuro: Cranial nerves II through XII intact grossly no sensorimotor deficits appreciated bilateral upper and lower extremities  Psych: Patient is calm cooperative and appropriate with exam not responding to internal stimuli  : No Rico catheter no bladder distention no suprapubic tenderness    Result Review    Result Review:  I have personally reviewed these results and agree with these findings:  [x]  Laboratory  LAB RESULTS:      Lab 01/07/24  0444 01/07/24  0254 01/06/24  0221 01/05/24  2039 01/05/24  1443 01/05/24  1214 01/05/24  0434 01/05/24  0433 01/04/24  0602 01/04/24  0152 01/03/24  0814 01/03/24  0358 01/03/24  0009 01/02/24  0453 01/02/24  0406 01/01/24  0528   WBC  --  15.46* 15.93*  --   --   --   --  18.83* 26.10*  --   --  29.36*  --    < >  --  28.43*   HEMOGLOBIN  --  9.4* 8.4*  --   --   --   --  8.3* 8.7* 9.1*   < > 7.4*  --    < >  --  7.9*   HEMATOCRIT  --  29.5* 26.1*  --   --   --   --  26.4* 26.5* 27.6*   < > 22.4*  --    < >  --  23.8*   PLATELETS  --  330 284  --   --   --   --  272 296  --   --  344  --    < >  --  286   NEUTROS ABS  --   --  14.15*  --   --    --   --   --  21.17*  --   --  26.72*  --   --   --  21.46*   IMMATURE GRANS (ABS)  --   --  0.24*  --   --   --   --   --  1.81*  --   --   --   --   --   --  1.70*   LYMPHS ABS  --   --  0.58*  --   --   --   --   --  1.21  --   --   --   --   --   --  2.13   MONOS ABS  --   --  0.84  --   --   --   --   --  1.72*  --   --   --   --   --   --  2.82*   EOS ABS  --   --  0.06  --   --   --   --   --  0.06  --   --   --   --   --   --  0.16   MCV  --  93.7 92.9  --   --   --   --  93.6 91.1  --   --  88.2  --    < >  --  86.2   PROCALCITONIN  --   --   --   --   --   --   --   --   --   --   --   --   --   --  11.77* 13.44*   LACTATE  --   --  1.2  --   --   --   --  1.2 1.0  --   --   --  2.5*   < >  --  2.0   LACTATE, ARTERIAL  --   --   --   --   --  1.11  --   --   --   --   --   --   --    < >  --   --    PROTIME  --  18.1* 20.4*  --  20.4*  --  19.3*  --  17.9*  --    < >  --   --   --   --   --    APTT 79.6  --  97.1* 94.7  --   --  38.9*  --  32.9  --    < >  --   --    < >  --  89.9    < > = values in this interval not displayed.         Lab 01/07/24  0254 01/06/24  1149 01/06/24  0548 01/06/24  0221 01/05/24  2358 01/05/24  2357 01/05/24  1742 01/05/24  1214 01/05/24  1156   SODIUM 133* 135* 134* 136 136  --  137  --  135*   SODIUM, ARTERIAL  --   --   --   --   --   --   --  136.4  --    POTASSIUM 5.5* 4.5 4.6 4.6 4.7  --  4.6  --  4.5   CHLORIDE 98 99 99 102 101  --  102  --  101   CO2 17.9* 19.5* 19.0* 18.0* 19.2*  --  18.7*  --  19.1*   ANION GAP 17.1* 16.5* 16.0* 16.0* 15.8*  --  16.3*  --  14.9   BUN 64* 49* 43* 43* 43*  --  46*  --  46*   CREATININE 4.05* 3.20* 2.85* 2.91* 2.97*  --  3.23*  --  3.26*   EGFR 15.6* 20.7* 23.8* 23.2* 22.6*  --  20.5*  --  20.2*   GLUCOSE 83 141* 125* 140* 124*  --  88  --  193*   GLUCOSE, ARTERIAL  --   --   --   --   --   --   --  162*  --    CALCIUM 7.8* 7.8* 7.7* 7.5* 7.8*  --  7.5*  --  7.6*   IONIZED CALCIUM  --   --  1.01*  --   --  1.00* 1.01* 1.01* 1.00*    MAGNESIUM 2.6* 2.6* 2.5*  --  2.6*  --  2.5*  --  2.5*   PHOSPHORUS 5.8* 4.6* 4.9*  --  5.1*  --  5.1*  --  5.2*         Lab 01/06/24  1149 01/06/24  0548 01/06/24  0221 01/05/24  2358 01/05/24  1742 01/05/24  1156 01/05/24  0433 01/04/24  1148 01/04/24  0602 01/03/24  1147 01/03/24  0358 01/03/24  0009 01/02/24  1422 01/02/24  0406   TOTAL PROTEIN  --   --  6.9  --   --   --  6.6  --  6.8  --  6.2 6.2  --  6.4   ALBUMIN 2.9* 2.8* 2.8* 2.8* 2.7*   < > 2.9*   < > 3.0*  3.0*   < > 3.0* 3.0*   < > 2.5*   GLOBULIN  --   --  4.1  --   --   --  3.7  --  3.8  --  3.2 3.2  --  3.9   ALT (SGPT)  --   --  103*  --   --   --  127*  --  153*  --  220* 228*  --  100*   AST (SGOT)  --   --  104*  --   --   --  147*  --  197*  --  600* 742*  --  248*   BILIRUBIN  --   --  8.2*  --   --   --  10.3*  --  10.1*  --  8.3* 8.4*  --  6.5*   ALK PHOS  --   --  115  --   --   --  114  --  114  --  112 107  --  122*   LIPASE  --   --   --   --   --   --   --   --   --   --   --   --   --  23    < > = values in this interval not displayed.         Lab 01/07/24  0254 01/06/24  0221 01/05/24  1443 01/05/24  0434 01/04/24  0602   PROTIME 18.1* 20.4* 20.4* 19.3* 17.9*   INR 1.47* 1.71* 1.71* 1.60* 1.45*             Lab 01/02/24  0951   ABO TYPING A   RH TYPING Negative   ANTIBODY SCREEN Negative         Lab 01/05/24  1214 01/02/24  2232   PH, ARTERIAL 7.386 7.467*   PCO2, ARTERIAL 32.1* 29.7*   PO2 ART 67.7* 58.5*   O2 SATURATION ART 92.9* 90.7*   FIO2 44 28   HCO3 ART 18.8* 21.0*   BASE EXCESS ART -5.3* -2.2*   CARBOXYHEMOGLOBIN 1.8* 1.6*     Brief Urine Lab Results  (Last result in the past 365 days)        Color   Clarity   Blood   Leuk Est   Nitrite   Protein   CREAT   Urine HCG        12/23/23 2054 Yellow   Clear   Small (1+)   Negative   Negative   100 mg/dL (2+)                 Microbiology Results (last 10 days)       Procedure Component Value - Date/Time    Blood Culture - Blood, Arm, Left [[id number]]  (Normal) Collected: 01/02/24  1422    Lab Status: Final result Specimen: Blood from Arm, Left Updated: 01/07/24 1430     Blood Culture No growth at 5 days    Blood Culture - Blood, Hand, Left [[id number]]  (Normal) Collected: 01/02/24 1216    Lab Status: Final result Specimen: Blood from Hand, Left Updated: 01/07/24 1230     Blood Culture No growth at 5 days    Aspergillus Galactomannan Antigen - Blood, Blood, Central Line [[id number]] Collected: 12/29/23 1936    Lab Status: Final result Specimen: Blood, Central Line Updated: 01/04/24 0608     Aspergillus Ag, BAL/Serum 0.05 Index     Narrative:      Performed at:  01 - [facility]  [address], [place]  [id number]  : Laura Gautam MD, Phone:  [phone]  Performed at:  02 - [facility]  [address], [place]  [id number]  : Deepthi Del Valle [facility], Phone:  [phone]    Blood Culture - Blood, Arm, Left [[id number]]  (Normal) Collected: 12/29/23 1139    Lab Status: Final result Specimen: Blood from Arm, Left Updated: 01/03/24 1215     Blood Culture No growth at 5 days    Blood Culture - Blood, Chest, Left [[id number]]  (Normal) Collected: 12/29/23 1002    Lab Status: Final result Specimen: Blood from Chest, Left Updated: 01/03/24 1030     Blood Culture No growth at 5 days            [x]  Microbiology  [x]  Radiology  XR Abdomen KUB    Result Date: 1/7/2024    1. Feeding tube projects in expected position 2. Mild gaseous distention of bowel with suspected mild wall edema.  No definite evidence of free air 3. Consolidation left lung base 4. Left sided inguinal vascular catheter projects in expected position     ANNABELLE PALMER MD       Electronically Signed and Approved By: ANNABELLE PALMER MD on 1/07/2024 at 9:13             XR Abdomen KUB    Result Date: 1/2/2024    1. Small bore feeding tube tip terminating at the junction of the 3rd and 4th portion of the duodenum.      DOMONIQUE SALDAÑA MD       Electronically Signed and Approved By: DOMONIQUE SALDAÑA  MD on 1/02/2024 at 19:43             XR Chest 1 View    Result Date: 1/2/2024    1. Mild to moderate perihilar and basilar airspace disease suspected to represent pulmonary edema.  Pneumonia is also in the differential 2. Small bilateral pleural effusions, unchanged 3. Feeding tube with the tip near the gastroesophageal junction.       Herve Garnett M.D.       Electronically Signed and Approved By: Herve Garnett M.D. on 1/02/2024 at 13:03             CT Abdomen Pelvis Without Contrast    Result Date: 1/1/2024    CT scan of the abdomen and pelvis without contrast demonstrating moderate right and small left effusions, larger in comparison to 9/29/2023..  Compressive atelectasis is again seen at the lung bases.  Ill-defined irregular retroperitoneal mass appears larger in comparison to the previous study concerning for interval hemorrhage.  An ill-defined 9.5 cm component with layering density is consistent with hematoma.  I discussed findings with Dr. Lloyd at 1855.     BENITO PRADHAN MD       Electronically Signed and Approved By: BENITO PRADHAN MD on 1/01/2024 at 18:56             XR Chest 1 View    Result Date: 12/31/2023   Favorable progression is suggested radiographically with decreased atelectasis and/or infiltrate(s) bilaterally.  There may be decreased pleural effusions, as well.      Please note that portions of this note were completed with a voice recognition program.  SHIN NUNEZ JR, MD       Electronically Signed and Approved By: SHIN NUNEZ JR, MD on 12/31/2023 at 6:22               [x]  EKG/Telemetry   [x]  Cardiology/Vascular   Echocardiogram 12/24/2023  •  Left ventricular ejection fraction appears to be 66 - 70%.  •  Hyperdynamic.  The left ventricular cavity is small in size.  •  Left ventricular diastolic function was not assessed.  •  There is a trivial pericardial effusion.  •  No significant valvular abnormalities noted.  []  Pathology  []  Old records  [x]  Other:  Scheduled  Meds:ampicillin, 2 g, Intravenous, Q24H  escitalopram, 5 mg, Nasogastric, Daily  melatonin, 10 mg, Nasogastric, Nightly  midodrine, 5 mg, Nasogastric, TID AC  pantoprazole, 40 mg, Intravenous, Q12H  QUEtiapine, 25 mg, Nasogastric, Nightly  senna-docusate sodium, 2 tablet, Nasogastric, BID  sodium bicarbonate, 650 mg, Nasogastric, TID  sodium chloride, 10 mL, Intravenous, Q12H      Continuous Infusions:heparin, 18 Units/kg/hr, Last Rate: 18 Units/kg/hr (24 1210)      PRN Meds:.•  senna-docusate sodium **AND** polyethylene glycol **AND** [DISCONTINUED] bisacodyl **AND** bisacodyl  •  calcium carbonate  •  dextrose  •  dextrose  •  dextrose  •  dextrose  •  fentaNYL citrate (PF)  •  glucagon (human recombinant)  •  glucagon (human recombinant)  •  heparin (porcine)  •  heparin (porcine)  •  heparin  •  heparin  •  [] Morphine **AND** naloxone  •  nitroglycerin  •  ondansetron  •  oxyCODONE-acetaminophen  •  oxyCODONE-acetaminophen  •  prochlorperazine  •  sodium chloride  •  sodium chloride  •  traZODone      Assessment & Plan   Assessment / Plan     Assessment/Plan:  Ruptured infrarenal AAA s/p open repair  Hemorrhagic shock  Hypovolemic shock  Acute hypoxic respiratory failure requiring mechanical ventilation  Septic shock with Streptococcus pyogenes bacteremia  SVT  History of RCC s/p partial nephrectomy on   Acute anuric renal failure requiring CRRT  Metabolic acidosis  Clinically significant lactic acidosis  Chronically immunosuppressed on CellCept  Hypocalcemia  Hypokalemia  Acute right lower extremity DVT in the peroneal and gastrocnemius  Acute blood loss anemia  Perirenal IVC thrombus  Ischemic bowel  Hyperkalemia  Anxiety        Patient admitted for further evaluation and treatment  Vascular surgery, general surgery, urology, nephrology and pulmonology critical care consulted thank you for assistance  Continue postop wound care per general surgery  Continue to monitor hemoglobin closely and  transfuse packed red blood cells as needed to keep greater than 7  Continue heparin drip and monitor very closely for signs of bleeding  Continue Protonix twice daily  Continue supplemental oxygen as needed to keep sats greater than 90%  Continue ampicillin-sulbactam complete 14-day course per pulmonology critical care  Continue midodrine and titrate per pulmonology critical care  Start hemodialysis and ultrafiltration per nephrology  Repeat BMP in a.m.  Continue to monitor electrolytes replace as needed  Continue Lexapro and Seroquel  Restart tube feeds at low rate and monitor for recurrence of nausea and vomiting  Abdominal x-ray just shows gaseous distention of the bowels.  Still having good ostomy output  Advance diet per speech  Further inpatient orders recommendations per clinical course         Discussed plan with bedside RN as well as pulmonology critical care team.    Disposition: LTAC family hoping for [facility] in [place].    DVT prophylaxis:  Medical and mechanical DVT prophylaxis orders are present.    CODE STATUS:   Level Of Support Discussed With: Patient  Code Status (Patient has no pulse and is not breathing): CPR (Attempt to Resuscitate)  Medical Interventions (Patient has pulse or is breathing): Full Support

## 2024-01-07 NOTE — PROGRESS NOTES
" LOS: 15 days   Patient Care Team:  Kodi Pichardo MD as PCP - General (Internal Medicine)  Lisset Harrington APRN as Nurse Practitioner (Urology)    Chief Complaint: ROLAND    Subjective     History of Present Illness  Off CRRT now.  Remains off pressors now.  Awake and alert, wife at bedside.  NG in place.  Having more n/v, TF's off.    Subjective:  Symptoms:  No shortness of breath, chest pain, chest pressure or anxiety.    Diet:  Poor intake.  He reports  nausea and vomiting.      History taken from: chart family    Objective     Vital Sign Min/Max for last 24 hours  Temp  Min: 97.4 °F (36.3 °C)  Max: 98.6 °F (37 °C)   BP  Min: 99/59  Max: 144/75   Pulse  Min: 82  Max: 95   Resp  Min: 14  Max: 14   SpO2  Min: 88 %  Max: 96 %   No data recorded   Weight  Min: 79 kg (174 lb 1.6 oz)  Max: 79 kg (174 lb 1.6 oz)     Flowsheet Rows      Flowsheet Row First Filed Value   Admission Height 177.8 cm (70\") Documented at 12/23/2023 1948   Admission Weight 79.9 kg (176 lb 2.4 oz) Documented at 12/23/2023 1948            No intake/output data recorded.  I/O last 3 completed shifts:  In: 2058.5 [P.O.:1129; I.V.:510.5; Other:81; NG/GT:338]  Out: 1797     Objective:  General Appearance:  Comfortable and ill-appearing.    Vital signs: (most recent): Blood pressure 131/74, pulse 89, temperature 97.4 °F (36.3 °C), temperature source Oral, resp. rate 14, height 177.8 cm (70\"), weight 79 kg (174 lb 1.6 oz), SpO2 93%.  Vital signs are normal.    Output: No urine output and producing stool.    HEENT: Normal HEENT exam.    Lungs:  Normal effort and normal respiratory rate.  Breath sounds clear to auscultation.    Heart: Normal rate.  Regular rhythm.    Abdomen: Abdomen is soft.  Hypoactive bowel sounds.     Extremities: There is dependent edema.    Pulses: Distal pulses are intact.    Neurological: Patient is alert and oriented to person, place and time.    Pupils:  Pupils are equal, round, and reactive to light.    Skin:  Warm " "and dry.              Results Review:     I reviewed the patient's new clinical results.    WBC WBC   Date Value Ref Range Status   01/07/2024 15.46 (H) 3.40 - 10.80 10*3/mm3 Final   01/06/2024 15.93 (H) 3.40 - 10.80 10*3/mm3 Final   01/05/2024 18.83 (H) 3.40 - 10.80 10*3/mm3 Final      HGB Hemoglobin   Date Value Ref Range Status   01/07/2024 9.4 (L) 13.0 - 17.7 g/dL Final   01/06/2024 8.4 (L) 13.0 - 17.7 g/dL Final   01/05/2024 8.3 (L) 13.0 - 17.7 g/dL Final      HCT Hematocrit   Date Value Ref Range Status   01/07/2024 29.5 (L) 37.5 - 51.0 % Final   01/06/2024 26.1 (L) 37.5 - 51.0 % Final   01/05/2024 26.4 (L) 37.5 - 51.0 % Final      Platlets No results found for: \"LABPLAT\"   MCV MCV   Date Value Ref Range Status   01/07/2024 93.7 79.0 - 97.0 fL Final   01/06/2024 92.9 79.0 - 97.0 fL Final   01/05/2024 93.6 79.0 - 97.0 fL Final          Sodium Sodium   Date Value Ref Range Status   01/07/2024 133 (L) 136 - 145 mmol/L Final   01/06/2024 135 (L) 136 - 145 mmol/L Final   01/06/2024 134 (L) 136 - 145 mmol/L Final   01/06/2024 136 136 - 145 mmol/L Final   01/05/2024 136 136 - 145 mmol/L Final   01/05/2024 137 136 - 145 mmol/L Final   01/05/2024 135 (L) 136 - 145 mmol/L Final   01/05/2024 136 136 - 145 mmol/L Final   01/04/2024 137 136 - 145 mmol/L Final   01/04/2024 137 136 - 145 mmol/L Final   01/04/2024 133 (L) 136 - 145 mmol/L Final     Sodium, Arterial   Date Value Ref Range Status   01/05/2024 136.4 136 - 146 mmol/L Final      Potassium Potassium   Date Value Ref Range Status   01/07/2024 5.5 (H) 3.5 - 5.2 mmol/L Final     Comment:     Slight hemolysis detected by analyzer. Result may be falsely elevated.   01/06/2024 4.5 3.5 - 5.2 mmol/L Final   01/06/2024 4.6 3.5 - 5.2 mmol/L Final   01/06/2024 4.6 3.5 - 5.2 mmol/L Final   01/05/2024 4.7 3.5 - 5.2 mmol/L Final   01/05/2024 4.6 3.5 - 5.2 mmol/L Final   01/05/2024 4.5 3.5 - 5.2 mmol/L Final   01/05/2024 5.3 (H) 3.5 - 5.2 mmol/L Final   01/04/2024 5.0 3.5 - 5.2 " mmol/L Final   01/04/2024 4.6 3.5 - 5.2 mmol/L Final   01/04/2024 4.8 3.5 - 5.2 mmol/L Final      Chloride Chloride   Date Value Ref Range Status   01/07/2024 98 98 - 107 mmol/L Final   01/06/2024 99 98 - 107 mmol/L Final   01/06/2024 99 98 - 107 mmol/L Final   01/06/2024 102 98 - 107 mmol/L Final   01/05/2024 101 98 - 107 mmol/L Final   01/05/2024 102 98 - 107 mmol/L Final   01/05/2024 101 98 - 107 mmol/L Final   01/05/2024 101 98 - 107 mmol/L Final   01/04/2024 101 98 - 107 mmol/L Final   01/04/2024 101 98 - 107 mmol/L Final   01/04/2024 100 98 - 107 mmol/L Final      CO2 CO2   Date Value Ref Range Status   01/07/2024 17.9 (L) 22.0 - 29.0 mmol/L Final   01/06/2024 19.5 (L) 22.0 - 29.0 mmol/L Final   01/06/2024 19.0 (L) 22.0 - 29.0 mmol/L Final   01/06/2024 18.0 (L) 22.0 - 29.0 mmol/L Final   01/05/2024 19.2 (L) 22.0 - 29.0 mmol/L Final   01/05/2024 18.7 (L) 22.0 - 29.0 mmol/L Final   01/05/2024 19.1 (L) 22.0 - 29.0 mmol/L Final   01/05/2024 18.8 (L) 22.0 - 29.0 mmol/L Final   01/04/2024 20.5 (L) 22.0 - 29.0 mmol/L Final   01/04/2024 19.8 (L) 22.0 - 29.0 mmol/L Final   01/04/2024 20.7 (L) 22.0 - 29.0 mmol/L Final      BUN BUN   Date Value Ref Range Status   01/07/2024 64 (H) 8 - 23 mg/dL Final   01/06/2024 49 (H) 8 - 23 mg/dL Final   01/06/2024 43 (H) 8 - 23 mg/dL Final   01/06/2024 43 (H) 8 - 23 mg/dL Final   01/05/2024 43 (H) 8 - 23 mg/dL Final   01/05/2024 46 (H) 8 - 23 mg/dL Final   01/05/2024 46 (H) 8 - 23 mg/dL Final   01/05/2024 41 (H) 8 - 23 mg/dL Final   01/04/2024 46 (H) 8 - 23 mg/dL Final   01/04/2024 48 (H) 8 - 23 mg/dL Final   01/04/2024 50 (H) 8 - 23 mg/dL Final      Creatinine Creatinine   Date Value Ref Range Status   01/07/2024 4.05 (H) 0.76 - 1.27 mg/dL Final   01/06/2024 3.20 (H) 0.76 - 1.27 mg/dL Final   01/06/2024 2.85 (H) 0.76 - 1.27 mg/dL Final   01/06/2024 2.91 (H) 0.76 - 1.27 mg/dL Final   01/05/2024 2.97 (H) 0.76 - 1.27 mg/dL Final   01/05/2024 3.23 (H) 0.76 - 1.27 mg/dL Final   01/05/2024  "3.26 (H) 0.76 - 1.27 mg/dL Final   01/05/2024 3.01 (H) 0.76 - 1.27 mg/dL Final   01/04/2024 2.92 (H) 0.76 - 1.27 mg/dL Final   01/04/2024 3.04 (H) 0.76 - 1.27 mg/dL Final   01/04/2024 3.45 (H) 0.76 - 1.27 mg/dL Final      Calcium Calcium   Date Value Ref Range Status   01/07/2024 7.8 (L) 8.6 - 10.5 mg/dL Final   01/06/2024 7.8 (L) 8.6 - 10.5 mg/dL Final   01/06/2024 7.7 (L) 8.6 - 10.5 mg/dL Final   01/06/2024 7.5 (L) 8.6 - 10.5 mg/dL Final   01/05/2024 7.8 (L) 8.6 - 10.5 mg/dL Final   01/05/2024 7.5 (L) 8.6 - 10.5 mg/dL Final   01/05/2024 7.6 (L) 8.6 - 10.5 mg/dL Final   01/05/2024 7.8 (L) 8.6 - 10.5 mg/dL Final   01/04/2024 7.6 (L) 8.6 - 10.5 mg/dL Final   01/04/2024 7.7 (L) 8.6 - 10.5 mg/dL Final   01/04/2024 7.9 (L) 8.6 - 10.5 mg/dL Final      PO4 No results found for: \"CAPO4\"   Albumin Albumin   Date Value Ref Range Status   01/06/2024 2.9 (L) 3.5 - 5.2 g/dL Final   01/06/2024 2.8 (L) 3.5 - 5.2 g/dL Final   01/06/2024 2.8 (L) 3.5 - 5.2 g/dL Final   01/05/2024 2.8 (L) 3.5 - 5.2 g/dL Final   01/05/2024 2.7 (L) 3.5 - 5.2 g/dL Final   01/05/2024 2.8 (L) 3.5 - 5.2 g/dL Final   01/05/2024 2.9 (L) 3.5 - 5.2 g/dL Final   01/04/2024 2.9 (L) 3.5 - 5.2 g/dL Final   01/04/2024 3.0 (L) 3.5 - 5.2 g/dL Final   01/04/2024 3.1 (L) 3.5 - 5.2 g/dL Final      Magnesium Magnesium   Date Value Ref Range Status   01/07/2024 2.6 (H) 1.6 - 2.4 mg/dL Final   01/06/2024 2.6 (H) 1.6 - 2.4 mg/dL Final   01/06/2024 2.5 (H) 1.6 - 2.4 mg/dL Final   01/05/2024 2.6 (H) 1.6 - 2.4 mg/dL Final   01/05/2024 2.5 (H) 1.6 - 2.4 mg/dL Final   01/05/2024 2.5 (H) 1.6 - 2.4 mg/dL Final   01/05/2024 2.5 (H) 1.6 - 2.4 mg/dL Final   01/04/2024 2.5 (H) 1.6 - 2.4 mg/dL Final   01/04/2024 2.6 (H) 1.6 - 2.4 mg/dL Final   01/04/2024 2.7 (H) 1.6 - 2.4 mg/dL Final      Uric Acid No results found for: \"URICACID\"     Medication Review:   ampicillin, 2 g, Intravenous, Q24H  escitalopram, 5 mg, Nasogastric, Daily  melatonin, 10 mg, Nasogastric, Nightly  midodrine, 5 mg, " Nasogastric, TID AC  pantoprazole, 40 mg, Intravenous, Q12H  QUEtiapine, 25 mg, Nasogastric, Nightly  senna-docusate sodium, 2 tablet, Nasogastric, BID  sodium bicarbonate, 650 mg, Nasogastric, TID  sodium chloride, 10 mL, Intravenous, Q12H          Assessment & Plan       Left flank pain    Ruptured infrarenal abdominal aortic aneurysm (AAA)    DVT, lower extremity, distal, acute, unspecified laterality    S/P Exploratory laparotomy; appendectomy; left colon resection with creation of ostomy    S/P Open repair of ruptured infrarenal abdominal aortic aneurysm      Assessment & Plan  ROLAND-  pt with previous partial left nephrectomy and baseline creatinine closer to 0.9-1.0.  Had noted hypotension with AAA rupture s/p repair.  Likely ATN from hypotension vs. Atheroembolic injury.  Noted renal infarcts seen on repeat CT.  Also received IV contrast with CTA.    HD scheduled for today.  Will plan again in AM and likely need m/w/f for now.  Will prob need to get TDC placed next week.  AAA rupture-  s/p open repair infrarenal aorta.  Ischemic bowel- s/p left colectomy, ostomy.  Shock- sepsis, hemorrhagic component with AAA rupture.   Sepsis-  blood cx with strep pyogenes previously.  On abx per primary.    Met Acidosis-  bicarb held.  Improved now with CRRT. Restart sodium bicarb per ng when able.  Anemia-  prbc's prn.  Hyperkalemia-  worsened with acidosis, likely due to reabsorption of blood from previous bleed. Improved  DVT- simran LE.  Unable to place filter, back on heparin gtt now.    Adin Hintno MD  01/07/24  09:08 EST

## 2024-01-07 NOTE — PROGRESS NOTES
[facility]     Progress Note    Patient Name: Danny Savage  : 1960  MRN: [id number]  Primary Care Physician:  Kodi Pichardo MD  Date of admission: 2023    Subjective   Subjective     POD #14 status post open repair of ruptured abdominal aorta aneurysm, POD #12 status post abdominal reexploration, sigmoidectomy with end colostomy, closure of abdominal wound  Now off CRRT as of this AM per nephrology and no pressors.  Attempted IVC filter placement unsuccessful due to thrombus at pararenal IVC level.  He is currently tolerating heparin gtt without bolus.  Having some N/V this AM.    Objective   Objective     Vitals:   Temp:  [97.4 °F (36.3 °C)-98.6 °F (37 °C)] 97.4 °F (36.3 °C)  Heart Rate:  [82-95] 89  Resp:  [14] 14  BP: ()/(59-78) 131/74    Physical Exam   General: Sleepy, no acute distress  Extremities: Symmetric, moderate edema.    Hgb: 9.4  WBC: 15.46  Lactate: 1.2    Assessment & Plan   Assessment / Plan     Assessment/Plan:    Patient remains critically ill in ICU.  Leukocytosis finally trending down.  Tolerating heparin gtt currently and hgb stable.  IVC filter unable to be placed due to IVC thrombus present.  Needs aggressive PT/OT now that off CRRT.  HD per nephrology with plan for today and tomorrow then M, W, F.  Dr. Velazco will be covering vascular service tomorrow and aware of patient.      Active Hospital Problems:  Active Hospital Problems    Diagnosis    • **Left flank pain    • S/P Exploratory laparotomy; appendectomy; left colon resection with creation of ostomy    • S/P Open repair of ruptured infrarenal abdominal aortic aneurysm    • Ruptured infrarenal abdominal aortic aneurysm (AAA)    • DVT, lower extremity, distal, acute, unspecified laterality

## 2024-01-07 NOTE — PROGRESS NOTES
[facility]   Hospitalist Progress Note  Date: 2024  Patient Name: Danny Savage  : 1960  MRN: [id number]  Date of admission: 2023      Subjective   Subjective     Chief Complaint: Follow-up postop seroma    Summary:Danny Savage is a 63 y.o. male  presented to the hospital with complaints of left lower back pain and fever x 3 days, at that time patient was status post nephrectomy and there was concern for postoperative seroma/hematoma, patient was discharged home but pain began getting worse 10 out of 10 intensity on repeat presentation patient was febrile to 102.6, repeat CT scan showed likely postoperative seroma/hematoma although infection could not be ruled out, there was an abdominal aortic aneurysm of 5.1 cm.  The patient was started on IV antibiotics and patient was admitted for possible abscess in the nephrectomy bed, patient went unresponsive on 2023, blood pressure was 42/32, patient was pale and clammy, CODE BLUE was called, patient received IV fluids, patient was intubated, he was found to have expanding AAA with eccentric mural thrombus/hematoma measuring 5.1 cm, vascular surgeon consulted and patient underwent urgent open repair of ruptured infrarenal AAA with tube graft and mobilization of the omentum, patient taken back to the OR on 2023 to look for sources of blood loss, he was found to have nonviable colon and patient underwent partial colectomy by Dr. Mallory.  Patient has had prolonged course with continued anemia, repeat scans concerning for worsening hematoma, patient was found to have right lower extremity DVT.  Patient initially started on heparin drip though hemoglobin trended down sharply concerning for occult bleed.  Heparin drip stopped and hemoglobin stabilized.  Patient's blood pressure did not tolerate hemodialysis and patient had to be switched to CRRT.  Patient remains on antibiotics but repeat blood cultures are negative.    Patient  able to be weaned off of pressors.  Vascular surgery planned on placing IVC filter though clot was found in the pararenal IVC on venogram and procedure was aborted.  Patient started back on heparin drip no bolus.   Patient will likely require LTAC placement when he stabilizes medically     Interval Followup: Patient lying in bed appears to be resting fairly comfortably.  Patient having issues with nausea and vomiting.  Patient states he is getting gag by thick secretions in his throat.  Unable to get anything productive with suction Yonker per nursing.  Tube feeds placed on hold.  Afebrile overnight.  Sinus rhythm 80s to 90s on telemetry reviewed.  Remains off of Levophed and vasopressin.  Satting well on room air.  CRRT off this morning.  No urine output recorded this shift.  Heparin drip therapeutic.  Bicarb low but stable.  Leukocytosis improving.  Hemoglobin stable.  Repeat blood cultures negative to date.  No other issues per nursing..    Review of Systems  Constitutional: Negative for fatigue and fever.   HENT: Positive for sore throat and trouble swallowing.    Eyes: Negative for pain and discharge.   Respiratory: Negative for cough and shortness of breath.    Cardiovascular: Negative for chest pain and palpitations.   Gastrointestinal: Negative for abdominal pain, positive nausea and vomiting.   Endocrine: Negative for cold intolerance and heat intolerance.   Genitourinary: Negative for difficulty urinating and dysuria.   Musculoskeletal: Negative for back pain and neck stiffness.   Skin: Negative for color change and rash.   Neurological: Negative for syncope and headaches.   Hematological: Negative for adenopathy.   Psychiatric/Behavioral: Negative for confusion and hallucinations.    Objective   Objective     Vitals:   Temp:  [97.5 °F (36.4 °C)-98.6 °F (37 °C)] 97.7 °F (36.5 °C)  Heart Rate:  [82-95] 82  Resp:  [17-18] 18  BP: ()/(58-73) 99/59  Flow (L/min):  [2] 2  Physical Exam   General:  well-developed appearing stated age in no acute distress  HEENT: Normocephalic atraumatic moist membranes pupils equal round reactive light, no scleral icterus no conjunctival injection  Cardiovascular: regular rate and rhythm no murmurs rubs or gallops S1-S2, no lower extremity edema appreciated  Pulmonary: Crackles bilateral posterior lung fields no wheezes or rhonchi symmetric chest expansion, unlabored, no conversational dyspnea appreciated  Gastrointestinal: Soft nondistended positive bowel sounds all 4 quadrants no rebound or guarding,  midline incision well-approximated without erythema or drainage, colostomy in left lower quadrant productive of stool  Musculoskeletal: No clubbing cyanosis, warm and well-perfused, calves soft symmetric nontender bilaterally  Skin: Clean dry without rashes  Neuro: Cranial nerves II through XII intact grossly no sensorimotor deficits appreciated bilateral upper and lower extremities  Psych: Patient is calm cooperative and appropriate with exam not responding to internal stimuli  : No Rico catheter no bladder distention no suprapubic tenderness    Result Review    Result Review:  I have personally reviewed these results and agree with these findings:  [x]  Laboratory  LAB RESULTS:      Lab 01/06/24  0221 01/05/24  2039 01/05/24  1443 01/05/24  1214 01/05/24  0434 01/05/24  0433 01/04/24  0602 01/04/24  0152 01/03/24  1640 01/03/24  0814 01/03/24  0358 01/03/24  0009 01/02/24  0951 01/02/24  0453 01/02/24  0453 01/02/24  0406 01/01/24  0528 12/31/23  0830 12/31/23  0524   WBC 15.93*  --   --   --   --  18.83* 26.10*  --   --   --  29.36*  --   --   --  43.46*  --  28.43*  --  26.82*   HEMOGLOBIN 8.4*  --   --   --   --  8.3* 8.7* 9.1* 7.0*  --  7.4*  --   --   --  7.1*  --  7.9*   < > 7.7*   HEMATOCRIT 26.1*  --   --   --   --  26.4* 26.5* 27.6* 21.3*  --  22.4*  --   --   --  21.8*  --  23.8*   < > 23.2*   PLATELETS 284  --   --   --   --  272 296  --   --   --  344  --   --    --  423  --  286  --  193   NEUTROS ABS 14.15*  --   --   --   --   --  21.17*  --   --   --  26.72*  --   --   --   --   --  21.46*  --  21.16*   IMMATURE GRANS (ABS) 0.24*  --   --   --   --   --  1.81*  --   --   --   --   --   --   --   --   --  1.70*  --  1.24*   LYMPHS ABS 0.58*  --   --   --   --   --  1.21  --   --   --   --   --   --   --   --   --  2.13  --  1.65   MONOS ABS 0.84  --   --   --   --   --  1.72*  --   --   --   --   --   --   --   --   --  2.82*  --  2.56*   EOS ABS 0.06  --   --   --   --   --  0.06  --   --   --   --   --   --   --   --   --  0.16  --  0.08   MCV 92.9  --   --   --   --  93.6 91.1  --   --   --  88.2  --   --   --  90.5  --  86.2  --  89.9   PROCALCITONIN  --   --   --   --   --   --   --   --   --   --   --   --   --   --   --  11.77* 13.44*  --   --    LACTATE 1.2  --   --   --   --  1.2 1.0  --   --   --   --  2.5*   < >  --   --   --  2.0   < >  --    LACTATE, ARTERIAL  --   --   --  1.11  --   --   --   --   --   --   --   --    < >  --   --   --   --   --   --    PROTIME 20.4*  --  20.4*  --  19.3*  --  17.9*  --  18.8* 22.6*  --   --   --    < >  --   --   --   --   --    APTT 97.1* 94.7  --   --  38.9*  --  32.9  --   --  38.1*  --   --   --   --  43.3*  --  89.9  --  100.9*    < > = values in this interval not displayed.         Lab 01/06/24  1149 01/06/24  0548 01/06/24  0221 01/05/24  2358 01/05/24  2357 01/05/24  1742 01/05/24  1214 01/05/24  1156   SODIUM 135* 134* 136 136  --  137  --  135*   SODIUM, ARTERIAL  --   --   --   --   --   --  136.4  --    POTASSIUM 4.5 4.6 4.6 4.7  --  4.6  --  4.5   CHLORIDE 99 99 102 101  --  102  --  101   CO2 19.5* 19.0* 18.0* 19.2*  --  18.7*  --  19.1*   ANION GAP 16.5* 16.0* 16.0* 15.8*  --  16.3*  --  14.9   BUN 49* 43* 43* 43*  --  46*  --  46*   CREATININE 3.20* 2.85* 2.91* 2.97*  --  3.23*  --  3.26*   EGFR 20.7* 23.8* 23.2* 22.6*  --  20.5*  --  20.2*   GLUCOSE 141* 125* 140* 124*  --  88  --  193*   GLUCOSE,  ARTERIAL  --   --   --   --   --   --  162*  --    CALCIUM 7.8* 7.7* 7.5* 7.8*  --  7.5*  --  7.6*   IONIZED CALCIUM  --  1.01*  --   --  1.00* 1.01* 1.01* 1.00*   MAGNESIUM 2.6* 2.5*  --  2.6*  --  2.5*  --  2.5*   PHOSPHORUS 4.6* 4.9*  --  5.1*  --  5.1*  --  5.2*         Lab 01/06/24  1149 01/06/24  0548 01/06/24  0221 01/05/24  2358 01/05/24  1742 01/05/24  1156 01/05/24  0433 01/04/24  1148 01/04/24  0602 01/03/24  1147 01/03/24  0358 01/03/24  0009 01/02/24  1422 01/02/24  0406 01/01/24  0528 12/31/23  0524   TOTAL PROTEIN  --   --  6.9  --   --   --  6.6  --  6.8  --  6.2 6.2  --  6.4   < > 6.0   ALBUMIN 2.9* 2.8* 2.8* 2.8* 2.7*   < > 2.9*   < > 3.0*  3.0*   < > 3.0* 3.0*   < > 2.5*   < > 2.3*   GLOBULIN  --   --  4.1  --   --   --  3.7  --  3.8  --  3.2 3.2  --  3.9   < > 3.7   ALT (SGPT)  --   --  103*  --   --   --  127*  --  153*  --  220* 228*  --  100*   < > 44*   AST (SGOT)  --   --  104*  --   --   --  147*  --  197*  --  600* 742*  --  248*   < > 58*   BILIRUBIN  --   --  8.2*  --   --   --  10.3*  --  10.1*  --  8.3* 8.4*  --  6.5*   < > 3.5*   ALK PHOS  --   --  115  --   --   --  114  --  114  --  112 107  --  122*   < > 124*   LIPASE  --   --   --   --   --   --   --   --   --   --   --   --   --  23  --  14    < > = values in this interval not displayed.         Lab 01/06/24  0221 01/05/24  1443 01/05/24  0434 01/04/24  0602 01/03/24  1640   PROTIME 20.4* 20.4* 19.3* 17.9* 18.8*   INR 1.71* 1.71* 1.60* 1.45* 1.54*             Lab 01/02/24  0951   ABO TYPING A   RH TYPING Negative   ANTIBODY SCREEN Negative         Lab 01/05/24  1214 01/02/24  2232   PH, ARTERIAL 7.386 7.467*   PCO2, ARTERIAL 32.1* 29.7*   PO2 ART 67.7* 58.5*   O2 SATURATION ART 92.9* 90.7*   FIO2 44 28   HCO3 ART 18.8* 21.0*   BASE EXCESS ART -5.3* -2.2*   CARBOXYHEMOGLOBIN 1.8* 1.6*     Brief Urine Lab Results  (Last result in the past 365 days)        Color   Clarity   Blood   Leuk Est   Nitrite   Protein   CREAT   Urine HCG         12/23/23 2054 Yellow   Clear   Small (1+)   Negative   Negative   100 mg/dL (2+)                 Microbiology Results (last 10 days)       Procedure Component Value - Date/Time    Blood Culture - Blood, Arm, Left [[id number]]  (Normal) Collected: 01/02/24 1422    Lab Status: Preliminary result Specimen: Blood from Arm, Left Updated: 01/06/24 1430     Blood Culture No growth at 4 days    Blood Culture - Blood, Hand, Left [[id number]]  (Normal) Collected: 01/02/24 1216    Lab Status: Preliminary result Specimen: Blood from Hand, Left Updated: 01/06/24 1230     Blood Culture No growth at 4 days    Aspergillus Galactomannan Antigen - Blood, Blood, Central Line [[id number]] Collected: 12/29/23 1936    Lab Status: Final result Specimen: Blood, Central Line Updated: 01/04/24 0608     Aspergillus Ag, BAL/Serum 0.05 Index     Narrative:      Performed at:  [facility] [address] [place], [place]  [id number]  : Laura Gautam MD, Phone:  [phone]  [facility]  [facility]  [address], [place]  [id number]  : Deepthi Del Valle [facility], Phone:  [phone]    Blood Culture - Blood, Arm, Left [[id number]]  (Normal) Collected: 12/29/23 1139    Lab Status: Final result Specimen: Blood from Arm, Left Updated: 01/03/24 1215     Blood Culture No growth at 5 days    Blood Culture - Blood, Chest, Left [[id number]]  (Normal) Collected: 12/29/23 1002    Lab Status: Final result Specimen: Blood from Chest, Left Updated: 01/03/24 1030     Blood Culture No growth at 5 days            [x]  Microbiology  [x]  Radiology  XR Abdomen KUB    Result Date: 1/2/2024    1. Small bore feeding tube tip terminating at the junction of the 3rd and 4th portion of the duodenum.      DOMONIQUE SALDAÑA MD       Electronically Signed and Approved By: DOMONIQUE SALDAÑA MD on 1/02/2024 at 19:43             XR Chest 1 View    Result Date: 1/2/2024    1. Mild to moderate perihilar and basilar airspace disease  suspected to represent pulmonary edema.  Pneumonia is also in the differential 2. Small bilateral pleural effusions, unchanged 3. Feeding tube with the tip near the gastroesophageal junction.       Herve Garnett M.D.       Electronically Signed and Approved By: Herve Garnett M.D. on 1/02/2024 at 13:03             CT Abdomen Pelvis Without Contrast    Result Date: 1/1/2024    CT scan of the abdomen and pelvis without contrast demonstrating moderate right and small left effusions, larger in comparison to 9/29/2023..  Compressive atelectasis is again seen at the lung bases.  Ill-defined irregular retroperitoneal mass appears larger in comparison to the previous study concerning for interval hemorrhage.  An ill-defined 9.5 cm component with layering density is consistent with hematoma.  I discussed findings with Dr. Lloyd at 1855.     BENITO PRADHAN MD       Electronically Signed and Approved By: BENITO PRADHAN MD on 1/01/2024 at 18:56             XR Chest 1 View    Result Date: 12/31/2023   Favorable progression is suggested radiographically with decreased atelectasis and/or infiltrate(s) bilaterally.  There may be decreased pleural effusions, as well.      Please note that portions of this note were completed with a voice recognition program.  SHIN NUNEZ JR, MD       Electronically Signed and Approved By: SHIN NUNEZ JR, MD on 12/31/2023 at 6:22               [x]  EKG/Telemetry   [x]  Cardiology/Vascular   Echocardiogram 12/24/2023  •  Left ventricular ejection fraction appears to be 66 - 70%.  •  Hyperdynamic.  The left ventricular cavity is small in size.  •  Left ventricular diastolic function was not assessed.  •  There is a trivial pericardial effusion.  •  No significant valvular abnormalities noted.  []  Pathology  []  Old records  [x]  Other:  Scheduled Meds:ampicillin, 2 g, Intravenous, Q24H  escitalopram, 5 mg, Nasogastric, Daily  melatonin, 10 mg, Nasogastric, Nightly  midodrine, 5 mg, Nasogastric,  TID AC  pantoprazole, 40 mg, Intravenous, Q12H  QUEtiapine, 25 mg, Nasogastric, Nightly  senna-docusate sodium, 2 tablet, Nasogastric, BID  sodium bicarbonate, 650 mg, Nasogastric, TID  sodium chloride, 10 mL, Intravenous, Q12H      Continuous Infusions:heparin, 18 Units/kg/hr, Last Rate: 18 Units/kg/hr (24 2060)      PRN Meds:.•  senna-docusate sodium **AND** polyethylene glycol **AND** [DISCONTINUED] bisacodyl **AND** bisacodyl  •  calcium carbonate  •  dextrose  •  dextrose  •  dextrose  •  dextrose  •  fentaNYL citrate (PF)  •  glucagon (human recombinant)  •  glucagon (human recombinant)  •  haloperidol lactate  •  heparin (porcine)  •  heparin (porcine)  •  heparin  •  heparin  •  [] Morphine **AND** naloxone  •  nitroglycerin  •  ondansetron  •  oxyCODONE-acetaminophen  •  oxyCODONE-acetaminophen  •  sodium chloride  •  sodium chloride  •  traZODone      Assessment & Plan   Assessment / Plan     Assessment/Plan:  Ruptured infrarenal AAA s/p open repair  Hemorrhagic shock  Hypovolemic shock  Acute hypoxic respiratory failure requiring mechanical ventilation  Septic shock with Streptococcus pyogenes bacteremia  SVT  History of RCC s/p partial nephrectomy on   Acute anuric renal failure requiring CRRT  Metabolic acidosis  Clinically significant lactic acidosis  Chronically immunosuppressed on CellCept  Hypocalcemia  Hypokalemia  Acute right lower extremity DVT in the peroneal and gastrocnemius  Acute blood loss anemia  Perirenal IVC thrombus  Ischemic bowel  Hyperkalemia  Anxiety        Patient admitted for further evaluation and treatment  Vascular surgery, general surgery, urology, nephrology and pulmonology critical care consulted thank you for assistance  Continue postop wound care per general surgery  Continue to monitor hemoglobin closely and transfuse packed red blood cells as needed to keep greater than 7  Continue heparin drip and monitor very closely for signs of bleeding  Continue  Protonix twice daily  Continue supplemental oxygen as needed to keep sats greater than 90%  Continue ampicillin-sulbactam complete 14-day course per pulmonology critical care  Continue midodrine and titrate per pulmonology critical care  Plan to start hemodialysis and ultrafiltration tomorrow per nephrology  Repeat BMP in a.m.  Continue to monitor electrolytes replace as needed  Continue Lexapro and Seroquel  Hold tube feeds until nausea vomiting improves  Discussed possible NT suction with nursing will discuss with respiratory therapy if patient can tolerate  Advance diet per speech  Further inpatient orders recommendations per clinical course         Discussed plan with bedside RN as well as pulmonology critical care team.    Disposition: LTAC versus inpatient rehab hospital once stable.    DVT prophylaxis:  Medical and mechanical DVT prophylaxis orders are present.    CODE STATUS:   Level Of Support Discussed With: Patient  Code Status (Patient has no pulse and is not breathing): CPR (Attempt to Resuscitate)  Medical Interventions (Patient has pulse or is breathing): Full Support

## 2024-01-07 NOTE — PROGRESS NOTES
Pulmonary / Critical Care Progress Note      Patient Name: Danny Savage  : 1960  MRN: [id number]  Attending:  Arpit Franks MD   Date of admission: 2023    Subjective   Subjective   Patient critically ill with ruptured AAA s/p open repair, hemorrhagic shock, Streptococcus pyogenes bacteremia    Over the past 24 hours patient remains on heparin drip for bilateral DVTs and thrombus in IVC, no evidence of bleeding, hemoglobin remained stable, off CRRT, off vasopressors, tube feeds on, very weak and deconditioned    Overnight, had 2 episodes of vomiting green bile, core track is postpyloric, tube feeds placed on hold    This morning  Patient is awake, still endorses some nausea  KUB okay  Very weak  Potassium up to 5.5, left femoral HD cath  On 2 L nasal cannula  Afebrile  Completing antibiotics  Ostomy with good output    Objective   Objective     Vitals:   Vital signs for last 24 hours:  Temp:  [97.4 °F (36.3 °C)-98.6 °F (37 °C)] 97.4 °F (36.3 °C)  Heart Rate:  [82-95] 89  Resp:  [14] 14  BP: ()/(59-78) 131/74    Intake/Output last 3 shifts:  I/O last 3 completed shifts:  In: .5 [P.O.:1129; I.V.:510.5; Other:81; NG/GT:338]  Out: 1797     Physical Exam   Vital Signs Reviewed  Chronically and critically ill-appearing male on room air, NG in place  General: Weak, deconditioned, alert  Chest:  good aeration, unchanged crackles in bases bilaterally, no work of breathing noted at rest  CV: RRR, no MGR, pulses 2+, equal.  Abd: Soft, ND, NT, midline incision in place, colostomy in place  Neuro:  A&Ox3,  CN grossly intact, no focal deficits.    Result Review    I have personally reviewed the results from the time of this admission to 2024 10:30 EST and agree with these findings:  [x]  Laboratory  [x]  Microbiology  [x]  Radiology  [x]  EKG/Telemetry   [x]  Cardiology/Vascular   []  Pathology  []  Old records  []  Other:  Most notable findings include:       Lab 24  0254 24  1149  01/06/24  0548 01/06/24  0221 01/05/24  2358 01/05/24  1742 01/05/24  1214 01/05/24  1156 01/05/24  0433 01/04/24  1148 01/04/24  0602 01/04/24  0152 01/04/24  0024 01/03/24  1640 01/03/24  1147 01/03/24  0358 01/03/24  0009 01/02/24  1422 01/02/24  0453 01/02/24  0406 01/01/24  0528   WBC 15.46*  --   --  15.93*  --   --   --   --  18.83*  --  26.10*  --   --   --   --  29.36*  --   --  43.46*  --  28.43*   HEMOGLOBIN 9.4*  --   --  8.4*  --   --   --   --  8.3*  --  8.7* 9.1*  --  7.0*  --  7.4*  --   --  7.1*  --  7.9*   HEMATOCRIT 29.5*  --   --  26.1*  --   --   --   --  26.4*  --  26.5* 27.6*  --  21.3*  --  22.4*  --   --  21.8*  --  23.8*   PLATELETS 330  --   --  284  --   --   --   --  272  --  296  --   --   --   --  344  --   --  423  --  286   SODIUM 133* 135* 134* 136 136 137  --  135* 136   < > 138  138  --    < > 134*   < > 134* 134*   < >  --  134* 132*   SODIUM, ARTERIAL  --   --   --   --   --   --  136.4  --   --   --   --   --   --   --   --   --   --    < >  --   --   --    POTASSIUM 5.5* 4.5 4.6 4.6 4.7 4.6  --  4.5 5.3*   < > 4.8  4.8  --    < > 4.5   < > 4.8 4.9   < >  --  6.2* 4.7   CHLORIDE 98 99 99 102 101 102  --  101 101   < > 101  101  --    < > 98   < > 99 98   < >  --  95* 95*   CO2 17.9* 19.5* 19.0* 18.0* 19.2* 18.7*  --  19.1* 18.8*   < > 18.7*  18.7*  --    < > 19.5*   < > 19.1* 18.2*   < >  --  12.9* 19.3*   BUN 64* 49* 43* 43* 43* 46*  --  46* 41*   < > 52*  52*  --    < > 57*   < > 58* 67*   < >  --  75* 56*   CREATININE 4.05* 3.20* 2.85* 2.91* 2.97* 3.23*  --  3.26* 3.01*   < > 3.31*  3.31*  --    < > 3.74*   < > 4.49* 4.65*   < >  --  5.77* 4.89*   GLUCOSE 83 141* 125* 140* 124* 88  --  193* 82   < > 126*  126*  --    < > 200*   < > 258* 243*   < >  --  55* 101*   GLUCOSE, ARTERIAL  --   --   --   --   --   --  162*  --   --   --   --   --   --   --   --   --   --    < >  --   --   --    CALCIUM 7.8* 7.8* 7.7* 7.5* 7.8* 7.5*  --  7.6* 7.8*   < > 7.7*  7.7*  --    < >  7.4*   < > 6.8* 6.7*   < >  --  7.3* 7.3*   PHOSPHORUS 5.8* 4.6* 4.9*  --  5.1* 5.1*  --  5.2* 5.2*   < > 4.8*  --    < > 4.1   < > 5.1* 5.5*   < >  --  9.3* 5.8*   TOTAL PROTEIN  --   --   --  6.9  --   --   --   --  6.6  --  6.8  --   --   --   --  6.2 6.2  --   --  6.4 6.1   ALBUMIN  --  2.9* 2.8* 2.8* 2.8* 2.7*  --  2.8* 2.9*   < > 3.0*  3.0*  --    < > 3.1*   < > 3.0* 3.0*   < >  --  2.5* 2.2*   GLOBULIN  --   --   --  4.1  --   --   --   --  3.7  --  3.8  --   --   --   --  3.2 3.2  --   --  3.9 3.9    < > = values in this interval not displayed.       Assessment & Plan   Assessment / Plan     Active Hospital Problems:  Active Hospital Problems    Diagnosis    • **Left flank pain    • S/P Exploratory laparotomy; appendectomy; left colon resection with creation of ostomy    • S/P Open repair of ruptured infrarenal abdominal aortic aneurysm    • Ruptured infrarenal abdominal aortic aneurysm (AAA)    • DVT, lower extremity, distal, acute, unspecified laterality      Impression:  Ruptured infrarenal AAA s/p open repair  Acute blood loss anemia  Acute DVT with plan for IVC filter  Intra-abdominal hematoma  Hemorrhagic shock  Hypovolemic shock  Acute hypoxic respiratory failure requiring mechanical ventilation  Septic shock with Streptococcus pyogenes bacteremia  Moraxella pneumonia  Ischemic sigmoid colon s/p colostomy  History of RCC s/p partial nephrectomy on 11/13  Acute kidney injury secondary to acute tubular necrosis requiring renal replacement therapy  Clinically significant lactic acidosis  Chronically immunosuppressed on CellCept  Hypocalcemia  Hypokalemia resolved now with hyperkalemia  SVT  Lactic acidosis, clinically significant     Plan:  -Continue heparin drip, unable to obtain IVC filter due to IVC thrombus/bilateral DVTs  -Monitor for any signs of bleeding, hemoglobin stable  -Continue midodrine 5 mg 3 times daily  -Trazodone decreased to as needed, continue Seroquel to 25 nightly  -Continue  delirium precautions  -Okay to dangle on side of bed, sit in chair position  -PT/OT/speech  -Core track in place, would resume tube feeds at trickle rate today  -Give 1 dose of Reglan  -Cleared per speech therapy however not meeting nutritional needs  -Nephrology following, HD per their service  -May need TDC next week, still has left femoral HD cath  -As needed Percocet and fentanyl for pain  -Continue Unasyn to complete 14 days of therapy for bacteremia  -Hold home chronic immunosuppression CellCept indefinitely  -Patient would benefit from LTAC    PUP PPx: Protonix  DVT prophylaxis: Planning for IVC filter  Medical and mechanical DVT prophylaxis orders are present.    CODE STATUS:   Level Of Support Discussed With: Patient  Code Status (Patient has no pulse and is not breathing): CPR (Attempt to Resuscitate)  Medical Interventions (Patient has pulse or is breathing): Full Support      ILiv APRN, spent 15 minutes critical care time in accordance to split shared billing.    Patient is critically ill with ruptured AAA, DVTs, hemorrhagic shock, requiring blood transfusion, intra-abdominal hematoma, acute kidney injury requiring renal replacement therapy.. We have personally reviewed all pertinent labs, imaging, microbiology and documentation. We have discussed care with the primary service as well as at multidisciplinary critical care rounds with the bedside nurse, respiratory therapist, pharmacist and all other ancillary services. 37 minutes of critical care time was spent managing this patient, excluding procedures. Of this time, I spent 22 minutes in accordance with split shared billing.    Electronically signed by Yinka Lloyd MD, 01/07/24, 2:05 PM EST.

## 2024-01-07 NOTE — PLAN OF CARE
Goal Outcome Evaluation:      Pt with frequent complaints of pain & nausea. Vomited x2. IV pain medication given x2 rather than by coretrack due to pt vomiting. Frequent weight shifting/turns in efforts to get comfortable.

## 2024-01-08 ENCOUNTER — APPOINTMENT (OUTPATIENT)
Dept: ULTRASOUND IMAGING | Facility: HOSPITAL | Age: 64
DRG: 268 | End: 2024-01-08
Payer: COMMERCIAL

## 2024-01-08 LAB
ALBUMIN SERPL-MCNC: 2.6 G/DL (ref 3.5–5.2)
ALBUMIN/GLOB SERPL: 0.6 G/DL
ALP SERPL-CCNC: 129 U/L (ref 39–117)
ALT SERPL W P-5'-P-CCNC: 101 U/L (ref 1–41)
ANION GAP SERPL CALCULATED.3IONS-SCNC: 15.8 MMOL/L (ref 5–15)
APTT PPP: 112.4 SECONDS (ref 78–95.9)
APTT PPP: 50.3 SECONDS (ref 78–95.9)
APTT PPP: 90.4 SECONDS (ref 78–95.9)
APTT PPP: 93.3 SECONDS (ref 78–95.9)
AST SERPL-CCNC: 103 U/L (ref 1–40)
BASOPHILS # BLD AUTO: 0.04 10*3/MM3 (ref 0–0.2)
BASOPHILS NFR BLD AUTO: 0.3 % (ref 0–1.5)
BILIRUB CONJ SERPL-MCNC: 8 MG/DL (ref 0–0.3)
BILIRUB INDIRECT SERPL-MCNC: 1.5 MG/DL
BILIRUB SERPL-MCNC: 9.5 MG/DL (ref 0–1.2)
BILIRUB SERPL-MCNC: 9.8 MG/DL (ref 0–1.2)
BUN SERPL-MCNC: 56 MG/DL (ref 8–23)
BUN/CREAT SERPL: 14.4 (ref 7–25)
CALCIUM SPEC-SCNC: 7.9 MG/DL (ref 8.6–10.5)
CHLORIDE SERPL-SCNC: 96 MMOL/L (ref 98–107)
CO2 SERPL-SCNC: 20.2 MMOL/L (ref 22–29)
CREAT SERPL-MCNC: 3.89 MG/DL (ref 0.76–1.27)
D-LACTATE SERPL-SCNC: 1 MMOL/L (ref 0.5–2)
DEPRECATED RDW RBC AUTO: 55.9 FL (ref 37–54)
EGFRCR SERPLBLD CKD-EPI 2021: 16.4 ML/MIN/1.73
EOSINOPHIL # BLD AUTO: 0.08 10*3/MM3 (ref 0–0.4)
EOSINOPHIL NFR BLD AUTO: 0.6 % (ref 0.3–6.2)
ERYTHROCYTE [DISTWIDTH] IN BLOOD BY AUTOMATED COUNT: 19.9 % (ref 12.3–15.4)
GLOBULIN UR ELPH-MCNC: 4.3 GM/DL
GLUCOSE BLDC GLUCOMTR-MCNC: 91 MG/DL (ref 70–99)
GLUCOSE BLDC GLUCOMTR-MCNC: 94 MG/DL (ref 70–99)
GLUCOSE BLDC GLUCOMTR-MCNC: 94 MG/DL (ref 70–99)
GLUCOSE BLDC GLUCOMTR-MCNC: 95 MG/DL (ref 70–99)
GLUCOSE SERPL-MCNC: 100 MG/DL (ref 65–99)
HCT VFR BLD AUTO: 25.3 % (ref 37.5–51)
HGB BLD-MCNC: 8.3 G/DL (ref 13–17.7)
IMM GRANULOCYTES # BLD AUTO: 0.17 10*3/MM3 (ref 0–0.05)
IMM GRANULOCYTES NFR BLD AUTO: 1.4 % (ref 0–0.5)
LYMPHOCYTES # BLD AUTO: 0.74 10*3/MM3 (ref 0.7–3.1)
LYMPHOCYTES NFR BLD AUTO: 5.9 % (ref 19.6–45.3)
MAGNESIUM SERPL-MCNC: 2.2 MG/DL (ref 1.6–2.4)
MCH RBC QN AUTO: 30.1 PG (ref 26.6–33)
MCHC RBC AUTO-ENTMCNC: 32.8 G/DL (ref 31.5–35.7)
MCV RBC AUTO: 91.7 FL (ref 79–97)
MONOCYTES # BLD AUTO: 0.85 10*3/MM3 (ref 0.1–0.9)
MONOCYTES NFR BLD AUTO: 6.8 % (ref 5–12)
NEUTROPHILS NFR BLD AUTO: 10.63 10*3/MM3 (ref 1.7–7)
NEUTROPHILS NFR BLD AUTO: 85 % (ref 42.7–76)
NRBC BLD AUTO-RTO: 0.2 /100 WBC (ref 0–0.2)
PHOSPHATE SERPL-MCNC: 5.4 MG/DL (ref 2.5–4.5)
PLATELET # BLD AUTO: 322 10*3/MM3 (ref 140–450)
PMV BLD AUTO: 9.8 FL (ref 6–12)
POTASSIUM SERPL-SCNC: 4.8 MMOL/L (ref 3.5–5.2)
PROT SERPL-MCNC: 6.9 G/DL (ref 6–8.5)
RBC # BLD AUTO: 2.76 10*6/MM3 (ref 4.14–5.8)
SODIUM SERPL-SCNC: 132 MMOL/L (ref 136–145)
WBC NRBC COR # BLD AUTO: 12.51 10*3/MM3 (ref 3.4–10.8)

## 2024-01-08 PROCEDURE — 83735 ASSAY OF MAGNESIUM: CPT | Performed by: INTERNAL MEDICINE

## 2024-01-08 PROCEDURE — 99291 CRITICAL CARE FIRST HOUR: CPT | Performed by: STUDENT IN AN ORGANIZED HEALTH CARE EDUCATION/TRAINING PROGRAM

## 2024-01-08 PROCEDURE — 85730 THROMBOPLASTIN TIME PARTIAL: CPT | Performed by: SURGERY

## 2024-01-08 PROCEDURE — 80053 COMPREHEN METABOLIC PANEL: CPT | Performed by: NURSE PRACTITIONER

## 2024-01-08 PROCEDURE — 25010000002 HEPARIN (PORCINE) 25000-0.45 UT/250ML-% SOLUTION: Performed by: SURGERY

## 2024-01-08 PROCEDURE — 97164 PT RE-EVAL EST PLAN CARE: CPT

## 2024-01-08 PROCEDURE — 84100 ASSAY OF PHOSPHORUS: CPT | Performed by: INTERNAL MEDICINE

## 2024-01-08 PROCEDURE — 85730 THROMBOPLASTIN TIME PARTIAL: CPT | Performed by: STUDENT IN AN ORGANIZED HEALTH CARE EDUCATION/TRAINING PROGRAM

## 2024-01-08 PROCEDURE — 97110 THERAPEUTIC EXERCISES: CPT

## 2024-01-08 PROCEDURE — 82247 BILIRUBIN TOTAL: CPT | Performed by: NURSE PRACTITIONER

## 2024-01-08 PROCEDURE — 25010000002 FENTANYL CITRATE (PF) 50 MCG/ML SOLUTION: Performed by: SURGERY

## 2024-01-08 PROCEDURE — 25010000002 AMPICILLIN PER 500 MG: Performed by: INTERNAL MEDICINE

## 2024-01-08 PROCEDURE — 25010000002 HEPARIN (PORCINE) PER 1000 UNITS: Performed by: SURGERY

## 2024-01-08 PROCEDURE — 83605 ASSAY OF LACTIC ACID: CPT | Performed by: SURGERY

## 2024-01-08 PROCEDURE — 82948 REAGENT STRIP/BLOOD GLUCOSE: CPT

## 2024-01-08 PROCEDURE — 76705 ECHO EXAM OF ABDOMEN: CPT

## 2024-01-08 PROCEDURE — 99233 SBSQ HOSP IP/OBS HIGH 50: CPT | Performed by: FAMILY MEDICINE

## 2024-01-08 PROCEDURE — 85025 COMPLETE CBC W/AUTO DIFF WBC: CPT | Performed by: SURGERY

## 2024-01-08 PROCEDURE — 99024 POSTOP FOLLOW-UP VISIT: CPT | Performed by: SURGERY

## 2024-01-08 PROCEDURE — 82248 BILIRUBIN DIRECT: CPT | Performed by: NURSE PRACTITIONER

## 2024-01-08 RX ADMIN — OXYCODONE AND ACETAMINOPHEN 1 TABLET: 10; 325 TABLET ORAL at 14:17

## 2024-01-08 RX ADMIN — MIDODRINE HYDROCHLORIDE 5 MG: 5 TABLET ORAL at 08:08

## 2024-01-08 RX ADMIN — Medication 10 MG: at 21:08

## 2024-01-08 RX ADMIN — AMPICILLIN SODIUM 2 G: 2 INJECTION, POWDER, FOR SOLUTION INTRAMUSCULAR; INTRAVENOUS at 19:46

## 2024-01-08 RX ADMIN — FENTANYL CITRATE 50 MCG: 50 INJECTION, SOLUTION INTRAMUSCULAR; INTRAVENOUS at 17:20

## 2024-01-08 RX ADMIN — HEPARIN SODIUM 18 UNITS/KG/HR: 10000 INJECTION, SOLUTION INTRAVENOUS at 04:02

## 2024-01-08 RX ADMIN — HEPARIN SODIUM 18 UNITS/KG/HR: 10000 INJECTION, SOLUTION INTRAVENOUS at 18:26

## 2024-01-08 RX ADMIN — SODIUM BICARBONATE 650 MG TABLET 650 MG: at 08:08

## 2024-01-08 RX ADMIN — Medication 10 ML: at 21:09

## 2024-01-08 RX ADMIN — Medication 10 ML: at 08:19

## 2024-01-08 RX ADMIN — OXYCODONE AND ACETAMINOPHEN 1 TABLET: 10; 325 TABLET ORAL at 23:19

## 2024-01-08 RX ADMIN — OXYCODONE AND ACETAMINOPHEN 1 TABLET: 10; 325 TABLET ORAL at 09:59

## 2024-01-08 RX ADMIN — QUETIAPINE FUMARATE 25 MG: 25 TABLET ORAL at 21:08

## 2024-01-08 RX ADMIN — MIDODRINE HYDROCHLORIDE 5 MG: 5 TABLET ORAL at 17:36

## 2024-01-08 RX ADMIN — ESCITALOPRAM OXALATE 5 MG: 10 TABLET ORAL at 08:08

## 2024-01-08 RX ADMIN — OXYCODONE AND ACETAMINOPHEN 1 TABLET: 10; 325 TABLET ORAL at 03:49

## 2024-01-08 RX ADMIN — SODIUM BICARBONATE 650 MG TABLET 650 MG: at 21:08

## 2024-01-08 RX ADMIN — PANTOPRAZOLE SODIUM 40 MG: 40 INJECTION, POWDER, FOR SOLUTION INTRAVENOUS at 08:08

## 2024-01-08 RX ADMIN — MIDODRINE HYDROCHLORIDE 5 MG: 5 TABLET ORAL at 11:01

## 2024-01-08 RX ADMIN — HEPARIN SODIUM 2600 UNITS: 1000 INJECTION INTRAVENOUS; SUBCUTANEOUS at 21:31

## 2024-01-08 RX ADMIN — SODIUM BICARBONATE 650 MG TABLET 650 MG: at 17:36

## 2024-01-08 RX ADMIN — PANTOPRAZOLE SODIUM 40 MG: 40 INJECTION, POWDER, FOR SOLUTION INTRAVENOUS at 21:08

## 2024-01-08 NOTE — SIGNIFICANT NOTE
Wound Eval / Progress Noted     Evelina     Patient Name: Danny Savage  : 1960  MRN: [id number]  Today's Date: 2024                 Admit Date: 2023    Visit Dx:    ICD-10-CM ICD-9-CM   1. Sepsis, due to unspecified organism, unspecified whether acute organ dysfunction present  A41.9 038.9     995.91   2. Fever, unspecified fever cause  R50.9 780.60   3. Intra-abdominal fluid collection  R18.8 789.59   4. Abdominal aortic aneurysm (AAA) without rupture, unspecified part  I71.40 441.4   5. Ruptured infrarenal abdominal aortic aneurysm (AAA)  I71.33 441.3   6. Decreased activities of daily living (ADL)  Z78.9 V49.89   7. Difficulty walking  R26.2 719.7   8. DVT, lower extremity, distal, acute, unspecified laterality  I82.4Z9 453.42   9. Dysphagia, oropharyngeal  R13.12 787.22   10. S/P Open repair of ruptured infrarenal abdominal aortic aneurysm  Z98.890 V45.89    Z86.79    11. S/P AAA (abdominal aortic aneurysm) repair  Z98.890 V45.89    Z86.79          Left flank pain    Ruptured infrarenal abdominal aortic aneurysm (AAA)    DVT, lower extremity, distal, acute, unspecified laterality    S/P Exploratory laparotomy; appendectomy; left colon resection with creation of ostomy    S/P Open repair of ruptured infrarenal abdominal aortic aneurysm        Past Medical History:   Diagnosis Date    Allergy     Aortic aneurysm     4.7 just found has not f/u    H/O Kidney stone     Hiatal hernia     Kidney mass     left    Kidney stone     Renal cancer     S/P Exploratory laparotomy; appendectomy; left colon resection with creation of ostomy 2024    S/P Open repair of ruptured infrarenal abdominal aortic aneurysm 2024        Past Surgical History:   Procedure Laterality Date    ABDOMINAL AORTIC ANEURYSM REPAIR N/A 2023    Procedure: ABDOMINAL AORTIC ANEURYSM REPAIR;  Surgeon: Koffi Agosto MD;  Location: [facility] MAIN OR;  Service: Vascular;  Laterality: N/A;    COLON RESECTION N/A  "12/26/2023    Procedure: COLON RESECTION;  Surgeon: Hernan Mallory MD;  Location: [facility] MAIN OR;  Service: General;  Laterality: N/A;  EXPLORATORY LAPAROTOMY, REPAIR OF SMALL BOWEL MESENTERY, APPENDECTOMY, LEFT HEMICOLECTOMY, CREATION OF OSTOMY, ABDOMINAL CLOSURE    EXPLORATORY LAPAROTOMY N/A 12/26/2023    Procedure: LAPAROTOMY EXPLORATORY;  Surgeon: Koffi Agosto MD;  Location: [facility] MAIN OR;  Service: Vascular;  Laterality: N/A;  Exploratory Laparotomy    INTERVENTIONAL RADIOLOGY PROCEDURE N/A 1/5/2024    Procedure: IVC Filter Placement;  Surgeon: Michoacano Collins MD;  Location: [facility] CATH INVASIVE LOCATION;  Service: Vascular;  Laterality: N/A;    KIDNEY STONE SURGERY      NEPHRECTOMY PARTIAL Left 11/13/2023    Procedure: NEPHRECTOMY PARTIAL LAPAROSCOPIC WITH DAVINCI ROBOT, left;  Surgeon: Michelle Cai MD;  Location: [facility] MAIN OR;  Service: Robotics - DaVinci;  Laterality: Left;    URETEROSCOPY LASER LITHOTRIPSY WITH STENT INSERTION Left 08/18/2023    Procedure: CYSTOSCOPY URETEROSCOPY RETROGRADE PYELOGRAM STENT INSERTION, left;  Surgeon: Michelle Cai MD;  Location: [facility] MAIN OR;  Service: Urology;  Laterality: Left;        01/08/24 1007   Wound 12/24/23 2035 midline abdomen Incision   Placement Date/Time: 12/24/23 2035   Orientation: midline  Location: abdomen  Primary Wound Type: Incision   Wound Image    Dressing Appearance dry;intact;no drainage   Closure Staples   Base clean;dressing in place, unable to visualize;dry   Periwound dry;intact;swelling   Periwound Temperature warm   Periwound Skin Turgor firm   Edges rolled/closed   Drainage Amount none   Care, Wound cleansed with;sterile normal saline   Dressing Care dressing applied;abdominal pad   Periwound Care absorptive dressing applied   Colostomy LLQ   Placement date: If unknown, DO NOT use \"Add Comment\" note/Placement time: If unknown, DO NOT use \"Add Comment\" note: 12/26/23 1315   Inserted by: DR MALLORY  Hand Hygiene Completed: " Yes  Location: LLQ   Wound Image    Stomal Appliance 1 piece;Clean;Dry;Intact;Drainable;Changed   Stoma Appearance pink;dusky;moist;protruding above skin level   Peristomal Assessment Clean;Intact   Accessories/Skin Care cleansed with water;skin sealant   Stoma Function stool   Stool Color brown   Stool Consistency loose;liquid;soft   Treatment Bag change;Site care                   Wound Check / Follow-up: Patient seen today for ostomy follow-up and ongoing education. Patient's wife is present at bedside. Patient remains in CICU. He is awake and alert at time of visit. Patient off of CRRT at this time. Cortrak remains in place with continuous tube feeds running.    Ostomy pouching system is clean, dry and intact. No signs of lifting or leaking noted. Small amount of brown stool noted in pouch. Removed pouching system with use of adhesive remover. Stoma is moist and pink with dusky discoloration noted. Peristomal tissue intact with no signs of irritation noted. Cleansed stoma and peristomal tissue with warm water and washcloth. Measured stoma then cut to fit and placed new one piece flat pouch. Seal obtained. No signs of lifting or leaking.     Midline abdominal incision closed with staples. No evidence of dehiscence noted. Cleansed incision with normal saline and gauze. Covered with ABD pads and secured with paper tape. Patient's abdomen remains distended. Abdominal binder in place is too big for patient and he is expressing complaints of discomfort, as the binder is sitting on his chest. Removed this binder and ordered him a new one of a different size.    Left foot and toes with dark red / purple discoloration. Intact serous filled blistering is present to left second toe. Patient not on pressors at this time. Patient does not complain of pain or tenderness to feet or toes. Primary RN notified intensivist and APRN of this new finding during rounding this morning.     Discharge plan is still for LTAC placement.  Patient remains on specialty mattress.      Impression: New colostomy. Surgical incision.      Short term goals: Colostomy management, ostomy education, daily dressing changes.        Enedina Norman RN    1/8/2024    13:10 EST

## 2024-01-08 NOTE — CONSULTS
"Nutrition Services    Patient Name: Danny Savage  YOB: 1960  MRN: [id number]  Admission date: 12/23/2023      CLINICAL NUTRITION ASSESSMENT      Reason for Assessment  Follow Up     H&P:  Past Medical History:   Diagnosis Date    Allergy     Aortic aneurysm     4.7 just found has not f/u    H/O Kidney stone     Hiatal hernia     Kidney mass     left    Kidney stone     Renal cancer     S/P Exploratory laparotomy; appendectomy; left colon resection with creation of ostomy 01/04/2024    S/P Open repair of ruptured infrarenal abdominal aortic aneurysm 01/04/2024        Current Problems:   Active Hospital Problems    Diagnosis     **Left flank pain     S/P Exploratory laparotomy; appendectomy; left colon resection with creation of ostomy     S/P Open repair of ruptured infrarenal abdominal aortic aneurysm     Ruptured infrarenal abdominal aortic aneurysm (AAA)     DVT, lower extremity, distal, acute, unspecified laterality         Nutrition/Diet History         Narrative   Patient admitted with left flank pain, ruptured AAA.  S/P open AAA repair.  Patient went back to OR 12/26/2023. Now S/P left open colectomy with ostomy creation.  Pt extubated 12/28/2023.   Cortrak placed 01/02/24 to supplement intake.  Had been receiving CRRT with fluid removal.  Now on MWF HD.      Patient is on a full liquid diet with minimal PO intake.      Patient has had multiple episodes of vomiting green, bilious output.  Unable to advance enteral nutrition toward goal rate.  Will adjust enteral formula to provide a hydrolyzed, peptide-based formula with lower osmolality.      Plan to resume enteral nutrition at trophic rate and monitor tolerance and ability to advance toward goal rate.       Anthropometrics        Current Height, Weight Height: 177.8 cm (70\")  Weight: 79 kg (174 lb 1.6 oz)   Current BMI Body mass index is 24.98 kg/m².   BMI Classification Normal range   % %   Adjusted Body Weight (ABW)    Weight Hx "  Wt Readings from Last 30 Encounters:   01/07/24 0516 79 kg (174 lb 1.6 oz)   01/06/24 0500 80.6 kg (177 lb 12.8 oz)   01/04/24 0500 87.5 kg (192 lb 12.8 oz)   01/03/24 0517 91 kg (200 lb 11.2 oz)   01/02/24 1320 92.7 kg (204 lb 4.8 oz)   01/01/24 1600 96.6 kg (212 lb 14.4 oz)   12/24/23 0027 80.5 kg (177 lb 7.5 oz)   12/23/23 1948 79.9 kg (176 lb 2.4 oz)   12/22/23 0706 78.8 kg (173 lb 11.6 oz)   12/06/23 0958 79.9 kg (176 lb 3.2 oz)   11/22/23 1338 78.2 kg (172 lb 6.4 oz)   11/13/23 0628 78.2 kg (172 lb 6.4 oz)   10/30/23 1440 79.9 kg (176 lb 2.4 oz)   08/17/23 1952 78.8 kg (173 lb 11.6 oz)   08/17/23 1537 80.3 kg (177 lb 0.5 oz)   08/15/23 0554 81.2 kg (179 lb 0.2 oz)          Wt Change Observation Stable x 4 months   Weight back to baseline, essentially neutral fluid balance for admission.       Estimated/Assessed Needs  Estimated Needs based on: Current Body Weight       Energy Requirements 25-30 kcal/kg   EST Needs (kcal/day)  0601-0059 kcal        Protein Requirements 1.2-1.3 g/kg   EST Daily Needs (g/day)  g       Fluid Requirements 25 ml/kg    Estimated Needs (mL/day) 1975 ml     Labs/Medications         Pertinent Labs Reviewed.   Results from last 7 days   Lab Units 01/08/24  0548 01/07/24  0254 01/06/24  1149 01/06/24  0548 01/06/24  0221 01/05/24  1156 01/05/24  0433   SODIUM mmol/L 132* 133* 135*   < > 136   < > 136   SODIUM, ARTERIAL   --   --   --   --   --    < >  --    POTASSIUM mmol/L 4.8 5.5* 4.5   < > 4.6   < > 5.3*   CHLORIDE mmol/L 96* 98 99   < > 102   < > 101   CO2 mmol/L 20.2* 17.9* 19.5*   < > 18.0*   < > 18.8*   BUN mg/dL 56* 64* 49*   < > 43*   < > 41*   CREATININE mg/dL 3.89* 4.05* 3.20*   < > 2.91*   < > 3.01*   CALCIUM mg/dL 7.9* 7.8* 7.8*   < > 7.5*   < > 7.8*   BILIRUBIN mg/dL 9.5*  9.8*  --   --   --  8.2*  --  10.3*   ALK PHOS U/L 129*  --   --   --  115  --  114   ALT (SGPT) U/L 101*  --   --   --  103*  --  127*   AST (SGOT) U/L 103*  --   --   --  104*  --  147*   GLUCOSE  "mg/dL 100* 83 141*   < > 140*   < > 82   GLUCOSE, ARTERIAL   --   --   --   --   --    < >  --     < > = values in this interval not displayed.     Results from last 7 days   Lab Units 01/08/24  0548 01/07/24  0254 01/06/24  1149   MAGNESIUM mg/dL 2.2 2.6* 2.6*   PHOSPHORUS mg/dL 5.4* 5.8* 4.6*   HEMOGLOBIN g/dL 8.3* 9.4*  --    HEMATOCRIT % 25.3* 29.5*  --      COVID19   Date Value Ref Range Status   12/23/2023 Not Detected Not Detected - Ref. Range Final     No results found for: \"HGBA1C\"      Pertinent Medications Reviewed.     Malnutrition Severity Assessment                Nutrition Diagnosis         Nutrition Dx Problem 1 Inadequate energy Intake related to GI dysfunction as evidenced by  Full liquid diet       Nutrition Intervention           Current Nutrition Orders & Evaluation of Intake       Current PO Diet Diet: Liquid Diets; Full Liquid; Fluid Consistency: Thin (IDDSI 0)   Supplement Orders Placed This Encounter      Dietary Nutrition Supplements Ensure Surgery; vanilla      DIET MESSAGE Guest tray, regular diet      Place Feeding Tube Per [employer] System      Diet, Tube Feeding Tube Feeding Formula: Peptamen 1.5; Tube Feeding Type: Continuous; Continuous Tube Feeding Start Rate (mL/hr): 15; Then Advance Rate By (mL/hr): RD To Manage; Every __ Hours: Patient at Goal Rate; To Goal Rate of (mL/hr): RD to ...           Nutrition Intervention/Prescription:  Provides 660 kcals, 39 grams of protein       Peptamen 1.5 @ 15 ml/hr x 22 hrs/day  Provides 495 kcal, 22 g pro, 254 ml fluid     Recommend to advance toward goal rate as able:  Peptamen 1.5 @ 75 ml/hr x 22 hours   Free water flushes 25 ml every 3 hours   Provides 2475 kcal, 112 g pro, 1471 ml fluid         Medical Nutrition Therapy/Nutrition Education          Learner     Readiness Patient  Education not indicated at this time     Method     Response N/A  N/A     Monitor/Evaluation        Monitor Per protocol, Pertinent labs, Weight, Skin status, GI " status, POC/GOC, Diet advancement       Nutrition Discharge Plan         To be determined       Electronically signed by:  Shannon Acharya RD  01/08/24 12:19 EST

## 2024-01-08 NOTE — PLAN OF CARE
Goal Outcome Evaluation:      Pt received hemodialysis the first part of the shift. Vitals remained stable. Pt had a period of lethargy then awoke very sad & asked that his wife be called to come back in. Pt expressed sadness at all that he has been through and what wife has had to go through with his illness & long hospitalization. Pt asked for help performing some bed exercises because he is eager to get stronger so he can talk his wife on a walk. Pt was able to go back to sleep after spending time with his wife who remained at bedside.

## 2024-01-08 NOTE — PROGRESS NOTES
" LOS: 16 days   Patient Care Team:  Kodi Pichardo MD as PCP - General (Internal Medicine)  Lisset Harrington APRN as Nurse Practitioner (Urology)    Chief Complaint: ROLAND    Subjective     History of Present Illness  Awake, alert sitting good bed, no major events since yesterday.  Tube feed on hold for ultrasound later today.  Off pressors.  Anuric.    Subjective:  Symptoms:  Improved.  No shortness of breath, chest pain, chest pressure or anxiety.    Diet:  Poor intake.  No nausea or vomiting.      History taken from: chart family    Objective     Vital Sign Min/Max for last 24 hours  Temp  Min: 97.7 °F (36.5 °C)  Max: 98.6 °F (37 °C)   BP  Min: 85/61  Max: 138/64   Pulse  Min: 84  Max: 96   Resp  Min: 13  Max: 22   SpO2  Min: 89 %  Max: 95 %   No data recorded   Weight  Min: 77.7 kg (171 lb 3.2 oz)  Max: 77.7 kg (171 lb 3.2 oz)     Flowsheet Rows      Flowsheet Row First Filed Value   Admission Height 177.8 cm (70\") Documented at 12/23/2023 1948   Admission Weight 79.9 kg (176 lb 2.4 oz) Documented at 12/23/2023 1948            No intake/output data recorded.  I/O last 3 completed shifts:  In: 2253 [P.O.:380; I.V.:908; Other:215; NG/GT:750]  Out: 150 [Stool:150]    Objective:  General Appearance:  Comfortable, ill-appearing and in no acute distress.    Vital signs: (most recent): Blood pressure 124/75, pulse 91, temperature 97.9 °F (36.6 °C), temperature source Oral, resp. rate 16, height 177.8 cm (70\"), weight 77.7 kg (171 lb 3.2 oz), SpO2 91%.  Vital signs are normal.  No fever.    Output: No urine output and producing stool.    HEENT: Normal HEENT exam.    Lungs:  Normal effort and normal respiratory rate.  Breath sounds clear to auscultation.  He is not in respiratory distress.    Heart: Normal rate.  Regular rhythm.    Abdomen: Abdomen is soft.  (Deep palpation not done.  Midline incision covered.  Colostomy left lower quadrant noted.).  Hypoactive bowel sounds.     Extremities: There is dependent " "edema.    Pulses: Distal pulses are intact.  (Left femoral Shiley in place.)    Neurological: Patient is alert and oriented to person, place and time.    Pupils:  Pupils are equal, round, and reactive to light.    Skin:  Warm and dry.              Results Review:     I reviewed the patient's new clinical results.    WBC WBC   Date Value Ref Range Status   01/08/2024 12.51 (H) 3.40 - 10.80 10*3/mm3 Final   01/07/2024 15.46 (H) 3.40 - 10.80 10*3/mm3 Final   01/06/2024 15.93 (H) 3.40 - 10.80 10*3/mm3 Final      HGB Hemoglobin   Date Value Ref Range Status   01/08/2024 8.3 (L) 13.0 - 17.7 g/dL Final   01/07/2024 9.4 (L) 13.0 - 17.7 g/dL Final   01/06/2024 8.4 (L) 13.0 - 17.7 g/dL Final      HCT Hematocrit   Date Value Ref Range Status   01/08/2024 25.3 (L) 37.5 - 51.0 % Final   01/07/2024 29.5 (L) 37.5 - 51.0 % Final   01/06/2024 26.1 (L) 37.5 - 51.0 % Final      Platlets No results found for: \"LABPLAT\"   MCV MCV   Date Value Ref Range Status   01/08/2024 91.7 79.0 - 97.0 fL Final   01/07/2024 93.7 79.0 - 97.0 fL Final   01/06/2024 92.9 79.0 - 97.0 fL Final          Sodium Sodium   Date Value Ref Range Status   01/08/2024 132 (L) 136 - 145 mmol/L Final   01/07/2024 133 (L) 136 - 145 mmol/L Final   01/06/2024 135 (L) 136 - 145 mmol/L Final   01/06/2024 134 (L) 136 - 145 mmol/L Final   01/06/2024 136 136 - 145 mmol/L Final   01/05/2024 136 136 - 145 mmol/L Final   01/05/2024 137 136 - 145 mmol/L Final      Potassium Potassium   Date Value Ref Range Status   01/08/2024 4.8 3.5 - 5.2 mmol/L Final   01/07/2024 5.5 (H) 3.5 - 5.2 mmol/L Final     Comment:     Slight hemolysis detected by analyzer. Result may be falsely elevated.   01/06/2024 4.5 3.5 - 5.2 mmol/L Final   01/06/2024 4.6 3.5 - 5.2 mmol/L Final   01/06/2024 4.6 3.5 - 5.2 mmol/L Final   01/05/2024 4.7 3.5 - 5.2 mmol/L Final   01/05/2024 4.6 3.5 - 5.2 mmol/L Final      Chloride Chloride   Date Value Ref Range Status   01/08/2024 96 (L) 98 - 107 mmol/L Final " "  01/07/2024 98 98 - 107 mmol/L Final   01/06/2024 99 98 - 107 mmol/L Final   01/06/2024 99 98 - 107 mmol/L Final   01/06/2024 102 98 - 107 mmol/L Final   01/05/2024 101 98 - 107 mmol/L Final   01/05/2024 102 98 - 107 mmol/L Final      CO2 CO2   Date Value Ref Range Status   01/08/2024 20.2 (L) 22.0 - 29.0 mmol/L Final   01/07/2024 17.9 (L) 22.0 - 29.0 mmol/L Final   01/06/2024 19.5 (L) 22.0 - 29.0 mmol/L Final   01/06/2024 19.0 (L) 22.0 - 29.0 mmol/L Final   01/06/2024 18.0 (L) 22.0 - 29.0 mmol/L Final   01/05/2024 19.2 (L) 22.0 - 29.0 mmol/L Final   01/05/2024 18.7 (L) 22.0 - 29.0 mmol/L Final      BUN BUN   Date Value Ref Range Status   01/08/2024 56 (H) 8 - 23 mg/dL Final   01/07/2024 64 (H) 8 - 23 mg/dL Final   01/06/2024 49 (H) 8 - 23 mg/dL Final   01/06/2024 43 (H) 8 - 23 mg/dL Final   01/06/2024 43 (H) 8 - 23 mg/dL Final   01/05/2024 43 (H) 8 - 23 mg/dL Final   01/05/2024 46 (H) 8 - 23 mg/dL Final      Creatinine Creatinine   Date Value Ref Range Status   01/08/2024 3.89 (H) 0.76 - 1.27 mg/dL Final   01/07/2024 4.05 (H) 0.76 - 1.27 mg/dL Final   01/06/2024 3.20 (H) 0.76 - 1.27 mg/dL Final   01/06/2024 2.85 (H) 0.76 - 1.27 mg/dL Final   01/06/2024 2.91 (H) 0.76 - 1.27 mg/dL Final   01/05/2024 2.97 (H) 0.76 - 1.27 mg/dL Final   01/05/2024 3.23 (H) 0.76 - 1.27 mg/dL Final      Calcium Calcium   Date Value Ref Range Status   01/08/2024 7.9 (L) 8.6 - 10.5 mg/dL Final   01/07/2024 7.8 (L) 8.6 - 10.5 mg/dL Final   01/06/2024 7.8 (L) 8.6 - 10.5 mg/dL Final   01/06/2024 7.7 (L) 8.6 - 10.5 mg/dL Final   01/06/2024 7.5 (L) 8.6 - 10.5 mg/dL Final   01/05/2024 7.8 (L) 8.6 - 10.5 mg/dL Final   01/05/2024 7.5 (L) 8.6 - 10.5 mg/dL Final      PO4 No results found for: \"CAPO4\"   Albumin Albumin   Date Value Ref Range Status   01/08/2024 2.6 (L) 3.5 - 5.2 g/dL Final   01/06/2024 2.9 (L) 3.5 - 5.2 g/dL Final   01/06/2024 2.8 (L) 3.5 - 5.2 g/dL Final   01/06/2024 2.8 (L) 3.5 - 5.2 g/dL Final   01/05/2024 2.8 (L) 3.5 - 5.2 g/dL " "Final   01/05/2024 2.7 (L) 3.5 - 5.2 g/dL Final      Magnesium Magnesium   Date Value Ref Range Status   01/08/2024 2.2 1.6 - 2.4 mg/dL Final   01/07/2024 2.6 (H) 1.6 - 2.4 mg/dL Final   01/06/2024 2.6 (H) 1.6 - 2.4 mg/dL Final   01/06/2024 2.5 (H) 1.6 - 2.4 mg/dL Final   01/05/2024 2.6 (H) 1.6 - 2.4 mg/dL Final   01/05/2024 2.5 (H) 1.6 - 2.4 mg/dL Final      Uric Acid No results found for: \"URICACID\"     Medication Review:   ampicillin, 2 g, Intravenous, Q24H  escitalopram, 5 mg, Nasogastric, Daily  melatonin, 10 mg, Nasogastric, Nightly  midodrine, 5 mg, Nasogastric, TID AC  pantoprazole, 40 mg, Intravenous, Q12H  QUEtiapine, 25 mg, Nasogastric, Nightly  senna-docusate sodium, 2 tablet, Nasogastric, BID  sodium bicarbonate, 650 mg, Nasogastric, TID  sodium chloride, 10 mL, Intravenous, Q12H          Assessment & Plan       Left flank pain    Ruptured infrarenal abdominal aortic aneurysm (AAA)    DVT, lower extremity, distal, acute, unspecified laterality    S/P Exploratory laparotomy; appendectomy; left colon resection with creation of ostomy    S/P Open repair of ruptured infrarenal abdominal aortic aneurysm      Assessment & Plan  Anuric ROLAND-  pt with previous partial left nephrectomy and baseline creatinine closer to 0.9-1.0.  Had noted hypotension with AAA rupture s/p repair.  Likely ATN from hypotension vs. Atheroembolic injury versus compromise blood supply to kidneys.    Noted renal infarcts seen on repeat CT.  Also received IV contrast with CTA.    Still anuric.  Check PVR bladder scan x 1.  HD scheduled for today.  Will need TDC placement soon.  Continue MWF schedule for now.    AAA rupture- s/p open repair infrarenal aorta.  Ischemic bowel- s/p left colectomy, ostomy.  Shock- sepsis, hemorrhagic component with AAA rupture.   Sepsis-  blood cx with strep pyogenes previously.  On abx per primary.    Met Acidosis-  bicarb held.  Improved now with CRRT.  Now intermittent hemodialysis.  Continue bicarb " replacement.  Anemia-  prbc's prn.  Hyperkalemia-controlled with dialysis.    DVT- simran LE.  Unable to place filter, back on heparin gtt now.  Discussed with family and nursing staff.  Will follow    Garett Bazzi MD  01/08/24  16:56 EST

## 2024-01-08 NOTE — SIGNIFICANT NOTE
01/08/24 1200   Coping/Psychosocial   Observed Emotional State calm;cooperative   Verbalized Emotional State hopefulness   Trust Relationship/Rapport empathic listening provided   Family/Support Persons spouse   Involvement in Care interacting with patient   Additional Documentation Spiritual Care (Group)   Spiritual Care   Use of Spiritual Resources non-[employer]ism use of spiritual care   Spiritual Care Source  initiative   Spiritual Care Follow-Up follow-up, none required as presently assessed   Response to Spiritual Care receptive of support   Spiritual Care Interventions supportive conversation provided   Spiritual Care Visit Type initial   Receptivity to Spiritual Care visit welcomed

## 2024-01-08 NOTE — THERAPY RE-EVALUATION
Acute Care - Physical Therapy Re-Evaluation and Treatment Note   Evelina     Patient Name: Danny Savage  : 1960  MRN: [id number]  Today's Date: 2024      Visit Dx:     ICD-10-CM ICD-9-CM   1. Sepsis, due to unspecified organism, unspecified whether acute organ dysfunction present  A41.9 038.9     995.91   2. Fever, unspecified fever cause  R50.9 780.60   3. Intra-abdominal fluid collection  R18.8 789.59   4. Abdominal aortic aneurysm (AAA) without rupture, unspecified part  I71.40 441.4   5. Ruptured infrarenal abdominal aortic aneurysm (AAA)  I71.33 441.3   6. Decreased activities of daily living (ADL)  Z78.9 V49.89   7. Difficulty walking  R26.2 719.7   8. DVT, lower extremity, distal, acute, unspecified laterality  I82.4Z9 453.42   9. Dysphagia, oropharyngeal  R13.12 787.22   10. S/P Open repair of ruptured infrarenal abdominal aortic aneurysm  Z98.890 V45.89    Z86.79    11. S/P AAA (abdominal aortic aneurysm) repair  Z98.890 V45.89    Z86.79      Patient Active Problem List   Diagnosis    Left renal mass    Left flank pain    Ruptured infrarenal abdominal aortic aneurysm (AAA)    DVT, lower extremity, distal, acute, unspecified laterality    S/P Exploratory laparotomy; appendectomy; left colon resection with creation of ostomy    S/P Open repair of ruptured infrarenal abdominal aortic aneurysm     Past Medical History:   Diagnosis Date    Allergy     Aortic aneurysm     4.7 just found has not f/u    H/O Kidney stone     Hiatal hernia     Kidney mass     left    Kidney stone     Renal cancer     S/P Exploratory laparotomy; appendectomy; left colon resection with creation of ostomy 2024    S/P Open repair of ruptured infrarenal abdominal aortic aneurysm 2024     Past Surgical History:   Procedure Laterality Date    ABDOMINAL AORTIC ANEURYSM REPAIR N/A 2023    Procedure: ABDOMINAL AORTIC ANEURYSM REPAIR;  Surgeon: Koffi Agosto MD;  Location: [facility] MAIN OR;  Service: Vascular;   Laterality: N/A;    COLON RESECTION N/A 12/26/2023    Procedure: COLON RESECTION;  Surgeon: Hernan Mallory MD;  Location: [facility] MAIN OR;  Service: General;  Laterality: N/A;  EXPLORATORY LAPAROTOMY, REPAIR OF SMALL BOWEL MESENTERY, APPENDECTOMY, LEFT HEMICOLECTOMY, CREATION OF OSTOMY, ABDOMINAL CLOSURE    EXPLORATORY LAPAROTOMY N/A 12/26/2023    Procedure: LAPAROTOMY EXPLORATORY;  Surgeon: Koffi Agosto MD;  Location: [facility] MAIN OR;  Service: Vascular;  Laterality: N/A;  Exploratory Laparotomy    INTERVENTIONAL RADIOLOGY PROCEDURE N/A 1/5/2024    Procedure: IVC Filter Placement;  Surgeon: Michoacano Collins MD;  Location: [facility] CATH INVASIVE LOCATION;  Service: Vascular;  Laterality: N/A;    KIDNEY STONE SURGERY      NEPHRECTOMY PARTIAL Left 11/13/2023    Procedure: NEPHRECTOMY PARTIAL LAPAROSCOPIC WITH DAVINCI ROBOT, left;  Surgeon: Michelle Cai MD;  Location: [facility] MAIN OR;  Service: Robotics - DaVinci;  Laterality: Left;    URETEROSCOPY LASER LITHOTRIPSY WITH STENT INSERTION Left 08/18/2023    Procedure: CYSTOSCOPY URETEROSCOPY RETROGRADE PYELOGRAM STENT INSERTION, left;  Surgeon: Michelle Cai MD;  Location: [facility] MAIN OR;  Service: Urology;  Laterality: Left;     PT Assessment (last 12 hours)       PT Evaluation and Treatment       Row Name 01/08/24 1500          Physical Therapy Time and Intention    Subjective Information complains of;pain  -AV     Document Type re-evaluation;therapy note (daily note)  -AV     Mode of Treatment individual therapy;physical therapy  -AV       Row Name 01/08/24 1500          Pain    Pretreatment Pain Rating 9/10  -AV     Posttreatment Pain Rating 9/10  -AV     Pain Location - --  scortum due to increased swelling  -AV     Pain Intervention(s) --  Patient and spouse report pain medication was given prior to PT arrival  -AV       Row Name 01/08/24 1500          Bed Mobility    Comment, (Bed Mobility) Patient deferred sitting EOB due to pain from increased scrotal  swelling  -AV       Row Name 01/08/24 1500          Safety Issues, Functional Mobility    Impairments Affecting Function (Mobility) balance;endurance/activity tolerance;pain;strength  -AV       Row Name 01/08/24 1500          Hip (Therapeutic Exercise)    Hip (Therapeutic Exercise) AAROM (active assistive range of motion)  -AV     Hip AAROM (Therapeutic Exercise) bilateral;flexion;extension;supine;2 sets;10 repetitions  -AV       Row Name 01/08/24 1500          Knee (Therapeutic Exercise)    Knee (Therapeutic Exercise) AAROM (active assistive range of motion)  -AV     Knee AAROM (Therapeutic Exercise) bilateral;flexion;extension;supine;2 sets;10 repetitions  SAQ  -AV       Row Name 01/08/24 1500          Ankle (Therapeutic Exercise)    Ankle (Therapeutic Exercise) AROM (active range of motion)  -AV     Ankle AROM (Therapeutic Exercise) bilateral;dorsiflexion;plantarflexion;supine;2 sets;10 repetitions  -AV       Row Name             Wound 12/24/23 2035 midline abdomen Incision    Wound - Properties Group Placement Date: 12/24/23  - Placement Time: 2035 -MH Orientation: midline  -MH Location: abdomen  -MH Primary Wound Type: Incision  -MH    Retired Wound - Properties Group Placement Date: 12/24/23  - Placement Time: 2035 - Orientation: midline  - Location: abdomen  -MH Primary Wound Type: Incision  -MH    Retired Wound - Properties Group Date first assessed: 12/24/23  - Time first assessed: 2035 - Location: abdomen  -MH Primary Wound Type: Incision  -MH      Row Name 01/08/24 1500          Plan of Care Review    Plan of Care Reviewed With patient;spouse  -AV     Outcome Evaluation Patient continues to present with deficits in balance, strength, transfers, and ambulation. He will continue to benefit from skilled PT services to address these mobility deficits and decrease risk of falls. Continue plan of care for an additional 10 days.  -AV       Row Name 01/08/24 1500          Therapy Assessment/Plan  (PT)    Criteria for Skilled Interventions Met (PT) yes  -AV     Therapy Frequency (PT) daily  -AV     Predicted Duration of Therapy Intervention (PT) 10 days  -AV     Problem List (PT) problems related to;balance;mobility;strength;pain  -AV     Activity Limitations Related to Problem List (PT) unable to transfer safely;unable to ambulate safely  -AV       Row Name 01/08/24 1500          Physical Therapy Goals    Bed Mobility Goal Selection (PT) bed mobility, PT goal 1  -AV     Transfer Goal Selection (PT) transfer, PT goal 1  -AV     Gait Training Goal Selection (PT) gait training, PT goal 1  -AV       Row Name 01/08/24 1500          Bed Mobility Goal 1 (PT)    Activity/Assistive Device (Bed Mobility Goal 1, PT) sit to supine/supine to sit  -AV     Taney Level/Cues Needed (Bed Mobility Goal 1, PT) minimum assist (75% or more patient effort)  -AV     Time Frame (Bed Mobility Goal 1, PT) 10 days  -AV     Progress/Outcomes (Bed Mobility Goal 1, PT) goal ongoing;continuing progress toward goal  -AV       Row Name 01/08/24 1500          Transfer Goal 1 (PT)    Activity/Assistive Device (Transfer Goal 1, PT) sit-to-stand/stand-to-sit;bed-to-chair/chair-to-bed;walker, rolling  -AV     T[place] Level/Cues Needed (Transfer Goal 1, PT) minimum assist (75% or more patient effort)  -AV     Time Frame (Transfer Goal 1, PT) 10 days  -AV     Progress/Outcome (Transfer Goal 1, PT) goal ongoing;progress slower than expected;medical status inhibiting progress  -AV       Row Name 01/08/24 1512 01/08/24 1500       Gait Training Goal 1 (PT)    Activity/Assistive Device (Gait Training Goal 1, PT) gait (walking locomotion);assistive device use;walker, rolling  -AV gait (walking locomotion);assistive device use;walker, rolling  -AV    Taney Level (Gait Training Goal 1, PT) minimum assist (75% or more patient effort)  -AV minimum assist (75% or more patient effort)  -AV    Distance (Gait Training Goal 1, PT) 15  -AV 50   -AV    Time Frame (Gait Training Goal 1, PT) 10 days  -AV 10 days  -AV    Progress/Outcome (Gait Training Goal 1, PT) goal revised this date  -AV goal not met  -AV              User Key  (r) = Recorded By, (t) = Taken By, (c) = Cosigned By      Initials Name Provider Type     Shahid Howard, RN Registered Nurse    AV Amrik Sánchez, PT Physical Therapist                    Physical Therapy Education       Title: PT OT SLP Therapies (In Progress)       Topic: Physical Therapy (In Progress)       Point: Mobility training (Done)       Learning Progress Summary             Patient Acceptance, E,TB, VU by AV at 12/29/2023 1536                         Point: Home exercise program (Not Started)       Learner Progress:  Not documented in this visit.              Point: Body mechanics (Done)       Learning Progress Summary             Patient Acceptance, E,TB, VU by AV at 12/29/2023 1536                         Point: Precautions (Done)       Learning Progress Summary             Patient Acceptance, E,TB, VU by AV at 12/29/2023 1536                                         User Key       Initials Effective Dates Name Provider Type Discipline    AV 06/11/21 -  Amrik Sánchez, LEONARDA Physical Therapist PT                  PT Recommendation and Plan  Anticipated Discharge Disposition (PT): LT ([facility])  Planned Therapy Interventions (PT): balance training, bed mobility training, gait training, home exercise program, neuromuscular re-education, strengthening, transfer training  Therapy Frequency (PT): daily  Plan of Care Reviewed With: patient, spouse  Progress: no change  Outcome Evaluation: Patient continues to present with deficits in balance, strength, transfers, and ambulation. He will continue to benefit from skilled PT services to address these mobility deficits and decrease risk of falls. Continue plan of care for an additional 10 days.   Outcome Measures       Row Name 01/08/24 1500 01/07/24 1116           How much help from another person do you currently need...    Turning from your back to your side while in flat bed without using bedrails? 2  -AV 2  -DK     Moving from lying on back to sitting on the side of a flat bed without bedrails? 1  -AV 1  -DK     Moving to and from a bed to a chair (including a wheelchair)? 1  -AV 1  -DK     Standing up from a chair using your arms (e.g., wheelchair, bedside chair)? 1  -AV 1  -DK     Climbing 3-5 steps with a railing? 1  -AV 1  -DK     To walk in hospital room? 1  -AV 1  -DK     AM-PAC 6 Clicks Score (PT) 7  -AV 7  -DK     Highest Level of Mobility Goal 2 --> Bed activities/dependent transfer  -AV 2 --> Bed activities/dependent transfer  -DK        Functional Assessment    Outcome Measure Options AM-PAC 6 Clicks Basic Mobility (PT)  -AV AM-PAC 6 Clicks Basic Mobility (PT)  -DK               User Key  (r) = Recorded By, (t) = Taken By, (c) = Cosigned By      Initials Name Provider Type    Kathie Saldaña, PTA Physical Therapist Assistant    AV Amrik Sánchez, PT Physical Therapist                     Time Calculation:    PT Charges       Row Name 01/08/24 1504             Time Calculation    PT Received On 01/08/24  -AV      PT Goal Re-Cert Due Date 01/16/24  -AV         Timed Charges    43649 - PT Therapeutic Exercise Minutes 18  -AV         Untimed Charges    PT Eval/Re-eval Minutes 10  -AV         Total Minutes    Timed Charges Total Minutes 18  -AV      Untimed Charges Total Minutes 10  -AV       Total Minutes 28  -AV                User Key  (r) = Recorded By, (t) = Taken By, (c) = Cosigned By      Initials Name Provider Type    AV Amrik Sánchez, PT Physical Therapist                  Therapy Charges for Today       Code Description Service Date Service Provider Modifiers Qty    10320049992 HC PT THER PROC EA 15 MIN 1/8/2024 Amrik Sánchez, PT GP 1    85639259473 HC PT RE-EVAL ESTABLISHED PLAN 2 1/8/2024 Amrik Sánchez, PT GP 1            PT  G-Codes  Outcome Measure Options: AM-PAC 6 Clicks Basic Mobility (PT)  AM-PAC 6 Clicks Score (PT): 7  AM-PAC 6 Clicks Score (OT): 12    Amrik Sánchez, PT  1/8/2024

## 2024-01-08 NOTE — PROGRESS NOTES
Pulmonary / Critical Care Progress Note      Patient Name: Danny Savage  : 1960  MRN: [id number]  Attending:  Arpit Franks MD   Date of admission: 2023    Subjective   Subjective   Patient critically ill with ruptured AAA s/p open repair, hemorrhagic shock, Streptococcus pyogenes bacteremia    Over the past 24 hours patient remains in the ICU, off vasopressors, hemoglobin stable, on heparin drip for bilateral DVTs and clot in IVC, received hemodialysis via left femoral HD cath, did have some nausea given Zofran and Compazine    No acute events overnight  This morning  Patient is awake, core track in place, receiving trickle tube feeds, jaundice on exam, bilirubin still remains at 9, complains of abdominal tenderness, colostomy with good output  Wife at bedside  Poor appetite  Extremely weak and fatigued    Objective   Objective     Vitals:   Vital signs for last 24 hours:  Temp:  [97.4 °F (36.3 °C)-98.6 °F (37 °C)] 98 °F (36.7 °C)  Heart Rate:  [84-96] 89  Resp:  [13-22] 20  BP: ()/(59-79) 125/72    Intake/Output last 3 shifts:  I/O last 3 completed shifts:  In: 2253 [P.O.:380; I.V.:908; Other:215; NG/GT:750]  Out: 150 [Stool:150]    Physical Exam   Vital Signs Reviewed   General:  Alert, NAD.  Weak, fatigued, chronically ill-appearing male  HEENT:  PERRL, EOMI.    Neck:  No JVD, no thyromegaly  Lymph: no axillary, cervical, supraclavicular lymphadenopathy noted bilaterally  Chest:  Clear to auscultation bilaterally, no work of breathing noted on 2 L nasal cannula  CV: RRR, no M/G/R, pulses 2+  Abd:  Soft, NT, ND, +BS, colostomy in place  EXT:  no clubbing, no cyanosis, no edema  Neuro:  A&Ox3, CN grossly intact, no focal deficits.  Skin: No rashes or lesions noted    Result Review    I have personally reviewed the results from the time of this admission to 2024 11:04 EST and agree with these findings:  [x]  Laboratory  [x]  Microbiology  [x]  Radiology  [x]  EKG/Telemetry   [x]   Cardiology/Vascular   []  Pathology  []  Old records  []  Other:  Most notable findings include:       Lab 01/08/24  0548 01/07/24  0254 01/06/24  1149 01/06/24  0548 01/06/24  0221 01/05/24  2358 01/05/24  1742 01/05/24  1214 01/05/24  1156 01/05/24  0433 01/04/24  1148 01/04/24  0602 01/04/24  0152 01/04/24  0024 01/03/24  1640 01/03/24  1147 01/03/24  0358 01/03/24  0009 01/02/24  1422 01/02/24  0453 01/02/24  0406   0000   WBC 12.51* 15.46*  --   --  15.93*  --   --   --   --  18.83*  --  26.10*  --   --   --   --  29.36*  --   --  43.46*  --   --    HEMOGLOBIN 8.3* 9.4*  --   --  8.4*  --   --   --   --  8.3*  --  8.7* 9.1*  --  7.0*  --  7.4*  --   --  7.1*  --    < >   HEMATOCRIT 25.3* 29.5*  --   --  26.1*  --   --   --   --  26.4*  --  26.5* 27.6*  --  21.3*  --  22.4*  --   --  21.8*  --    < >   PLATELETS 322 330  --   --  284  --   --   --   --  272  --  296  --   --   --   --  344  --   --  423  --   --    SODIUM 132* 133* 135* 134* 136 136 137  --  135* 136   < > 138  138  --    < > 134*   < > 134* 134*   < >  --  134*  --    SODIUM, ARTERIAL  --   --   --   --   --   --   --    < >  --   --   --   --   --   --   --   --   --   --    < >  --   --   --    POTASSIUM 4.8 5.5* 4.5 4.6 4.6 4.7 4.6  --  4.5 5.3*   < > 4.8  4.8  --    < > 4.5   < > 4.8 4.9   < >  --  6.2*  --    CHLORIDE 96* 98 99 99 102 101 102  --  101 101   < > 101  101  --    < > 98   < > 99 98   < >  --  95*  --    CO2 20.2* 17.9* 19.5* 19.0* 18.0* 19.2* 18.7*  --  19.1* 18.8*   < > 18.7*  18.7*  --    < > 19.5*   < > 19.1* 18.2*   < >  --  12.9*  --    BUN 56* 64* 49* 43* 43* 43* 46*  --  46* 41*   < > 52*  52*  --    < > 57*   < > 58* 67*   < >  --  75*  --    CREATININE 3.89* 4.05* 3.20* 2.85* 2.91* 2.97* 3.23*  --  3.26* 3.01*   < > 3.31*  3.31*  --    < > 3.74*   < > 4.49* 4.65*   < >  --  5.77*  --    GLUCOSE 100* 83 141* 125* 140* 124* 88  --  193* 82   < > 126*  126*  --    < > 200*   < > 258* 243*   < >  --  55*  --     GLUCOSE, ARTERIAL  --   --   --   --   --   --   --    < >  --   --   --   --   --   --   --   --   --   --    < >  --   --   --    CALCIUM 7.9* 7.8* 7.8* 7.7* 7.5* 7.8* 7.5*  --  7.6* 7.8*   < > 7.7*  7.7*  --    < > 7.4*   < > 6.8* 6.7*   < >  --  7.3*  --    PHOSPHORUS 5.4* 5.8* 4.6* 4.9*  --  5.1* 5.1*  --  5.2* 5.2*   < > 4.8*  --    < > 4.1   < > 5.1* 5.5*   < >  --  9.3*  --    TOTAL PROTEIN 6.9  --   --   --  6.9  --   --   --   --  6.6  --  6.8  --   --   --   --  6.2 6.2  --   --  6.4  --    ALBUMIN 2.6*  --  2.9* 2.8* 2.8* 2.8* 2.7*  --  2.8* 2.9*   < > 3.0*  3.0*  --    < > 3.1*   < > 3.0* 3.0*   < >  --  2.5*  --    GLOBULIN 4.3  --   --   --  4.1  --   --   --   --  3.7  --  3.8  --   --   --   --  3.2 3.2  --   --  3.9  --     < > = values in this interval not displayed.       Assessment & Plan   Assessment / Plan     Active Hospital Problems:  Active Hospital Problems    Diagnosis     **Left flank pain     S/P Exploratory laparotomy; appendectomy; left colon resection with creation of ostomy     S/P Open repair of ruptured infrarenal abdominal aortic aneurysm     Ruptured infrarenal abdominal aortic aneurysm (AAA)     DVT, lower extremity, distal, acute, unspecified laterality      Impression:  Ruptured infrarenal AAA s/p open repair  Acute blood loss anemia  Acute DVT with plan for IVC filter  Intra-abdominal hematoma  Hemorrhagic shock  Hypovolemic shock  Acute hypoxic respiratory failure requiring mechanical ventilation  Septic shock with Streptococcus pyogenes bacteremia  Moraxella pneumonia  Ischemic sigmoid colon s/p colostomy  History of RCC s/p partial nephrectomy on 11/13  Acute kidney injury secondary to acute tubular necrosis requiring renal replacement therapy  Clinically significant lactic acidosis  Chronically immunosuppressed on CellCept  Hypocalcemia  Hypokalemia resolved now with hyperkalemia  SVT  Lactic acidosis, clinically significant     Plan:  -Patient currently on room  air  -Continue heparin drip, unable to obtain IVC filter due to IVC thrombus/bilateral DVTs  -Monitor for any signs of bleeding, hemoglobin stable  -Continue midodrine 5 mg 3 times daily  -Trazodone decreased to as needed, will reduce Seroquel to 25 nightly  -Continue delirium precautions  -Okay to dangle on side of bed  -PT/OT/speech  -Will check right upper quadrant ultrasound, check direct/indirect bilirubin  -Core track in place, tube feeds per dietitian, changing formula today  -Cleared per speech therapy however not meeting nutritional needs  -Nephrology following, HD per their service  -Receiving intermittent dialysis, planning for TDC  -As needed Percocet and fentanyl for pain  -Continue Unasyn to complete 14 days of therapy for bacteremia  -Hold home chronic immunosuppression CellCept indefinitely  -Patient would benefit from LTAC    PUP PPx: Protonix  DVT prophylaxis: Planning for IVC filter    Medical and mechanical DVT prophylaxis orders are present.    CODE STATUS:   Level Of Support Discussed With: Patient  Code Status (Patient has no pulse and is not breathing): CPR (Attempt to Resuscitate)  Medical Interventions (Patient has pulse or is breathing): Full Support      Liv ENG APRN, spent 15 minutes critical care time in accordance to split shared billing.    Patient is critically ill with acute blood loss anemia, acute DVT, bacteremia, hyperbilirubinemia, in the setting of AAA rupture and repair and ESRD on HD. 35 minutes of critical care time was spent managing this patient, excluding procedures. Of this time, Dr. Reno ENG, spent 20 minutes and Liv Gomez NP spent 15 minutes in accordance with split shared billing. This included personally reviewing all pertinent labs, imaging, microbiology and documentation. Also discussing the case with the patient and any available family, the admitting physician and any available ancillary staff.   Electronically signed by Reno Jeffery MD,  01/08/24, 4:44 PM EST.

## 2024-01-09 ENCOUNTER — APPOINTMENT (OUTPATIENT)
Dept: MRI IMAGING | Facility: HOSPITAL | Age: 64
DRG: 268 | End: 2024-01-09
Payer: COMMERCIAL

## 2024-01-09 LAB
ALBUMIN SERPL-MCNC: 2.6 G/DL (ref 3.5–5.2)
ALP SERPL-CCNC: 139 U/L (ref 39–117)
ALT SERPL W P-5'-P-CCNC: 119 U/L (ref 1–41)
ANION GAP SERPL CALCULATED.3IONS-SCNC: 17.1 MMOL/L (ref 5–15)
APTT PPP: 108.5 SECONDS (ref 78–95.9)
APTT PPP: 109.4 SECONDS (ref 78–95.9)
APTT PPP: 119.1 SECONDS (ref 78–95.9)
AST SERPL-CCNC: 129 U/L (ref 1–40)
BASOPHILS # BLD AUTO: 0.03 10*3/MM3 (ref 0–0.2)
BASOPHILS NFR BLD AUTO: 0.3 % (ref 0–1.5)
BILIRUB CONJ SERPL-MCNC: 8.5 MG/DL (ref 0–0.3)
BILIRUB INDIRECT SERPL-MCNC: 1.8 MG/DL
BILIRUB SERPL-MCNC: 10.3 MG/DL (ref 0–1.2)
BUN SERPL-MCNC: 47 MG/DL (ref 8–23)
BUN/CREAT SERPL: 13.4 (ref 7–25)
CALCIUM SPEC-SCNC: 7.9 MG/DL (ref 8.6–10.5)
CHLORIDE SERPL-SCNC: 94 MMOL/L (ref 98–107)
CO2 SERPL-SCNC: 21.9 MMOL/L (ref 22–29)
CREAT SERPL-MCNC: 3.51 MG/DL (ref 0.76–1.27)
DEPRECATED RDW RBC AUTO: 57.2 FL (ref 37–54)
EGFRCR SERPLBLD CKD-EPI 2021: 18.5 ML/MIN/1.73
EOSINOPHIL # BLD AUTO: 0.1 10*3/MM3 (ref 0–0.4)
EOSINOPHIL NFR BLD AUTO: 0.9 % (ref 0.3–6.2)
ERYTHROCYTE [DISTWIDTH] IN BLOOD BY AUTOMATED COUNT: 19.9 % (ref 12.3–15.4)
GLUCOSE BLDC GLUCOMTR-MCNC: 81 MG/DL (ref 70–99)
GLUCOSE BLDC GLUCOMTR-MCNC: 82 MG/DL (ref 70–99)
GLUCOSE BLDC GLUCOMTR-MCNC: 89 MG/DL (ref 70–99)
GLUCOSE BLDC GLUCOMTR-MCNC: 91 MG/DL (ref 70–99)
GLUCOSE SERPL-MCNC: 89 MG/DL (ref 65–99)
HCT VFR BLD AUTO: 27.2 % (ref 37.5–51)
HGB BLD-MCNC: 8.7 G/DL (ref 13–17.7)
IMM GRANULOCYTES # BLD AUTO: 0.13 10*3/MM3 (ref 0–0.05)
IMM GRANULOCYTES NFR BLD AUTO: 1.2 % (ref 0–0.5)
LYMPHOCYTES # BLD AUTO: 0.63 10*3/MM3 (ref 0.7–3.1)
LYMPHOCYTES NFR BLD AUTO: 5.6 % (ref 19.6–45.3)
MAGNESIUM SERPL-MCNC: 1.9 MG/DL (ref 1.6–2.4)
MCH RBC QN AUTO: 29.3 PG (ref 26.6–33)
MCHC RBC AUTO-ENTMCNC: 32 G/DL (ref 31.5–35.7)
MCV RBC AUTO: 91.6 FL (ref 79–97)
MONOCYTES # BLD AUTO: 0.88 10*3/MM3 (ref 0.1–0.9)
MONOCYTES NFR BLD AUTO: 7.8 % (ref 5–12)
NEUTROPHILS NFR BLD AUTO: 84.2 % (ref 42.7–76)
NEUTROPHILS NFR BLD AUTO: 9.51 10*3/MM3 (ref 1.7–7)
NRBC BLD AUTO-RTO: 0 /100 WBC (ref 0–0.2)
PHOSPHATE SERPL-MCNC: 4.9 MG/DL (ref 2.5–4.5)
PLATELET # BLD AUTO: 366 10*3/MM3 (ref 140–450)
PMV BLD AUTO: 10 FL (ref 6–12)
POTASSIUM SERPL-SCNC: 4.4 MMOL/L (ref 3.5–5.2)
PROT SERPL-MCNC: 6.9 G/DL (ref 6–8.5)
RBC # BLD AUTO: 2.97 10*6/MM3 (ref 4.14–5.8)
SODIUM SERPL-SCNC: 133 MMOL/L (ref 136–145)
WBC NRBC COR # BLD AUTO: 11.28 10*3/MM3 (ref 3.4–10.8)

## 2024-01-09 PROCEDURE — 83735 ASSAY OF MAGNESIUM: CPT | Performed by: STUDENT IN AN ORGANIZED HEALTH CARE EDUCATION/TRAINING PROGRAM

## 2024-01-09 PROCEDURE — 25010000002 HEPARIN (PORCINE) 25000-0.45 UT/250ML-% SOLUTION: Performed by: SURGERY

## 2024-01-09 PROCEDURE — 80048 BASIC METABOLIC PNL TOTAL CA: CPT | Performed by: INTERNAL MEDICINE

## 2024-01-09 PROCEDURE — 85730 THROMBOPLASTIN TIME PARTIAL: CPT | Performed by: FAMILY MEDICINE

## 2024-01-09 PROCEDURE — 99024 POSTOP FOLLOW-UP VISIT: CPT | Performed by: SURGERY

## 2024-01-09 PROCEDURE — 82948 REAGENT STRIP/BLOOD GLUCOSE: CPT

## 2024-01-09 PROCEDURE — 85730 THROMBOPLASTIN TIME PARTIAL: CPT | Performed by: INTERNAL MEDICINE

## 2024-01-09 PROCEDURE — 25010000002 ONDANSETRON PER 1 MG: Performed by: SURGERY

## 2024-01-09 PROCEDURE — 25010000002 AMPICILLIN PER 500 MG: Performed by: INTERNAL MEDICINE

## 2024-01-09 PROCEDURE — 74181 MRI ABDOMEN W/O CONTRAST: CPT

## 2024-01-09 PROCEDURE — 25010000002 PROCHLORPERAZINE 10 MG/2ML SOLUTION: Performed by: NURSE PRACTITIONER

## 2024-01-09 PROCEDURE — 84100 ASSAY OF PHOSPHORUS: CPT | Performed by: INTERNAL MEDICINE

## 2024-01-09 PROCEDURE — 97168 OT RE-EVAL EST PLAN CARE: CPT

## 2024-01-09 PROCEDURE — 80076 HEPATIC FUNCTION PANEL: CPT | Performed by: FAMILY MEDICINE

## 2024-01-09 PROCEDURE — 99291 CRITICAL CARE FIRST HOUR: CPT | Performed by: STUDENT IN AN ORGANIZED HEALTH CARE EDUCATION/TRAINING PROGRAM

## 2024-01-09 PROCEDURE — 85730 THROMBOPLASTIN TIME PARTIAL: CPT | Performed by: STUDENT IN AN ORGANIZED HEALTH CARE EDUCATION/TRAINING PROGRAM

## 2024-01-09 PROCEDURE — 85025 COMPLETE CBC W/AUTO DIFF WBC: CPT | Performed by: SURGERY

## 2024-01-09 PROCEDURE — 97110 THERAPEUTIC EXERCISES: CPT

## 2024-01-09 PROCEDURE — 99291 CRITICAL CARE FIRST HOUR: CPT | Performed by: INTERNAL MEDICINE

## 2024-01-09 RX ADMIN — OXYCODONE AND ACETAMINOPHEN 1 TABLET: 10; 325 TABLET ORAL at 16:16

## 2024-01-09 RX ADMIN — ONDANSETRON 4 MG: 2 INJECTION INTRAMUSCULAR; INTRAVENOUS at 16:16

## 2024-01-09 RX ADMIN — HEPARIN SODIUM 17 UNITS/KG/HR: 10000 INJECTION, SOLUTION INTRAVENOUS at 04:03

## 2024-01-09 RX ADMIN — MIDODRINE HYDROCHLORIDE 5 MG: 5 TABLET ORAL at 08:13

## 2024-01-09 RX ADMIN — MIDODRINE HYDROCHLORIDE 5 MG: 5 TABLET ORAL at 17:48

## 2024-01-09 RX ADMIN — DOCUSATE SODIUM 50MG AND SENNOSIDES 8.6MG 2 TABLET: 8.6; 5 TABLET, FILM COATED ORAL at 08:13

## 2024-01-09 RX ADMIN — PROCHLORPERAZINE EDISYLATE 2.5 MG: 5 INJECTION INTRAMUSCULAR; INTRAVENOUS at 02:37

## 2024-01-09 RX ADMIN — SODIUM BICARBONATE 650 MG TABLET 650 MG: at 17:48

## 2024-01-09 RX ADMIN — Medication 10 ML: at 20:24

## 2024-01-09 RX ADMIN — SODIUM BICARBONATE 650 MG TABLET 650 MG: at 20:23

## 2024-01-09 RX ADMIN — MIDODRINE HYDROCHLORIDE 5 MG: 5 TABLET ORAL at 11:11

## 2024-01-09 RX ADMIN — AMPICILLIN SODIUM 2 G: 2 INJECTION, POWDER, FOR SOLUTION INTRAMUSCULAR; INTRAVENOUS at 20:10

## 2024-01-09 RX ADMIN — HEPARIN SODIUM 17 UNITS/KG/HR: 10000 INJECTION, SOLUTION INTRAVENOUS at 19:37

## 2024-01-09 RX ADMIN — Medication 10 MG: at 20:23

## 2024-01-09 RX ADMIN — PANTOPRAZOLE SODIUM 40 MG: 40 INJECTION, POWDER, FOR SOLUTION INTRAVENOUS at 08:13

## 2024-01-09 RX ADMIN — ESCITALOPRAM OXALATE 5 MG: 10 TABLET ORAL at 08:13

## 2024-01-09 RX ADMIN — Medication 10 ML: at 08:14

## 2024-01-09 RX ADMIN — OXYCODONE AND ACETAMINOPHEN 1 TABLET: 5; 325 TABLET ORAL at 06:31

## 2024-01-09 RX ADMIN — QUETIAPINE FUMARATE 25 MG: 25 TABLET ORAL at 20:23

## 2024-01-09 RX ADMIN — SODIUM BICARBONATE 650 MG TABLET 650 MG: at 08:13

## 2024-01-09 NOTE — NURSING NOTE
Visited with mr Savage in his room in the CCU. He is working with PT at this time. His wife tells me they are having issues with tube feedings and they are on hold for now. She tells me the discharge plans remain to go to LTAC in [place].   Will continue to monitor and provided emotional support to family as well.   Daisy RIVERA RN, BSN  Palliative Care

## 2024-01-09 NOTE — PROGRESS NOTES
" LOS: 17 days   Patient Care Team:  Kodi Pichardo MD as PCP - General (Internal Medicine)  Lisset Harrington APRN as Nurse Practitioner (Urology)    Chief Complaint: ROLAND    Subjective     History of Present Illness  Awake, alert sitting good bed, no major events since yesterday.      Off pressors.  Anuric.    Subjective:  Symptoms:  Improved.  No shortness of breath, chest pain, chest pressure or anxiety.    Diet:  Poor intake.  No nausea or vomiting.      History taken from: chart family    Objective     Vital Sign Min/Max for last 24 hours  Temp  Min: 97.5 °F (36.4 °C)  Max: 98.1 °F (36.7 °C)   BP  Min: 81/56  Max: 137/66   Pulse  Min: 82  Max: 95   Resp  Min: 12  Max: 18   SpO2  Min: 90 %  Max: 97 %   Flow (L/min)  Min: 2  Max: 2   Weight  Min: 77.7 kg (171 lb 3.2 oz)  Max: 77.7 kg (171 lb 3.2 oz)     Flowsheet Rows      Flowsheet Row First Filed Value   Admission Height 177.8 cm (70\") Documented at 12/23/2023 1948   Admission Weight 79.9 kg (176 lb 2.4 oz) Documented at 12/23/2023 1948            No intake/output data recorded.  I/O last 3 completed shifts:  In: 1554 [P.O.:100; I.V.:908; Other:134; NG/GT:412]  Out: 1395 [Other:100]    Objective:  General Appearance:  Comfortable, ill-appearing and in no acute distress.    Vital signs: (most recent): Blood pressure 114/66, pulse 85, temperature 98.1 °F (36.7 °C), temperature source Oral, resp. rate 16, height 177.8 cm (70\"), weight 77.7 kg (171 lb 3.2 oz), SpO2 93%.  Vital signs are normal.  No fever.    Output: No urine output and producing stool.    HEENT: Normal HEENT exam.    Lungs:  Normal effort and normal respiratory rate.  Breath sounds clear to auscultation.  He is not in respiratory distress.    Heart: Normal rate.  Regular rhythm.    Abdomen: Abdomen is soft.  (Deep palpation not done.  Midline incision covered.  Colostomy left lower quadrant noted.).  Hypoactive bowel sounds.     Extremities: There is dependent edema.    Pulses: Distal " "pulses are intact.  (Left femoral Shiley in place.)    Neurological: Patient is alert and oriented to person, place and time.    Pupils:  Pupils are equal, round, and reactive to light.    Skin:  Warm and dry.              Results Review:     I reviewed the patient's new clinical results.    WBC WBC   Date Value Ref Range Status   01/09/2024 11.28 (H) 3.40 - 10.80 10*3/mm3 Final   01/08/2024 12.51 (H) 3.40 - 10.80 10*3/mm3 Final   01/07/2024 15.46 (H) 3.40 - 10.80 10*3/mm3 Final      HGB Hemoglobin   Date Value Ref Range Status   01/09/2024 8.7 (L) 13.0 - 17.7 g/dL Final   01/08/2024 8.3 (L) 13.0 - 17.7 g/dL Final   01/07/2024 9.4 (L) 13.0 - 17.7 g/dL Final      HCT Hematocrit   Date Value Ref Range Status   01/09/2024 27.2 (L) 37.5 - 51.0 % Final   01/08/2024 25.3 (L) 37.5 - 51.0 % Final   01/07/2024 29.5 (L) 37.5 - 51.0 % Final      Platlets No results found for: \"LABPLAT\"   MCV MCV   Date Value Ref Range Status   01/09/2024 91.6 79.0 - 97.0 fL Final   01/08/2024 91.7 79.0 - 97.0 fL Final   01/07/2024 93.7 79.0 - 97.0 fL Final          Sodium Sodium   Date Value Ref Range Status   01/09/2024 133 (L) 136 - 145 mmol/L Final   01/08/2024 132 (L) 136 - 145 mmol/L Final   01/07/2024 133 (L) 136 - 145 mmol/L Final   01/06/2024 135 (L) 136 - 145 mmol/L Final      Potassium Potassium   Date Value Ref Range Status   01/09/2024 4.4 3.5 - 5.2 mmol/L Final   01/08/2024 4.8 3.5 - 5.2 mmol/L Final   01/07/2024 5.5 (H) 3.5 - 5.2 mmol/L Final     Comment:     Slight hemolysis detected by analyzer. Result may be falsely elevated.   01/06/2024 4.5 3.5 - 5.2 mmol/L Final      Chloride Chloride   Date Value Ref Range Status   01/09/2024 94 (L) 98 - 107 mmol/L Final   01/08/2024 96 (L) 98 - 107 mmol/L Final   01/07/2024 98 98 - 107 mmol/L Final   01/06/2024 99 98 - 107 mmol/L Final      CO2 CO2   Date Value Ref Range Status   01/09/2024 21.9 (L) 22.0 - 29.0 mmol/L Final   01/08/2024 20.2 (L) 22.0 - 29.0 mmol/L Final   01/07/2024 17.9 " "(L) 22.0 - 29.0 mmol/L Final   01/06/2024 19.5 (L) 22.0 - 29.0 mmol/L Final      BUN BUN   Date Value Ref Range Status   01/09/2024 47 (H) 8 - 23 mg/dL Final   01/08/2024 56 (H) 8 - 23 mg/dL Final   01/07/2024 64 (H) 8 - 23 mg/dL Final   01/06/2024 49 (H) 8 - 23 mg/dL Final      Creatinine Creatinine   Date Value Ref Range Status   01/09/2024 3.51 (H) 0.76 - 1.27 mg/dL Final   01/08/2024 3.89 (H) 0.76 - 1.27 mg/dL Final   01/07/2024 4.05 (H) 0.76 - 1.27 mg/dL Final   01/06/2024 3.20 (H) 0.76 - 1.27 mg/dL Final      Calcium Calcium   Date Value Ref Range Status   01/09/2024 7.9 (L) 8.6 - 10.5 mg/dL Final   01/08/2024 7.9 (L) 8.6 - 10.5 mg/dL Final   01/07/2024 7.8 (L) 8.6 - 10.5 mg/dL Final   01/06/2024 7.8 (L) 8.6 - 10.5 mg/dL Final      PO4 No results found for: \"CAPO4\"   Albumin Albumin   Date Value Ref Range Status   01/09/2024 2.6 (L) 3.5 - 5.2 g/dL Final   01/08/2024 2.6 (L) 3.5 - 5.2 g/dL Final   01/06/2024 2.9 (L) 3.5 - 5.2 g/dL Final      Magnesium Magnesium   Date Value Ref Range Status   01/09/2024 1.9 1.6 - 2.4 mg/dL Final   01/08/2024 2.2 1.6 - 2.4 mg/dL Final   01/07/2024 2.6 (H) 1.6 - 2.4 mg/dL Final   01/06/2024 2.6 (H) 1.6 - 2.4 mg/dL Final      Uric Acid No results found for: \"URICACID\"     Medication Review:   ampicillin, 2 g, Intravenous, Q24H  escitalopram, 5 mg, Nasogastric, Daily  melatonin, 10 mg, Nasogastric, Nightly  midodrine, 5 mg, Nasogastric, TID AC  pantoprazole, 40 mg, Intravenous, Q12H  QUEtiapine, 25 mg, Nasogastric, Nightly  senna-docusate sodium, 2 tablet, Nasogastric, BID  sodium bicarbonate, 650 mg, Nasogastric, TID  sodium chloride, 10 mL, Intravenous, Q12H          Assessment & Plan       Left flank pain    Ruptured infrarenal abdominal aortic aneurysm (AAA)    DVT, lower extremity, distal, acute, unspecified laterality    S/P Exploratory laparotomy; appendectomy; left colon resection with creation of ostomy    S/P Open repair of ruptured infrarenal abdominal aortic " aneurysm      Assessment & Plan  Anuric ROLAND-  pt with previous partial left nephrectomy and baseline creatinine closer to 0.9-1.0.  Had noted hypotension with AAA rupture s/p repair.  Likely ATN from hypotension vs. Atheroembolic injury versus compromise blood supply to kidneys.    Noted renal infarcts seen on repeat CT.  Also received IV contrast with CTA.    Still anuric. Will need TDC placement soon.  Continue MWF schedule for now.    AAA rupture- s/p open repair infrarenal aorta.  Ischemic bowel- s/p left colectomy, ostomy.  Shock- sepsis, hemorrhagic component with AAA rupture.   Sepsis-  blood cx with strep pyogenes previously.  On abx per primary.    Met Acidosis-  bicarb held.  Improved now with CRRT.  Now intermittent hemodialysis.  Continue bicarb replacement.  Anemia-  prbc's prn.  Hyperkalemia-controlled with dialysis.    DVT- simran LE.  Unable to place filter, back on heparin gtt now.    Adin Hinton MD  01/09/24  07:38 EST

## 2024-01-09 NOTE — PROGRESS NOTES
[facility]   Hospitalist Progress Note  Date: 2024  Patient Name: Danny Savage  : 1960  MRN: [id number]  Date of admission: 2023      Subjective   Subjective     Chief Complaint: Follow-up postop seroma    Summary:Danny Savage is a 63 y.o. male  presented to the hospital with complaints of left lower back pain and fever x 3 days, at that time patient was status post nephrectomy and there was concern for postoperative seroma/hematoma, patient was discharged home but pain began getting worse 10 out of 10 intensity on repeat presentation patient was febrile to 102.6, repeat CT scan showed likely postoperative seroma/hematoma although infection could not be ruled out, there was an abdominal aortic aneurysm of 5.1 cm.  The patient was started on IV antibiotics and patient was admitted for possible abscess in the nephrectomy bed, patient went unresponsive on 2023, blood pressure was 42/32, patient was pale and clammy, CODE BLUE was called, patient received IV fluids, patient was intubated, he was found to have expanding AAA with eccentric mural thrombus/hematoma measuring 5.1 cm, vascular surgeon consulted and patient underwent urgent open repair of ruptured infrarenal AAA with tube graft and mobilization of the omentum, patient taken back to the OR on 2023 to look for sources of blood loss, he was found to have nonviable colon and patient underwent partial colectomy by Dr. Mallory.  Patient has had prolonged course with continued anemia, repeat scans concerning for worsening hematoma, patient was found to have right lower extremity DVT.  Patient initially started on heparin drip though hemoglobin trended down sharply concerning for occult bleed.  Heparin drip stopped and hemoglobin stabilized.  Patient's blood pressure did not tolerate hemodialysis and patient had to be switched to CRRT.  Patient remains on antibiotics but repeat blood cultures are negative.    Patient  able to be weaned off of pressors.  Vascular surgery planned on placing IVC filter though clot was found in the pararenal IVC on venogram and procedure was aborted.  Patient started back on heparin drip no bolus.  Able to transition from CRRT to intermittent hemodialysis with ultrafiltration.  Having remittent issues with nausea and vomiting.  Good ostomy output.  Right upper quadrant ultrasound demonstrated cholelithiasis without cholecystitis.  Liver unremarkable.  Tube feeds restarted slowly.  Patient and family hoping to discharge to LTAC at [facility] once stable    Interval Followup: Patient lying in bed appears to be resting fairly comfortably.  Patient's main complaint today is discomfort with abdominal binder.  Discussed the importance of abdominal binder taking the pressure off of staple line to allow for healing.  Nausea and vomiting improving overnight.  Currently n.p.o. for right upper quadrant ultrasound.  Afebrile overnight.  Sinus rhythm 80s to 90s on telemetry reviewed.  Blood pressure stable during the day became hypotensive during hemodialysis this evening bolused IV saline.  Remains off of Levophed and vasopressin.  Satting well on room air.  No urine output recorded this shift.  Serum potassium normal limits this morning.  Bicarb remains low but proved.  Creatinine trending down following dialysis.  ALT AST remains elevated.  Total bilirubin remains elevated.  Heparin drip therapeutic.   Leukocytosis improved.  Hemoglobin pending down.  Platelet count stable.  Repeat blood cultures negative to date.  No other issues per nursing..    Review of Systems  Constitutional: Negative for fatigue and fever.   HENT: Positive for sore throat and trouble swallowing.    Eyes: Negative for pain and discharge.   Respiratory: Negative for cough and shortness of breath.    Cardiovascular: Negative for chest pain and palpitations.   Gastrointestinal: Positive for abdominal pain, negative nausea and  vomiting.   Endocrine: Negative for cold intolerance and heat intolerance.   Genitourinary: Negative for difficulty urinating and dysuria.   Musculoskeletal: Negative for back pain and neck stiffness.   Skin: Negative for color change and rash.   Neurological: Negative for syncope and headaches.   Hematological: Negative for adenopathy.   Psychiatric/Behavioral: Negative for confusion and hallucinations.    Objective   Objective     Vitals:   Temp:  [97.7 °F (36.5 °C)-98.6 °F (37 °C)] 97.9 °F (36.6 °C)  Heart Rate:  [85-96] 87  Resp:  [12-20] 15  BP: ()/(56-81) 108/70  Physical Exam   General: well-developed appearing stated age in no acute distress  HEENT: Normocephalic atraumatic moist membranes pupils equal round reactive light, no scleral icterus no conjunctival injection  Cardiovascular: regular rate and rhythm no murmurs rubs or gallops S1-S2, no lower extremity edema appreciated  Pulmonary: Crackles bilateral posterior lung fields no wheezes or rhonchi symmetric chest expansion, unlabored, no conversational dyspnea appreciated  Gastrointestinal: Soft nondistended positive bowel sounds all 4 quadrants no rebound or guarding,  midline incision well-approximated without erythema or drainage, colostomy in left lower quadrant   Musculoskeletal: No clubbing cyanosis, warm and well-perfused, calves soft symmetric nontender bilaterally  Skin: Clean dry without rashes  Neuro: Cranial nerves II through XII intact grossly no sensorimotor deficits appreciated bilateral upper and lower extremities  Psych: Patient is calm cooperative and appropriate with exam not responding to internal stimuli  : No Rico catheter no bladder distention no suprapubic tenderness    Result Review    Result Review:  I have personally reviewed these results and agree with these findings:  [x]  Laboratory  LAB RESULTS:      Lab 01/08/24  1511 01/08/24  0854 01/08/24  0642 01/08/24  0548 01/07/24  0444 01/07/24  0254 01/06/24  0221  01/05/24  2039 01/05/24  1443 01/05/24  1214 01/05/24  0434 01/05/24  0433 01/04/24  0602 01/03/24  0814 01/03/24  0358 01/02/24  0453 01/02/24  0406   WBC  --   --   --  12.51*  --  15.46* 15.93*  --   --   --   --  18.83* 26.10*  --  29.36*   < >  --    HEMOGLOBIN  --   --   --  8.3*  --  9.4* 8.4*  --   --   --   --  8.3* 8.7*   < > 7.4*   < >  --    HEMATOCRIT  --   --   --  25.3*  --  29.5* 26.1*  --   --   --   --  26.4* 26.5*   < > 22.4*   < >  --    PLATELETS  --   --   --  322  --  330 284  --   --   --   --  272 296  --  344   < >  --    NEUTROS ABS  --   --   --  10.63*  --   --  14.15*  --   --   --   --   --  21.17*  --  26.72*  --   --    IMMATURE GRANS (ABS)  --   --   --  0.17*  --   --  0.24*  --   --   --   --   --  1.81*  --   --   --   --    LYMPHS ABS  --   --   --  0.74  --   --  0.58*  --   --   --   --   --  1.21  --   --   --   --    MONOS ABS  --   --   --  0.85  --   --  0.84  --   --   --   --   --  1.72*  --   --   --   --    EOS ABS  --   --   --  0.08  --   --  0.06  --   --   --   --   --  0.06  --   --   --   --    MCV  --   --   --  91.7  --  93.7 92.9  --   --   --   --  93.6 91.1  --  88.2   < >  --    PROCALCITONIN  --   --   --   --   --   --   --   --   --   --   --   --   --   --   --   --  11.77*   LACTATE  --   --   --  1.0  --   --  1.2  --   --   --   --  1.2 1.0  --   --    < >  --    LACTATE, ARTERIAL  --   --   --   --   --   --   --   --   --  1.11  --   --   --   --   --    < >  --    PROTIME  --   --   --   --   --  18.1* 20.4*  --  20.4*  --  19.3*  --  17.9*   < >  --   --   --    APTT 93.3 90.4 50.3* 112.4* 79.6  --  97.1*   < >  --   --  38.9*  --  32.9   < >  --    < >  --     < > = values in this interval not displayed.         Lab 01/08/24  0548 01/07/24  0254 01/06/24  1149 01/06/24  0548 01/06/24  0221 01/05/24  2358 01/05/24  2357 01/05/24  1742 01/05/24  1214 01/05/24  1156   SODIUM 132* 133* 135* 134* 136 136  --  137  --  135*   SODIUM, ARTERIAL  --   --    --   --   --   --   --   --  136.4  --    POTASSIUM 4.8 5.5* 4.5 4.6 4.6 4.7  --  4.6  --  4.5   CHLORIDE 96* 98 99 99 102 101  --  102  --  101   CO2 20.2* 17.9* 19.5* 19.0* 18.0* 19.2*  --  18.7*  --  19.1*   ANION GAP 15.8* 17.1* 16.5* 16.0* 16.0* 15.8*  --  16.3*  --  14.9   BUN 56* 64* 49* 43* 43* 43*  --  46*  --  46*   CREATININE 3.89* 4.05* 3.20* 2.85* 2.91* 2.97*  --  3.23*  --  3.26*   EGFR 16.4* 15.6* 20.7* 23.8* 23.2* 22.6*  --  20.5*  --  20.2*   GLUCOSE 100* 83 141* 125* 140* 124*  --  88  --  193*   GLUCOSE, ARTERIAL  --   --   --   --   --   --   --   --  162*  --    CALCIUM 7.9* 7.8* 7.8* 7.7* 7.5* 7.8*  --  7.5*  --  7.6*   IONIZED CALCIUM  --   --   --  1.01*  --   --  1.00* 1.01* 1.01* 1.00*   MAGNESIUM 2.2 2.6* 2.6* 2.5*  --  2.6*  --  2.5*  --  2.5*   PHOSPHORUS 5.4* 5.8* 4.6* 4.9*  --  5.1*  --  5.1*  --  5.2*         Lab 01/08/24  0548 01/06/24  1149 01/06/24  0548 01/06/24  0221 01/05/24  2358 01/05/24  1156 01/05/24  0433 01/04/24  1148 01/04/24  0602 01/03/24  1147 01/03/24  0358 01/02/24  1422 01/02/24  0406   TOTAL PROTEIN 6.9  --   --  6.9  --   --  6.6  --  6.8  --  6.2   < > 6.4   ALBUMIN 2.6* 2.9* 2.8* 2.8* 2.8*   < > 2.9*   < > 3.0*  3.0*   < > 3.0*   < > 2.5*   GLOBULIN 4.3  --   --  4.1  --   --  3.7  --  3.8  --  3.2   < > 3.9   ALT (SGPT) 101*  --   --  103*  --   --  127*  --  153*  --  220*   < > 100*   AST (SGOT) 103*  --   --  104*  --   --  147*  --  197*  --  600*   < > 248*   BILIRUBIN 9.5*  9.8*  --   --  8.2*  --   --  10.3*  --  10.1*  --  8.3*   < > 6.5*   INDIRECT BILIRUBIN 1.5  --   --   --   --   --   --   --   --   --   --   --   --    BILIRUBIN DIRECT 8.0*  --   --   --   --   --   --   --   --   --   --   --   --    ALK PHOS 129*  --   --  115  --   --  114  --  114  --  112   < > 122*   LIPASE  --   --   --   --   --   --   --   --   --   --   --   --  23    < > = values in this interval not displayed.         Lab 01/07/24  0254 01/06/24  0221  01/05/24  1443 01/05/24  0434 01/04/24  0602   PROTIME 18.1* 20.4* 20.4* 19.3* 17.9*   INR 1.47* 1.71* 1.71* 1.60* 1.45*             Lab 01/02/24  0951   ABO TYPING A   RH TYPING Negative   ANTIBODY SCREEN Negative         Lab 01/05/24  1214 01/02/24  2232   PH, ARTERIAL 7.386 7.467*   PCO2, ARTERIAL 32.1* 29.7*   PO2 ART 67.7* 58.5*   O2 SATURATION ART 92.9* 90.7*   FIO2 44 28   HCO3 ART 18.8* 21.0*   BASE EXCESS ART -5.3* -2.2*   CARBOXYHEMOGLOBIN 1.8* 1.6*     Brief Urine Lab Results  (Last result in the past 365 days)        Color   Clarity   Blood   Leuk Est   Nitrite   Protein   CREAT   Urine HCG        12/23/23 2054 Yellow   Clear   Small (1+)   Negative   Negative   100 mg/dL (2+)                 Microbiology Results (last 10 days)       Procedure Component Value - Date/Time    Blood Culture - Blood, Arm, Left [[id number]]  (Normal) Collected: 01/02/24 1422    Lab Status: Final result Specimen: Blood from Arm, Left Updated: 01/07/24 1430     Blood Culture No growth at 5 days    Blood Culture - Blood, Hand, Left [[id number]]  (Normal) Collected: 01/02/24 1216    Lab Status: Final result Specimen: Blood from Hand, Left Updated: 01/07/24 1230     Blood Culture No growth at 5 days            [x]  Microbiology  [x]  Radiology  US Abdomen Limited    Result Date: 1/8/2024    1. Cholelithiasis and sludge filling the gallbladder.  No sonographic evidence of acute cholecystitis 2. Limited imaging of the liver is grossly unremarkable in appearance     ANNABELLE PALMER MD       ELECTRONICALLY SIGNED AND APPROVED BY: ANNABELLE PALMRE MD ON 1/08/2024 AT 19:27             XR Abdomen KUB    Result Date: 1/7/2024    1. Feeding tube projects in expected position 2. Mild gaseous distention of bowel with suspected mild wall edema.  No definite evidence of free air 3. Consolidation left lung base 4. Left sided inguinal vascular catheter projects in expected position     ANNABELLE PALMER MD       Electronically Signed and Approved  By: ANNABELLE PALMER MD on 1/07/2024 at 9:13             XR Abdomen KUB    Result Date: 1/2/2024    1. Small bore feeding tube tip terminating at the junction of the 3rd and 4th portion of the duodenum.      DOMONIQUE SALDAÑA MD       Electronically Signed and Approved By: DOMONIQUE SALDAÑA MD on 1/02/2024 at 19:43             XR Chest 1 View    Result Date: 1/2/2024    1. Mild to moderate perihilar and basilar airspace disease suspected to represent pulmonary edema.  Pneumonia is also in the differential 2. Small bilateral pleural effusions, unchanged 3. Feeding tube with the tip near the gastroesophageal junction.       Herve Garnett M.D.       Electronically Signed and Approved By: Herve Garnett M.D. on 1/02/2024 at 13:03             CT Abdomen Pelvis Without Contrast    Result Date: 1/1/2024    CT scan of the abdomen and pelvis without contrast demonstrating moderate right and small left effusions, larger in comparison to 9/29/2023..  Compressive atelectasis is again seen at the lung bases.  Ill-defined irregular retroperitoneal mass appears larger in comparison to the previous study concerning for interval hemorrhage.  An ill-defined 9.5 cm component with layering density is consistent with hematoma.  I discussed findings with Dr. Lloyd at 1855.     BENITO PRADHAN MD       Electronically Signed and Approved By: BENITO PRADHAN MD on 1/01/2024 at 18:56              [x]  EKG/Telemetry   [x]  Cardiology/Vascular   Echocardiogram 12/24/2023  •  Left ventricular ejection fraction appears to be 66 - 70%.  •  Hyperdynamic.  The left ventricular cavity is small in size.  •  Left ventricular diastolic function was not assessed.  •  There is a trivial pericardial effusion.  •  No significant valvular abnormalities noted.  []  Pathology  []  Old records  [x]  Other:  Scheduled Meds:ampicillin, 2 g, Intravenous, Q24H  escitalopram, 5 mg, Nasogastric, Daily  melatonin, 10 mg, Nasogastric, Nightly  midodrine, 5 mg,  Nasogastric, TID AC  pantoprazole, 40 mg, Intravenous, Q12H  QUEtiapine, 25 mg, Nasogastric, Nightly  senna-docusate sodium, 2 tablet, Nasogastric, BID  sodium bicarbonate, 650 mg, Nasogastric, TID  sodium chloride, 10 mL, Intravenous, Q12H      Continuous Infusions:heparin, 18 Units/kg/hr, Last Rate: 18 Units/kg/hr (24 976)      PRN Meds:.•  senna-docusate sodium **AND** polyethylene glycol **AND** [DISCONTINUED] bisacodyl **AND** bisacodyl  •  calcium carbonate  •  dextrose  •  dextrose  •  dextrose  •  dextrose  •  fentaNYL citrate (PF)  •  glucagon (human recombinant)  •  glucagon (human recombinant)  •  heparin (porcine)  •  heparin (porcine)  •  heparin  •  heparin  •  [] Morphine **AND** naloxone  •  nitroglycerin  •  ondansetron  •  oxyCODONE-acetaminophen  •  oxyCODONE-acetaminophen  •  prochlorperazine  •  sodium chloride  •  sodium chloride  •  traZODone      Assessment & Plan   Assessment / Plan     Assessment/Plan:  Ruptured infrarenal AAA s/p open repair  Hemorrhagic shock  Hypovolemic shock  Acute hypoxic respiratory failure requiring mechanical ventilation  Septic shock with Streptococcus pyogenes bacteremia  SVT  History of RCC s/p partial nephrectomy on   Acute anuric renal failure requiring CRRT  Metabolic acidosis  Clinically significant lactic acidosis  Chronically immunosuppressed on CellCept  Hypocalcemia  Hypokalemia  Acute right lower extremity DVT in the peroneal and gastrocnemius  Acute blood loss anemia  Perirenal IVC thrombus  Ischemic bowel  Hyperkalemia  Anxiety  Transaminitis  Hyperbilirubinemia        Patient admitted for further evaluation and treatment  Vascular surgery, general surgery, urology, nephrology and pulmonology critical care consulted thank you for assistance  Continue postop wound care per general surgery  Continue to monitor hemoglobin closely and transfuse packed red blood cells as needed to keep greater than 7  Continue heparin drip and monitor  very closely for signs of bleeding  Continue Protonix twice daily  Continue supplemental oxygen as needed to keep sats greater than 90%  Continue ampicillin-sulbactam complete 14-day course per pulmonology critical care  Continue midodrine and titrate per pulmonology critical care  Continue hemodialysis and ultrafiltration per nephrology  Repeat renal panel in a.m.  Continue to monitor electrolytes replace as needed  Continue Lexapro and Seroquel  Continue tube feeds at low rate and titrate up slowly to goal  Monitor transaminases  Monitor hyperbilirubinemia suspect could be due to continued resorption of intra-abdominal blood following AAA rupture  Advance diet per speech  Further inpatient orders recommendations per clinical course         Discussed plan with bedside RN as well as pulmonology critical care team.    Disposition: LTAC family hoping for [facility] in [place].    DVT prophylaxis:  Medical and mechanical DVT prophylaxis orders are present.    CODE STATUS:   Level Of Support Discussed With: Patient  Code Status (Patient has no pulse and is not breathing): CPR (Attempt to Resuscitate)  Medical Interventions (Patient has pulse or is breathing): Full Support

## 2024-01-09 NOTE — CONSULTS
"Nutrition Services    Patient Name: Danny Savage  YOB: 1960  MRN: [id number]  Admission date: 12/23/2023      CLINICAL NUTRITION ASSESSMENT      Reason for Assessment  Follow Up     H&P:  Past Medical History:   Diagnosis Date    Allergy     Aortic aneurysm     4.7 just found has not f/u    H/O Kidney stone     Hiatal hernia     Kidney mass     left    Kidney stone     Renal cancer     S/P Exploratory laparotomy; appendectomy; left colon resection with creation of ostomy 01/04/2024    S/P Open repair of ruptured infrarenal abdominal aortic aneurysm 01/04/2024        Current Problems:   Active Hospital Problems    Diagnosis     **Left flank pain     S/P Exploratory laparotomy; appendectomy; left colon resection with creation of ostomy     S/P Open repair of ruptured infrarenal abdominal aortic aneurysm     Ruptured infrarenal abdominal aortic aneurysm (AAA)     DVT, lower extremity, distal, acute, unspecified laterality         Nutrition/Diet History         Narrative   Patient admitted with left flank pain, ruptured AAA.  S/P open AAA repair.  Patient went back to OR 12/26/2023. Now S/P left open colectomy with ostomy creation.  Pt extubated 12/28/2023.   Cortrak placed 01/02/24 to supplement intake.  Had been receiving CRRT with fluid removal.  Now on MWF HD.      Patient is on a full liquid diet with minimal PO intake.  Continues to have issues with nausea/vomiting when enteral nutrition is running, despite post-pyloric/jejunal tube placement.  RN notes indicate patient is vomiting green bilious output.      Discussed during multidisciplinary rounds.  Plan for GI consult today.  RD will continue to follow clinical course and ability to resume enteral nutrition.       Anthropometrics        Current Height, Weight Height: 177.8 cm (70\")  Weight: 77.7 kg (171 lb 3.2 oz)   Current BMI Body mass index is 24.56 kg/m².   BMI Classification Normal range   % %   Adjusted Body Weight (ABW)  "   Weight Hx  Wt Readings from Last 30 Encounters:   01/08/24 1624 77.7 kg (171 lb 3.2 oz)   01/07/24 0516 79 kg (174 lb 1.6 oz)   01/06/24 0500 80.6 kg (177 lb 12.8 oz)   01/04/24 0500 87.5 kg (192 lb 12.8 oz)   01/03/24 0517 91 kg (200 lb 11.2 oz)   01/02/24 1320 92.7 kg (204 lb 4.8 oz)   01/01/24 1600 96.6 kg (212 lb 14.4 oz)   12/24/23 0027 80.5 kg (177 lb 7.5 oz)   12/23/23 1948 79.9 kg (176 lb 2.4 oz)   12/22/23 0706 78.8 kg (173 lb 11.6 oz)   12/06/23 0958 79.9 kg (176 lb 3.2 oz)   11/22/23 1338 78.2 kg (172 lb 6.4 oz)   11/13/23 0628 78.2 kg (172 lb 6.4 oz)   10/30/23 1440 79.9 kg (176 lb 2.4 oz)   08/17/23 1952 78.8 kg (173 lb 11.6 oz)   08/17/23 1537 80.3 kg (177 lb 0.5 oz)   08/15/23 0554 81.2 kg (179 lb 0.2 oz)          Wt Change Observation Stable x 4 months   Weight back to baseline, essentially neutral fluid balance for admission.       Estimated/Assessed Needs  Estimated Needs based on: Current Body Weight       Energy Requirements 25-30 kcal/kg   EST Needs (kcal/day)  9163-5938 kcal        Protein Requirements 1.2-1.3 g/kg   EST Daily Needs (g/day)  g       Fluid Requirements 25 ml/kg    Estimated Needs (mL/day) 1975 ml     Labs/Medications         Pertinent Labs Reviewed.   Results from last 7 days   Lab Units 01/09/24  0306 01/08/24  0548 01/07/24  0254 01/06/24  0548 01/06/24  0221   SODIUM mmol/L 133* 132* 133*   < > 136   POTASSIUM mmol/L 4.4 4.8 5.5*   < > 4.6   CHLORIDE mmol/L 94* 96* 98   < > 102   CO2 mmol/L 21.9* 20.2* 17.9*   < > 18.0*   BUN mg/dL 47* 56* 64*   < > 43*   CREATININE mg/dL 3.51* 3.89* 4.05*   < > 2.91*   CALCIUM mg/dL 7.9* 7.9* 7.8*   < > 7.5*   BILIRUBIN mg/dL 10.3* 9.5*  9.8*  --   --  8.2*   ALK PHOS U/L 139* 129*  --   --  115   ALT (SGPT) U/L 119* 101*  --   --  103*   AST (SGOT) U/L 129* 103*  --   --  104*   GLUCOSE mg/dL 89 100* 83   < > 140*    < > = values in this interval not displayed.     Results from last 7 days   Lab Units 01/09/24  0306 01/08/24  0548  "01/07/24  0254   MAGNESIUM mg/dL 1.9 2.2 2.6*   PHOSPHORUS mg/dL 4.9* 5.4* 5.8*   HEMOGLOBIN g/dL 8.7* 8.3* 9.4*   HEMATOCRIT % 27.2* 25.3* 29.5*     COVID19   Date Value Ref Range Status   12/23/2023 Not Detected Not Detected - Ref. Range Final     No results found for: \"HGBA1C\"      Pertinent Medications Reviewed.     Malnutrition Severity Assessment                Nutrition Diagnosis         Nutrition Dx Problem 1 Inadequate energy Intake related to GI dysfunction as evidenced by  Full liquid diet       Nutrition Intervention           Current Nutrition Orders & Evaluation of Intake       Current PO Diet Diet: Liquid Diets; Full Liquid; Fluid Consistency: Thin (IDDSI 0)   Supplement Orders Placed This Encounter      Dietary Nutrition Supplements Ensure Surgery; vanilla      DIET MESSAGE Guest tray, regular diet      Place Feeding Tube Per [employer] [facility]      Diet, Tube Feeding Tube Feeding Formula: Peptamen 1.5; Tube Feeding Type: Continuous; Continuous Tube Feeding Start Rate (mL/hr): 15; Then Advance Rate By (mL/hr): RD To Manage; Every __ Hours: Patient at Goal Rate; To Goal Rate of (mL/hr): RD to ...           Nutrition Intervention/Prescription:  Provides 660 kcals, 39 grams of protein       Enteral nutrition remains on hold at this time.    Full liquid diet as tolerated.      Once bale to resume enteral nutrition, recommend to advance toward goal rate as able:  Peptamen 1.5 @ 75 ml/hr x 22 hours   Free water flushes 25 ml every 3 hours   Provides 2475 kcal, 112 g pro, 1471 ml fluid         Medical Nutrition Therapy/Nutrition Education          Learner     Readiness Patient  Education not indicated at this time     Method     Response N/A  N/A     Monitor/Evaluation        Monitor Per protocol, Pertinent labs, Weight, Skin status, GI status, POC/GOC, Diet advancement       Nutrition Discharge Plan         To be determined       Electronically signed by:  Shannon Acharya RD  01/09/24 12:08 EST    "

## 2024-01-09 NOTE — PLAN OF CARE
Goal Outcome Evaluation:  Plan of Care Reviewed With: patient, spouse        Progress: improving  Outcome Evaluation: Occupational Therapy re-evaluation completed to assess patient progress towards goals established per plan of care. Patient demonstrates improvements related to tolerance and balance. patient continues to demonstrate deficits related to tolerance, strength, transfers, balance and mobility that impede patient independence with ADLs. Patient would benefit from continued skilled OT intervention to promote return to baseline. Goals modified for patient current level. Continue per plan of care.

## 2024-01-09 NOTE — NURSING NOTE
Pt completed hemodialysis treatment. 1295mL removed during 4 hr tx.    Origional dialysis order for 1L removal. Per Dr. Bazzi this RN to attempt to remove 2 L if BP tolerates.    Pt BP was hypotensive at times of tx so UFR was modified accordingly in order to maintain stable systolic.     Pt overall tolerated tx well.

## 2024-01-09 NOTE — PROGRESS NOTES
Pulmonary / Critical Care Progress Note      Patient Name: Danny Savage  : 1960  MRN: [id number]  Attending:  Killian Arreola DO   Date of admission: 2023    Subjective   Subjective   Patient critically ill with ruptured AAA s/p open repair, hemorrhagic shock, Streptococcus pyogenes bacteremia    Over the past 24 hours patient remains in ICU, off vasopressors, hemoglobin stable on heparin drip for DVT and IVC clot, intermittent hemodialysis via left femoral HD cath, still with significant nausea, elevated T. bili, right upper quadrant ultrasound showed biliary sludge    Overnight, nausea some vomiting    This morning  She is frail and weak  Unable to tolerate oral diet  Poor appetite  On heparin drip      Objective   Objective     Vitals:   Vital signs for last 24 hours:  Temp:  [97.5 °F (36.4 °C)-98.8 °F (37.1 °C)] 98.8 °F (37.1 °C)  Heart Rate:  [82-95] 88  Resp:  [12-19] 19  BP: ()/(56-81) 118/70    Intake/Output last 3 shifts:  I/O last 3 completed shifts:  In: 1554 [P.O.:100; I.V.:908; Other:134; NG/GT:412]  Out: 1395 [Other:100]    Physical Exam   Vital Signs Reviewed   General:  Alert, NAD.  Chronically ill, weak appearing  HEENT:  PERRL, EOMI.    Neck:  No JVD, no thyromegaly  Lymph: no axillary, cervical, supraclavicular lymphadenopathy noted bilaterally  Chest:  Clear to auscultation bilaterally, no work of breathing noted on room air  CV: RRR, no M/G/R, pulses 2+  Abd:  Soft, NT, ND, +BS  EXT:  no clubbing, no cyanosis, no edema  Neuro:  A&Ox3, CN grossly intact, no focal deficits.  Skin: No rashes or lesions noted, jaundiced    Result Review    I have personally reviewed the results from the time of this admission to 2024 11:05 EST and agree with these findings:  [x]  Laboratory  [x]  Microbiology  [x]  Radiology  [x]  EKG/Telemetry   [x]  Cardiology/Vascular   []  Pathology  []  Old records  []  Other:  Most notable findings include:       Lab 24  0306 24  0548  01/07/24  0254 01/06/24  1149 01/06/24  0548 01/06/24  0221 01/05/24  2358 01/05/24  1742 01/05/24  1156 01/05/24  0433 01/04/24  1148 01/04/24  0602 01/04/24  0152 01/03/24  1147 01/03/24  0358 01/03/24  0009   WBC 11.28* 12.51* 15.46*  --   --  15.93*  --   --   --  18.83*  --  26.10*  --   --  29.36*  --    HEMOGLOBIN 8.7* 8.3* 9.4*  --   --  8.4*  --   --   --  8.3*  --  8.7* 9.1*   < > 7.4*  --    HEMATOCRIT 27.2* 25.3* 29.5*  --   --  26.1*  --   --   --  26.4*  --  26.5* 27.6*   < > 22.4*  --    PLATELETS 366 322 330  --   --  284  --   --   --  272  --  296  --   --  344  --    SODIUM 133* 132* 133* 135* 134* 136 136 137   < > 136   < > 138  138  --    < > 134* 134*   SODIUM, ARTERIAL  --   --   --   --   --   --   --   --    < >  --   --   --   --   --   --   --    POTASSIUM 4.4 4.8 5.5* 4.5 4.6 4.6 4.7 4.6   < > 5.3*   < > 4.8  4.8  --    < > 4.8 4.9   CHLORIDE 94* 96* 98 99 99 102 101 102   < > 101   < > 101  101  --    < > 99 98   CO2 21.9* 20.2* 17.9* 19.5* 19.0* 18.0* 19.2* 18.7*   < > 18.8*   < > 18.7*  18.7*  --    < > 19.1* 18.2*   BUN 47* 56* 64* 49* 43* 43* 43* 46*   < > 41*   < > 52*  52*  --    < > 58* 67*   CREATININE 3.51* 3.89* 4.05* 3.20* 2.85* 2.91* 2.97* 3.23*   < > 3.01*   < > 3.31*  3.31*  --    < > 4.49* 4.65*   GLUCOSE 89 100* 83 141* 125* 140* 124* 88   < > 82   < > 126*  126*  --    < > 258* 243*   GLUCOSE, ARTERIAL  --   --   --   --   --   --   --   --    < >  --   --   --   --   --   --   --    CALCIUM 7.9* 7.9* 7.8* 7.8* 7.7* 7.5* 7.8* 7.5*   < > 7.8*   < > 7.7*  7.7*  --    < > 6.8* 6.7*   PHOSPHORUS 4.9* 5.4* 5.8* 4.6* 4.9*  --  5.1* 5.1*   < > 5.2*   < > 4.8*  --    < > 5.1* 5.5*   TOTAL PROTEIN 6.9 6.9  --   --   --  6.9  --   --   --  6.6  --  6.8  --   --  6.2 6.2   ALBUMIN 2.6* 2.6*  --  2.9* 2.8* 2.8* 2.8* 2.7*   < > 2.9*   < > 3.0*  3.0*  --    < > 3.0* 3.0*   GLOBULIN  --  4.3  --   --   --  4.1  --   --   --  3.7  --  3.8  --   --  3.2 3.2    < > = values in  this interval not displayed.       Assessment & Plan   Assessment / Plan     Active Hospital Problems:  Active Hospital Problems    Diagnosis    • **Left flank pain    • S/P Exploratory laparotomy; appendectomy; left colon resection with creation of ostomy    • S/P Open repair of ruptured infrarenal abdominal aortic aneurysm    • Ruptured infrarenal abdominal aortic aneurysm (AAA)    • DVT, lower extremity, distal, acute, unspecified laterality      Impression:  Ruptured infrarenal AAA s/p open repair  Acute blood loss anemia  Acute DVT with plan for IVC filter  Intra-abdominal hematoma  Hemorrhagic shock  Hypovolemic shock  Acute hypoxic respiratory failure requiring mechanical ventilation  Septic shock with Streptococcus pyogenes bacteremia  Moraxella pneumonia  Ischemic sigmoid colon s/p colostomy  History of RCC s/p partial nephrectomy on 11/13  Acute kidney injury secondary to acute tubular necrosis requiring renal replacement therapy  Clinically significant lactic acidosis  Chronically immunosuppressed on CellCept  Hypocalcemia  Hypokalemia resolved now with hyperkalemia  SVT  Lactic acidosis, clinically significant     Plan:  -Patient currently on room air  -Continue heparin drip, unable to obtain IVC filter due to IVC thrombus/bilateral DVTs  -Monitor for any signs of bleeding, hemoglobin stable  -Continue midodrine 5 mg 3 times daily  -So significant elevation in bilirubin, nausea, right upper quadrant choked cholelithiasis and sludge in the gallbladder ?  Would recommend GI evaluation, unsure if this is related to massive hemolysis  -PT/OT/speech  -Core track in place, tube feeds per dietitian, changing formula today  -Cleared per speech therapy however not meeting nutritional needs  -Nephrology following, HD per their service  -Receiving intermittent dialysis, planning for TDC  -As needed Percocet and fentanyl for pain  -Continue Unasyn to complete 14 days of therapy for bacteremia  -Hold home chronic  immunosuppression CellCept indefinitely  -Patient would benefit from LTAC    PUP PPx: Protonix  DVT prophylaxis: Planning for IVC filter    Medical and mechanical DVT prophylaxis orders are present.    CODE STATUS:   Level Of Support Discussed With: Patient  Code Status (Patient has no pulse and is not breathing): CPR (Attempt to Resuscitate)  Medical Interventions (Patient has pulse or is breathing): Full Support      Liv ENG APRN, spent 15 minutes critical care time in accordance to split shared billing.    Patient is critically ill with group A strep bacteremia, hyperbilirubinemia, AAA leak/perfect status post repair, VTE/DVT, acute kidney injury requiring HD.  35 minutes of critical care time was spent managing this patient, excluding procedures. Of this time, I, Dr. Reno Jeffery, spent 20 minutes and Liv Gomez NP spent 15 minutes in accordance with split shared billing. This included personally reviewing all pertinent labs, imaging, microbiology and documentation. Also discussing the case with the patient and any available family, the admitting physician and any available ancillary staff.   Electronically signed by Reno Jeffery MD, 01/09/24, 12:38 PM EST.

## 2024-01-09 NOTE — PROGRESS NOTES
[facility]     Progress Note    Patient Name: Danny Savage  : 1960  MRN: [id number]  Primary Care Physician:  Kodi Pichardo MD  Date of admission: 2023    Subjective   Subjective     POD #16 status post open repair of ruptured abdominal aorta aneurysm, POD #14 status post abdominal reexploration, sigmoidectomy with end colostomy, closure of abdominal wound    Had to have his tube feeding stopped last night due to nausea and vomiting.  Undergoing evaluation for possible biliary obstruction.    Objective   Objective     Vitals:   Temp:  [97.5 °F (36.4 °C)-98.8 °F (37.1 °C)] 97.8 °F (36.6 °C)  Heart Rate:  [82-95] 88  Resp:  [12-19] 17  BP: (103-142)/(61-89) 142/76  Flow (L/min):  [2] 2    Physical Exam   General: Sleepy, no acute distress  Extremities: Symmetric, moderate edema.    Hgb: 8.7  WBC: 11.28      Assessment & Plan   Assessment / Plan     Assessment/Plan:    Significant improvement in overall status.  Will plan for TDC, timing to be determined.      Active Hospital Problems:  Active Hospital Problems    Diagnosis    • **Left flank pain    • DVT, lower extremity, distal, acute, unspecified laterality    • S/P Exploratory laparotomy; appendectomy; left colon resection with creation of ostomy    • S/P Open repair of ruptured infrarenal abdominal aortic aneurysm    • Ruptured infrarenal abdominal aortic aneurysm (AAA)            Electronically signed by Swapnil Velazco MD, 23, 10:25 AM EST.

## 2024-01-09 NOTE — THERAPY TREATMENT NOTE
Acute Care - Physical Therapy Treatment Note   [facility]     Patient Name: Danny Savage  : 1960  MRN: [id number]  Today's Date: 2024      Visit Dx:     ICD-10-CM ICD-9-CM   1. Sepsis, due to unspecified organism, unspecified whether acute organ dysfunction present  A41.9 038.9     995.91   2. Fever, unspecified fever cause  R50.9 780.60   3. Intra-abdominal fluid collection  R18.8 789.59   4. Abdominal aortic aneurysm (AAA) without rupture, unspecified part  I71.40 441.4   5. Ruptured infrarenal abdominal aortic aneurysm (AAA)  I71.33 441.3   6. Decreased activities of daily living (ADL)  Z78.9 V49.89   7. Difficulty walking  R26.2 719.7   8. DVT, lower extremity, distal, acute, unspecified laterality  I82.4Z9 453.42   9. Dysphagia, oropharyngeal  R13.12 787.22   10. S/P Open repair of ruptured infrarenal abdominal aortic aneurysm  Z98.890 V45.89    Z86.79    11. S/P AAA (abdominal aortic aneurysm) repair  Z98.890 V45.89    Z86.79      Patient Active Problem List   Diagnosis    Left renal mass    Left flank pain    Ruptured infrarenal abdominal aortic aneurysm (AAA)    DVT, lower extremity, distal, acute, unspecified laterality    S/P Exploratory laparotomy; appendectomy; left colon resection with creation of ostomy    S/P Open repair of ruptured infrarenal abdominal aortic aneurysm     Past Medical History:   Diagnosis Date    Allergy     Aortic aneurysm     4.7 just found has not f/u    H/O Kidney stone     Hiatal hernia     Kidney mass     left    Kidney stone     Renal cancer     S/P Exploratory laparotomy; appendectomy; left colon resection with creation of ostomy 2024    S/P Open repair of ruptured infrarenal abdominal aortic aneurysm 2024     Past Surgical History:   Procedure Laterality Date    ABDOMINAL AORTIC ANEURYSM REPAIR N/A 2023    Procedure: ABDOMINAL AORTIC ANEURYSM REPAIR;  Surgeon: Koffi Agosto MD;  Location: [facility] MAIN OR;  Service: Vascular;  Laterality: N/A;     COLON RESECTION N/A 12/26/2023    Procedure: COLON RESECTION;  Surgeon: Hernan Mallory MD;  Location: [facility] MAIN OR;  Service: General;  Laterality: N/A;  EXPLORATORY LAPAROTOMY, REPAIR OF SMALL BOWEL MESENTERY, APPENDECTOMY, LEFT HEMICOLECTOMY, CREATION OF OSTOMY, ABDOMINAL CLOSURE    EXPLORATORY LAPAROTOMY N/A 12/26/2023    Procedure: LAPAROTOMY EXPLORATORY;  Surgeon: Koffi Agosto MD;  Location: [facility] MAIN OR;  Service: Vascular;  Laterality: N/A;  Exploratory Laparotomy    INTERVENTIONAL RADIOLOGY PROCEDURE N/A 1/5/2024    Procedure: IVC Filter Placement;  Surgeon: Michoacano Collins MD;  Location: [facility] CATH INVASIVE LOCATION;  Service: Vascular;  Laterality: N/A;    KIDNEY STONE SURGERY      NEPHRECTOMY PARTIAL Left 11/13/2023    Procedure: NEPHRECTOMY PARTIAL LAPAROSCOPIC WITH DAVINCI ROBOT, left;  Surgeon: Michelle Cai MD;  Location: [facility] OR;  Service: Robotics - DaVinci;  Laterality: Left;    URETEROSCOPY LASER LITHOTRIPSY WITH STENT INSERTION Left 08/18/2023    Procedure: CYSTOSCOPY URETEROSCOPY RETROGRADE PYELOGRAM STENT INSERTION, left;  Surgeon: Michelle Cai MD;  Location: [facility] OR;  Service: Urology;  Laterality: Left;     PT Assessment (last 12 hours)       PT Evaluation and Treatment       Row Name 01/09/24 1133          Physical Therapy Time and Intention    Subjective Information complains of;weakness;fatigue;pain  -DK     Document Type therapy note (daily note)  -DK     Mode of Treatment individual therapy;physical therapy  -DK     Patient Effort fair  -DK     Symptoms Noted During/After Treatment fatigue;increased pain;shortness of breath  -DK     Comment Pt had just sat EOB with OT prior to PT session.  -DK       Row Name 01/09/24 1133          Pain    Pretreatment Pain Rating 2/10  -DK     Posttreatment Pain Rating 2/10  -DK     Pain Location generalized  -DK     Pain Intervention(s) Distraction;Therapeutic presence  -DK       Row Name 01/09/24 1133           Cognition    Affect/Mental Status (Cognition) WFL  -DK     Orientation Status (Cognition) oriented x 4  -DK     Follows Commands (Cognition) WFL  -     Cognitive Function WFL  -     Personal Safety Interventions gait belt;nonskid shoes/slippers when out of bed;supervised activity  -       Row Name 01/09/24 1133          Motor Skills    Motor Skills --  therapeutic exercises  -     Coordination WFL  -     Therapeutic Exercise hip;knee;ankle  -     Additional Documentation --  Right ankle now moves to neutral position without pain.  No transfers due to fatigue after sitting EOB with OT just before treatment.  -       Row Name 01/09/24 1133          Hip (Therapeutic Exercise)    Hip (Therapeutic Exercise) AAROM (active assistive range of motion)  -     Hip AAROM (Therapeutic Exercise) bilateral;flexion;extension;aBduction;aDduction;supine;10 repetitions;2 sets  -       Row Name 01/09/24 1133          Knee (Therapeutic Exercise)    Knee (Therapeutic Exercise) AAROM (active assistive range of motion)  -     Knee AAROM (Therapeutic Exercise) bilateral;flexion;extension;supine;10 repetitions;2 sets  -       Row Name 01/09/24 1133          Ankle (Therapeutic Exercise)    Ankle (Therapeutic Exercise) AAROM (active assistive range of motion)  -     Ankle AAROM (Therapeutic Exercise) bilateral;dorsiflexion;plantarflexion;supine;10 repetitions;2 sets  -       Row Name             Wound 12/24/23 2035 midline abdomen Incision    Wound - Properties Group Placement Date: 12/24/23  - Placement Time: 2035 -MH Orientation: midline  -MH Location: abdomen  -MH Primary Wound Type: Incision  -MH    Retired Wound - Properties Group Placement Date: 12/24/23  - Placement Time: 2035 -MH Orientation: midline  -MH Location: abdomen  -MH Primary Wound Type: Incision  -MH    Retired Wound - Properties Group Date first assessed: 12/24/23  - Time first assessed: 2035 -MH Location: abdomen  -MH Primary Wound Type:  Incision  -      Row Name 01/09/24 1133          Plan of Care Review    Plan of Care Reviewed With patient;spouse  -     Progress improving  -       Row Name 01/09/24 1133          Positioning and Restraints    Pre-Treatment Position in bed  -DK     Post Treatment Position bed  -DK     In Bed supine;call light within reach;encouraged to call for assist;exit alarm on;with family/caregiver;side rails up x2;legs elevated;heels elevated  -       Row Name 01/09/24 1133          Therapy Assessment/Plan (PT)    Rehab Potential (PT) fair, will monitor progress closely  -     Criteria for Skilled Interventions Met (PT) skilled treatment is necessary  -     Therapy Frequency (PT) daily  -     Problem List (PT) problems related to;balance;mobility;range of motion (ROM);strength;pain  -     Activity Limitations Related to Problem List (PT) unable to ambulate safely;unable to transfer safely  -       Row Name 01/09/24 1133          Progress Summary (PT)    Progress Toward Functional Goals (PT) progress toward functional goals is fair  -               User Key  (r) = Recorded By, (t) = Taken By, (c) = Cosigned By      Initials Name Provider Type    Kathie Saldaña PTA Physical Therapist Assistant    Shahid Alvares, RN Registered Nurse                    Physical Therapy Education       Title: PT OT SLP Therapies (In Progress)       Topic: Physical Therapy (In Progress)       Point: Mobility training (Done)       Learning Progress Summary             Patient Acceptance, E,TB, VU by AV at 12/29/2023 1536                         Point: Home exercise program (Not Started)       Learner Progress:  Not documented in this visit.              Point: Body mechanics (Done)       Learning Progress Summary             Patient Acceptance, E,TB, VU by AV at 12/29/2023 1536                         Point: Precautions (Done)       Learning Progress Summary             Patient Acceptance, E,TB, VU by AV at 12/29/2023  1536                                         User Key       Initials Effective Dates Name Provider Type Discipline     06/11/21 -  Amrik Sánchez, PT Physical Therapist PT                  PT Recommendation and Plan  Planned Therapy Interventions (PT): balance training, bed mobility training, gait training, strengthening, transfer training  Therapy Frequency (PT): daily  Progress Summary (PT)  Progress Toward Functional Goals (PT): progress toward functional goals is fair  Plan of Care Reviewed With: patient, spouse  Progress: improving   Outcome Measures       Row Name 01/09/24 1100 01/08/24 1500 01/07/24 1116       How much help from another person do you currently need...    Turning from your back to your side while in flat bed without using bedrails? 2  -DK 2  -AV 2  -DK    Moving from lying on back to sitting on the side of a flat bed without bedrails? 2  -DK 1  -AV 1  -DK    Moving to and from a bed to a chair (including a wheelchair)? 1  -DK 1  -AV 1  -DK    Standing up from a chair using your arms (e.g., wheelchair, bedside chair)? 1  -DK 1  -AV 1  -DK    Climbing 3-5 steps with a railing? 1  -DK 1  -AV 1  -DK    To walk in hospital room? 1  -DK 1  -AV 1  -DK    AM-PAC 6 Clicks Score (PT) 8  -DK 7  -AV 7  -DK    Highest Level of Mobility Goal 3 --> Sit at edge of bed  -DK 2 --> Bed activities/dependent transfer  -AV 2 --> Bed activities/dependent transfer  -DK       Functional Assessment    Outcome Measure Options -- AM-PAC 6 Clicks Basic Mobility (PT)  -AV AM-PAC 6 Clicks Basic Mobility (PT)  -DK              User Key  (r) = Recorded By, (t) = Taken By, (c) = Cosigned By      Initials Name Provider Type    Kathie Saldaña PTA Physical Therapist Assistant    AV Amrik Sánchez, LEONARDA Physical Therapist                     Time Calculation:    PT Charges       Row Name 01/09/24 1145             Time Calculation    PT Received On 01/09/24  -DK      PT Goal Re-Cert Due Date 01/16/24  -DK         Timed  Charges    47259 - PT Therapeutic Exercise Minutes 14  -DK      64091 - PT Therapeutic Activity Minutes 4  -DK         Total Minutes    Timed Charges Total Minutes 18  -DK       Total Minutes 18  -DK                User Key  (r) = Recorded By, (t) = Taken By, (c) = Cosigned By      Initials Name Provider Type    Kathie Saldaña PTA Physical Therapist Assistant                  Therapy Charges for Today       Code Description Service Date Service Provider Modifiers Qty    55902629390 HC PT THER PROC EA 15 MIN 1/9/2024 Kathie Sullivan PTA GP 1            PT G-Codes  Outcome Measure Options: AM-PAC 6 Clicks Basic Mobility (PT)  AM-PAC 6 Clicks Score (PT): 8  AM-PAC 6 Clicks Score (OT): 12    Kathie Sullivan PTA  1/9/2024

## 2024-01-09 NOTE — THERAPY RE-EVALUATION
Patient Name: Danny Savage  : 1960    MRN: [id number]                              Today's Date: 2024       Admit Date: 2023    Visit Dx:     ICD-10-CM ICD-9-CM   1. Sepsis, due to unspecified organism, unspecified whether acute organ dysfunction present  A41.9 038.9     995.91   2. Fever, unspecified fever cause  R50.9 780.60   3. Intra-abdominal fluid collection  R18.8 789.59   4. Abdominal aortic aneurysm (AAA) without rupture, unspecified part  I71.40 441.4   5. Ruptured infrarenal abdominal aortic aneurysm (AAA)  I71.33 441.3   6. Decreased activities of daily living (ADL)  Z78.9 V49.89   7. Difficulty walking  R26.2 719.7   8. DVT, lower extremity, distal, acute, unspecified laterality  I82.4Z9 453.42   9. Dysphagia, oropharyngeal  R13.12 787.22   10. S/P Open repair of ruptured infrarenal abdominal aortic aneurysm  Z98.890 V45.89    Z86.79    11. S/P AAA (abdominal aortic aneurysm) repair  Z98.890 V45.89    Z86.79      Patient Active Problem List   Diagnosis    Left renal mass    Left flank pain    Ruptured infrarenal abdominal aortic aneurysm (AAA)    DVT, lower extremity, distal, acute, unspecified laterality    S/P Exploratory laparotomy; appendectomy; left colon resection with creation of ostomy    S/P Open repair of ruptured infrarenal abdominal aortic aneurysm     Past Medical History:   Diagnosis Date    Allergy     Aortic aneurysm     4.7 just found has not f/u    H/O Kidney stone     Hiatal hernia     Kidney mass     left    Kidney stone     Renal cancer     S/P Exploratory laparotomy; appendectomy; left colon resection with creation of ostomy 2024    S/P Open repair of ruptured infrarenal abdominal aortic aneurysm 2024     Past Surgical History:   Procedure Laterality Date    ABDOMINAL AORTIC ANEURYSM REPAIR N/A 2023    Procedure: ABDOMINAL AORTIC ANEURYSM REPAIR;  Surgeon: Koffi Agosto MD;  Location: [facility] MAIN OR;  Service: Vascular;  Laterality: N/A;     COLON RESECTION N/A 12/26/2023    Procedure: COLON RESECTION;  Surgeon: Hernan Mallory MD;  Location: [facility] MAIN OR;  Service: General;  Laterality: N/A;  EXPLORATORY LAPAROTOMY, REPAIR OF SMALL BOWEL MESENTERY, APPENDECTOMY, LEFT HEMICOLECTOMY, CREATION OF OSTOMY, ABDOMINAL CLOSURE    EXPLORATORY LAPAROTOMY N/A 12/26/2023    Procedure: LAPAROTOMY EXPLORATORY;  Surgeon: Koffi Agosto MD;  Location: [facility] MAIN OR;  Service: Vascular;  Laterality: N/A;  Exploratory Laparotomy    INTERVENTIONAL RADIOLOGY PROCEDURE N/A 1/5/2024    Procedure: IVC Filter Placement;  Surgeon: Michoacano Collins MD;  Location: [facility] CATH INVASIVE LOCATION;  Service: Vascular;  Laterality: N/A;    KIDNEY STONE SURGERY      NEPHRECTOMY PARTIAL Left 11/13/2023    Procedure: NEPHRECTOMY PARTIAL LAPAROSCOPIC WITH DAVINCI ROBOT, left;  Surgeon: Michelle Cai MD;  Location: [facility] MAIN OR;  Service: Robotics - DaVinci;  Laterality: Left;    URETEROSCOPY LASER LITHOTRIPSY WITH STENT INSERTION Left 08/18/2023    Procedure: CYSTOSCOPY URETEROSCOPY RETROGRADE PYELOGRAM STENT INSERTION, left;  Surgeon: Michelle Cai MD;  Location: [facility] MAIN OR;  Service: Urology;  Laterality: Left;      General Information       Row Name 01/09/24 1438          OT Time and Intention    Document Type re-evaluation  -ES     Mode of Treatment individual therapy;occupational therapy  -ES       Row Name 01/09/24 1438          General Information    Patient Profile Reviewed yes  -ES     Existing Precautions/Restrictions fall  -ES       Row Name 01/09/24 1438          Cognition    Orientation Status (Cognition) oriented x 4  Patient cooperative, agreeable to therapy re-evaluation. Patient spouse present, encouraging for patient participation.  -ES       Row Name 01/09/24 1438          Safety Issues, Functional Mobility    Impairments Affecting Function (Mobility) balance;endurance/activity tolerance;pain;strength  -ES               User Key  (r) = Recorded By,  (t) = Taken By, (c) = Cosigned By      Initials Name Provider Type    ES Rach Brunson, OTR/L, CSRS Occupational Therapist                     Mobility/ADL's       Row Name 01/09/24 1441          Bed Mobility    Bed Mobility supine-sit;sit-supine  -ES     Supine-Sit [place] (Bed Mobility) moderate assist (50% patient effort);1 person assist  -     Sit-Supine [place] (Bed Mobility) moderate assist (50% patient effort);1 person assist  -ES     Bed Mobility, Safety Issues decreased use of arms for pushing/pulling;decreased use of legs for bridging/pushing  -     Assistive Device (Bed Mobility) bed rails;draw sheet  -     Comment, (Bed Mobility) seated EOB x 5 mins, initially CGA progressing to SBA with increased time.  -       Row Name 01/09/24 1441          Transfers    Comment, (Transfers) Patient declined transfers, reports fatigue seated EOB.  -       Row Name 01/09/24 1441          Activities of Daily Living    BADL Assessment/Intervention bathing;upper body dressing;lower body dressing;grooming;feeding;toileting  -       Row Name 01/09/24 1441          Bathing Assessment/Intervention    [place] Level (Bathing) bathing skills;maximum assist (25% patient effort)  -       Row Name 01/09/24 1441          Upper Body Dressing Assessment/Training    [place] Level (Upper Body Dressing) upper body dressing skills;minimum assist (75% patient effort)  -       Row Name 01/09/24 1441          Lower Body Dressing Assessment/Training    [place] Level (Lower Body Dressing) lower body dressing skills;maximum assist (25% patient effort);dependent (less than 25% patient effort)  -       Row Name 01/09/24 1441          Grooming Assessment/Training    [place] Level (Grooming) grooming skills;minimum assist (75% patient effort)  -       Row Name 01/09/24 1441          Self-Feeding Assessment/Training    [place] Level (Feeding) feeding skills;set up  -       Row Name 01/09/24  1441          Toileting Assessment/Training    [place] Level (Toileting) toileting skills;maximum assist (25% patient effort);dependent (less than 25% patient effort)  -ES               User Key  (r) = Recorded By, (t) = Taken By, (c) = Cosigned By      Initials Name Provider Type    ES Rach Brunson, OTR/L, RACHIDS Occupational Therapist                   Obj/Interventions       Row Name 01/09/24 1444          Sensory Assessment (Somatosensory)    Sensory Assessment (Somatosensory) sensation intact  -ES       Row Name 01/09/24 1444          Vision Assessment/Intervention    Visual Impairment/Limitations WFL  -ES       Row Name 01/09/24 1444          Range of Motion Comprehensive    General Range of Motion bilateral upper extremity ROM WNL  -ES       Row Name 01/09/24 1444          Strength Comprehensive (MMT)    General Manual Muscle Testing (MMT) Assessment upper extremity strength deficits identified;lower extremity strength deficits identified  -ES     Comment, General Manual Muscle Testing (MMT) Assessment BUEs 4/5  -ES       Row Name 01/09/24 1444          Motor Skills    Motor Skills functional endurance  -ES     Functional Endurance fair  -ES       Row Name 01/09/24 1444          Balance    Balance Assessment sitting dynamic balance  -ES     Dynamic Sitting Balance standby assist  -ES     Position, Sitting Balance unsupported;sitting edge of bed  -ES               User Key  (r) = Recorded By, (t) = Taken By, (c) = Cosigned By      Initials Name Provider Type    Rach Mims, OTR/L, RACHIDS Occupational Therapist                   Goals/Plan       Row Name 01/09/24 1449          Bed Mobility Goal 1 (OT)    Activity/Assistive Device (Bed Mobility Goal 1, OT) bed mobility activities, all  -ES     [place] Level/Cues Needed (Bed Mobility Goal 1, OT) standby assist  -ES     Time Frame (Bed Mobility Goal 1, OT) long term goal (LTG);10 days  -ES     Progress/Outcomes (Bed Mobility Goal 1, OT) goal revised  this date  -ES       Row Name 01/09/24 1449          Transfer Goal 1 (OT)    Activity/Assistive Device (Transfer Goal 1, OT) transfers, all  -ES     [place] Level/Cues Needed (Transfer Goal 1, OT) standby assist  -ES     Time Frame (Transfer Goal 1, OT) long term goal (LTG);10 days  -ES     Progress/Outcome (Transfer Goal 1, OT) goal revised this date  -ES       Row Name 01/09/24 1449          Bathing Goal 1 (OT)    Activity/Device (Bathing Goal 1, OT) bathing skills, all  -ES     [place] Level/Cues Needed (Bathing Goal 1, OT) minimum assist (75% or more patient effort)  -ES     Time Frame (Bathing Goal 1, OT) long term goal (LTG);10 days  -ES     Progress/Outcomes (Bathing Goal 1, OT) continuing progress toward goal  -ES       Row Name 01/09/24 1449          Dressing Goal 1 (OT)    Activity/Device (Dressing Goal 1, OT) dressing skills, all  -ES     [place]/Cues Needed (Dressing Goal 1, OT) minimum assist (75% or more patient effort)  -ES     Time Frame (Dressing Goal 1, OT) long term goal (LTG);10 days  -ES     Progress/Outcome (Dressing Goal 1, OT) continuing progress toward goal  -ES       Row Name 01/09/24 1449          Toileting Goal 1 (OT)    Activity/Device (Toileting Goal 1, OT) toileting skills, all  -ES     [place] Level/Cues Needed (Toileting Goal 1, OT) minimum assist (75% or more patient effort)  -ES     Time Frame (Toileting Goal 1, OT) long term goal (LTG);10 days  -ES     Progress/Outcome (Toileting Goal 1, OT) continuing progress toward goal  -ES       Row Name 01/09/24 1449          Strength Goal 1 (OT)    Strength Goal 1 (OT) Patient will increase BUE strength by 1/2 grade to support ADLs/functional transfers.  -ES     Time Frame (Strength Goal 1, OT) long term goal (LTG);10 days  -ES     Progress/Outcome (Strength Goal 1, OT) continuing progress toward goal  -ES       Row Name 01/09/24 1449          Problem Specific Goal 1 (OT)    Problem Specific Goal 1 (OT) Patient will  demonstrate fair+ endurance to support ADLs/functional transfers.  -ES     Time Frame (Problem Specific Goal 1, OT) long term goal (LTG);10 days  -ES     Progress/Outcome (Problem Specific Goal 1, OT) continuing progress toward goal  -ES       Row Name 01/09/24 1449          Therapy Assessment/Plan (OT)    Planned Therapy Interventions (OT) activity tolerance training;BADL retraining;functional balance retraining;occupation/activity based interventions;patient/caregiver education/training;strengthening exercise;transfer/mobility retraining  -ES               User Key  (r) = Recorded By, (t) = Taken By, (c) = Cosigned By      Initials Name Provider Type    Rach Mims, OTR/L, CSRS Occupational Therapist                   Clinical Impression       Row Name 01/09/24 1445          Plan of Care Review    Plan of Care Reviewed With patient;spouse  -ES     Progress improving  -ES     Outcome Evaluation Occupational Therapy re-evaluation completed to assess patient progress towards goals established per plan of care. Patient demonstrates improvements related to tolerance and balance. patient continues to demonstrate deficits related to tolerance, strength, transfers, balance and mobility that impede patient independence with ADLs. Patient would benefit from continued skilled OT intervention to promote return to baseline. Goals modified for patient current level. Continue per plan of care.  -ES       Row Name 01/09/24 1445          Therapy Assessment/Plan (OT)    Rehab Potential (OT) good, to achieve stated therapy goals  -ES     Criteria for Skilled Therapeutic Interventions Met (OT) yes;meets criteria;skilled treatment is necessary  -ES     Therapy Frequency (OT) 5 times/wk  -ES               User Key  (r) = Recorded By, (t) = Taken By, (c) = Cosigned By      Initials Name Provider Type    Rach Mims, KOSTAR/L, CSRS Occupational Therapist                   Outcome Measures       Row Name 01/09/24 145          How  much help from another is currently needed...    Putting on and taking off regular lower body clothing? 2  -ES     Bathing (including washing, rinsing, and drying) 2  -ES     Toileting (which includes using toilet bed pan or urinal) 2  -ES     Putting on and taking off regular upper body clothing 3  -ES     Taking care of personal grooming (such as brushing teeth) 3  -ES     Eating meals 3  -ES     AM-PAC 6 Clicks Score (OT) 15  -ES       Row Name 01/09/24 1100 01/09/24 0800       How much help from another person do you currently need...    Turning from your back to your side while in flat bed without using bedrails? 2  -DK 1  -AE    Moving from lying on back to sitting on the side of a flat bed without bedrails? 2  -DK 1  -AE    Moving to and from a bed to a chair (including a wheelchair)? 1  -DK 1  -AE    Standing up from a chair using your arms (e.g., wheelchair, bedside chair)? 1  -DK 1  -AE    Climbing 3-5 steps with a railing? 1  -DK 1  -AE    To walk in hospital room? 1  -DK 1  -AE    AM-PAC 6 Clicks Score (PT) 8  -DK 6  -AE    Highest Level of Mobility Goal 3 --> Sit at edge of bed  -DK 2 --> Bed activities/dependent transfer  -AE      Row Name 01/09/24 1450          Functional Assessment    Outcome Measure Options AM-PAC 6 Clicks Daily Activity (OT);Optimal Instrument  -ES       Row Name 01/09/24 1450          Optimal Instrument    Optimal Instrument Optimal - 3  -ES     Bending/Stooping 3  -ES     Standing 4  -ES     Reaching 2  -ES               User Key  (r) = Recorded By, (t) = Taken By, (c) = Cosigned By      Initials Name Provider Type    Kathie Saldaña, FRANCESCO Physical Therapist Assistant    Ronda Curiel, RN Registered Nurse    Rach Mims, OTR/L, CSRS Occupational Therapist                    Occupational Therapy Education       Title: PT OT SLP Therapies (In Progress)       Topic: Occupational Therapy (In Progress)       Point: ADL training (Done)       Description:   Instruct learner(s)  on proper safety adaptation and remediation techniques during self care or transfers.   Instruct in proper use of assistive devices.                  Learning Progress Summary             Patient Acceptance, E,TB, VU by  at 12/29/2023 1525   Significant Other Acceptance, E,TB, VU by  at 12/29/2023 1525                         Point: Home exercise program (Not Started)       Description:   Instruct learner(s) on appropriate technique for monitoring, assisting and/or progressing therapeutic exercises/activities.                  Learner Progress:  Not documented in this visit.              Point: Precautions (Done)       Description:   Instruct learner(s) on prescribed precautions during self-care and functional transfers.                  Learning Progress Summary             Patient Acceptance, E,TB, VU by  at 12/29/2023 1525   Significant Other Acceptance, E,TB, VU by  at 12/29/2023 1525                         Point: Body mechanics (Done)       Description:   Instruct learner(s) on proper positioning and spine alignment during self-care, functional mobility activities and/or exercises.                  Learning Progress Summary             Patient Acceptance, E,TB, VU by  at 12/29/2023 1525   Significant Other Acceptance, E,TB, VU by  at 12/29/2023 1525                                         User Key       Initials Effective Dates Name Provider Type Discipline     06/16/21 -  Jasmyn Colon OT Occupational Therapist OT                  OT Recommendation and Plan  Planned Therapy Interventions (OT): activity tolerance training, BADL retraining, functional balance retraining, occupation/activity based interventions, patient/caregiver education/training, strengthening exercise, transfer/mobility retraining  Therapy Frequency (OT): 5 times/wk  Plan of Care Review  Plan of Care Reviewed With: patient, spouse  Progress: improving  Outcome Evaluation: Occupational Therapy re-evaluation completed to  assess patient progress towards goals established per plan of care. Patient demonstrates improvements related to tolerance and balance. patient continues to demonstrate deficits related to tolerance, strength, transfers, balance and mobility that impede patient independence with ADLs. Patient would benefit from continued skilled OT intervention to promote return to baseline. Goals modified for patient current level. Continue per plan of care.     Time Calculation:         Time Calculation- OT       Row Name 01/09/24 1451             Time Calculation- OT    OT Received On 01/09/24  -ES      OT Goal Re-Cert Due Date 01/18/24  -ES         Untimed Charges    OT Eval/Re-eval Minutes 22  -ES         Total Minutes    Untimed Charges Total Minutes 22  -ES       Total Minutes 22  -ES                User Key  (r) = Recorded By, (t) = Taken By, (c) = Cosigned By      Initials Name Provider Type    ES Rach Brunson OTR/L, CSRS Occupational Therapist                  Therapy Charges for Today       Code Description Service Date Service Provider Modifiers Qty    [id number] HC OT RE-EVAL 2 1/9/2024 Rach Brunson OTR/L, CSRS GO 1                 HALEY Thompson/L, CSRS  1/9/2024

## 2024-01-09 NOTE — PROGRESS NOTES
[facility]   Hospitalist Progress Note  Date: 2024  Patient Name: Danny Savage  : 1960  MRN: [id number]  Date of admission: 2023      Subjective   Subjective     Chief Complaint: Follow-up postop seroma    Summary:Danny Savage is a 63 y.o. male  presented to the hospital with complaints of left lower back pain and fever x 3 days, at that time patient was status post nephrectomy and there was concern for postoperative seroma/hematoma, patient was discharged home but pain began getting worse 10 out of 10 intensity on repeat presentation patient was febrile to 102.6, repeat CT scan showed likely postoperative seroma/hematoma although infection could not be ruled out, there was an abdominal aortic aneurysm of 5.1 cm.  The patient was started on IV antibiotics and patient was admitted for possible abscess in the nephrectomy bed, patient went unresponsive on 2023, blood pressure was 42/32, patient was pale and clammy, CODE BLUE was called, patient received IV fluids, patient was intubated, he was found to have expanding AAA with eccentric mural thrombus/hematoma measuring 5.1 cm, vascular surgeon consulted and patient underwent urgent open repair of ruptured infrarenal AAA with tube graft and mobilization of the omentum, patient taken back to the OR on 2023 to look for sources of blood loss, he was found to have nonviable colon and patient underwent partial colectomy by Dr. Mallory.  Patient has had prolonged course with continued anemia, repeat scans concerning for worsening hematoma, patient was found to have right lower extremity DVT.  Patient initially started on heparin drip though hemoglobin trended down sharply concerning for occult bleed.  Heparin drip stopped and hemoglobin stabilized.  Patient's blood pressure did not tolerate hemodialysis and patient had to be switched to CRRT.  Patient remains on antibiotics but repeat blood cultures are negative.    Patient  able to be weaned off of pressors.  Vascular surgery planned on placing IVC filter though clot was found in the pararenal IVC on venogram and procedure was aborted.  Patient started back on heparin drip no bolus.  Able to transition from CRRT to intermittent hemodialysis with ultrafiltration.  Having remittent issues with nausea and vomiting.  Good ostomy output.  Right upper quadrant ultrasound demonstrated cholelithiasis without cholecystitis.  Liver unremarkable.  Tube feeds restarted slowly.  Patient and family hoping to discharge to LTAC at [facility] once stable    Interval Followup:  Patient is resting in bed.  Patient continues to feel weak and on well  His bilirubin is elevated however he denies any significant abdominal pain.  GI has been consulted  Patient continues to have intermittent nausea and vomiting.  Patient is having difficulty tolerating his tube feeds  Patient remains on heparin drip  Remains on dialysis      Review of Systems  All systems reviewed and negative unless  stated above    Objective   Objective     Vitals:   Temp:  [97.5 °F (36.4 °C)-98.8 °F (37.1 °C)] 98.8 °F (37.1 °C)  Heart Rate:  [82-95] 88  Resp:  [12-19] 19  BP: ()/(56-81) 118/70  Flow (L/min):  [2] 2  Physical Exam   General: Patient appears chronically ill resting in bed with wife at the bedside  HEENT: Normocephalic atraumatic moist membranes pupils equal round reactive light, no scleral icterus no conjunctival injection  Cardiovascular: regular rate and rhythm no murmurs rubs or gallops S1-S2, no lower extremity edema appreciated  Pulmonary: Decreased, no wheezing or rhonchi  Gastrointestinal: Soft nondistended positive bowel sounds all 4 quadrants no rebound or guarding,  midline incision well-approximated without erythema or drainage, colostomy in left lower quadrant   Musculoskeletal: No clubbing cyanosis, warm and well-perfused, calves soft symmetric nontender bilaterally  Skin: Clean dry without  faiza  Neuro: Cranial nerves II through XII intact grossly no sensorimotor deficits appreciated bilateral upper and lower extremities  Psych: Patient is calm cooperative and appropriate with exam not responding to internal stimuli  : No Rico catheter no bladder distention no suprapubic tenderness    Result Review    Result Review:  I have personally reviewed these results and agree with these findings:  [x]  Laboratory  LAB RESULTS:      Lab 01/09/24  0958 01/09/24  0306 01/08/24  1511 01/08/24  0854 01/08/24  0642 01/08/24  0548 01/07/24  0444 01/07/24  0254 01/06/24  0221 01/05/24  2039 01/05/24  1443 01/05/24  1214 01/05/24  0434 01/05/24  0433 01/04/24  0602 01/03/24  0814 01/03/24  0358   WBC  --  11.28*  --   --   --  12.51*  --  15.46* 15.93*  --   --   --   --  18.83* 26.10*  --  29.36*   HEMOGLOBIN  --  8.7*  --   --   --  8.3*  --  9.4* 8.4*  --   --   --   --  8.3* 8.7*   < > 7.4*   HEMATOCRIT  --  27.2*  --   --   --  25.3*  --  29.5* 26.1*  --   --   --   --  26.4* 26.5*   < > 22.4*   PLATELETS  --  366  --   --   --  322  --  330 284  --   --   --   --  272 296  --  344   NEUTROS ABS  --  9.51*  --   --   --  10.63*  --   --  14.15*  --   --   --   --   --  21.17*  --  26.72*   IMMATURE GRANS (ABS)  --  0.13*  --   --   --  0.17*  --   --  0.24*  --   --   --   --   --  1.81*  --   --    LYMPHS ABS  --  0.63*  --   --   --  0.74  --   --  0.58*  --   --   --   --   --  1.21  --   --    MONOS ABS  --  0.88  --   --   --  0.85  --   --  0.84  --   --   --   --   --  1.72*  --   --    EOS ABS  --  0.10  --   --   --  0.08  --   --  0.06  --   --   --   --   --  0.06  --   --    MCV  --  91.6  --   --   --  91.7  --  93.7 92.9  --   --   --   --  93.6 91.1  --  88.2   LACTATE  --   --   --   --   --  1.0  --   --  1.2  --   --   --   --  1.2 1.0  --   --    LACTATE, ARTERIAL  --   --   --   --   --   --   --   --   --   --   --  1.11  --   --   --   --   --    PROTIME  --   --   --   --   --   --   --   18.1* 20.4*  --  20.4*  --  19.3*  --  17.9*   < >  --    APTT 109.4* 119.1* 93.3 90.4 50.3* 112.4*   < >  --  97.1*   < >  --   --  38.9*  --  32.9   < >  --     < > = values in this interval not displayed.         Lab 01/09/24  0306 01/08/24  0548 01/07/24  0254 01/06/24  1149 01/06/24  0548 01/05/24  2358 01/05/24  2357 01/05/24  1742 01/05/24  1214 01/05/24  1156   SODIUM 133* 132* 133* 135* 134*   < >  --  137  --  135*   SODIUM, ARTERIAL  --   --   --   --   --   --   --   --  136.4  --    POTASSIUM 4.4 4.8 5.5* 4.5 4.6   < >  --  4.6  --  4.5   CHLORIDE 94* 96* 98 99 99   < >  --  102  --  101   CO2 21.9* 20.2* 17.9* 19.5* 19.0*   < >  --  18.7*  --  19.1*   ANION GAP 17.1* 15.8* 17.1* 16.5* 16.0*   < >  --  16.3*  --  14.9   BUN 47* 56* 64* 49* 43*   < >  --  46*  --  46*   CREATININE 3.51* 3.89* 4.05* 3.20* 2.85*   < >  --  3.23*  --  3.26*   EGFR 18.5* 16.4* 15.6* 20.7* 23.8*   < >  --  20.5*  --  20.2*   GLUCOSE 89 100* 83 141* 125*   < >  --  88  --  193*   GLUCOSE, ARTERIAL  --   --   --   --   --   --   --   --  162*  --    CALCIUM 7.9* 7.9* 7.8* 7.8* 7.7*   < >  --  7.5*  --  7.6*   IONIZED CALCIUM  --   --   --   --  1.01*  --  1.00* 1.01* 1.01* 1.00*   MAGNESIUM 1.9 2.2 2.6* 2.6* 2.5*   < >  --  2.5*  --  2.5*   PHOSPHORUS 4.9* 5.4* 5.8* 4.6* 4.9*   < >  --  5.1*  --  5.2*    < > = values in this interval not displayed.         Lab 01/09/24  0306 01/08/24  0548 01/06/24  1149 01/06/24  0548 01/06/24  0221 01/05/24  1156 01/05/24  0433 01/04/24  1148 01/04/24  0602 01/03/24  1147 01/03/24  0358   TOTAL PROTEIN 6.9 6.9  --   --  6.9  --  6.6  --  6.8  --  6.2   ALBUMIN 2.6* 2.6* 2.9* 2.8* 2.8*   < > 2.9*   < > 3.0*  3.0*   < > 3.0*   GLOBULIN  --  4.3  --   --  4.1  --  3.7  --  3.8  --  3.2   ALT (SGPT) 119* 101*  --   --  103*  --  127*  --  153*  --  220*   AST (SGOT) 129* 103*  --   --  104*  --  147*  --  197*  --  600*   BILIRUBIN 10.3* 9.5*  9.8*  --   --  8.2*  --  10.3*  --  10.1*  --  8.3*    INDIRECT BILIRUBIN 1.8 1.5  --   --   --   --   --   --   --   --   --    BILIRUBIN DIRECT 8.5* 8.0*  --   --   --   --   --   --   --   --   --    ALK PHOS 139* 129*  --   --  115  --  114  --  114  --  112    < > = values in this interval not displayed.         Lab 01/07/24  0254 01/06/24  0221 01/05/24  1443 01/05/24  0434 01/04/24  0602   PROTIME 18.1* 20.4* 20.4* 19.3* 17.9*   INR 1.47* 1.71* 1.71* 1.60* 1.45*                   Lab 01/05/24  1214 01/02/24  2232   PH, ARTERIAL 7.386 7.467*   PCO2, ARTERIAL 32.1* 29.7*   PO2 ART 67.7* 58.5*   O2 SATURATION ART 92.9* 90.7*   FIO2 44 28   HCO3 ART 18.8* 21.0*   BASE EXCESS ART -5.3* -2.2*   CARBOXYHEMOGLOBIN 1.8* 1.6*     Brief Urine Lab Results  (Last result in the past 365 days)        Color   Clarity   Blood   Leuk Est   Nitrite   Protein   CREAT   Urine HCG        12/23/23 2054 Yellow   Clear   Small (1+)   Negative   Negative   100 mg/dL (2+)                 Microbiology Results (last 10 days)       Procedure Component Value - Date/Time    Blood Culture - Blood, Arm, Left [[id number]]  (Normal) Collected: 01/02/24 1422    Lab Status: Final result Specimen: Blood from Arm, Left Updated: 01/07/24 1430     Blood Culture No growth at 5 days    Blood Culture - Blood, Hand, Left [[id number]]  (Normal) Collected: 01/02/24 1216    Lab Status: Final result Specimen: Blood from Hand, Left Updated: 01/07/24 1230     Blood Culture No growth at 5 days            [x]  Microbiology  [x]  Radiology  US Abdomen Limited    Result Date: 1/8/2024    1. Cholelithiasis and sludge filling the gallbladder.  No sonographic evidence of acute cholecystitis 2. Limited imaging of the liver is grossly unremarkable in appearance     ANNABELLE PALMER MD       ELECTRONICALLY SIGNED AND APPROVED BY: ANNABELLE PALMER MD ON 1/08/2024 AT 19:27             XR Abdomen KUB    Result Date: 1/7/2024    1. Feeding tube projects in expected position 2. Mild gaseous distention of bowel with suspected  mild wall edema.  No definite evidence of free air 3. Consolidation left lung base 4. Left sided inguinal vascular catheter projects in expected position     ANNABELLE PALMER MD       Electronically Signed and Approved By: ANNABELLE PALMER MD on 1/07/2024 at 9:13             XR Abdomen KUB    Result Date: 1/2/2024    1. Small bore feeding tube tip terminating at the junction of the 3rd and 4th portion of the duodenum.      DOMONIQUE SALDAÑA MD       Electronically Signed and Approved By: DOMONIQUE SALDAÑA MD on 1/02/2024 at 19:43             XR Chest 1 View    Result Date: 1/2/2024    1. Mild to moderate perihilar and basilar airspace disease suspected to represent pulmonary edema.  Pneumonia is also in the differential 2. Small bilateral pleural effusions, unchanged 3. Feeding tube with the tip near the gastroesophageal junction.       Herve Garnett M.D.       Electronically Signed and Approved By: Herve Garnett M.D. on 1/02/2024 at 13:03             CT Abdomen Pelvis Without Contrast    Result Date: 1/1/2024    CT scan of the abdomen and pelvis without contrast demonstrating moderate right and small left effusions, larger in comparison to 9/29/2023..  Compressive atelectasis is again seen at the lung bases.  Ill-defined irregular retroperitoneal mass appears larger in comparison to the previous study concerning for interval hemorrhage.  An ill-defined 9.5 cm component with layering density is consistent with hematoma.  I discussed findings with Dr. Lloyd at 1855.     BENITO PRADHAN MD       Electronically Signed and Approved By: BENITO PRADHAN MD on 1/01/2024 at 18:56              [x]  EKG/Telemetry   [x]  Cardiology/Vascular   Echocardiogram 12/24/2023  •  Left ventricular ejection fraction appears to be 66 - 70%.  •  Hyperdynamic.  The left ventricular cavity is small in size.  •  Left ventricular diastolic function was not assessed.  •  There is a trivial pericardial effusion.  •  No significant valvular  abnormalities noted.  []  Pathology  []  Old records  [x]  Other:  Scheduled Meds:ampicillin, 2 g, Intravenous, Q24H  escitalopram, 5 mg, Nasogastric, Daily  melatonin, 10 mg, Nasogastric, Nightly  midodrine, 5 mg, Nasogastric, TID AC  QUEtiapine, 25 mg, Nasogastric, Nightly  senna-docusate sodium, 2 tablet, Nasogastric, BID  sodium bicarbonate, 650 mg, Nasogastric, TID  sodium chloride, 10 mL, Intravenous, Q12H      Continuous Infusions:heparin, 18 Units/kg/hr, Last Rate: 17 Units/kg/hr (24 0403)      PRN Meds:.•  senna-docusate sodium **AND** polyethylene glycol **AND** [DISCONTINUED] bisacodyl **AND** bisacodyl  •  calcium carbonate  •  dextrose  •  dextrose  •  dextrose  •  dextrose  •  fentaNYL citrate (PF)  •  glucagon (human recombinant)  •  glucagon (human recombinant)  •  heparin (porcine)  •  heparin (porcine)  •  heparin  •  heparin  •  [] Morphine **AND** naloxone  •  nitroglycerin  •  ondansetron  •  oxyCODONE-acetaminophen  •  oxyCODONE-acetaminophen  •  prochlorperazine  •  sodium chloride  •  sodium chloride  •  traZODone      Assessment & Plan   Assessment / Plan     Assessment/Plan:  Ruptured infrarenal AAA s/p open repair  Hemorrhagic shock  Hypovolemic shock  Acute hypoxic respiratory failure requiring mechanical ventilation  Septic shock with Streptococcus pyogenes bacteremia  SVT  History of RCC s/p partial nephrectomy on   Acute anuric renal failure requiring CRRT  Metabolic acidosis  Clinically significant lactic acidosis  Chronically immunosuppressed on CellCept  Hypocalcemia  Hypokalemia  Acute right lower extremity DVT in the peroneal and gastrocnemius  Acute blood loss anemia  Perirenal IVC thrombus  Ischemic bowel  Hyperkalemia  Anxiety  Transaminitis  Hyperbilirubinemia    PLAN    Vascular surgery, general surgery, urology, nephrology and pulmonology critical care consulted thank you for assistance  Continue postop wound care per general surgery  Consult GI today given  elevated bilirubin and will order MRCP  Continue to monitor hemoglobin closely and transfuse packed red blood cells as needed to keep greater than 7  Continue heparin drip and monitor very closely for signs of bleeding  Continue Protonix twice daily  Continue supplemental oxygen as needed to keep sats greater than 90%  Continue ampicillin-sulbactam complete 14-day course per pulmonology critical care  Continue midodrine and titrate per pulmonology critical care  Continue hemodialysis and ultrafiltration per nephrology  Repeat renal panel in a.m.  Continue to monitor electrolytes replace as needed  Continue Lexapro and Seroquel  Continue tube feeds at low rate and titrate up slowly to goal  Monitor transaminases  Advance diet per speech  Further inpatient orders recommendations per clinical course    Discussed plan with bedside RN as well as pulmonology critical care team.    Disposition: LTAC family hoping for [facility] in [place].    DVT prophylaxis:  Medical and mechanical DVT prophylaxis orders are present.    CODE STATUS:   Level Of Support Discussed With: Patient  Code Status (Patient has no pulse and is not breathing): CPR (Attempt to Resuscitate)  Medical Interventions (Patient has pulse or is breathing): Full Support      Patient remains critically ill in the intensive care unit.  I spent 32 minutes of critical care time managing this critically ill patient with group a strep bacteremia, hyperbilirubinemia, abdominal aortic aneurysm status postrepair, acute DVT, acute kidney injury requiring dialysis.  Time was spent in examining patient, reviewing laboratory data, formulating care plan, talking to specialist and nursing staff.

## 2024-01-09 NOTE — PROGRESS NOTES
[facility]   Urology Progress Note    Patient Name: Danny Savage  : 1960  MRN: [id number]  Primary Care Physician:  Kodi Pichardo MD  Date of admission: 2023    Subjective   Subjective     Drowsy but easily awakens on dialysis; wife at bedside      Objective   Objective     Vitals:   Temp:  [97.7 °F (36.5 °C)-98.6 °F (37 °C)] 97.9 °F (36.6 °C)  Heart Rate:  [85-96] 87  Resp:  [12-20] 15  BP: ()/(56-81) 107/73  Physical Exam     Resting  No acute distress  Unlabored respirations        Result Review    Result Review:  I have personally reviewed the results from the time of this admission to 2024 19:29 EST and agree with these findings:  [x]  Laboratory  []  Microbiology  [x]  Radiology  []  EKG/Telemetry   []  Cardiology/Vascular   []  Pathology  [x]  Old records  []  Other:      Assessment & Plan   Assessment / Plan     Brief Patient Summary:  Danny Savage is a 63 y.o. male history of left partial nephrectomy, 2023 status post ruptured AAA requiring emergent repair, 2023 and subsequent ex lap appendectomy, left colon resection with creation of an ostomy, 2023 remains in ICU, requiring hemodialysis    Active Hospital Problems:  Active Hospital Problems    Diagnosis    • **Left flank pain    • S/P Exploratory laparotomy; appendectomy; left colon resection with creation of ostomy    • S/P Open repair of ruptured infrarenal abdominal aortic aneurysm    • Ruptured infrarenal abdominal aortic aneurysm (AAA)    • DVT, lower extremity, distal, acute, unspecified laterality      Plan:   Chart reviewed; no urologic recommendations    Attempted to provide support and encouragement to patient and wife  Attempting to to find appropriate placement after admission    Will check on intermittently    Electronically signed by Michelle Cai MD, 24, 7:29 PM EST.

## 2024-01-09 NOTE — PLAN OF CARE
Goal Outcome Evaluation:      Trickle rate TF turned off around 4am due to multiple episodes of nausea and vomiting with green bile (300ml). Once tube feeds were turned off, N&V eased up and are now coming to a stop. Remians off pressors. HD completed 1.3 L off. Medicated x1 with pain meds. Multiple shifts in bed. Patient rested on and off through out the night.

## 2024-01-09 NOTE — SIGNIFICANT NOTE
Wound Eval / Progress Noted     Evelina     Patient Name: Danny Savage  : 1960  MRN: [id number]  Today's Date: 2024                 Admit Date: 2023    Visit Dx:    ICD-10-CM ICD-9-CM   1. Sepsis, due to unspecified organism, unspecified whether acute organ dysfunction present  A41.9 038.9     995.91   2. Fever, unspecified fever cause  R50.9 780.60   3. Intra-abdominal fluid collection  R18.8 789.59   4. Abdominal aortic aneurysm (AAA) without rupture, unspecified part  I71.40 441.4   5. Ruptured infrarenal abdominal aortic aneurysm (AAA)  I71.33 441.3   6. Decreased activities of daily living (ADL)  Z78.9 V49.89   7. Difficulty walking  R26.2 719.7   8. DVT, lower extremity, distal, acute, unspecified laterality  I82.4Z9 453.42   9. Dysphagia, oropharyngeal  R13.12 787.22   10. S/P Open repair of ruptured infrarenal abdominal aortic aneurysm  Z98.890 V45.89    Z86.79    11. S/P AAA (abdominal aortic aneurysm) repair  Z98.890 V45.89    Z86.79          Left flank pain    Ruptured infrarenal abdominal aortic aneurysm (AAA)    DVT, lower extremity, distal, acute, unspecified laterality    S/P Exploratory laparotomy; appendectomy; left colon resection with creation of ostomy    S/P Open repair of ruptured infrarenal abdominal aortic aneurysm        Past Medical History:   Diagnosis Date    Allergy     Aortic aneurysm     4.7 just found has not f/u    H/O Kidney stone     Hiatal hernia     Kidney mass     left    Kidney stone     Renal cancer     S/P Exploratory laparotomy; appendectomy; left colon resection with creation of ostomy 2024    S/P Open repair of ruptured infrarenal abdominal aortic aneurysm 2024        Past Surgical History:   Procedure Laterality Date    ABDOMINAL AORTIC ANEURYSM REPAIR N/A 2023    Procedure: ABDOMINAL AORTIC ANEURYSM REPAIR;  Surgeon: Koffi Agosto MD;  Location: [facility] MAIN OR;  Service: Vascular;  Laterality: N/A;    COLON RESECTION N/A  12/26/2023    Procedure: COLON RESECTION;  Surgeon: Hernan Mallory MD;  Location: [facility] MAIN OR;  Service: General;  Laterality: N/A;  EXPLORATORY LAPAROTOMY, REPAIR OF SMALL BOWEL MESENTERY, APPENDECTOMY, LEFT HEMICOLECTOMY, CREATION OF OSTOMY, ABDOMINAL CLOSURE    EXPLORATORY LAPAROTOMY N/A 12/26/2023    Procedure: LAPAROTOMY EXPLORATORY;  Surgeon: Koffi Agosto MD;  Location: [facility] MAIN OR;  Service: Vascular;  Laterality: N/A;  Exploratory Laparotomy    INTERVENTIONAL RADIOLOGY PROCEDURE N/A 1/5/2024    Procedure: IVC Filter Placement;  Surgeon: Michoacano Collins MD;  Location: [facility] CATH INVASIVE LOCATION;  Service: Vascular;  Laterality: N/A;    KIDNEY STONE SURGERY      NEPHRECTOMY PARTIAL Left 11/13/2023    Procedure: NEPHRECTOMY PARTIAL LAPAROSCOPIC WITH DAVINCI ROBOT, left;  Surgeon: Michelle Cai MD;  Location: [facility] MAIN OR;  Service: Robotics - DaVinci;  Laterality: Left;    URETEROSCOPY LASER LITHOTRIPSY WITH STENT INSERTION Left 08/18/2023    Procedure: CYSTOSCOPY URETEROSCOPY RETROGRADE PYELOGRAM STENT INSERTION, left;  Surgeon: Michelle Cai MD;  Location[facility] OR;  Service: Urology;  Laterality: Left;     Wound Check / Follow-up: Patient seen today for ostomy follow-up and check-in. Patient is awake and alert at time of visit. Cortrak tube remains but tube feeds are off at this time due to nausea / vomiting issues. Patient's wife is present at bedside. Ostomy pouching system is intact with no signs of lifting or leaking noted. Loose, brown stool is noted in pouch. Patient and wife deny any needs or concerns at this. Will plan to see patient again later this week for next pouching system change.        Impression: Recent colostomy.      Short term goals: Colostomy management, ostomy education.       Enedina Norman RN    1/9/2024    12:34 EST

## 2024-01-10 ENCOUNTER — APPOINTMENT (OUTPATIENT)
Dept: GENERAL RADIOLOGY | Facility: HOSPITAL | Age: 64
DRG: 268 | End: 2024-01-10
Payer: COMMERCIAL

## 2024-01-10 ENCOUNTER — ANESTHESIA EVENT (OUTPATIENT)
Dept: PERIOP | Facility: HOSPITAL | Age: 64
End: 2024-01-10
Payer: COMMERCIAL

## 2024-01-10 PROBLEM — N17.9 AKI (ACUTE KIDNEY INJURY): Status: ACTIVE | Noted: 2023-12-23

## 2024-01-10 LAB
ALBUMIN SERPL-MCNC: 2.4 G/DL (ref 3.5–5.2)
ALP SERPL-CCNC: 146 U/L (ref 39–117)
ALT SERPL W P-5'-P-CCNC: 143 U/L (ref 1–41)
ANION GAP SERPL CALCULATED.3IONS-SCNC: 17.9 MMOL/L (ref 5–15)
APTT PPP: 119.6 SECONDS (ref 78–95.9)
APTT PPP: 59.9 SECONDS (ref 78–95.9)
APTT PPP: 82 SECONDS (ref 78–95.9)
APTT PPP: >200 SECONDS (ref 78–95.9)
AST SERPL-CCNC: 154 U/L (ref 1–40)
BILIRUB CONJ SERPL-MCNC: 8.4 MG/DL (ref 0–0.3)
BILIRUB INDIRECT SERPL-MCNC: 2.1 MG/DL
BILIRUB SERPL-MCNC: 10.5 MG/DL (ref 0–1.2)
BUN SERPL-MCNC: 70 MG/DL (ref 8–23)
BUN/CREAT SERPL: 14.5 (ref 7–25)
CALCIUM SPEC-SCNC: 8 MG/DL (ref 8.6–10.5)
CHLORIDE SERPL-SCNC: 95 MMOL/L (ref 98–107)
CO2 SERPL-SCNC: 19.1 MMOL/L (ref 22–29)
CREAT SERPL-MCNC: 4.82 MG/DL (ref 0.76–1.27)
EGFRCR SERPLBLD CKD-EPI 2021: 12.7 ML/MIN/1.73
GLUCOSE BLDC GLUCOMTR-MCNC: 86 MG/DL (ref 70–99)
GLUCOSE BLDC GLUCOMTR-MCNC: 92 MG/DL (ref 70–99)
GLUCOSE SERPL-MCNC: 89 MG/DL (ref 65–99)
MAGNESIUM SERPL-MCNC: 2.1 MG/DL (ref 1.6–2.4)
PHOSPHATE SERPL-MCNC: 5.6 MG/DL (ref 2.5–4.5)
POTASSIUM SERPL-SCNC: 5.2 MMOL/L (ref 3.5–5.2)
PROT SERPL-MCNC: 6.8 G/DL (ref 6–8.5)
SODIUM SERPL-SCNC: 132 MMOL/L (ref 136–145)
WHOLE BLOOD HOLD SPECIMEN: NORMAL

## 2024-01-10 PROCEDURE — 85730 THROMBOPLASTIN TIME PARTIAL: CPT | Performed by: INTERNAL MEDICINE

## 2024-01-10 PROCEDURE — 25010000002 PROCHLORPERAZINE 10 MG/2ML SOLUTION: Performed by: NURSE PRACTITIONER

## 2024-01-10 PROCEDURE — 71045 X-RAY EXAM CHEST 1 VIEW: CPT

## 2024-01-10 PROCEDURE — 82948 REAGENT STRIP/BLOOD GLUCOSE: CPT

## 2024-01-10 PROCEDURE — 25010000002 HEPARIN (PORCINE) PER 1000 UNITS: Performed by: SURGERY

## 2024-01-10 PROCEDURE — 83735 ASSAY OF MAGNESIUM: CPT | Performed by: PHYSICIAN ASSISTANT

## 2024-01-10 PROCEDURE — 99024 POSTOP FOLLOW-UP VISIT: CPT | Performed by: SURGERY

## 2024-01-10 PROCEDURE — 84100 ASSAY OF PHOSPHORUS: CPT | Performed by: PHYSICIAN ASSISTANT

## 2024-01-10 PROCEDURE — 80076 HEPATIC FUNCTION PANEL: CPT | Performed by: PHYSICIAN ASSISTANT

## 2024-01-10 PROCEDURE — 25010000002 HEPARIN (PORCINE) 25000-0.45 UT/250ML-% SOLUTION: Performed by: SURGERY

## 2024-01-10 PROCEDURE — 85730 THROMBOPLASTIN TIME PARTIAL: CPT | Performed by: FAMILY MEDICINE

## 2024-01-10 PROCEDURE — 80048 BASIC METABOLIC PNL TOTAL CA: CPT | Performed by: INTERNAL MEDICINE

## 2024-01-10 PROCEDURE — 99291 CRITICAL CARE FIRST HOUR: CPT | Performed by: INTERNAL MEDICINE

## 2024-01-10 PROCEDURE — 25010000002 ONDANSETRON PER 1 MG: Performed by: SURGERY

## 2024-01-10 PROCEDURE — 25010000002 AMPICILLIN PER 500 MG: Performed by: INTERNAL MEDICINE

## 2024-01-10 PROCEDURE — 99291 CRITICAL CARE FIRST HOUR: CPT | Performed by: STUDENT IN AN ORGANIZED HEALTH CARE EDUCATION/TRAINING PROGRAM

## 2024-01-10 PROCEDURE — 25810000003 LACTATED RINGERS SOLUTION: Performed by: STUDENT IN AN ORGANIZED HEALTH CARE EDUCATION/TRAINING PROGRAM

## 2024-01-10 RX ADMIN — DOCUSATE SODIUM 50MG AND SENNOSIDES 8.6MG 2 TABLET: 8.6; 5 TABLET, FILM COATED ORAL at 08:22

## 2024-01-10 RX ADMIN — ONDANSETRON 4 MG: 2 INJECTION INTRAMUSCULAR; INTRAVENOUS at 00:58

## 2024-01-10 RX ADMIN — TRAZODONE HYDROCHLORIDE 50 MG: 50 TABLET ORAL at 21:29

## 2024-01-10 RX ADMIN — HEPARIN SODIUM 2600 UNITS: 1000 INJECTION INTRAVENOUS; SUBCUTANEOUS at 11:25

## 2024-01-10 RX ADMIN — SODIUM BICARBONATE 650 MG TABLET 650 MG: at 17:49

## 2024-01-10 RX ADMIN — ESCITALOPRAM OXALATE 5 MG: 10 TABLET ORAL at 08:22

## 2024-01-10 RX ADMIN — ONDANSETRON 4 MG: 2 INJECTION INTRAMUSCULAR; INTRAVENOUS at 08:40

## 2024-01-10 RX ADMIN — PROCHLORPERAZINE EDISYLATE 2.5 MG: 5 INJECTION INTRAMUSCULAR; INTRAVENOUS at 23:32

## 2024-01-10 RX ADMIN — HEPARIN SODIUM 2600 UNITS: 1000 INJECTION INTRAVENOUS; SUBCUTANEOUS at 17:51

## 2024-01-10 RX ADMIN — MIDODRINE HYDROCHLORIDE 5 MG: 5 TABLET ORAL at 08:23

## 2024-01-10 RX ADMIN — MIDODRINE HYDROCHLORIDE 5 MG: 5 TABLET ORAL at 17:49

## 2024-01-10 RX ADMIN — AMPICILLIN SODIUM 2 G: 2 INJECTION, POWDER, FOR SOLUTION INTRAMUSCULAR; INTRAVENOUS at 21:29

## 2024-01-10 RX ADMIN — Medication 10 ML: at 08:23

## 2024-01-10 RX ADMIN — HEPARIN SODIUM 12 UNITS/KG/HR: 10000 INJECTION, SOLUTION INTRAVENOUS at 14:55

## 2024-01-10 RX ADMIN — SODIUM BICARBONATE 650 MG TABLET 650 MG: at 08:22

## 2024-01-10 RX ADMIN — SODIUM CHLORIDE, POTASSIUM CHLORIDE, SODIUM LACTATE AND CALCIUM CHLORIDE 1000 ML: 600; 310; 30; 20 INJECTION, SOLUTION INTRAVENOUS at 11:31

## 2024-01-10 RX ADMIN — Medication 10 MG: at 21:29

## 2024-01-10 RX ADMIN — MIDODRINE HYDROCHLORIDE 5 MG: 5 TABLET ORAL at 11:27

## 2024-01-10 RX ADMIN — Medication 10 ML: at 21:29

## 2024-01-10 RX ADMIN — QUETIAPINE FUMARATE 25 MG: 25 TABLET ORAL at 21:29

## 2024-01-10 RX ADMIN — SODIUM BICARBONATE 650 MG TABLET 650 MG: at 21:29

## 2024-01-10 NOTE — SIGNIFICANT NOTE
01/10/24 6827   Plan   Plan  received call back from Evita with [facility] - clinical reviewed again by team and they will start pre-cert today. Hopeful to hear an answer this afternoon or tomorrow. Spouse notified.

## 2024-01-10 NOTE — SIGNIFICANT NOTE
01/10/24 1027   Plan   Plan  spoke to Evita at [facility]. She states that she is still following the referral and plans to submit his clinical information to medical director today for another review. Will touch base with spouse once I have spoken to Evita again.

## 2024-01-10 NOTE — PROGRESS NOTES
[facility]     Progress Note    Patient Name: Danny Savage  : 1960  MRN: [id number]  Primary Care Physician:  Kodi Pichardo MD  Date of admission: 2023    Subjective   Subjective     POD #17 status post open repair of ruptured abdominal aorta aneurysm, POD #15 status post abdominal reexploration, sigmoidectomy with end colostomy, closure of abdominal wound    Continuing to have difficulties tolerating oral intake.  No other complaints at this time.      Objective   Objective     Vitals:   Temp:  [97.8 °F (36.6 °C)-98 °F (36.7 °C)] 98 °F (36.7 °C)  Heart Rate:  [86-97] 86  Resp:  [15-21] 17  BP: (119-144)/(47-87) 129/85  Flow (L/min):  [2] 2    Physical Exam   General: Awake, no acute distress  Extremities: Symmetric, moderate edema.      Assessment & Plan   Assessment / Plan     Assessment/Plan:    Mr. Savage is in need for long-term access for hemodialysis.  The plan is for insertion of a tunneled catheter for hemodialysis.  I have discussed with his wife who is at the bedside in detail the mechanics of the procedure, the indications, benefits, risks, alternatives as well as potential complications to include but not limited to infection, bleeding, pneumothorax, catheter malfunction.  She appears to understand and desires to proceed.    Active Hospital Problems:  Active Hospital Problems    Diagnosis    • **Left flank pain    • DVT, lower extremity, distal, acute, unspecified laterality    • S/P Exploratory laparotomy; appendectomy; left colon resection with creation of ostomy    • S/P Open repair of ruptured infrarenal abdominal aortic aneurysm    • Ruptured infrarenal abdominal aortic aneurysm (AAA)            Electronically signed by Swapnil Velazco MD, 23, 10:25 AM EST.

## 2024-01-10 NOTE — PROGRESS NOTES
Pulmonary / Critical Care Progress Note      Patient Name: Danny Savage  : 1960  MRN: [id number]  Attending:  Killian Arreola DO   Date of admission: 2023    Subjective   Subjective   Patient critically ill with ruptured AAA s/p open repair, hemorrhagic shock, Streptococcus pyogenes bacteremia    Over the past 24 hours patient remains in ICU, off vasopressors, hemoglobin stable on heparin drip for DVT and IVC clot, intermittent hemodialysis via left femoral HD cath, still with significant nausea, elevated T. bili, right upper quadrant ultrasound showed biliary sludge    Overnight, nausea some vomiting; tube feeds on hold    This morning  He is frail and weak  Unable to tolerate oral diet  Poor appetite; nausea worse at night  On heparin drip  Having a difficult time with HD access working today      Objective   Objective     Vitals:   Vital signs for last 24 hours:  Temp:  [97.8 °F (36.6 °C)-98 °F (36.7 °C)] 98 °F (36.7 °C)  Heart Rate:  [88-97] 90  Resp:  [15-21] 19  BP: (119-144)/(47-89) 130/78    Intake/Output last 3 shifts:  I/O last 3 completed shifts:  In: 1742 [I.V.:1104; Other:120; NG/GT:518]  Out: 1495 [Emesis/NG output:200]    Physical Exam   Vital Signs Reviewed   General:  Alert, NAD.  Weak appearing, lying in bed  HEENT:  PERRL, EOMI.    Neck:  No JVD, no thyromegaly  Lymph: no axillary, cervical, supraclavicular lymphadenopathy noted bilaterally  Chest:  Clear to auscultation bilaterally, no work of breathing noted on room air  CV: RRR, no M/G/R, pulses 2+  Abd:  Soft, NT, ND, +BS  EXT:  no clubbing, no cyanosis, no edema, left side HD catheter  Neuro:  A&Ox3, CN grossly intact, no focal deficits.  Skin: No rashes or lesions noted, jaundiced    Result Review    I have personally reviewed the results from the time of this admission to 1/10/2024 11:50 EST and agree with these findings:  [x]  Laboratory  [x]  Microbiology  [x]  Radiology  [x]  EKG/Telemetry   [x]  Cardiology/Vascular    []  Pathology  []  Old records  []  Other:  Most notable findings include:       Lab 01/10/24  0257 01/09/24  0306 01/08/24  0548 01/07/24  0254 01/06/24  1149 01/06/24  0548 01/06/24 0221 01/05/24 2358 01/05/24  1156 01/05/24  0433 01/04/24  1148 01/04/24  0602 01/04/24  0152   WBC  --  11.28* 12.51* 15.46*  --   --  15.93*  --   --  18.83*  --  26.10*  --    HEMOGLOBIN  --  8.7* 8.3* 9.4*  --   --  8.4*  --   --  8.3*  --  8.7* 9.1*   HEMATOCRIT  --  27.2* 25.3* 29.5*  --   --  26.1*  --   --  26.4*  --  26.5* 27.6*   PLATELETS  --  366 322 330  --   --  284  --   --  272  --  296  --    SODIUM 132* 133* 132* 133* 135* 134* 136 136   < > 136   < > 138  138  --    SODIUM, ARTERIAL  --   --   --   --   --   --   --   --    < >  --   --   --   --    POTASSIUM 5.2 4.4 4.8 5.5* 4.5 4.6 4.6 4.7   < > 5.3*   < > 4.8  4.8  --    CHLORIDE 95* 94* 96* 98 99 99 102 101   < > 101   < > 101  101  --    CO2 19.1* 21.9* 20.2* 17.9* 19.5* 19.0* 18.0* 19.2*   < > 18.8*   < > 18.7*  18.7*  --    BUN 70* 47* 56* 64* 49* 43* 43* 43*   < > 41*   < > 52*  52*  --    CREATININE 4.82* 3.51* 3.89* 4.05* 3.20* 2.85* 2.91* 2.97*   < > 3.01*   < > 3.31*  3.31*  --    GLUCOSE 89 89 100* 83 141* 125* 140* 124*   < > 82   < > 126*  126*  --    GLUCOSE, ARTERIAL  --   --   --   --   --   --   --   --    < >  --   --   --   --    CALCIUM 8.0* 7.9* 7.9* 7.8* 7.8* 7.7* 7.5* 7.8*   < > 7.8*   < > 7.7*  7.7*  --    PHOSPHORUS 5.6* 4.9* 5.4* 5.8* 4.6* 4.9*  --  5.1*   < > 5.2*   < > 4.8*  --    TOTAL PROTEIN 6.8 6.9 6.9  --   --   --  6.9  --   --  6.6  --  6.8  --    ALBUMIN 2.4* 2.6* 2.6*  --  2.9* 2.8* 2.8* 2.8*   < > 2.9*   < > 3.0*  3.0*  --    GLOBULIN  --   --  4.3  --   --   --  4.1  --   --  3.7  --  3.8  --     < > = values in this interval not displayed.       Assessment & Plan   Assessment / Plan     Active Hospital Problems:  Active Hospital Problems    Diagnosis    • **Left flank pain    • S/P Exploratory laparotomy;  appendectomy; left colon resection with creation of ostomy    • S/P Open repair of ruptured infrarenal abdominal aortic aneurysm    • Ruptured infrarenal abdominal aortic aneurysm (AAA)    • DVT, lower extremity, distal, acute, unspecified laterality      Impression:  Ruptured infrarenal AAA s/p open repair  Acute blood loss anemia  Acute DVT with plan for IVC filter  Intra-abdominal hematoma  Hemorrhagic shock  Hypovolemic shock  Acute hypoxic respiratory failure requiring mechanical ventilation  Septic shock with Streptococcus pyogenes bacteremia  Moraxella pneumonia  Ischemic sigmoid colon s/p colostomy  History of RCC s/p partial nephrectomy on 11/13  Acute kidney injury secondary to acute tubular necrosis requiring renal replacement therapy  Clinically significant lactic acidosis  Chronically immunosuppressed on CellCept  Hypocalcemia  Hypokalemia resolved now with hyperkalemia  SVT  Lactic acidosis, clinically significant     Plan:  -Patient currently on room air  -With bilateral pleural effusions per MRI.  Will obtain chest x-ray today.  -Continue heparin drip, unable to obtain IVC filter due to IVC thrombus/bilateral DVTs  -Monitor for any signs of bleeding, hemoglobin stable  -Continue midodrine 5 mg 3 times daily  -Significant elevation in bilirubin, nausea, right upper quadrant choked cholelithiasis and sludge in the gallbladder   -GI on board  -PT/OT/speech  -Cortrak in place, tube feeds per dietitian, discussing bolus feeds today  -Cleared per speech therapy however not meeting nutritional needs  -Nephrology following, HD per their service  -Receiving intermittent dialysis, planning for TDC  -As needed Percocet and fentanyl for pain  -Continue Unasyn to complete 14 days of therapy for bacteremia  -Hold home chronic immunosuppression CellCept indefinitely  -Patient would benefit from LTAC    PUP PPx: Protonix  DVT prophylaxis: Planning for IVC filter    Medical and mechanical DVT prophylaxis orders are  present.    CODE STATUS:   Level Of Support Discussed With: Patient  Code Status (Patient has no pulse and is not breathing): CPR (Attempt to Resuscitate)  Medical Interventions (Patient has pulse or is breathing): Full Support      [place] ENG Pa-C, spent 15 minutes critical care time in accordance to split shared billing.    Patient is critically ill with ruptured AAA status post repair, hyperbilirubinemia, transaminitis, Moraxella pneumonia, group A strep bacteremia, renal failure requiring HD.  35 minutes of critical care time was spent managing this patient, excluding procedures. Of this time, Dr. Reno ENG, spent 20 minutes and ROEL Lopez spent 15 minutes in accordance with split shared billing. This included personally reviewing all pertinent labs, imaging, microbiology and documentation. Also discussing the case with the patient and any available family, the admitting physician and any available ancillary staff.   Electronically signed by Reno Jeffery MD, 01/10/24, 3:44 PM EST.

## 2024-01-10 NOTE — PROGRESS NOTES
" LOS: 18 days   Patient Care Team:  Kodi Pichardo MD as PCP - General (Internal Medicine)  Lisset Harrington APRN as Nurse Practitioner (Urology)    Chief Complaint: ROLAND    Subjective     History of Present Illness  Awake, not feeling well.  Just finished vomiting.  Not tolerating p.o.  NG tube feed on hold.  Dialysis ordered for today, about to start.  I was called later that they had trouble with machine, initially low venous pressure alarms then air in line.  Hypertensive when I saw him, wife at bedside.  Reportedly voided but amount not recorded.    Subjective:  Symptoms:  He reports weakness.  No shortness of breath, chest pain, chest pressure or anxiety.    Diet:  NPO.  No nausea or vomiting.    Activity level: Impaired due to weakness.    Pain:  He complains of pain that is moderate.      History taken from: chart family    Objective     Vital Sign Min/Max for last 24 hours  Temp  Min: 97.8 °F (36.6 °C)  Max: 98 °F (36.7 °C)   BP  Min: 109/78  Max: 144/83   Pulse  Min: 86  Max: 97   Resp  Min: 15  Max: 21   SpO2  Min: 89 %  Max: 97 %   Flow (L/min)  Min: 2  Max: 2   Weight  Min: 69 kg (152 lb 3.2 oz)  Max: 69 kg (152 lb 3.2 oz)     Flowsheet Rows      Flowsheet Row First Filed Value   Admission Height 177.8 cm (70\") Documented at 12/23/2023 1948   Admission Weight 79.9 kg (176 lb 2.4 oz) Documented at 12/23/2023 1948            I/O this shift:  In: -   Out: 300 [Emesis/NG output:300]  I/O last 3 completed shifts:  In: 1742 [I.V.:1104; Other:120; NG/GT:518]  Out: 1495 [Emesis/NG output:200]    Objective:  General Appearance:  Ill-appearing, in no acute distress and uncomfortable.    Vital signs: (most recent): Blood pressure 109/78, pulse 89, temperature 98 °F (36.7 °C), temperature source Oral, resp. rate 17, height 177.8 cm (70\"), weight 69 kg (152 lb 3.2 oz), SpO2 90%.  Vital signs are normal.  No fever.    Output: Producing urine and producing stool.    HEENT: Normal HEENT exam.    Lungs:  " "Normal effort and normal respiratory rate.  Breath sounds clear to auscultation.  He is not in respiratory distress.    Heart: Normal rate.  Regular rhythm.    Abdomen: Abdomen is soft.  (Deep palpation not done.  Midline incision covered.  Colostomy left lower quadrant noted.).  Hypoactive bowel sounds.     Extremities: There is dependent edema.    Pulses: There are decreased pulses.  (Left femoral Shiley in place.)    Neurological: Patient is alert and oriented to person, place and time.    Pupils:  Pupils are equal, round, and reactive to light.    Skin:  Warm, dry and pale.              Results Review:     I reviewed the patient's new clinical results.    WBC WBC   Date Value Ref Range Status   01/09/2024 11.28 (H) 3.40 - 10.80 10*3/mm3 Final   01/08/2024 12.51 (H) 3.40 - 10.80 10*3/mm3 Final      HGB Hemoglobin   Date Value Ref Range Status   01/09/2024 8.7 (L) 13.0 - 17.7 g/dL Final   01/08/2024 8.3 (L) 13.0 - 17.7 g/dL Final      HCT Hematocrit   Date Value Ref Range Status   01/09/2024 27.2 (L) 37.5 - 51.0 % Final   01/08/2024 25.3 (L) 37.5 - 51.0 % Final      Platlets No results found for: \"LABPLAT\"   MCV MCV   Date Value Ref Range Status   01/09/2024 91.6 79.0 - 97.0 fL Final   01/08/2024 91.7 79.0 - 97.0 fL Final          Sodium Sodium   Date Value Ref Range Status   01/10/2024 132 (L) 136 - 145 mmol/L Final   01/09/2024 133 (L) 136 - 145 mmol/L Final   01/08/2024 132 (L) 136 - 145 mmol/L Final      Potassium Potassium   Date Value Ref Range Status   01/10/2024 5.2 3.5 - 5.2 mmol/L Final   01/09/2024 4.4 3.5 - 5.2 mmol/L Final   01/08/2024 4.8 3.5 - 5.2 mmol/L Final      Chloride Chloride   Date Value Ref Range Status   01/10/2024 95 (L) 98 - 107 mmol/L Final   01/09/2024 94 (L) 98 - 107 mmol/L Final   01/08/2024 96 (L) 98 - 107 mmol/L Final      CO2 CO2   Date Value Ref Range Status   01/10/2024 19.1 (L) 22.0 - 29.0 mmol/L Final   01/09/2024 21.9 (L) 22.0 - 29.0 mmol/L Final   01/08/2024 20.2 (L) 22.0 - " "29.0 mmol/L Final      BUN BUN   Date Value Ref Range Status   01/10/2024 70 (H) 8 - 23 mg/dL Final   01/09/2024 47 (H) 8 - 23 mg/dL Final   01/08/2024 56 (H) 8 - 23 mg/dL Final      Creatinine Creatinine   Date Value Ref Range Status   01/10/2024 4.82 (H) 0.76 - 1.27 mg/dL Final   01/09/2024 3.51 (H) 0.76 - 1.27 mg/dL Final   01/08/2024 3.89 (H) 0.76 - 1.27 mg/dL Final      Calcium Calcium   Date Value Ref Range Status   01/10/2024 8.0 (L) 8.6 - 10.5 mg/dL Final   01/09/2024 7.9 (L) 8.6 - 10.5 mg/dL Final   01/08/2024 7.9 (L) 8.6 - 10.5 mg/dL Final      PO4 No results found for: \"CAPO4\"   Albumin Albumin   Date Value Ref Range Status   01/10/2024 2.4 (L) 3.5 - 5.2 g/dL Final   01/09/2024 2.6 (L) 3.5 - 5.2 g/dL Final   01/08/2024 2.6 (L) 3.5 - 5.2 g/dL Final      Magnesium Magnesium   Date Value Ref Range Status   01/10/2024 2.1 1.6 - 2.4 mg/dL Final   01/09/2024 1.9 1.6 - 2.4 mg/dL Final   01/08/2024 2.2 1.6 - 2.4 mg/dL Final      Uric Acid No results found for: \"URICACID\"     Medication Review:   ampicillin, 2 g, Intravenous, Q24H  escitalopram, 5 mg, Nasogastric, Daily  melatonin, 10 mg, Nasogastric, Nightly  midodrine, 5 mg, Nasogastric, TID AC  QUEtiapine, 25 mg, Nasogastric, Nightly  senna-docusate sodium, 2 tablet, Nasogastric, BID  sodium bicarbonate, 650 mg, Nasogastric, TID  sodium chloride, 10 mL, Intravenous, Q12H          Assessment & Plan       Left flank pain    Ruptured infrarenal abdominal aortic aneurysm (AAA)    DVT, lower extremity, distal, acute, unspecified laterality    S/P Exploratory laparotomy; appendectomy; left colon resection with creation of ostomy    S/P Open repair of ruptured infrarenal abdominal aortic aneurysm    ROLAND (acute kidney injury)      Assessment & Plan  Anuric ROLAND-  pt with previous partial left nephrectomy and baseline creatinine closer to 0.9-1.0.  Had noted hypotension with AAA rupture s/p repair.  Likely ATN from hypotension vs. Atheroembolic injury versus compromise " blood supply to kidneys.    Noted renal infarcts seen on repeat CT.  Also received IV contrast with CTA.    Still anuric.  Reportedly voided once but amount not recorded.  Will need TDC placement soon.  Timing per vascular.  Appreciate help. Continue MWF schedule for now.  Dialysis today rescheduled for the afternoon due to machine problems.  AAA rupture- s/p open repair infrarenal aorta.  Ischemic bowel- s/p left colectomy, ostomy.  Shock- sepsis, hemorrhagic component with AAA rupture.   Sepsis-  blood cx with strep pyogenes previously.  Treatment per primary.    Met Acidosis-  bicarb held.  Improved now with CRRT.  Now intermittent hemodialysis.    Anemia-  prbc's prn.  Hyperkalemia-controlled with dialysis.    DVT- simran LE.  Unable to place filter, on heparin gtt now.  Vascular following.  Discussed with ICU team and dialysis staff  Discussed with wife.  Will follow.    Garett Bazzi MD  01/10/24  14:03 EST

## 2024-01-10 NOTE — NURSING NOTE
1301 - Start of dialysis treatment - GISELLA Billy, RN 01/10/2023  1736 - Dialysis treatment end - GISELLA Billy, RN 01/10/2023

## 2024-01-10 NOTE — ANESTHESIA PREPROCEDURE EVALUATION
Anesthesia Evaluation     Patient summary reviewed and Nursing notes reviewed   no history of anesthetic complications:   NPO Solid Status: > 8 hours  NPO Liquid Status: > 2 hours           Airway   Mallampati: II  TM distance: >3 FB  Neck ROM: full  No difficulty expected  Dental    (+) poor dentition    Pulmonary - negative pulmonary ROS and normal exam    breath sounds clear to auscultation  (+) a smoker Former,  Cardiovascular - negative cardio ROS and normal exam  Exercise tolerance: poor (<4 METS)    PT is on anticoagulation therapy  Rhythm: regular  Rate: normal    (+) PVD, DVT    ROS comment: Heparin gtt    Neuro/Psych- negative ROS  GI/Hepatic/Renal/Endo - negative ROS   (+) renal disease (last HD 1/10 via femoral line)- ESRD    Musculoskeletal     Abdominal    Substance History      OB/GYN          Other                    Anesthesia Plan    ASA 4     general and MAC   total IV anesthesia  (Nephrectomy 11/2023  Rutured AAA 12/24/2023  Colon resection 12/26/2023  Now with ESRD, off pressors  Tube feeds to be held at [facility]  Last HD 1/10)    Anesthetic plan, risks, benefits, and alternatives have been provided, discussed and informed consent has been obtained with: patient and spouse/significant other.    CODE STATUS:    Level Of Support Discussed With: Patient  Code Status (Patient has no pulse and is not breathing): CPR (Attempt to Resuscitate)  Medical Interventions (Patient has pulse or is breathing): Full Support

## 2024-01-10 NOTE — CONSULTS
Nutrition Services    Patient Name: Danny Savage  YOB: 1960  MRN: [id number]  Admission date: 12/23/2023      CLINICAL NUTRITION ASSESSMENT      Reason for Assessment  Follow Up     H&P:  Past Medical History:   Diagnosis Date    Allergy     Aortic aneurysm     4.7 just found has not f/u    H/O Kidney stone     Hiatal hernia     Kidney mass     left    Kidney stone     Renal cancer     S/P Exploratory laparotomy; appendectomy; left colon resection with creation of ostomy 01/04/2024    S/P Open repair of ruptured infrarenal abdominal aortic aneurysm 01/04/2024        Current Problems:   Active Hospital Problems    Diagnosis     **Left flank pain     S/P Exploratory laparotomy; appendectomy; left colon resection with creation of ostomy     S/P Open repair of ruptured infrarenal abdominal aortic aneurysm     Ruptured infrarenal abdominal aortic aneurysm (AAA)     DVT, lower extremity, distal, acute, unspecified laterality     ROLAND (acute kidney injury)         Nutrition/Diet History         Narrative   Patient admitted with left flank pain, ruptured AAA.  S/P open AAA repair.  Patient went back to OR 12/26/2023. Now S/P left open colectomy with ostomy creation.  Pt extubated 12/28/2023.   Cortrak placed 01/02/24 to supplement intake.  Had been receiving CRRT with fluid removal.  Now on MWF HD.  Vascular has seen pt to discuss placement of a tunneled catheter for hemodialysis.      Patient is on a full liquid diet with minimal PO intake.  Continues to have issues with nausea/vomiting when enteral nutrition is running, despite post-pyloric/jejunal tube placement.  Pt reports that after eating ice chips he began vomiting green bilious output. Pt is declining PO intake due to fear of nausea/vomiting.    Pt reviewed during critical care rounds.   Pt has not tolerated trophic feeds for 24 hours consecutively.  Will re attempt continuous feedings and monitor toleration.  MRCP was conducted 1/09/2024 and  "there are no identified obstructions per physician.  Gastroenterologist has indicated that the nausea is not related to the enteral nutrition and continuous feeding should resume .     Anthropometrics        Current Height, Weight Height: 177.8 cm (70\")  Weight: 69 kg (152 lb 3.2 oz) (no pillows no blankets)   Current BMI Body mass index is 21.84 kg/m².   BMI Classification Normal range   % %   Adjusted Body Weight (ABW)    Weight Hx  Wt Readings from Last 30 Encounters:   01/10/24 0800 69 kg (152 lb 3.2 oz)   01/08/24 1624 77.7 kg (171 lb 3.2 oz)   01/07/24 0516 79 kg (174 lb 1.6 oz)   01/06/24 0500 80.6 kg (177 lb 12.8 oz)   01/04/24 0500 87.5 kg (192 lb 12.8 oz)   01/03/24 0517 91 kg (200 lb 11.2 oz)   01/02/24 1320 92.7 kg (204 lb 4.8 oz)   01/01/24 1600 96.6 kg (212 lb 14.4 oz)   12/24/23 0027 80.5 kg (177 lb 7.5 oz)   12/23/23 1948 79.9 kg (176 lb 2.4 oz)   12/22/23 0706 78.8 kg (173 lb 11.6 oz)   12/06/23 0958 79.9 kg (176 lb 3.2 oz)   11/22/23 1338 78.2 kg (172 lb 6.4 oz)   11/13/23 0628 78.2 kg (172 lb 6.4 oz)   10/30/23 1440 79.9 kg (176 lb 2.4 oz)   08/17/23 1952 78.8 kg (173 lb 11.6 oz)   08/17/23 1537 80.3 kg (177 lb 0.5 oz)   08/15/23 0554 81.2 kg (179 lb 0.2 oz)          Wt Change Observation Wt has decreased 10.9 kg (13.6%) in 1 month.     Estimated/Assessed Needs  Estimated Needs based on: Current Body Weight       Energy Requirements 25-30 kcal/kg   EST Needs (kcal/day)  5022-9719 kcal        Protein Requirements 1.2-1.3 g/kg   EST Daily Needs (g/day)  g       Fluid Requirements 25 ml/kg    Estimated Needs (mL/day) 1975 ml     Labs/Medications         Pertinent Labs Reviewed.   Results from last 7 days   Lab Units 01/10/24  0257 01/09/24  0306 01/08/24  0548   SODIUM mmol/L 132* 133* 132*   POTASSIUM mmol/L 5.2 4.4 4.8   CHLORIDE mmol/L 95* 94* 96*   CO2 mmol/L 19.1* 21.9* 20.2*   BUN mg/dL 70* 47* 56*   CREATININE mg/dL 4.82* 3.51* 3.89*   CALCIUM mg/dL 8.0* 7.9* 7.9*   BILIRUBIN " "mg/dL 10.5* 10.3* 9.5*  9.8*   ALK PHOS U/L 146* 139* 129*   ALT (SGPT) U/L 143* 119* 101*   AST (SGOT) U/L 154* 129* 103*   GLUCOSE mg/dL 89 89 100*     Results from last 7 days   Lab Units 01/10/24  0257 01/09/24  0306 01/08/24  0548   MAGNESIUM mg/dL 2.1 1.9 2.2   PHOSPHORUS mg/dL 5.6* 4.9* 5.4*   HEMOGLOBIN g/dL  --  8.7* 8.3*   HEMATOCRIT %  --  27.2* 25.3*     COVID19   Date Value Ref Range Status   12/23/2023 Not Detected Not Detected - Ref. Range Final     No results found for: \"HGBA1C\"      Pertinent Medications Reviewed.     Malnutrition Severity Assessment      Patient meets criteria for : Severe Malnutrition  Malnutrition Type (last 8 hours)       Malnutrition Severity Assessment       Row Name 01/10/24 1432       Malnutrition Severity Assessment    Malnutrition Type Acute Disease or Injury - Related Malnutrition      Row Name 01/10/24 1432       Insufficient Energy Intake     Insufficient Energy Intake Findings Severe    Insufficient Energy Intake  < or equal to 50% of est. energy requirement for > or equal to 5d)      Row Name 01/10/24 1432       Unintentional Weight Loss     Unintentional Weight Loss Findings Severe    Unintentional Weight Loss  Weight loss greater than 5% in one month      Row Name 01/10/24 1432       Muscle Loss    Loss of Muscle Mass Findings Severe    Anglican Region Severe - deep hollowing/scooping, lack of muscle to touch, facial bones well defined    Clavicle Bone Region Severe - protruding prominent bone    Acromion Bone Region Severe - squared shoulders, bones, and acromion process protrusion prominent    Scapular Bone Region Severe - prominent bones, depressions easily visible between ribs, scapula, spine, shoulders    Dorsal Hand Region None    Patellar Region Moderate - patella more prominent, less muscle definition around patella    Anterior Thigh Region None    Posterior Calf Region None      Row Name 01/10/24 1432       Fat Loss    Subcutaneous Fat Loss Findings Mild    " Orbital Region  Moderate -  somewhat hollowness, slightly dark circles    Upper Arm Region Moderate - some fat tissue, not ample    Thoracic & Lumbar Region Moderate - ribs visible with mild depressions, iliac crest somewhat prominent      Row Name 01/10/24 1432       Fluid Accumulation (Edema)    Fluid Acumulation Findings Moderate    Fluid Accumulation  Moderate equals 2+ pitting edema      Row Name 01/10/24 1432       Declining Functional Status    Declining Functional Status Findings N/A      Row Name 01/10/24 1432       Criteria Met (Must meet criteria for severity in at least 2 of these categories: M Wasting, Fat Loss, Fluid, Secondary Signs, Wt. Status, Intake)    Patient meets criteria for  Severe Malnutrition                       Nutrition Diagnosis         Nutrition Dx Problem 1 Inadequate energy Intake related to GI dysfunction as evidenced by  Full liquid diet       Nutrition Intervention           Current Nutrition Orders & Evaluation of Intake       Current PO Diet Diet: Liquid Diets; Full Liquid; Fluid Consistency: Thin (IDDSI 0)  NPO Diet NPO Type: Sips with Meds   Supplement Orders Placed This Encounter      Dietary Nutrition Supplements Ensure Surgery; vanilla      DIET MESSAGE Guest tray, regular diet      Place Feeding Tube Per [employer] System      Diet, Tube Feeding Tube Feeding Formula: Peptamen 1.5; Tube Feeding Type: Continuous; Continuous Tube Feeding Start Rate (mL/hr): 20; Then Advance Rate By (mL/hr): RD To Manage; Every __ Hours: Patient at Goal Rate; To Goal Rate of (mL/hr): RD to ...           Nutrition Intervention/Prescription:  Provides 660 kcals, 39 grams of protein       Start   Peptamen 1.5 @ 20 ml/hr x 22 hours    Free water flushes 25 ml every 3 hours (200 ml)  Provides  660 kcal, 45 g pro,539 ml free water   Note:  this is not goal rate.  This is trophic feeding to establish tolerance.         Medical Nutrition Therapy/Nutrition Education          Learner     Readiness  Patient  Acceptance     Method     Response N/A  N/A     Monitor/Evaluation        Monitor Per protocol, Pertinent labs, Weight, Skin status, GI status, POC/GOC, Diet advancement       Nutrition Discharge Plan         To be determined       Electronically signed by:  Kandis Salinas RD  01/10/24 15:23 EST

## 2024-01-10 NOTE — PLAN OF CARE
Goal Outcome Evaluation:vital signs stable;heparin drip maintained per protocol;scheduled for hemodialysis today;vomited green emesis again tonight, tube feeding placed on hold due to nausea and vomiting;continues to have very little appetite

## 2024-01-10 NOTE — NURSING NOTE
1105 - came to room to start dialysis at 0900.   Unable to run dialysis after several attempts.- GISELLA Billy, RN  1115 - Reported issue to Dr. Jeffery who was making rounds. Dr. Jeffery assessed, flushed and manipulated catheter then put new central line dressing on. - GISELLA Billy, RN  1125 - Patient to get one liter of fluid over 1 hour then will attempt dialysis again.- GISELLA Billy, RN  1250 - arrived to patients room, dialysis , catheter assessed, heparin lock aspirated then accessed for dialysis. Catheter dressing clean, dry, and intact. - GISELLA Billy, RN

## 2024-01-10 NOTE — PROGRESS NOTES
Psychiatric   Hospitalist Progress Note  Date: 1/10/2024  Patient Name: Danny Savage  : 1960  MRN: [id number]  Date of admission: 2023      Subjective   Subjective     Chief Complaint: Follow-up postop seroma    Summary:Danny Savage is a 63 y.o. male  presented to the hospital with complaints of left lower back pain and fever x 3 days, at that time patient was status post nephrectomy and there was concern for postoperative seroma/hematoma, patient was discharged home but pain began getting worse 10 out of 10 intensity on repeat presentation patient was febrile to 102.6, repeat CT scan showed likely postoperative seroma/hematoma although infection could not be ruled out, there was an abdominal aortic aneurysm of 5.1 cm.  The patient was started on IV antibiotics and patient was admitted for possible abscess in the nephrectomy bed, patient went unresponsive on 2023, blood pressure was 42/32, patient was pale and clammy, CODE BLUE was called, patient received IV fluids, patient was intubated, he was found to have expanding AAA with eccentric mural thrombus/hematoma measuring 5.1 cm, vascular surgeon consulted and patient underwent urgent open repair of ruptured infrarenal AAA with tube graft and mobilization of the omentum, patient taken back to the OR on 2023 to look for sources of blood loss, he was found to have nonviable colon and patient underwent partial colectomy by Dr. Mallory.  Patient has had prolonged course with continued anemia, repeat scans concerning for worsening hematoma, patient was found to have right lower extremity DVT.  Patient initially started on heparin drip though hemoglobin trended down sharply concerning for occult bleed.  Heparin drip stopped and hemoglobin stabilized.  Patient's blood pressure did not tolerate hemodialysis and patient had to be switched to CRRT.  Patient remains on antibiotics but repeat blood cultures are negative.    Patient  able to be weaned off of pressors.  Vascular surgery planned on placing IVC filter though clot was found in the pararenal IVC on venogram and procedure was aborted.  Patient started back on heparin drip no bolus.  Able to transition from CRRT to intermittent hemodialysis with ultrafiltration.  Having remittent issues with nausea and vomiting.  Good ostomy output.  Right upper quadrant ultrasound demonstrated cholelithiasis without cholecystitis.  Liver unremarkable.  Tube feeds restarted slowly.  Patient and family hoping to discharge to LTAC at [facility] once stable    Interval Followup:  Patient is resting in bed.  Patient continues to feel weak and on well  His bilirubin is elevated however he denies any significant abdominal pain.  GI has been consulted but feel this is due to shock liver and will improve  Patient has continues nausea and vomiting   Patient remains on heparin drip  Remains on dialysis  Awaiting LTACH placement  Plan for TDC on Friday       Review of Systems  All systems reviewed and negative unless  stated above    Objective   Objective     Vitals:   Temp:  [97.8 °F (36.6 °C)-98 °F (36.7 °C)] 98 °F (36.7 °C)  Heart Rate:  [88-97] 88  Resp:  [15-21] 20  BP: (119-144)/(47-87) 138/84  Flow (L/min):  [2] 2  Physical Exam   General: Patient appears chronically ill resting in bed with wife at the bedside  HEENT: Normocephalic atraumatic moist membranes pupils equal round reactive light, no scleral icterus no conjunctival injection  Cardiovascular: regular rate and rhythm no murmurs rubs or gallops S1-S2, no lower extremity edema appreciated  Pulmonary: Decreased, no wheezing or rhonchi  Gastrointestinal: Soft nondistended positive bowel sounds all 4 quadrants no rebound or guarding,  midline incision well-approximated without erythema or drainage, colostomy in left lower quadrant   Musculoskeletal: No clubbing cyanosis, warm and well-perfused, calves soft symmetric nontender  bilaterally  Skin: Clean dry without rashes  Neuro: Cranial nerves II through XII intact grossly no sensorimotor deficits appreciated bilateral upper and lower extremities  Psych: Patient is calm cooperative and appropriate with exam not responding to internal stimuli  : No Rico catheter no bladder distention no suprapubic tenderness    Result Review    Result Review:  I have personally reviewed these results and agree with these findings:  [x]  Laboratory  LAB RESULTS:      Lab 01/10/24  0935 01/10/24  0257 01/09/24  1628 01/09/24  0958 01/09/24  0306 01/08/24  0642 01/08/24  0548 01/07/24  0444 01/07/24  0254 01/06/24  0221 01/05/24  2039 01/05/24  1443 01/05/24  1214 01/05/24  0434 01/05/24  0433 01/04/24  0602   WBC  --   --   --   --  11.28*  --  12.51*  --  15.46* 15.93*  --   --   --   --  18.83* 26.10*   HEMOGLOBIN  --   --   --   --  8.7*  --  8.3*  --  9.4* 8.4*  --   --   --   --  8.3* 8.7*   HEMATOCRIT  --   --   --   --  27.2*  --  25.3*  --  29.5* 26.1*  --   --   --   --  26.4* 26.5*   PLATELETS  --   --   --   --  366  --  322  --  330 284  --   --   --   --  272 296   NEUTROS ABS  --   --   --   --  9.51*  --  10.63*  --   --  14.15*  --   --   --   --   --  21.17*   IMMATURE GRANS (ABS)  --   --   --   --  0.13*  --  0.17*  --   --  0.24*  --   --   --   --   --  1.81*   LYMPHS ABS  --   --   --   --  0.63*  --  0.74  --   --  0.58*  --   --   --   --   --  1.21   MONOS ABS  --   --   --   --  0.88  --  0.85  --   --  0.84  --   --   --   --   --  1.72*   EOS ABS  --   --   --   --  0.10  --  0.08  --   --  0.06  --   --   --   --   --  0.06   MCV  --   --   --   --  91.6  --  91.7  --  93.7 92.9  --   --   --   --  93.6 91.1   LACTATE  --   --   --   --   --   --  1.0  --   --  1.2  --   --   --   --  1.2 1.0   LACTATE, ARTERIAL  --   --   --   --   --   --   --   --   --   --   --   --  1.11  --   --   --    PROTIME  --   --   --   --   --   --   --   --  18.1* 20.4*  --  20.4*  --  19.3*  --   17.9*   APTT >200.0* 119.6* 108.5* 109.4* 119.1*   < > 112.4*   < >  --  97.1*   < >  --   --  38.9*  --  32.9    < > = values in this interval not displayed.         Lab 01/10/24  0257 01/09/24  0306 01/08/24  0548 01/07/24  0254 01/06/24  1149 01/06/24  0548 01/05/24  2358 01/05/24  2357 01/05/24  1742 01/05/24  1214 01/05/24  1156   SODIUM 132* 133* 132* 133* 135* 134*   < >  --  137  --  135*   SODIUM, ARTERIAL  --   --   --   --   --   --   --   --   --  136.4  --    POTASSIUM 5.2 4.4 4.8 5.5* 4.5 4.6   < >  --  4.6  --  4.5   CHLORIDE 95* 94* 96* 98 99 99   < >  --  102  --  101   CO2 19.1* 21.9* 20.2* 17.9* 19.5* 19.0*   < >  --  18.7*  --  19.1*   ANION GAP 17.9* 17.1* 15.8* 17.1* 16.5* 16.0*   < >  --  16.3*  --  14.9   BUN 70* 47* 56* 64* 49* 43*   < >  --  46*  --  46*   CREATININE 4.82* 3.51* 3.89* 4.05* 3.20* 2.85*   < >  --  3.23*  --  3.26*   EGFR 12.7* 18.5* 16.4* 15.6* 20.7* 23.8*   < >  --  20.5*  --  20.2*   GLUCOSE 89 89 100* 83 141* 125*   < >  --  88  --  193*   GLUCOSE, ARTERIAL  --   --   --   --   --   --   --   --   --  162*  --    CALCIUM 8.0* 7.9* 7.9* 7.8* 7.8* 7.7*   < >  --  7.5*  --  7.6*   IONIZED CALCIUM  --   --   --   --   --  1.01*  --  1.00* 1.01* 1.01* 1.00*   MAGNESIUM 2.1 1.9 2.2 2.6* 2.6* 2.5*   < >  --  2.5*  --  2.5*   PHOSPHORUS 5.6* 4.9* 5.4* 5.8* 4.6* 4.9*   < >  --  5.1*  --  5.2*    < > = values in this interval not displayed.         Lab 01/10/24  0257 01/09/24  0306 01/08/24  0548 01/06/24  1149 01/06/24  0548 01/06/24  0221 01/05/24  1156 01/05/24  0433 01/04/24  1148 01/04/24  0602   TOTAL PROTEIN 6.8 6.9 6.9  --   --  6.9  --  6.6  --  6.8   ALBUMIN 2.4* 2.6* 2.6* 2.9* 2.8* 2.8*   < > 2.9*   < > 3.0*  3.0*   GLOBULIN  --   --  4.3  --   --  4.1  --  3.7  --  3.8   ALT (SGPT) 143* 119* 101*  --   --  103*  --  127*  --  153*   AST (SGOT) 154* 129* 103*  --   --  104*  --  147*  --  197*   BILIRUBIN 10.5* 10.3* 9.5*  9.8*  --   --  8.2*  --  10.3*  --  10.1*    INDIRECT BILIRUBIN 2.1 1.8 1.5  --   --   --   --   --   --   --    BILIRUBIN DIRECT 8.4* 8.5* 8.0*  --   --   --   --   --   --   --    ALK PHOS 146* 139* 129*  --   --  115  --  114  --  114    < > = values in this interval not displayed.         Lab 01/07/24  0254 01/06/24  0221 01/05/24  1443 01/05/24  0434 01/04/24  0602   PROTIME 18.1* 20.4* 20.4* 19.3* 17.9*   INR 1.47* 1.71* 1.71* 1.60* 1.45*                   Lab 01/05/24  1214   PH, ARTERIAL 7.386   PCO2, ARTERIAL 32.1*   PO2 ART 67.7*   O2 SATURATION ART 92.9*   FIO2 44   HCO3 ART 18.8*   BASE EXCESS ART -5.3*   CARBOXYHEMOGLOBIN 1.8*     Brief Urine Lab Results  (Last result in the past 365 days)        Color   Clarity   Blood   Leuk Est   Nitrite   Protein   CREAT   Urine HCG        12/23/23 2054 Yellow   Clear   Small (1+)   Negative   Negative   100 mg/dL (2+)                 Microbiology Results (last 10 days)       Procedure Component Value - Date/Time    Blood Culture - Blood, Arm, Left [[id number]]  (Normal) Collected: 01/02/24 1422    Lab Status: Final result Specimen: Blood from Arm, Left Updated: 01/07/24 1430     Blood Culture No growth at 5 days    Blood Culture - Blood, Hand, Left [[id number]]  (Normal) Collected: 01/02/24 1216    Lab Status: Final result Specimen: Blood from Hand, Left Updated: 01/07/24 1230     Blood Culture No growth at 5 days            [x]  Microbiology  [x]  Radiology  XR Chest 1 View    Result Date: 1/10/2024    1. Small bilateral layering pleural effusions, right worse than left with associated compressive atelectasis.        DOMONIQUE SALDAÑA MD       Electronically Signed and Approved By: DOMONIQUE SALDAÑA MD on 1/10/2024 at 12:04             MRI abdomen wo contrast mrcp    Result Date: 1/9/2024    1. Large retroperitoneal hematoma is incompletely evaluated.  Please see prior CTs for further discussion.  This extends to the region of the head of the pancreas. 2. Common bile duct is normal in caliber without evidence  of filling defect to suggest obstruction 3. Abnormal appearance of the gallbladder demonstrates filling defects compatible with sludge and stones.  No wall thickening noted.  Fluid surrounding the gallbladder may be related to underlying ascites. 4. Moderate size right-sided pleural effusion and small left-sided pleural effusion with associated atelectasis 5. Postsurgical changes of the abdominal aorta 6. Other findings as above     ANNABELLE PALMER MD       Electronically Signed and Approved By: ANNABELLE PALMER MD on 1/09/2024 at 17:58             US Abdomen Limited    Result Date: 1/8/2024    1. Cholelithiasis and sludge filling the gallbladder.  No sonographic evidence of acute cholecystitis 2. Limited imaging of the liver is grossly unremarkable in appearance     ANNABELLE PALMER MD       ELECTRONICALLY SIGNED AND APPROVED BY: ANNABELLE PALMER MD ON 1/08/2024 AT 19:27             XR Abdomen KUB    Result Date: 1/7/2024    1. Feeding tube projects in expected position 2. Mild gaseous distention of bowel with suspected mild wall edema.  No definite evidence of free air 3. Consolidation left lung base 4. Left sided inguinal vascular catheter projects in expected position     ANNABELLE PALMER MD       Electronically Signed and Approved By: ANNABELLE PALMER MD on 1/07/2024 at 9:13             XR Abdomen KUB    Result Date: 1/2/2024    1. Small bore feeding tube tip terminating at the junction of the 3rd and 4th portion of the duodenum.      DOMONIQUE SALDAÑA MD       Electronically Signed and Approved By: DOMONIQUE SALDAÑA MD on 1/02/2024 at 19:43              [x]  EKG/Telemetry   [x]  Cardiology/Vascular   Echocardiogram 12/24/2023  •  Left ventricular ejection fraction appears to be 66 - 70%.  •  Hyperdynamic.  The left ventricular cavity is small in size.  •  Left ventricular diastolic function was not assessed.  •  There is a trivial pericardial effusion.  •  No significant valvular abnormalities noted.  []   Pathology  []  Old records  [x]  Other:  Scheduled Meds:ampicillin, 2 g, Intravenous, Q24H  escitalopram, 5 mg, Nasogastric, Daily  melatonin, 10 mg, Nasogastric, Nightly  midodrine, 5 mg, Nasogastric, TID AC  QUEtiapine, 25 mg, Nasogastric, Nightly  senna-docusate sodium, 2 tablet, Nasogastric, BID  sodium bicarbonate, 650 mg, Nasogastric, TID  sodium chloride, 10 mL, Intravenous, Q12H      Continuous Infusions:heparin, 18 Units/kg/hr, Last Rate: Stopped (01/10/24 1136)      PRN Meds:.•  senna-docusate sodium **AND** polyethylene glycol **AND** [DISCONTINUED] bisacodyl **AND** bisacodyl  •  calcium carbonate  •  dextrose  •  dextrose  •  dextrose  •  dextrose  •  fentaNYL citrate (PF)  •  glucagon (human recombinant)  •  glucagon (human recombinant)  •  heparin (porcine)  •  heparin (porcine)  •  heparin  •  heparin  •  [] Morphine **AND** naloxone  •  nitroglycerin  •  ondansetron  •  oxyCODONE-acetaminophen  •  oxyCODONE-acetaminophen  •  prochlorperazine  •  sodium chloride  •  sodium chloride  •  traZODone      Assessment & Plan   Assessment / Plan     Assessment/Plan:  Ruptured infrarenal AAA s/p open repair  Hemorrhagic shock  Hypovolemic shock  Acute hypoxic respiratory failure requiring mechanical ventilation  Septic shock with Streptococcus pyogenes bacteremia  SVT  History of RCC s/p partial nephrectomy on   Acute anuric renal failure requiring CRRT  Metabolic acidosis  Clinically significant lactic acidosis  Chronically immunosuppressed on CellCept  Hypocalcemia  Hypokalemia  Acute right lower extremity DVT in the peroneal and gastrocnemius  Acute blood loss anemia  Perirenal IVC thrombus  Ischemic bowel  Hyperkalemia  Anxiety  Transaminitis  Hyperbilirubinemia    PLAN    Vascular surgery, general surgery, urology, nephrology and pulmonology critical care consulted thank you for assistance  Continue postop wound care per general surgery  MRI done and shows continued large retroperitoneal  hematoma.  Common bile duct is normal with no obstruction.  Sludge in the gallbladder.  Moderate right sided pleural effusion  Continue to monitor hemoglobin closely and transfuse packed red blood cells as needed to keep greater than 7  Continue heparin drip and monitor very closely for signs of bleeding  Continue Protonix twice daily  Continue supplemental oxygen as needed to keep sats greater than 90%  Continue ampicillin-sulbactam complete 14-day course per pulmonology critical care  Continue midodrine and titrate per pulmonology critical care  Continue hemodialysis and ultrafiltration per nephrology  Vascular to place TDC later in the week   Repeat renal panel in a.m.  Continue to monitor electrolytes replace as needed  Continue Lexapro and Seroquel  Continue tube feeds at low rate and titrate up slowly to goal  Monitor transaminases  Advance diet per speech  Further inpatient orders recommendations per clinical course    Discussed plan with bedside RN as well as pulmonology critical care team.    Disposition: LTAC family hoping for [facility] in [place].    DVT prophylaxis:  Medical and mechanical DVT prophylaxis orders are present.    CODE STATUS:   Level Of Support Discussed With: Patient  Code Status (Patient has no pulse and is not breathing): CPR (Attempt to Resuscitate)  Medical Interventions (Patient has pulse or is breathing): Full Support      Patient remains critically ill in the intensive care unit.  I spent 32 minutes of critical care time managing this critically ill patient with group a strep bacteremia, hyperbilirubinemia, abdominal aortic aneurysm status postrepair, acute DVT, acute kidney injury requiring dialysis.  Time was spent in examining patient, reviewing laboratory data, formulating care plan, talking to specialist and nursing staff.

## 2024-01-11 ENCOUNTER — ANESTHESIA (OUTPATIENT)
Dept: PERIOP | Facility: HOSPITAL | Age: 64
End: 2024-01-11
Payer: COMMERCIAL

## 2024-01-11 ENCOUNTER — APPOINTMENT (OUTPATIENT)
Dept: GENERAL RADIOLOGY | Facility: HOSPITAL | Age: 64
DRG: 268 | End: 2024-01-11
Payer: COMMERCIAL

## 2024-01-11 PROBLEM — E43 SEVERE MALNUTRITION: Status: ACTIVE | Noted: 2024-01-11

## 2024-01-11 LAB
ALBUMIN SERPL-MCNC: 2.4 G/DL (ref 3.5–5.2)
ALBUMIN/GLOB SERPL: 0.6 G/DL
ALP SERPL-CCNC: 143 U/L (ref 39–117)
ALT SERPL W P-5'-P-CCNC: 167 U/L (ref 1–41)
ANION GAP SERPL CALCULATED.3IONS-SCNC: 15 MMOL/L (ref 5–15)
APTT PPP: 89.5 SECONDS (ref 78–95.9)
AST SERPL-CCNC: 169 U/L (ref 1–40)
B PARAPERT DNA SPEC QL NAA+PROBE: NOT DETECTED
B PERT DNA SPEC QL NAA+PROBE: NOT DETECTED
BASOPHILS # BLD AUTO: 0.02 10*3/MM3 (ref 0–0.2)
BASOPHILS NFR BLD AUTO: 0.2 % (ref 0–1.5)
BILIRUB SERPL-MCNC: 9.4 MG/DL (ref 0–1.2)
BUN SERPL-MCNC: 54 MG/DL (ref 8–23)
BUN/CREAT SERPL: 13.4 (ref 7–25)
C PNEUM DNA NPH QL NAA+NON-PROBE: NOT DETECTED
CALCIUM SPEC-SCNC: 7.8 MG/DL (ref 8.6–10.5)
CHLORIDE SERPL-SCNC: 93 MMOL/L (ref 98–107)
CO2 SERPL-SCNC: 23 MMOL/L (ref 22–29)
CREAT SERPL-MCNC: 4.02 MG/DL (ref 0.76–1.27)
DEPRECATED RDW RBC AUTO: 57.8 FL (ref 37–54)
EGFRCR SERPLBLD CKD-EPI 2021: 15.7 ML/MIN/1.73
EOSINOPHIL # BLD AUTO: 0.05 10*3/MM3 (ref 0–0.4)
EOSINOPHIL NFR BLD AUTO: 0.5 % (ref 0.3–6.2)
ERYTHROCYTE [DISTWIDTH] IN BLOOD BY AUTOMATED COUNT: 19.9 % (ref 12.3–15.4)
FLUAV SUBTYP SPEC NAA+PROBE: NOT DETECTED
FLUBV RNA ISLT QL NAA+PROBE: NOT DETECTED
GLOBULIN UR ELPH-MCNC: 4 GM/DL
GLUCOSE BLDC GLUCOMTR-MCNC: 106 MG/DL (ref 70–99)
GLUCOSE BLDC GLUCOMTR-MCNC: 112 MG/DL (ref 70–99)
GLUCOSE BLDC GLUCOMTR-MCNC: 125 MG/DL (ref 70–99)
GLUCOSE BLDC GLUCOMTR-MCNC: 154 MG/DL (ref 70–99)
GLUCOSE BLDC GLUCOMTR-MCNC: 232 MG/DL (ref 70–99)
GLUCOSE SERPL-MCNC: 103 MG/DL (ref 65–99)
HADV DNA SPEC NAA+PROBE: NOT DETECTED
HCOV 229E RNA SPEC QL NAA+PROBE: NOT DETECTED
HCOV HKU1 RNA SPEC QL NAA+PROBE: NOT DETECTED
HCOV NL63 RNA SPEC QL NAA+PROBE: NOT DETECTED
HCOV OC43 RNA SPEC QL NAA+PROBE: NOT DETECTED
HCT VFR BLD AUTO: 20.8 % (ref 37.5–51)
HCT VFR BLD AUTO: 24.8 % (ref 37.5–51)
HGB BLD-MCNC: 6.7 G/DL (ref 13–17.7)
HGB BLD-MCNC: 8.1 G/DL (ref 13–17.7)
HMPV RNA NPH QL NAA+NON-PROBE: NOT DETECTED
HPIV1 RNA ISLT QL NAA+PROBE: NOT DETECTED
HPIV2 RNA SPEC QL NAA+PROBE: NOT DETECTED
HPIV3 RNA NPH QL NAA+PROBE: NOT DETECTED
HPIV4 P GENE NPH QL NAA+PROBE: NOT DETECTED
IMM GRANULOCYTES # BLD AUTO: 0.15 10*3/MM3 (ref 0–0.05)
IMM GRANULOCYTES NFR BLD AUTO: 1.6 % (ref 0–0.5)
LYMPHOCYTES # BLD AUTO: 0.67 10*3/MM3 (ref 0.7–3.1)
LYMPHOCYTES NFR BLD AUTO: 7.2 % (ref 19.6–45.3)
M PNEUMO IGG SER IA-ACNC: NOT DETECTED
MAGNESIUM SERPL-MCNC: 1.8 MG/DL (ref 1.6–2.4)
MCH RBC QN AUTO: 29.3 PG (ref 26.6–33)
MCHC RBC AUTO-ENTMCNC: 32.7 G/DL (ref 31.5–35.7)
MCV RBC AUTO: 89.9 FL (ref 79–97)
MONOCYTES # BLD AUTO: 0.79 10*3/MM3 (ref 0.1–0.9)
MONOCYTES NFR BLD AUTO: 8.5 % (ref 5–12)
NEUTROPHILS NFR BLD AUTO: 7.59 10*3/MM3 (ref 1.7–7)
NEUTROPHILS NFR BLD AUTO: 82 % (ref 42.7–76)
NRBC BLD AUTO-RTO: 0 /100 WBC (ref 0–0.2)
PHOSPHATE SERPL-MCNC: 4.3 MG/DL (ref 2.5–4.5)
PLATELET # BLD AUTO: 436 10*3/MM3 (ref 140–450)
PMV BLD AUTO: 9.4 FL (ref 6–12)
POTASSIUM SERPL-SCNC: 4.4 MMOL/L (ref 3.5–5.2)
PROT SERPL-MCNC: 6.4 G/DL (ref 6–8.5)
RBC # BLD AUTO: 2.76 10*6/MM3 (ref 4.14–5.8)
RHINOVIRUS RNA SPEC NAA+PROBE: NOT DETECTED
RSV RNA NPH QL NAA+NON-PROBE: NOT DETECTED
SARS-COV-2 RNA RESP QL NAA+PROBE: NOT DETECTED
SODIUM SERPL-SCNC: 131 MMOL/L (ref 136–145)
WBC NRBC COR # BLD AUTO: 9.27 10*3/MM3 (ref 3.4–10.8)

## 2024-01-11 PROCEDURE — 25010000002 PROPOFOL 10 MG/ML EMULSION

## 2024-01-11 PROCEDURE — 76000 FLUOROSCOPY <1 HR PHYS/QHP: CPT

## 2024-01-11 PROCEDURE — 25010000002 HEPARIN (PORCINE) PER 1000 UNITS: Performed by: SURGERY

## 2024-01-11 PROCEDURE — 99291 CRITICAL CARE FIRST HOUR: CPT | Performed by: STUDENT IN AN ORGANIZED HEALTH CARE EDUCATION/TRAINING PROGRAM

## 2024-01-11 PROCEDURE — 77001 FLUOROGUIDE FOR VEIN DEVICE: CPT

## 2024-01-11 PROCEDURE — 77001 FLUOROGUIDE FOR VEIN DEVICE: CPT | Performed by: SURGERY

## 2024-01-11 PROCEDURE — 99024 POSTOP FOLLOW-UP VISIT: CPT | Performed by: SURGERY

## 2024-01-11 PROCEDURE — 25010000002 PROCHLORPERAZINE 10 MG/2ML SOLUTION: Performed by: SURGERY

## 2024-01-11 PROCEDURE — 25010000002 MAGNESIUM SULFATE 2 GM/50ML SOLUTION: Performed by: PHYSICIAN ASSISTANT

## 2024-01-11 PROCEDURE — 25010000002 ONDANSETRON PER 1 MG: Performed by: SURGERY

## 2024-01-11 PROCEDURE — 97530 THERAPEUTIC ACTIVITIES: CPT

## 2024-01-11 PROCEDURE — 83735 ASSAY OF MAGNESIUM: CPT | Performed by: INTERNAL MEDICINE

## 2024-01-11 PROCEDURE — 85025 COMPLETE CBC W/AUTO DIFF WBC: CPT | Performed by: SURGERY

## 2024-01-11 PROCEDURE — 99221 1ST HOSP IP/OBS SF/LOW 40: CPT | Performed by: INTERNAL MEDICINE

## 2024-01-11 PROCEDURE — 25010000002 BUPIVACAINE (PF) 0.5 % SOLUTION 10 ML VIAL: Performed by: SURGERY

## 2024-01-11 PROCEDURE — 25010000002 LIDOCAINE 1 % SOLUTION 20 ML VIAL: Performed by: SURGERY

## 2024-01-11 PROCEDURE — 71045 X-RAY EXAM CHEST 1 VIEW: CPT

## 2024-01-11 PROCEDURE — 25010000002 AMPICILLIN PER 500 MG: Performed by: SURGERY

## 2024-01-11 PROCEDURE — 99291 CRITICAL CARE FIRST HOUR: CPT | Performed by: INTERNAL MEDICINE

## 2024-01-11 PROCEDURE — 25010000002 CEFAZOLIN PER 500 MG

## 2024-01-11 PROCEDURE — 0JH60XZ INSERTION OF TUNNELED VASCULAR ACCESS DEVICE INTO CHEST SUBCUTANEOUS TISSUE AND FASCIA, OPEN APPROACH: ICD-10-PCS | Performed by: SURGERY

## 2024-01-11 PROCEDURE — 25010000002 FENTANYL CITRATE (PF) 50 MCG/ML SOLUTION

## 2024-01-11 PROCEDURE — C1750 CATH, HEMODIALYSIS,LONG-TERM: HCPCS | Performed by: SURGERY

## 2024-01-11 PROCEDURE — 25010000002 DESMOPRESSIN ACETATE PF 4 MCG/ML SOLUTION 1 ML AMPULE: Performed by: SURGERY

## 2024-01-11 PROCEDURE — 02HV33Z INSERTION OF INFUSION DEVICE INTO SUPERIOR VENA CAVA, PERCUTANEOUS APPROACH: ICD-10-PCS | Performed by: SURGERY

## 2024-01-11 PROCEDURE — 80053 COMPREHEN METABOLIC PANEL: CPT | Performed by: INTERNAL MEDICINE

## 2024-01-11 PROCEDURE — 36558 INSERT TUNNELED CV CATH: CPT | Performed by: SURGERY

## 2024-01-11 PROCEDURE — 82948 REAGENT STRIP/BLOOD GLUCOSE: CPT

## 2024-01-11 PROCEDURE — 0202U NFCT DS 22 TRGT SARS-COV-2: CPT | Performed by: SURGERY

## 2024-01-11 PROCEDURE — 85014 HEMATOCRIT: CPT | Performed by: SURGERY

## 2024-01-11 PROCEDURE — 85730 THROMBOPLASTIN TIME PARTIAL: CPT | Performed by: STUDENT IN AN ORGANIZED HEALTH CARE EDUCATION/TRAINING PROGRAM

## 2024-01-11 PROCEDURE — 84100 ASSAY OF PHOSPHORUS: CPT | Performed by: INTERNAL MEDICINE

## 2024-01-11 PROCEDURE — 85018 HEMOGLOBIN: CPT | Performed by: SURGERY

## 2024-01-11 RX ORDER — HEPARIN SODIUM 1000 [USP'U]/ML
INJECTION, SOLUTION INTRAVENOUS; SUBCUTANEOUS AS NEEDED
Status: DISCONTINUED | OUTPATIENT
Start: 2024-01-11 | End: 2024-01-11 | Stop reason: HOSPADM

## 2024-01-11 RX ORDER — MAGNESIUM SULFATE HEPTAHYDRATE 40 MG/ML
2 INJECTION, SOLUTION INTRAVENOUS ONCE
Status: COMPLETED | OUTPATIENT
Start: 2024-01-11 | End: 2024-01-11

## 2024-01-11 RX ORDER — PROPOFOL 10 MG/ML
VIAL (ML) INTRAVENOUS AS NEEDED
Status: DISCONTINUED | OUTPATIENT
Start: 2024-01-11 | End: 2024-01-11 | Stop reason: SURG

## 2024-01-11 RX ORDER — METHION/INOS/CHOL BT/B COM/LIV 110MG-86MG
100 CAPSULE ORAL DAILY
Status: DISCONTINUED | OUTPATIENT
Start: 2024-01-11 | End: 2024-01-12 | Stop reason: HOSPADM

## 2024-01-11 RX ORDER — ONDANSETRON 2 MG/ML
4 INJECTION INTRAMUSCULAR; INTRAVENOUS ONCE AS NEEDED
Status: DISCONTINUED | OUTPATIENT
Start: 2024-01-11 | End: 2024-01-11 | Stop reason: HOSPADM

## 2024-01-11 RX ORDER — OXYCODONE HYDROCHLORIDE 5 MG/1
5 TABLET ORAL
Status: DISCONTINUED | OUTPATIENT
Start: 2024-01-11 | End: 2024-01-11 | Stop reason: HOSPADM

## 2024-01-11 RX ORDER — DEXMEDETOMIDINE HYDROCHLORIDE 100 UG/ML
INJECTION, SOLUTION INTRAVENOUS AS NEEDED
Status: DISCONTINUED | OUTPATIENT
Start: 2024-01-11 | End: 2024-01-11 | Stop reason: SURG

## 2024-01-11 RX ORDER — MULTIVIT AND MINERALS-FERROUS GLUCONATE 9 MG IRON/15 ML ORAL LIQUID 9 MG/15 ML
15 LIQUID (ML) ORAL DAILY
Status: DISCONTINUED | OUTPATIENT
Start: 2024-01-11 | End: 2024-01-12 | Stop reason: HOSPADM

## 2024-01-11 RX ORDER — FENTANYL CITRATE 50 UG/ML
INJECTION, SOLUTION INTRAMUSCULAR; INTRAVENOUS AS NEEDED
Status: DISCONTINUED | OUTPATIENT
Start: 2024-01-11 | End: 2024-01-11 | Stop reason: SURG

## 2024-01-11 RX ORDER — LIDOCAINE HYDROCHLORIDE 20 MG/ML
INJECTION, SOLUTION EPIDURAL; INFILTRATION; INTRACAUDAL; PERINEURAL AS NEEDED
Status: DISCONTINUED | OUTPATIENT
Start: 2024-01-11 | End: 2024-01-11 | Stop reason: SURG

## 2024-01-11 RX ORDER — PHENYLEPHRINE HCL IN 0.9% NACL 1 MG/10 ML
SYRINGE (ML) INTRAVENOUS AS NEEDED
Status: DISCONTINUED | OUTPATIENT
Start: 2024-01-11 | End: 2024-01-11 | Stop reason: SURG

## 2024-01-11 RX ORDER — DEXTROSE MONOHYDRATE 25 G/50ML
50 INJECTION, SOLUTION INTRAVENOUS 3 TIMES DAILY
Status: DISCONTINUED | OUTPATIENT
Start: 2024-01-11 | End: 2024-01-12 | Stop reason: HOSPADM

## 2024-01-11 RX ORDER — CEFAZOLIN SODIUM 2 G/100ML
2 INJECTION, SOLUTION INTRAVENOUS ONCE
Status: DISCONTINUED | OUTPATIENT
Start: 2024-01-11 | End: 2024-01-11 | Stop reason: HOSPADM

## 2024-01-11 RX ORDER — CEFAZOLIN SODIUM 1 G/3ML
INJECTION, POWDER, FOR SOLUTION INTRAMUSCULAR; INTRAVENOUS AS NEEDED
Status: DISCONTINUED | OUTPATIENT
Start: 2024-01-11 | End: 2024-01-11 | Stop reason: SURG

## 2024-01-11 RX ADMIN — DEXMEDETOMIDINE HYDROCHLORIDE 12 MCG: 100 INJECTION, SOLUTION, CONCENTRATE INTRAVENOUS at 15:04

## 2024-01-11 RX ADMIN — MIDODRINE HYDROCHLORIDE 5 MG: 5 TABLET ORAL at 08:10

## 2024-01-11 RX ADMIN — Medication 200 MCG: at 15:35

## 2024-01-11 RX ADMIN — PROCHLORPERAZINE EDISYLATE 2.5 MG: 5 INJECTION INTRAMUSCULAR; INTRAVENOUS at 17:47

## 2024-01-11 RX ADMIN — Medication 10 ML: at 21:02

## 2024-01-11 RX ADMIN — AMPICILLIN SODIUM 2 G: 2 INJECTION, POWDER, FOR SOLUTION INTRAMUSCULAR; INTRAVENOUS at 21:00

## 2024-01-11 RX ADMIN — DEXTROSE MONOHYDRATE 50 ML: 25 INJECTION, SOLUTION INTRAVENOUS at 17:30

## 2024-01-11 RX ADMIN — FENTANYL CITRATE 25 MCG: 50 INJECTION, SOLUTION INTRAMUSCULAR; INTRAVENOUS at 15:38

## 2024-01-11 RX ADMIN — Medication 100 MCG: at 15:37

## 2024-01-11 RX ADMIN — MAGNESIUM SULFATE HEPTAHYDRATE 2 G: 2 INJECTION, SOLUTION INTRAVENOUS at 08:09

## 2024-01-11 RX ADMIN — PROPOFOL 30 MG: 10 INJECTION, EMULSION INTRAVENOUS at 15:04

## 2024-01-11 RX ADMIN — ONDANSETRON 4 MG: 2 INJECTION INTRAMUSCULAR; INTRAVENOUS at 14:22

## 2024-01-11 RX ADMIN — ONDANSETRON 4 MG: 2 INJECTION INTRAMUSCULAR; INTRAVENOUS at 07:33

## 2024-01-11 RX ADMIN — Medication 100 MCG: at 15:29

## 2024-01-11 RX ADMIN — Medication 100 MCG: at 15:21

## 2024-01-11 RX ADMIN — MIDODRINE HYDROCHLORIDE 5 MG: 5 TABLET ORAL at 17:47

## 2024-01-11 RX ADMIN — FENTANYL CITRATE 25 MCG: 50 INJECTION, SOLUTION INTRAMUSCULAR; INTRAVENOUS at 15:18

## 2024-01-11 RX ADMIN — Medication 100 MG: at 11:25

## 2024-01-11 RX ADMIN — MIDODRINE HYDROCHLORIDE 5 MG: 5 TABLET ORAL at 11:25

## 2024-01-11 RX ADMIN — Medication 200 MCG: at 15:41

## 2024-01-11 RX ADMIN — ESCITALOPRAM OXALATE 5 MG: 10 TABLET ORAL at 08:10

## 2024-01-11 RX ADMIN — SODIUM BICARBONATE 650 MG TABLET 650 MG: at 08:10

## 2024-01-11 RX ADMIN — Medication 10 MG: at 21:00

## 2024-01-11 RX ADMIN — Medication 15 ML: at 11:26

## 2024-01-11 RX ADMIN — LIDOCAINE HYDROCHLORIDE 50 MG: 20 INJECTION, SOLUTION EPIDURAL; INFILTRATION; INTRACAUDAL; PERINEURAL at 15:03

## 2024-01-11 RX ADMIN — DEXTROSE MONOHYDRATE 50 ML: 25 INJECTION, SOLUTION INTRAVENOUS at 21:00

## 2024-01-11 RX ADMIN — FENTANYL CITRATE 25 MCG: 50 INJECTION, SOLUTION INTRAMUSCULAR; INTRAVENOUS at 15:23

## 2024-01-11 RX ADMIN — CEFAZOLIN 2 G: 1 INJECTION, POWDER, FOR SOLUTION INTRAMUSCULAR; INTRAVENOUS at 15:12

## 2024-01-11 RX ADMIN — FENTANYL CITRATE 25 MCG: 50 INJECTION, SOLUTION INTRAMUSCULAR; INTRAVENOUS at 15:07

## 2024-01-11 RX ADMIN — Medication 10 ML: at 08:10

## 2024-01-11 RX ADMIN — DESMOPRESSIN ACETATE 21 MCG: 4 INJECTION INTRAVENOUS; SUBCUTANEOUS at 19:55

## 2024-01-11 RX ADMIN — QUETIAPINE FUMARATE 25 MG: 25 TABLET ORAL at 21:00

## 2024-01-11 RX ADMIN — DOCUSATE SODIUM 50MG AND SENNOSIDES 8.6MG 2 TABLET: 8.6; 5 TABLET, FILM COATED ORAL at 08:10

## 2024-01-11 RX ADMIN — DEXTROSE MONOHYDRATE 50 ML: 25 INJECTION, SOLUTION INTRAVENOUS at 11:26

## 2024-01-11 RX ADMIN — PROPOFOL 100 MCG/KG/MIN: 10 INJECTION, EMULSION INTRAVENOUS at 15:04

## 2024-01-11 NOTE — ANESTHESIA POSTPROCEDURE EVALUATION
Patient: Danny Savage    Procedure Summary       Date: 01/11/24 Room / Location: [facility] OR 01 / [facility] MAIN OR    Anesthesia Start: 1456 Anesthesia Stop: 1556    Procedure: Insertion of tunneled catheter for hemodialysis Diagnosis:       ROLAND (acute kidney injury)      (ROLAND (acute kidney injury) [N17.9])    Surgeons: Swapnil Velazco MD Provider: Clayton Anders MD    Anesthesia Type: general, MAC ASA Status: 4            Anesthesia Type: general, MAC    Vitals  Vitals Value Taken Time   /67 01/11/24 1628   Temp 36.2 °C (97.1 °F) 01/11/24 1555   Pulse 80 01/11/24 1642   Resp 16 01/11/24 1620   SpO2 97 % 01/11/24 1642   Vitals shown include unfiled device data.        Post Anesthesia Care and Evaluation    Patient location during evaluation: bedside  Patient participation: complete - patient participated  Level of consciousness: awake  Pain management: adequate    Airway patency: patent  PONV Status: none  Cardiovascular status: acceptable  Respiratory status: acceptable  Hydration status: acceptable    Comments: An Anesthesiologist personally participated in the most demanding procedures (including induction and emergence if applicable) in the anesthesia plan, monitored the course of anesthesia administration at frequent intervals and remained physically present and available for immediate diagnosis and treatment of emergencies.

## 2024-01-11 NOTE — INTERVAL H&P NOTE
H&P updated. The patient was examined and the following changes are noted:  S/P repair of ruptured AAA.

## 2024-01-11 NOTE — PROGRESS NOTES
" LOS: 19 days   Patient Care Team:  Kodi Pichardo MD as PCP - General (Internal Medicine)  Lisset Harrington APRN as Nurse Practitioner (Urology)    Chief Complaint: ROLAND    Subjective     History of Present Illness  Awake, not feeling well.  Just finished vomiting.  Not tolerating p.o.  NG tube feed on hold.      Subjective:  Symptoms:  He reports weakness.  No shortness of breath, chest pain, chest pressure or anxiety.    Diet:  NPO.  No nausea or vomiting.    Activity level: Impaired due to weakness.    Pain:  He complains of pain that is moderate.      History taken from: chart family    Objective     Vital Sign Min/Max for last 24 hours  Temp  Min: 98 °F (36.7 °C)  Max: 98.5 °F (36.9 °C)   BP  Min: 104/76  Max: 142/87   Pulse  Min: 86  Max: 96   Resp  Min: 14  Max: 20   SpO2  Min: 88 %  Max: 98 %   Flow (L/min)  Min: 2  Max: 2   No data recorded     Flowsheet Rows      Flowsheet Row First Filed Value   Admission Height 177.8 cm (70\") Documented at 12/23/2023 1948   Admission Weight 79.9 kg (176 lb 2.4 oz) Documented at 12/23/2023 1948            No intake/output data recorded.  I/O last 3 completed shifts:  In: 961 [I.V.:391; Other:200; NG/GT:370]  Out: 500 [Emesis/NG output:300; Stool:200]    Objective:  General Appearance:  Ill-appearing, in no acute distress and uncomfortable.    Vital signs: (most recent): Blood pressure 120/67, pulse 90, temperature 98.5 °F (36.9 °C), temperature source Oral, resp. rate 17, height 177.8 cm (70\"), weight 69 kg (152 lb 3.2 oz), SpO2 97%.  Vital signs are normal.  No fever.    Output: Producing urine and producing stool.    HEENT: Normal HEENT exam.    Lungs:  Normal effort and normal respiratory rate.  Breath sounds clear to auscultation.  He is not in respiratory distress.    Heart: Normal rate.  Regular rhythm.    Abdomen: Abdomen is soft.  (Deep palpation not done.  Midline incision covered.  Colostomy left lower quadrant noted.).  Hypoactive bowel sounds.   " "  Extremities: There is dependent edema.    Pulses: There are decreased pulses.  (Left femoral Shiley in place.)    Neurological: Patient is alert and oriented to person, place and time.    Pupils:  Pupils are equal, round, and reactive to light.    Skin:  Warm, dry and pale.              Results Review:     I reviewed the patient's new clinical results.    WBC WBC   Date Value Ref Range Status   01/11/2024 9.27 3.40 - 10.80 10*3/mm3 Final   01/09/2024 11.28 (H) 3.40 - 10.80 10*3/mm3 Final      HGB Hemoglobin   Date Value Ref Range Status   01/11/2024 8.1 (L) 13.0 - 17.7 g/dL Final   01/09/2024 8.7 (L) 13.0 - 17.7 g/dL Final      HCT Hematocrit   Date Value Ref Range Status   01/11/2024 24.8 (L) 37.5 - 51.0 % Final   01/09/2024 27.2 (L) 37.5 - 51.0 % Final      Platlets No results found for: \"LABPLAT\"   MCV MCV   Date Value Ref Range Status   01/11/2024 89.9 79.0 - 97.0 fL Final   01/09/2024 91.6 79.0 - 97.0 fL Final          Sodium Sodium   Date Value Ref Range Status   01/11/2024 131 (L) 136 - 145 mmol/L Final   01/10/2024 132 (L) 136 - 145 mmol/L Final   01/09/2024 133 (L) 136 - 145 mmol/L Final      Potassium Potassium   Date Value Ref Range Status   01/11/2024 4.4 3.5 - 5.2 mmol/L Final   01/10/2024 5.2 3.5 - 5.2 mmol/L Final   01/09/2024 4.4 3.5 - 5.2 mmol/L Final      Chloride Chloride   Date Value Ref Range Status   01/11/2024 93 (L) 98 - 107 mmol/L Final   01/10/2024 95 (L) 98 - 107 mmol/L Final   01/09/2024 94 (L) 98 - 107 mmol/L Final      CO2 CO2   Date Value Ref Range Status   01/11/2024 23.0 22.0 - 29.0 mmol/L Final   01/10/2024 19.1 (L) 22.0 - 29.0 mmol/L Final   01/09/2024 21.9 (L) 22.0 - 29.0 mmol/L Final      BUN BUN   Date Value Ref Range Status   01/11/2024 54 (H) 8 - 23 mg/dL Final   01/10/2024 70 (H) 8 - 23 mg/dL Final   01/09/2024 47 (H) 8 - 23 mg/dL Final      Creatinine Creatinine   Date Value Ref Range Status   01/11/2024 4.02 (H) 0.76 - 1.27 mg/dL Final   01/10/2024 4.82 (H) 0.76 - 1.27 " "mg/dL Final   01/09/2024 3.51 (H) 0.76 - 1.27 mg/dL Final      Calcium Calcium   Date Value Ref Range Status   01/11/2024 7.8 (L) 8.6 - 10.5 mg/dL Final   01/10/2024 8.0 (L) 8.6 - 10.5 mg/dL Final   01/09/2024 7.9 (L) 8.6 - 10.5 mg/dL Final      PO4 No results found for: \"CAPO4\"   Albumin Albumin   Date Value Ref Range Status   01/11/2024 2.4 (L) 3.5 - 5.2 g/dL Final   01/10/2024 2.4 (L) 3.5 - 5.2 g/dL Final   01/09/2024 2.6 (L) 3.5 - 5.2 g/dL Final      Magnesium Magnesium   Date Value Ref Range Status   01/11/2024 1.8 1.6 - 2.4 mg/dL Final   01/10/2024 2.1 1.6 - 2.4 mg/dL Final   01/09/2024 1.9 1.6 - 2.4 mg/dL Final      Uric Acid No results found for: \"URICACID\"     Medication Review:   ampicillin, 2 g, Intravenous, Q24H  escitalopram, 5 mg, Nasogastric, Daily  melatonin, 10 mg, Nasogastric, Nightly  midodrine, 5 mg, Nasogastric, TID AC  QUEtiapine, 25 mg, Nasogastric, Nightly  senna-docusate sodium, 2 tablet, Nasogastric, BID  sodium bicarbonate, 650 mg, Nasogastric, TID  sodium chloride, 10 mL, Intravenous, Q12H          Assessment & Plan       Left flank pain    Ruptured infrarenal abdominal aortic aneurysm (AAA)    DVT, lower extremity, distal, acute, unspecified laterality    S/P Exploratory laparotomy; appendectomy; left colon resection with creation of ostomy    S/P Open repair of ruptured infrarenal abdominal aortic aneurysm    ROLAND (acute kidney injury)    Severe malnutrition      Assessment & Plan  Anuric ROLAND-  pt with previous partial left nephrectomy and baseline creatinine closer to 0.9-1.0.  Had noted hypotension with AAA rupture s/p repair.  Likely ATN from hypotension vs. Atheroembolic injury versus compromise blood supply to kidneys.    Noted renal infarcts seen on repeat CT.  Also received IV contrast with CTA.    Still anuric.  Will need TDC placement.  Timing per vascular. Likely today.  Appreciate help. Continue MWF schedule for now.    AAA rupture- s/p open repair infrarenal aorta.  Ischemic " bowel- s/p left colectomy, ostomy.  Shock- sepsis, hemorrhagic component with AAA rupture.   Sepsis-  blood cx with strep pyogenes previously.  Treatment per primary.    Met Acidosis-  bicarb held.  Improved with HD.   Anemia-  prbc's prn.  Hyperkalemia-controlled with dialysis.    DVT- simran LE.  Unable to place filter, on heparin gtt now.  Vascular following.      Adin Hinton MD  01/11/24  09:32 EST

## 2024-01-11 NOTE — CONSULTS
Chief Complaint  Back Pain    History of Present Illness  Danny Savage is a 63 y.o. male who was admitted to the hospital with a leaking aortic aneurysm couple of weeks ago.  Patient had surgery.  In the aneurysm was repaired.  Since then patient has been ICU.  He has past medical history significant for kidney stones.  He has history of renal cancer.  History of hiatal hernia.  Who presents to [facility] on referral from Koffi Agosto MD for a gastroenterology evaluation of jaundice.  Patient was found to have elevated liver enzymes with history ALT about 8-900 range right after his aortic aneurysm surgery.  Since then AST ALT been slowly improving and now in 100 range but bilirubin has gone up to about 10.  Patient denies any abdominal pain.  He is currently on tube feeds and tolerating them well.  His MRCP was negative for bile duct stones or dilatation.        Labs Result Review Imaging    Past Medical History:   Diagnosis Date    Allergy     Aortic aneurysm     4.7 just found has not f/u    H/O Kidney stone     Hiatal hernia     Kidney mass     left    Kidney stone     Renal cancer     S/P Exploratory laparotomy; appendectomy; left colon resection with creation of ostomy 01/04/2024    S/P Open repair of ruptured infrarenal abdominal aortic aneurysm 01/04/2024       Past Surgical History:   Procedure Laterality Date    ABDOMINAL AORTIC ANEURYSM REPAIR N/A 12/24/2023    Procedure: ABDOMINAL AORTIC ANEURYSM REPAIR;  Surgeon: Koffi Agosto MD;  Location: [facility] OR;  Service: Vascular;  Laterality: N/A;    COLON RESECTION N/A 12/26/2023    Procedure: COLON RESECTION;  Surgeon: Hernan Mallory MD;  Location: [facility] MAIN OR;  Service: General;  Laterality: N/A;  EXPLORATORY LAPAROTOMY, REPAIR OF SMALL BOWEL MESENTERY, APPENDECTOMY, LEFT HEMICOLECTOMY, CREATION OF OSTOMY, ABDOMINAL CLOSURE    EXPLORATORY LAPAROTOMY N/A 12/26/2023    Procedure: LAPAROTOMY EXPLORATORY;  Surgeon:  Koffi Agosto MD;  Location: [facility] MAIN OR;  Service: Vascular;  Laterality: N/A;  Exploratory Laparotomy    INTERVENTIONAL RADIOLOGY PROCEDURE N/A 1/5/2024    Procedure: IVC Filter Placement;  Surgeon: Michoacano Collins MD;  Location: [facility] CATH INVASIVE LOCATION;  Service: Vascular;  Laterality: N/A;    KIDNEY STONE SURGERY      NEPHRECTOMY PARTIAL Left 11/13/2023    Procedure: NEPHRECTOMY PARTIAL LAPAROSCOPIC WITH DAVINCI ROBOT, left;  Surgeon: Michelle Cai MD;  Location: [facility] MAIN OR;  Service: Robotics - DaVinci;  Laterality: Left;    URETEROSCOPY LASER LITHOTRIPSY WITH STENT INSERTION Left 08/18/2023    Procedure: CYSTOSCOPY URETEROSCOPY RETROGRADE PYELOGRAM STENT INSERTION, left;  Surgeon: Michelle Cai MD;  Location: [facility] MAIN OR;  Service: Urology;  Laterality: Left;         Current Facility-Administered Medications:     ampicillin 2000 mg/100 mL 0.9% NS (MBP), 2 g, Intravenous, Q24H, Yinka Lloyd MD, 2 g at 01/10/24 2129    sennosides-docusate (PERICOLACE) 8.6-50 MG per tablet 2 tablet, 2 tablet, Nasogastric, BID, 2 tablet at 01/11/24 0810 **AND** polyethylene glycol (MIRALAX) packet 17 g, 17 g, Nasogastric, Daily PRN **AND** [DISCONTINUED] bisacodyl (DULCOLAX) EC tablet 5 mg, 5 mg, Oral, Daily PRN **AND** bisacodyl (DULCOLAX) suppository 10 mg, 10 mg, Rectal, Daily PRN, Michoacano Collins MD    calcium carbonate (TUMS) chewable tablet 500 mg (200 mg elemental), 2 tablet, Nasogastric, TID PRN, Michoacano Collins MD    dextrose (D50W) (25 g/50 mL) IV injection 25 g, 25 g, Intravenous, Q15 Min PRN, Michoacano Collins MD, 25 g at 01/05/24 0520    dextrose (D50W) (25 g/50 mL) IV injection 25 g, 25 g, Intravenous, Q15 Min PRN, Michoacano Collins MD, 25 g at 01/05/24 1145    dextrose (D50W) (25 g/50 mL) IV injection 50 mL, 50 mL, Intravenous, TID, Reno Jeffery MD    dextrose (GLUTOSE) oral gel 15 g, 15 g, Oral, Q15 Min PRN, Michoacano Collins MD, 15 g at 01/02/24 0610    dextrose (GLUTOSE) oral gel 15 g, 15  g, Oral, Q15 Min PRN, Michoacano Collins MD    escitalopram (LEXAPRO) tablet 5 mg, 5 mg, Nasogastric, Daily, Michoacano Collins MD, 5 mg at 24 0810    fentaNYL citrate (PF) (SUBLIMAZE) injection 50 mcg, 50 mcg, Intravenous, Q4H PRN, Michoacano Collins MD, 50 mcg at 24 1720    glucagon (GLUCAGEN) injection 1 mg, 1 mg, Intramuscular, Q15 Min PRN, Michoacano Collins MD    glucagon (GLUCAGEN) injection 1 mg, 1 mg, Intramuscular, Q15 Min PRN, Michoacano Collins MD    heparin (porcine) injection 1,000-2,000 Units, 1,000-2,000 Units, Intravenous, PRN, Michoacano Collins MD    heparin (porcine) injection 3,000 Units, 3,000 Units, Intracatheter, PRN, Michoacano Collins MD, 2,600 Units at 01/10/24 1751    heparin 87884 units/250 mL (100 units/mL) in 0.45 % NaCl infusion, 18 Units/kg/hr, Intravenous, Titrated, Michoacano Collins MD, Last Rate: 15.75 mL/hr at 24 0442, 18 Units/kg/hr at 24 0442    heparin bolus from bag 2,200 Units, 25 Units/kg, Intravenous, PRN, Michoacano Collins MD    heparin bolus from bag 4,400 Units, 50 Units/kg, Intravenous, PRN, Michoacano Collins MD    melatonin tablet 10 mg, 10 mg, Nasogastric, Nightly, Michoacano Collins MD, 10 mg at 01/10/24 2129    midodrine (PROAMATINE) tablet 5 mg, 5 mg, Nasogastric, TID AC, Michoacano Collins MD, 5 mg at 24 0810    [] morphine injection 2 mg, 2 mg, Intravenous, Q4H PRN, 2 mg at 23 1938 **AND** naloxone (NARCAN) injection 0.4 mg, 0.4 mg, Intravenous, Q5 Min PRN, Michoacano Collins MD, 0.4 mg at 23 1405    nitroglycerin (NITROSTAT) SL tablet 0.4 mg, 0.4 mg, Sublingual, Q5 Min PRN, Michoacano Collins MD    ondansetron (ZOFRAN) injection 4 mg, 4 mg, Intravenous, Q6H PRN, Michoacano Collins MD, 4 mg at 24 0733    oxyCODONE-acetaminophen (PERCOCET)  MG per tablet 1 tablet, 1 tablet, Nasogastric, Q4H PRN, Arpit Franks MD, 1 tablet at 24 1616    oxyCODONE-acetaminophen (PERCOCET) 5-325 MG per tablet 1 tablet, 1 tablet, Nasogastric, Q4H PRN,  "Arpit Franks MD, 1 tablet at 01/09/24 0631    prochlorperazine (COMPAZINE) injection 2.5 mg, 2.5 mg, Intravenous, Q6H PRN, Liv Gomez APRN, 2.5 mg at 01/10/24 2332    QUEtiapine (SEROquel) tablet 25 mg, 25 mg, Nasogastric, Nightly, Liv Gomez APRN, 25 mg at 01/10/24 2129    sodium chloride 0.9 % flush 10 mL, 10 mL, Intravenous, Q12H, Michoacano Collins MD, 10 mL at 01/11/24 0810    sodium chloride 0.9 % flush 10 mL, 10 mL, Intravenous, PRN, Michoacano Collins MD    sodium chloride 0.9 % infusion 40 mL, 40 mL, Intravenous, PRN, Michoacano Collins MD, 40 mL at 12/24/23 2102    traZODone (DESYREL) tablet 50 mg, 50 mg, Nasogastric, Nightly PRN, Liv Gomez APRN, 50 mg at 01/10/24 2129     Allergies   Allergen Reactions    Latex, Natural Rubber Hives       Family History   Problem Relation Age of Onset    Cancer Mother         Skin Cancer    Cancer Father         Throat Cancer    Heart disease Father         Congestive Heart Failure    Hypertension Father         Social History     Social History Narrative    Not on file   Social history smoking, alcohol abuse, drugs    Immunization:  Immunization History   Administered Date(s) Administered    COVID-19 (MODERNA) 1st,2nd,3rd Dose Monovalent 08/28/2021, 09/25/2021    COVID-19 (MODERNA) Monovalent Original Booster 03/01/2022        Objective     Review of Systems 10 system review is negative for weakness patient has some nausea and had vomiting of gastric contents otherwise to 10 system review was negative except was mentioned in HPI    Vital Signs:   /67   Pulse 90   Temp 98.5 °F (36.9 °C) (Oral)   Resp 17   Ht 177.8 cm (70\")   Wt 69 kg (152 lb 3.2 oz) Comment: no pillows no blankets  SpO2 97%   BMI 21.84 kg/m²       Physical Exam  Constitutional:       General: He is awake. He is not in acute distress.     Appearance: Normal appearance. He is well-developed and well-groomed.   HENT:      Head: Normocephalic and atraumatic.      " Mouth/Throat:      Mouth: Mucous membranes are moist.      Comments: René dental hygiene is good  Eyes:      General: Lids are normal.      Conjunctiva/sclera: Conjunctivae normal.      Pupils: Pupils are equal, round, and reactive to light.   Neck:      Thyroid: No thyroid mass.      Trachea: Trachea normal.   Cardiovascular:      Rate and Rhythm: Normal rate and regular rhythm.      Heart sounds: Normal heart sounds.   Pulmonary:      Effort: Pulmonary effort is normal.      Breath sounds: Normal breath sounds and air entry.   Abdominal:      General: Abdomen is flat. Bowel sounds are normal. There is no distension.      Palpations: Abdomen is soft. There is no mass.      Tenderness: There is no abdominal tenderness. There is no guarding.   Musculoskeletal:      Cervical back: Neck supple.      Right lower leg: No edema.      Left lower leg: No edema.   Skin:     General: Skin is warm and moist.      Coloration: Skin is not cyanotic or pale.      Findings: No rash.      Nails: There is no clubbing.   Neurological:      Mental Status: He is alert and oriented to person, place, and time.   Psychiatric:         Attention and Perception: Attention normal.         Mood and Affect: Mood and affect normal.         Speech: Speech normal.         Labs:  Results from last 7 days   Lab Units 01/11/24  0336 01/09/24  0306 01/08/24  0548   WBC 10*3/mm3 9.27 11.28* 12.51*   HEMOGLOBIN g/dL 8.1* 8.7* 8.3*   HEMATOCRIT % 24.8* 27.2* 25.3*   PLATELETS 10*3/mm3 436 366 322      Results from last 7 days   Lab Units 01/11/24  0336 01/10/24  0257 01/09/24  0306   SODIUM mmol/L 131* 132* 133*   POTASSIUM mmol/L 4.4 5.2 4.4   CHLORIDE mmol/L 93* 95* 94*   CO2 mmol/L 23.0 19.1* 21.9*   BUN mg/dL 54* 70* 47*   CREATININE mg/dL 4.02* 4.82* 3.51*   CALCIUM mg/dL 7.8* 8.0* 7.9*   BILIRUBIN mg/dL 9.4* 10.5* 10.3*   ALK PHOS U/L 143* 146* 139*   ALT (SGPT) U/L 167* 143* 119*   AST (SGOT) U/L 169* 154* 129*   GLUCOSE mg/dL 103* 89 89       Results from last 7 days   Lab Units 01/07/24  0254 01/06/24  0221 01/05/24  1443   INR  1.47* 1.71* 1.71*        Assessment & Plan:  Principal Problem:    Left flank pain  Active Problems:    Ruptured infrarenal abdominal aortic aneurysm (AAA)    DVT, lower extremity, distal, acute, unspecified laterality    S/P Exploratory laparotomy; appendectomy; left colon resection with creation of ostomy    S/P Open repair of ruptured infrarenal abdominal aortic aneurysm    ROLAND (acute kidney injury)    Severe malnutrition  Jaundice which is nonobstructive most likely secondary to ischemic liver injury  I recommend continuing supportive care.  Continue to follow LFTs.  Patient was given instructions and counseling regarding his condition or for health maintenance advice. Please see specific information pulled into the AVS if appropriate.        Signed:  Krishna Osorio MD  01/11/24  10:55 EST

## 2024-01-11 NOTE — CONSULTS
"Nutrition Services    Patient Name: Danny Savage  YOB: 1960  MRN: [id number]  Admission date: 12/23/2023      CLINICAL NUTRITION ASSESSMENT      Reason for Assessment  Follow Up     H&P:  Past Medical History:   Diagnosis Date    Allergy     Aortic aneurysm     4.7 just found has not f/u    H/O Kidney stone     Hiatal hernia     Kidney mass     left    Kidney stone     Renal cancer     S/P Exploratory laparotomy; appendectomy; left colon resection with creation of ostomy 01/04/2024    S/P Open repair of ruptured infrarenal abdominal aortic aneurysm 01/04/2024        Current Problems:   Active Hospital Problems    Diagnosis     **Left flank pain     Severe malnutrition     S/P Exploratory laparotomy; appendectomy; left colon resection with creation of ostomy     S/P Open repair of ruptured infrarenal abdominal aortic aneurysm     Ruptured infrarenal abdominal aortic aneurysm (AAA)     DVT, lower extremity, distal, acute, unspecified laterality     ROLAND (acute kidney injury)         Nutrition/Diet History         Narrative   Patient admitted with left flank pain, ruptured AAA.  S/P open AAA repair.  Patient went back to OR 12/26/2023. Now S/P left open colectomy with ostomy creation.  Pt extubated 12/28/2023.   Cortrak placed 01/02/24 to supplement intake.  Had been receiving CRRT with fluid removal.  Now on MWF HD.      Patient is on a full liquid diet with minimal PO intake.  Continues to have issues with nausea/vomiting when enteral nutrition is running, despite post-pyloric/jejunal tube placement.  Enteral nutrition is currently on hold for planned TDC today.      Once able to resume enteral nutrition will provide protein supplement and D50 pushes to provides kcal/pro with minimal to no fat to help decreased bile production and monitor for improvement with vomiting symptoms.       Anthropometrics        Current Height, Weight Height: 177.8 cm (70\")  Weight: 69 kg (152 lb 3.2 oz) (no pillows no " blankets)   Current BMI Body mass index is 21.84 kg/m².   BMI Classification Normal range   % IBW 91%   Adjusted Body Weight (ABW)    Weight Hx  Wt Readings from Last 30 Encounters:   01/10/24 0800 69 kg (152 lb 3.2 oz)   01/08/24 1624 77.7 kg (171 lb 3.2 oz)   01/07/24 0516 79 kg (174 lb 1.6 oz)   01/06/24 0500 80.6 kg (177 lb 12.8 oz)   01/04/24 0500 87.5 kg (192 lb 12.8 oz)   01/03/24 0517 91 kg (200 lb 11.2 oz)   01/02/24 1320 92.7 kg (204 lb 4.8 oz)   01/01/24 1600 96.6 kg (212 lb 14.4 oz)   12/24/23 0027 80.5 kg (177 lb 7.5 oz)   12/23/23 1948 79.9 kg (176 lb 2.4 oz)   12/22/23 0706 78.8 kg (173 lb 11.6 oz)   12/06/23 0958 79.9 kg (176 lb 3.2 oz)   11/22/23 1338 78.2 kg (172 lb 6.4 oz)   11/13/23 0628 78.2 kg (172 lb 6.4 oz)   10/30/23 1440 79.9 kg (176 lb 2.4 oz)   08/17/23 1952 78.8 kg (173 lb 11.6 oz)   08/17/23 1537 80.3 kg (177 lb 0.5 oz)   08/15/23 0554 81.2 kg (179 lb 0.2 oz)          Wt Change Observation Weight continues to trend down with dialysis treatments/fluid removal.  Weight is below patient's UBW.      -13.6% x 3 months      Estimated/Assessed Needs  Estimated Needs based on: Current Body Weight       Energy Requirements 30-35 kcal/kg    EST Needs (kcal/day) 1449-4641 kcal        Protein Requirements 1.5 g/kg   EST Daily Needs (g/day) 104 g       Fluid Requirements 25 ml/kg    Estimated Needs (mL/day) 1725 ml     Labs/Medications         Pertinent Labs Reviewed.   Results from last 7 days   Lab Units 01/11/24  0336 01/10/24  0257 01/09/24 0306   SODIUM mmol/L 131* 132* 133*   POTASSIUM mmol/L 4.4 5.2 4.4   CHLORIDE mmol/L 93* 95* 94*   CO2 mmol/L 23.0 19.1* 21.9*   BUN mg/dL 54* 70* 47*   CREATININE mg/dL 4.02* 4.82* 3.51*   CALCIUM mg/dL 7.8* 8.0* 7.9*   BILIRUBIN mg/dL 9.4* 10.5* 10.3*   ALK PHOS U/L 143* 146* 139*   ALT (SGPT) U/L 167* 143* 119*   AST (SGOT) U/L 169* 154* 129*   GLUCOSE mg/dL 103* 89 89     Results from last 7 days   Lab Units 01/11/24  0336 01/10/24  0257 01/09/24 0306  "  MAGNESIUM mg/dL 1.8 2.1 1.9   PHOSPHORUS mg/dL 4.3 5.6* 4.9*   HEMOGLOBIN g/dL 8.1*  --  8.7*   HEMATOCRIT % 24.8*  --  27.2*     COVID19   Date Value Ref Range Status   12/23/2023 Not Detected Not Detected - Ref. Range Final     No results found for: \"HGBA1C\"      Pertinent Medications Reviewed.     Malnutrition Severity Assessment      Patient meets criteria for : Severe Malnutrition           Nutrition Diagnosis         Nutrition Dx Problem 1 Inadequate energy Intake related to GI dysfunction as evidenced by enteral nutrition not tolerated., PO diet not tolerated., and report of minimal PO intake.     Nutrition Intervention           Current Nutrition Orders & Evaluation of Intake       Current PO Diet NPO Diet NPO Type: Sips with Meds   Supplement Orders Placed This Encounter      Dietary Nutrition Supplements Ensure Surgery; vanilla      DIET MESSAGE Guest tray, regular diet      Place Feeding Tube Per Klout System      Diet, Tube Feeding Tube Feeding Formula: Peptamen 1.5; Tube Feeding Type: Continuous; Continuous Tube Feeding Start Rate (mL/hr): 20; Then Advance Rate By (mL/hr): RD To Manage; Every __ Hours: Patient at Goal Rate; To Goal Rate of (mL/hr): RD to ...           Nutrition Intervention/Prescription:  Provides 660 kcals, 39 grams of protein       Once able to resume enteral nutrition recommend:  D50 one 50 ml amp TID (25 g dextrose each, 255 kcal)  ProSource TF 5 x day (400 kcal, 100 g pro)  Thiamine 100 mg daily   MVI with minerals daily     In total, will receive 655 kcal, 100 g pro  Meets 30% estimated kcal needs and 96% estimated protein needs.     If patient is able to tolerate this regimen, recommend to trial Vivonex RTF.    Vivonex RTF @ 100 ml/hr x 22 hours   Free water flushes 15 ml every 3 hours   Provides 2200 kcal, 110 g pro, 1986 ml fluid         Medical Nutrition Therapy/Nutrition Education          Learner     Readiness Patient  Acceptance     Method     Response " Explanation  Verbalizes understanding     Monitor/Evaluation        Monitor Per protocol, I&O, PO intake, Pertinent labs, EN delivery/tolerance, Weight, Symptoms       Nutrition Discharge Plan         To be determined       Electronically signed by:  Shannon Acharya RD  01/11/24 15:58 EST

## 2024-01-11 NOTE — THERAPY TREATMENT NOTE
Patient Name: Danny Savage  : 1960    MRN: [id number]                              Today's Date: 2024       Admit Date: 2023    Visit Dx:     ICD-10-CM ICD-9-CM   1. Sepsis, due to unspecified organism, unspecified whether acute organ dysfunction present  A41.9 038.9     995.91   2. Fever, unspecified fever cause  R50.9 780.60   3. Intra-abdominal fluid collection  R18.8 789.59   4. Abdominal aortic aneurysm (AAA) without rupture, unspecified part  I71.40 441.4   5. Ruptured infrarenal abdominal aortic aneurysm (AAA)  I71.33 441.3   6. Decreased activities of daily living (ADL)  Z78.9 V49.89   7. Difficulty walking  R26.2 719.7   8. DVT, lower extremity, distal, acute, unspecified laterality  I82.4Z9 453.42   9. Dysphagia, oropharyngeal  R13.12 787.22   10. S/P Open repair of ruptured infrarenal abdominal aortic aneurysm  Z98.890 V45.89    Z86.79    11. S/P AAA (abdominal aortic aneurysm) repair  Z98.890 V45.89    Z86.79    12. ROLAND (acute kidney injury)  N17.9 584.9     Patient Active Problem List   Diagnosis    Left renal mass    Left flank pain    Ruptured infrarenal abdominal aortic aneurysm (AAA)    DVT, lower extremity, distal, acute, unspecified laterality    S/P Exploratory laparotomy; appendectomy; left colon resection with creation of ostomy    S/P Open repair of ruptured infrarenal abdominal aortic aneurysm    ROLAND (acute kidney injury)    Severe malnutrition     Past Medical History:   Diagnosis Date    Allergy     Aortic aneurysm     4.7 just found has not f/u    H/O Kidney stone     Hiatal hernia     Kidney mass     left    Kidney stone     Renal cancer     S/P Exploratory laparotomy; appendectomy; left colon resection with creation of ostomy 2024    S/P Open repair of ruptured infrarenal abdominal aortic aneurysm 2024     Past Surgical History:   Procedure Laterality Date    ABDOMINAL AORTIC ANEURYSM REPAIR N/A 2023    Procedure: ABDOMINAL AORTIC ANEURYSM  REPAIR;  Surgeon: Koffi Agosto MD;  Location: [facility] MAIN OR;  Service: Vascular;  Laterality: N/A;    COLON RESECTION N/A 12/26/2023    Procedure: COLON RESECTION;  Surgeon: Hernan Mallory MD;  Location: [facility] MAIN OR;  Service: General;  Laterality: N/A;  EXPLORATORY LAPAROTOMY, REPAIR OF SMALL BOWEL MESENTERY, APPENDECTOMY, LEFT HEMICOLECTOMY, CREATION OF OSTOMY, ABDOMINAL CLOSURE    EXPLORATORY LAPAROTOMY N/A 12/26/2023    Procedure: LAPAROTOMY EXPLORATORY;  Surgeon: Koffi Agosto MD;  Location: [facility] MAIN OR;  Service: Vascular;  Laterality: N/A;  Exploratory Laparotomy    INTERVENTIONAL RADIOLOGY PROCEDURE N/A 1/5/2024    Procedure: IVC Filter Placement;  Surgeon: Michoacano Collins MD;  Location: [facility] CATH INVASIVE LOCATION;  Service: Vascular;  Laterality: N/A;    KIDNEY STONE SURGERY      NEPHRECTOMY PARTIAL Left 11/13/2023    Procedure: NEPHRECTOMY PARTIAL LAPAROSCOPIC WITH DAVINCI ROBOT, left;  Surgeon: Michelle Cai MD;  Location: [facility] MAIN OR;  Service: Robotics - DaVinci;  Laterality: Left;    URETEROSCOPY LASER LITHOTRIPSY WITH STENT INSERTION Left 08/18/2023    Procedure: CYSTOSCOPY URETEROSCOPY RETROGRADE PYELOGRAM STENT INSERTION, left;  Surgeon: Michelle Cai MD;  Location: [facility] MAIN OR;  Service: Urology;  Laterality: Left;      General Information       Row Name 01/11/24 1605          OT Time and Intention    Document Type therapy note (daily note)  -ES     Mode of Treatment individual therapy;occupational therapy  -ES       Row Name 01/11/24 1605          General Information    Existing Precautions/Restrictions fall  -ES       Row Name 01/11/24 1605          Cognition    Orientation Status (Cognition) oriented x 3  patient cooperative, agreeable to therapy participation  -ES       Row Name 01/11/24 1605          Safety Issues, Functional Mobility    Impairments Affecting Function (Mobility) balance;endurance/activity tolerance;pain;strength  -ES               User Key  (r) =  Recorded By, (t) = Taken By, (c) = Cosigned By      Initials Name Provider Type    ES Rach Brunson, OTR/L, RACHIDS Occupational Therapist                     Mobility/ADL's       Row Name 01/11/24 1605          Bed Mobility    Bed Mobility supine-sit;sit-supine  -ES     Supine-Sit [place] (Bed Mobility) minimum assist (75% patient effort);1 person assist  -ES     Sit-Supine [place] (Bed Mobility) moderate assist (50% patient effort);1 person assist  -ES     Bed Mobility, Safety Issues decreased use of arms for pushing/pulling;decreased use of legs for bridging/pushing  -ES       Row Name 01/11/24 1605          Transfers    Transfers sit-stand transfer  -ES     Comment, (Transfers) STS x3 this session.  -ES       Row Name 01/11/24 1605          Sit-Stand Transfer    Sit-Stand [place] (Transfers) minimum assist (75% patient effort);moderate assist (50% patient effort)  -ES     Assistive Device (Sit-Stand Transfers) walker, front-wheeled  -ES     Comment, (Sit-Stand Transfer) Initial stance required moderate assist, second two stands progressed to min A with use of rolling walker  -ES               User Key  (r) = Recorded By, (t) = Taken By, (c) = Cosigned By      Initials Name Provider Type    Rach Mims, OTR/L, JANIS Occupational Therapist                   Obj/Interventions       Row Name 01/11/24 1606          Motor Skills    Motor Skills functional endurance  -ES     Functional Endurance fair. Patient tolerates increased time sitting edge of bed and STS this session without rest breaks  -ES       Row Name 01/11/24 1606          Balance    Balance Assessment sitting dynamic balance;standing static balance  -ES     Dynamic Sitting Balance standby assist  -ES     Position, Sitting Balance unsupported;sitting edge of bed  -ES     Static Standing Balance 1-person assist;contact guard  -ES     Position/Device Used, Standing Balance supported;walker, front-wheeled  -ES               User Key  (r) =  Recorded By, (t) = Taken By, (c) = Cosigned By      Initials Name Provider Type    Rach Mims, OTR/L, CSRS Occupational Therapist                   Goals/Plan    No documentation.                  Clinical Impression       Row Name 01/11/24 1607          Plan of Care Review    Plan of Care Reviewed With patient  -ES     Progress improving  -ES     Outcome Evaluation Patient with good participation in therapy session. Patient with increased tolerance this session, tolerates sitting EOB longer perod of time and agreeable to static standing. Patient requires no more than moderate assist, progressing to min assist with additional stands with use of rolling walker. Patient continues to decline functional mobiltiy or transfers, however demonstrates increased strength and tolerance to tolerate increased intensity in therapy session.  -ES               User Key  (r) = Recorded By, (t) = Taken By, (c) = Cosigned By      Initials Name Provider Type    Rach Mims, KOSTAR/L, CSRS Occupational Therapist                   Outcome Measures       Row Name 01/11/24 1609          How much help from another is currently needed...    Putting on and taking off regular lower body clothing? 2  -ES     Bathing (including washing, rinsing, and drying) 2  -ES     Toileting (which includes using toilet bed pan or urinal) 2  -ES     Putting on and taking off regular upper body clothing 3  -ES     Taking care of personal grooming (such as brushing teeth) 3  -ES     Eating meals 3  -ES     AM-PAC 6 Clicks Score (OT) 15  -ES       Row Name 01/11/24 0800          How much help from another person do you currently need...    Turning from your back to your side while in flat bed without using bedrails? 3  -AE     Moving from lying on back to sitting on the side of a flat bed without bedrails? 2  -AE     Moving to and from a bed to a chair (including a wheelchair)? 1  -AE     Standing up from a chair using your arms (e.g., wheelchair,  bedside chair)? 1  -AE     Climbing 3-5 steps with a railing? 1  -AE     To walk in hospital room? 1  -AE     AM-PAC 6 Clicks Score (PT) 9  -AE     Highest Level of Mobility Goal 3 --> Sit at edge of bed  -AE       Row Name 01/11/24 1609          Functional Assessment    Outcome Measure Options AM-PAC 6 Clicks Daily Activity (OT)  -ES               User Key  (r) = Recorded By, (t) = Taken By, (c) = Cosigned By      Initials Name Provider Type    AE Ronda Hoffman, RN Registered Nurse    Rach Mims, OTR/L, CSRS Occupational Therapist                    Occupational Therapy Education       Title: PT OT SLP Therapies (In Progress)       Topic: Occupational Therapy (In Progress)       Point: ADL training (Done)       Description:   Instruct learner(s) on proper safety adaptation and remediation techniques during self care or transfers.   Instruct in proper use of assistive devices.                  Learning Progress Summary             Patient Acceptance, E,TB, VU by LF at 12/29/2023 1525   Significant Other Acceptance, E,TB, VU by LF at 12/29/2023 1525                         Point: Home exercise program (Not Started)       Description:   Instruct learner(s) on appropriate technique for monitoring, assisting and/or progressing therapeutic exercises/activities.                  Learner Progress:  Not documented in this visit.              Point: Precautions (Done)       Description:   Instruct learner(s) on prescribed precautions during self-care and functional transfers.                  Learning Progress Summary             Patient Acceptance, E,TB, VU by LF at 12/29/2023 1525   Significant Other Acceptance, E,TB, VU by LF at 12/29/2023 1525                         Point: Body mechanics (Done)       Description:   Instruct learner(s) on proper positioning and spine alignment during self-care, functional mobility activities and/or exercises.                  Learning Progress Summary             Patient Acceptance,  E,TB, VU by  at 12/29/2023 1525   Significant Other Acceptance, E,TB, VU by  at 12/29/2023 1525                                         User Key       Initials Effective Dates Name Provider Type Discipline     06/16/21 -  Jasymn Colon OT Occupational Therapist OT                  OT Recommendation and Plan  Planned Therapy Interventions (OT): activity tolerance training, BADL retraining, functional balance retraining, occupation/activity based interventions, patient/caregiver education/training, strengthening exercise, transfer/mobility retraining  Therapy Frequency (OT): 5 times/wk  Plan of Care Review  Plan of Care Reviewed With: patient  Progress: improving  Outcome Evaluation: Patient with good participation in therapy session. Patient with increased tolerance this session, tolerates sitting EOB longer perod of time and agreeable to static standing. Patient requires no more than moderate assist, progressing to min assist with additional stands with use of rolling walker. Patient continues to decline functional mobiltiy or transfers, however demonstrates increased strength and tolerance to tolerate increased intensity in therapy session.     Time Calculation:         Time Calculation- OT       Row Name 01/11/24 1609             Time Calculation- OT    OT Received On 01/11/24  -ES      OT Goal Re-Cert Due Date 01/18/24  -ES         Timed Charges    90313 - OT Therapeutic Activity Minutes 24  -ES         Total Minutes    Timed Charges Total Minutes 24  -ES       Total Minutes 24  -ES                User Key  (r) = Recorded By, (t) = Taken By, (c) = Cosigned By      Initials Name Provider Type    ES Rach Brunson OTR/L, CSRS Occupational Therapist                  Therapy Charges for Today       Code Description Service Date Service Provider Modifiers Qty    [id number]  OT THERAPEUTIC ACT EA 15 MIN 1/11/2024 Rach Brunson OTR/L, CSRS GO 2                 HALEY Thompson/L, CSRS  1/11/2024

## 2024-01-11 NOTE — PROGRESS NOTES
General surgery    Patient in OR getting TDC  Following intermittently with chart checks  Please call if there are any general surgery issues

## 2024-01-11 NOTE — PROGRESS NOTES
[facility]   Hospitalist Progress Note  Date: 2024  Patient Name: Danny Savage  : 1960  MRN: [id number]  Date of admission: 2023      Subjective   Subjective     Chief Complaint: Follow-up postop seroma    Summary:Danny Savage is a 63 y.o. male  presented to the hospital with complaints of left lower back pain and fever x 3 days, at that time patient was status post nephrectomy and there was concern for postoperative seroma/hematoma, patient was discharged home but pain began getting worse 10 out of 10 intensity on repeat presentation patient was febrile to 102.6, repeat CT scan showed likely postoperative seroma/hematoma although infection could not be ruled out, there was an abdominal aortic aneurysm of 5.1 cm.  The patient was started on IV antibiotics and patient was admitted for possible abscess in the nephrectomy bed, patient went unresponsive on 2023, blood pressure was 42/32, patient was pale and clammy, CODE BLUE was called, patient received IV fluids, patient was intubated, he was found to have expanding AAA with eccentric mural thrombus/hematoma measuring 5.1 cm, vascular surgeon consulted and patient underwent urgent open repair of ruptured infrarenal AAA with tube graft and mobilization of the omentum, patient taken back to the OR on 2023 to look for sources of blood loss, he was found to have nonviable colon and patient underwent partial colectomy by Dr. Mallory.  Patient has had prolonged course with continued anemia, repeat scans concerning for worsening hematoma, patient was found to have right lower extremity DVT.  Patient initially started on heparin drip though hemoglobin trended down sharply concerning for occult bleed.  Heparin drip stopped and hemoglobin stabilized.  Patient's blood pressure did not tolerate hemodialysis and patient had to be switched to CRRT.  Patient remains on antibiotics but repeat blood cultures are negative.    Patient  able to be weaned off of pressors.  Vascular surgery planned on placing IVC filter though clot was found in the pararenal IVC on venogram and procedure was aborted.  Patient started back on heparin drip no bolus.  Able to transition from CRRT to intermittent hemodialysis with ultrafiltration.  Having remittent issues with nausea and vomiting.  Good ostomy output.  Right upper quadrant ultrasound demonstrated cholelithiasis without cholecystitis.  Liver unremarkable.  Tube feeds restarted slowly.  Patient and family hoping to discharge to LTAC at [facility] once stable    Interval Followup:  Patient is resting in bed.  Patient continues to feel weak and on well  His bilirubin remains elevated however he denies any significant abdominal pain.  GI has been consulted but feel this is due to shock liver and will improve  Patient has continues to have intermittent nausea and vomiting   Patient remains on heparin drip for his DVT   Remains on dialysis  Plan for TDC today       Review of Systems  All systems reviewed and negative unless  stated above    Objective   Objective     Vitals:   Temp:  [98 °F (36.7 °C)-98.5 °F (36.9 °C)] 98.5 °F (36.9 °C)  Heart Rate:  [86-96] 90  Resp:  [14-20] 17  BP: (104-142)/(64-87) 120/67  Flow (L/min):  [2] 2  Physical Exam   General: Patient appears chronically ill resting in bed with wife at the bedside  HEENT: Normocephalic atraumatic moist membranes pupils equal round reactive light, no scleral icterus no conjunctival injection  Cardiovascular: regular rate and rhythm no murmurs rubs or gallops S1-S2, no lower extremity edema appreciated  Pulmonary: Decreased, no wheezing or rhonchi  Gastrointestinal: Soft nondistended positive bowel sounds all 4 quadrants no rebound or guarding,  midline incision well-approximated without erythema or drainage, colostomy in left lower quadrant   Musculoskeletal: Full rom   Skin: Clean dry without rashes  Neuro: Cranial nerves II through XII  intact grossly no sensorimotor deficits appreciated bilateral upper and lower extremities  Psych: Patient is calm cooperative and appropriate with exam not responding to internal stimuli  : No Rico catheter no bladder distention no suprapubic tenderness    Result Review    Result Review:  I have personally reviewed these results and agree with these findings:  [x]  Laboratory  LAB RESULTS:      Lab 01/11/24  0336 01/10/24  2027 01/10/24  1353 01/10/24  0935 01/10/24  0257 01/09/24  0958 01/09/24  0306 01/08/24  0642 01/08/24  0548 01/07/24  0444 01/07/24  0254 01/06/24  0221 01/05/24  2039 01/05/24  1443 01/05/24  1214 01/05/24  0434 01/05/24 0433   WBC 9.27  --   --   --   --   --  11.28*  --  12.51*  --  15.46* 15.93*  --   --   --   --  18.83*   HEMOGLOBIN 8.1*  --   --   --   --   --  8.7*  --  8.3*  --  9.4* 8.4*  --   --   --   --  8.3*   HEMATOCRIT 24.8*  --   --   --   --   --  27.2*  --  25.3*  --  29.5* 26.1*  --   --   --   --  26.4*   PLATELETS 436  --   --   --   --   --  366  --  322  --  330 284  --   --   --   --  272   NEUTROS ABS 7.59*  --   --   --   --   --  9.51*  --  10.63*  --   --  14.15*  --   --   --   --   --    IMMATURE GRANS (ABS) 0.15*  --   --   --   --   --  0.13*  --  0.17*  --   --  0.24*  --   --   --   --   --    LYMPHS ABS 0.67*  --   --   --   --   --  0.63*  --  0.74  --   --  0.58*  --   --   --   --   --    MONOS ABS 0.79  --   --   --   --   --  0.88  --  0.85  --   --  0.84  --   --   --   --   --    EOS ABS 0.05  --   --   --   --   --  0.10  --  0.08  --   --  0.06  --   --   --   --   --    MCV 89.9  --   --   --   --   --  91.6  --  91.7  --  93.7 92.9  --   --   --   --  93.6   LACTATE  --   --   --   --   --   --   --   --  1.0  --   --  1.2  --   --   --   --  1.2   LACTATE, ARTERIAL  --   --   --   --   --   --   --   --   --   --   --   --   --   --  1.11  --   --    PROTIME  --   --   --   --   --   --   --   --   --   --  18.1* 20.4*  --  20.4*  --  19.3*  --     APTT 89.5 82.0 59.9* >200.0* 119.6*   < > 119.1*   < > 112.4*   < >  --  97.1*   < >  --   --  38.9*  --     < > = values in this interval not displayed.         Lab 01/11/24  0336 01/10/24  0257 01/09/24  0306 01/08/24  0548 01/07/24  0254 01/06/24  1149 01/06/24  0548 01/05/24  2358 01/05/24  2357 01/05/24  1742 01/05/24  1214 01/05/24  1156   SODIUM 131* 132* 133* 132* 133*   < > 134*   < >  --  137  --  135*   SODIUM, ARTERIAL  --   --   --   --   --   --   --   --   --   --  136.4  --    POTASSIUM 4.4 5.2 4.4 4.8 5.5*   < > 4.6   < >  --  4.6  --  4.5   CHLORIDE 93* 95* 94* 96* 98   < > 99   < >  --  102  --  101   CO2 23.0 19.1* 21.9* 20.2* 17.9*   < > 19.0*   < >  --  18.7*  --  19.1*   ANION GAP 15.0 17.9* 17.1* 15.8* 17.1*   < > 16.0*   < >  --  16.3*  --  14.9   BUN 54* 70* 47* 56* 64*   < > 43*   < >  --  46*  --  46*   CREATININE 4.02* 4.82* 3.51* 3.89* 4.05*   < > 2.85*   < >  --  3.23*  --  3.26*   EGFR 15.7* 12.7* 18.5* 16.4* 15.6*   < > 23.8*   < >  --  20.5*  --  20.2*   GLUCOSE 103* 89 89 100* 83   < > 125*   < >  --  88  --  193*   GLUCOSE, ARTERIAL  --   --   --   --   --   --   --   --   --   --  162*  --    CALCIUM 7.8* 8.0* 7.9* 7.9* 7.8*   < > 7.7*   < >  --  7.5*  --  7.6*   IONIZED CALCIUM  --   --   --   --   --   --  1.01*  --  1.00* 1.01* 1.01* 1.00*   MAGNESIUM 1.8 2.1 1.9 2.2 2.6*   < > 2.5*   < >  --  2.5*  --  2.5*   PHOSPHORUS 4.3 5.6* 4.9* 5.4* 5.8*   < > 4.9*   < >  --  5.1*  --  5.2*    < > = values in this interval not displayed.         Lab 01/11/24  0336 01/10/24  0257 01/09/24  0306 01/08/24  0548 01/06/24  1149 01/06/24  0548 01/06/24  0221 01/05/24  1156 01/05/24  0433   TOTAL PROTEIN 6.4 6.8 6.9 6.9  --   --  6.9  --  6.6   ALBUMIN 2.4* 2.4* 2.6* 2.6* 2.9*   < > 2.8*   < > 2.9*   GLOBULIN 4.0  --   --  4.3  --   --  4.1  --  3.7   ALT (SGPT) 167* 143* 119* 101*  --   --  103*  --  127*   AST (SGOT) 169* 154* 129* 103*  --   --  104*  --  147*   BILIRUBIN 9.4* 10.5*  10.3* 9.5*  9.8*  --   --  8.2*  --  10.3*   INDIRECT BILIRUBIN  --  2.1 1.8 1.5  --   --   --   --   --    BILIRUBIN DIRECT  --  8.4* 8.5* 8.0*  --   --   --   --   --    ALK PHOS 143* 146* 139* 129*  --   --  115  --  114    < > = values in this interval not displayed.         Lab 01/07/24  0254 01/06/24  0221 01/05/24  1443 01/05/24  0434   PROTIME 18.1* 20.4* 20.4* 19.3*   INR 1.47* 1.71* 1.71* 1.60*                   Lab 01/05/24  1214   PH, ARTERIAL 7.386   PCO2, ARTERIAL 32.1*   PO2 ART 67.7*   O2 SATURATION ART 92.9*   FIO2 44   HCO3 ART 18.8*   BASE EXCESS ART -5.3*   CARBOXYHEMOGLOBIN 1.8*     Brief Urine Lab Results  (Last result in the past 365 days)        Color   Clarity   Blood   Leuk Est   Nitrite   Protein   CREAT   Urine HCG        12/23/23 2054 Yellow   Clear   Small (1+)   Negative   Negative   100 mg/dL (2+)                 Microbiology Results (last 10 days)       Procedure Component Value - Date/Time    Blood Culture - Blood, Arm, Left [[id number]]  (Normal) Collected: 01/02/24 1422    Lab Status: Final result Specimen: Blood from Arm, Left Updated: 01/07/24 1430     Blood Culture No growth at 5 days    Blood Culture - Blood, Hand, Left [[id number]]  (Normal) Collected: 01/02/24 1216    Lab Status: Final result Specimen: Blood from Hand, Left Updated: 01/07/24 1230     Blood Culture No growth at 5 days            [x]  Microbiology  [x]  Radiology  XR Chest 1 View    Result Date: 1/10/2024    1. Small bilateral layering pleural effusions, right worse than left with associated compressive atelectasis.        DOMONIQUE SALDAÑA MD       Electronically Signed and Approved By: DOMONIQUE SALDAÑA MD on 1/10/2024 at 12:04             MRI abdomen wo contrast mrcp    Result Date: 1/9/2024    1. Large retroperitoneal hematoma is incompletely evaluated.  Please see prior CTs for further discussion.  This extends to the region of the head of the pancreas. 2. Common bile duct is normal in caliber without  evidence of filling defect to suggest obstruction 3. Abnormal appearance of the gallbladder demonstrates filling defects compatible with sludge and stones.  No wall thickening noted.  Fluid surrounding the gallbladder may be related to underlying ascites. 4. Moderate size right-sided pleural effusion and small left-sided pleural effusion with associated atelectasis 5. Postsurgical changes of the abdominal aorta 6. Other findings as above     ANNABELLE PALMER MD       Electronically Signed and Approved By: ANNABELLE PALMER MD on 1/09/2024 at 17:58             US Abdomen Limited    Result Date: 1/8/2024    1. Cholelithiasis and sludge filling the gallbladder.  No sonographic evidence of acute cholecystitis 2. Limited imaging of the liver is grossly unremarkable in appearance     ANNABELLE PALMER MD       ELECTRONICALLY SIGNED AND APPROVED BY: ANNABELLE PALMER MD ON 1/08/2024 AT 19:27             XR Abdomen KUB    Result Date: 1/7/2024    1. Feeding tube projects in expected position 2. Mild gaseous distention of bowel with suspected mild wall edema.  No definite evidence of free air 3. Consolidation left lung base 4. Left sided inguinal vascular catheter projects in expected position     ANNABELLE PALMER MD       Electronically Signed and Approved By: ANNABELLE PALMER MD on 1/07/2024 at 9:13              [x]  EKG/Telemetry   [x]  Cardiology/Vascular   Echocardiogram 12/24/2023  •  Left ventricular ejection fraction appears to be 66 - 70%.  •  Hyperdynamic.  The left ventricular cavity is small in size.  •  Left ventricular diastolic function was not assessed.  •  There is a trivial pericardial effusion.  •  No significant valvular abnormalities noted.  []  Pathology  []  Old records  [x]  Other:  Scheduled Meds:ampicillin, 2 g, Intravenous, Q24H  dextrose, 50 mL, Intravenous, TID  escitalopram, 5 mg, Nasogastric, Daily  melatonin, 10 mg, Nasogastric, Nightly  midodrine, 5 mg, Nasogastric, TID AC  multivitamin and  minerals, 15 mL, Nasogastric, Daily  QUEtiapine, 25 mg, Nasogastric, Nightly  senna-docusate sodium, 2 tablet, Nasogastric, BID  sodium chloride, 10 mL, Intravenous, Q12H  thiamine, 100 mg, Nasogastric, Daily      Continuous Infusions:heparin, 18 Units/kg/hr, Last Rate: 18 Units/kg/hr (24 5502)      PRN Meds:.•  senna-docusate sodium **AND** polyethylene glycol **AND** [DISCONTINUED] bisacodyl **AND** bisacodyl  •  calcium carbonate  •  dextrose  •  dextrose  •  dextrose  •  dextrose  •  fentaNYL citrate (PF)  •  glucagon (human recombinant)  •  glucagon (human recombinant)  •  heparin (porcine)  •  heparin (porcine)  •  heparin  •  heparin  •  [] Morphine **AND** naloxone  •  nitroglycerin  •  ondansetron  •  oxyCODONE-acetaminophen  •  oxyCODONE-acetaminophen  •  prochlorperazine  •  sodium chloride  •  sodium chloride  •  traZODone      Assessment & Plan   Assessment / Plan     Assessment/Plan:  Ruptured infrarenal AAA s/p open repair  Hemorrhagic shock  Hypovolemic shock  Acute hypoxic respiratory failure requiring mechanical ventilation  Septic shock with Streptococcus pyogenes bacteremia  SVT  History of RCC s/p partial nephrectomy on   Acute anuric renal failure requiring CRRT  Metabolic acidosis  Clinically significant lactic acidosis  Chronically immunosuppressed on CellCept  Hypocalcemia  Hypokalemia  Acute right lower extremity DVT in the peroneal and gastrocnemius  Acute blood loss anemia  Perirenal IVC thrombus  Ischemic bowel  Hyperkalemia  Anxiety  Transaminitis  Hyperbilirubinemia    PLAN    Vascular surgery, general surgery, urology, nephrology and pulmonology critical care consulted thank you for assistance  Continue postop wound care per general surgery  MRI done and shows continued large retroperitoneal hematoma.  Common bile duct is normal with no obstruction.  Sludge in the gallbladder.  Moderate right sided pleural effusion  Continue to monitor hemoglobin closely and  transfuse packed red blood cells as needed to keep greater than 7  Continue heparin drip and monitor very closely for signs of bleeding  Continue Protonix twice daily  Continue supplemental oxygen as needed to keep sats greater than 90%  Continue ampicillin-sulbactam complete 14-day course per pulmonology critical care  Continue midodrine and titrate per pulmonology critical care  Continue hemodialysis and ultrafiltration per nephrology  Vascular to place TDC today   Repeat renal panel in a.m.  Continue to monitor electrolytes replace as needed  Continue Lexapro and Seroquel  Continue tube feeds at low rate and titrate up slowly to goal  Monitor transaminases and bili   Advance diet per speech  Further inpatient orders recommendations per clinical course    Discussed plan with bedside RN as well as pulmonology critical care team.    Disposition: COMPLETED PEER TO PEER TODAY AND AFTER 20 MINUTES OF DISCUSSING PATIENTS MEDICAL CONDITIONS IT WAS APPROVED.     DVT prophylaxis:  Medical and mechanical DVT prophylaxis orders are present.    CODE STATUS:   Level Of Support Discussed With: Patient  Code Status (Patient has no pulse and is not breathing): CPR (Attempt to Resuscitate)  Medical Interventions (Patient has pulse or is breathing): Full Support      Patient remains critically ill in the intensive care unit.  I spent 32 minutes of critical care time managing this critically ill patient with group a strep bacteremia, hyperbilirubinemia, abdominal aortic aneurysm status postrepair, acute DVT, acute kidney injury requiring dialysis.  Time was spent in examining patient, reviewing laboratory data, formulating care plan, talking to specialist and nursing staff.

## 2024-01-11 NOTE — NURSING NOTE
Patient is alert and oriented. He has visitors at bedside. He is scheduled to have a TDC today. Will follow up and monitor progress  Daisy RIVERA RN, BSN  Palliative Care

## 2024-01-11 NOTE — OP NOTE
HEMODIALYSIS CATHETER INSERTION  Procedure Report    Patient Name:  Danny Savage  YOB: 1960    Date of Surgery:  1/11/2024     Indications: Need for long-term access for hemodialysis    Pre-op Diagnosis:   ROLAND (acute kidney injury) [N17.9]       Post-Op Diagnosis Codes:     * ROLAND (acute kidney injury) [N17.9]    Procedure(s):  Ultrasound-guided insertion of right internal jugular vein tunneled catheter for hemodialysis    Staff:  Surgeon(s):  Swapnil Velazco MD         Anesthesia: Monitored Anesthesia Care    Estimated Blood Loss: 2 mL    Implants:    Nothing was implanted during the procedure    Specimen: None       Findings: Ultrasound of the right neck reveals a fully compressible right internal jugular vein without any evidence of intraluminal defects.  Following completion of the procedure the course of the catheter is evaluated with fluoroscopy and found to have a gentle curve in the neck with no kinks, the tip of the catheter is in the right atrium.  There is excellent blood return and easy infusability via both ports.    Complications: None    Description of Procedure: The patient was brought into the operating room and was placed in the supine position.  He was then given intravenous sedation by anesthesia.  He was then positioned with the head turned to the left and the right chest exposed.  The right neck was interrogated with ultrasound with the above-mentioned findings.  He was then prepped and draped in the usual aseptic fashion.  An Ioban was applied.  A timeout was taken to confirm patient and procedure.  Local anesthesia was then applied at the projected sites of incision and tunneling.  Under ultrasound guidance a micropuncture needle was used to gain access into the right internal jugular vein.  Standard Seldinger technique was used to insert a microwire into the vein.  A micro-sheath was advanced over the guidewire.  Dilator and guidewire were then removed and a standard  J-wire advanced into the micro sheath.  The wire was then advanced approximately 50 cm.  A small transverse incision was made in the right upper chest and a tunnel created between this incision and the neck access.  The catheter was brought through.  Subsequently larger dilators followed by a peel-away sheath were then advanced over the wire.  The catheter was then advanced into the peel-away sheath which was removed leaving the catheter in place.  The entire course of the catheter was inspected under fluoroscopy with the above-mentioned findings.  A 23 cm tip to cuff HemoSplit HK catheter was used.  Both ports were aspirated and flushed with excellent blood return and infusibility.  The neck incision was approximated using 3-0 Vicryl.  The entry point of the catheter in the skin was approximated using 3-0 Vicryl around the catheter.  The catheter was secured in place at 2 different sites using 2-0 nylon.  Each port of the new catheter was flushed with 2 cc of heparin in a concentration of 1000 units/mL.  The ports were capped.  A Biopatch was applied.  Dressings were applied.  The patient tolerated the procedure well.          Swapnil Velazco MD     Date: 1/11/2024  Time: 15:47 EST

## 2024-01-11 NOTE — CASE MANAGEMENT/SOCIAL WORK
Discharge Planning Assessment   [facility]     Patient Name: Danny Savage  MRN: [id number]  Today's Date: 1/11/2024    Admit Date: 12/23/2023    Plan: Pt can discharge tomorrow to [facility] in [place]. Dr. Ramey is the accepting MD. Pt will be going to the 4th floor - report needs to be called to [phone]. Discharge summary, med list and COVID test results need to be faxed to [phone]. ROEL Abdi to call Dr. Ramey to have provider to provider discussion. Trinidad to order COVID test as requested. Evita would like to be notified of transport time at [phone].   Discharge Needs Assessment    No documentation.                  Discharge Plan       Row Name 01/11/24 1092       Plan    Plan Pt can discharge tomorrow to [facility] in [place]. Dr. Ramey is the accepting MD. Pt will be going to the 4th floor - report needs to be called to [phone]. Discharge summary, med list and COVID test results need to be faxed to [phone]. ROEL Abdi to call Dr. Ramey to have provider to provider discussion. Trinidad to order COVID test as requested. Evita would like to be notified of transport time at [phone].    Final Discharge Disposition Code 63 - LTCH      Row Name 01/11/24 1428       Plan    Plan SW spoke with MD who states that peer to peer was completed and approved LTAC. MD notified wife and HORTENCIA will follow up with [facility] to see when bed will be available.                  Continued Care and Services - Admitted Since 12/23/2023       Destination       Service Provider Request Status Selected Services Address Phone Fax Patient Preferred    [facility] Considering N/A [address] [place] [id number] [phone] --    [facility] Pending - Request Sent N/A [address] [facility] [place] [address] [phone] -- --    [facility] Pending - Request Sent N/A [address]  [place] [address] [phone] --    [facility] Pending - Request Sent N/A [address] [address] [phone] --                  Expected Discharge Date and Time       Expected Discharge Date Expected Discharge Time    Jan 11, 2024            Demographic Summary    No documentation.                  Functional Status    No documentation.                  Psychosocial    No documentation.                  Abuse/Neglect    No documentation.                  Legal    No documentation.                  Substance Abuse    No documentation.                  Patient Forms    No documentation.                     Monserrat Oro MSW

## 2024-01-11 NOTE — PLAN OF CARE
Goal Outcome Evaluation:      VSS. No complaints of pain. 200 ml of emesis this shift. Medicated with compazine with relief. Plan for surgery today for tunneled dialysis cath.

## 2024-01-12 VITALS
RESPIRATION RATE: 13 BRPM | WEIGHT: 152.2 LBS | HEART RATE: 86 BPM | OXYGEN SATURATION: 95 % | TEMPERATURE: 97.5 F | SYSTOLIC BLOOD PRESSURE: 90 MMHG | HEIGHT: 70 IN | DIASTOLIC BLOOD PRESSURE: 56 MMHG | BODY MASS INDEX: 21.79 KG/M2

## 2024-01-12 LAB
ABO GROUP BLD: NORMAL
ALBUMIN SERPL-MCNC: 2.3 G/DL (ref 3.5–5.2)
ALP SERPL-CCNC: 153 U/L (ref 39–117)
ALT SERPL W P-5'-P-CCNC: 139 U/L (ref 1–41)
ANION GAP SERPL CALCULATED.3IONS-SCNC: 16.3 MMOL/L (ref 5–15)
APTT PPP: 43.3 SECONDS (ref 78–95.9)
AST SERPL-CCNC: 154 U/L (ref 1–40)
BASOPHILS # BLD AUTO: 0.06 10*3/MM3 (ref 0–0.2)
BASOPHILS NFR BLD AUTO: 0.5 % (ref 0–1.5)
BILIRUB CONJ SERPL-MCNC: 6.4 MG/DL (ref 0–0.3)
BILIRUB INDIRECT SERPL-MCNC: 1.5 MG/DL
BILIRUB SERPL-MCNC: 7.9 MG/DL (ref 0–1.2)
BLD GP AB SCN SERPL QL: NEGATIVE
BUN SERPL-MCNC: 85 MG/DL (ref 8–23)
BUN/CREAT SERPL: 15.7 (ref 7–25)
CALCIUM SPEC-SCNC: 7.6 MG/DL (ref 8.6–10.5)
CHLORIDE SERPL-SCNC: 94 MMOL/L (ref 98–107)
CO2 SERPL-SCNC: 20.7 MMOL/L (ref 22–29)
CREAT SERPL-MCNC: 5.42 MG/DL (ref 0.76–1.27)
DEPRECATED RDW RBC AUTO: 57.1 FL (ref 37–54)
EGFRCR SERPLBLD CKD-EPI 2021: 11 ML/MIN/1.73
EOSINOPHIL # BLD AUTO: 0.09 10*3/MM3 (ref 0–0.4)
EOSINOPHIL NFR BLD AUTO: 0.8 % (ref 0.3–6.2)
ERYTHROCYTE [DISTWIDTH] IN BLOOD BY AUTOMATED COUNT: 18.6 % (ref 12.3–15.4)
GLUCOSE BLDC GLUCOMTR-MCNC: 130 MG/DL (ref 70–99)
GLUCOSE BLDC GLUCOMTR-MCNC: 133 MG/DL (ref 70–99)
GLUCOSE SERPL-MCNC: 128 MG/DL (ref 65–99)
HCT VFR BLD AUTO: 25.7 % (ref 37.5–51)
HGB BLD-MCNC: 8.2 G/DL (ref 13–17.7)
IMM GRANULOCYTES # BLD AUTO: 0.2 10*3/MM3 (ref 0–0.05)
IMM GRANULOCYTES NFR BLD AUTO: 1.8 % (ref 0–0.5)
LYMPHOCYTES # BLD AUTO: 0.8 10*3/MM3 (ref 0.7–3.1)
LYMPHOCYTES NFR BLD AUTO: 7.3 % (ref 19.6–45.3)
MAGNESIUM SERPL-MCNC: 2.3 MG/DL (ref 1.6–2.4)
MCH RBC QN AUTO: 28.8 PG (ref 26.6–33)
MCHC RBC AUTO-ENTMCNC: 31.9 G/DL (ref 31.5–35.7)
MCV RBC AUTO: 90.2 FL (ref 79–97)
MONOCYTES # BLD AUTO: 0.8 10*3/MM3 (ref 0.1–0.9)
MONOCYTES NFR BLD AUTO: 7.3 % (ref 5–12)
NEUTROPHILS NFR BLD AUTO: 8.96 10*3/MM3 (ref 1.7–7)
NEUTROPHILS NFR BLD AUTO: 82.3 % (ref 42.7–76)
NRBC BLD AUTO-RTO: 0 /100 WBC (ref 0–0.2)
PHOSPHATE SERPL-MCNC: 5.4 MG/DL (ref 2.5–4.5)
PLATELET # BLD AUTO: 404 10*3/MM3 (ref 140–450)
PMV BLD AUTO: 9.8 FL (ref 6–12)
POTASSIUM SERPL-SCNC: 4.7 MMOL/L (ref 3.5–5.2)
PROT SERPL-MCNC: 5.9 G/DL (ref 6–8.5)
RBC # BLD AUTO: 2.85 10*6/MM3 (ref 4.14–5.8)
RH BLD: NEGATIVE
SODIUM SERPL-SCNC: 131 MMOL/L (ref 136–145)
T&S EXPIRATION DATE: NORMAL
WBC NRBC COR # BLD AUTO: 10.91 10*3/MM3 (ref 3.4–10.8)

## 2024-01-12 PROCEDURE — 85730 THROMBOPLASTIN TIME PARTIAL: CPT | Performed by: SURGERY

## 2024-01-12 PROCEDURE — P9016 RBC LEUKOCYTES REDUCED: HCPCS

## 2024-01-12 PROCEDURE — 84100 ASSAY OF PHOSPHORUS: CPT | Performed by: SURGERY

## 2024-01-12 PROCEDURE — 86923 COMPATIBILITY TEST ELECTRIC: CPT

## 2024-01-12 PROCEDURE — 25010000002 ONDANSETRON PER 1 MG: Performed by: SURGERY

## 2024-01-12 PROCEDURE — 99024 POSTOP FOLLOW-UP VISIT: CPT | Performed by: SURGERY

## 2024-01-12 PROCEDURE — 80076 HEPATIC FUNCTION PANEL: CPT | Performed by: PHYSICIAN ASSISTANT

## 2024-01-12 PROCEDURE — 82948 REAGENT STRIP/BLOOD GLUCOSE: CPT

## 2024-01-12 PROCEDURE — 99291 CRITICAL CARE FIRST HOUR: CPT | Performed by: STUDENT IN AN ORGANIZED HEALTH CARE EDUCATION/TRAINING PROGRAM

## 2024-01-12 PROCEDURE — 80048 BASIC METABOLIC PNL TOTAL CA: CPT | Performed by: SURGERY

## 2024-01-12 PROCEDURE — 86901 BLOOD TYPING SEROLOGIC RH(D): CPT | Performed by: SURGERY

## 2024-01-12 PROCEDURE — 99239 HOSP IP/OBS DSCHRG MGMT >30: CPT | Performed by: INTERNAL MEDICINE

## 2024-01-12 PROCEDURE — 86850 RBC ANTIBODY SCREEN: CPT | Performed by: SURGERY

## 2024-01-12 PROCEDURE — 86900 BLOOD TYPING SEROLOGIC ABO: CPT | Performed by: SURGERY

## 2024-01-12 PROCEDURE — 25010000002 DESMOPRESSIN ACETATE PF 4 MCG/ML SOLUTION 1 ML AMPULE: Performed by: SURGERY

## 2024-01-12 PROCEDURE — 25010000002 EPOETIN ALFA PER 1000 UNITS: Performed by: INTERNAL MEDICINE

## 2024-01-12 PROCEDURE — 85025 COMPLETE CBC W/AUTO DIFF WBC: CPT | Performed by: SURGERY

## 2024-01-12 PROCEDURE — 86900 BLOOD TYPING SEROLOGIC ABO: CPT

## 2024-01-12 PROCEDURE — 83735 ASSAY OF MAGNESIUM: CPT | Performed by: SURGERY

## 2024-01-12 RX ORDER — QUETIAPINE FUMARATE 25 MG/1
25 TABLET, FILM COATED ORAL NIGHTLY
Start: 2024-01-12

## 2024-01-12 RX ORDER — HEPARIN SODIUM 10000 [USP'U]/100ML
18 INJECTION, SOLUTION INTRAVENOUS
Start: 2024-01-12

## 2024-01-12 RX ORDER — TRAZODONE HYDROCHLORIDE 50 MG/1
50 TABLET ORAL NIGHTLY PRN
Start: 2024-01-12

## 2024-01-12 RX ORDER — MULTIVIT AND MINERALS-FERROUS GLUCONATE 9 MG IRON/15 ML ORAL LIQUID 9 MG/15 ML
15 LIQUID (ML) ORAL DAILY
Start: 2024-01-13

## 2024-01-12 RX ORDER — OXYCODONE AND ACETAMINOPHEN 10; 325 MG/1; MG/1
1 TABLET ORAL EVERY 4 HOURS PRN
Start: 2024-01-12

## 2024-01-12 RX ORDER — MIDODRINE HYDROCHLORIDE 5 MG/1
5 TABLET ORAL
Start: 2024-01-12

## 2024-01-12 RX ORDER — LANOLIN ALCOHOL/MO/W.PET/CERES
100 CREAM (GRAM) TOPICAL DAILY
Start: 2024-01-12

## 2024-01-12 RX ADMIN — DEXTROSE MONOHYDRATE 50 ML: 25 INJECTION, SOLUTION INTRAVENOUS at 08:37

## 2024-01-12 RX ADMIN — MIDODRINE HYDROCHLORIDE 5 MG: 5 TABLET ORAL at 08:37

## 2024-01-12 RX ADMIN — DOCUSATE SODIUM 50MG AND SENNOSIDES 8.6MG 2 TABLET: 8.6; 5 TABLET, FILM COATED ORAL at 08:37

## 2024-01-12 RX ADMIN — OXYCODONE AND ACETAMINOPHEN 1 TABLET: 5; 325 TABLET ORAL at 15:38

## 2024-01-12 RX ADMIN — Medication 100 MG: at 11:26

## 2024-01-12 RX ADMIN — Medication 10 ML: at 08:38

## 2024-01-12 RX ADMIN — ONDANSETRON 4 MG: 2 INJECTION INTRAMUSCULAR; INTRAVENOUS at 15:38

## 2024-01-12 RX ADMIN — OXYCODONE AND ACETAMINOPHEN 1 TABLET: 10; 325 TABLET ORAL at 06:19

## 2024-01-12 RX ADMIN — Medication 15 ML: at 08:38

## 2024-01-12 RX ADMIN — ONDANSETRON 4 MG: 2 INJECTION INTRAMUSCULAR; INTRAVENOUS at 07:27

## 2024-01-12 RX ADMIN — ESCITALOPRAM OXALATE 5 MG: 10 TABLET ORAL at 08:37

## 2024-01-12 RX ADMIN — ERYTHROPOIETIN 10000 UNITS: 10000 INJECTION, SOLUTION INTRAVENOUS; SUBCUTANEOUS at 15:27

## 2024-01-12 RX ADMIN — DESMOPRESSIN ACETATE 21 MCG: 4 INJECTION INTRAVENOUS; SUBCUTANEOUS at 11:25

## 2024-01-12 RX ADMIN — MIDODRINE HYDROCHLORIDE 5 MG: 5 TABLET ORAL at 11:26

## 2024-01-12 NOTE — PROGRESS NOTES
" LOS: 20 days   Patient Care Team:  Kodi Pichardo MD as PCP - General (Internal Medicine)  Lisset Harrington APRN as Nurse Practitioner (Urology)    Chief Complaint: ROLAND    Subjective     History of Present Illness  Awake, feeling okay.  Little urine output since Wednesday.  No urgency to void.  Receiving blood transfusion x 2 last night.  Hemoglobin was 6.7.  Labs pending from this morning.  Has dialysis orders for today.  Status post right IJ TDC, site is oozing.  Under pressure dressing.    Subjective:  Symptoms:  Improved.  He reports weakness.  No shortness of breath, chest pain, chest pressure or anxiety.    Diet:  NPO.  No nausea or vomiting.    Activity level: Impaired due to weakness.    Pain:  He complains of pain that is moderate.      History taken from: chart family    Objective     Vital Sign Min/Max for last 24 hours  Temp  Min: 97.1 °F (36.2 °C)  Max: 98.7 °F (37.1 °C)   BP  Min: 90/52  Max: 128/77   Pulse  Min: 78  Max: 95   Resp  Min: 12  Max: 20   SpO2  Min: 93 %  Max: 99 %   Flow (L/min)  Min: 2  Max: 3   No data recorded     Flowsheet Rows      Flowsheet Row First Filed Value   Admission Height 177.8 cm (70\") Documented at 12/23/2023 1948   Admission Weight 79.9 kg (176 lb 2.4 oz) Documented at 12/23/2023 1948            I/O this shift:  In: 350 [Blood:350]  Out: 150 [Stool:150]  I/O last 3 completed shifts:  In: 1100 [I.V.:800; Blood:300]  Out: 1260 [Emesis/NG output:650; Stool:600; Blood:10]    Objective:  General Appearance:  Ill-appearing, in no acute distress and comfortable.    Vital signs: (most recent): Blood pressure 104/63, pulse 95, temperature 97.5 °F (36.4 °C), temperature source Oral, resp. rate 12, height 177.8 cm (70\"), weight 69 kg (152 lb 3.2 oz), SpO2 95%.  Vital signs are normal.  No fever.    Output: Producing stool.    HEENT: Normal HEENT exam.    Lungs:  Normal effort and normal respiratory rate.  Breath sounds clear to auscultation.  He is not in respiratory " "distress.    Heart: Normal rate.  Regular rhythm.    Abdomen: Abdomen is soft.  (Deep palpation not done.  Midline incision covered.  Colostomy left lower quadrant noted.).  Hypoactive bowel sounds.     Extremities: There is dependent edema.    Pulses: There are decreased pulses.  (Right IJ TDC in place.  Under pressure dressing some bruising.)    Neurological: Patient is alert and oriented to person, place and time.    Pupils:  Pupils are equal, round, and reactive to light.    Skin:  Warm, dry and pale.              Results Review:     I reviewed the patient's new clinical results.    WBC WBC   Date Value Ref Range Status   01/12/2024 10.91 (H) 3.40 - 10.80 10*3/mm3 Final   01/11/2024 9.27 3.40 - 10.80 10*3/mm3 Final      HGB Hemoglobin   Date Value Ref Range Status   01/12/2024 8.2 (L) 13.0 - 17.7 g/dL Final   01/11/2024 6.7 (C) 13.0 - 17.7 g/dL Final   01/11/2024 8.1 (L) 13.0 - 17.7 g/dL Final      HCT Hematocrit   Date Value Ref Range Status   01/12/2024 25.7 (L) 37.5 - 51.0 % Final   01/11/2024 20.8 (C) 37.5 - 51.0 % Final   01/11/2024 24.8 (L) 37.5 - 51.0 % Final      Platlets No results found for: \"LABPLAT\"   MCV MCV   Date Value Ref Range Status   01/12/2024 90.2 79.0 - 97.0 fL Final   01/11/2024 89.9 79.0 - 97.0 fL Final          Sodium Sodium   Date Value Ref Range Status   01/12/2024 131 (L) 136 - 145 mmol/L Final   01/11/2024 131 (L) 136 - 145 mmol/L Final   01/10/2024 132 (L) 136 - 145 mmol/L Final      Potassium Potassium   Date Value Ref Range Status   01/12/2024 4.7 3.5 - 5.2 mmol/L Final   01/11/2024 4.4 3.5 - 5.2 mmol/L Final   01/10/2024 5.2 3.5 - 5.2 mmol/L Final      Chloride Chloride   Date Value Ref Range Status   01/12/2024 94 (L) 98 - 107 mmol/L Final   01/11/2024 93 (L) 98 - 107 mmol/L Final   01/10/2024 95 (L) 98 - 107 mmol/L Final      CO2 CO2   Date Value Ref Range Status   01/12/2024 20.7 (L) 22.0 - 29.0 mmol/L Final   01/11/2024 23.0 22.0 - 29.0 mmol/L Final   01/10/2024 19.1 (L) 22.0 " "- 29.0 mmol/L Final      BUN BUN   Date Value Ref Range Status   01/12/2024 85 (H) 8 - 23 mg/dL Final   01/11/2024 54 (H) 8 - 23 mg/dL Final   01/10/2024 70 (H) 8 - 23 mg/dL Final      Creatinine Creatinine   Date Value Ref Range Status   01/12/2024 5.42 (H) 0.76 - 1.27 mg/dL Final   01/11/2024 4.02 (H) 0.76 - 1.27 mg/dL Final   01/10/2024 4.82 (H) 0.76 - 1.27 mg/dL Final      Calcium Calcium   Date Value Ref Range Status   01/12/2024 7.6 (L) 8.6 - 10.5 mg/dL Final   01/11/2024 7.8 (L) 8.6 - 10.5 mg/dL Final   01/10/2024 8.0 (L) 8.6 - 10.5 mg/dL Final      PO4 No results found for: \"CAPO4\"   Albumin Albumin   Date Value Ref Range Status   01/12/2024 2.3 (L) 3.5 - 5.2 g/dL Final   01/11/2024 2.4 (L) 3.5 - 5.2 g/dL Final   01/10/2024 2.4 (L) 3.5 - 5.2 g/dL Final      Magnesium Magnesium   Date Value Ref Range Status   01/12/2024 2.3 1.6 - 2.4 mg/dL Final   01/11/2024 1.8 1.6 - 2.4 mg/dL Final   01/10/2024 2.1 1.6 - 2.4 mg/dL Final      Uric Acid No results found for: \"URICACID\"     Medication Review:   ampicillin, 2 g, Intravenous, Q24H  dextrose, 50 mL, Intravenous, TID  epoetin buddy/buddy-epbx, 10,000 Units, Intravenous, Once per day on Monday Wednesday Friday  escitalopram, 5 mg, Nasogastric, Daily  melatonin, 10 mg, Nasogastric, Nightly  midodrine, 5 mg, Nasogastric, TID AC  multivitamin and minerals, 15 mL, Nasogastric, Daily  QUEtiapine, 25 mg, Nasogastric, Nightly  senna-docusate sodium, 2 tablet, Nasogastric, BID  sodium chloride, 10 mL, Intravenous, Q12H  thiamine, 100 mg, Nasogastric, Daily          Assessment & Plan       Left flank pain    Ruptured infrarenal abdominal aortic aneurysm (AAA)    DVT, lower extremity, distal, acute, unspecified laterality    S/P Exploratory laparotomy; appendectomy; left colon resection with creation of ostomy    S/P Open repair of ruptured infrarenal abdominal aortic aneurysm    ROLAND (acute kidney injury)    Severe malnutrition      Assessment & Plan  Anuric ROLAND-  pt with " previous partial left nephrectomy and baseline creatinine closer to 0.9-1.0.  Had noted hypotension with AAA rupture s/p repair.  Likely ATN from hypotension vs. Atheroembolic injury versus compromise blood supply to kidneys.    Noted renal infarcts seen on repeat CT.  Also received IV contrast with CTA.    Still anuric.  Status post TDC placement 1/11/2020 and right IJ position.  Under pressure dressing.  Appreciate vascular help.  Left femoral Shiley removed.  Continue MWF schedule and monitor for potential recovery .    AAA rupture- s/p open repair infrarenal aorta.  Ischemic bowel- s/p left colectomy, ostomy.  Shock- sepsis, hemorrhagic component with AAA rupture.   Sepsis-  blood cx with strep pyogenes previously.  Treatment per primary.    Met Acidosis-  bicarb held.  Improved with HD.   Anemia-  prbc's prn.  Will add Epogen 10,000 units IV with every dialysis, hemoglobin goal above 10.  Would repeat iron studies in the next 2 weeks or so and replace if needed.  Hyperkalemia-controlled with dialysis.    DVT- simran LE.  Unable to place filter, on heparin gtt now.  Vascular following.  Discussed with wife and nursing staff.  Possible transfer to LTAC in [facility].  Please call with any question.      Garett Bazzi MD  01/12/24  13:04 EST

## 2024-01-12 NOTE — SIGNIFICANT NOTE
01/11/24 2348   Clinician Notification   Reason for Communication Critical lab value   Clinician Name MORA   Clinician Role Consulting physician   Method of Communication Call   Response See orders   Notification Time 2346       RN reached out to MD Mora about copious drainage from new TDC and drop in hgb. RN instructed to change TDC with pressure dressing as needed and replace with 2 units.

## 2024-01-12 NOTE — PROGRESS NOTES
Pulmonary / Critical Care Progress Note      Patient Name: Danny Savage  : 1960  MRN: [id number]  Attending:  Killian Arreola DO   Date of admission: 2023    Subjective   Subjective   Patient critically ill with ruptured AAA s/p open repair, hemorrhagic shock, Streptococcus pyogenes bacteremia    Over the past 24 hours   Remains in ICU  He is frail and weak  Unable to tolerate oral diet  Poor appetite; nausea worse at night with vomiting episode (1/10_  NPO after midnight  Heparin gtt on hold; TDC placed    Overnight  Oozing noted from TDC  Hgb dropped -required blood transfusion 2 units    Today  Pressure dressing in place over TDC, with slow ooze around insertion site  Getting set up for dialysis  No nausea or vomiting overnight -with start of D50 pushes and routine supplements  Family at bedside        Objective   Objective     Vitals:   Vital signs for last 24 hours:  Temp:  [97.1 °F (36.2 °C)-98.7 °F (37.1 °C)] 97.5 °F (36.4 °C)  Heart Rate:  [78-89] 84  Resp:  [12-20] 20  BP: ()/(52-78) 104/69    Intake/Output last 3 shifts:  I/O last 3 completed shifts:  In: 1100 [I.V.:800; Blood:300]  Out: 1260 [Emesis/NG output:650; Stool:600; Blood:10]    Physical Exam   Vital Signs Reviewed   General:  Alert, NAD.  Chronically ill-appearing male, lying in bed  HEENT:  PERRL, EOMI.    Neck:  No JVD, no thyromegaly  Lymph: no axillary, cervical, supraclavicular lymphadenopathy noted bilaterally  Chest:  Clear to auscultation bilaterally, no work of breathing noted  CV: RRR, no M/G/R, pulses 2+  Abd:  Soft, NT, ND, +BS, ostomy  EXT:  no clubbing, no cyanosis, no edema, left femoral HD catheter  Neuro:  A&Ox3, CN grossly intact, no focal deficits.  Skin: No rashes or lesions noted, jaundiced    Result Review    I have personally reviewed the results from the time of this admission to 2024 12:16 EST and agree with these findings:  [x]  Laboratory  [x]  Microbiology  [x]  Radiology  [x]   EKG/Telemetry   [x]  Cardiology/Vascular   []  Pathology  []  Old records  []  Other:  Most notable findings include:       Lab 01/12/24  0830 01/11/24  2333 01/11/24  0336 01/10/24  0257 01/09/24  0306 01/08/24  0548 01/07/24  0254 01/06/24  1149 01/06/24  0548 01/06/24  0221   WBC 10.91*  --  9.27  --  11.28* 12.51* 15.46*  --   --  15.93*   HEMOGLOBIN 8.2* 6.7* 8.1*  --  8.7* 8.3* 9.4*  --   --  8.4*   HEMATOCRIT 25.7* 20.8* 24.8*  --  27.2* 25.3* 29.5*  --   --  26.1*   PLATELETS 404  --  436  --  366 322 330  --   --  284   SODIUM 131*  --  131* 132* 133* 132* 133* 135* 134* 136   POTASSIUM 4.7  --  4.4 5.2 4.4 4.8 5.5* 4.5 4.6 4.6   CHLORIDE 94*  --  93* 95* 94* 96* 98 99 99 102   CO2 20.7*  --  23.0 19.1* 21.9* 20.2* 17.9* 19.5* 19.0* 18.0*   BUN 85*  --  54* 70* 47* 56* 64* 49* 43* 43*   CREATININE 5.42*  --  4.02* 4.82* 3.51* 3.89* 4.05* 3.20* 2.85* 2.91*   GLUCOSE 128*  --  103* 89 89 100* 83 141* 125* 140*   CALCIUM 7.6*  --  7.8* 8.0* 7.9* 7.9* 7.8* 7.8* 7.7* 7.5*   PHOSPHORUS 5.4*  --  4.3 5.6* 4.9* 5.4* 5.8* 4.6* 4.9*  --    TOTAL PROTEIN 5.9*  --  6.4 6.8 6.9 6.9  --   --   --  6.9   ALBUMIN 2.3*  --  2.4* 2.4* 2.6* 2.6*  --  2.9* 2.8* 2.8*   GLOBULIN  --   --  4.0  --   --  4.3  --   --   --  4.1       Assessment & Plan   Assessment / Plan     Active Hospital Problems:  Active Hospital Problems    Diagnosis     **Left flank pain     Severe malnutrition     S/P Exploratory laparotomy; appendectomy; left colon resection with creation of ostomy     S/P Open repair of ruptured infrarenal abdominal aortic aneurysm     Ruptured infrarenal abdominal aortic aneurysm (AAA)     DVT, lower extremity, distal, acute, unspecified laterality     ROLAND (acute kidney injury)      Impression:  Ruptured infrarenal AAA s/p open repair  Acute blood loss anemia  Acute DVT with plan for IVC filter  Intra-abdominal hematoma  Hemorrhagic shock  Hypovolemic shock  Acute hypoxic respiratory failure requiring mechanical  ventilation  Septic shock with Streptococcus pyogenes bacteremia  Moraxella pneumonia  Ischemic sigmoid colon s/p colostomy  History of RCC s/p partial nephrectomy on 11/13  Acute kidney injury secondary to acute tubular necrosis requiring renal replacement therapy  Clinically significant lactic acidosis  Chronically immunosuppressed on CellCept  Hypocalcemia  Hypokalemia resolved now with hyperkalemia  SVT  Lactic acidosis, clinically significant     Plan:  -Patient currently on room air  -Pleural effusion noted on imaging, continue to monitor as patient is currently on room air; patient to have fluid removed with HD  -Heparin drip currently on hold.  Likely to start later this today.  Monitoring TDC site for persistent oozing.  Heparin drip in place due to IVC thrombus/bilateral DVTs  -Received 2 units PBRC's overnight.  Hemoglobin 6.7.  Repeat hemoglobin post transfusion 8.2.  Recommend transfusion for Hgb 7 or less  -Monitor for any signs of bleeding, hemoglobin stable  -Continue midodrine 5 mg 3 times daily  -Significant elevation in bilirubin, nausea, right upper quadrant choked cholelithiasis and sludge in the gallbladder; continue to monitor  -GI on board, appreciate assistance  -PT/OT/speech  -Cortrak in place, tube feeds per dietitian  -Cleared per speech therapy however not meeting nutritional needs  -Nephrology following, HD per their service  -Plan for TDC placement today with vascular  -Continue as needed Percocet for pain  -Continue Unasyn to complete 14 days of therapy for bacteremia  -Hold home chronic immunosuppression CellCept indefinitely  -On MVI, thiamine  -Dietitian on board.  Patient currently on D50 pushes 3 times daily, Prosource tube feeds 5 times daily.  Patient tolerated well overnight.  Will continue  -Plan is for dispo to LTAC.  Monitoring TDC site today, oozing around insertion site has already declined will monitor    PUP PPx: Protonix  DVT prophylaxis: Planning for IVC  filter    Medical and mechanical DVT prophylaxis orders are present.    CODE STATUS:   Level Of Support Discussed With: Patient  Code Status (Patient has no pulse and is not breathing): CPR (Attempt to Resuscitate)  Medical Interventions (Patient has pulse or is breathing): Full Support    [place] ENG Pa-C, spent 15 minutes critical care time in accordance to split shared billing.  Electronically signed by ROEL Negro, 01/12/24, 12:26 PM EST.    Patient is critically ill with acute anemia requiring blood transfusion, acute renal failure requiring HD, hyperbilirubinemia, status post AAA rupture and repair, extreme deconditioning. 35 minutes of critical care time was spent managing this patient, excluding procedures. Of this time, IDr. [place], spent 20 minutes and ROEL Lopez spent 15 minutes in accordance with split shared billing. This included personally reviewing all pertinent labs, imaging, microbiology and documentation. Also discussing the case with the patient and any available family, the admitting physician and any available ancillary staff.   Electronically signed by [place] Jeffery MD, 01/12/24, 1:40 PM EST.

## 2024-01-12 NOTE — PROGRESS NOTES
[facility]     Progress Note    Patient Name: Danny Savage  : 1960  MRN: [id number]  Primary Care Physician:  Kodi Pichardo MD  Date of admission: 2023    Subjective   Subjective     POD #18 status post open repair of ruptured abdominal aorta aneurysm, POD #16 status post abdominal reexploration, sigmoidectomy with end colostomy, closure of abdominal wound  POD #1 status post TDC    Had significant oozing from the TDC site.  Mostly serosanguineous at this time.  Catheter functioning well.  Patient without any complaints.      Objective   Objective     Vitals:   Temp:  [97.1 °F (36.2 °C)-98.7 °F (37.1 °C)] 97.5 °F (36.4 °C)  Heart Rate:  [78-89] 84  Resp:  [12-20] 20  BP: ()/(52-78) 104/69  Flow (L/min):  [2-3] 2    Physical Exam   General: Sleepy, undergoing hemodialysis, no acute distress  Extremities: Symmetric, moderate edema.  TDC site: Bulky dressing in place with serosanguineous staining.      Assessment & Plan   Assessment / Plan     Assessment/Plan:    Stable from the vascular surgery standpoint.  I agree with plans for LTAC.  Will follow peripherally while in-house.      Active Hospital Problems:  Active Hospital Problems    Diagnosis     **Left flank pain     DVT, lower extremity, distal, acute, unspecified laterality     Severe malnutrition     S/P Exploratory laparotomy; appendectomy; left colon resection with creation of ostomy     S/P Open repair of ruptured infrarenal abdominal aortic aneurysm     Ruptured infrarenal abdominal aortic aneurysm (AAA)     ROLAND (acute kidney injury)            Electronically signed by Swapnil Velazco MD, 23, 10:25 AM EST.

## 2024-01-12 NOTE — DISCHARGE SUMMARY
[facility]         HOSPITALIST  DISCHARGE SUMMARY    Patient Name: Danny Savage  : 1960  MRN: [id number]    Date of Admission: 2023  Date of Discharge:  2024    Primary Care Physician: Kodi Pichardo MD    Consults       Date and Time Order Name Status Description    2024 11:38 AM Inpatient Gastroenterology Consult Completed     2023  7:16 AM Inpatient Nephrology Consult      2023 10:49 PM Hospitalist (on-call MD unless specified)      2023 10:45 PM Urology (on-call MD unless specified)              Active and Resolved Hospital Problems:  Active Hospital Problems    Diagnosis POA    **Left flank pain [R10.9] Yes    Severe malnutrition [E43] Yes    S/P Exploratory laparotomy; appendectomy; left colon resection with creation of ostomy [Z98.890] Not Applicable    S/P Open repair of ruptured infrarenal abdominal aortic aneurysm [Z98.890, Z86.79] Not Applicable    Ruptured infrarenal abdominal aortic aneurysm (AAA) [I71.33] Unknown    DVT, lower extremity, distal, acute, unspecified laterality [I82.4Z9] Unknown    ROLAND (acute kidney injury) [N17.9] Unknown      Resolved Hospital Problems   No resolved problems to display.       Hospital Course     Hospital Course:  Danny Savage is a 63 y.o. male  who presented to the hospital with complaints of left lower back pain and fever x 3 days, at that time patient was status post nephrectomy and there was concern for postoperative seroma/hematoma, patient was discharged home but pain began getting worse 10 out of 10 intensity on repeat presentation patient was febrile to 102.6, repeat CT scan showed likely postoperative seroma/hematoma although infection could not be ruled out, there was an abdominal aortic aneurysm of 5.1 cm.  The patient was started on IV antibiotics and patient was admitted for possible abscess in the nephrectomy bed, patient went unresponsive on 2023, blood pressure was 42/32,  patient was pale and clammy, CODE BLUE was called, patient received IV fluids, patient was intubated, he was found to have expanding AAA with eccentric mural thrombus/hematoma measuring 5.1 cm, vascular surgeon consulted and patient underwent urgent open repair of ruptured infrarenal AAA with tube graft and mobilization of the omentum, patient taken back to the OR on 12/26/2023 to look for sources of blood loss, he was found to have nonviable colon and patient underwent partial colectomy by Dr. Mallory.  Patient has had prolonged course with continued anemia, repeat scans concerning for worsening hematoma, patient was found to have right lower extremity DVT.  Patient initially started on heparin drip though hemoglobin trended down sharply concerning for occult bleed.  Heparin drip stopped and hemoglobin stabilized.  Patient's blood pressure did not tolerate hemodialysis and patient had to be switched to CRRT.  Patient remains on antibiotics but repeat blood cultures are negative.    Patient able to be weaned off of pressors.  Vascular surgery planned on placing IVC filter though clot was found in the pararenal IVC on venogram and procedure was aborted.  Patient started back on heparin drip no bolus.  Able to transition from CRRT to intermittent hemodialysis with ultrafiltration.  Having remittent issues with nausea and vomiting.  Good ostomy output.  Right upper quadrant ultrasound demonstrated cholelithiasis without cholecystitis.  Liver unremarkable.  Tube feeds restarted slowly.  Patient did have tunneled dialysis catheter placed on January 11.  Patient has had some oozing around the skin suture site.  The critical care physician has placed another suture.  Patient did require another 2 units of blood.  Patient does have a pressure dressing in place over the tunneled dialysis catheter.  Patient did finally get approval for LTAC.  Patient will go to [facility] in [place] for continued care at this  time.      HOSPITAL A/P     Ruptured infrarenal AAA s/p open repair  Hemorrhagic shock  Hypovolemic shock  Acute hypoxic respiratory failure requiring mechanical ventilation  Septic shock with Streptococcus pyogenes bacteremia  SVT  History of RCC s/p partial nephrectomy on 11/13  Acute anuric renal failure requiring CRRT  Metabolic acidosis  Clinically significant lactic acidosis  Chronically immunosuppressed on CellCept  Hypocalcemia  Hypokalemia  Acute right lower extremity DVT in the peroneal and gastrocnemius  Acute blood loss anemia  Perirenal IVC thrombus  Ischemic bowel  Hyperkalemia  Anxiety  Transaminitis  Hyperbilirubinemia     PLAN     Vascular surgery, general surgery, urology, nephrology and pulmonology critical care consulted thank you for assistance  Continue postop wound care per general surgery  MRI done and shows continued large retroperitoneal hematoma.  Common bile duct is normal with no obstruction.  Sludge in the gallbladder.  Moderate right sided pleural effusion  Continue to monitor hemoglobin closely and transfuse packed red blood cells as needed to keep greater than 7  Continue heparin drip and monitor very closely for signs of bleeding  Continue Protonix twice daily  Continue supplemental oxygen as needed to keep sats greater than 90%  Continue ampicillin-sulbactam complete 14-day course per pulmonology critical care  Continue midodrine and titrate per pulmonology critical care  Continue hemodialysis and ultrafiltration per nephrology  Vascular to place TDC today   Repeat renal panel in a.m.  Continue to monitor electrolytes replace as needed  Continue Lexapro and Seroquel  Continue tube feeds at low rate and titrate up slowly to goal      Day of Discharge     Vital Signs:  Temp:  [97.1 °F (36.2 °C)-98.7 °F (37.1 °C)] 98 °F (36.7 °C)  Heart Rate:  [80-89] 83  Resp:  [14-17] 16  BP: ()/(52-78) 126/78  Flow (L/min):  [2-3] 2  Physical Exam:   General: Patient appears chronically ill  resting in bed with wife at the bedside  HEENT: Normocephalic atraumatic moist membranes pupils equal round reactive light, no scleral icterus no conjunctival injection  Cardiovascular: regular rate and rhythm no murmurs rubs or gallops S1-S2, no lower extremity edema appreciated  Pulmonary: Decreased, no wheezing or rhonchi  Gastrointestinal: Soft nondistended positive bowel sounds all 4 quadrants no rebound or guarding,  midline incision well-approximated without erythema or drainage, colostomy in left lower quadrant   Musculoskeletal: Full rom   Skin: Clean dry without rashes  Neuro: Cranial nerves II through XII intact grossly no sensorimotor deficits appreciated bilateral upper and lower extremities  Psych: Patient is calm cooperative and appropriate with exam not responding to internal stimuli  : No Rico catheter no bladder distention no suprapubic tenderness       Discharge Details        Discharge Medications        New Medications        Instructions Start Date   ampicillin   2 g, Intravenous, Every 24 Hours      Heparin (Porcine) 76295-8.45 UT/250ML-% infusion   18 Units/kg/hr (1,242 Units/hr), Intravenous, Titrated      midodrine 5 MG tablet  Commonly known as: PROAMATINE   5 mg, Oral, 3 Times Daily Before Meals      multivitamin and minerals liquid liquid   15 mL, Nasogastric, Daily   Start Date: January 13, 2024     oxyCODONE-acetaminophen  MG per tablet  Commonly known as: PERCOCET   1 tablet, Oral, Every 4 Hours PRN      QUEtiapine 25 MG tablet  Commonly known as: SEROquel   25 mg, Oral, Nightly      thiamine 100 MG tablet  Commonly known as: VITAMIN B1   100 mg, Nasogastric, Daily      traZODone 50 MG tablet  Commonly known as: DESYREL   50 mg, Oral, Nightly PRN             Continue These Medications        Instructions Start Date   Metamucil 0.36 g capsule  Generic drug: Psyllium   1 capsule, Oral, 2 Times Daily      mycophenolate 500 MG tablet  Commonly known as: CELLCEPT   Take 1 tablet  by mouth Daily.      ondansetron 4 MG tablet  Commonly known as: ZOFRAN   4 mg, Oral, Every 6 Hours PRN             Stop These Medications      cetirizine 10 MG tablet  Commonly known as: zyrTEC              Allergies   Allergen Reactions    Latex, Natural Rubber Hives       Discharge Disposition:  Long Term Care (DC - External)    Diet:  Hospital:No active diet order      Discharge Activity: as tolerated       CODE STATUS:  Code Status and Medical Interventions:   Ordered at: 12/24/23 0024     Level Of Support Discussed With:    Patient     Code Status (Patient has no pulse and is not breathing):    CPR (Attempt to Resuscitate)     Medical Interventions (Patient has pulse or is breathing):    Full Support         Future Appointments   Date Time Provider Department Center   4/5/2024 10:00 AM [address]   4/9/2024  8:30 AM Michelle Cai MD [facility]           Pertinent  and/or Most Recent Results     PROCEDURES:   on 12- Procedure(s):  Open repair of ruptured infrarenal abdominal aortic aneurysm with a tube graft, 18 mm Dacron graft.  Mobilization of the omentum with coverage of the graft using pedicled omental flap.    On 12-  Procedure: Exploratory laparotomy; appendectomy; left colon resection with creation of ostomy     Expiratory laparotomy.  Repair of hole in the mesentery of the terminal ileum.  Appendectomy and left colon resection performed by general surgery with ostomy creation, to be dictated under separate heading.  Delayed abdominal closure    On 1-  Procedure(s):  Ultrasound-guided insertion of right internal jugular vein tunneled catheter for hemodialysis      LAB RESULTS:      Lab 01/12/24  0830 01/11/24  2333 01/11/24  0336 01/10/24  2027 01/10/24  1353 01/10/24  0935 01/09/24  0958 01/09/24  0306 01/08/24  0642 01/08/24  0548 01/07/24  0444 01/07/24  0254 01/06/24  0221 01/05/24  2039 01/05/24  1443 01/05/24  1214   WBC 10.91*  --  9.27  --   --    --   --  11.28*  --  12.51*  --  15.46* 15.93*   < >  --   --    HEMOGLOBIN 8.2* 6.7* 8.1*  --   --   --   --  8.7*  --  8.3*  --  9.4* 8.4*   < >  --   --    HEMATOCRIT 25.7* 20.8* 24.8*  --   --   --   --  27.2*  --  25.3*  --  29.5* 26.1*   < >  --   --    PLATELETS 404  --  436  --   --   --   --  366  --  322  --  330 284   < >  --   --    NEUTROS ABS 8.96*  --  7.59*  --   --   --   --  9.51*  --  10.63*  --   --  14.15*  --   --   --    IMMATURE GRANS (ABS) 0.20*  --  0.15*  --   --   --   --  0.13*  --  0.17*  --   --  0.24*  --   --   --    LYMPHS ABS 0.80  --  0.67*  --   --   --   --  0.63*  --  0.74  --   --  0.58*  --   --   --    MONOS ABS 0.80  --  0.79  --   --   --   --  0.88  --  0.85  --   --  0.84  --   --   --    EOS ABS 0.09  --  0.05  --   --   --   --  0.10  --  0.08  --   --  0.06  --   --   --    MCV 90.2  --  89.9  --   --   --   --  91.6  --  91.7  --  93.7 92.9   < >  --   --    LACTATE  --   --   --   --   --   --   --   --   --  1.0  --   --  1.2  --   --   --    LACTATE, ARTERIAL  --   --   --   --   --   --   --   --   --   --   --   --   --   --   --  1.11   PROTIME  --   --   --   --   --   --   --   --   --   --   --  18.1* 20.4*  --  20.4*  --    APTT 43.3*  --  89.5 82.0 59.9* >200.0*   < > 119.1*   < > 112.4*   < >  --  97.1*   < >  --   --     < > = values in this interval not displayed.         Lab 01/12/24  0830 01/11/24  0336 01/10/24  0257 01/09/24  0306 01/08/24  0548 01/06/24  1149 01/06/24  0548 01/05/24  2358 01/05/24  2357 01/05/24  1742 01/05/24  1214 01/05/24  1156   SODIUM 131* 131* 132* 133* 132*   < > 134*   < >  --  137  --  135*   SODIUM, ARTERIAL  --   --   --   --   --   --   --   --   --   --  136.4  --    POTASSIUM 4.7 4.4 5.2 4.4 4.8   < > 4.6   < >  --  4.6  --  4.5   CHLORIDE 94* 93* 95* 94* 96*   < > 99   < >  --  102  --  101   CO2 20.7* 23.0 19.1* 21.9* 20.2*   < > 19.0*   < >  --  18.7*  --  19.1*   ANION GAP 16.3* 15.0 17.9* 17.1* 15.8*   < >  16.0*   < >  --  16.3*  --  14.9   BUN 85* 54* 70* 47* 56*   < > 43*   < >  --  46*  --  46*   CREATININE 5.42* 4.02* 4.82* 3.51* 3.89*   < > 2.85*   < >  --  3.23*  --  3.26*   EGFR 11.0* 15.7* 12.7* 18.5* 16.4*   < > 23.8*   < >  --  20.5*  --  20.2*   GLUCOSE 128* 103* 89 89 100*   < > 125*   < >  --  88  --  193*   GLUCOSE, ARTERIAL  --   --   --   --   --   --   --   --   --   --  162*  --    CALCIUM 7.6* 7.8* 8.0* 7.9* 7.9*   < > 7.7*   < >  --  7.5*  --  7.6*   IONIZED CALCIUM  --   --   --   --   --   --  1.01*  --  1.00* 1.01* 1.01* 1.00*   MAGNESIUM 2.3 1.8 2.1 1.9 2.2   < > 2.5*   < >  --  2.5*  --  2.5*   PHOSPHORUS 5.4* 4.3 5.6* 4.9* 5.4*   < > 4.9*   < >  --  5.1*  --  5.2*    < > = values in this interval not displayed.         Lab 01/12/24  0830 01/11/24  0336 01/10/24  0257 01/09/24  0306 01/08/24  0548 01/06/24  0548 01/06/24  0221   TOTAL PROTEIN 5.9* 6.4 6.8 6.9 6.9  --  6.9   ALBUMIN 2.3* 2.4* 2.4* 2.6* 2.6*   < > 2.8*   GLOBULIN  --  4.0  --   --  4.3  --  4.1   ALT (SGPT) 139* 167* 143* 119* 101*  --  103*   AST (SGOT) 154* 169* 154* 129* 103*  --  104*   BILIRUBIN 7.9* 9.4* 10.5* 10.3* 9.5*  9.8*  --  8.2*   INDIRECT BILIRUBIN 1.5  --  2.1 1.8 1.5  --   --    BILIRUBIN DIRECT 6.4*  --  8.4* 8.5* 8.0*  --   --    ALK PHOS 153* 143* 146* 139* 129*  --  115    < > = values in this interval not displayed.         Lab 01/07/24  0254 01/06/24  0221 01/05/24  1443   PROTIME 18.1* 20.4* 20.4*   INR 1.47* 1.71* 1.71*             Lab 01/12/24  0019   ABO TYPING A   RH TYPING Negative   ANTIBODY SCREEN Negative         Lab 01/05/24  1214   PH, ARTERIAL 7.386   PCO2, ARTERIAL 32.1*   PO2 ART 67.7*   O2 SATURATION ART 92.9*   FIO2 44   HCO3 ART 18.8*   BASE EXCESS ART -5.3*   CARBOXYHEMOGLOBIN 1.8*     Brief Urine Lab Results  (Last result in the past 365 days)        Color   Clarity   Blood   Leuk Est   Nitrite   Protein   CREAT   Urine HCG        12/23/23 2054 Yellow   Clear   Small (1+)   Negative    Negative   100 mg/dL (2+)                 Microbiology Results (last 10 days)       Procedure Component Value - Date/Time    Respiratory Panel PCR w/COVID-19(SARS-CoV-2) SURINDER/KARINA/REESE/PAD/COR/SHARRON In-House, NP Swab in UTM/VTM, 2 HR TAT - Swab, Nasopharynx [[id number]]  (Normal) Collected: 01/11/24 1754    Lab Status: Final result Specimen: Swab from Nasopharynx Updated: 01/11/24 1919     ADENOVIRUS, PCR Not Detected     Coronavirus 229E Not Detected     Coronavirus HKU1 Not Detected     Coronavirus NL63 Not Detected     Coronavirus OC43 Not Detected     COVID19 Not Detected     Human Metapneumovirus Not Detected     Human Rhinovirus/Enterovirus Not Detected     Influenza A PCR Not Detected     Influenza B PCR Not Detected     Parainfluenza Virus 1 Not Detected     Parainfluenza Virus 2 Not Detected     Parainfluenza Virus 3 Not Detected     Parainfluenza Virus 4 Not Detected     RSV, PCR Not Detected     Bordetella pertussis pcr Not Detected     Bordetella parapertussis PCR Not Detected     Chlamydophila pneumoniae PCR Not Detected     Mycoplasma pneumo by PCR Not Detected    Narrative:      In the setting of a positive respiratory panel with a viral infection PLUS a negative procalcitonin without other underlying concern for bacterial infection, consider observing off antibiotics or discontinuation of antibiotics and continue supportive care. If the respiratory panel is positive for atypical bacterial infection (Bordetella pertussis, Chlamydophila pneumoniae, or Mycoplasma pneumoniae), consider antibiotic de-escalation to target atypical bacterial infection.    Blood Culture - Blood, Arm, Left [[id number]]  (Normal) Collected: 01/02/24 1422    Lab Status: Final result Specimen: Blood from Arm, Left Updated: 01/07/24 1430     Blood Culture No growth at 5 days    Blood Culture - Blood, Hand, Left [[id number]]  (Normal) Collected: 01/02/24 1216    Lab Status: Final result Specimen: Blood from Hand, Left Updated: 01/07/24  1230     Blood Culture No growth at 5 days            XR Chest 1 View    Result Date: 1/11/2024    1. New right IJ catheter with tip projecting in the right atrium.  No definite pneumothorax. 2. Right greater than left opacities and pleural effusions, as before.       NICK STEEN MD       Electronically Signed and Approved By: NICK STEEN MD on 1/11/2024 at 16:43             XR Chest 1 View    Result Date: 1/10/2024    1. Small bilateral layering pleural effusions, right worse than left with associated compressive atelectasis.        DOMONIQUE SALDAÑA MD       Electronically Signed and Approved By: DOMONIQUE SALDAÑA MD on 1/10/2024 at 12:04             MRI abdomen wo contrast mrcp    Result Date: 1/9/2024    1. Large retroperitoneal hematoma is incompletely evaluated.  Please see prior CTs for further discussion.  This extends to the region of the head of the pancreas. 2. Common bile duct is normal in caliber without evidence of filling defect to suggest obstruction 3. Abnormal appearance of the gallbladder demonstrates filling defects compatible with sludge and stones.  No wall thickening noted.  Fluid surrounding the gallbladder may be related to underlying ascites. 4. Moderate size right-sided pleural effusion and small left-sided pleural effusion with associated atelectasis 5. Postsurgical changes of the abdominal aorta 6. Other findings as above     ANNABELLE PALMER MD       Electronically Signed and Approved By: ANNABELLE PALMER MD on 1/09/2024 at 17:58             US Abdomen Limited    Result Date: 1/8/2024    1. Cholelithiasis and sludge filling the gallbladder.  No sonographic evidence of acute cholecystitis 2. Limited imaging of the liver is grossly unremarkable in appearance     ANNABELLE PALMER MD       ELECTRONICALLY SIGNED AND APPROVED BY: ANNABELLE PALMER MD ON 1/08/2024 AT 19:27             XR Abdomen KUB    Result Date: 1/7/2024    1. Feeding tube projects in expected position 2. Mild gaseous  distention of bowel with suspected mild wall edema.  No definite evidence of free air 3. Consolidation left lung base 4. Left sided inguinal vascular catheter projects in expected position     ANNABELLE PALMER MD       Electronically Signed and Approved By: ANNABELLE PALMER MD on 1/07/2024 at 9:13               Results for orders placed during the hospital encounter of 12/23/23    Duplex Venous Lower Extremity - Right CV-READ    Interpretation Summary    Acute right lower extremity deep vein thrombosis noted in the peroneal and gastrocnemius.    All other right sided veins appeared normal.      Results for orders placed during the hospital encounter of 12/23/23    Duplex Venous Lower Extremity - Right CV-READ    Interpretation Summary    Acute right lower extremity deep vein thrombosis noted in the peroneal and gastrocnemius.    All other right sided veins appeared normal.      Results for orders placed during the hospital encounter of 12/23/23    Adult Transthoracic Echo Complete W/ Cont if Necessary Per Protocol    Interpretation Summary    Left ventricular ejection fraction appears to be 66 - 70%.    Hyperdynamic.  The left ventricular cavity is small in size.    Left ventricular diastolic function was not assessed.    There is a trivial pericardial effusion.    No significant valvular abnormalities noted.      Labs Pending at Discharge:  Pending Labs       Order Current Status    CBC & Differential Collected (12/25/23 0532)    Calcium, Ionized Collected (01/07/24 9834)              Time spent on Discharge including face to face service:  more than 45 minutes    Electronically signed by Killian Arreola DO, 01/12/24, 11:20 AM EST.

## 2024-01-12 NOTE — SIGNIFICANT NOTE
Wound Eval / Progress Noted    OLIVIA Hoyt     Patient Name: Danny Savage  : 1960  MRN: [id number]  Today's Date: 2024                 Admit Date: 2023    Visit Dx:    ICD-10-CM ICD-9-CM   1. Sepsis, due to unspecified organism, unspecified whether acute organ dysfunction present  A41.9 038.9     995.91   2. Fever, unspecified fever cause  R50.9 780.60   3. Intra-abdominal fluid collection  R18.8 789.59   4. Abdominal aortic aneurysm (AAA) without rupture, unspecified part  I71.40 441.4   5. Ruptured infrarenal abdominal aortic aneurysm (AAA)  I71.33 441.3   6. Decreased activities of daily living (ADL)  Z78.9 V49.89   7. Difficulty walking  R26.2 719.7   8. DVT, lower extremity, distal, acute, unspecified laterality  I82.4Z9 453.42   9. Dysphagia, oropharyngeal  R13.12 787.22   10. S/P Open repair of ruptured infrarenal abdominal aortic aneurysm  Z98.890 V45.89    Z86.79    11. S/P AAA (abdominal aortic aneurysm) repair  Z98.890 V45.89    Z86.79    12. ROLAND (acute kidney injury)  N17.9 584.9         Left flank pain    Ruptured infrarenal abdominal aortic aneurysm (AAA)    DVT, lower extremity, distal, acute, unspecified laterality    S/P Exploratory laparotomy; appendectomy; left colon resection with creation of ostomy    S/P Open repair of ruptured infrarenal abdominal aortic aneurysm    ROLAND (acute kidney injury)    Severe malnutrition        Past Medical History:   Diagnosis Date    Allergy     Aortic aneurysm     4.7 just found has not f/u    H/O Kidney stone     Hiatal hernia     Kidney mass     left    Kidney stone     Renal cancer     S/P Exploratory laparotomy; appendectomy; left colon resection with creation of ostomy 2024    S/P Open repair of ruptured infrarenal abdominal aortic aneurysm 2024        Past Surgical History:   Procedure Laterality Date    ABDOMINAL AORTIC ANEURYSM REPAIR N/A 2023    Procedure: ABDOMINAL AORTIC ANEURYSM REPAIR;  Surgeon: Koffi Agosto,  "MD;  Location: [facility] MAIN OR;  Service: Vascular;  Laterality: N/A;    COLON RESECTION N/A 12/26/2023    Procedure: COLON RESECTION;  Surgeon: Hernan Mallory MD;  Location: [facility] MAIN OR;  Service: General;  Laterality: N/A;  EXPLORATORY LAPAROTOMY, REPAIR OF SMALL BOWEL MESENTERY, APPENDECTOMY, LEFT HEMICOLECTOMY, CREATION OF OSTOMY, ABDOMINAL CLOSURE    EXPLORATORY LAPAROTOMY N/A 12/26/2023    Procedure: LAPAROTOMY EXPLORATORY;  Surgeon: Koffi Agosto MD;  Location: [facility] MAIN OR;  Service: Vascular;  Laterality: N/A;  Exploratory Laparotomy    INSERTION HEMODIALYSIS CATHETER N/A 1/11/2024    Procedure: Insertion of tunneled catheter for hemodialysis;  Surgeon: Swapnil Velazco MD;  Location: [facility] MAIN OR;  Service: Vascular;  Laterality: N/A;    INTERVENTIONAL RADIOLOGY PROCEDURE N/A 1/5/2024    Procedure: IVC Filter Placement;  Surgeon: Michoacano Collins MD;  Location: [facility] CATH INVASIVE LOCATION;  Service: Vascular;  Laterality: N/A;    KIDNEY STONE SURGERY      NEPHRECTOMY PARTIAL Left 11/13/2023    Procedure: NEPHRECTOMY PARTIAL LAPAROSCOPIC WITH DAVINCI ROBOT, left;  Surgeon: Michelle Cai MD;  Location: [facility] MAIN OR;  Service: Robotics - DaVinci;  Laterality: Left;    URETEROSCOPY LASER LITHOTRIPSY WITH STENT INSERTION Left 08/18/2023    Procedure: CYSTOSCOPY URETEROSCOPY RETROGRADE PYELOGRAM STENT INSERTION, left;  Surgeon: Michelle Cai MD;  Location: [facility] MAIN OR;  Service: Urology;  Laterality: Left;      01/12/24 0947   Colostomy [facility]   Placement date: If unknown, DO NOT use \"Add Comment\" note/Placement time: If unknown, DO NOT use \"Add Comment\" note: 12/26/23 1315   Inserted by: DR MALLORY  Hand Hygiene Completed: Yes  Location: [facility]   Wound Image    Stomal Appliance 1 piece;Clean;Dry;Intact;Drainable;Changed   Stoma Appearance pink;red;dusky;moist;protruding above skin level;mucocutaneous separation   Peristomal Assessment Clean;Intact   Accessories/Skin Care cleansed with " water;skin sealant   Stoma Function stool   Stool Color brown   Stool Consistency loose;mucoid   Treatment Bag change;Site care   Output (mL) 150 mL             Wound Check / Follow-up: Patient seen today for ostomy follow-up and ongoing education. Patient remains in CICU. He is awake, alert, and oriented at time of visit. Patient's wife is present at bedside. Cortrak remains in place but no continuous tube feeds running at this time.    Ostomy pouching system is clean, dry, and intact. No signs of lifting or leaking noted. 150 mL of brown loose, mucoid stool emptied from pouching system. Then removed pouch with use of adhesive remover. Stoma is moist and pink and red in color. Dusky discoloration improved. There is small area of mucocutaneous separation at 10 o'clock area. Peristomal tissue is intact with no signs of irritation noted. Cleansed stoma and peristomal tissue with warm water and washcloth. Cut to fit and placed new one piece flat pouch. Seal obtained. No signs of lifting or leaking noted.    Left foot and toes remain with darkened discoloration but seems improved. Blistering remains to left second toe. Patient continues to deny any pain or tenderness to these areas. Recommending to continue current wound care to blistered area.    Pressure dressing in place to right neck / chest due to continuous trickle of bleeding from new tunneled dialysis catheter placed yesterday. Vascular surgery and intensivist aware.    Patient remains on specialty mattress.    Patient to discharge to LTAC facility in [place] today.       Impression: Recent colostomy.      Short term goals: Colostomy management, ostomy education.      Enedina Norman RN    1/12/2024    11:32 EST

## 2024-01-12 NOTE — CONSULTS
"Nutrition Services    Patient Name: Danny Savage  YOB: 1960  MRN: [id number]  Admission date: 12/23/2023      CLINICAL NUTRITION ASSESSMENT      Reason for Assessment  Follow Up     H&P:  Past Medical History:   Diagnosis Date    Allergy     Aortic aneurysm     4.7 just found has not f/u    H/O Kidney stone     Hiatal hernia     Kidney mass     left    Kidney stone     Renal cancer     S/P Exploratory laparotomy; appendectomy; left colon resection with creation of ostomy 01/04/2024    S/P Open repair of ruptured infrarenal abdominal aortic aneurysm 01/04/2024        Current Problems:   Active Hospital Problems    Diagnosis     **Left flank pain     Severe malnutrition     S/P Exploratory laparotomy; appendectomy; left colon resection with creation of ostomy     S/P Open repair of ruptured infrarenal abdominal aortic aneurysm     Ruptured infrarenal abdominal aortic aneurysm (AAA)     DVT, lower extremity, distal, acute, unspecified laterality     ROLAND (acute kidney injury)         Nutrition/Diet History         Narrative   Patient admitted with left flank pain, ruptured AAA.  S/P open AAA repair.  Patient went back to OR 12/26/2023. Now S/P left open colectomy with ostomy creation.  Pt extubated 12/28/2023.   Cortrak placed 01/02/24 to supplement intake.  Had been receiving CRRT with fluid removal.  Now on MWF HD.  S/P TDC 01/11/24.    Patient is on a full liquid diet with minimal PO intake.      Receiving 50 ml amps of D50 TID and Prosource TF 5 x daily.  Family at bedside reports no vomiting overnight.  Tolerating well.  No new lab values available since last RD assessment.      At this time, continue current nutrition plan of care.  Once LFT's, bili start to trend down, recommend to trial Vivonex and monitor enteral nutrition tolerance.       Anthropometrics        Current Height, Weight Height: 177.8 cm (70\")  Weight: 69 kg (152 lb 3.2 oz) (no pillows no blankets)   Current BMI Body mass " index is 21.84 kg/m².   BMI Classification Normal range   % IBW 91%   Adjusted Body Weight (ABW)    Weight Hx  Wt Readings from Last 30 Encounters:   01/10/24 0800 69 kg (152 lb 3.2 oz)   01/08/24 1624 77.7 kg (171 lb 3.2 oz)   01/07/24 0516 79 kg (174 lb 1.6 oz)   01/06/24 0500 80.6 kg (177 lb 12.8 oz)   01/04/24 0500 87.5 kg (192 lb 12.8 oz)   01/03/24 0517 91 kg (200 lb 11.2 oz)   01/02/24 1320 92.7 kg (204 lb 4.8 oz)   01/01/24 1600 96.6 kg (212 lb 14.4 oz)   12/24/23 0027 80.5 kg (177 lb 7.5 oz)   12/23/23 1948 79.9 kg (176 lb 2.4 oz)   12/22/23 0706 78.8 kg (173 lb 11.6 oz)   12/06/23 0958 79.9 kg (176 lb 3.2 oz)   11/22/23 1338 78.2 kg (172 lb 6.4 oz)   11/13/23 0628 78.2 kg (172 lb 6.4 oz)   10/30/23 1440 79.9 kg (176 lb 2.4 oz)   08/17/23 1952 78.8 kg (173 lb 11.6 oz)   08/17/23 1537 80.3 kg (177 lb 0.5 oz)   08/15/23 0554 81.2 kg (179 lb 0.2 oz)          Wt Change Observation Weight continues to trend down with dialysis treatments/fluid removal.  Weight is below patient's UBW.      -13.6% x 3 months      Estimated/Assessed Needs  Estimated Needs based on: Current Body Weight       Energy Requirements 30-35 kcal/kg    EST Needs (kcal/day) 0278-9289 kcal        Protein Requirements 1.5 g/kg   EST Daily Needs (g/day) 104 g       Fluid Requirements 25 ml/kg    Estimated Needs (mL/day) 1725 ml     Labs/Medications         Pertinent Labs Reviewed.   Results from last 7 days   Lab Units 01/11/24  0336 01/10/24  0257 01/09/24  0306   SODIUM mmol/L 131* 132* 133*   POTASSIUM mmol/L 4.4 5.2 4.4   CHLORIDE mmol/L 93* 95* 94*   CO2 mmol/L 23.0 19.1* 21.9*   BUN mg/dL 54* 70* 47*   CREATININE mg/dL 4.02* 4.82* 3.51*   CALCIUM mg/dL 7.8* 8.0* 7.9*   BILIRUBIN mg/dL 9.4* 10.5* 10.3*   ALK PHOS U/L 143* 146* 139*   ALT (SGPT) U/L 167* 143* 119*   AST (SGOT) U/L 169* 154* 129*   GLUCOSE mg/dL 103* 89 89     Results from last 7 days   Lab Units 01/11/24  2333 01/11/24  0336 01/10/24  0257 01/09/24  0306   MAGNESIUM mg/dL   "--  1.8 2.1 1.9   PHOSPHORUS mg/dL  --  4.3 5.6* 4.9*   HEMOGLOBIN g/dL 6.7* 8.1*  --  8.7*   HEMATOCRIT % 20.8* 24.8*  --  27.2*     COVID19   Date Value Ref Range Status   01/11/2024 Not Detected Not Detected - Ref. Range Final     No results found for: \"HGBA1C\"      Pertinent Medications Reviewed.     Malnutrition Severity Assessment      Patient meets criteria for : Severe Malnutrition           Nutrition Diagnosis         Nutrition Dx Problem 1 Inadequate energy Intake related to GI dysfunction as evidenced by enteral nutrition not tolerated., PO diet not tolerated., and report of minimal PO intake.     Nutrition Intervention           Current Nutrition Orders & Evaluation of Intake       Current PO Diet No diet orders on file   Supplement Orders Placed This Encounter      DIET MESSAGE Guest tray, regular diet      Place Feeding Tube Per Oscar System      Dietary Nutrition Supplements Other (See Comment); Prosource TF (Enfit)           Nutrition Intervention/Prescription:  Provides 660 kcals, 39 grams of protein       Once able to resume enteral nutrition recommend:  D50 one 50 ml amp TID (25 g dextrose each, 255 kcal)  ProSource TF 5 x day (400 kcal, 100 g pro)  Thiamine 100 mg daily   MVI with minerals daily     In total, will receive 655 kcal, 100 g pro  Meets 30% estimated kcal needs and 96% estimated protein needs.     If patient is able to tolerate this regimen, recommend to trial Vivonex RTF.    Vivonex RTF @ 100 ml/hr x 22 hours   Free water flushes 15 ml every 3 hours   Provides 2200 kcal, 110 g pro, 1986 ml fluid         Medical Nutrition Therapy/Nutrition Education          Learner     Readiness Patient  Acceptance     Method     Response Explanation  Verbalizes understanding     Monitor/Evaluation        Monitor Per protocol, I&O, PO intake, Pertinent labs, EN delivery/tolerance, Weight, Symptoms       Nutrition Discharge Plan         To be determined       Electronically signed by:  Shannon Acharya, " RD  01/12/24 08:52 EST

## 2024-01-13 LAB
BH BB BLOOD EXPIRATION DATE: NORMAL
BH BB BLOOD EXPIRATION DATE: NORMAL
BH BB BLOOD TYPE BARCODE: 600
BH BB BLOOD TYPE BARCODE: NORMAL
BH BB DISPENSE STATUS: NORMAL
BH BB DISPENSE STATUS: NORMAL
BH BB PRODUCT CODE: NORMAL
BH BB PRODUCT CODE: NORMAL
BH BB UNIT NUMBER: NORMAL
BH BB UNIT NUMBER: NORMAL
CROSSMATCH INTERPRETATION: NORMAL
CROSSMATCH INTERPRETATION: NORMAL
UNIT  ABO: NORMAL
UNIT  ABO: NORMAL
UNIT  RH: NORMAL
UNIT  RH: NORMAL

## 2024-12-01 NOTE — PROGRESS NOTES
Pulmonary / Critical Care Progress Note      Patient Name: Danny Savage  : 1960  MRN: [id number]  Attending:  Killian Arreola DO   Date of admission: 2023    Subjective   Subjective   Patient critically ill with ruptured AAA s/p open repair, hemorrhagic shock, Streptococcus pyogenes bacteremia    Over the past 24 hours patient remains in ICU, off vasopressors, hemoglobin stable on heparin drip for DVT and IVC clot, intermittent hemodialysis via left femoral HD cath, still with significant nausea, elevated T. bili, right upper quadrant ultrasound showed biliary sludge    Overnight, nausea some vomiting; tube feeds on hold    This morning  He is frail and weak  Unable to tolerate oral diet  Poor appetite; nausea worse at night  NPO after midnight  On heparin drip, to be held on call to the TDC placement        Objective   Objective     Vitals:   Vital signs for last 24 hours:  Temp:  [98 °F (36.7 °C)-98.5 °F (36.9 °C)] 98.3 °F (36.8 °C)  Heart Rate:  [86-96] 92  Resp:  [14-20] 19  BP: (104-138)/(64-85) 125/72    Intake/Output last 3 shifts:  I/O last 3 completed shifts:  In: 961 [I.V.:391; Other:200; NG/GT:370]  Out: 500 [Emesis/NG output:300; Stool:200]    Physical Exam   Vital Signs Reviewed   General:  Alert, NAD.  Chronically ill-appearing male, lying in bed  HEENT:  PERRL, EOMI.    Neck:  No JVD, no thyromegaly  Lymph: no axillary, cervical, supraclavicular lymphadenopathy noted bilaterally  Chest:  Clear to auscultation bilaterally, no work of breathing noted on room air  CV: RRR, no M/G/R, pulses 2+  Abd:  Soft, NT, ND, +BS, ostomy  EXT:  no clubbing, no cyanosis, no edema  Neuro:  A&Ox3, CN grossly intact, no focal deficits.  Skin: No rashes or lesions noted jaundiced    Result Review    I have personally reviewed the results from the time of this admission to 2024 12:30 EST and agree with these findings:  [x]  Laboratory  [x]  Microbiology  [x]  Radiology  [x]  EKG/Telemetry   [x]   Cardiology/Vascular   []  Pathology  []  Old records  []  Other:  Most notable findings include:       Lab 01/11/24  0336 01/10/24  0257 01/09/24  0306 01/08/24  0548 01/07/24  0254 01/06/24  1149 01/06/24  0548 01/06/24  0221 01/05/24  1156 01/05/24  0433   WBC 9.27  --  11.28* 12.51* 15.46*  --   --  15.93*  --  18.83*   HEMOGLOBIN 8.1*  --  8.7* 8.3* 9.4*  --   --  8.4*  --  8.3*   HEMATOCRIT 24.8*  --  27.2* 25.3* 29.5*  --   --  26.1*  --  26.4*   PLATELETS 436  --  366 322 330  --   --  284  --  272   SODIUM 131* 132* 133* 132* 133* 135* 134* 136   < > 136   SODIUM, ARTERIAL  --   --   --   --   --   --   --   --    < >  --    POTASSIUM 4.4 5.2 4.4 4.8 5.5* 4.5 4.6 4.6   < > 5.3*   CHLORIDE 93* 95* 94* 96* 98 99 99 102   < > 101   CO2 23.0 19.1* 21.9* 20.2* 17.9* 19.5* 19.0* 18.0*   < > 18.8*   BUN 54* 70* 47* 56* 64* 49* 43* 43*   < > 41*   CREATININE 4.02* 4.82* 3.51* 3.89* 4.05* 3.20* 2.85* 2.91*   < > 3.01*   GLUCOSE 103* 89 89 100* 83 141* 125* 140*   < > 82   GLUCOSE, ARTERIAL  --   --   --   --   --   --   --   --    < >  --    CALCIUM 7.8* 8.0* 7.9* 7.9* 7.8* 7.8* 7.7* 7.5*   < > 7.8*   PHOSPHORUS 4.3 5.6* 4.9* 5.4* 5.8* 4.6* 4.9*  --    < > 5.2*   TOTAL PROTEIN 6.4 6.8 6.9 6.9  --   --   --  6.9  --  6.6   ALBUMIN 2.4* 2.4* 2.6* 2.6*  --  2.9* 2.8* 2.8*   < > 2.9*   GLOBULIN 4.0  --   --  4.3  --   --   --  4.1  --  3.7    < > = values in this interval not displayed.       Assessment & Plan   Assessment / Plan     Active Hospital Problems:  Active Hospital Problems    Diagnosis    • **Left flank pain    • Severe malnutrition    • S/P Exploratory laparotomy; appendectomy; left colon resection with creation of ostomy    • S/P Open repair of ruptured infrarenal abdominal aortic aneurysm    • Ruptured infrarenal abdominal aortic aneurysm (AAA)    • DVT, lower extremity, distal, acute, unspecified laterality    • ROLAND (acute kidney injury)      Impression:  Ruptured infrarenal AAA s/p open repair  Acute  blood loss anemia  Acute DVT with plan for IVC filter  Intra-abdominal hematoma  Hemorrhagic shock  Hypovolemic shock  Acute hypoxic respiratory failure requiring mechanical ventilation  Septic shock with Streptococcus pyogenes bacteremia  Moraxella pneumonia  Ischemic sigmoid colon s/p colostomy  History of RCC s/p partial nephrectomy on 11/13  Acute kidney injury secondary to acute tubular necrosis requiring renal replacement therapy  Clinically significant lactic acidosis  Chronically immunosuppressed on CellCept  Hypocalcemia  Hypokalemia resolved now with hyperkalemia  SVT  Lactic acidosis, clinically significant     Plan:  -Patient currently on room air  -Pleural effusion noted on imaging, continue to monitor as patient is currently on room air; patient to have fluid removed with HD-Continue heparin drip, unable to obtain IVC filter due to IVC thrombus/bilateral DVTs  -Monitor for any signs of bleeding, hemoglobin stable  -Continue midodrine 5 mg 3 times daily  -Significant elevation in bilirubin, nausea, right upper quadrant choked cholelithiasis and sludge in the gallbladder; continue to monitor  -GI on board, appreciate assistance  -PT/OT/speech  -Cortrak in place, tube feeds per dietitian  -Cleared per speech therapy however not meeting nutritional needs  -Nephrology following, HD per their service  -Plan for TDC placement today with vascular  -Continue as needed Percocet and fentanyl for pain  -Continue Unasyn to complete 14 days of therapy for bacteremia  -Hold home chronic immunosuppression CellCept indefinitely  -Start MVI, thiamine  -Dietitian on board.  Starting protein supplements and D50 pushes scheduled to help with protein and calorie intake given intolerance of tube feeds  -Patient would benefit from LTAC    PUP PPx: Protonix  DVT prophylaxis: Planning for IVC filter    Medical and mechanical DVT prophylaxis orders are present.    CODE STATUS:   Level Of Support Discussed With: Patient  Code  Status (Patient has no pulse and is not breathing): CPR (Attempt to Resuscitate)  Medical Interventions (Patient has pulse or is breathing): Full Support    I, Trinidad Lr Pa-C, spent 15 minutes critical care time in accordance to split shared billing.Electronically signed by ROEL Negro, 01/11/24, 12:39 PM EST.    Patient is critically ill with ruptured AAA status post repair, hyperbilirubinemia, transaminitis, Moraxella pneumonia, group A strep bacteremia, renal failure requiring HD.  35 minutes of critical care time was spent managing this patient, excluding procedures. Of this time, I, Dr. Reno Jeffery, spent 20 minutes and ROEL Lopez, spent 15 minutes in accordance with split shared billing. This included personally reviewing all pertinent labs, imaging, microbiology and documentation. Also discussing the case with the patient and any available family, the admitting physician and any available ancillary staff. Electronically signed by Reno Jeffery MD, 01/11/24, 2:04 PM EST.     Age appropriate behavior

## (undated) DEVICE — DEV OPN LIGASURE CRV 180D 36MM 13.5CM  1P/U

## (undated) DEVICE — SLV SCD KN/LEN ADJ EXPRSS BLENDED MD 1P/U

## (undated) DEVICE — PROXIMATE RH ROTATING HEAD SKIN STAPLERS (35 WIDE) CONTAINS 35 STAINLESS STEEL STAPLES: Brand: PROXIMATE

## (undated) DEVICE — TOWEL,OR,DSP,ST,BLUE,STD,4/PK,20PK/CS: Brand: MEDLINE

## (undated) DEVICE — ABDOMINAL BINDER, ILIO BYPASS: Brand: DEROYAL

## (undated) DEVICE — VIOLET POLYDIOXANONE POLYMER, SYNTHETIC ABSORBABLE SUTURE CLIPS: Brand: LAPRA-TY

## (undated) DEVICE — Y-TYPE TUR/BLADDER IRRIGATION SET, REGULATING CLAMP

## (undated) DEVICE — SUT SILK 3/0 SH CR8 18IN C013D

## (undated) DEVICE — SUT PDS 1 XLH LP 99IN Z881G

## (undated) DEVICE — SOL IRR NACL 0.9PCT BT 1000ML

## (undated) DEVICE — TROCAR: Brand: KII FIOS FIRST ENTRY

## (undated) DEVICE — BIOPATCH™ ANTIMICROBIAL DRESSING WITH CHLORHEXIDINE GLUCONATE IS A HYDROPHILLIC POLYURETHANE ABSORPTIVE FOAM WITH CHLORHEXIDINE GLUCONATE (CHG) WHICH INHIBITS BACTERIAL GROWTH UNDER THE DRESSING. THE DRESSING IS INTENDED TO BE USED TO ABSORB EXUDATE, COVER A WOUND CAUSED BY VASCULAR AND NONVASCULAR PERCUTANEOUS MEDICAL DEVICES DURING SURGERY, AS WELL AS REDUCE LOCAL INFECTION AND COLONIZATION OF MICROORGANISMS.: Brand: BIOPATCH

## (undated) DEVICE — GLV SURG SENSICARE PI ORTHO PF SZ7 LF STRL

## (undated) DEVICE — SKIN PREP TRAY W/CHG: Brand: MEDLINE INDUSTRIES, INC.

## (undated) DEVICE — VASCULAR ACCESS-LF: Brand: MEDLINE INDUSTRIES, INC.

## (undated) DEVICE — PENCL E/S SMOKEEVAC W/TELESCP CANN

## (undated) DEVICE — STERILE POLYISOPRENE POWDER-FREE SURGICAL GLOVES: Brand: PROTEXIS

## (undated) DEVICE — SUT SILK B 3/0 SA84H

## (undated) DEVICE — DRAPE,LAPAROTOMY,PCH,STERILE: Brand: MEDLINE

## (undated) DEVICE — GLV SURG SENSICARE PI PF LF 7 GRN STRL

## (undated) DEVICE — SYS IRR PUMP SGL ACTN VAC SYR 10CC

## (undated) DEVICE — SOL IRR NACL 0.9PCT 3000ML

## (undated) DEVICE — BASIC SINGLE BASIN-LF: Brand: MEDLINE INDUSTRIES, INC.

## (undated) DEVICE — NON-WOVEN ADHESIVE WOUND DRESSING: Brand: PRIMAPORE ADHESIVE WOUND DRSG 7.2*5CM

## (undated) DEVICE — ELECTRD BLD EXT EDGE/INSUL 6IN

## (undated) DEVICE — SUT VIC PLS CTD BR 0 TIE 18IN VIL

## (undated) DEVICE — DRSNG WND BORDR/ADHS NONADHR/GZ LF 4X14IN STRL

## (undated) DEVICE — SYR LL TP 10ML STRL

## (undated) DEVICE — LIPIGUARD SB REINFUSION FILTER FOR SALVAGED BLOOD: Brand: LIPIGUARD SB

## (undated) DEVICE — SOL NACL INJ .9PCT 500ML

## (undated) DEVICE — LAPAROSCOPIC SCISSORS: Brand: EPIX LAPAROSCOPIC SCISSORS

## (undated) DEVICE — SUT PROLN 4/0 SH D/A 36IN 8521H

## (undated) DEVICE — MEDI-VAC NON-CONDUCTIVE SUCTION TUBING: Brand: CARDINAL HEALTH

## (undated) DEVICE — SOL NS 500ML

## (undated) DEVICE — KT CANSTR VAC WND W/ISOLYSER SENSATRAC 500CC 5CS

## (undated) DEVICE — FLTR VC CELECT PLAT FEM W NAVALIGN DEL 7F: Type: IMPLANTABLE DEVICE | Status: NON-FUNCTIONAL

## (undated) DEVICE — SUT SILK 2/0 30IN A305H

## (undated) DEVICE — RADIFOCUS GLIDECATH: Brand: GLIDECATH

## (undated) DEVICE — PINNACLE INTRODUCER SHEATH: Brand: PINNACLE

## (undated) DEVICE — SUT VIC 2/0 SH 27IN

## (undated) DEVICE — GUIDEWIRE WITH ICE™ HYDROPHILIC COATING: Brand: V-18™ CONTROL WIRE™

## (undated) DEVICE — DAVINCI-LF: Brand: MEDLINE INDUSTRIES, INC.

## (undated) DEVICE — GLV SURG SENSICARE PI ORTHO SZ7.5 LF STRL

## (undated) DEVICE — TOTAL TRAY, 16FR 10ML SIL FOLEY, URN: Brand: MEDLINE

## (undated) DEVICE — BLAKE SILICONE DRAIN, 19 FR ROUND, HUBLESS WITH 1/4" TROCAR: Brand: BLAKE

## (undated) DEVICE — GLV SURG BIOGEL LTX PF 7 1/2

## (undated) DEVICE — FLEXIBLE YANKAUER,MEDIUM TIP, NO VACUUM CONTROL: Brand: ARGYLE

## (undated) DEVICE — MAJOR-LF: Brand: MEDLINE INDUSTRIES, INC.

## (undated) DEVICE — SUT VIC 3/0 SH 27IN J416H

## (undated) DEVICE — CYSTO PACK: Brand: MEDLINE INDUSTRIES, INC.

## (undated) DEVICE — BLANKT WARM PACU MISTRAL/AIR PREM REFL A/ 85.8X50IN

## (undated) DEVICE — SOL IRR H2O BTL 1000ML STRL

## (undated) DEVICE — SUT SILK 2/0 SH 75CM 30IN BLK C016D

## (undated) DEVICE — CS5/5+ FASTPACK, 225ML 150U RES: Brand: HAEMONETICS CELL SAVER 5/5+ SYSTEMS

## (undated) DEVICE — LAPAROSCOPIC TROCAR SLEEVE/SINGLE USE: Brand: KII® OPTICAL ACCESS SYSTEM

## (undated) DEVICE — SUT MNCRYL 4/0 PS2 18 IN

## (undated) DEVICE — NITINOL WIRE WITH HYDROPHILIC TIP: Brand: SENSOR

## (undated) DEVICE — TP UMB COTN 30IN U11T

## (undated) DEVICE — GLV SURG SENSICARE SLT PF LF 7 STRL

## (undated) DEVICE — GLV SURG BIOGEL LTX PF 7

## (undated) DEVICE — SOL NACL 0.9PCT 1000ML

## (undated) DEVICE — SYR LUERLOK 30CC

## (undated) DEVICE — GAUZE,PACKING STRIP,PLAIN,1/4"X5YD,STRL: Brand: CURAD

## (undated) DEVICE — THE FLOSEAL MALLEABLE TIP AND TRIMMABLE TIP ARE INTENDED FOR DELIVERY OF FLOSEAL HEMOSTATIC MATRIX.: Brand: FLOSEAL SPECIAL APPLICATOR TIPS

## (undated) DEVICE — 3M™ IOBAN™ 2 ANTIMICROBIAL INCISE DRAPE 6650EZ: Brand: IOBAN™ 2

## (undated) DEVICE — INTENDED FOR TISSUE SEPARATION, AND OTHER PROCEDURES THAT REQUIRE A SHARP SURGICAL BLADE TO PUNCTURE OR CUT.: Brand: BARD-PARKER ® CARBON RIB-BACK BLADES

## (undated) DEVICE — KT ANTI FOG W/FLD AND SPNG

## (undated) DEVICE — CLAMP INSERT: Brand: STEALTH® CLAMP INSERT

## (undated) DEVICE — ELECTRD BLD EDGE COAT 3IN

## (undated) DEVICE — GW ULTRATRACK HYBRID STR/TP .035IN 3X150CM EA/5

## (undated) DEVICE — TIP COVER ACCESSORY

## (undated) DEVICE — KT DRP SPY/PHI W/ICG 25MG STRL

## (undated) DEVICE — TISSUE RETRIEVAL SYSTEM: Brand: INZII RETRIEVAL SYSTEM

## (undated) DEVICE — SUT PROLN 3/0 SH 48IN BLU 8534H

## (undated) DEVICE — KT INTRO MIC VSI SMOTH STFF 4F 40CM 7CM

## (undated) DEVICE — CANNULA SEAL

## (undated) DEVICE — 2, DISPOSABLE SUCTION/IRRIGATOR WITHOUT DISPOSABLE TIP: Brand: STRYKEFLOW

## (undated) DEVICE — GAMMEX® NON-LATEX SIZE 7.5, STERILE NEOPRENE POWDER-FREE SURGICAL GLOVE: Brand: GAMMEX

## (undated) DEVICE — HEMOSPLIT XK HEMODIALYSIS CATH, ST, 16 FR. 23CM: Brand: HEMOSPLIT XK LONG-TERM HEMODIALYSIS CATHETER

## (undated) DEVICE — CATH URETRL OPEN END W/CONNECT 5F 70CM

## (undated) DEVICE — SUT PROLN 6/0 BV1 D/A 30IN 8709H

## (undated) DEVICE — SUT VIC 0 UR6 27IN VCP603H

## (undated) DEVICE — ENDOSCOPIC VALVE WITH ADAPTER.: Brand: SURSEAL® II

## (undated) DEVICE — Device

## (undated) DEVICE — GAUZE,SPONGE,4"X4",16PLY,STRL,LF,10/TRAY: Brand: MEDLINE

## (undated) DEVICE — ANTIBACTERIAL VIOLET BRAIDED (POLYGLACTIN 910), SYNTHETIC ABSORBABLE SUTURE: Brand: COATED VICRYL

## (undated) DEVICE — GW STARTER JB STR .035 15X180CM

## (undated) DEVICE — SUT NLY 2/0 664G

## (undated) DEVICE — DEV OPN LIGASURE SM/JAW 28D 16.5MM 18.8CM 1P/U

## (undated) DEVICE — ELECTRD BLD EDGE/INSUL1P 2.4X5.1MM STRL

## (undated) DEVICE — STERILE POLYISOPRENE POWDER-FREE SURGICAL GLOVES WITH EMOLLIENT COATING: Brand: PROTEXIS

## (undated) DEVICE — SUT SILK 2/0 TIES 18IN A185H

## (undated) DEVICE — PREVENA PLUS PEEL & PLACE SYSTEM KIT- 35 CM: Brand: PREVENA  PLUS™ PEEL & PLACE™

## (undated) DEVICE — SUT PROLN 5/0 C1 D/A 36IN 8720H

## (undated) DEVICE — RADIFOCUS GLIDEWIRE ADVANTAGE GUIDEWIRE: Brand: GLIDEWIRE ADVANTAGE

## (undated) DEVICE — CATH URETRL FLXITP POLLACK STD 5F 70CM

## (undated) DEVICE — ARM DRAPE

## (undated) DEVICE — NON-WOVEN ADHESIVE WOUND DRESSING: Brand: PRIMAPORE ADHESIVE DRESSING 10*8CM

## (undated) DEVICE — SUT MNCRYL PLS ANTIB UD 4/0 PS2 18IN

## (undated) DEVICE — DRSNG WND VAC GRANUFOAM THN 26X15X1.6CM

## (undated) DEVICE — ABDOMINAL VASCULAR-LF: Brand: MEDLINE INDUSTRIES, INC.